# Patient Record
Sex: FEMALE | Race: WHITE | NOT HISPANIC OR LATINO | Employment: OTHER | URBAN - METROPOLITAN AREA
[De-identification: names, ages, dates, MRNs, and addresses within clinical notes are randomized per-mention and may not be internally consistent; named-entity substitution may affect disease eponyms.]

---

## 2017-05-22 ENCOUNTER — TELEPHONE (OUTPATIENT)
Dept: OBSTETRICS AND GYNECOLOGY | Facility: CLINIC | Age: 67
End: 2017-05-22

## 2017-05-22 NOTE — TELEPHONE ENCOUNTER
Returned call to [facility]. Questions regarding prescription were answered. Verbalized understanding.

## 2017-05-22 NOTE — TELEPHONE ENCOUNTER
----- Message from Afia Almaraz sent at 5/22/2017  2:41 PM CDT -----  Contact: [employer]/[phone]  They are calling about some questions they have about estrogens, conjugated, (PREMARIN) 0.625 MG tablet with a reference number of [id number].  Please advise

## 2017-09-07 RX ORDER — ESTROGENS, CONJUGATED 0.62 MG/1
0.62 TABLET, FILM COATED ORAL DAILY
Qty: 30 TABLET | Refills: 11 | OUTPATIENT
Start: 2017-09-07

## 2017-09-07 NOTE — TELEPHONE ENCOUNTER
----- Message from Latricia Johns sent at 9/7/2017 11:05 AM CDT -----  Contact: [phone]/[facility]   Pharmacy  requesting a refill on rx estrogens, conjugated, (PREMARIN) 0.625 MG tablet  . Please advise

## 2017-09-07 NOTE — TELEPHONE ENCOUNTER
Returned call to pharmacy. Pharmacy given refill authorization for a one month refill and notified to notify the patient that she should schedule and annual visit. Verbalized understanding.

## 2017-09-12 ENCOUNTER — TELEPHONE (OUTPATIENT)
Dept: OBSTETRICS AND GYNECOLOGY | Facility: CLINIC | Age: 67
End: 2017-09-12

## 2017-09-12 DIAGNOSIS — Z12.31 VISIT FOR SCREENING MAMMOGRAM: Primary | ICD-10-CM

## 2017-09-12 NOTE — TELEPHONE ENCOUNTER
----- Message from Lori Williamson sent at 9/12/2017  8:52 AM CDT -----  Contact: [phone]  Patient is  requesting an order for a mammogram

## 2017-10-02 ENCOUNTER — HOSPITAL ENCOUNTER (OUTPATIENT)
Dept: RADIOLOGY | Facility: HOSPITAL | Age: 67
Discharge: HOME | End: 2017-10-02
Attending: OBSTETRICS & GYNECOLOGY
Payer: MEDICARE

## 2017-10-02 ENCOUNTER — OFFICE VISIT (OUTPATIENT)
Dept: OBSTETRICS AND GYNECOLOGY | Facility: CLINIC | Age: 67
End: 2017-10-02
Payer: MEDICARE

## 2017-10-02 VITALS
HEIGHT: 64 IN | BODY MASS INDEX: 23.18 KG/M2 | DIASTOLIC BLOOD PRESSURE: 74 MMHG | WEIGHT: 135.81 LBS | SYSTOLIC BLOOD PRESSURE: 132 MMHG

## 2017-10-02 VITALS — WEIGHT: 124 LBS | HEIGHT: 64 IN | BODY MASS INDEX: 21.17 KG/M2

## 2017-10-02 DIAGNOSIS — Z12.4 ROUTINE PAPANICOLAOU SMEAR: Primary | ICD-10-CM

## 2017-10-02 DIAGNOSIS — Z12.31 VISIT FOR SCREENING MAMMOGRAM: ICD-10-CM

## 2017-10-02 PROCEDURE — 77063 BREAST TOMOSYNTHESIS BI: CPT | Mod: 26,,, | Performed by: RADIOLOGY

## 2017-10-02 PROCEDURE — 77067 SCR MAMMO BI INCL CAD: CPT | Mod: 26,,, | Performed by: RADIOLOGY

## 2017-10-02 PROCEDURE — 99212 OFFICE O/P EST SF 10 MIN: CPT | Mod: PBBFAC,PO | Performed by: OBSTETRICS & GYNECOLOGY

## 2017-10-02 PROCEDURE — 77067 SCR MAMMO BI INCL CAD: CPT | Mod: TC

## 2017-10-02 PROCEDURE — G0101 CA SCREEN;PELVIC/BREAST EXAM: HCPCS | Mod: S$PBB,,, | Performed by: OBSTETRICS & GYNECOLOGY

## 2017-10-02 PROCEDURE — 88175 CYTOPATH C/V AUTO FLUID REDO: CPT

## 2017-10-02 PROCEDURE — 99999 PR PBB SHADOW E&M-EST. PATIENT-LVL II: CPT | Mod: PBBFAC,,, | Performed by: OBSTETRICS & GYNECOLOGY

## 2017-10-02 NOTE — PROGRESS NOTES
"  HPI:  66 y.o.   OB History      Para Term  AB Living    0 0 0 0 0 0    SAB TAB Ectopic Multiple Live Births    0 0 0 0         No LMP recorded. Patient has had a hysterectomy.   Patient here for her annual gynecologic exam.  She has no complaints at this time.    ROS:  GENERAL: No fever, chills, fatigability or weight loss.  SKIN: No rashes, itching or changes in color or texture of skin.  HEAD: No headaches or recent head trauma.  EYES: Visual acuity fine. No photophobia, ocular pain or diplopia.  EARS: Denies ear pain, discharge or vertigo.  NOSE: No loss of smell, no epistaxis or postnasal drip.  MOUTH & THROAT: No hoarseness or change in voice. No excessive gum bleeding.  NODES: Denies swollen glands.  CHEST: Denies MILLAN, cyanosis, wheezing, cough and sputum production.  CARDIOVASCULAR: Denies chest pain, PND, orthopnea or reduced exercise tolerance.  ABDOMEN: Appetite fine. No weight loss. Denies diarrhea, abdominal pain, hematemesis or blood in stool.  URINARY: No flank pain, dysuria or hematuria.  PERIPHERAL VASCULAR: No claudication or cyanosis.  MUSCULOSKELETAL: No joint stiffness or swelling. Denies back pain.  NEUROLOGIC: No history of seizures, paralysis, alteration of gait or coordination.    PE:   /74   Ht 5' 4" (1.626 m)   Wt 61.6 kg (135 lb 12.9 oz)   BMI 23.31 kg/m²   APPEARANCE: Well nourished, well developed, in no acute distress.  NECK: Neck symmetric without masses or thyromegaly.  NODES: No inguinal lymph node enlargement.  ABDOMEN: Soft. No tenderness or masses. No hepatosplenomegaly. No hernias.  BREASTS: Symmetrical, no skin changes or visible lesions. No palpable masses, nipple discharge or adenopathy bilaterally.  PELVIC: Normal external female genitalia without lesions. Normal hair distribution. Adequate perineal body, normal urethral meatus. Vagina moist and well rugated without lesions or discharge. No significant cystocele or rectocele. Uterus and cervix " surgically absent. Bimanual exam revealed no masses, tenderness or abnormality.    Procedure:  Pap Smear    Assessment:  Normal Gynecologic Exam    Plan:  Mammogram and Colonoscopy as per current recommendations.   Return to clinic in one year or for any problems or complaints.  Ov out  On hrt, premarin  rx fioricet 3

## 2017-11-16 RX ORDER — BUTALBITAL, ACETAMINOPHEN, CAFFEINE AND CODEINE PHOSPHATE 50; 325; 40; 30 MG/1; MG/1; MG/1; MG/1
CAPSULE ORAL
Qty: 30 EACH | Refills: 0 | OUTPATIENT
Start: 2017-11-16

## 2017-11-16 NOTE — TELEPHONE ENCOUNTER
"After looking into pt chart it was found that ayana Hardy gave pt a Rx for fioricet with codeine on 11/14/17. She was given #40. I then called [facility] [employer] where it shows the medication was filled to verify if the medication was picked up by the pt and they confirmed that the medication was picked up by the pt. I called the patient back and notified her that her chart shows that she just had #40 fioricet with codeine sent in on 11/14/17 and she said "No". I advised her that I have already called the pharmacy and confirmed with them that she did indeed  the medicine and that because of that Dr. Wiedemann would not be able to fill her medicine. Pt said "ok, bye" and hung up.   "

## 2017-11-16 NOTE — TELEPHONE ENCOUNTER
----- Message from Chidi Mchugh sent at 11/16/2017  1:12 PM CST -----  Contact: [phone] self  Patient would like a refill of Fioricet # 3 sent to [facility] @ [phone] refill # [phone]. Patient is requesting a call back. Please call and advise.

## 2017-11-16 NOTE — TELEPHONE ENCOUNTER
----- Message from Chidi Mchugh sent at 11/16/2017  8:42 AM CST -----  Contact: [phone] self  Patient would like a refill of Fioricet # 3 sent to [facility] @ [phone] refill # [id number]. Please call and advise.

## 2018-04-11 ENCOUNTER — TELEPHONE (OUTPATIENT)
Dept: OBSTETRICS AND GYNECOLOGY | Facility: CLINIC | Age: 68
End: 2018-04-11

## 2018-04-11 NOTE — TELEPHONE ENCOUNTER
----- Message from Boo Hugo sent at 4/11/2018  9:33 AM CDT -----  Contact: [phone]/self  Pt requesting to speak with you concerning her insurance company sending her a letter stating she needs a prior authorization for estrogens, conjugated, (PREMARIN) 0.625 MG tablet being on May 22, 2018.     Please call and advise

## 2018-04-11 NOTE — TELEPHONE ENCOUNTER
Returned patients call.  Patient states she got a letter and PA form from insurance stating they will no longer pay for her medication PREMARIN after May 22. Informed her she can fax us the PA form and we will fill it out and fax it back to her insurance for them to approve or deny. Patient states she cannot fax but she will mail it to us and put it attention to Doris Middleton.

## 2018-05-02 ENCOUNTER — TELEPHONE (OUTPATIENT)
Dept: OBSTETRICS AND GYNECOLOGY | Facility: CLINIC | Age: 68
End: 2018-05-02

## 2018-05-02 NOTE — TELEPHONE ENCOUNTER
Pt notified we did not get anything in the mail. She says she needs something filled out to get her premarin. Pt advised it would be better if it could be faxed. Pt will have it faxed

## 2018-05-02 NOTE — TELEPHONE ENCOUNTER
----- Message from Ruby Marie sent at 5/2/2018  8:49 AM CDT -----  Contact: Self [phone]  Patient is calling to talk to nurse concerning to see if you have received the paper work she mailed to the office concerning her medication. Please advice

## 2018-05-03 ENCOUNTER — TELEPHONE (OUTPATIENT)
Dept: OBSTETRICS AND GYNECOLOGY | Facility: CLINIC | Age: 68
End: 2018-05-03

## 2018-05-03 NOTE — TELEPHONE ENCOUNTER
----- Message from Yaneth Olivares sent at 5/3/2018 11:31 AM CDT -----  Contact: Geovanna with [employer] [phone]  Geovanna with [employer] [phone] calling about the below medication. Please advise     Ref # [id number]      Premarin  ( Medication )     They have the below questions.    Needs her Diagnosis , Aware it is high risk for her age , Is pt in hospice ?

## 2018-05-04 ENCOUNTER — TELEPHONE (OUTPATIENT)
Dept: OBSTETRICS AND GYNECOLOGY | Facility: CLINIC | Age: 68
End: 2018-05-04

## 2018-05-04 NOTE — TELEPHONE ENCOUNTER
----- Message from Roslyn Wong sent at 5/4/2018  9:29 AM CDT -----  Contact: [employer]/[phone]  Medicare would like to speak with you about Prior Authorization for estrogens, conjugated, (PREMARIN) 0.625 MG tablet. Please advise.

## 2018-05-07 ENCOUNTER — TELEPHONE (OUTPATIENT)
Dept: OBSTETRICS AND GYNECOLOGY | Facility: CLINIC | Age: 68
End: 2018-05-07

## 2018-05-07 NOTE — TELEPHONE ENCOUNTER
----- Message from Tati Lizarraga sent at 5/7/2018  9:24 AM CDT -----  Contact: Self. [phone]  Patient would like to speak with you about a personal matter. Please advise

## 2018-05-07 NOTE — TELEPHONE ENCOUNTER
----- Message from Yaneth Olivares sent at 5/7/2018  1:14 PM CDT -----  Contact: Bobbi - [phone] with [employer]  Bobbi - [phone] with [employer], has clinical questions about the Premarin. Please advise

## 2018-05-07 NOTE — TELEPHONE ENCOUNTER
Returned patients call.   Patient is calling requesting prior authorization for her medication. Patient was notified that the office will work on this and let her know the status. Patient verbalized understanding.     [phone]

## 2018-05-08 NOTE — TELEPHONE ENCOUNTER
----- Message from Chidi Mchugh sent at 5/8/2018  8:36 AM CDT -----  Contact: jessica with [facility] [phone]  Rep called in returning your call. Please advise.

## 2018-10-06 RX ORDER — ESTROGENS, CONJUGATED 0.62 MG/1
TABLET, FILM COATED ORAL
Qty: 30 TABLET | Refills: 4 | Status: SHIPPED | OUTPATIENT
Start: 2018-10-06 | End: 2019-05-06 | Stop reason: SDUPTHER

## 2019-04-01 ENCOUNTER — TELEPHONE (OUTPATIENT)
Dept: OBSTETRICS AND GYNECOLOGY | Facility: CLINIC | Age: 69
End: 2019-04-01

## 2019-04-01 NOTE — TELEPHONE ENCOUNTER
----- Message from Yaneth Olivares sent at 4/1/2019 10:13 AM CDT -----  Contact: self / [phone]  Patient called regarding, she called to tell you that her  passed away, and she will come in to see you when things get settled in. Please advise

## 2019-05-06 DIAGNOSIS — Z12.39 SCREENING BREAST EXAMINATION: Primary | ICD-10-CM

## 2019-05-06 NOTE — TELEPHONE ENCOUNTER
----- Message from Luc Blackburn sent at 5/6/2019  1:06 PM CDT -----  Contact: self/[phone]  Type:  Mammogram    Caller is requesting to schedule their annual mammogram appointment.  Order is not listed in EPIC.  Please enter order and contact patient to schedule.  Name of Caller: Juan Antonio  Where would they like the mammogram performed? [facility]  Would the patient rather a call back or a response via [facility]? Call back  Best Call Back Number: [phone]  Additional Information:  She also needs her PREMARIN 0.625 mg tablet sent to the [facility] [address] in [place]

## 2019-05-13 ENCOUNTER — OFFICE VISIT (OUTPATIENT)
Dept: OBSTETRICS AND GYNECOLOGY | Facility: CLINIC | Age: 69
End: 2019-05-13
Payer: MEDICARE

## 2019-05-13 VITALS
WEIGHT: 135.81 LBS | SYSTOLIC BLOOD PRESSURE: 132 MMHG | HEIGHT: 64 IN | BODY MASS INDEX: 23.18 KG/M2 | DIASTOLIC BLOOD PRESSURE: 74 MMHG

## 2019-05-13 DIAGNOSIS — Z01.419 WELL WOMAN EXAM WITH ROUTINE GYNECOLOGICAL EXAM: ICD-10-CM

## 2019-05-13 DIAGNOSIS — Z12.4 ROUTINE PAPANICOLAOU SMEAR: Primary | ICD-10-CM

## 2019-05-13 PROCEDURE — G0101 CA SCREEN;PELVIC/BREAST EXAM: HCPCS | Mod: S$PBB,,, | Performed by: OBSTETRICS & GYNECOLOGY

## 2019-05-13 PROCEDURE — 99999 PR PBB SHADOW E&M-EST. PATIENT-LVL III: CPT | Mod: PBBFAC,,, | Performed by: OBSTETRICS & GYNECOLOGY

## 2019-05-13 PROCEDURE — 99999 PR PBB SHADOW E&M-EST. PATIENT-LVL III: ICD-10-PCS | Mod: PBBFAC,,, | Performed by: OBSTETRICS & GYNECOLOGY

## 2019-05-13 PROCEDURE — 99213 OFFICE O/P EST LOW 20 MIN: CPT | Mod: PBBFAC,PO | Performed by: OBSTETRICS & GYNECOLOGY

## 2019-05-13 PROCEDURE — G0101 PR CA SCREEN;PELVIC/BREAST EXAM: ICD-10-PCS | Mod: S$PBB,,, | Performed by: OBSTETRICS & GYNECOLOGY

## 2019-05-13 PROCEDURE — G0101 CA SCREEN;PELVIC/BREAST EXAM: HCPCS | Mod: PBBFAC,PO | Performed by: OBSTETRICS & GYNECOLOGY

## 2019-05-13 PROCEDURE — 88175 CYTOPATH C/V AUTO FLUID REDO: CPT

## 2019-05-13 RX ORDER — BUTALBITAL, ASPIRIN, CAFFEINE AND CODEINE PHOSPHATE 50; 325; 40; 30 MG/1; MG/1; MG/1; MG/1
1 CAPSULE ORAL EVERY 6 HOURS PRN
Qty: 20 CAPSULE | Refills: 0 | Status: SHIPPED | OUTPATIENT
Start: 2019-05-13 | End: 2019-05-23

## 2019-05-13 NOTE — PROGRESS NOTES
"  HPI:  68 y.o.   OB History        0    Para   0    Term   0       0    AB   0    Living   0       SAB   0    TAB   0    Ectopic   0    Multiple   0    Live Births                  No LMP recorded. Patient has had a hysterectomy.   Patient here for her annual gynecologic exam.  She has no complaints at this time.    ROS:  GENERAL: No fever, chills, fatigability or weight loss.  SKIN: No rashes, itching or changes in color or texture of skin.  HEAD: No headaches or recent head trauma.  EYES: Visual acuity fine. No photophobia, ocular pain or diplopia.  EARS: Denies ear pain, discharge or vertigo.  NOSE: No loss of smell, no epistaxis or postnasal drip.  MOUTH & THROAT: No hoarseness or change in voice. No excessive gum bleeding.  NODES: Denies swollen glands.  CHEST: Denies MILLAN, cyanosis, wheezing, cough and sputum production.  CARDIOVASCULAR: Denies chest pain, PND, orthopnea or reduced exercise tolerance.  ABDOMEN: Appetite fine. No weight loss. Denies diarrhea, abdominal pain, hematemesis or blood in stool.  URINARY: No flank pain, dysuria or hematuria.  PERIPHERAL VASCULAR: No claudication or cyanosis.  MUSCULOSKELETAL: No joint stiffness or swelling. Denies back pain.  NEUROLOGIC: No history of seizures, paralysis, alteration of gait or coordination.    PE:   /74   Ht 5' 4" (1.626 m)   Wt 61.6 kg (135 lb 12.9 oz)   BMI 23.31 kg/m²   APPEARANCE: Well nourished, well developed, in no acute distress.  NECK: Neck symmetric without masses or thyromegaly.  NODES: No inguinal lymph node enlargement.  ABDOMEN: Soft. No tenderness or masses. No hepatosplenomegaly. No hernias.  BREASTS: Symmetrical, no skin changes or visible lesions. No palpable masses, nipple discharge or adenopathy bilaterally.  PELVIC: Normal external female genitalia without lesions. Normal hair distribution. Adequate perineal body, normal urethral meatus. Vagina moist and well rugated without lesions or discharge. No " significant cystocele or rectocele. Uterus and cervix surgically absent. Bimanual exam revealed no masses, tenderness or abnormality.    Procedure:  Pap Smear    Assessment:  Normal Gynecologic Exam    Plan:  Mammogram and Colonoscopy as per current recommendations.   Return to clinic in one year or for any problems or complaints.  Doing well on premarin

## 2019-05-21 ENCOUNTER — HOSPITAL ENCOUNTER (OUTPATIENT)
Dept: RADIOLOGY | Facility: HOSPITAL | Age: 69
Discharge: HOME | End: 2019-05-21
Attending: OBSTETRICS & GYNECOLOGY
Payer: MEDICARE

## 2019-05-21 DIAGNOSIS — Z12.39 SCREENING BREAST EXAMINATION: ICD-10-CM

## 2019-05-21 PROCEDURE — 77067 MAMMO DIGITAL SCREENING BILAT WITH TOMOSYNTHESIS_CAD: ICD-10-PCS | Mod: 26,,, | Performed by: RADIOLOGY

## 2019-05-21 PROCEDURE — 77067 SCR MAMMO BI INCL CAD: CPT | Mod: 26,,, | Performed by: RADIOLOGY

## 2019-05-21 PROCEDURE — 77067 SCR MAMMO BI INCL CAD: CPT | Mod: TC

## 2019-05-21 PROCEDURE — 77063 MAMMO DIGITAL SCREENING BILAT WITH TOMOSYNTHESIS_CAD: ICD-10-PCS | Mod: 26,,, | Performed by: RADIOLOGY

## 2019-05-21 PROCEDURE — 77063 BREAST TOMOSYNTHESIS BI: CPT | Mod: 26,,, | Performed by: RADIOLOGY

## 2019-06-07 ENCOUNTER — TELEPHONE (OUTPATIENT)
Dept: OBSTETRICS AND GYNECOLOGY | Facility: CLINIC | Age: 69
End: 2019-06-07

## 2019-06-07 NOTE — TELEPHONE ENCOUNTER
Left message to return call  Pt insurance is not covering premarin without a PA. Will need to let pt know we can send rx to the [facility] pharmacy so PA can be completed

## 2019-06-07 NOTE — TELEPHONE ENCOUNTER
----- Message from Chidi Mchugh sent at 6/7/2019 12:55 PM CDT -----  Contact: riley mccann [phone]  Pharmacist requested to speak with the nurse because the estrogens, conjugated, (PREMARIN) 0.625 MG tablet is not covered under the patient insurance without a prior auth. Please call.

## 2019-06-11 ENCOUNTER — TELEPHONE (OUTPATIENT)
Dept: OBSTETRICS AND GYNECOLOGY | Facility: CLINIC | Age: 69
End: 2019-06-11

## 2019-06-11 NOTE — TELEPHONE ENCOUNTER
Pt state her insurance will call to see if pt need premarin. Pt was notified if the insurance call we will let her know

## 2019-06-11 NOTE — TELEPHONE ENCOUNTER
----- Message from Jeni Morales sent at 6/11/2019  8:54 AM CDT -----  Contact: [phone]/self  Patient requesting to speak with you regarding medication (PREMARIN) 0.625 MG tablet. She wants to keep it at [facility]. Please advise.

## 2019-06-11 NOTE — TELEPHONE ENCOUNTER
----- Message from Boo Hugo sent at 6/11/2019  1:46 PM CDT -----  Contact: Mar w/[employer] [phone]   Mar states she has clinical questions concerning a prior authorization received by the Dr.  Please call back to assist at [phone]

## 2019-10-02 ENCOUNTER — HOSPITAL ENCOUNTER (EMERGENCY)
Facility: HOSPITAL | Age: 69
Discharge: HOME | End: 2019-10-02
Attending: EMERGENCY MEDICINE
Payer: MEDICARE

## 2019-10-02 VITALS
HEART RATE: 78 BPM | DIASTOLIC BLOOD PRESSURE: 58 MMHG | HEIGHT: 64 IN | TEMPERATURE: 98 F | WEIGHT: 130 LBS | RESPIRATION RATE: 19 BRPM | OXYGEN SATURATION: 100 % | SYSTOLIC BLOOD PRESSURE: 125 MMHG | BODY MASS INDEX: 22.2 KG/M2

## 2019-10-02 DIAGNOSIS — S92.302A CLOSED DISPLACED FRACTURE OF METATARSAL BONE OF LEFT FOOT, UNSPECIFIED METATARSAL, INITIAL ENCOUNTER: ICD-10-CM

## 2019-10-02 DIAGNOSIS — M79.672 LEFT FOOT PAIN: ICD-10-CM

## 2019-10-02 DIAGNOSIS — S93.325A LISFRANC DISLOCATION, LEFT, INITIAL ENCOUNTER: Primary | ICD-10-CM

## 2019-10-02 DIAGNOSIS — S92.302A CLOSED NONDISPLACED FRACTURE OF METATARSAL BONE OF LEFT FOOT, UNSPECIFIED METATARSAL, INITIAL ENCOUNTER: ICD-10-CM

## 2019-10-02 DIAGNOSIS — M25.572 LEFT ANKLE PAIN: ICD-10-CM

## 2019-10-02 PROCEDURE — 99284 EMERGENCY DEPT VISIT MOD MDM: CPT | Mod: 25,,, | Performed by: EMERGENCY MEDICINE

## 2019-10-02 PROCEDURE — 99284 PR EMERGENCY DEPT VISIT,LEVEL IV: ICD-10-PCS | Mod: 25,,, | Performed by: EMERGENCY MEDICINE

## 2019-10-02 PROCEDURE — 25000003 PHARM REV CODE 250: Performed by: EMERGENCY MEDICINE

## 2019-10-02 PROCEDURE — 99283 EMERGENCY DEPT VISIT LOW MDM: CPT | Mod: 25

## 2019-10-02 PROCEDURE — 99220 PR INITIAL OBSERVATION CARE,LEVL III: CPT | Mod: GC,,, | Performed by: PODIATRIST

## 2019-10-02 PROCEDURE — 99220 PR INITIAL OBSERVATION CARE,LEVL III: ICD-10-PCS | Mod: GC,,, | Performed by: PODIATRIST

## 2019-10-02 PROCEDURE — 29515 PR APPLY LOWER LEG SPLINT: ICD-10-PCS | Mod: LT,,, | Performed by: EMERGENCY MEDICINE

## 2019-10-02 PROCEDURE — 29515 APPLICATION SHORT LEG SPLINT: CPT | Mod: LT,,, | Performed by: EMERGENCY MEDICINE

## 2019-10-02 RX ORDER — MORPHINE SULFATE 30 MG/1
30 TABLET ORAL EVERY 6 HOURS PRN
Qty: 12 TABLET | Refills: 0 | Status: SHIPPED | OUTPATIENT
Start: 2019-10-02 | End: 2019-10-05

## 2019-10-02 RX ORDER — HYDROCODONE BITARTRATE AND ACETAMINOPHEN 5; 325 MG/1; MG/1
1 TABLET ORAL
Status: COMPLETED | OUTPATIENT
Start: 2019-10-02 | End: 2019-10-02

## 2019-10-02 RX ORDER — MORPHINE SULFATE 15 MG/1
30 TABLET ORAL
Status: COMPLETED | OUTPATIENT
Start: 2019-10-02 | End: 2019-10-02

## 2019-10-02 RX ADMIN — HYDROCODONE BITARTRATE AND ACETAMINOPHEN 1 TABLET: 5; 325 TABLET ORAL at 09:10

## 2019-10-02 RX ADMIN — MORPHINE SULFATE 30 MG: 15 TABLET ORAL at 10:10

## 2019-10-02 NOTE — SUBJECTIVE & OBJECTIVE
Scheduled Meds:  Continuous Infusions:  PRN Meds:    Review of patient's allergies indicates:  No Known Allergies     Past Medical History:   Diagnosis Date    Hypertension      Past Surgical History:   Procedure Laterality Date    APPENDECTOMY      cyst removed from neck      HYSTERECTOMY  1986    OOPHORECTOMY Bilateral 1989    TONSILLECTOMY, ADENOIDECTOMY         Family History     Problem Relation (Age of Onset)    Hypertension Mother        Tobacco Use    Smoking status: Current Every Day Smoker    Smokeless tobacco: Never Used   Substance and Sexual Activity    Alcohol use: Not Currently     Comment: socially    Drug use: No    Sexual activity: Yes     Partners: Male     Comment:      Review of Systems   Constitutional: Negative for chills and fever.   Respiratory: Negative for apnea, cough, chest tightness and shortness of breath.    Cardiovascular: Positive for leg swelling. Negative for chest pain and palpitations.   Gastrointestinal: Negative for nausea and vomiting.   Musculoskeletal: Positive for arthralgias, gait problem and myalgias.   Neurological: Negative for weakness and numbness.   Hematological: Does not bruise/bleed easily.   Psychiatric/Behavioral: Negative for behavioral problems and confusion.     Objective:     Vital Signs (Most Recent):  Temp: 98.4 °F (36.9 °C) (10/02/19 1203)  Pulse: 78 (10/02/19 1203)  Resp: 19 (10/02/19 1203)  BP: (!) 125/58 (10/02/19 1203)  SpO2: 100 % (10/02/19 1203) Vital Signs (24h Range):  Temp:  [98 °F (36.7 °C)-98.4 °F (36.9 °C)] 98.4 °F (36.9 °C)  Pulse:  [78-90] 78  Resp:  [18-19] 19  SpO2:  [99 %-100 %] 100 %  BP: (120-125)/(58-70) 125/58     Weight: 59 kg (130 lb)  Body mass index is 22.31 kg/m².    Foot Exam    General  Orientation: alert and oriented to person, place, and time       Left Foot/Ankle      Inspection and Palpation  Ecchymosis: dorsum  Tenderness: metatarsals   Swelling: dorsum   Skin Exam: (ecchymosis noted to dorsal aspect of  forefoot left foot)    Neurovascular  Dorsalis pedis: 2+  Posterior tibial: 2+    Comments  Muscle strength deferred due to pain  ROM deferred due to pain.   NO pain with Ankle ROM      Laboratory:  CBC: No results for input(s): WBC, RBC, HGB, HCT, PLT, MCV, MCH, MCHC in the last 168 hours.  CMP: No results for input(s): GLU, CALCIUM, ALBUMIN, PROT, NA, K, CO2, CL, BUN, CREATININE, ALKPHOS, ALT, AST, BILITOT in the last 168 hours.    Diagnostic Results:  I have reviewed all pertinent imaging results/findings within the past 24 hours.  Imaging Results          X-Ray Tibia Fibula 2 View Left (Final result)  Result time 10/02/19 09:56:24    Final result by Howard Chan MD (10/02/19 09:56:24)                 Impression:      Unremarkable two views of the left tibia and fibula.      Electronically signed by: Howard Chan MD  Date:    10/02/2019  Time:    09:56             Narrative:    EXAMINATION:  XR TIBIA FIBULA 2 VIEW LEFT    CLINICAL HISTORY:  Pain in left ankle and joints of left foot    TECHNIQUE:  AP and lateral views of the left tibia and fibula were performed.    COMPARISON:  No prior studies are available.  This study was performed in conjunction with three views of the left ankle (with separate report).    FINDINGS:  No sites of displaced fracture or other acute osseous abnormalities are identified in the included regions.  There is unremarkable appearance of the included soft tissues and joint spaces on these two views.                               X-Ray Ankle Complete Left (Final result)  Result time 10/02/19 09:55:02    Final result by Howard Chan MD (10/02/19 09:55:02)                 Impression:      Unremarkable three views of the left ankle.      Electronically signed by: Howard Chan MD  Date:    10/02/2019  Time:    09:55             Narrative:    EXAMINATION:  XR ANKLE COMPLETE 3 VIEW LEFT    CLINICAL HISTORY:  Pain in left ankle and joints of left foot    TECHNIQUE:  AP, lateral and  "oblique views of the left ankle were performed.    COMPARISON:  No prior studies are available.  This study was performed in conjunction with two views of the left tibia and fibula.    FINDINGS:  The left ankle joint space is uniform and appears well-defined on these views.  The talar dome as well as the tibial plafond also appear intact on these views.    There are no sites of displaced fracture, periosteal reaction or other acute osseous abnormalities identified in the included regions.  There is unremarkable appearance of the remaining included joint spaces and soft tissues.                                X-Ray Foot Complete Left (Final result)  Result time 10/02/19 10:10:40    Final result by Howard Chan MD (10/02/19 10:10:40)                 Impression:      Fracturing of the distal 2nd through 3rd metatarsals clinical correlation for Lisfranc injury is recommended.    Findings were discussed with the referring physician at 10:10 a.m.  This report was flagged in Epic as abnormal.      Electronically signed by: Howard Chan MD  Date:    10/02/2019  Time:    10:10             Narrative:    EXAMINATION:  XR FOOT COMPLETE 3 VIEW LEFT    CLINICAL HISTORY:  .  Pain in left foot    TECHNIQUE:  AP, lateral and oblique views of the left foot were performed.    COMPARISON:  No prior studies of the left foot are available for comparison or correlation purposes.    FINDINGS:  There are findings consistent with healed oblique fracture of the 4th and 5th metatarsal shafts.  However, there is cortical irregularity and sites of cortical "step-off" involving the 2nd metatarsal head and at the junctions of the 3rd and 4th metatarsal heads and shaft consistent with fracturing in these regions.    On the AP view, there is also slight separation of the bases of the 2nd and 1st metatarsals without other associated findings.  Clinical correlation for Lisfranc dislocation or injury is recommended.    There are minor " degenerative type findings of the interphalangeal joints and at the 1st metatarsal-phalangeal articulation but with otherwise unremarkable appearance of the included soft tissues, osseous structures and joint spaces.                                Clinical Findings:  Moderate edema and ecchymosis to dorsal aspect of left forefoot. Light touch intact. Ankle ROM WNL. Muscle strength of left foot and ROM deferred due to pain.

## 2019-10-02 NOTE — CONSULTS
"[facility]  Podiatry  Consult Note    Patient Name: Juan Antonio Rowe  MRN: [id number]  Admission Date: 10/2/2019  Hospital Length of Stay: 0 days  Attending Physician: Johnnie Mckeon MD  Primary Care Provider: Ronal Boo MD     Inpatient consult to Podiatry  Consult performed by: Analia Milan MD  Consult ordered by: Johnnie Mckeon MD        Subjective:     History of Present Illness:  68 y.o Female with PMHx of HTN presents to ED for left foot pain. Pt reports she fell last night when she went to the bathroom and tripped on her dog. She placed herself in a CAM boot and EMS transported her to [facility] ED today. In the ED, xrays of left foot was taken and demonstrated slight separation of the bases of the 2nd and 1st metatarsals consistent with Lisfranc injury and  cortical "step-off" involving the 2nd metatarsal head and at the junctions of the 3rd and 4th metatarsal heads and shaft consistent with fracturing in these regions. Patient endorses to fracturing her left foot in the past and was treated conservatively with immobilization. Endorses to diffuse generalized pain and swelling to left foot at rest and with weightbearing. Denies numbness to left foot    Scheduled Meds:  Continuous Infusions:  PRN Meds:    Review of patient's allergies indicates:  No Known Allergies     Past Medical History:   Diagnosis Date    Hypertension      Past Surgical History:   Procedure Laterality Date    APPENDECTOMY      cyst removed from neck      HYSTERECTOMY  1986    OOPHORECTOMY Bilateral 1989    TONSILLECTOMY, ADENOIDECTOMY         Family History     Problem Relation (Age of Onset)    Hypertension Mother        Tobacco Use    Smoking status: Current Every Day Smoker    Smokeless tobacco: Never Used   Substance and Sexual Activity    Alcohol use: Not Currently     Comment: socially    Drug use: No    Sexual activity: Yes     Partners: Male     Comment:      Review of Systems "   Constitutional: Negative for chills and fever.   Respiratory: Negative for apnea, cough, chest tightness and shortness of breath.    Cardiovascular: Positive for leg swelling. Negative for chest pain and palpitations.   Gastrointestinal: Negative for nausea and vomiting.   Musculoskeletal: Positive for arthralgias, gait problem and myalgias.   Neurological: Negative for weakness and numbness.   Hematological: Does not bruise/bleed easily.   Psychiatric/Behavioral: Negative for behavioral problems and confusion.     Objective:     Vital Signs (Most Recent):  Temp: 98.4 °F (36.9 °C) (10/02/19 1203)  Pulse: 78 (10/02/19 1203)  Resp: 19 (10/02/19 1203)  BP: (!) 125/58 (10/02/19 1203)  SpO2: 100 % (10/02/19 1203) Vital Signs (24h Range):  Temp:  [98 °F (36.7 °C)-98.4 °F (36.9 °C)] 98.4 °F (36.9 °C)  Pulse:  [78-90] 78  Resp:  [18-19] 19  SpO2:  [99 %-100 %] 100 %  BP: (120-125)/(58-70) 125/58     Weight: 59 kg (130 lb)  Body mass index is 22.31 kg/m².    Foot Exam    General  Orientation: alert and oriented to person, place, and time       Left Foot/Ankle      Inspection and Palpation  Ecchymosis: dorsum  Tenderness: metatarsals   Swelling: dorsum   Skin Exam: (ecchymosis noted to dorsal aspect of forefoot left foot)    Neurovascular  Dorsalis pedis: 2+  Posterior tibial: 2+    Comments  Muscle strength deferred due to pain  ROM deferred due to pain.   NO pain with Ankle ROM      Laboratory:  CBC: No results for input(s): WBC, RBC, HGB, HCT, PLT, MCV, MCH, MCHC in the last 168 hours.  CMP: No results for input(s): GLU, CALCIUM, ALBUMIN, PROT, NA, K, CO2, CL, BUN, CREATININE, ALKPHOS, ALT, AST, BILITOT in the last 168 hours.    Diagnostic Results:  I have reviewed all pertinent imaging results/findings within the past 24 hours.  Imaging Results          X-Ray Tibia Fibula 2 View Left (Final result)  Result time 10/02/19 09:56:24    Final result by Howard Chan MD (10/02/19 09:56:24)                 Impression:       Unremarkable two views of the left tibia and fibula.      Electronically signed by: Howard Chan MD  Date:    10/02/2019  Time:    09:56             Narrative:    EXAMINATION:  XR TIBIA FIBULA 2 VIEW LEFT    CLINICAL HISTORY:  Pain in left ankle and joints of left foot    TECHNIQUE:  AP and lateral views of the left tibia and fibula were performed.    COMPARISON:  No prior studies are available.  This study was performed in conjunction with three views of the left ankle (with separate report).    FINDINGS:  No sites of displaced fracture or other acute osseous abnormalities are identified in the included regions.  There is unremarkable appearance of the included soft tissues and joint spaces on these two views.                               X-Ray Ankle Complete Left (Final result)  Result time 10/02/19 09:55:02    Final result by Howard Chan MD (10/02/19 09:55:02)                 Impression:      Unremarkable three views of the left ankle.      Electronically signed by: Howard Chan MD  Date:    10/02/2019  Time:    09:55             Narrative:    EXAMINATION:  XR ANKLE COMPLETE 3 VIEW LEFT    CLINICAL HISTORY:  Pain in left ankle and joints of left foot    TECHNIQUE:  AP, lateral and oblique views of the left ankle were performed.    COMPARISON:  No prior studies are available.  This study was performed in conjunction with two views of the left tibia and fibula.    FINDINGS:  The left ankle joint space is uniform and appears well-defined on these views.  The talar dome as well as the tibial plafond also appear intact on these views.    There are no sites of displaced fracture, periosteal reaction or other acute osseous abnormalities identified in the included regions.  There is unremarkable appearance of the remaining included joint spaces and soft tissues.                                X-Ray Foot Complete Left (Final result)  Result time 10/02/19 10:10:40    Final result by Howard Chan MD  "(10/02/19 10:10:40)                 Impression:      Fracturing of the distal 2nd through 3rd metatarsals clinical correlation for Lisfranc injury is recommended.    Findings were discussed with the referring physician at 10:10 a.m.  This report was flagged in Epic as abnormal.      Electronically signed by: Howard Chan MD  Date:    10/02/2019  Time:    10:10             Narrative:    EXAMINATION:  XR FOOT COMPLETE 3 VIEW LEFT    CLINICAL HISTORY:  .  Pain in left foot    TECHNIQUE:  AP, lateral and oblique views of the left foot were performed.    COMPARISON:  No prior studies of the left foot are available for comparison or correlation purposes.    FINDINGS:  There are findings consistent with healed oblique fracture of the 4th and 5th metatarsal shafts.  However, there is cortical irregularity and sites of cortical "step-off" involving the 2nd metatarsal head and at the junctions of the 3rd and 4th metatarsal heads and shaft consistent with fracturing in these regions.    On the AP view, there is also slight separation of the bases of the 2nd and 1st metatarsals without other associated findings.  Clinical correlation for Lisfranc dislocation or injury is recommended.    There are minor degenerative type findings of the interphalangeal joints and at the 1st metatarsal-phalangeal articulation but with otherwise unremarkable appearance of the included soft tissues, osseous structures and joint spaces.                                Clinical Findings:  Moderate edema and ecchymosis to dorsal aspect of left forefoot. Light touch intact. Ankle ROM WNL. Muscle strength of left foot and ROM deferred due to pain.     Assessment/Plan:     Lisfranc dislocation, left, initial encounter  68 y.o female with PMHx of HTN presents to ED with left foot pain. ED xray of left foot demonstrates slight separation of the bases of the 2nd and 1st metatarsals concerning for Lisfranc injury as well as cortical irregularity and sites " "of cortical "step-off" involving the 2nd metatarsal head and at the junctions of the 3rd and 4th metatarsal heads and shaft consistent with fracturing in these regions.    Plan:  -Left foot placed in Deng compression dressing. Keep dressing to left foot clean, dry, intact until podiatry follow up  -Patient instructed to be completely nonweightbearing on left foot  -Ordered knee scooter  -Patient tentatively scheduled for surgery next Tuesday 10/8. Pending preop clearance.   -Instructed to RICE left foot for edema.  -Will be dispensed home with PO pain medication per ED.         Thank you for your consult. I will follow-up with patient. Please contact us if you have any additional questions.    Analia Milan MD  Podiatry  [facility]  "

## 2019-10-02 NOTE — HPI
"68 y.o Female with PMHx of HTN presents to ED for left foot pain. Pt reports she fell last night when she went to the bathroom and tripped on her dog. She placed herself in a CAM boot and EMS transported her to [facility] ED today. In the ED, xrays of left foot was taken and demonstrated slight separation of the bases of the 2nd and 1st metatarsals consistent with Lisfranc injury and  cortical "step-off" involving the 2nd metatarsal head and at the junctions of the 3rd and 4th metatarsal heads and shaft consistent with fracturing in these regions. Patient endorses to fracturing her left foot in the past and was treated conservatively with immobilization. Endorses to diffuse generalized pain and swelling to left foot at rest and with weightbearing. Denies numbness to left foot  "

## 2019-10-02 NOTE — DISCHARGE INSTRUCTIONS
Per podiatry request, please follow up with your primary care physician prior to Tuesday to obtain pre-operative clearance.

## 2019-10-02 NOTE — ED NOTES
Encounter with patient for discharge only. No IV. Vital signs stable, alert/oriented x4, unlabored respirations. Instructed no ETOH or driving due to medication administration and prescriptions. Verbalized understanding of discharge instructions. Patient wheeled independently (on ortho scooter) with steady gait out of ER.

## 2019-10-02 NOTE — H&P (VIEW-ONLY)
"[facility]  Podiatry  Consult Note    Patient Name: Juan Antonio Rowe  MRN: [id number]  Admission Date: 10/2/2019  Hospital Length of Stay: 0 days  Attending Physician: Johnnie Mckeon MD  Primary Care Provider: Ronal Boo MD     Inpatient consult to Podiatry  Consult performed by: Analia Milan MD  Consult ordered by: Johnnie Mckeon MD        Subjective:     History of Present Illness:  68 y.o Female with PMHx of HTN presents to ED for left foot pain. Pt reports she fell last night when she went to the bathroom and tripped on her dog. She placed herself in a CAM boot and EMS transported her to [facility] ED today. In the ED, xrays of left foot was taken and demonstrated slight separation of the bases of the 2nd and 1st metatarsals consistent with Lisfranc injury and  cortical "step-off" involving the 2nd metatarsal head and at the junctions of the 3rd and 4th metatarsal heads and shaft consistent with fracturing in these regions. Patient endorses to fracturing her left foot in the past and was treated conservatively with immobilization. Endorses to diffuse generalized pain and swelling to left foot at rest and with weightbearing. Denies numbness to left foot    Scheduled Meds:  Continuous Infusions:  PRN Meds:    Review of patient's allergies indicates:  No Known Allergies     Past Medical History:   Diagnosis Date    Hypertension      Past Surgical History:   Procedure Laterality Date    APPENDECTOMY      cyst removed from neck      HYSTERECTOMY  1986    OOPHORECTOMY Bilateral 1989    TONSILLECTOMY, ADENOIDECTOMY         Family History     Problem Relation (Age of Onset)    Hypertension Mother        Tobacco Use    Smoking status: Current Every Day Smoker    Smokeless tobacco: Never Used   Substance and Sexual Activity    Alcohol use: Not Currently     Comment: socially    Drug use: No    Sexual activity: Yes     Partners: Male     Comment:      Review of Systems "   Constitutional: Negative for chills and fever.   Respiratory: Negative for apnea, cough, chest tightness and shortness of breath.    Cardiovascular: Positive for leg swelling. Negative for chest pain and palpitations.   Gastrointestinal: Negative for nausea and vomiting.   Musculoskeletal: Positive for arthralgias, gait problem and myalgias.   Neurological: Negative for weakness and numbness.   Hematological: Does not bruise/bleed easily.   Psychiatric/Behavioral: Negative for behavioral problems and confusion.     Objective:     Vital Signs (Most Recent):  Temp: 98.4 °F (36.9 °C) (10/02/19 1203)  Pulse: 78 (10/02/19 1203)  Resp: 19 (10/02/19 1203)  BP: (!) 125/58 (10/02/19 1203)  SpO2: 100 % (10/02/19 1203) Vital Signs (24h Range):  Temp:  [98 °F (36.7 °C)-98.4 °F (36.9 °C)] 98.4 °F (36.9 °C)  Pulse:  [78-90] 78  Resp:  [18-19] 19  SpO2:  [99 %-100 %] 100 %  BP: (120-125)/(58-70) 125/58     Weight: 59 kg (130 lb)  Body mass index is 22.31 kg/m².    Foot Exam    General  Orientation: alert and oriented to person, place, and time       Left Foot/Ankle      Inspection and Palpation  Ecchymosis: dorsum  Tenderness: metatarsals   Swelling: dorsum   Skin Exam: (ecchymosis noted to dorsal aspect of forefoot left foot)    Neurovascular  Dorsalis pedis: 2+  Posterior tibial: 2+    Comments  Muscle strength deferred due to pain  ROM deferred due to pain.   NO pain with Ankle ROM      Laboratory:  CBC: No results for input(s): WBC, RBC, HGB, HCT, PLT, MCV, MCH, MCHC in the last 168 hours.  CMP: No results for input(s): GLU, CALCIUM, ALBUMIN, PROT, NA, K, CO2, CL, BUN, CREATININE, ALKPHOS, ALT, AST, BILITOT in the last 168 hours.    Diagnostic Results:  I have reviewed all pertinent imaging results/findings within the past 24 hours.  Imaging Results          X-Ray Tibia Fibula 2 View Left (Final result)  Result time 10/02/19 09:56:24    Final result by Howard Chan MD (10/02/19 09:56:24)                 Impression:       Unremarkable two views of the left tibia and fibula.      Electronically signed by: Howard Chan MD  Date:    10/02/2019  Time:    09:56             Narrative:    EXAMINATION:  XR TIBIA FIBULA 2 VIEW LEFT    CLINICAL HISTORY:  Pain in left ankle and joints of left foot    TECHNIQUE:  AP and lateral views of the left tibia and fibula were performed.    COMPARISON:  No prior studies are available.  This study was performed in conjunction with three views of the left ankle (with separate report).    FINDINGS:  No sites of displaced fracture or other acute osseous abnormalities are identified in the included regions.  There is unremarkable appearance of the included soft tissues and joint spaces on these two views.                               X-Ray Ankle Complete Left (Final result)  Result time 10/02/19 09:55:02    Final result by Howard Chan MD (10/02/19 09:55:02)                 Impression:      Unremarkable three views of the left ankle.      Electronically signed by: Howard Chan MD  Date:    10/02/2019  Time:    09:55             Narrative:    EXAMINATION:  XR ANKLE COMPLETE 3 VIEW LEFT    CLINICAL HISTORY:  Pain in left ankle and joints of left foot    TECHNIQUE:  AP, lateral and oblique views of the left ankle were performed.    COMPARISON:  No prior studies are available.  This study was performed in conjunction with two views of the left tibia and fibula.    FINDINGS:  The left ankle joint space is uniform and appears well-defined on these views.  The talar dome as well as the tibial plafond also appear intact on these views.    There are no sites of displaced fracture, periosteal reaction or other acute osseous abnormalities identified in the included regions.  There is unremarkable appearance of the remaining included joint spaces and soft tissues.                                X-Ray Foot Complete Left (Final result)  Result time 10/02/19 10:10:40    Final result by Howard Chan MD  "(10/02/19 10:10:40)                 Impression:      Fracturing of the distal 2nd through 3rd metatarsals clinical correlation for Lisfranc injury is recommended.    Findings were discussed with the referring physician at 10:10 a.m.  This report was flagged in Epic as abnormal.      Electronically signed by: Howard Chan MD  Date:    10/02/2019  Time:    10:10             Narrative:    EXAMINATION:  XR FOOT COMPLETE 3 VIEW LEFT    CLINICAL HISTORY:  .  Pain in left foot    TECHNIQUE:  AP, lateral and oblique views of the left foot were performed.    COMPARISON:  No prior studies of the left foot are available for comparison or correlation purposes.    FINDINGS:  There are findings consistent with healed oblique fracture of the 4th and 5th metatarsal shafts.  However, there is cortical irregularity and sites of cortical "step-off" involving the 2nd metatarsal head and at the junctions of the 3rd and 4th metatarsal heads and shaft consistent with fracturing in these regions.    On the AP view, there is also slight separation of the bases of the 2nd and 1st metatarsals without other associated findings.  Clinical correlation for Lisfranc dislocation or injury is recommended.    There are minor degenerative type findings of the interphalangeal joints and at the 1st metatarsal-phalangeal articulation but with otherwise unremarkable appearance of the included soft tissues, osseous structures and joint spaces.                                Clinical Findings:  Moderate edema and ecchymosis to dorsal aspect of left forefoot. Light touch intact. Ankle ROM WNL. Muscle strength of left foot and ROM deferred due to pain.     Assessment/Plan:     Lisfranc dislocation, left, initial encounter  68 y.o female with PMHx of HTN presents to ED with left foot pain. ED xray of left foot demonstrates slight separation of the bases of the 2nd and 1st metatarsals concerning for Lisfranc injury as well as cortical irregularity and sites " "of cortical "step-off" involving the 2nd metatarsal head and at the junctions of the 3rd and 4th metatarsal heads and shaft consistent with fracturing in these regions.    Plan:  -Left foot placed in Deng compression dressing. Keep dressing to left foot clean, dry, intact until podiatry follow up  -Patient instructed to be completely nonweightbearing on left foot  -Ordered knee scooter  -Patient tentatively scheduled for surgery next Tuesday 10/8. Pending preop clearance.   -Instructed to RICE left foot for edema.  -Will be dispensed home with PO pain medication per ED.         Thank you for your consult. I will follow-up with patient. Please contact us if you have any additional questions.    Analia Milan MD  Podiatry  [facility]  "

## 2019-10-02 NOTE — ASSESSMENT & PLAN NOTE
"68 y.o female with PMHx of HTN presents to ED with left foot pain. ED xray of left foot demonstrates slight separation of the bases of the 2nd and 1st metatarsals concerning for Lisfranc injury as well as cortical irregularity and sites of cortical "step-off" involving the 2nd metatarsal head and at the junctions of the 3rd and 4th metatarsal heads and shaft consistent with fracturing in these regions.    Plan:  -Left foot placed in Deng compression dressing. Keep dressing to left foot clean, dry, intact until podiatry follow up  -Patient instructed to be completely nonweightbearing on left foot  -Ordered knee scooter  -Patient tentatively scheduled for surgery next Tuesday 10/8. Pending preop clearance.   -Instructed to RICE left foot for edema.  -Will be dispensed home with PO pain medication per ED.     "

## 2019-10-02 NOTE — ED PROVIDER NOTES
Encounter Date: 10/2/2019       History     Chief Complaint   Patient presents with    Foot Injury     PT had a trip and fall lastnight. Pt denies hitting head and no loc. left foot has swelling noted and deformity. 2+dorsalis pedis pulse noted. PT has broken left foot before.      HPI   Ms. Rowe is a 68 y.o. female with HTN, depression and anxiety here today with left ankle and foot pain. Reports she was walking in the dark around midnight when she lost her balance and fell, rolling her left ankle. Complaining of severe pain, swelling and color change to left ankle that does not radiate. Taken tylenol for pain which did not help much. Was unable to ambulate at home, so she called EMS for evaluation. Denies neck pain, back pain, head injury, chest pain, SOB, wound, abd pain, numbness, tingling, weakness.     Review of patient's allergies indicates:  No Known Allergies  Past Medical History:   Diagnosis Date    Hypertension      Past Surgical History:   Procedure Laterality Date    APPENDECTOMY      cyst removed from neck      HYSTERECTOMY  1986    OOPHORECTOMY Bilateral 1989    TONSILLECTOMY, ADENOIDECTOMY       Family History   Problem Relation Age of Onset    Hypertension Mother      Social History     Tobacco Use    Smoking status: Current Every Day Smoker    Smokeless tobacco: Never Used   Substance Use Topics    Alcohol use: Not Currently     Comment: socially    Drug use: No     Review of Systems   Constitutional: Negative for appetite change, fatigue and fever.   HENT: Negative for sore throat.    Eyes: Negative for visual disturbance.   Respiratory: Negative for shortness of breath.    Cardiovascular: Negative for chest pain and leg swelling.   Gastrointestinal: Negative for abdominal distention, abdominal pain, diarrhea, nausea and vomiting.   Genitourinary: Negative for dysuria.   Musculoskeletal: Positive for arthralgias, gait problem and joint swelling. Negative for back pain.   Skin:  Negative for rash and wound.   Neurological: Negative for weakness.   Hematological: Does not bruise/bleed easily.       Physical Exam     Initial Vitals [10/02/19 0849]   BP Pulse Resp Temp SpO2   120/70 90 18 98 °F (36.7 °C) 99 %      MAP       --         Physical Exam    Nursing note and vitals reviewed.  Constitutional: She appears well-developed and well-nourished. She is not diaphoretic. No distress.   HENT:   Head: Normocephalic and atraumatic.   Right Ear: External ear normal.   Left Ear: External ear normal.   Nose: Nose normal.   Mouth/Throat: Oropharynx is clear and moist.   Eyes: Conjunctivae and EOM are normal. Pupils are equal, round, and reactive to light.   Neck: Neck supple.   Cardiovascular: Normal rate, regular rhythm, normal heart sounds and intact distal pulses.   Pulmonary/Chest: Breath sounds normal. No respiratory distress. She has no wheezes. She has no rhonchi. She has no rales.   Abdominal: Soft. She exhibits no distension. There is no tenderness. There is no rebound and no guarding.   Musculoskeletal:   Swelling to left ankle w/ TTP over dorsum of midfoot as well as over lateral malleolus. Has no medial malleolar TTP.    Neurological: She is alert and oriented to person, place, and time. No cranial nerve deficit or sensory deficit. GCS score is 15. GCS eye subscore is 4. GCS verbal subscore is 5. GCS motor subscore is 6.   Decreased strength in left foot due to pain.   Skin: Skin is warm. Capillary refill takes less than 2 seconds. No rash noted.   Ecchymosis to left foot and ankle.   Psychiatric:   Appears anxious. Tearful.          ED Course   Orthopedic Injury  Date/Time: 10/2/2019 12:00 PM  Performed by: Analia Milan MD  Authorized by: Johnnie Mckeon MD     Injury:     Injury location:  Foot    Location details:  Left foot    Injury type:  Fracture-dislocation    Fracture type: second metatarsal, third metatarsal and fourth metatarsal        Pre-procedure assessment:      Neurovascular status: Neurovascularly intact      Distal perfusion: normal      Neurological function: normal      Range of motion: reduced        Selections made in this section will also lock the Injury type section above.:     Manipulation performed?: No      Immobilization:  Splint    Splint type: raffi gonzalez.    Supplies used:  Cotton padding and plaster  Post-procedure assessment:     Neurovascular status: Neurovascularly intact      Distal perfusion: normal      Neurological function: normal      Range of motion: splinted      Patient tolerance:  Patient tolerated the procedure well with no immediate complications     Also has Lisfranc injury      Labs Reviewed - No data to display       Imaging Results          X-Ray Tibia Fibula 2 View Left (Final result)  Result time 10/02/19 09:56:24    Final result by Howard Chan MD (10/02/19 09:56:24)                 Impression:      Unremarkable two views of the left tibia and fibula.      Electronically signed by: Howard Chan MD  Date:    10/02/2019  Time:    09:56             Narrative:    EXAMINATION:  XR TIBIA FIBULA 2 VIEW LEFT    CLINICAL HISTORY:  Pain in left ankle and joints of left foot    TECHNIQUE:  AP and lateral views of the left tibia and fibula were performed.    COMPARISON:  No prior studies are available.  This study was performed in conjunction with three views of the left ankle (with separate report).    FINDINGS:  No sites of displaced fracture or other acute osseous abnormalities are identified in the included regions.  There is unremarkable appearance of the included soft tissues and joint spaces on these two views.                               X-Ray Ankle Complete Left (Final result)  Result time 10/02/19 09:55:02    Final result by Howard Chan MD (10/02/19 09:55:02)                 Impression:      Unremarkable three views of the left ankle.      Electronically signed by: Howard Chan MD  Date:    10/02/2019  Time:    09:55     "         Narrative:    EXAMINATION:  XR ANKLE COMPLETE 3 VIEW LEFT    CLINICAL HISTORY:  Pain in left ankle and joints of left foot    TECHNIQUE:  AP, lateral and oblique views of the left ankle were performed.    COMPARISON:  No prior studies are available.  This study was performed in conjunction with two views of the left tibia and fibula.    FINDINGS:  The left ankle joint space is uniform and appears well-defined on these views.  The talar dome as well as the tibial plafond also appear intact on these views.    There are no sites of displaced fracture, periosteal reaction or other acute osseous abnormalities identified in the included regions.  There is unremarkable appearance of the remaining included joint spaces and soft tissues.                                X-Ray Foot Complete Left (Final result)  Result time 10/02/19 10:10:40    Final result by Howard Chan MD (10/02/19 10:10:40)                 Impression:      Fracturing of the distal 2nd through 3rd metatarsals clinical correlation for Lisfranc injury is recommended.    Findings were discussed with the referring physician at 10:10 a.m.  This report was flagged in Epic as abnormal.      Electronically signed by: Howard Chan MD  Date:    10/02/2019  Time:    10:10             Narrative:    EXAMINATION:  XR FOOT COMPLETE 3 VIEW LEFT    CLINICAL HISTORY:  .  Pain in left foot    TECHNIQUE:  AP, lateral and oblique views of the left foot were performed.    COMPARISON:  No prior studies of the left foot are available for comparison or correlation purposes.    FINDINGS:  There are findings consistent with healed oblique fracture of the 4th and 5th metatarsal shafts.  However, there is cortical irregularity and sites of cortical "step-off" involving the 2nd metatarsal head and at the junctions of the 3rd and 4th metatarsal heads and shaft consistent with fracturing in these regions.    On the AP view, there is also slight separation of the bases of " the 2nd and 1st metatarsals without other associated findings.  Clinical correlation for Lisfranc dislocation or injury is recommended.    There are minor degenerative type findings of the interphalangeal joints and at the 1st metatarsal-phalangeal articulation but with otherwise unremarkable appearance of the included soft tissues, osseous structures and joint spaces.                              X-Rays:   Independently Interpreted Readings:   Other Readings:  XR w/ multiple metatarsal fractures on my read. Clinical concern for Lisfranc injury on my read.    Medical Decision Making:   History:   Old Medical Records: I decided to obtain old medical records.  Old Records Summarized: records from clinic visits.  Independently Interpreted Test(s):   I have ordered and independently interpreted X-rays - see prior notes.  Clinical Tests:   Radiological Study: Ordered and Reviewed  ED Management:  Vitals normal. Afebrile. Here w/ left foot/ankle injury. Clinically concerned for fx. XR L foot, ankle, tib/fib obtained. Norco given for pain.     Continues to have pain; MSIR given.    XR w/ fractures and lisfranc injury.  Discussed with podiatry who evaluated pt.   They placed pt in splint.   Rolling walker obtained for pt.  Pt will need to obtain medical clearance prior to surgery next Tuesday, which I explicitly stated to pt as did podiatry resident.     rx for MSIR provided.  Stable for discharge at this time. Return precautions discussed.     Other:   I have discussed this case with another health care provider.       <> Summary of the Discussion: Orthopedic surgery              Attending Attestation:   Physician Attestation Statement for Resident:  As the supervising MD I was personally present during the critical portions of the procedure(s) performed by the resident and was immediately available in the ED to provide services and assistance as needed during the entire procedure.                       Clinical  Impression:       ICD-10-CM ICD-9-CM   1. Lisfranc dislocation, left, initial encounter S93.325A 838.03   2. Left foot pain M79.672 729.5   3. Left ankle pain M25.572 719.47   4. Closed displaced fracture of metatarsal bone of left foot, unspecified metatarsal, initial encounter S92.302A 825.25   5. Closed nondisplaced fracture of metatarsal bone of left foot, unspecified metatarsal, initial encounter S92.302A 825.25         Disposition:   Disposition: Discharged  Condition: Stable                        Johnnie Mckeon MD  10/03/19 1634

## 2019-10-03 DIAGNOSIS — S93.325A LISFRANC DISLOCATION, LEFT, INITIAL ENCOUNTER: Primary | ICD-10-CM

## 2019-10-04 ENCOUNTER — TELEPHONE (OUTPATIENT)
Dept: PODIATRY | Facility: CLINIC | Age: 69
End: 2019-10-04

## 2019-10-04 DIAGNOSIS — S93.325A LISFRANC DISLOCATION, LEFT, INITIAL ENCOUNTER: Primary | ICD-10-CM

## 2019-10-04 NOTE — TELEPHONE ENCOUNTER
Dr. Johnson, don't know what to do with that message, can you please call the patient back, she is asking for surgery ASAP, thank you

## 2019-10-08 ENCOUNTER — ANESTHESIA EVENT (OUTPATIENT)
Dept: SURGERY | Facility: HOSPITAL | Age: 69
End: 2019-10-08
Payer: MEDICARE

## 2019-10-09 ENCOUNTER — TELEPHONE (OUTPATIENT)
Dept: PODIATRY | Facility: CLINIC | Age: 69
End: 2019-10-09

## 2019-10-09 ENCOUNTER — HOSPITAL ENCOUNTER (OUTPATIENT)
Dept: RADIOLOGY | Facility: HOSPITAL | Age: 69
Discharge: HOME | End: 2019-10-09
Attending: STUDENT IN AN ORGANIZED HEALTH CARE EDUCATION/TRAINING PROGRAM
Payer: MEDICARE

## 2019-10-09 ENCOUNTER — ANESTHESIA (OUTPATIENT)
Dept: SURGERY | Facility: HOSPITAL | Age: 69
End: 2019-10-09
Payer: MEDICARE

## 2019-10-09 DIAGNOSIS — S93.325D LISFRANC DISLOCATION, LEFT, SUBSEQUENT ENCOUNTER: Primary | ICD-10-CM

## 2019-10-09 DIAGNOSIS — S93.325D LISFRANC DISLOCATION, LEFT, SUBSEQUENT ENCOUNTER: ICD-10-CM

## 2019-10-09 DIAGNOSIS — S93.325A LISFRANC DISLOCATION, LEFT, INITIAL ENCOUNTER: ICD-10-CM

## 2019-10-09 PROCEDURE — 73718 MRI LOWER EXTREMITY W/O DYE: CPT | Mod: TC,LT

## 2019-10-09 PROCEDURE — 73700 CT FOOT WITHOUT CONTRAST LEFT: ICD-10-PCS | Mod: 26,LT,, | Performed by: RADIOLOGY

## 2019-10-09 PROCEDURE — 73718 MRI FOOT (FOREFOOT) LEFT WITHOUT CONTRAST: ICD-10-PCS | Mod: 26,LT,, | Performed by: RADIOLOGY

## 2019-10-09 PROCEDURE — 73700 CT LOWER EXTREMITY W/O DYE: CPT | Mod: 26,LT,, | Performed by: RADIOLOGY

## 2019-10-09 PROCEDURE — 73700 CT LOWER EXTREMITY W/O DYE: CPT | Mod: TC,LT

## 2019-10-09 PROCEDURE — 73718 MRI LOWER EXTREMITY W/O DYE: CPT | Mod: 26,LT,, | Performed by: RADIOLOGY

## 2019-10-09 RX ORDER — HYDROCODONE BITARTRATE AND ACETAMINOPHEN 5; 325 MG/1; MG/1
1 TABLET ORAL EVERY 8 HOURS PRN
Qty: 30 TABLET | Refills: 0 | Status: ON HOLD | OUTPATIENT
Start: 2019-10-09 | End: 2019-10-11 | Stop reason: HOSPADM

## 2019-10-09 RX ORDER — HYDROCODONE BITARTRATE AND ACETAMINOPHEN 5; 325 MG/1; MG/1
1 TABLET ORAL EVERY 6 HOURS PRN
Qty: 8 TABLET | Refills: 0 | Status: ON HOLD | OUTPATIENT
Start: 2019-10-09 | End: 2019-10-11 | Stop reason: HOSPADM

## 2019-10-11 ENCOUNTER — TELEPHONE (OUTPATIENT)
Dept: PODIATRY | Facility: CLINIC | Age: 69
End: 2019-10-11

## 2019-10-11 ENCOUNTER — HOSPITAL ENCOUNTER (OUTPATIENT)
Facility: HOSPITAL | Age: 69
Discharge: HOME | End: 2019-10-11
Attending: PODIATRIST | Admitting: PODIATRIST
Payer: MEDICARE

## 2019-10-11 VITALS
HEIGHT: 65 IN | TEMPERATURE: 99 F | DIASTOLIC BLOOD PRESSURE: 74 MMHG | OXYGEN SATURATION: 100 % | RESPIRATION RATE: 16 BRPM | BODY MASS INDEX: 21.66 KG/M2 | HEART RATE: 86 BPM | SYSTOLIC BLOOD PRESSURE: 166 MMHG | WEIGHT: 130 LBS

## 2019-10-11 DIAGNOSIS — S93.325D LISFRANC DISLOCATION, LEFT, SUBSEQUENT ENCOUNTER: Primary | ICD-10-CM

## 2019-10-11 PROCEDURE — D9220A PRA ANESTHESIA: ICD-10-PCS | Mod: CRNA,,, | Performed by: NURSE ANESTHETIST, CERTIFIED REGISTERED

## 2019-10-11 PROCEDURE — D9220A PRA ANESTHESIA: Mod: CRNA,,, | Performed by: NURSE ANESTHETIST, CERTIFIED REGISTERED

## 2019-10-11 PROCEDURE — 71000016 HC POSTOP RECOV ADDL HR: Performed by: PODIATRIST

## 2019-10-11 PROCEDURE — 28615 REPAIR FOOT DISLOCATION: CPT | Mod: TA,GC,, | Performed by: PODIATRIST

## 2019-10-11 PROCEDURE — 36000709 HC OR TIME LEV III EA ADD 15 MIN: Performed by: PODIATRIST

## 2019-10-11 PROCEDURE — C1769 GUIDE WIRE: HCPCS | Performed by: PODIATRIST

## 2019-10-11 PROCEDURE — 71000044 HC DOSC ROUTINE RECOVERY FIRST HOUR: Performed by: PODIATRIST

## 2019-10-11 PROCEDURE — 37000009 HC ANESTHESIA EA ADD 15 MINS: Performed by: PODIATRIST

## 2019-10-11 PROCEDURE — 63600175 PHARM REV CODE 636 W HCPCS: Performed by: PODIATRIST

## 2019-10-11 PROCEDURE — 71000015 HC POSTOP RECOV 1ST HR: Performed by: PODIATRIST

## 2019-10-11 PROCEDURE — 36000708 HC OR TIME LEV III 1ST 15 MIN: Performed by: PODIATRIST

## 2019-10-11 PROCEDURE — 37000008 HC ANESTHESIA 1ST 15 MINUTES: Performed by: PODIATRIST

## 2019-10-11 PROCEDURE — 25000003 PHARM REV CODE 250: Performed by: STUDENT IN AN ORGANIZED HEALTH CARE EDUCATION/TRAINING PROGRAM

## 2019-10-11 PROCEDURE — 63600175 PHARM REV CODE 636 W HCPCS: Performed by: NURSE ANESTHETIST, CERTIFIED REGISTERED

## 2019-10-11 PROCEDURE — 27201423 OPTIME MED/SURG SUP & DEVICES STERILE SUPPLY: Performed by: PODIATRIST

## 2019-10-11 PROCEDURE — 28615 PR OPEN TREATMENT TARSOMETATARSAL JOINT DISLOCATION: ICD-10-PCS | Mod: 51,T1,GC, | Performed by: PODIATRIST

## 2019-10-11 PROCEDURE — 11012 PR DEBRIDE ASSOC OPEN FX/DISLO SKIN/MUS/BONE: ICD-10-PCS | Mod: 51,GC,, | Performed by: PODIATRIST

## 2019-10-11 PROCEDURE — D9220A PRA ANESTHESIA: ICD-10-PCS | Mod: ANES,,, | Performed by: ANESTHESIOLOGY

## 2019-10-11 PROCEDURE — D9220A PRA ANESTHESIA: Mod: ANES,,, | Performed by: ANESTHESIOLOGY

## 2019-10-11 PROCEDURE — 11012 DEB SKIN BONE AT FX SITE: CPT | Mod: 51,GC,, | Performed by: PODIATRIST

## 2019-10-11 PROCEDURE — 25000003 PHARM REV CODE 250: Performed by: PODIATRIST

## 2019-10-11 PROCEDURE — C1713 ANCHOR/SCREW BN/BN,TIS/BN: HCPCS | Performed by: PODIATRIST

## 2019-10-11 PROCEDURE — 25000003 PHARM REV CODE 250: Performed by: NURSE ANESTHETIST, CERTIFIED REGISTERED

## 2019-10-11 PROCEDURE — 63600175 PHARM REV CODE 636 W HCPCS: Performed by: ANESTHESIOLOGY

## 2019-10-11 RX ORDER — SODIUM CHLORIDE 9 MG/ML
INJECTION, SOLUTION INTRAVENOUS CONTINUOUS
Status: DISCONTINUED | OUTPATIENT
Start: 2019-10-11 | End: 2019-10-11 | Stop reason: HOSPADM

## 2019-10-11 RX ORDER — OXYCODONE HYDROCHLORIDE 5 MG/1
5 TABLET ORAL EVERY 4 HOURS PRN
Status: DISCONTINUED | OUTPATIENT
Start: 2019-10-11 | End: 2019-10-11

## 2019-10-11 RX ORDER — ONDANSETRON 4 MG/1
4 TABLET, FILM COATED ORAL EVERY 6 HOURS PRN
Qty: 30 TABLET | Refills: 0 | Status: ON HOLD | OUTPATIENT
Start: 2019-10-11 | End: 2021-02-27 | Stop reason: HOSPADM

## 2019-10-11 RX ORDER — FENTANYL CITRATE 50 UG/ML
25 INJECTION, SOLUTION INTRAMUSCULAR; INTRAVENOUS EVERY 5 MIN PRN
Status: COMPLETED | OUTPATIENT
Start: 2019-10-11 | End: 2019-10-11

## 2019-10-11 RX ORDER — ONDANSETRON 2 MG/ML
4 INJECTION INTRAMUSCULAR; INTRAVENOUS DAILY PRN
Status: DISCONTINUED | OUTPATIENT
Start: 2019-10-11 | End: 2019-10-11 | Stop reason: HOSPADM

## 2019-10-11 RX ORDER — BUPIVACAINE HYDROCHLORIDE 2.5 MG/ML
INJECTION, SOLUTION EPIDURAL; INFILTRATION; INTRACAUDAL
Status: DISCONTINUED | OUTPATIENT
Start: 2019-10-11 | End: 2019-10-11 | Stop reason: HOSPADM

## 2019-10-11 RX ORDER — CLONAZEPAM 1 MG/1
1 TABLET ORAL 2 TIMES DAILY PRN
Status: ON HOLD | COMMUNITY
End: 2021-02-09 | Stop reason: SDUPTHER

## 2019-10-11 RX ORDER — KETAMINE HCL IN 0.9 % NACL 50 MG/5 ML
SYRINGE (ML) INTRAVENOUS
Status: DISCONTINUED | OUTPATIENT
Start: 2019-10-11 | End: 2019-10-11

## 2019-10-11 RX ORDER — KETAMINE HCL IN 0.9 % NACL 50 MG/5 ML
SYRINGE (ML) INTRAVENOUS
Status: COMPLETED
Start: 2019-10-11 | End: 2019-10-11

## 2019-10-11 RX ORDER — FENTANYL CITRATE 50 UG/ML
INJECTION, SOLUTION INTRAMUSCULAR; INTRAVENOUS
Status: DISCONTINUED
Start: 2019-10-11 | End: 2019-10-11 | Stop reason: HOSPADM

## 2019-10-11 RX ORDER — HYDROMORPHONE HYDROCHLORIDE 1 MG/ML
0.2 INJECTION, SOLUTION INTRAMUSCULAR; INTRAVENOUS; SUBCUTANEOUS EVERY 5 MIN PRN
Status: COMPLETED | OUTPATIENT
Start: 2019-10-11 | End: 2019-10-11

## 2019-10-11 RX ORDER — OXYCODONE AND ACETAMINOPHEN 10; 325 MG/1; MG/1
1 TABLET ORAL EVERY 4 HOURS PRN
Qty: 32 TABLET | Refills: 0 | Status: SHIPPED | OUTPATIENT
Start: 2019-10-11 | End: 2019-10-18 | Stop reason: SDUPTHER

## 2019-10-11 RX ORDER — CEFAZOLIN SODIUM 1 G/3ML
INJECTION, POWDER, FOR SOLUTION INTRAMUSCULAR; INTRAVENOUS
Status: DISCONTINUED | OUTPATIENT
Start: 2019-10-11 | End: 2019-10-11

## 2019-10-11 RX ORDER — OXYCODONE AND ACETAMINOPHEN 5; 325 MG/1; MG/1
1 TABLET ORAL EVERY 4 HOURS PRN
Qty: 30 TABLET | Refills: 0 | Status: SHIPPED | OUTPATIENT
Start: 2019-10-11 | End: 2019-10-11 | Stop reason: HOSPADM

## 2019-10-11 RX ORDER — KETOROLAC TROMETHAMINE 30 MG/ML
30 INJECTION, SOLUTION INTRAMUSCULAR; INTRAVENOUS ONCE
Status: COMPLETED | OUTPATIENT
Start: 2019-10-11 | End: 2019-10-11

## 2019-10-11 RX ORDER — KETOROLAC TROMETHAMINE 30 MG/ML
INJECTION, SOLUTION INTRAMUSCULAR; INTRAVENOUS
Status: DISCONTINUED
Start: 2019-10-11 | End: 2019-10-11 | Stop reason: HOSPADM

## 2019-10-11 RX ORDER — LABETALOL HYDROCHLORIDE 5 MG/ML
INJECTION, SOLUTION INTRAVENOUS
Status: DISCONTINUED | OUTPATIENT
Start: 2019-10-11 | End: 2019-10-11

## 2019-10-11 RX ORDER — LIDOCAINE HYDROCHLORIDE 10 MG/ML
1 INJECTION, SOLUTION EPIDURAL; INFILTRATION; INTRACAUDAL; PERINEURAL ONCE
Status: COMPLETED | OUTPATIENT
Start: 2019-10-11 | End: 2019-10-11

## 2019-10-11 RX ORDER — OXYCODONE HYDROCHLORIDE 5 MG/1
TABLET ORAL
Status: DISCONTINUED
Start: 2019-10-11 | End: 2019-10-11 | Stop reason: HOSPADM

## 2019-10-11 RX ORDER — BUPIVACAINE HYDROCHLORIDE 2.5 MG/ML
INJECTION, SOLUTION EPIDURAL; INFILTRATION; INTRACAUDAL
Status: DISCONTINUED
Start: 2019-10-11 | End: 2019-10-11 | Stop reason: HOSPADM

## 2019-10-11 RX ORDER — ONDANSETRON 2 MG/ML
INJECTION INTRAMUSCULAR; INTRAVENOUS
Status: DISCONTINUED | OUTPATIENT
Start: 2019-10-11 | End: 2019-10-11

## 2019-10-11 RX ORDER — SODIUM CHLORIDE 9 MG/ML
INJECTION, SOLUTION INTRAVENOUS CONTINUOUS PRN
Status: DISCONTINUED | OUTPATIENT
Start: 2019-10-11 | End: 2019-10-11

## 2019-10-11 RX ORDER — OXYCODONE HYDROCHLORIDE 5 MG/1
10 TABLET ORAL EVERY 4 HOURS PRN
Status: DISCONTINUED | OUTPATIENT
Start: 2019-10-11 | End: 2019-10-11 | Stop reason: HOSPADM

## 2019-10-11 RX ORDER — PROPOFOL 10 MG/ML
VIAL (ML) INTRAVENOUS
Status: DISCONTINUED | OUTPATIENT
Start: 2019-10-11 | End: 2019-10-11

## 2019-10-11 RX ORDER — ACETAMINOPHEN 10 MG/ML
INJECTION, SOLUTION INTRAVENOUS
Status: DISCONTINUED | OUTPATIENT
Start: 2019-10-11 | End: 2019-10-11

## 2019-10-11 RX ORDER — LIDOCAINE HYDROCHLORIDE 10 MG/ML
INJECTION, SOLUTION EPIDURAL; INFILTRATION; INTRACAUDAL; PERINEURAL
Status: DISCONTINUED | OUTPATIENT
Start: 2019-10-11 | End: 2019-10-11 | Stop reason: HOSPADM

## 2019-10-11 RX ORDER — FENTANYL CITRATE 50 UG/ML
INJECTION, SOLUTION INTRAMUSCULAR; INTRAVENOUS
Status: DISCONTINUED | OUTPATIENT
Start: 2019-10-11 | End: 2019-10-11

## 2019-10-11 RX ORDER — MIDAZOLAM HYDROCHLORIDE 1 MG/ML
INJECTION, SOLUTION INTRAMUSCULAR; INTRAVENOUS
Status: DISCONTINUED | OUTPATIENT
Start: 2019-10-11 | End: 2019-10-11

## 2019-10-11 RX ORDER — LIDOCAINE HCL/PF 100 MG/5ML
SYRINGE (ML) INTRAVENOUS
Status: DISCONTINUED | OUTPATIENT
Start: 2019-10-11 | End: 2019-10-11

## 2019-10-11 RX ORDER — LIDOCAINE HYDROCHLORIDE 10 MG/ML
INJECTION INFILTRATION; PERINEURAL
Status: DISCONTINUED
Start: 2019-10-11 | End: 2019-10-11 | Stop reason: HOSPADM

## 2019-10-11 RX ADMIN — FENTANYL CITRATE 25 MCG: 50 INJECTION, SOLUTION INTRAMUSCULAR; INTRAVENOUS at 10:10

## 2019-10-11 RX ADMIN — FENTANYL CITRATE 25 MCG: 50 INJECTION INTRAMUSCULAR; INTRAVENOUS at 01:10

## 2019-10-11 RX ADMIN — SODIUM CHLORIDE, SODIUM GLUCONATE, SODIUM ACETATE, POTASSIUM CHLORIDE, MAGNESIUM CHLORIDE, SODIUM PHOSPHATE, DIBASIC, AND POTASSIUM PHOSPHATE: .53; .5; .37; .037; .03; .012; .00082 INJECTION, SOLUTION INTRAVENOUS at 11:10

## 2019-10-11 RX ADMIN — HYDROMORPHONE HYDROCHLORIDE 0.2 MG: 1 INJECTION, SOLUTION INTRAMUSCULAR; INTRAVENOUS; SUBCUTANEOUS at 01:10

## 2019-10-11 RX ADMIN — FENTANYL CITRATE 25 MCG: 50 INJECTION, SOLUTION INTRAMUSCULAR; INTRAVENOUS at 11:10

## 2019-10-11 RX ADMIN — ONDANSETRON 4 MG: 2 INJECTION INTRAMUSCULAR; INTRAVENOUS at 10:10

## 2019-10-11 RX ADMIN — FENTANYL CITRATE 25 MCG: 50 INJECTION INTRAMUSCULAR; INTRAVENOUS at 12:10

## 2019-10-11 RX ADMIN — KETOROLAC TROMETHAMINE 30 MG: 30 INJECTION, SOLUTION INTRAMUSCULAR at 02:10

## 2019-10-11 RX ADMIN — PROPOFOL 120 MG: 10 INJECTION, EMULSION INTRAVENOUS at 10:10

## 2019-10-11 RX ADMIN — PROPOFOL 30 MG: 10 INJECTION, EMULSION INTRAVENOUS at 10:10

## 2019-10-11 RX ADMIN — LIDOCAINE HYDROCHLORIDE 100 MG: 20 INJECTION, SOLUTION INTRAVENOUS at 10:10

## 2019-10-11 RX ADMIN — SODIUM CHLORIDE: 0.9 INJECTION, SOLUTION INTRAVENOUS at 10:10

## 2019-10-11 RX ADMIN — MIDAZOLAM HYDROCHLORIDE 2 MG: 1 INJECTION, SOLUTION INTRAMUSCULAR; INTRAVENOUS at 10:10

## 2019-10-11 RX ADMIN — SODIUM CHLORIDE: 0.9 INJECTION, SOLUTION INTRAVENOUS at 08:10

## 2019-10-11 RX ADMIN — OXYCODONE HYDROCHLORIDE 10 MG: 5 TABLET ORAL at 01:10

## 2019-10-11 RX ADMIN — FENTANYL CITRATE 50 MCG: 50 INJECTION, SOLUTION INTRAMUSCULAR; INTRAVENOUS at 10:10

## 2019-10-11 RX ADMIN — CEFAZOLIN 2 G: 330 INJECTION, POWDER, FOR SOLUTION INTRAMUSCULAR; INTRAVENOUS at 10:10

## 2019-10-11 RX ADMIN — HYDROMORPHONE HYDROCHLORIDE 0.2 MG: 1 INJECTION, SOLUTION INTRAMUSCULAR; INTRAVENOUS; SUBCUTANEOUS at 12:10

## 2019-10-11 RX ADMIN — FENTANYL CITRATE 25 MCG: 50 INJECTION INTRAMUSCULAR; INTRAVENOUS at 02:10

## 2019-10-11 RX ADMIN — Medication 10 MG: at 11:10

## 2019-10-11 RX ADMIN — LIDOCAINE HYDROCHLORIDE 2 MG: 10 INJECTION, SOLUTION EPIDURAL; INFILTRATION; INTRACAUDAL; PERINEURAL at 08:10

## 2019-10-11 RX ADMIN — ACETAMINOPHEN 1000 MG: 10 INJECTION, SOLUTION INTRAVENOUS at 10:10

## 2019-10-11 RX ADMIN — LABETALOL HYDROCHLORIDE 5 MG: 5 INJECTION, SOLUTION INTRAVENOUS at 10:10

## 2019-10-11 RX ADMIN — Medication 25 MG: at 10:10

## 2019-10-11 NOTE — BRIEF OP NOTE
[facility]  Brief Operative Note     SUMMARY     Surgery Date: 10/11/2019     Surgeon(s) and Role:     * Ferdinand Stauffer DPM - Primary     * Analia Milan DPM - Resident - Assisting      Pre-op Diagnosis:  Lisfranc dislocation, left, initial encounter [S93.325A]    Post-op Diagnosis:  Post-Op Diagnosis Codes:     * Lisfranc dislocation, left, initial encounter [S93.325A]    Procedure(s) (LRB):  ORIF, LISFRANC INJURY, FOOT (Left)    Anesthesia: General    Description of the findings of the procedure:    2 linear dorsal incisions were made using a #15 blade overlying the 1st metatarsal and medial cuneiform and the second incision overlying the 3rd metatarsal and lateral cuneiform. Sharp and blunt dissection was carried down through the subcutaneous tissues until the 1st, 2nd, 3rd, and 4th TMT joints were visualized, being mindful to protect all neurovascular structures. All small bleeding vessels were cauterized as necessary.     Diastasis > 3 mm between the bases of the 1st and 2nd metatarsals was noted. Fracture of the proximal 1st metatarsal along the medial aspect was noted. Oblique, complete, extraarticular fracture of the 2nd metatarsal base with dorsal displacement/elevation was visualized. 3rd metatarsal base fracture was nondisplaced.     Bony, fragmented debris at the TMT joints and intervening soft tissue were resected with rongeurs. Sequential reduction of the 1st and 2nd metatarsal and 1st and 2nd intercuneiform was performed. Disrupted 1st and 2nd TMT joints were repositioned back into proper alignment under fluoroscopy and once proper alignment was achieved, the dorsal lisfranc plate was applied and fixated with locking and nonlocking screws using proper AO principles.    Under fluoro, good reduction was noted as well as decreased distance between the 1st and 2nd metatarsal base was observed along with good realignment of the metatarsals to their respective cuneiforms. No  tarsometatarsal gapping was noted.  SubQ closure was achieved with 3-0, 4-0 Monocryl and skin was reapproximated with 3-0 Nylon. 30 mL of a 1:1 mixture of 1% lidocaine plain & .25% Marcaine plain was injected around the surgical incision sites. Well padded sterile dressing and posterior splint was applied to the left lower extremity.      Findings/Key Components: as above    Estimated Blood Loss: 10 mL       Specimens:   Specimen (12h ago, onward)    None

## 2019-10-11 NOTE — ANESTHESIA PREPROCEDURE EVALUATION
[facility]  Anesthesia Pre-Operative Evaluation         Patient Name: Juan Antonio Rowe  YOB: 1950  MRN: [id number]  [facility]: [id number]        Date of Procedure: 10/11/2019  Procedure: Procedure(s) (LRB):  ORIF, LISFRANC INJURY, FOOT (Left)  Anesthesia: Local MAC  Pre-Operative Diagnosis: <principal problem not specified>   Proceduralist/Surgeon(s) and Role:     * Ferdinand Stauffer DPM - Primary    SUBJECTIVE:     Juan Antonio Rowe is a 68 y.o. female w/ a significant PMHx listed below.   Patient now presents for the above procedure(s). Pt appropriately NPO.        Anesthesia Evaluation      Airway   Mallampati: I  TM distance: Normal, at least 6 cm  Neck ROM: Normal ROM  Dental    (+) In tact    Pulmonary    Cardiovascular   Exercise tolerance: good  (+) hypertension,     Neuro/Psych      GI/Hepatic/Renal      Endo/Other    Abdominal                   Relevant Problems   No relevant active problems      ALLERGIES:   Review of patient's allergies indicates:  No Known Allergies  MEDICATIONS:     Prior to Admission medications    Medication Sig Start Date End Date Taking? Authorizing Provider   calcium carbonate (OS-TORI) 600 mg (1,500 mg) Tab Take 600 mg by mouth 2 (two) times daily with meals.   Yes Historical Provider, MD   estrogens, conjugated, (PREMARIN) 0.625 MG tablet TAKE 1 TABLET (0.625 MG TOTAL) BY MOUTH ONCE DAILY. 6/5/19  Yes Michael A. Wiedemann, MD   HYDROcodone-acetaminophen (NORCO) 5-325 mg per tablet Take 1 tablet by mouth every 6 (six) hours as needed for Pain. 10/9/19 10/11/19 Yes Analia Milan MD   lisinopril-hydrochlorothiazide (PRINZIDE,ZESTORETIC) 20-12.5 mg per tablet Take 1 tablet by mouth once daily.   Yes Historical Provider, MD   codeine-butalbital-ASA-caffeine 54--40 mg (FIORINAL-CODEINE #3) 36--40 mg Cap Take 1 capsule by mouth every 4 (four) hours as needed.      Historical Provider, MD   HYDROcodone-acetaminophen (NORCO) 5-325 mg per tablet  "Take 1 tablet by mouth every 8 (eight) hours as needed for Pain. 10/9/19   Ferdinand Stauffer, GEN     History:     Patient Active Problem List   Diagnosis    Lisfranc dislocation, left, initial encounter      Past Medical History:   Diagnosis Date    Hypertension      Past Surgical History:   Procedure Laterality Date    APPENDECTOMY      cyst removed from neck      HYSTERECTOMY  1986    OOPHORECTOMY Bilateral 1989    TONSILLECTOMY, ADENOIDECTOMY       OBJECTIVE:   Last 3 sets of Vitals    Vitals - 1 value per visit 5/13/2019 10/2/2019 10/11/2019   SYSTOLIC 132 125 133   DIASTOLIC 74 58 65   PULSE - 78 83   TEMPERATURE - 98.4 98.3   RESPIRATIONS - 19 16   SPO2 - 100 99   Weight (lb) 135.8 130 130   Weight (kg) 61.6 58.968 58.968   HEIGHT 5' 4" 5' 4" 5' 4.5"   BODY MASS INDEX 23.31 22.31 21.97   VISIT REPORT - - -   Pain Score  0 - -       Significant Labs:  No results found for: WBC, HGB, HCT, PLT, CHOL, TRIG, HDL, LDLDIRECT, ALT, AST, NA, K, CL, CREATININE, BUN, CO2, TSH, PSA, INR, GLUF, HGBA1C, MICROALBUR  No results found for: PREGTESTUR, PREGSERUM, HCG, HCGQUANT   Patient's last menstrual period was 01/01/1986.    EKG:   No results found for this or any previous visit.  TTE:  No results found for this or any previous visit.  DHARA:  No results found for this or any previous visit.  Stress Test:  No results found for this or any previous visit.   LHC:  No results found for this or any previous visit.   PFT:  No results found for: FEV1, FVC, HOC9DOS, TLC, DLCO     ASSESSMENT/PLAN:     Anesthesia Evaluation    I have reviewed the Patient Summary Reports.    I have reviewed the Nursing Notes.   I have reviewed the Medications.     Review of Systems  Anesthesia Hx:  No problems with previous Anesthesia  Denies Family Hx of Anesthesia complications.   Denies Personal Hx of Anesthesia complications.   Hematology/Oncology:  Hematology Normal   Oncology Normal     EENT/Dental:EENT/Dental Normal   Cardiovascular:   " Exercise tolerance: good Hypertension    Pulmonary:  Pulmonary Normal    Renal/:  Renal/ Normal     Hepatic/GI:  Hepatic/GI Normal    Musculoskeletal:   Lisfranc dislocation   Neurological:  Neurology Normal    Endocrine:  Endocrine Normal    Dermatological:  Skin Normal    Psych:  Psychiatric Normal           Physical Exam  General:  Well nourished    Airway/Jaw/Neck:  Airway Findings: Mouth Opening: Normal Tongue: Normal  General Airway Assessment: Adult  Mallampati: I  TM Distance: Normal, at least 6 cm  Jaw/Neck Findings:  Neck ROM: Normal ROM     Eyes/Ears/Nose:  EYES/EARS/NOSE FINDINGS: Normal   Dental:  Dental Findings: In tact   Chest/Lungs:  Chest/Lungs Findings: Clear to auscultation     Heart/Vascular:  Heart Findings: Rhythm: Regular Rhythm  Sounds: Normal  Heart murmur: negative Vascular Findings: Normal    Abdomen:  Abdomen Findings: Normal    Musculoskeletal:  Musculoskeletal Findings: Normal   Skin:  Skin Findings: Normal    Mental Status:  Mental Status Findings:  Cooperative, Alert and Oriented         Anesthesia Plan  Type of Anesthesia, risks & benefits discussed:  Anesthesia Type:  general, MAC  Patient's Preference:   Intra-op Monitoring Plan: standard ASA monitors  Intra-op Monitoring Plan Comments:   Post Op Pain Control Plan: per primary service following discharge from PACU  Post Op Pain Control Plan Comments:   Induction:   IV  Beta Blocker:  Patient is not currently on a Beta-Blocker (No further documentation required).       Informed Consent: Patient understands risks and agrees with Anesthesia plan.  Questions answered. Anesthesia consent signed with patient.  ASA Score: 2     Day of Surgery Review of History & Physical:     H&P completed by Anesthesiologist.   Anesthesia Plan Notes: Chart reviewed, patient interviewed and examined.  The anesthetic plan was explained.  Risks, benefits, and alternatives were discussed. Questions were answered and the consent was signed.        LUIS ANTONIO  Corbin [facility]URN         Ready For Surgery From Anesthesia Perspective.

## 2019-10-11 NOTE — PROGRESS NOTES
Patient given prescription for pain medication.  Patient educated to stop all previously filled opioids.  Patient told not only take pain medication as needed for moderate to severe pain.

## 2019-10-11 NOTE — TRANSFER OF CARE
"Anesthesia Transfer of Care Note    Patient: Juan Antonio Rowe    Procedure(s) Performed: Procedure(s) (LRB):  ORIF, LISFRANC INJURY, FOOT (Left)    Patient location: [facility]    Anesthesia Type: general    Transport from OR: Transported from OR on 6-10 L/min O2 by face mask with adequate spontaneous ventilation    Post pain: adequate analgesia    Post assessment: no apparent anesthetic complications    Post vital signs: stable    Level of consciousness: awake, alert and oriented    Nausea/Vomiting: no nausea/vomiting    Complications: none    Transfer of care protocol was followed      Last vitals:   Visit Vitals  /71 (BP Location: Left arm, Patient Position: Lying)   Pulse 83   Temp 36.8 °C (98.3 °F) (Oral)   Resp 16   Ht 5' 4.5" (1.638 m)   Wt 59 kg (130 lb)   LMP 01/01/1986   SpO2 99%   BMI 21.97 kg/m²     "

## 2019-10-11 NOTE — ANESTHESIA POSTPROCEDURE EVALUATION
Anesthesia Post Evaluation    Patient: Juan Antonio Rowe    Procedure(s) Performed: Procedure(s) (LRB):  ORIF, LISFRANC INJURY, FOOT (Left)    Final Anesthesia Type: general  Patient location during evaluation: PACU  Patient participation: Yes- Able to Participate  Level of consciousness: awake and alert  Post-procedure vital signs: reviewed and stable  Pain management: adequate (patient requiring high levels of narcotics for pain control, pulse ox maintained throughout.  Offered pt regional block for postop pain mangement, but pt declined, saying her pain was adequately controlled for discharge to home)  Airway patency: patent  PONV status at discharge: No PONV  Anesthetic complications: no      Cardiovascular status: blood pressure returned to baseline  Respiratory status: unassisted  Hydration status: euvolemic  Follow-up not needed.          Vitals Value Taken Time   /74 10/11/2019  3:33 PM   Temp 37 °C (98.6 °F) 10/11/2019  3:30 PM   Pulse 91 10/11/2019  3:37 PM   Resp 16 10/11/2019  3:30 PM   SpO2 99 % 10/11/2019  3:37 PM   Vitals shown include unvalidated device data.      No case tracking events are documented in the log.      Pain/Teodoro Score: Pain Rating Prior to Med Admin: 8 (10/11/2019  2:33 PM)  Teodoro Score: 10 (10/11/2019  3:48 PM)

## 2019-10-11 NOTE — DISCHARGE INSTRUCTIONS

## 2019-10-11 NOTE — TELEPHONE ENCOUNTER
Nurse called pt to make post op appointment.N o answer Lvm with contact info. Appointment reminder will be mailed out

## 2019-10-12 NOTE — OP NOTE
Operative Note       Surgery Date: 10/11/2019     Surgeon(s) and Role:     * Ferdinand Stauffer DPM - Primary     * Analia Milan DPM - Resident - Assisting    Pre-op Diagnosis:  Lisfranc dislocation, left, initial encounter [S93.325A]    Post-op Diagnosis: Post-Op Diagnosis Codes:     * Lisfranc dislocation, left, initial encounter [S93.325A]    Procedure(s) (LRB):  ORIF, LISFRANC INJURY, FOOT (Left)    Anesthesia: General     Procedure in Detail/Findings:  The patient was brought to the operating room on a stretcher and placed on the operating table in a supine position. Following the successful induction of general anesthesia, a pneumatic tourniquet was placed on the left ankle. A timeout was performed verifying the patient's identity, surgical site, allergies, and medications. All were in agreement. The left foot was prepped and draped in a sterile manner. An esmarch bandage was used to exsanguinate the left foot. Tourniquet was inflated to 250mmHg.      Attention was directed to the left foot where the degree of injury to the tarsometatarsal joint was assessed by manipulating the forefoot on the midfoot under direct visualization via intraoperative fluoroscopy. Next,  2 linearl dorsal incisions were made using a #15 blade overlying the 1st metatarsal and medial cuneiform and the second incision overlying the 3rd metatarsal and lateral cuneiform. Sharp and blunt dissection was carried down through the subcutaneous tissues until the 1st, 2nd, 3rd, and 4th TMT joints were visualized, being mindful to protect all neurovascular structures. All small bleeding vessels were cauterized as necessary. Diastasis > 3 mm between the bases of the 1st and 2nd metatarsals was noted. Fracture of the proximal 1st metatarsal along the medial aspect was noted. Oblique, complete, extraarticular fracture of the 2nd metatarsal base with dorsal displacement/elevation was visualized. 3rd metatarsal base fracture was nondisplaced.  Bony, fragmented debris at the TMT joints and intervening soft tissue were resected with rongeurs. All soft tissue and periosteum was reflected off the proximal bases of metatarsals 1-2. Sequential reduction of the 1st and 2nd metatarsal and 1st and 2nd intercuneiform was performed. Disrupted 1st and 2nd TMT joints were repositioned back into proper alignment under fluoroscopy and temporary fixation of a dorsal 1st and 2nd Lisfranc TMT plate from [place] [employer] was achieved with olive wires.contoured manually to match the patient's anatomy.  Adjustment of the positioning of the Lisfranc plate was done under intraop fluoroscopy to verify alignment to ensure proper anatomic reduction.  Once proper alignment was achieved, the dorsal lisfranc plate was applied and fixated with locking and nonlocking screws using proper AO principles. Under fluoro, good reduction was noted as well as decreased distance between the 1st and 2nd metatarsal base was observed along with good realignment of the metatarsals to their respective cuneiforms. No tarsometatarsal gapping was noted. The surgical site was irrigated with copious amounts of normal sterile saline solution.  SubQ closure was achieved with 3-0, 4-0 Monocryl and skin was reapproximated with 3-0 Nylon. 30 mL of a 1:1 mixture of 1% lidocaine plain & .25% Marcaine plain was injected around the surgical incision sites. Xeroform was applied to the surgical incision sites and a well-padded sterile dressing consisting of 4x4 gauze, cast padding, kerlix, posterior splint, and ACE bandage was applied to the left foot while maintaining the foot at 90 degrees to the lower leg. Tourniquet was deflated and brisk capillary refill was noted to all digits of the left foot.  Patient tolerated the procedure and anesthesia well.      The patient was transferred to the recovery room with vital signs stable. Following a period of post op monitoring, the patient was discharged home  with the  following post op instructions:   1. Keep dressing dry and intact until Podiatry follow up.   2.Weight bearing status: Strict nonweightbearing to left foot. Use knee scooter for assistance.  3. All necessary prescriptions ordered and medical management will continue.   4. Contact Podiatry for all post op follow up care and if any problems arise.      Estimated Blood Loss: 10 mL           Specimens (From admission, onward)    None        Implants:   Implant Name Type Inv. Item Serial No.  Lot No. LRB No. Used   trocar tip k-wire (smooth) 2.0 x 150mm      Left 1   1st and 2nd TMT plate, LisFranc, dual ray      Left 1   1st and 2nd TMT plate, left, medium    [employer]  Left 1   R3CON locking screw      Left 2   R3CON locking screw      Left 1   R3CON locking screw      Left 1   R3CON non-locking screw      Left 2              Disposition: PACU - hemodynamically stable.           Condition: Good    Attestation:  I was present and scrubbed for the entire procedure.

## 2019-10-12 NOTE — DISCHARGE SUMMARY
[facility]  Podiatry  Discharge Summary      Patient Name: Juan Antonio Rowe  MRN: [id number]  Admission Date: 10/11/2019  Hospital Length of Stay: 0 days  Discharge Date and Time: 10/11/2019  3:53 PM  Attending Physician: Ferdinand Stauffer DPM  Discharging Provider: Ferdinand Stauffer DPM  Primary Care Provider: Ronal Boo MD    HPI: Juan Antonio Rowe is a 68 y.o. female who  has a past medical history of Hypertension.    Patient placed in outpatient surgery for below procedures.  Patient denies F/C/N/V    Procedure(s) (LRB):  ORIF, LISFRANC INJURY, FOOT (Left)      Hospital Course: .Had above procedures done without complication        Significant Diagnostic Studies: Labs: All labs within the past 24 hours have been reviewed    Pending Diagnostic Studies:     None        Final Active Diagnoses:    Diagnosis Date Noted POA    Lisfranc dislocation, left, subsequent encounter [S93.325D] 10/11/2019 Not Applicable      Problems Resolved During this Admission:      Discharged Condition: good    Disposition: Home or Self Care    Follow Up:    Patient Instructions:      Diet general     Diet Adult Regular     Keep surgical extremity elevated     Ice to affected area     Keep surgical extremity elevated     Ice to affected area     Call MD for:  temperature >100.4     Call MD for:  persistent nausea and vomiting     Call MD for:  severe uncontrolled pain     Call MD for:  difficulty breathing, headache or visual disturbances     Call MD for:  redness, tenderness, or signs of infection (pain, swelling, redness, odor or green/yellow discharge around incision site)     Call MD for:  hives     Call MD for:  persistent dizziness or light-headedness     Call MD for:  extreme fatigue     Leave dressing on - Keep it clean, dry, and intact until clinic visit     Weight bearing restrictions (specify):   Order Comments: Strict nonweightbearing to left lower extremity. Use knee scooter for assistance. Keep  posterior splint intact     Medications:  Reconciled Home Medications:      Medication List      START taking these medications    ondansetron 4 MG tablet  Commonly known as:  ZOFRAN  Take 1 tablet (4 mg total) by mouth every 6 (six) hours as needed for Nausea.     oxyCODONE-acetaminophen  mg per tablet  Commonly known as:  PERCOCET  Take 1 tablet by mouth every 4 (four) hours as needed for Pain.        STOP taking these medications    HYDROcodone-acetaminophen 5-325 mg per tablet  Commonly known as:  NORCO        ASK your doctor about these medications    calcium carbonate 600 mg calcium (1,500 mg) Tab  Commonly known as:  OS-TORI  Take 600 mg by mouth 2 (two) times daily with meals.     clonazePAM 1 MG tablet  Commonly known as:  KLONOPIN  Take 1 mg by mouth 2 (two) times daily as needed for Anxiety.     estrogens (conjugated) 0.625 MG tablet  Commonly known as:  PREMARIN  TAKE 1 TABLET (0.625 MG TOTAL) BY MOUTH ONCE DAILY.     FIORINAL-CODEINE #3 96--40 mg Cap  Generic drug:  codeine-butalbital-ASA-caffeine  Take 1 capsule by mouth every 4 (four) hours as needed.     lisinopril-hydrochlorothiazide 20-12.5 mg per tablet  Commonly known as:  PRINZIDE,ZESTORETIC  Take 1 tablet by mouth once daily.          Time spent on the discharge of patient: 20 minutes    Analia Milan MD  Podiatry  [facility]

## 2019-10-14 ENCOUNTER — TELEPHONE (OUTPATIENT)
Dept: PODIATRY | Facility: CLINIC | Age: 69
End: 2019-10-14

## 2019-10-14 NOTE — TELEPHONE ENCOUNTER
Nurse spoke with pt. Pt appointment moved back to 2:45 per her request        ----- Message from Caryl Somers sent at 10/14/2019  8:08 AM CDT -----  Contact: pt#[phone]  Pt is calling to reschedule post op appt. Please call

## 2019-10-18 ENCOUNTER — OFFICE VISIT (OUTPATIENT)
Dept: PODIATRY | Facility: CLINIC | Age: 69
End: 2019-10-18
Payer: MEDICARE

## 2019-10-18 VITALS
BODY MASS INDEX: 22.2 KG/M2 | SYSTOLIC BLOOD PRESSURE: 153 MMHG | HEART RATE: 105 BPM | DIASTOLIC BLOOD PRESSURE: 87 MMHG | WEIGHT: 130 LBS | HEIGHT: 64 IN

## 2019-10-18 DIAGNOSIS — S93.325D LISFRANC DISLOCATION, LEFT, SUBSEQUENT ENCOUNTER: Primary | ICD-10-CM

## 2019-10-18 PROCEDURE — 99024 POSTOP FOLLOW-UP VISIT: CPT | Mod: POP,,, | Performed by: PODIATRIST

## 2019-10-18 PROCEDURE — 99024 PR POST-OP FOLLOW-UP VISIT: ICD-10-PCS | Mod: POP,,, | Performed by: PODIATRIST

## 2019-10-18 PROCEDURE — 99213 OFFICE O/P EST LOW 20 MIN: CPT | Mod: PBBFAC | Performed by: PODIATRIST

## 2019-10-18 PROCEDURE — 99999 PR PBB SHADOW E&M-EST. PATIENT-LVL III: CPT | Mod: PBBFAC,,, | Performed by: PODIATRIST

## 2019-10-18 PROCEDURE — 99999 PR PBB SHADOW E&M-EST. PATIENT-LVL III: ICD-10-PCS | Mod: PBBFAC,,, | Performed by: PODIATRIST

## 2019-10-18 RX ORDER — SULFAMETHOXAZOLE AND TRIMETHOPRIM 400; 80 MG/1; MG/1
1 TABLET ORAL 2 TIMES DAILY
Qty: 14 TABLET | Refills: 0 | Status: ON HOLD | OUTPATIENT
Start: 2019-10-18 | End: 2021-02-09 | Stop reason: HOSPADM

## 2019-10-18 RX ORDER — OXYCODONE AND ACETAMINOPHEN 10; 325 MG/1; MG/1
1 TABLET ORAL EVERY 8 HOURS PRN
Qty: 40 TABLET | Refills: 0 | Status: SHIPPED | OUTPATIENT
Start: 2019-10-18 | End: 2019-10-25 | Stop reason: SDUPTHER

## 2019-10-22 NOTE — PROGRESS NOTES
Approximately 5 days status post left Lisfranc's ORIF.  She is doing well.  She has been nonweightbearing.  She lives alone however is managing with the help of her brother.  She has been taking her pain medication which is helping.  She has been keeping the area clean and dry.  Incisions are healing well no signs of infection.  Follow-up in 1 week.

## 2019-10-25 ENCOUNTER — OFFICE VISIT (OUTPATIENT)
Dept: PODIATRY | Facility: CLINIC | Age: 69
End: 2019-10-25
Payer: MEDICARE

## 2019-10-25 DIAGNOSIS — S93.325D LISFRANC DISLOCATION, LEFT, SUBSEQUENT ENCOUNTER: Primary | ICD-10-CM

## 2019-10-25 PROCEDURE — 99024 PR POST-OP FOLLOW-UP VISIT: ICD-10-PCS | Mod: POP,,, | Performed by: PODIATRIST

## 2019-10-25 PROCEDURE — 99999 PR PBB SHADOW E&M-EST. PATIENT-LVL I: ICD-10-PCS | Mod: PBBFAC,,, | Performed by: PODIATRIST

## 2019-10-25 PROCEDURE — 99211 OFF/OP EST MAY X REQ PHY/QHP: CPT | Mod: PBBFAC | Performed by: PODIATRIST

## 2019-10-25 PROCEDURE — 99024 POSTOP FOLLOW-UP VISIT: CPT | Mod: POP,,, | Performed by: PODIATRIST

## 2019-10-25 PROCEDURE — 99999 PR PBB SHADOW E&M-EST. PATIENT-LVL I: CPT | Mod: PBBFAC,,, | Performed by: PODIATRIST

## 2019-10-25 RX ORDER — OXYCODONE AND ACETAMINOPHEN 10; 325 MG/1; MG/1
1 TABLET ORAL EVERY 8 HOURS PRN
Qty: 40 TABLET | Refills: 0 | Status: ON HOLD | OUTPATIENT
Start: 2019-10-25 | End: 2021-02-09 | Stop reason: HOSPADM

## 2019-10-27 NOTE — PROGRESS NOTES
Approximately 2 weeks status post left Lisfranc's ORIF.  She is doing well.  She has been nonweightbearing.  She lives alone however is managing with the help of her brother.  She has been taking her pain medication which is helping.  She has been keeping the area clean and dry.  Incisions are healing well no signs of infection.  Follow-up in 1 week.

## 2019-10-28 ENCOUNTER — TELEPHONE (OUTPATIENT)
Dept: PODIATRY | Facility: CLINIC | Age: 69
End: 2019-10-28

## 2019-10-28 NOTE — TELEPHONE ENCOUNTER
Spoke with pharmacy who is questioning use of Klonopin in addition to Percocet.  Medication authorization given for both medications.

## 2019-10-28 NOTE — TELEPHONE ENCOUNTER
----- Message from Karuna Agee sent at 10/28/2019  9:46 AM CDT -----  Contact: Self @ [phone]  Pt stated that she went to the pharmacy to  her rx for oxyCODONE-acetaminophen (PERCOCET)  mg per tablet and they stated that the provider needs to override the rx because she is currently on a nerve blocking medication.    [facility]/pharmacy #[id number] - [place] - [address]  [address]  [place] [id number]  Phone: [phone] Fax: [phone]    -Hannah Israel

## 2019-11-12 ENCOUNTER — OFFICE VISIT (OUTPATIENT)
Dept: PODIATRY | Facility: CLINIC | Age: 69
End: 2019-11-12
Payer: MEDICARE

## 2019-11-12 VITALS
WEIGHT: 130 LBS | SYSTOLIC BLOOD PRESSURE: 156 MMHG | DIASTOLIC BLOOD PRESSURE: 105 MMHG | HEIGHT: 64 IN | HEART RATE: 84 BPM | BODY MASS INDEX: 22.2 KG/M2

## 2019-11-12 DIAGNOSIS — S93.325D LISFRANC DISLOCATION, LEFT, SUBSEQUENT ENCOUNTER: Primary | ICD-10-CM

## 2019-11-12 PROCEDURE — 99024 POSTOP FOLLOW-UP VISIT: CPT | Mod: POP,,, | Performed by: PODIATRIST

## 2019-11-12 PROCEDURE — 99024 PR POST-OP FOLLOW-UP VISIT: ICD-10-PCS | Mod: POP,,, | Performed by: PODIATRIST

## 2019-11-12 PROCEDURE — 99999 PR PBB SHADOW E&M-EST. PATIENT-LVL III: ICD-10-PCS | Mod: PBBFAC,,, | Performed by: PODIATRIST

## 2019-11-12 PROCEDURE — 99999 PR PBB SHADOW E&M-EST. PATIENT-LVL III: CPT | Mod: PBBFAC,,, | Performed by: PODIATRIST

## 2019-11-12 PROCEDURE — 99213 OFFICE O/P EST LOW 20 MIN: CPT | Mod: PBBFAC | Performed by: PODIATRIST

## 2019-11-12 RX ORDER — OXYCODONE AND ACETAMINOPHEN 7.5; 325 MG/1; MG/1
1 TABLET ORAL EVERY 8 HOURS PRN
Qty: 45 TABLET | Refills: 0 | Status: ON HOLD | OUTPATIENT
Start: 2019-11-12 | End: 2021-02-09 | Stop reason: HOSPADM

## 2019-12-02 RX ORDER — OXYCODONE AND ACETAMINOPHEN 5; 325 MG/1; MG/1
1 TABLET ORAL EVERY 8 HOURS PRN
Qty: 90 TABLET | Refills: 0 | Status: SHIPPED | OUTPATIENT
Start: 2019-12-02 | End: 2020-01-11

## 2019-12-04 ENCOUNTER — OFFICE VISIT (OUTPATIENT)
Dept: PODIATRY | Facility: CLINIC | Age: 69
End: 2019-12-04
Payer: MEDICARE

## 2019-12-04 VITALS
HEART RATE: 85 BPM | HEIGHT: 64 IN | BODY MASS INDEX: 22.2 KG/M2 | WEIGHT: 130 LBS | SYSTOLIC BLOOD PRESSURE: 150 MMHG | DIASTOLIC BLOOD PRESSURE: 105 MMHG

## 2019-12-04 DIAGNOSIS — S93.325D LISFRANC DISLOCATION, LEFT, SUBSEQUENT ENCOUNTER: Primary | ICD-10-CM

## 2019-12-04 PROCEDURE — 99213 OFFICE O/P EST LOW 20 MIN: CPT | Mod: PBBFAC | Performed by: PODIATRIST

## 2019-12-04 PROCEDURE — 99024 PR POST-OP FOLLOW-UP VISIT: ICD-10-PCS | Mod: POP,,, | Performed by: PODIATRIST

## 2019-12-04 PROCEDURE — 99024 POSTOP FOLLOW-UP VISIT: CPT | Mod: POP,,, | Performed by: PODIATRIST

## 2019-12-04 PROCEDURE — 99999 PR PBB SHADOW E&M-EST. PATIENT-LVL III: CPT | Mod: PBBFAC,,, | Performed by: PODIATRIST

## 2019-12-04 PROCEDURE — 99999 PR PBB SHADOW E&M-EST. PATIENT-LVL III: ICD-10-PCS | Mod: PBBFAC,,, | Performed by: PODIATRIST

## 2019-12-05 ENCOUNTER — HOSPITAL ENCOUNTER (EMERGENCY)
Facility: HOSPITAL | Age: 69
Discharge: HOME | End: 2019-12-05
Attending: EMERGENCY MEDICINE
Payer: MEDICARE

## 2019-12-05 ENCOUNTER — PATIENT OUTREACH (OUTPATIENT)
Dept: EMERGENCY MEDICINE | Facility: HOSPITAL | Age: 69
End: 2019-12-05

## 2019-12-05 VITALS
RESPIRATION RATE: 18 BRPM | SYSTOLIC BLOOD PRESSURE: 170 MMHG | TEMPERATURE: 98 F | DIASTOLIC BLOOD PRESSURE: 80 MMHG | HEART RATE: 88 BPM | OXYGEN SATURATION: 99 %

## 2019-12-05 DIAGNOSIS — Z78.9 HAS RUN OUT OF MEDICATIONS: Primary | ICD-10-CM

## 2019-12-05 DIAGNOSIS — Z91.148 NON COMPLIANCE W MEDICATION REGIMEN: ICD-10-CM

## 2019-12-05 DIAGNOSIS — F41.9 ANXIETY: ICD-10-CM

## 2019-12-05 DIAGNOSIS — Z71.89 MEDICATION CARE PLAN DISCUSSED WITH PATIENT: ICD-10-CM

## 2019-12-05 PROCEDURE — 99284 EMERGENCY DEPT VISIT MOD MDM: CPT | Mod: ,,, | Performed by: EMERGENCY MEDICINE

## 2019-12-05 PROCEDURE — 99284 PR EMERGENCY DEPT VISIT,LEVEL IV: ICD-10-PCS | Mod: ,,, | Performed by: EMERGENCY MEDICINE

## 2019-12-05 PROCEDURE — 99283 EMERGENCY DEPT VISIT LOW MDM: CPT

## 2019-12-05 PROCEDURE — 25000003 PHARM REV CODE 250: Performed by: PHYSICIAN ASSISTANT

## 2019-12-05 RX ORDER — CLONAZEPAM 0.5 MG/1
1 TABLET ORAL
Status: COMPLETED | OUTPATIENT
Start: 2019-12-05 | End: 2019-12-05

## 2019-12-05 RX ADMIN — CLONAZEPAM 1 MG: 0.5 TABLET ORAL at 10:12

## 2019-12-05 NOTE — ED NOTES
LOC: The patient is awake, alert and aware of environment with an appropriate affect, the patient is oriented x 3 and speaking appropriately.  APPEARANCE: Patient resting comfortably and in no acute distress, patient is clean and well groomed, patient's clothing is properly fastened.  SKIN: The skin is warm and dry, color consistent with ethnicity, patient has normal skin turgor and moist mucus membranes, skin intact, no breakdown or bruising noted.  RESPIRATORY: Airway is open and patent, respirations are spontaneous, patient has a normal effort and rate, no accessory muscle use noted  ABDOMEN: Soft and non tender to palpation, no distention noted  NEUROLOGIC:  facial expression is symmetrical, patient moving all extremities spontaneously, normal sensation in all extremities when touched with a finger.  Follows all commands appropriately.    Patient states she has been out of her Klonopin for about a week, patient states she believes she has been taking more than she should be taken, patient states now that she is out she cant eat for the last 5 days because without her medication none of the food tastes good, no acute distress noted, will continue to monitor

## 2019-12-05 NOTE — ED PROVIDER NOTES
"Encounter Date: 12/5/2019       History     Chief Complaint   Patient presents with    Out of Meds     reports being out of medication clonazapam x 4-5 days, not able to eat, weakness, pain to foot      The patient is prescribed Klonopin for anxiety. She states that she has been more anxious recently and has been taking the Klonopin more frequently that she is prescribed. She states that she ran out of the medication 4 days ago and is not due for a Rx refill until next week. She states that her anxiety is uncontrolled. She states that she has had a decreased appetite due to anxiety. "My nerves are so bad, I have barely been eating". She states that she has not contacted her prescribing doctor about this. She states that this has happened to her once before. She denies any additional symptoms or concerns. She is requesting Klonopin.         Review of patient's allergies indicates:  No Known Allergies  Past Medical History:   Diagnosis Date    Hypertension      Past Surgical History:   Procedure Laterality Date    APPENDECTOMY      cyst removed from neck      HYSTERECTOMY  1986    OOPHORECTOMY Bilateral 1989    OPEN REDUCTION AND INTERNAL FIXATION (ORIF) OF LISFRANC INJURY OF FOOT Left 10/11/2019    Procedure: ORIF, LISFRANC INJURY, FOOT;  Surgeon: Ferdinand Stauffer DPM;  Location: [facility] OR [address] [place] [address];  Service: Podiatry;  Laterality: Left;    TONSILLECTOMY, ADENOIDECTOMY       Family History   Problem Relation Age of Onset    Hypertension Mother      Social History     Tobacco Use    Smoking status: Current Every Day Smoker    Smokeless tobacco: Never Used   Substance Use Topics    Alcohol use: Not Currently     Comment: socially    Drug use: No     Review of Systems   Constitutional: Negative for chills and fever.   HENT: Negative for congestion and sore throat.    Eyes: Negative for visual disturbance.   Respiratory: Negative for cough, chest tightness, shortness of breath and wheezing.    Cardiovascular: " Negative for chest pain, palpitations and leg swelling.   Gastrointestinal: Negative for abdominal pain, diarrhea, nausea and vomiting.   Genitourinary: Negative for decreased urine volume.   Musculoskeletal: Negative for back pain and neck pain.   Skin: Negative for rash.   Neurological: Negative for dizziness, tremors, seizures, syncope, facial asymmetry, speech difficulty, light-headedness, numbness and headaches.   Psychiatric/Behavioral: Negative for agitation, behavioral problems, confusion, self-injury, sleep disturbance and suicidal ideas. The patient is nervous/anxious.        Physical Exam     Initial Vitals [12/05/19 1019]   BP Pulse Resp Temp SpO2   (!) 170/80 88 18 98.1 °F (36.7 °C) 99 %      MAP       --         Physical Exam    Nursing note and vitals reviewed.  Constitutional: She appears well-developed and well-nourished. She is not diaphoretic.   HENT:   Head: Normocephalic.   Mouth/Throat: Oropharynx is clear and moist.   Eyes: Conjunctivae are normal.   Cardiovascular: Normal rate and intact distal pulses.   Pulmonary/Chest: Breath sounds normal. No respiratory distress.   Abdominal: Soft. There is no tenderness.   Musculoskeletal: Normal range of motion. She exhibits no edema or tenderness.   Walking boot to left foot - seeing podiatry for lis franc injury - evaluated by podiatry yesterday.    Neurological: She is alert and oriented to person, place, and time. She has normal strength. No sensory deficit.   Skin: Skin is warm and dry. No rash noted.   Psychiatric:   Anxious. Good eye contact. No SI/HI.          ED Course   Procedures  Labs Reviewed - No data to display       Imaging Results    None          Medical Decision Making:   History:   Old Medical Records: I decided to obtain old medical records.  Initial Assessment:   Pt exceeding prescribed dose of klonopin and ran out a week early. Not due to be refilled until next week. Here c/o anxiety and asking for refill. Did not call her PCP  about this.   Differential Diagnosis:   Uncontrolled Anxiety, Non-compliance, Benzodiazepine dependence/withdrawal, Medication abuse, etc   Clinical Tests:   Lab Tests: Reviewed  ED Management:  I discussed the case with the ER attending physician   I had a lengthy discussion with the patient regarding importance of medication compliance and risks of overdose, depenance, and seizure/withdrawal.   Will give patient dose of Klonopin in the ER now, and advised her to go see her PCP this afternoon to formulate long term plan.   Pt advised to return to the ER if worse in any way                                   Clinical Impression:       ICD-10-CM ICD-9-CM   1. Has run out of medications Z78.9 V49.89   2. Anxiety F41.9 300.00   3. Medication care plan discussed with patient Z71.89 V65.49   4. Non compliance w medication regimen Z91.14 V15.81         Disposition:   Disposition: Discharged  Condition: Stable                     Barak Rain PA-C  12/05/19 [id number]

## 2019-12-08 NOTE — PROGRESS NOTES
Approximately 6 weeks status post left Lisfranc's ORIF.  She is doing well.  She lives alone however is managing with the help of her brother.  She has been taking her pain medication which is helping.  She has been keeping the area clean and dry.  Incisions are healing well no signs of infection.  She admittedly has been walking on the foot in her boot despite recommendations to remain completely nonweightbearing secondary to the ORIF procedure. Sutures removed.  Follow-up in 1 week.

## 2020-01-13 ENCOUNTER — TELEPHONE (OUTPATIENT)
Dept: PODIATRY | Facility: CLINIC | Age: 70
End: 2020-01-13

## 2020-01-13 NOTE — TELEPHONE ENCOUNTER
Called pat back and she said she is in a lot of pain. Ask for pain meds, something she had before, and she want to be send to [facility] on [address]. Pat want a call back as soon as the prescription is send out by Dr. Stauffer

## 2020-01-13 NOTE — TELEPHONE ENCOUNTER
----- Message from Cuate Calvin sent at 1/13/2020  8:29 AM CST -----  Contact: pt   Please call pt at [phone]    Patient is having left foot pain    Thank you

## 2020-01-13 NOTE — TELEPHONE ENCOUNTER
Patient is asking for pain meds . Patient is experiencing pain , please advise Kim lundberg advised patient she was going to be receiving a appt regarding appt  Patient is schedule to be seen

## 2020-01-14 ENCOUNTER — TELEPHONE (OUTPATIENT)
Dept: PODIATRY | Facility: CLINIC | Age: 70
End: 2020-01-14

## 2020-01-14 RX ORDER — TRAMADOL HYDROCHLORIDE 50 MG/1
50 TABLET ORAL EVERY 12 HOURS PRN
Qty: 40 TABLET | Refills: 1 | Status: ON HOLD | OUTPATIENT
Start: 2020-01-14 | End: 2021-02-09 | Stop reason: SDUPTHER

## 2020-01-14 NOTE — TELEPHONE ENCOUNTER
----- Message from Leisa Alonso sent at 1/14/2020  2:33 PM CST -----  Contact: self  Pt is returning nurse Luly phone call.      Contact Info

## 2020-01-16 ENCOUNTER — OFFICE VISIT (OUTPATIENT)
Dept: PODIATRY | Facility: CLINIC | Age: 70
End: 2020-01-16
Payer: MEDICARE

## 2020-01-16 ENCOUNTER — TELEPHONE (OUTPATIENT)
Dept: PODIATRY | Facility: CLINIC | Age: 70
End: 2020-01-16

## 2020-01-16 VITALS
DIASTOLIC BLOOD PRESSURE: 77 MMHG | SYSTOLIC BLOOD PRESSURE: 147 MMHG | HEART RATE: 124 BPM | BODY MASS INDEX: 22.2 KG/M2 | WEIGHT: 130 LBS | HEIGHT: 64 IN

## 2020-01-16 DIAGNOSIS — S93.325D LISFRANC DISLOCATION, LEFT, SUBSEQUENT ENCOUNTER: Primary | ICD-10-CM

## 2020-01-16 PROCEDURE — 99999 PR PBB SHADOW E&M-EST. PATIENT-LVL III: CPT | Mod: PBBFAC,,, | Performed by: PODIATRIST

## 2020-01-16 PROCEDURE — 99213 OFFICE O/P EST LOW 20 MIN: CPT | Mod: PBBFAC | Performed by: PODIATRIST

## 2020-01-16 PROCEDURE — 99024 PR POST-OP FOLLOW-UP VISIT: ICD-10-PCS | Mod: POP,,, | Performed by: PODIATRIST

## 2020-01-16 PROCEDURE — 99999 PR PBB SHADOW E&M-EST. PATIENT-LVL III: ICD-10-PCS | Mod: PBBFAC,,, | Performed by: PODIATRIST

## 2020-01-16 PROCEDURE — 99024 POSTOP FOLLOW-UP VISIT: CPT | Mod: POP,,, | Performed by: PODIATRIST

## 2020-01-16 RX ORDER — OXYCODONE AND ACETAMINOPHEN 5; 325 MG/1; MG/1
1 TABLET ORAL EVERY 4 HOURS PRN
Qty: 40 TABLET | Refills: 0 | Status: ON HOLD | OUTPATIENT
Start: 2020-01-16 | End: 2021-02-09 | Stop reason: HOSPADM

## 2020-01-16 NOTE — TELEPHONE ENCOUNTER
Patient called c/o tramadol doesn't agreed with pain pain level pain would like a script for hydrocodone refill called in

## 2020-01-16 NOTE — PROGRESS NOTES
Approximately 8 weeks status post left Lisfranc's ORIF.  She is doing well.  She lives alone however is managing with the help of her brother.  She has been taking her pain medication which is helping.  She has been keeping the area clean and dry.  Incisions are healing well no signs of infection.  She admittedly has been walking on the foot in her boot despite recommendations to remain completely nonweightbearing secondary to the ORIF procedure. Transition slowly out of the walking boot and into normal shoe gear.  Follow-up in 4 weeks.

## 2020-01-17 ENCOUNTER — TELEPHONE (OUTPATIENT)
Dept: PODIATRY | Facility: CLINIC | Age: 70
End: 2020-01-17

## 2020-01-17 NOTE — TELEPHONE ENCOUNTER
Nurse informed pt that medication was sent to [facility] [address]          ----- Message from Felicia Castro sent at 1/17/2020  9:17 AM CST -----  Contact: SELF  Dr Stauffer    Pt states that he prescription that was called into her pharmacy on yesterday do not work Pt states that she need something else ask for a  Call    Contact info  [phone]

## 2020-01-18 RX ORDER — OXYCODONE AND ACETAMINOPHEN 5; 325 MG/1; MG/1
1 TABLET ORAL EVERY 12 HOURS PRN
Qty: 40 TABLET | Refills: 0 | Status: ON HOLD | OUTPATIENT
Start: 2020-01-18 | End: 2021-02-09 | Stop reason: HOSPADM

## 2020-06-29 NOTE — TELEPHONE ENCOUNTER
----- Message from Ruby Frey sent at 6/29/2020  2:34 PM CDT -----  Regarding: Refills  Contact: [phone]  Patient is calling to get refills on her medication sent to [facility]/pharmacy #[id number] - [place], [place] - [address] [phone] (Phone) [phone] (Fax)    1. estrogens, conjugated, (PREMARIN) 0.625 MG tablet 30 tablet

## 2020-06-30 NOTE — TELEPHONE ENCOUNTER
----- Message from Jeni Morales sent at 6/30/2020  1:44 PM CDT -----  Regarding: refill  Patient is following up on a refill request for Premarin. [facility] on [address]. Please call and advise.

## 2020-08-04 ENCOUNTER — TELEPHONE (OUTPATIENT)
Dept: OBSTETRICS AND GYNECOLOGY | Facility: CLINIC | Age: 70
End: 2020-08-04

## 2020-08-04 DIAGNOSIS — Z12.31 ENCOUNTER FOR SCREENING MAMMOGRAM FOR BREAST CANCER: Primary | ICD-10-CM

## 2020-08-04 NOTE — TELEPHONE ENCOUNTER
----- Message from Canelo Benavides sent at 8/4/2020  9:33 AM CDT -----  Regarding: Orders request  Contact: self/ [phone]  Patient requesting orders for a mammogram. Please advise.

## 2020-08-27 ENCOUNTER — OFFICE VISIT (OUTPATIENT)
Dept: OBSTETRICS AND GYNECOLOGY | Facility: CLINIC | Age: 70
End: 2020-08-27
Payer: MEDICARE

## 2020-08-27 ENCOUNTER — HOSPITAL ENCOUNTER (OUTPATIENT)
Dept: RADIOLOGY | Facility: HOSPITAL | Age: 70
Discharge: HOME | End: 2020-08-27
Attending: OBSTETRICS & GYNECOLOGY
Payer: MEDICARE

## 2020-08-27 VITALS
BODY MASS INDEX: 23.19 KG/M2 | WEIGHT: 135.13 LBS | DIASTOLIC BLOOD PRESSURE: 64 MMHG | SYSTOLIC BLOOD PRESSURE: 110 MMHG

## 2020-08-27 DIAGNOSIS — Z01.419 WELL WOMAN EXAM WITH ROUTINE GYNECOLOGICAL EXAM: Primary | ICD-10-CM

## 2020-08-27 DIAGNOSIS — Z12.31 ENCOUNTER FOR SCREENING MAMMOGRAM FOR BREAST CANCER: ICD-10-CM

## 2020-08-27 PROCEDURE — 88142 CYTOPATH C/V THIN LAYER: CPT

## 2020-08-27 PROCEDURE — 77063 BREAST TOMOSYNTHESIS BI: CPT | Mod: 26,,, | Performed by: RADIOLOGY

## 2020-08-27 PROCEDURE — 99999 PR PBB SHADOW E&M-EST. PATIENT-LVL III: ICD-10-PCS | Mod: PBBFAC,,, | Performed by: OBSTETRICS & GYNECOLOGY

## 2020-08-27 PROCEDURE — 99999 PR PBB SHADOW E&M-EST. PATIENT-LVL III: CPT | Mod: PBBFAC,,, | Performed by: OBSTETRICS & GYNECOLOGY

## 2020-08-27 PROCEDURE — 99213 OFFICE O/P EST LOW 20 MIN: CPT | Mod: PBBFAC,PO | Performed by: OBSTETRICS & GYNECOLOGY

## 2020-08-27 PROCEDURE — 77067 SCR MAMMO BI INCL CAD: CPT | Mod: TC

## 2020-08-27 PROCEDURE — G0101 CA SCREEN;PELVIC/BREAST EXAM: HCPCS | Mod: PBBFAC,PO | Performed by: OBSTETRICS & GYNECOLOGY

## 2020-08-27 PROCEDURE — G0101 CA SCREEN;PELVIC/BREAST EXAM: HCPCS | Mod: S$PBB,,, | Performed by: OBSTETRICS & GYNECOLOGY

## 2020-08-27 PROCEDURE — 77067 SCR MAMMO BI INCL CAD: CPT | Mod: 26,,, | Performed by: RADIOLOGY

## 2020-08-27 PROCEDURE — 77063 MAMMO DIGITAL SCREENING BILAT WITH TOMOSYNTHESIS_CAD: ICD-10-PCS | Mod: 26,,, | Performed by: RADIOLOGY

## 2020-08-27 PROCEDURE — 77067 MAMMO DIGITAL SCREENING BILAT WITH TOMOSYNTHESIS_CAD: ICD-10-PCS | Mod: 26,,, | Performed by: RADIOLOGY

## 2020-08-27 PROCEDURE — G0101 PR CA SCREEN;PELVIC/BREAST EXAM: ICD-10-PCS | Mod: S$PBB,,, | Performed by: OBSTETRICS & GYNECOLOGY

## 2020-08-27 NOTE — PROGRESS NOTES
HPI:   69 y.o.   OB History        0    Para   0    Term   0       0    AB   0    Living   0       SAB   0    TAB   0    Ectopic   0    Multiple   0    Live Births                  Patient's last menstrual period was 1986.    Patient is  here for her annual gynecologic exam.  She has no complaints.     ROS:  GENERAL: No fever, chills, fatigability or weight loss.  SKIN: No rashes, itching or changes in color or texture of skin.  HEAD: No headaches or recent head trauma.  EYES: Visual acuity fine. No photophobia, ocular pain or diplopia.  EARS: Denies ear pain, discharge or vertigo.  NOSE: No loss of smell, no epistaxis or postnasal drip.  MOUTH & THROAT: No hoarseness or change in voice. No excessive gum bleeding.  NODES: Denies swollen glands.  CHEST: Denies MILLAN, cyanosis, wheezing, cough and sputum production.  CARDIOVASCULAR: Denies chest pain, PND, orthopnea or reduced exercise tolerance.  ABDOMEN: Appetite fine. No weight loss. Denies diarrhea, abdominal pain, hematemesis or blood in stool.  URINARY: No flank pain, dysuria or hematuria.  PERIPHERAL VASCULAR: No claudication or cyanosis.  MUSCULOSKELETAL: No joint stiffness or swelling. Denies back pain.  NEUROLOGIC: No history of seizures, paralysis, alteration of gait or coordination.    PE:   /64   Wt 61.3 kg (135 lb 1.6 oz)   LMP 1986   BMI 23.19 kg/m²   APPEARANCE: Well nourished, well developed, in no acute distress.  NECK: Neck symmetric without masses or thyromegaly.  BREASTS: Symmetrical, no skin changes or visible lesions. No palpable masses, nipple discharge or adenopathy bilaterally.  ABDOMEN: Flat. Soft. No tenderness or masses. No hepatosplenomegaly. No hernias. No CVA tenderness.  VULVA: No lesions. Normal female genitalia.  URETHRAL MEATUS: Normal size and location, no lesions, no prolapse.  URETHRA: No masses, tenderness, prolapse or scarring.  VAGINA: Moist and well rugated, no discharge, no significant  cystocele or rectocele.  ADNEXA: No masses, tenderness or CDS nodularity.  ANUS PERINEUM: Normal.    PROCEDURES:  Pap smear    Assessment:  Normal Gynecologic Exam    Plan:  Mammogram and Colonoscopy if indicated by current recommendations.  Return to clinic in one year or for any problems or complaints.  On premarin, but $$$

## 2020-09-01 NOTE — TELEPHONE ENCOUNTER
----- Message from Koki Aguilera sent at 9/1/2020 11:57 AM CDT -----  Regarding: refill  Contact: Juan Antonio  Type:  RX Refill Request    Who Called: patient  Refill or New Rx:refill  RX Name and Strength:estrogens, conjugated, (PREMARIN) 0.625 MG tablet  How is the patient currently taking it? (ex. 1XDay): TAKE 1 TABLET (0.625 MG TOTAL) BY MOUTH ONCE DAILY.  Is this a 30 day or 90 day RX: 30  [facility] [address]  Local or Mail Order:local  Ordering Provider: Dr. Michael Wiedemann  Would the patient rather a call back or a response via [facility]? Call back  Best Call Back Number:[phone]  Additional Information: N/A

## 2020-09-01 NOTE — TELEPHONE ENCOUNTER
----- Message from Hailey Nam sent at 9/1/2020  2:57 PM CDT -----  Contact: Opcfg-[phone]  Type:  Needs Medical Advice    Who Called: PT  Reason for Call: regarding needing a Prior Authorization  for her medication estrogens, conjugated, (PREMARIN) 0.625 MG tablet through her Insurance company  Pharmacy name and phone #:  [facility]/PHARMACY #[id number] - [place], [place] - [address]  Would the patient rather a call back or a response via [facility]? Call back  Best Call Back Number: [phone]

## 2020-09-01 NOTE — TELEPHONE ENCOUNTER
----- Message from Hailey Nam sent at 9/1/2020  2:57 PM CDT -----  Contact: [phone]  Type:  Needs Medical Advice    Who Called: PT  Reason for Call: regarding needing a Prior Authorization  for her medication estrogens, conjugated, (PREMARIN) 0.625 MG tablet through her Insurance company  Pharmacy name and phone #:  [facility]/PHARMACY #[id number] - [place], [place] - [address].  Would the patient rather a call back or a response via [facility]? Call back  Best Call Back Number: [phone]

## 2020-09-03 ENCOUNTER — TELEPHONE (OUTPATIENT)
Dept: OBSTETRICS AND GYNECOLOGY | Facility: CLINIC | Age: 70
End: 2020-09-03

## 2020-09-03 NOTE — TELEPHONE ENCOUNTER
Returned call, pt states her premarin rx has been denied so she purchases some items OTC in the drug store and will try that out and f/u with the office.

## 2020-09-03 NOTE — TELEPHONE ENCOUNTER
----- Message from Ruby Frey sent at 9/3/2020 10:17 AM CDT -----  Regarding: Call back  Contact: [phone]  Patient is calling to talk to nurse in regards to her Premerin hormone.

## 2020-09-15 LAB
FINAL PATHOLOGIC DIAGNOSIS: NORMAL
Lab: NORMAL

## 2021-01-21 ENCOUNTER — ANESTHESIA EVENT (OUTPATIENT)
Dept: SURGERY | Facility: HOSPITAL | Age: 71
DRG: 853 | End: 2021-01-21
Payer: MEDICARE

## 2021-01-21 ENCOUNTER — HOSPITAL ENCOUNTER (INPATIENT)
Facility: HOSPITAL | Age: 71
LOS: 19 days | Discharge: NURSING, REHAB OR LONG-TERM CARE | DRG: 853 | End: 2021-02-09
Attending: EMERGENCY MEDICINE | Admitting: SURGERY
Payer: MEDICARE

## 2021-01-21 ENCOUNTER — ANESTHESIA (OUTPATIENT)
Dept: SURGERY | Facility: HOSPITAL | Age: 71
DRG: 853 | End: 2021-01-21
Payer: MEDICARE

## 2021-01-21 DIAGNOSIS — R65.21 SEPTIC SHOCK: ICD-10-CM

## 2021-01-21 DIAGNOSIS — R10.84 GENERALIZED ABDOMINAL PAIN: ICD-10-CM

## 2021-01-21 DIAGNOSIS — R41.82 ALTERED MENTAL STATUS, UNSPECIFIED ALTERED MENTAL STATUS TYPE: ICD-10-CM

## 2021-01-21 DIAGNOSIS — D68.9 COAGULOPATHY: ICD-10-CM

## 2021-01-21 DIAGNOSIS — K65.9 PERITONITIS: ICD-10-CM

## 2021-01-21 DIAGNOSIS — I21.4 NSTEMI (NON-ST ELEVATED MYOCARDIAL INFARCTION): ICD-10-CM

## 2021-01-21 DIAGNOSIS — R00.0 TACHYCARDIA: ICD-10-CM

## 2021-01-21 DIAGNOSIS — R79.89 ELEVATED TROPONIN: ICD-10-CM

## 2021-01-21 DIAGNOSIS — Z74.09 IMPAIRED FUNCTIONAL MOBILITY AND ENDURANCE: ICD-10-CM

## 2021-01-21 DIAGNOSIS — N17.9 AKI (ACUTE KIDNEY INJURY): ICD-10-CM

## 2021-01-21 DIAGNOSIS — K55.9 COLONIC ISCHEMIA: ICD-10-CM

## 2021-01-21 DIAGNOSIS — K66.8 PNEUMOPERITONEUM: Primary | ICD-10-CM

## 2021-01-21 DIAGNOSIS — E86.0 DEHYDRATION: ICD-10-CM

## 2021-01-21 DIAGNOSIS — R94.31 QT PROLONGATION: ICD-10-CM

## 2021-01-21 DIAGNOSIS — T14.8XXA BRUISING: ICD-10-CM

## 2021-01-21 DIAGNOSIS — A41.9 SEPTIC SHOCK: ICD-10-CM

## 2021-01-21 DIAGNOSIS — Z99.11 ENCOUNTER FOR WEANING FROM VENTILATOR: ICD-10-CM

## 2021-01-21 DIAGNOSIS — N19 UREMIA: ICD-10-CM

## 2021-01-21 DIAGNOSIS — R41.82 AMS (ALTERED MENTAL STATUS): ICD-10-CM

## 2021-01-21 DIAGNOSIS — K25.1 ACUTE GASTRIC ULCER WITH PERFORATION: ICD-10-CM

## 2021-01-21 PROBLEM — R74.01 TRANSAMINITIS: Status: ACTIVE | Noted: 2021-01-21

## 2021-01-21 PROBLEM — R65.20 SEVERE SEPSIS: Status: ACTIVE | Noted: 2021-01-21

## 2021-01-21 LAB
ABO + RH BLD: NORMAL
ALBUMIN SERPL BCP-MCNC: 2.2 G/DL (ref 3.5–5.2)
ALBUMIN SERPL BCP-MCNC: 3.4 G/DL (ref 3.5–5.2)
ALLENS TEST: ABNORMAL
ALLENS TEST: ABNORMAL
ALLENS TEST: NORMAL
ALP SERPL-CCNC: 115 U/L (ref 55–135)
ALP SERPL-CCNC: 71 U/L (ref 55–135)
ALT SERPL W/O P-5'-P-CCNC: 120 U/L (ref 10–44)
ALT SERPL W/O P-5'-P-CCNC: 151 U/L (ref 10–44)
AMMONIA PLAS-SCNC: 52 UMOL/L (ref 10–50)
AMPHET+METHAMPHET UR QL: NEGATIVE
ANION GAP SERPL CALC-SCNC: 15 MMOL/L (ref 8–16)
ANION GAP SERPL CALC-SCNC: 17 MMOL/L (ref 8–16)
ANION GAP SERPL CALC-SCNC: 19 MMOL/L (ref 8–16)
ANION GAP SERPL CALC-SCNC: 32 MMOL/L (ref 8–16)
ANISOCYTOSIS BLD QL SMEAR: SLIGHT
ANISOCYTOSIS BLD QL SMEAR: SLIGHT
APTT BLDCRRT: 28.8 SEC (ref 21–32)
APTT BLDCRRT: 29.6 SEC (ref 21–32)
AST SERPL-CCNC: 585 U/L (ref 10–40)
AST SERPL-CCNC: 751 U/L (ref 10–40)
BARBITURATES UR QL SCN>200 NG/ML: NORMAL
BASO STIPL BLD QL SMEAR: ABNORMAL
BASO STIPL BLD QL SMEAR: ABNORMAL
BASOPHILS # BLD AUTO: 0 K/UL (ref 0–0.2)
BASOPHILS # BLD AUTO: 0 K/UL (ref 0–0.2)
BASOPHILS # BLD AUTO: 0.01 K/UL (ref 0–0.2)
BASOPHILS NFR BLD: 0 % (ref 0–1.9)
BASOPHILS NFR BLD: 0 % (ref 0–1.9)
BASOPHILS NFR BLD: 0.1 % (ref 0–1.9)
BENZODIAZ UR QL SCN>200 NG/ML: NEGATIVE
BILIRUB SERPL-MCNC: 0.2 MG/DL (ref 0.1–1)
BILIRUB SERPL-MCNC: 0.2 MG/DL (ref 0.1–1)
BLD GP AB SCN CELLS X3 SERPL QL: NORMAL
BLD PROD TYP BPU: NORMAL
BLOOD UNIT EXPIRATION DATE: NORMAL
BLOOD UNIT TYPE CODE: 9500
BLOOD UNIT TYPE: NORMAL
BUN SERPL-MCNC: 61 MG/DL (ref 8–23)
BUN SERPL-MCNC: 63 MG/DL (ref 8–23)
BUN SERPL-MCNC: 64 MG/DL (ref 8–23)
BUN SERPL-MCNC: 72 MG/DL (ref 6–30)
BUN SERPL-MCNC: 72 MG/DL (ref 8–23)
BZE UR QL SCN: NEGATIVE
CA-I BLDV-SCNC: 0.85 MMOL/L (ref 1.06–1.42)
CA-I BLDV-SCNC: 0.94 MMOL/L (ref 1.06–1.42)
CALCIUM SERPL-MCNC: 6.5 MG/DL (ref 8.7–10.5)
CALCIUM SERPL-MCNC: 7.1 MG/DL (ref 8.7–10.5)
CALCIUM SERPL-MCNC: 7.3 MG/DL (ref 8.7–10.5)
CALCIUM SERPL-MCNC: 9.1 MG/DL (ref 8.7–10.5)
CANNABINOIDS UR QL SCN: NEGATIVE
CHLORIDE SERPL-SCNC: 102 MMOL/L (ref 95–110)
CHLORIDE SERPL-SCNC: 105 MMOL/L (ref 95–110)
CHLORIDE SERPL-SCNC: 105 MMOL/L (ref 95–110)
CHLORIDE SERPL-SCNC: 106 MMOL/L (ref 95–110)
CHLORIDE SERPL-SCNC: 97 MMOL/L (ref 95–110)
CO2 SERPL-SCNC: 14 MMOL/L (ref 23–29)
CO2 SERPL-SCNC: 16 MMOL/L (ref 23–29)
CO2 SERPL-SCNC: 18 MMOL/L (ref 23–29)
CO2 SERPL-SCNC: 19 MMOL/L (ref 23–29)
CODING SYSTEM: NORMAL
CREAT SERPL-MCNC: 5.9 MG/DL (ref 0.5–1.4)
CREAT SERPL-MCNC: 6.2 MG/DL (ref 0.5–1.4)
CREAT SERPL-MCNC: 6.3 MG/DL (ref 0.5–1.4)
CREAT SERPL-MCNC: 8.3 MG/DL (ref 0.5–1.4)
CREAT SERPL-MCNC: 9.4 MG/DL (ref 0.5–1.4)
CREAT UR-MCNC: 210 MG/DL (ref 15–325)
CREAT UR-MCNC: 210 MG/DL (ref 15–325)
CTP QC/QA: YES
DELSYS: ABNORMAL
DELSYS: NORMAL
DIFFERENTIAL METHOD: ABNORMAL
DISPENSE STATUS: NORMAL
EOSINOPHIL # BLD AUTO: 0 K/UL (ref 0–0.5)
EOSINOPHIL NFR BLD: 0 % (ref 0–8)
ERYTHROCYTE [DISTWIDTH] IN BLOOD BY AUTOMATED COUNT: 16.3 % (ref 11.5–14.5)
ERYTHROCYTE [DISTWIDTH] IN BLOOD BY AUTOMATED COUNT: 16.4 % (ref 11.5–14.5)
ERYTHROCYTE [DISTWIDTH] IN BLOOD BY AUTOMATED COUNT: 16.6 % (ref 11.5–14.5)
EST. GFR  (AFRICAN AMERICAN): 5.1 ML/MIN/1.73 M^2
EST. GFR  (AFRICAN AMERICAN): 7.2 ML/MIN/1.73 M^2
EST. GFR  (AFRICAN AMERICAN): 7.3 ML/MIN/1.73 M^2
EST. GFR  (AFRICAN AMERICAN): 7.8 ML/MIN/1.73 M^2
EST. GFR  (NON AFRICAN AMERICAN): 4.5 ML/MIN/1.73 M^2
EST. GFR  (NON AFRICAN AMERICAN): 6.2 ML/MIN/1.73 M^2
EST. GFR  (NON AFRICAN AMERICAN): 6.3 ML/MIN/1.73 M^2
EST. GFR  (NON AFRICAN AMERICAN): 6.7 ML/MIN/1.73 M^2
ETHANOL SERPL-MCNC: <10 MG/DL
FIBRINOGEN PPP-MCNC: 398 MG/DL (ref 182–366)
GIANT PLATELETS BLD QL SMEAR: PRESENT
GLUCOSE SERPL-MCNC: 101 MG/DL (ref 70–110)
GLUCOSE SERPL-MCNC: 123 MG/DL (ref 70–110)
GLUCOSE SERPL-MCNC: 125 MG/DL (ref 70–110)
GLUCOSE SERPL-MCNC: 90 MG/DL (ref 70–110)
GLUCOSE SERPL-MCNC: 95 MG/DL (ref 70–110)
GLUCOSE SERPL-MCNC: 98 MG/DL (ref 70–110)
HCO3 UR-SCNC: 18.4 MMOL/L (ref 24–28)
HCO3 UR-SCNC: 19.3 MMOL/L (ref 24–28)
HCO3 UR-SCNC: 20.1 MMOL/L (ref 24–28)
HCT VFR BLD AUTO: 19.6 % (ref 37–48.5)
HCT VFR BLD AUTO: 22.9 % (ref 37–48.5)
HCT VFR BLD AUTO: 30.4 % (ref 37–48.5)
HCT VFR BLD CALC: 16 %PCV (ref 36–54)
HCT VFR BLD CALC: 17 %PCV (ref 36–54)
HCT VFR BLD CALC: 27 %PCV (ref 36–54)
HGB BLD-MCNC: 6.1 G/DL (ref 12–16)
HGB BLD-MCNC: 6.9 G/DL (ref 12–16)
HGB BLD-MCNC: 9.1 G/DL (ref 12–16)
HYPOCHROMIA BLD QL SMEAR: ABNORMAL
HYPOCHROMIA BLD QL SMEAR: ABNORMAL
IMM GRANULOCYTES # BLD AUTO: 0.01 K/UL (ref 0–0.04)
IMM GRANULOCYTES # BLD AUTO: 0.01 K/UL (ref 0–0.04)
IMM GRANULOCYTES # BLD AUTO: 0.03 K/UL (ref 0–0.04)
IMM GRANULOCYTES NFR BLD AUTO: 0.2 % (ref 0–0.5)
IMM GRANULOCYTES NFR BLD AUTO: 0.4 % (ref 0–0.5)
IMM GRANULOCYTES NFR BLD AUTO: 0.4 % (ref 0–0.5)
INR PPP: 1.6 (ref 0.8–1.2)
INR PPP: 1.7 (ref 0.8–1.2)
INR PPP: 1.7 (ref 0.8–1.2)
LACTATE SERPL-SCNC: 1.1 MMOL/L (ref 0.5–2.2)
LACTATE SERPL-SCNC: 1.2 MMOL/L (ref 0.5–2.2)
LACTATE SERPL-SCNC: 4.3 MMOL/L (ref 0.5–2.2)
LDH SERPL L TO P-CCNC: 1.1 MMOL/L (ref 0.36–1.25)
LIPASE SERPL-CCNC: 132 U/L (ref 4–60)
LYMPHOCYTES # BLD AUTO: 0.5 K/UL (ref 1–4.8)
LYMPHOCYTES # BLD AUTO: 0.7 K/UL (ref 1–4.8)
LYMPHOCYTES # BLD AUTO: 0.8 K/UL (ref 1–4.8)
LYMPHOCYTES NFR BLD: 20.1 % (ref 18–48)
LYMPHOCYTES NFR BLD: 20.5 % (ref 18–48)
LYMPHOCYTES NFR BLD: 7.8 % (ref 18–48)
MAGNESIUM SERPL-MCNC: 1.9 MG/DL (ref 1.6–2.6)
MAGNESIUM SERPL-MCNC: 2 MG/DL (ref 1.6–2.6)
MAGNESIUM SERPL-MCNC: 2.3 MG/DL (ref 1.6–2.6)
MCH RBC QN AUTO: 31.2 PG (ref 27–31)
MCH RBC QN AUTO: 31.5 PG (ref 27–31)
MCH RBC QN AUTO: 31.9 PG (ref 27–31)
MCHC RBC AUTO-ENTMCNC: 29.9 G/DL (ref 32–36)
MCHC RBC AUTO-ENTMCNC: 30.1 G/DL (ref 32–36)
MCHC RBC AUTO-ENTMCNC: 31.1 G/DL (ref 32–36)
MCV RBC AUTO: 103 FL (ref 82–98)
MCV RBC AUTO: 104 FL (ref 82–98)
MCV RBC AUTO: 105 FL (ref 82–98)
METHADONE UR QL SCN>300 NG/ML: NEGATIVE
MONOCYTES # BLD AUTO: 0.1 K/UL (ref 0.3–1)
MONOCYTES # BLD AUTO: 0.1 K/UL (ref 0.3–1)
MONOCYTES # BLD AUTO: 0.6 K/UL (ref 0.3–1)
MONOCYTES NFR BLD: 2.7 % (ref 4–15)
MONOCYTES NFR BLD: 3.5 % (ref 4–15)
MONOCYTES NFR BLD: 7 % (ref 4–15)
NEUTROPHILS # BLD AUTO: 2 K/UL (ref 1.8–7.7)
NEUTROPHILS # BLD AUTO: 3.1 K/UL (ref 1.8–7.7)
NEUTROPHILS # BLD AUTO: 7.2 K/UL (ref 1.8–7.7)
NEUTROPHILS NFR BLD: 75.6 % (ref 38–73)
NEUTROPHILS NFR BLD: 77 % (ref 38–73)
NEUTROPHILS NFR BLD: 84.7 % (ref 38–73)
NRBC BLD-RTO: 0 /100 WBC
NRBC BLD-RTO: 1 /100 WBC
NRBC BLD-RTO: 1 /100 WBC
NUM UNITS TRANS FFP: NORMAL
OPIATES UR QL SCN: NORMAL
OVALOCYTES BLD QL SMEAR: ABNORMAL
OVALOCYTES BLD QL SMEAR: ABNORMAL
PCO2 BLDA: 36.5 MMHG (ref 35–45)
PCO2 BLDA: 37.9 MMHG (ref 35–45)
PCO2 BLDA: 42.4 MMHG (ref 35–45)
PCP UR QL SCN>25 NG/ML: NEGATIVE
PH SMN: 7.28 [PH] (ref 7.35–7.45)
PH SMN: 7.29 [PH] (ref 7.35–7.45)
PH SMN: 7.33 [PH] (ref 7.35–7.45)
PHOSPHATE SERPL-MCNC: 5.9 MG/DL (ref 2.7–4.5)
PLATELET # BLD AUTO: 259 K/UL (ref 150–350)
PLATELET # BLD AUTO: 296 K/UL (ref 150–350)
PLATELET # BLD AUTO: 531 K/UL (ref 150–350)
PLATELET BLD QL SMEAR: ABNORMAL
PLATELET BLD QL SMEAR: ABNORMAL
PMV BLD AUTO: 10.2 FL (ref 9.2–12.9)
PMV BLD AUTO: 10.2 FL (ref 9.2–12.9)
PMV BLD AUTO: 10.3 FL (ref 9.2–12.9)
PO2 BLDA: 306 MMHG (ref 80–100)
PO2 BLDA: 337 MMHG (ref 80–100)
PO2 BLDA: 45 MMHG (ref 40–60)
POC BE: -7 MMOL/L
POC BE: -7 MMOL/L
POC BE: -8 MMOL/L
POC IONIZED CALCIUM: 0.88 MMOL/L (ref 1.06–1.42)
POC IONIZED CALCIUM: 0.92 MMOL/L (ref 1.06–1.42)
POC IONIZED CALCIUM: 0.94 MMOL/L (ref 1.06–1.42)
POC SATURATED O2: 100 % (ref 95–100)
POC SATURATED O2: 100 % (ref 95–100)
POC SATURATED O2: 76 % (ref 95–100)
POC TCO2 (MEASURED): 18 MMOL/L (ref 23–29)
POC TCO2: 20 MMOL/L (ref 23–27)
POC TCO2: 20 MMOL/L (ref 24–29)
POC TCO2: 21 MMOL/L (ref 23–27)
POCT GLUCOSE: 147 MG/DL (ref 70–110)
POIKILOCYTOSIS BLD QL SMEAR: SLIGHT
POIKILOCYTOSIS BLD QL SMEAR: SLIGHT
POLYCHROMASIA BLD QL SMEAR: ABNORMAL
POLYCHROMASIA BLD QL SMEAR: ABNORMAL
POTASSIUM BLD-SCNC: 3.9 MMOL/L (ref 3.5–5.1)
POTASSIUM BLD-SCNC: 4.1 MMOL/L (ref 3.5–5.1)
POTASSIUM BLD-SCNC: 4.2 MMOL/L (ref 3.5–5.1)
POTASSIUM SERPL-SCNC: 3.8 MMOL/L (ref 3.5–5.1)
POTASSIUM SERPL-SCNC: 3.9 MMOL/L (ref 3.5–5.1)
POTASSIUM SERPL-SCNC: 4.1 MMOL/L (ref 3.5–5.1)
POTASSIUM SERPL-SCNC: 4.2 MMOL/L (ref 3.5–5.1)
PROCALCITONIN SERPL IA-MCNC: 2.83 NG/ML
PROT SERPL-MCNC: 4.4 G/DL (ref 6–8.4)
PROT SERPL-MCNC: 7.1 G/DL (ref 6–8.4)
PROTHROMBIN TIME: 17.1 SEC (ref 9–12.5)
PROTHROMBIN TIME: 18 SEC (ref 9–12.5)
PROTHROMBIN TIME: 18.3 SEC (ref 9–12.5)
RBC # BLD AUTO: 1.91 M/UL (ref 4–5.4)
RBC # BLD AUTO: 2.21 M/UL (ref 4–5.4)
RBC # BLD AUTO: 2.89 M/UL (ref 4–5.4)
SAMPLE: ABNORMAL
SAMPLE: NORMAL
SARS-COV-2 RDRP RESP QL NAA+PROBE: NEGATIVE
SITE: ABNORMAL
SITE: ABNORMAL
SITE: NORMAL
SODIUM BLD-SCNC: 137 MMOL/L (ref 136–145)
SODIUM BLD-SCNC: 140 MMOL/L (ref 136–145)
SODIUM BLD-SCNC: 140 MMOL/L (ref 136–145)
SODIUM SERPL-SCNC: 139 MMOL/L (ref 136–145)
SODIUM SERPL-SCNC: 140 MMOL/L (ref 136–145)
SODIUM SERPL-SCNC: 141 MMOL/L (ref 136–145)
SODIUM SERPL-SCNC: 143 MMOL/L (ref 136–145)
SODIUM UR-SCNC: 56 MMOL/L (ref 20–250)
SPHEROCYTES BLD QL SMEAR: ABNORMAL
TOXICOLOGY INFORMATION: NORMAL
TROPONIN I SERPL DL<=0.01 NG/ML-MCNC: 5.09 NG/ML (ref 0–0.03)
WBC # BLD AUTO: 2.58 K/UL (ref 3.9–12.7)
WBC # BLD AUTO: 4.02 K/UL (ref 3.9–12.7)
WBC # BLD AUTO: 8.54 K/UL (ref 3.9–12.7)

## 2021-01-21 PROCEDURE — 99292 PR CRITICAL CARE, ADDL 30 MIN: ICD-10-PCS | Mod: ,,, | Performed by: EMERGENCY MEDICINE

## 2021-01-21 PROCEDURE — 88307 TISSUE EXAM BY PATHOLOGIST: CPT | Mod: 26,,, | Performed by: STUDENT IN AN ORGANIZED HEALTH CARE EDUCATION/TRAINING PROGRAM

## 2021-01-21 PROCEDURE — 85027 COMPLETE CBC AUTOMATED: CPT

## 2021-01-21 PROCEDURE — 96374 THER/PROPH/DIAG INJ IV PUSH: CPT

## 2021-01-21 PROCEDURE — 85007 BL SMEAR W/DIFF WBC COUNT: CPT

## 2021-01-21 PROCEDURE — 99291 PR CRITICAL CARE, E/M 30-74 MINUTES: ICD-10-PCS | Mod: ,,, | Performed by: EMERGENCY MEDICINE

## 2021-01-21 PROCEDURE — 43840 GSTRRPHY SUTR DUOL/GSTR ULCR: CPT | Mod: GC,,, | Performed by: SURGERY

## 2021-01-21 PROCEDURE — 85025 COMPLETE CBC W/AUTO DIFF WBC: CPT | Mod: 91

## 2021-01-21 PROCEDURE — 36000709 HC OR TIME LEV III EA ADD 15 MIN: Performed by: SURGERY

## 2021-01-21 PROCEDURE — 82962 GLUCOSE BLOOD TEST: CPT

## 2021-01-21 PROCEDURE — 93010 ELECTROCARDIOGRAM REPORT: CPT | Mod: ,,, | Performed by: INTERNAL MEDICINE

## 2021-01-21 PROCEDURE — 85014 HEMATOCRIT: CPT

## 2021-01-21 PROCEDURE — 88307 PR  SURG PATH,LEVEL V: ICD-10-PCS | Mod: 26,,, | Performed by: STUDENT IN AN ORGANIZED HEALTH CARE EDUCATION/TRAINING PROGRAM

## 2021-01-21 PROCEDURE — 82077 ASSAY SPEC XCP UR&BREATH IA: CPT

## 2021-01-21 PROCEDURE — 84484 ASSAY OF TROPONIN QUANT: CPT | Mod: 91

## 2021-01-21 PROCEDURE — 99292 CRITICAL CARE ADDL 30 MIN: CPT

## 2021-01-21 PROCEDURE — 99291 CRITICAL CARE FIRST HOUR: CPT | Mod: 24,,, | Performed by: STUDENT IN AN ORGANIZED HEALTH CARE EDUCATION/TRAINING PROGRAM

## 2021-01-21 PROCEDURE — 36556 INSERT NON-TUNNEL CV CATH: CPT | Mod: 59,,, | Performed by: ANESTHESIOLOGY

## 2021-01-21 PROCEDURE — 37799 UNLISTED PX VASCULAR SURGERY: CPT

## 2021-01-21 PROCEDURE — 37000009 HC ANESTHESIA EA ADD 15 MINS: Performed by: SURGERY

## 2021-01-21 PROCEDURE — 83735 ASSAY OF MAGNESIUM: CPT | Mod: 91

## 2021-01-21 PROCEDURE — 82330 ASSAY OF CALCIUM: CPT | Mod: 91

## 2021-01-21 PROCEDURE — 80047 BASIC METABLC PNL IONIZED CA: CPT

## 2021-01-21 PROCEDURE — 83605 ASSAY OF LACTIC ACID: CPT

## 2021-01-21 PROCEDURE — 88305 TISSUE EXAM BY PATHOLOGIST: ICD-10-PCS | Mod: 26,,, | Performed by: STUDENT IN AN ORGANIZED HEALTH CARE EDUCATION/TRAINING PROGRAM

## 2021-01-21 PROCEDURE — 82565 ASSAY OF CREATININE: CPT

## 2021-01-21 PROCEDURE — P9021 RED BLOOD CELLS UNIT: HCPCS

## 2021-01-21 PROCEDURE — 99291 CRITICAL CARE FIRST HOUR: CPT | Mod: ,,, | Performed by: EMERGENCY MEDICINE

## 2021-01-21 PROCEDURE — 85384 FIBRINOGEN ACTIVITY: CPT

## 2021-01-21 PROCEDURE — 27000221 HC OXYGEN, UP TO 24 HOURS

## 2021-01-21 PROCEDURE — 25000003 PHARM REV CODE 250: Performed by: PHYSICIAN ASSISTANT

## 2021-01-21 PROCEDURE — 25000003 PHARM REV CODE 250: Performed by: STUDENT IN AN ORGANIZED HEALTH CARE EDUCATION/TRAINING PROGRAM

## 2021-01-21 PROCEDURE — P9017 PLASMA 1 DONOR FRZ W/IN 8 HR: HCPCS

## 2021-01-21 PROCEDURE — 87040 BLOOD CULTURE FOR BACTERIA: CPT | Mod: 59

## 2021-01-21 PROCEDURE — 63600175 PHARM REV CODE 636 W HCPCS: Performed by: STUDENT IN AN ORGANIZED HEALTH CARE EDUCATION/TRAINING PROGRAM

## 2021-01-21 PROCEDURE — 94002 VENT MGMT INPAT INIT DAY: CPT

## 2021-01-21 PROCEDURE — 82803 BLOOD GASES ANY COMBINATION: CPT

## 2021-01-21 PROCEDURE — 36620 PR INSERT CATH,ART,PERCUT,SHORTTERM: ICD-10-PCS | Mod: 59,,, | Performed by: ANESTHESIOLOGY

## 2021-01-21 PROCEDURE — 85730 THROMBOPLASTIN TIME PARTIAL: CPT

## 2021-01-21 PROCEDURE — 84100 ASSAY OF PHOSPHORUS: CPT

## 2021-01-21 PROCEDURE — 80307 DRUG TEST PRSMV CHEM ANLYZR: CPT

## 2021-01-21 PROCEDURE — 99223 1ST HOSP IP/OBS HIGH 75: CPT | Mod: 57,AI,, | Performed by: SURGERY

## 2021-01-21 PROCEDURE — 44160 PR REMVL COLON & TERM ILEUM W/ILEOCOLOSTOMY: ICD-10-PCS | Mod: 52,51,GC, | Performed by: SURGERY

## 2021-01-21 PROCEDURE — 36556 PR INSERT NON-TUNNEL CV CATH 5+ YRS OLD: ICD-10-PCS | Mod: 59,,, | Performed by: ANESTHESIOLOGY

## 2021-01-21 PROCEDURE — 82140 ASSAY OF AMMONIA: CPT

## 2021-01-21 PROCEDURE — 84145 PROCALCITONIN (PCT): CPT

## 2021-01-21 PROCEDURE — 84295 ASSAY OF SERUM SODIUM: CPT

## 2021-01-21 PROCEDURE — 86677 HELICOBACTER PYLORI ANTIBODY: CPT

## 2021-01-21 PROCEDURE — 93005 ELECTROCARDIOGRAM TRACING: CPT

## 2021-01-21 PROCEDURE — 44160 REMOVAL OF COLON: CPT | Mod: 52,51,GC, | Performed by: SURGERY

## 2021-01-21 PROCEDURE — 83605 ASSAY OF LACTIC ACID: CPT | Mod: 91

## 2021-01-21 PROCEDURE — D9220A PRA ANESTHESIA: Mod: ANES,,, | Performed by: ANESTHESIOLOGY

## 2021-01-21 PROCEDURE — 85610 PROTHROMBIN TIME: CPT | Mod: 91

## 2021-01-21 PROCEDURE — 86900 BLOOD TYPING SEROLOGIC ABO: CPT

## 2021-01-21 PROCEDURE — 63600175 PHARM REV CODE 636 W HCPCS: Performed by: PHYSICIAN ASSISTANT

## 2021-01-21 PROCEDURE — 20000000 HC ICU ROOM

## 2021-01-21 PROCEDURE — 63600175 PHARM REV CODE 636 W HCPCS: Performed by: NURSE ANESTHETIST, CERTIFIED REGISTERED

## 2021-01-21 PROCEDURE — D9220A PRA ANESTHESIA: ICD-10-PCS | Mod: CRNA,,, | Performed by: NURSE ANESTHETIST, CERTIFIED REGISTERED

## 2021-01-21 PROCEDURE — C9113 INJ PANTOPRAZOLE SODIUM, VIA: HCPCS | Performed by: STUDENT IN AN ORGANIZED HEALTH CARE EDUCATION/TRAINING PROGRAM

## 2021-01-21 PROCEDURE — 99900035 HC TECH TIME PER 15 MIN (STAT)

## 2021-01-21 PROCEDURE — 84300 ASSAY OF URINE SODIUM: CPT

## 2021-01-21 PROCEDURE — 84132 ASSAY OF SERUM POTASSIUM: CPT

## 2021-01-21 PROCEDURE — 93010 EKG 12-LEAD: ICD-10-PCS | Mod: ,,, | Performed by: INTERNAL MEDICINE

## 2021-01-21 PROCEDURE — 88307 TISSUE EXAM BY PATHOLOGIST: CPT | Performed by: STUDENT IN AN ORGANIZED HEALTH CARE EDUCATION/TRAINING PROGRAM

## 2021-01-21 PROCEDURE — 94761 N-INVAS EAR/PLS OXIMETRY MLT: CPT

## 2021-01-21 PROCEDURE — 88305 TISSUE EXAM BY PATHOLOGIST: CPT | Performed by: STUDENT IN AN ORGANIZED HEALTH CARE EDUCATION/TRAINING PROGRAM

## 2021-01-21 PROCEDURE — 84484 ASSAY OF TROPONIN QUANT: CPT

## 2021-01-21 PROCEDURE — 36430 TRANSFUSION BLD/BLD COMPNT: CPT

## 2021-01-21 PROCEDURE — 82330 ASSAY OF CALCIUM: CPT

## 2021-01-21 PROCEDURE — 27201423 OPTIME MED/SURG SUP & DEVICES STERILE SUPPLY: Performed by: SURGERY

## 2021-01-21 PROCEDURE — 36620 INSERTION CATHETER ARTERY: CPT | Mod: 59,,, | Performed by: ANESTHESIOLOGY

## 2021-01-21 PROCEDURE — 85730 THROMBOPLASTIN TIME PARTIAL: CPT | Mod: 91

## 2021-01-21 PROCEDURE — 43840 PR REPAIR, PERF DUOD/GAST ULC-WND/INJ: ICD-10-PCS | Mod: GC,,, | Performed by: SURGERY

## 2021-01-21 PROCEDURE — 88342 IMHCHEM/IMCYTCHM 1ST ANTB: CPT | Performed by: STUDENT IN AN ORGANIZED HEALTH CARE EDUCATION/TRAINING PROGRAM

## 2021-01-21 PROCEDURE — 80048 BASIC METABOLIC PNL TOTAL CA: CPT | Mod: 91

## 2021-01-21 PROCEDURE — 99291 CRITICAL CARE FIRST HOUR: CPT | Mod: 25

## 2021-01-21 PROCEDURE — 80053 COMPREHEN METABOLIC PANEL: CPT

## 2021-01-21 PROCEDURE — 99900026 HC AIRWAY MAINTENANCE (STAT)

## 2021-01-21 PROCEDURE — 88305 TISSUE EXAM BY PATHOLOGIST: CPT | Mod: 26,,, | Performed by: STUDENT IN AN ORGANIZED HEALTH CARE EDUCATION/TRAINING PROGRAM

## 2021-01-21 PROCEDURE — 99291 PR CRITICAL CARE, E/M 30-74 MINUTES: ICD-10-PCS | Mod: 24,,, | Performed by: STUDENT IN AN ORGANIZED HEALTH CARE EDUCATION/TRAINING PROGRAM

## 2021-01-21 PROCEDURE — 86920 COMPATIBILITY TEST SPIN: CPT

## 2021-01-21 PROCEDURE — 80053 COMPREHEN METABOLIC PANEL: CPT | Mod: 91

## 2021-01-21 PROCEDURE — 83690 ASSAY OF LIPASE: CPT

## 2021-01-21 PROCEDURE — D9220A PRA ANESTHESIA: ICD-10-PCS | Mod: ANES,,, | Performed by: ANESTHESIOLOGY

## 2021-01-21 PROCEDURE — 99223 PR INITIAL HOSPITAL CARE,LEVL III: ICD-10-PCS | Mod: 57,AI,, | Performed by: SURGERY

## 2021-01-21 PROCEDURE — 85610 PROTHROMBIN TIME: CPT

## 2021-01-21 PROCEDURE — 97606 NEG PRS WND THER DME>50 SQCM: CPT | Mod: GC,,, | Performed by: SURGERY

## 2021-01-21 PROCEDURE — 96375 TX/PRO/DX INJ NEW DRUG ADDON: CPT

## 2021-01-21 PROCEDURE — 88342 IMHCHEM/IMCYTCHM 1ST ANTB: CPT | Mod: 26,,, | Performed by: STUDENT IN AN ORGANIZED HEALTH CARE EDUCATION/TRAINING PROGRAM

## 2021-01-21 PROCEDURE — 97606 PR NEG PRESS WOUND THERAPY (NPWT) W/NON-DISPOSABLE WOUND VAC DEVICE (DME), >=50 CM: ICD-10-PCS | Mod: GC,,, | Performed by: SURGERY

## 2021-01-21 PROCEDURE — D9220A PRA ANESTHESIA: Mod: CRNA,,, | Performed by: NURSE ANESTHETIST, CERTIFIED REGISTERED

## 2021-01-21 PROCEDURE — 25000003 PHARM REV CODE 250: Performed by: NURSE ANESTHETIST, CERTIFIED REGISTERED

## 2021-01-21 PROCEDURE — 88342 CHG IMMUNOCYTOCHEMISTRY: ICD-10-PCS | Mod: 26,,, | Performed by: STUDENT IN AN ORGANIZED HEALTH CARE EDUCATION/TRAINING PROGRAM

## 2021-01-21 PROCEDURE — 37000008 HC ANESTHESIA 1ST 15 MINUTES: Performed by: SURGERY

## 2021-01-21 PROCEDURE — U0002 COVID-19 LAB TEST NON-CDC: HCPCS | Performed by: PHYSICIAN ASSISTANT

## 2021-01-21 PROCEDURE — 99292 CRITICAL CARE ADDL 30 MIN: CPT | Mod: ,,, | Performed by: EMERGENCY MEDICINE

## 2021-01-21 PROCEDURE — 83735 ASSAY OF MAGNESIUM: CPT

## 2021-01-21 PROCEDURE — 36000708 HC OR TIME LEV III 1ST 15 MIN: Performed by: SURGERY

## 2021-01-21 RX ORDER — LIDOCAINE HYDROCHLORIDE 20 MG/ML
INJECTION, SOLUTION EPIDURAL; INFILTRATION; INTRACAUDAL; PERINEURAL
Status: DISCONTINUED | OUTPATIENT
Start: 2021-01-21 | End: 2021-01-21

## 2021-01-21 RX ORDER — NOREPINEPHRINE BITARTRATE/D5W 4MG/250ML
0-3 PLASTIC BAG, INJECTION (ML) INTRAVENOUS CONTINUOUS
Status: DISCONTINUED | OUTPATIENT
Start: 2021-01-21 | End: 2021-01-25

## 2021-01-21 RX ORDER — FLUCONAZOLE 2 MG/ML
400 INJECTION, SOLUTION INTRAVENOUS
Status: DISCONTINUED | OUTPATIENT
Start: 2021-01-21 | End: 2021-01-31

## 2021-01-21 RX ORDER — MUPIROCIN 20 MG/G
OINTMENT TOPICAL 2 TIMES DAILY
Status: DISPENSED | OUTPATIENT
Start: 2021-01-21 | End: 2021-01-26

## 2021-01-21 RX ORDER — FENTANYL CITRATE 50 UG/ML
50 INJECTION, SOLUTION INTRAMUSCULAR; INTRAVENOUS
Status: COMPLETED | OUTPATIENT
Start: 2021-01-21 | End: 2021-01-21

## 2021-01-21 RX ORDER — HYDROCODONE BITARTRATE AND ACETAMINOPHEN 500; 5 MG/1; MG/1
TABLET ORAL
Status: DISCONTINUED | OUTPATIENT
Start: 2021-01-21 | End: 2021-01-22

## 2021-01-21 RX ORDER — PROPOFOL 10 MG/ML
VIAL (ML) INTRAVENOUS
Status: DISCONTINUED | OUTPATIENT
Start: 2021-01-21 | End: 2021-01-21

## 2021-01-21 RX ORDER — PANTOPRAZOLE SODIUM 40 MG/10ML
40 INJECTION, POWDER, LYOPHILIZED, FOR SOLUTION INTRAVENOUS 2 TIMES DAILY
Status: DISCONTINUED | OUTPATIENT
Start: 2021-01-21 | End: 2021-01-30

## 2021-01-21 RX ORDER — ROCURONIUM BROMIDE 10 MG/ML
INJECTION, SOLUTION INTRAVENOUS
Status: DISCONTINUED | OUTPATIENT
Start: 2021-01-21 | End: 2021-01-21

## 2021-01-21 RX ORDER — SUCCINYLCHOLINE CHLORIDE 20 MG/ML
INJECTION INTRAMUSCULAR; INTRAVENOUS
Status: DISCONTINUED | OUTPATIENT
Start: 2021-01-21 | End: 2021-01-21

## 2021-01-21 RX ORDER — HEPARIN SODIUM 5000 [USP'U]/ML
5000 INJECTION, SOLUTION INTRAVENOUS; SUBCUTANEOUS EVERY 8 HOURS
Status: DISCONTINUED | OUTPATIENT
Start: 2021-01-21 | End: 2021-02-09 | Stop reason: HOSPADM

## 2021-01-21 RX ORDER — IPRATROPIUM BROMIDE AND ALBUTEROL SULFATE 2.5; .5 MG/3ML; MG/3ML
3 SOLUTION RESPIRATORY (INHALATION) EVERY 6 HOURS PRN
Status: DISCONTINUED | OUTPATIENT
Start: 2021-01-21 | End: 2021-01-30

## 2021-01-21 RX ORDER — ETOMIDATE 2 MG/ML
INJECTION INTRAVENOUS
Status: DISCONTINUED | OUTPATIENT
Start: 2021-01-21 | End: 2021-01-21

## 2021-01-21 RX ORDER — LIDOCAINE HYDROCHLORIDE 20 MG/ML
INJECTION INTRAVENOUS
Status: DISCONTINUED | OUTPATIENT
Start: 2021-01-21 | End: 2021-01-21

## 2021-01-21 RX ORDER — FENTANYL CITRATE 50 UG/ML
25 INJECTION, SOLUTION INTRAMUSCULAR; INTRAVENOUS
Status: DISCONTINUED | OUTPATIENT
Start: 2021-01-21 | End: 2021-01-25

## 2021-01-21 RX ORDER — PHENYLEPHRINE HYDROCHLORIDE 10 MG/ML
INJECTION INTRAVENOUS
Status: DISCONTINUED | OUTPATIENT
Start: 2021-01-21 | End: 2021-01-21

## 2021-01-21 RX ORDER — NEOSTIGMINE METHYLSULFATE 0.5 MG/ML
INJECTION, SOLUTION INTRAVENOUS
Status: DISCONTINUED | OUTPATIENT
Start: 2021-01-21 | End: 2021-01-21

## 2021-01-21 RX ORDER — ONDANSETRON 2 MG/ML
4 INJECTION INTRAMUSCULAR; INTRAVENOUS EVERY 12 HOURS PRN
Status: DISCONTINUED | OUTPATIENT
Start: 2021-01-21 | End: 2021-02-09 | Stop reason: HOSPADM

## 2021-01-21 RX ORDER — HYDROCODONE BITARTRATE AND ACETAMINOPHEN 500; 5 MG/1; MG/1
TABLET ORAL
Status: DISCONTINUED | OUTPATIENT
Start: 2021-01-21 | End: 2021-01-25

## 2021-01-21 RX ORDER — MORPHINE SULFATE 4 MG/ML
4 INJECTION, SOLUTION INTRAMUSCULAR; INTRAVENOUS EVERY 4 HOURS PRN
Status: DISCONTINUED | OUTPATIENT
Start: 2021-01-21 | End: 2021-01-21

## 2021-01-21 RX ORDER — MIDAZOLAM HYDROCHLORIDE 1 MG/ML
INJECTION, SOLUTION INTRAMUSCULAR; INTRAVENOUS
Status: DISCONTINUED | OUTPATIENT
Start: 2021-01-21 | End: 2021-01-21

## 2021-01-21 RX ORDER — PROPOFOL 10 MG/ML
0-50 INJECTION, EMULSION INTRAVENOUS CONTINUOUS
Status: DISCONTINUED | OUTPATIENT
Start: 2021-01-21 | End: 2021-01-25

## 2021-01-21 RX ORDER — SODIUM CHLORIDE, SODIUM LACTATE, POTASSIUM CHLORIDE, CALCIUM CHLORIDE 600; 310; 30; 20 MG/100ML; MG/100ML; MG/100ML; MG/100ML
INJECTION, SOLUTION INTRAVENOUS CONTINUOUS
Status: DISCONTINUED | OUTPATIENT
Start: 2021-01-21 | End: 2021-01-22

## 2021-01-21 RX ORDER — SODIUM CHLORIDE 0.9 % (FLUSH) 0.9 %
10 SYRINGE (ML) INJECTION EVERY 6 HOURS PRN
Status: DISCONTINUED | OUTPATIENT
Start: 2021-01-21 | End: 2021-02-09 | Stop reason: HOSPADM

## 2021-01-21 RX ORDER — MORPHINE SULFATE 2 MG/ML
2 INJECTION, SOLUTION INTRAMUSCULAR; INTRAVENOUS EVERY 4 HOURS PRN
Status: DISCONTINUED | OUTPATIENT
Start: 2021-01-21 | End: 2021-01-21

## 2021-01-21 RX ORDER — PROPOFOL 10 MG/ML
VIAL (ML) INTRAVENOUS CONTINUOUS PRN
Status: DISCONTINUED | OUTPATIENT
Start: 2021-01-21 | End: 2021-01-21

## 2021-01-21 RX ORDER — FENTANYL CITRATE 50 UG/ML
INJECTION, SOLUTION INTRAMUSCULAR; INTRAVENOUS
Status: DISCONTINUED | OUTPATIENT
Start: 2021-01-21 | End: 2021-01-21

## 2021-01-21 RX ADMIN — SODIUM CHLORIDE 1000 ML: 0.9 INJECTION, SOLUTION INTRAVENOUS at 11:01

## 2021-01-21 RX ADMIN — FENTANYL CITRATE 50 MCG: 50 INJECTION, SOLUTION INTRAMUSCULAR; INTRAVENOUS at 11:01

## 2021-01-21 RX ADMIN — PIPERACILLIN AND TAZOBACTAM 4.5 G: 4; .5 INJECTION, POWDER, LYOPHILIZED, FOR SOLUTION INTRAVENOUS; PARENTERAL at 11:01

## 2021-01-21 RX ADMIN — ROCURONIUM BROMIDE 20 MG: 10 INJECTION, SOLUTION INTRAVENOUS at 02:01

## 2021-01-21 RX ADMIN — HEPARIN SODIUM 5000 UNITS: 5000 INJECTION INTRAVENOUS; SUBCUTANEOUS at 09:01

## 2021-01-21 RX ADMIN — FENTANYL CITRATE 50 MCG: 50 INJECTION, SOLUTION INTRAMUSCULAR; INTRAVENOUS at 12:01

## 2021-01-21 RX ADMIN — SODIUM CHLORIDE, SODIUM LACTATE, POTASSIUM CHLORIDE, AND CALCIUM CHLORIDE 1770 ML: .6; .31; .03; .02 INJECTION, SOLUTION INTRAVENOUS at 11:01

## 2021-01-21 RX ADMIN — PHENYLEPHRINE HYDROCHLORIDE 100 MCG: 10 INJECTION INTRAVENOUS at 01:01

## 2021-01-21 RX ADMIN — NEOSTIGMINE METHYLSULFATE 4 MG: 0.5 INJECTION INTRAVENOUS at 03:01

## 2021-01-21 RX ADMIN — ROCURONIUM BROMIDE 10 MG: 10 INJECTION, SOLUTION INTRAVENOUS at 02:01

## 2021-01-21 RX ADMIN — PIPERACILLIN AND TAZOBACTAM 4.5 G: 4; .5 INJECTION, POWDER, LYOPHILIZED, FOR SOLUTION INTRAVENOUS; PARENTERAL at 12:01

## 2021-01-21 RX ADMIN — MIDAZOLAM 1 MG: 1 INJECTION INTRAMUSCULAR; INTRAVENOUS at 12:01

## 2021-01-21 RX ADMIN — FLUCONAZOLE, SODIUM CHLORIDE 400 MG: 2 INJECTION INTRAVENOUS at 01:01

## 2021-01-21 RX ADMIN — SODIUM CHLORIDE: 0.9 INJECTION, SOLUTION INTRAVENOUS at 12:01

## 2021-01-21 RX ADMIN — SODIUM CHLORIDE 0.4 MCG/KG/MIN: 9 INJECTION, SOLUTION INTRAVENOUS at 02:01

## 2021-01-21 RX ADMIN — SODIUM CHLORIDE, SODIUM LACTATE, POTASSIUM CHLORIDE, AND CALCIUM CHLORIDE 100 ML/HR: .6; .31; .03; .02 INJECTION, SOLUTION INTRAVENOUS at 05:01

## 2021-01-21 RX ADMIN — SODIUM CHLORIDE, SODIUM LACTATE, POTASSIUM CHLORIDE, AND CALCIUM CHLORIDE 1000 ML: .6; .31; .03; .02 INJECTION, SOLUTION INTRAVENOUS at 05:01

## 2021-01-21 RX ADMIN — FENTANYL CITRATE 25 MCG: 50 INJECTION, SOLUTION INTRAMUSCULAR; INTRAVENOUS at 05:01

## 2021-01-21 RX ADMIN — LIDOCAINE HYDROCHLORIDE 40 MG: 20 INJECTION, SOLUTION INTRAVENOUS at 12:01

## 2021-01-21 RX ADMIN — MUPIROCIN: 20 OINTMENT TOPICAL at 09:01

## 2021-01-21 RX ADMIN — ROCURONIUM BROMIDE 5 MG: 10 INJECTION, SOLUTION INTRAVENOUS at 12:01

## 2021-01-21 RX ADMIN — PROPOFOL 30 MG: 10 INJECTION, EMULSION INTRAVENOUS at 12:01

## 2021-01-21 RX ADMIN — PHENYLEPHRINE HYDROCHLORIDE 100 MCG: 10 INJECTION INTRAVENOUS at 12:01

## 2021-01-21 RX ADMIN — VANCOMYCIN HYDROCHLORIDE 1250 MG: 1.25 INJECTION, POWDER, LYOPHILIZED, FOR SOLUTION INTRAVENOUS at 01:01

## 2021-01-21 RX ADMIN — PHENYLEPHRINE HYDROCHLORIDE 200 MCG: 10 INJECTION INTRAVENOUS at 12:01

## 2021-01-21 RX ADMIN — LIDOCAINE HYDROCHLORIDE 40 MG: 20 INJECTION, SOLUTION EPIDURAL; INFILTRATION; INTRACAUDAL at 12:01

## 2021-01-21 RX ADMIN — PROPOFOL 20 MCG/KG/MIN: 10 INJECTION, EMULSION INTRAVENOUS at 05:01

## 2021-01-21 RX ADMIN — FENTANYL CITRATE 50 MCG: 50 INJECTION, SOLUTION INTRAMUSCULAR; INTRAVENOUS at 01:01

## 2021-01-21 RX ADMIN — ETOMIDATE 10 MG: 2 INJECTION, SOLUTION INTRAVENOUS at 12:01

## 2021-01-21 RX ADMIN — SUCCINYLCHOLINE CHLORIDE 120 MG: 20 INJECTION, SOLUTION INTRAMUSCULAR; INTRAVENOUS at 12:01

## 2021-01-21 RX ADMIN — PROPOFOL 50 MCG/KG/MIN: 10 INJECTION, EMULSION INTRAVENOUS at 03:01

## 2021-01-21 RX ADMIN — SODIUM CHLORIDE, SODIUM LACTATE, POTASSIUM CHLORIDE, AND CALCIUM CHLORIDE: .6; .31; .03; .02 INJECTION, SOLUTION INTRAVENOUS at 08:01

## 2021-01-21 RX ADMIN — ROCURONIUM BROMIDE 45 MG: 10 INJECTION, SOLUTION INTRAVENOUS at 12:01

## 2021-01-21 RX ADMIN — PANTOPRAZOLE SODIUM 40 MG: 40 INJECTION, POWDER, FOR SOLUTION INTRAVENOUS at 09:01

## 2021-01-21 RX ADMIN — ROCURONIUM BROMIDE 20 MG: 10 INJECTION, SOLUTION INTRAVENOUS at 01:01

## 2021-01-21 RX ADMIN — GLYCOPYRROLATE 0.6 MG: 0.2 INJECTION, SOLUTION INTRAMUSCULAR; INTRAVITREAL at 03:01

## 2021-01-21 RX ADMIN — PHENYLEPHRINE HYDROCHLORIDE 200 MCG: 10 INJECTION INTRAVENOUS at 02:01

## 2021-01-21 RX ADMIN — CALCIUM GLUCONATE 1000 MG: 98 INJECTION, SOLUTION INTRAVENOUS at 07:01

## 2021-01-21 RX ADMIN — PHENYLEPHRINE HYDROCHLORIDE 200 MCG: 10 INJECTION INTRAVENOUS at 01:01

## 2021-01-22 ENCOUNTER — ANESTHESIA EVENT (OUTPATIENT)
Dept: SURGERY | Facility: HOSPITAL | Age: 71
DRG: 853 | End: 2021-01-22
Payer: MEDICARE

## 2021-01-22 LAB
ALBUMIN SERPL BCP-MCNC: 2.3 G/DL (ref 3.5–5.2)
ALP SERPL-CCNC: 88 U/L (ref 55–135)
ALT SERPL W/O P-5'-P-CCNC: 134 U/L (ref 10–44)
AMORPH CRY UR QL COMP ASSIST: ABNORMAL
ANION GAP SERPL CALC-SCNC: 16 MMOL/L (ref 8–16)
ANION GAP SERPL CALC-SCNC: 17 MMOL/L (ref 8–16)
ANION GAP SERPL CALC-SCNC: 17 MMOL/L (ref 8–16)
ANION GAP SERPL CALC-SCNC: 18 MMOL/L (ref 8–16)
ANION GAP SERPL CALC-SCNC: 18 MMOL/L (ref 8–16)
ANISOCYTOSIS BLD QL SMEAR: SLIGHT
ASCENDING AORTA: 2.73 CM
AST SERPL-CCNC: 576 U/L (ref 10–40)
AV INDEX (PROSTH): 0.95
AV MEAN GRADIENT: 7 MMHG
AV PEAK GRADIENT: 15 MMHG
AV VALVE AREA: 3.11 CM2
AV VELOCITY RATIO: 0.97
BACTERIA #/AREA URNS AUTO: ABNORMAL /HPF
BASO STIPL BLD QL SMEAR: ABNORMAL
BASOPHILS # BLD AUTO: 0.01 K/UL (ref 0–0.2)
BASOPHILS # BLD AUTO: 0.03 K/UL (ref 0–0.2)
BASOPHILS # BLD AUTO: ABNORMAL K/UL (ref 0–0.2)
BASOPHILS NFR BLD: 0 % (ref 0–1.9)
BASOPHILS NFR BLD: 0 % (ref 0–1.9)
BASOPHILS NFR BLD: 0.2 % (ref 0–1.9)
BASOPHILS NFR BLD: 0.4 % (ref 0–1.9)
BASOPHILS NFR BLD: 1 % (ref 0–1.9)
BILIRUB SERPL-MCNC: 0.3 MG/DL (ref 0.1–1)
BILIRUB UR QL STRIP: NEGATIVE
BSA FOR ECHO PROCEDURE: 1.63 M2
BUN SERPL-MCNC: 60 MG/DL (ref 8–23)
BUN SERPL-MCNC: 64 MG/DL (ref 8–23)
BUN SERPL-MCNC: 66 MG/DL (ref 8–23)
BUN SERPL-MCNC: 70 MG/DL (ref 8–23)
BUN SERPL-MCNC: 72 MG/DL (ref 8–23)
BURR CELLS BLD QL SMEAR: ABNORMAL
CALCIUM SERPL-MCNC: 7.2 MG/DL (ref 8.7–10.5)
CALCIUM SERPL-MCNC: 7.3 MG/DL (ref 8.7–10.5)
CALCIUM SERPL-MCNC: 7.4 MG/DL (ref 8.7–10.5)
CALCIUM SERPL-MCNC: 7.4 MG/DL (ref 8.7–10.5)
CALCIUM SERPL-MCNC: 7.6 MG/DL (ref 8.7–10.5)
CAOX CRY UR QL COMP ASSIST: ABNORMAL
CHLORIDE SERPL-SCNC: 102 MMOL/L (ref 95–110)
CHLORIDE SERPL-SCNC: 103 MMOL/L (ref 95–110)
CHLORIDE SERPL-SCNC: 104 MMOL/L (ref 95–110)
CHLORIDE SERPL-SCNC: 105 MMOL/L (ref 95–110)
CHLORIDE SERPL-SCNC: 105 MMOL/L (ref 95–110)
CHOLEST SERPL-MCNC: 115 MG/DL (ref 120–199)
CHOLEST/HDLC SERPL: 5 {RATIO} (ref 2–5)
CLARITY UR REFRACT.AUTO: ABNORMAL
CO2 SERPL-SCNC: 15 MMOL/L (ref 23–29)
CO2 SERPL-SCNC: 16 MMOL/L (ref 23–29)
CO2 SERPL-SCNC: 17 MMOL/L (ref 23–29)
CO2 SERPL-SCNC: 18 MMOL/L (ref 23–29)
CO2 SERPL-SCNC: 18 MMOL/L (ref 23–29)
COLOR UR AUTO: YELLOW
CREAT SERPL-MCNC: 5.7 MG/DL (ref 0.5–1.4)
CREAT SERPL-MCNC: 5.8 MG/DL (ref 0.5–1.4)
CREAT SERPL-MCNC: 5.9 MG/DL (ref 0.5–1.4)
CREAT UR-MCNC: 24 MG/DL (ref 15–325)
CV ECHO LV RWT: 0.52 CM
DACRYOCYTES BLD QL SMEAR: ABNORMAL
DACRYOCYTES BLD QL SMEAR: ABNORMAL
DIFFERENTIAL METHOD: ABNORMAL
DOP CALC AO PEAK VEL: 1.93 M/S
DOP CALC AO VTI: 28.01 CM
DOP CALC LVOT AREA: 3.3 CM2
DOP CALC LVOT DIAMETER: 2.04 CM
DOP CALC LVOT PEAK VEL: 1.87 M/S
DOP CALC LVOT STROKE VOLUME: 87.16 CM3
DOP CALCLVOT PEAK VEL VTI: 26.68 CM
E WAVE DECELERATION TIME: 244.65 MSEC
E/A RATIO: 0.65
E/E' RATIO: 9.29 M/S
ECHO LV POSTERIOR WALL: 0.91 CM (ref 0.6–1.1)
EOSINOPHIL # BLD AUTO: 0 K/UL (ref 0–0.5)
EOSINOPHIL # BLD AUTO: 0 K/UL (ref 0–0.5)
EOSINOPHIL # BLD AUTO: ABNORMAL K/UL (ref 0–0.5)
EOSINOPHIL NFR BLD: 0 % (ref 0–8)
EOSINOPHIL NFR BLD: 0 % (ref 0–8)
EOSINOPHIL NFR BLD: 0.2 % (ref 0–8)
EOSINOPHIL NFR BLD: 0.4 % (ref 0–8)
EOSINOPHIL NFR BLD: 1 % (ref 0–8)
ERYTHROCYTE [DISTWIDTH] IN BLOOD BY AUTOMATED COUNT: 16 % (ref 11.5–14.5)
ERYTHROCYTE [DISTWIDTH] IN BLOOD BY AUTOMATED COUNT: 16.2 % (ref 11.5–14.5)
ERYTHROCYTE [DISTWIDTH] IN BLOOD BY AUTOMATED COUNT: 16.4 % (ref 11.5–14.5)
ERYTHROCYTE [DISTWIDTH] IN BLOOD BY AUTOMATED COUNT: 16.4 % (ref 11.5–14.5)
ERYTHROCYTE [DISTWIDTH] IN BLOOD BY AUTOMATED COUNT: 16.5 % (ref 11.5–14.5)
EST. GFR  (AFRICAN AMERICAN): 7.8 ML/MIN/1.73 M^2
EST. GFR  (AFRICAN AMERICAN): 7.9 ML/MIN/1.73 M^2
EST. GFR  (AFRICAN AMERICAN): 8.1 ML/MIN/1.73 M^2
EST. GFR  (NON AFRICAN AMERICAN): 6.7 ML/MIN/1.73 M^2
EST. GFR  (NON AFRICAN AMERICAN): 6.9 ML/MIN/1.73 M^2
EST. GFR  (NON AFRICAN AMERICAN): 7 ML/MIN/1.73 M^2
ESTIMATED AVG GLUCOSE: 88 MG/DL (ref 68–131)
FRACTIONAL SHORTENING: 28 % (ref 28–44)
GLUCOSE SERPL-MCNC: 113 MG/DL (ref 70–110)
GLUCOSE SERPL-MCNC: 139 MG/DL (ref 70–110)
GLUCOSE SERPL-MCNC: 81 MG/DL (ref 70–110)
GLUCOSE SERPL-MCNC: 85 MG/DL (ref 70–110)
GLUCOSE SERPL-MCNC: 88 MG/DL (ref 70–110)
GLUCOSE UR QL STRIP: NEGATIVE
HBA1C MFR BLD HPLC: 4.7 % (ref 4–5.6)
HCT VFR BLD AUTO: 27.2 % (ref 37–48.5)
HCT VFR BLD AUTO: 28 % (ref 37–48.5)
HCT VFR BLD AUTO: 28.3 % (ref 37–48.5)
HCT VFR BLD AUTO: 28.5 % (ref 37–48.5)
HCT VFR BLD AUTO: 29 % (ref 37–48.5)
HCV AB SERPL QL IA: NEGATIVE
HDLC SERPL-MCNC: 23 MG/DL (ref 40–75)
HDLC SERPL: 20 % (ref 20–50)
HGB BLD-MCNC: 8.6 G/DL (ref 12–16)
HGB BLD-MCNC: 9 G/DL (ref 12–16)
HGB BLD-MCNC: 9.1 G/DL (ref 12–16)
HGB UR QL STRIP: ABNORMAL
HYALINE CASTS UR QL AUTO: 8 /LPF
HYPOCHROMIA BLD QL SMEAR: ABNORMAL
IMM GRANULOCYTES # BLD AUTO: 0.02 K/UL (ref 0–0.04)
IMM GRANULOCYTES # BLD AUTO: 0.08 K/UL (ref 0–0.04)
IMM GRANULOCYTES # BLD AUTO: ABNORMAL K/UL (ref 0–0.04)
IMM GRANULOCYTES NFR BLD AUTO: 0.4 % (ref 0–0.5)
IMM GRANULOCYTES NFR BLD AUTO: 1.1 % (ref 0–0.5)
IMM GRANULOCYTES NFR BLD AUTO: ABNORMAL % (ref 0–0.5)
INR PPP: 1.3 (ref 0.8–1.2)
INTERVENTRICULAR SEPTUM: 0.95 CM (ref 0.6–1.1)
IVRT: 68.51 MSEC
KETONES UR QL STRIP: NEGATIVE
LA MAJOR: 4.44 CM
LA MINOR: 4.67 CM
LA WIDTH: 3.33 CM
LACTATE SERPL-SCNC: 1 MMOL/L (ref 0.5–2.2)
LDLC SERPL CALC-MCNC: 40.4 MG/DL (ref 63–159)
LEFT ATRIUM SIZE: 2.73 CM
LEFT ATRIUM VOLUME INDEX: 21.6 ML/M2
LEFT ATRIUM VOLUME: 35.18 CM3
LEFT INTERNAL DIMENSION IN SYSTOLE: 2.49 CM (ref 2.1–4)
LEFT VENTRICLE DIASTOLIC VOLUME INDEX: 30.81 ML/M2
LEFT VENTRICLE DIASTOLIC VOLUME: 50.19 ML
LEFT VENTRICLE MASS INDEX: 57 G/M2
LEFT VENTRICLE SYSTOLIC VOLUME INDEX: 13.5 ML/M2
LEFT VENTRICLE SYSTOLIC VOLUME: 22.04 ML
LEFT VENTRICULAR INTERNAL DIMENSION IN DIASTOLE: 3.48 CM (ref 3.5–6)
LEFT VENTRICULAR MASS: 92.23 G
LEUKOCYTE ESTERASE UR QL STRIP: NEGATIVE
LV LATERAL E/E' RATIO: 8.13 M/S
LV SEPTAL E/E' RATIO: 10.83 M/S
LYMPHOCYTES # BLD AUTO: 0.6 K/UL (ref 1–4.8)
LYMPHOCYTES # BLD AUTO: 0.7 K/UL (ref 1–4.8)
LYMPHOCYTES # BLD AUTO: ABNORMAL K/UL (ref 1–4.8)
LYMPHOCYTES NFR BLD: 11.8 % (ref 18–48)
LYMPHOCYTES NFR BLD: 17.4 % (ref 18–48)
LYMPHOCYTES NFR BLD: 6 % (ref 18–48)
LYMPHOCYTES NFR BLD: 7 % (ref 18–48)
LYMPHOCYTES NFR BLD: 9.5 % (ref 18–48)
MAGNESIUM SERPL-MCNC: 1.7 MG/DL (ref 1.6–2.6)
MCH RBC QN AUTO: 30.9 PG (ref 27–31)
MCH RBC QN AUTO: 31.3 PG (ref 27–31)
MCH RBC QN AUTO: 31.4 PG (ref 27–31)
MCH RBC QN AUTO: 31.4 PG (ref 27–31)
MCH RBC QN AUTO: 32.1 PG (ref 27–31)
MCHC RBC AUTO-ENTMCNC: 31 G/DL (ref 32–36)
MCHC RBC AUTO-ENTMCNC: 31.6 G/DL (ref 32–36)
MCHC RBC AUTO-ENTMCNC: 31.6 G/DL (ref 32–36)
MCHC RBC AUTO-ENTMCNC: 32.1 G/DL (ref 32–36)
MCHC RBC AUTO-ENTMCNC: 32.2 G/DL (ref 32–36)
MCV RBC AUTO: 100 FL (ref 82–98)
MCV RBC AUTO: 100 FL (ref 82–98)
MCV RBC AUTO: 97 FL (ref 82–98)
MCV RBC AUTO: 99 FL (ref 82–98)
MCV RBC AUTO: 99 FL (ref 82–98)
METAMYELOCYTES NFR BLD MANUAL: 1 %
METAMYELOCYTES NFR BLD MANUAL: 1.3 %
METAMYELOCYTES NFR BLD MANUAL: 4 %
MICROSCOPIC COMMENT: ABNORMAL
MONOCYTES # BLD AUTO: 0.2 K/UL (ref 0.3–1)
MONOCYTES # BLD AUTO: 0.2 K/UL (ref 0.3–1)
MONOCYTES # BLD AUTO: ABNORMAL K/UL (ref 0.3–1)
MONOCYTES NFR BLD: 1.3 % (ref 4–15)
MONOCYTES NFR BLD: 2.5 % (ref 4–15)
MONOCYTES NFR BLD: 3 % (ref 4–15)
MONOCYTES NFR BLD: 3.8 % (ref 4–15)
MONOCYTES NFR BLD: 4 % (ref 4–15)
MV A" WAVE DURATION": 9.99 MSEC
MV PEAK A VEL: 1 M/S
MV PEAK E VEL: 0.65 M/S
MYELOCYTES NFR BLD MANUAL: 1 %
MYELOCYTES NFR BLD MANUAL: 3 %
NEUTROPHILS # BLD AUTO: 4.4 K/UL (ref 1.8–7.7)
NEUTROPHILS # BLD AUTO: 6.3 K/UL (ref 1.8–7.7)
NEUTROPHILS NFR BLD: 54 % (ref 38–73)
NEUTROPHILS NFR BLD: 58 % (ref 38–73)
NEUTROPHILS NFR BLD: 64 % (ref 38–73)
NEUTROPHILS NFR BLD: 83.6 % (ref 38–73)
NEUTROPHILS NFR BLD: 86.1 % (ref 38–73)
NEUTS BAND NFR BLD MANUAL: 16 %
NEUTS BAND NFR BLD MANUAL: 25 %
NEUTS BAND NFR BLD MANUAL: 32 %
NITRITE UR QL STRIP: NEGATIVE
NONHDLC SERPL-MCNC: 92 MG/DL
NRBC BLD-RTO: 0 /100 WBC
NRBC BLD-RTO: 1 /100 WBC
NRBC BLD-RTO: 1 /100 WBC
OVALOCYTES BLD QL SMEAR: ABNORMAL
PH UR STRIP: 5 [PH] (ref 5–8)
PHOSPHATE SERPL-MCNC: 5.2 MG/DL (ref 2.7–4.5)
PISA TR MAX VEL: 3.08 M/S
PLATELET # BLD AUTO: 254 K/UL (ref 150–350)
PLATELET # BLD AUTO: 262 K/UL (ref 150–350)
PLATELET # BLD AUTO: 269 K/UL (ref 150–350)
PLATELET # BLD AUTO: 284 K/UL (ref 150–350)
PLATELET # BLD AUTO: 291 K/UL (ref 150–350)
PLATELET BLD QL SMEAR: ABNORMAL
PLATELET BLD QL SMEAR: ABNORMAL
PMV BLD AUTO: 10.1 FL (ref 9.2–12.9)
PMV BLD AUTO: 10.3 FL (ref 9.2–12.9)
PMV BLD AUTO: 10.3 FL (ref 9.2–12.9)
PMV BLD AUTO: 10.4 FL (ref 9.2–12.9)
PMV BLD AUTO: 9.9 FL (ref 9.2–12.9)
POIKILOCYTOSIS BLD QL SMEAR: SLIGHT
POLYCHROMASIA BLD QL SMEAR: ABNORMAL
POTASSIUM SERPL-SCNC: 3.9 MMOL/L (ref 3.5–5.1)
POTASSIUM SERPL-SCNC: 4 MMOL/L (ref 3.5–5.1)
POTASSIUM SERPL-SCNC: 4 MMOL/L (ref 3.5–5.1)
POTASSIUM SERPL-SCNC: 4.1 MMOL/L (ref 3.5–5.1)
POTASSIUM SERPL-SCNC: 4.5 MMOL/L (ref 3.5–5.1)
PROT SERPL-MCNC: 5.2 G/DL (ref 6–8.4)
PROT UR QL STRIP: NEGATIVE
PROT UR-MCNC: 18 MG/DL (ref 0–15)
PROT/CREAT UR: 0.75 MG/G{CREAT} (ref 0–0.2)
PROTHROMBIN TIME: 13.9 SEC (ref 9–12.5)
PULM VEIN S/D RATIO: 1.8
PV PEAK D VEL: 0.41 M/S
PV PEAK S VEL: 0.74 M/S
RA MAJOR: 4.29 CM
RA WIDTH: 3.33 CM
RBC # BLD AUTO: 2.74 M/UL (ref 4–5.4)
RBC # BLD AUTO: 2.8 M/UL (ref 4–5.4)
RBC # BLD AUTO: 2.87 M/UL (ref 4–5.4)
RBC # BLD AUTO: 2.91 M/UL (ref 4–5.4)
RBC # BLD AUTO: 2.91 M/UL (ref 4–5.4)
RBC #/AREA URNS AUTO: 3 /HPF (ref 0–4)
RIGHT VENTRICULAR END-DIASTOLIC DIMENSION: 2.62 CM
RV TISSUE DOPPLER FREE WALL SYSTOLIC VELOCITY 1 (APICAL 4 CHAMBER VIEW): 19.6 CM/S
SINUS: 2.89 CM
SODIUM SERPL-SCNC: 137 MMOL/L (ref 136–145)
SODIUM SERPL-SCNC: 137 MMOL/L (ref 136–145)
SODIUM SERPL-SCNC: 138 MMOL/L (ref 136–145)
SODIUM SERPL-SCNC: 138 MMOL/L (ref 136–145)
SODIUM SERPL-SCNC: 139 MMOL/L (ref 136–145)
SODIUM UR-SCNC: 106 MMOL/L (ref 20–250)
SP GR UR STRIP: 1.01 (ref 1–1.03)
SQUAMOUS #/AREA URNS AUTO: 1 /HPF
STJ: 2.28 CM
T4 FREE SERPL-MCNC: 0.7 NG/DL (ref 0.71–1.51)
TDI LATERAL: 0.08 M/S
TDI SEPTAL: 0.06 M/S
TDI: 0.07 M/S
TR MAX PG: 38 MMHG
TRICUSPID ANNULAR PLANE SYSTOLIC EXCURSION: 1.95 CM
TRIGL SERPL-MCNC: 258 MG/DL (ref 30–150)
TROPONIN I SERPL DL<=0.01 NG/ML-MCNC: 2.56 NG/ML (ref 0–0.03)
TROPONIN I SERPL DL<=0.01 NG/ML-MCNC: 3.06 NG/ML (ref 0–0.03)
TSH SERPL DL<=0.005 MIU/L-ACNC: 2.55 UIU/ML (ref 0.4–4)
URN SPEC COLLECT METH UR: ABNORMAL
VANCOMYCIN SERPL-MCNC: 14.5 UG/ML
WBC # BLD AUTO: 4.93 K/UL (ref 3.9–12.7)
WBC # BLD AUTO: 5.26 K/UL (ref 3.9–12.7)
WBC # BLD AUTO: 7.24 K/UL (ref 3.9–12.7)
WBC # BLD AUTO: 7.27 K/UL (ref 3.9–12.7)
WBC # BLD AUTO: 7.99 K/UL (ref 3.9–12.7)
WBC #/AREA URNS AUTO: 5 /HPF (ref 0–5)

## 2021-01-22 PROCEDURE — 99900035 HC TECH TIME PER 15 MIN (STAT)

## 2021-01-22 PROCEDURE — 81001 URINALYSIS AUTO W/SCOPE: CPT

## 2021-01-22 PROCEDURE — 20000000 HC ICU ROOM

## 2021-01-22 PROCEDURE — 86803 HEPATITIS C AB TEST: CPT

## 2021-01-22 PROCEDURE — 80202 ASSAY OF VANCOMYCIN: CPT

## 2021-01-22 PROCEDURE — 99223 1ST HOSP IP/OBS HIGH 75: CPT | Mod: ,,, | Performed by: INTERNAL MEDICINE

## 2021-01-22 PROCEDURE — 99223 PR INITIAL HOSPITAL CARE,LEVL III: ICD-10-PCS | Mod: ,,, | Performed by: INTERNAL MEDICINE

## 2021-01-22 PROCEDURE — 85027 COMPLETE CBC AUTOMATED: CPT

## 2021-01-22 PROCEDURE — 63600175 PHARM REV CODE 636 W HCPCS: Performed by: STUDENT IN AN ORGANIZED HEALTH CARE EDUCATION/TRAINING PROGRAM

## 2021-01-22 PROCEDURE — 25000003 PHARM REV CODE 250: Performed by: STUDENT IN AN ORGANIZED HEALTH CARE EDUCATION/TRAINING PROGRAM

## 2021-01-22 PROCEDURE — 84156 ASSAY OF PROTEIN URINE: CPT

## 2021-01-22 PROCEDURE — 84100 ASSAY OF PHOSPHORUS: CPT

## 2021-01-22 PROCEDURE — 27000221 HC OXYGEN, UP TO 24 HOURS

## 2021-01-22 PROCEDURE — 94003 VENT MGMT INPAT SUBQ DAY: CPT

## 2021-01-22 PROCEDURE — 84443 ASSAY THYROID STIM HORMONE: CPT

## 2021-01-22 PROCEDURE — C9113 INJ PANTOPRAZOLE SODIUM, VIA: HCPCS | Performed by: STUDENT IN AN ORGANIZED HEALTH CARE EDUCATION/TRAINING PROGRAM

## 2021-01-22 PROCEDURE — 36620 INSERTION CATHETER ARTERY: CPT

## 2021-01-22 PROCEDURE — 85610 PROTHROMBIN TIME: CPT

## 2021-01-22 PROCEDURE — 99900026 HC AIRWAY MAINTENANCE (STAT)

## 2021-01-22 PROCEDURE — 94761 N-INVAS EAR/PLS OXIMETRY MLT: CPT

## 2021-01-22 PROCEDURE — 85025 COMPLETE CBC W/AUTO DIFF WBC: CPT | Mod: 91

## 2021-01-22 PROCEDURE — 84300 ASSAY OF URINE SODIUM: CPT

## 2021-01-22 PROCEDURE — 36430 TRANSFUSION BLD/BLD COMPNT: CPT

## 2021-01-22 PROCEDURE — 99291 CRITICAL CARE FIRST HOUR: CPT | Mod: 24,,, | Performed by: STUDENT IN AN ORGANIZED HEALTH CARE EDUCATION/TRAINING PROGRAM

## 2021-01-22 PROCEDURE — 80061 LIPID PANEL: CPT

## 2021-01-22 PROCEDURE — 83036 HEMOGLOBIN GLYCOSYLATED A1C: CPT

## 2021-01-22 PROCEDURE — 87040 BLOOD CULTURE FOR BACTERIA: CPT | Mod: 59

## 2021-01-22 PROCEDURE — 99232 SBSQ HOSP IP/OBS MODERATE 35: CPT | Mod: ,,, | Performed by: STUDENT IN AN ORGANIZED HEALTH CARE EDUCATION/TRAINING PROGRAM

## 2021-01-22 PROCEDURE — 99232 PR SUBSEQUENT HOSPITAL CARE,LEVL II: ICD-10-PCS | Mod: ,,, | Performed by: STUDENT IN AN ORGANIZED HEALTH CARE EDUCATION/TRAINING PROGRAM

## 2021-01-22 PROCEDURE — 87535 HIV-1 PROBE&REVERSE TRNSCRPJ: CPT

## 2021-01-22 PROCEDURE — 80053 COMPREHEN METABOLIC PANEL: CPT

## 2021-01-22 PROCEDURE — 84439 ASSAY OF FREE THYROXINE: CPT

## 2021-01-22 PROCEDURE — 84484 ASSAY OF TROPONIN QUANT: CPT

## 2021-01-22 PROCEDURE — 83735 ASSAY OF MAGNESIUM: CPT

## 2021-01-22 PROCEDURE — 63600175 PHARM REV CODE 636 W HCPCS: Performed by: SURGERY

## 2021-01-22 PROCEDURE — 86707 HEPATITIS BE ANTIBODY: CPT

## 2021-01-22 PROCEDURE — 80048 BASIC METABOLIC PNL TOTAL CA: CPT | Mod: 91

## 2021-01-22 PROCEDURE — 99291 PR CRITICAL CARE, E/M 30-74 MINUTES: ICD-10-PCS | Mod: 24,,, | Performed by: STUDENT IN AN ORGANIZED HEALTH CARE EDUCATION/TRAINING PROGRAM

## 2021-01-22 PROCEDURE — 85007 BL SMEAR W/DIFF WBC COUNT: CPT

## 2021-01-22 PROCEDURE — 25000003 PHARM REV CODE 250: Performed by: SURGERY

## 2021-01-22 RX ORDER — FENTANYL CITRATE-0.9 % NACL/PF 10 MCG/ML
0-200 PLASTIC BAG, INJECTION (ML) INTRAVENOUS CONTINUOUS
Status: DISCONTINUED | OUTPATIENT
Start: 2021-01-22 | End: 2021-01-25

## 2021-01-22 RX ORDER — FUROSEMIDE 10 MG/ML
120 INJECTION INTRAMUSCULAR; INTRAVENOUS ONCE
Status: COMPLETED | OUTPATIENT
Start: 2021-01-22 | End: 2021-01-22

## 2021-01-22 RX ORDER — MAGNESIUM SULFATE 1 G/100ML
1 INJECTION INTRAVENOUS ONCE
Status: COMPLETED | OUTPATIENT
Start: 2021-01-22 | End: 2021-01-22

## 2021-01-22 RX ORDER — HYDROMORPHONE HYDROCHLORIDE 1 MG/ML
0.5 INJECTION, SOLUTION INTRAMUSCULAR; INTRAVENOUS; SUBCUTANEOUS ONCE
Status: COMPLETED | OUTPATIENT
Start: 2021-01-22 | End: 2021-01-22

## 2021-01-22 RX ORDER — HYDROMORPHONE HYDROCHLORIDE 1 MG/ML
1 INJECTION, SOLUTION INTRAMUSCULAR; INTRAVENOUS; SUBCUTANEOUS ONCE
Status: COMPLETED | OUTPATIENT
Start: 2021-01-22 | End: 2021-01-22

## 2021-01-22 RX ADMIN — MUPIROCIN: 20 OINTMENT TOPICAL at 10:01

## 2021-01-22 RX ADMIN — PIPERACILLIN AND TAZOBACTAM 4.5 G: 4; .5 INJECTION, POWDER, LYOPHILIZED, FOR SOLUTION INTRAVENOUS; PARENTERAL at 10:01

## 2021-01-22 RX ADMIN — PANTOPRAZOLE SODIUM 40 MG: 40 INJECTION, POWDER, FOR SOLUTION INTRAVENOUS at 10:01

## 2021-01-22 RX ADMIN — MUPIROCIN: 20 OINTMENT TOPICAL at 09:01

## 2021-01-22 RX ADMIN — PROPOFOL 40 MCG/KG/MIN: 10 INJECTION, EMULSION INTRAVENOUS at 04:01

## 2021-01-22 RX ADMIN — HEPARIN SODIUM 5000 UNITS: 5000 INJECTION INTRAVENOUS; SUBCUTANEOUS at 10:01

## 2021-01-22 RX ADMIN — Medication 50 MCG/HR: at 08:01

## 2021-01-22 RX ADMIN — FENTANYL CITRATE 25 MCG: 50 INJECTION, SOLUTION INTRAMUSCULAR; INTRAVENOUS at 07:01

## 2021-01-22 RX ADMIN — FENTANYL CITRATE 25 MCG: 50 INJECTION, SOLUTION INTRAMUSCULAR; INTRAVENOUS at 02:01

## 2021-01-22 RX ADMIN — HYDROMORPHONE HYDROCHLORIDE 1 MG: 1 INJECTION, SOLUTION INTRAMUSCULAR; INTRAVENOUS; SUBCUTANEOUS at 06:01

## 2021-01-22 RX ADMIN — PIPERACILLIN AND TAZOBACTAM 4.5 G: 4; .5 INJECTION, POWDER, LYOPHILIZED, FOR SOLUTION INTRAVENOUS; PARENTERAL at 12:01

## 2021-01-22 RX ADMIN — FLUCONAZOLE, SODIUM CHLORIDE 400 MG: 2 INJECTION INTRAVENOUS at 02:01

## 2021-01-22 RX ADMIN — PROPOFOL 40 MCG/KG/MIN: 10 INJECTION, EMULSION INTRAVENOUS at 12:01

## 2021-01-22 RX ADMIN — HEPARIN SODIUM 5000 UNITS: 5000 INJECTION INTRAVENOUS; SUBCUTANEOUS at 06:01

## 2021-01-22 RX ADMIN — CALCIUM GLUCONATE 500 MG: 98 INJECTION, SOLUTION INTRAVENOUS at 01:01

## 2021-01-22 RX ADMIN — HEPARIN SODIUM 5000 UNITS: 5000 INJECTION INTRAVENOUS; SUBCUTANEOUS at 02:01

## 2021-01-22 RX ADMIN — PROPOFOL 20 MCG/KG/MIN: 10 INJECTION, EMULSION INTRAVENOUS at 07:01

## 2021-01-22 RX ADMIN — SODIUM CHLORIDE 1000 ML: 0.9 INJECTION, SOLUTION INTRAVENOUS at 10:01

## 2021-01-22 RX ADMIN — HYDROMORPHONE HYDROCHLORIDE 0.5 MG: 1 INJECTION, SOLUTION INTRAMUSCULAR; INTRAVENOUS; SUBCUTANEOUS at 04:01

## 2021-01-22 RX ADMIN — FENTANYL CITRATE 25 MCG: 50 INJECTION, SOLUTION INTRAMUSCULAR; INTRAVENOUS at 12:01

## 2021-01-22 RX ADMIN — SODIUM CHLORIDE, SODIUM LACTATE, POTASSIUM CHLORIDE, AND CALCIUM CHLORIDE: .6; .31; .03; .02 INJECTION, SOLUTION INTRAVENOUS at 07:01

## 2021-01-22 RX ADMIN — PANTOPRAZOLE SODIUM 40 MG: 40 INJECTION, POWDER, FOR SOLUTION INTRAVENOUS at 09:01

## 2021-01-22 RX ADMIN — VANCOMYCIN HYDROCHLORIDE 750 MG: 750 INJECTION, POWDER, LYOPHILIZED, FOR SOLUTION INTRAVENOUS at 04:01

## 2021-01-22 RX ADMIN — MAGNESIUM SULFATE HEPTAHYDRATE 1 G: 500 INJECTION, SOLUTION INTRAMUSCULAR; INTRAVENOUS at 09:01

## 2021-01-22 RX ADMIN — CALCIUM GLUCONATE 1000 MG: 98 INJECTION, SOLUTION INTRAVENOUS at 11:01

## 2021-01-22 RX ADMIN — FUROSEMIDE 120 MG: 10 INJECTION, SOLUTION INTRAMUSCULAR; INTRAVENOUS at 07:01

## 2021-01-23 ENCOUNTER — ANESTHESIA (OUTPATIENT)
Dept: SURGERY | Facility: HOSPITAL | Age: 71
DRG: 853 | End: 2021-01-23
Payer: MEDICARE

## 2021-01-23 LAB
ALBUMIN SERPL BCP-MCNC: 1.9 G/DL (ref 3.5–5.2)
ALP SERPL-CCNC: 106 U/L (ref 55–135)
ALT SERPL W/O P-5'-P-CCNC: 141 U/L (ref 10–44)
ANION GAP SERPL CALC-SCNC: 15 MMOL/L (ref 8–16)
ANION GAP SERPL CALC-SCNC: 18 MMOL/L (ref 8–16)
ANION GAP SERPL CALC-SCNC: 18 MMOL/L (ref 8–16)
ANION GAP SERPL CALC-SCNC: 20 MMOL/L (ref 8–16)
ANION GAP SERPL CALC-SCNC: 21 MMOL/L (ref 8–16)
ANISOCYTOSIS BLD QL SMEAR: SLIGHT
AST SERPL-CCNC: 531 U/L (ref 10–40)
BASOPHILS # BLD AUTO: 0.01 K/UL (ref 0–0.2)
BASOPHILS # BLD AUTO: 0.02 K/UL (ref 0–0.2)
BASOPHILS # BLD AUTO: 0.02 K/UL (ref 0–0.2)
BASOPHILS # BLD AUTO: 0.03 K/UL (ref 0–0.2)
BASOPHILS # BLD AUTO: 0.03 K/UL (ref 0–0.2)
BASOPHILS # BLD AUTO: 0.05 K/UL (ref 0–0.2)
BASOPHILS NFR BLD: 0.2 % (ref 0–1.9)
BASOPHILS NFR BLD: 0.3 % (ref 0–1.9)
BASOPHILS NFR BLD: 0.3 % (ref 0–1.9)
BASOPHILS NFR BLD: 0.4 % (ref 0–1.9)
BASOPHILS NFR BLD: 0.5 % (ref 0–1.9)
BASOPHILS NFR BLD: 0.9 % (ref 0–1.9)
BILIRUB SERPL-MCNC: 0.4 MG/DL (ref 0.1–1)
BLD PROD TYP BPU: NORMAL
BLD PROD TYP BPU: NORMAL
BLOOD UNIT EXPIRATION DATE: NORMAL
BLOOD UNIT EXPIRATION DATE: NORMAL
BLOOD UNIT TYPE CODE: 5100
BLOOD UNIT TYPE CODE: 5100
BLOOD UNIT TYPE: NORMAL
BLOOD UNIT TYPE: NORMAL
BUN SERPL-MCNC: 60 MG/DL (ref 8–23)
BUN SERPL-MCNC: 67 MG/DL (ref 8–23)
BUN SERPL-MCNC: 69 MG/DL (ref 8–23)
BUN SERPL-MCNC: 70 MG/DL (ref 8–23)
BUN SERPL-MCNC: 70 MG/DL (ref 8–23)
BURR CELLS BLD QL SMEAR: ABNORMAL
CA-I BLDV-SCNC: 0.89 MMOL/L (ref 1.06–1.42)
CALCIUM SERPL-MCNC: 6.2 MG/DL (ref 8.7–10.5)
CALCIUM SERPL-MCNC: 6.5 MG/DL (ref 8.7–10.5)
CALCIUM SERPL-MCNC: 6.9 MG/DL (ref 8.7–10.5)
CALCIUM SERPL-MCNC: 6.9 MG/DL (ref 8.7–10.5)
CALCIUM SERPL-MCNC: 7.1 MG/DL (ref 8.7–10.5)
CHLORIDE SERPL-SCNC: 104 MMOL/L (ref 95–110)
CHLORIDE SERPL-SCNC: 106 MMOL/L (ref 95–110)
CO2 SERPL-SCNC: 14 MMOL/L (ref 23–29)
CO2 SERPL-SCNC: 15 MMOL/L (ref 23–29)
CO2 SERPL-SCNC: 16 MMOL/L (ref 23–29)
CO2 SERPL-SCNC: 16 MMOL/L (ref 23–29)
CO2 SERPL-SCNC: 18 MMOL/L (ref 23–29)
CODING SYSTEM: NORMAL
CODING SYSTEM: NORMAL
CREAT SERPL-MCNC: 4.1 MG/DL (ref 0.5–1.4)
CREAT SERPL-MCNC: 4.9 MG/DL (ref 0.5–1.4)
CREAT SERPL-MCNC: 5.4 MG/DL (ref 0.5–1.4)
CREAT SERPL-MCNC: 5.5 MG/DL (ref 0.5–1.4)
CREAT SERPL-MCNC: 5.7 MG/DL (ref 0.5–1.4)
DACRYOCYTES BLD QL SMEAR: ABNORMAL
DIFFERENTIAL METHOD: ABNORMAL
DISPENSE STATUS: NORMAL
DISPENSE STATUS: NORMAL
DOHLE BOD BLD QL SMEAR: PRESENT
EOSINOPHIL # BLD AUTO: 0 K/UL (ref 0–0.5)
EOSINOPHIL # BLD AUTO: 0 K/UL (ref 0–0.5)
EOSINOPHIL # BLD AUTO: 0.1 K/UL (ref 0–0.5)
EOSINOPHIL # BLD AUTO: 0.2 K/UL (ref 0–0.5)
EOSINOPHIL NFR BLD: 0.5 % (ref 0–8)
EOSINOPHIL NFR BLD: 0.7 % (ref 0–8)
EOSINOPHIL NFR BLD: 0.7 % (ref 0–8)
EOSINOPHIL NFR BLD: 1.1 % (ref 0–8)
EOSINOPHIL NFR BLD: 1.3 % (ref 0–8)
EOSINOPHIL NFR BLD: 2.3 % (ref 0–8)
ERYTHROCYTE [DISTWIDTH] IN BLOOD BY AUTOMATED COUNT: 15.8 % (ref 11.5–14.5)
ERYTHROCYTE [DISTWIDTH] IN BLOOD BY AUTOMATED COUNT: 16.6 % (ref 11.5–14.5)
ERYTHROCYTE [DISTWIDTH] IN BLOOD BY AUTOMATED COUNT: 16.6 % (ref 11.5–14.5)
ERYTHROCYTE [DISTWIDTH] IN BLOOD BY AUTOMATED COUNT: 16.8 % (ref 11.5–14.5)
ERYTHROCYTE [DISTWIDTH] IN BLOOD BY AUTOMATED COUNT: 16.8 % (ref 11.5–14.5)
ERYTHROCYTE [DISTWIDTH] IN BLOOD BY AUTOMATED COUNT: 16.9 % (ref 11.5–14.5)
EST. GFR  (AFRICAN AMERICAN): 12.1 ML/MIN/1.73 M^2
EST. GFR  (AFRICAN AMERICAN): 8.1 ML/MIN/1.73 M^2
EST. GFR  (AFRICAN AMERICAN): 8.5 ML/MIN/1.73 M^2
EST. GFR  (AFRICAN AMERICAN): 8.6 ML/MIN/1.73 M^2
EST. GFR  (AFRICAN AMERICAN): 9.7 ML/MIN/1.73 M^2
EST. GFR  (NON AFRICAN AMERICAN): 10.5 ML/MIN/1.73 M^2
EST. GFR  (NON AFRICAN AMERICAN): 7 ML/MIN/1.73 M^2
EST. GFR  (NON AFRICAN AMERICAN): 7.3 ML/MIN/1.73 M^2
EST. GFR  (NON AFRICAN AMERICAN): 7.5 ML/MIN/1.73 M^2
EST. GFR  (NON AFRICAN AMERICAN): 8.4 ML/MIN/1.73 M^2
GIANT PLATELETS BLD QL SMEAR: PRESENT
GLUCOSE SERPL-MCNC: 84 MG/DL (ref 70–110)
GLUCOSE SERPL-MCNC: 89 MG/DL (ref 70–110)
GLUCOSE SERPL-MCNC: 90 MG/DL (ref 70–110)
GLUCOSE SERPL-MCNC: 90 MG/DL (ref 70–110)
GLUCOSE SERPL-MCNC: 92 MG/DL (ref 70–110)
HCT VFR BLD AUTO: 21 % (ref 37–48.5)
HCT VFR BLD AUTO: 25.8 % (ref 37–48.5)
HCT VFR BLD AUTO: 25.9 % (ref 37–48.5)
HCT VFR BLD AUTO: 26 % (ref 37–48.5)
HCT VFR BLD AUTO: 26.7 % (ref 37–48.5)
HCT VFR BLD AUTO: 27.5 % (ref 37–48.5)
HGB BLD-MCNC: 6.8 G/DL (ref 12–16)
HGB BLD-MCNC: 8.1 G/DL (ref 12–16)
HGB BLD-MCNC: 8.2 G/DL (ref 12–16)
HGB BLD-MCNC: 8.5 G/DL (ref 12–16)
HGB BLD-MCNC: 8.6 G/DL (ref 12–16)
HGB BLD-MCNC: 8.8 G/DL (ref 12–16)
HYPOCHROMIA BLD QL SMEAR: ABNORMAL
IMM GRANULOCYTES # BLD AUTO: 0.01 K/UL (ref 0–0.04)
IMM GRANULOCYTES # BLD AUTO: 0.01 K/UL (ref 0–0.04)
IMM GRANULOCYTES # BLD AUTO: 0.02 K/UL (ref 0–0.04)
IMM GRANULOCYTES # BLD AUTO: 0.06 K/UL (ref 0–0.04)
IMM GRANULOCYTES # BLD AUTO: 0.07 K/UL (ref 0–0.04)
IMM GRANULOCYTES # BLD AUTO: 0.15 K/UL (ref 0–0.04)
IMM GRANULOCYTES NFR BLD AUTO: 0.2 % (ref 0–0.5)
IMM GRANULOCYTES NFR BLD AUTO: 0.2 % (ref 0–0.5)
IMM GRANULOCYTES NFR BLD AUTO: 0.3 % (ref 0–0.5)
IMM GRANULOCYTES NFR BLD AUTO: 0.9 % (ref 0–0.5)
IMM GRANULOCYTES NFR BLD AUTO: 1 % (ref 0–0.5)
IMM GRANULOCYTES NFR BLD AUTO: 2.2 % (ref 0–0.5)
INR PPP: 1 (ref 0.8–1.2)
LACTATE SERPL-SCNC: 1.1 MMOL/L (ref 0.5–2.2)
LYMPHOCYTES # BLD AUTO: 0.3 K/UL (ref 1–4.8)
LYMPHOCYTES # BLD AUTO: 0.4 K/UL (ref 1–4.8)
LYMPHOCYTES # BLD AUTO: 0.4 K/UL (ref 1–4.8)
LYMPHOCYTES # BLD AUTO: 0.5 K/UL (ref 1–4.8)
LYMPHOCYTES # BLD AUTO: 0.6 K/UL (ref 1–4.8)
LYMPHOCYTES # BLD AUTO: 0.7 K/UL (ref 1–4.8)
LYMPHOCYTES NFR BLD: 6.4 % (ref 18–48)
LYMPHOCYTES NFR BLD: 6.8 % (ref 18–48)
LYMPHOCYTES NFR BLD: 7.2 % (ref 18–48)
LYMPHOCYTES NFR BLD: 8.7 % (ref 18–48)
LYMPHOCYTES NFR BLD: 8.8 % (ref 18–48)
LYMPHOCYTES NFR BLD: 9.7 % (ref 18–48)
MAGNESIUM SERPL-MCNC: 1.7 MG/DL (ref 1.6–2.6)
MAGNESIUM SERPL-MCNC: 1.9 MG/DL (ref 1.6–2.6)
MCH RBC QN AUTO: 31.2 PG (ref 27–31)
MCH RBC QN AUTO: 31.4 PG (ref 27–31)
MCH RBC QN AUTO: 31.6 PG (ref 27–31)
MCH RBC QN AUTO: 31.6 PG (ref 27–31)
MCH RBC QN AUTO: 31.7 PG (ref 27–31)
MCH RBC QN AUTO: 31.9 PG (ref 27–31)
MCHC RBC AUTO-ENTMCNC: 31.4 G/DL (ref 32–36)
MCHC RBC AUTO-ENTMCNC: 31.5 G/DL (ref 32–36)
MCHC RBC AUTO-ENTMCNC: 32 G/DL (ref 32–36)
MCHC RBC AUTO-ENTMCNC: 32.2 G/DL (ref 32–36)
MCHC RBC AUTO-ENTMCNC: 32.4 G/DL (ref 32–36)
MCHC RBC AUTO-ENTMCNC: 32.8 G/DL (ref 32–36)
MCV RBC AUTO: 100 FL (ref 82–98)
MCV RBC AUTO: 96 FL (ref 82–98)
MCV RBC AUTO: 98 FL (ref 82–98)
MCV RBC AUTO: 99 FL (ref 82–98)
MONOCYTES # BLD AUTO: 0.2 K/UL (ref 0.3–1)
MONOCYTES NFR BLD: 2.6 % (ref 4–15)
MONOCYTES NFR BLD: 2.7 % (ref 4–15)
MONOCYTES NFR BLD: 3 % (ref 4–15)
MONOCYTES NFR BLD: 3.1 % (ref 4–15)
MONOCYTES NFR BLD: 3.6 % (ref 4–15)
MONOCYTES NFR BLD: 4.2 % (ref 4–15)
NEUTROPHILS # BLD AUTO: 4 K/UL (ref 1.8–7.7)
NEUTROPHILS # BLD AUTO: 5 K/UL (ref 1.8–7.7)
NEUTROPHILS # BLD AUTO: 5.3 K/UL (ref 1.8–7.7)
NEUTROPHILS # BLD AUTO: 5.6 K/UL (ref 1.8–7.7)
NEUTROPHILS # BLD AUTO: 5.8 K/UL (ref 1.8–7.7)
NEUTROPHILS # BLD AUTO: 6.3 K/UL (ref 1.8–7.7)
NEUTROPHILS NFR BLD: 84.9 % (ref 38–73)
NEUTROPHILS NFR BLD: 85.1 % (ref 38–73)
NEUTROPHILS NFR BLD: 86 % (ref 38–73)
NEUTROPHILS NFR BLD: 86.7 % (ref 38–73)
NEUTROPHILS NFR BLD: 87.5 % (ref 38–73)
NEUTROPHILS NFR BLD: 89 % (ref 38–73)
NRBC BLD-RTO: 0 /100 WBC
PHOSPHATE SERPL-MCNC: 5.2 MG/DL (ref 2.7–4.5)
PLATELET # BLD AUTO: 164 K/UL (ref 150–350)
PLATELET # BLD AUTO: 164 K/UL (ref 150–350)
PLATELET # BLD AUTO: 230 K/UL (ref 150–350)
PLATELET # BLD AUTO: 235 K/UL (ref 150–350)
PLATELET # BLD AUTO: 244 K/UL (ref 150–350)
PLATELET # BLD AUTO: 259 K/UL (ref 150–350)
PLATELET BLD QL SMEAR: ABNORMAL
PMV BLD AUTO: 10.1 FL (ref 9.2–12.9)
PMV BLD AUTO: 10.3 FL (ref 9.2–12.9)
PMV BLD AUTO: 10.4 FL (ref 9.2–12.9)
PMV BLD AUTO: 10.5 FL (ref 9.2–12.9)
PMV BLD AUTO: 10.5 FL (ref 9.2–12.9)
PMV BLD AUTO: 10.6 FL (ref 9.2–12.9)
POCT GLUCOSE: 97 MG/DL (ref 70–110)
POIKILOCYTOSIS BLD QL SMEAR: SLIGHT
POLYCHROMASIA BLD QL SMEAR: ABNORMAL
POTASSIUM SERPL-SCNC: 3.7 MMOL/L (ref 3.5–5.1)
POTASSIUM SERPL-SCNC: 3.9 MMOL/L (ref 3.5–5.1)
POTASSIUM SERPL-SCNC: 3.9 MMOL/L (ref 3.5–5.1)
POTASSIUM SERPL-SCNC: 4 MMOL/L (ref 3.5–5.1)
POTASSIUM SERPL-SCNC: 4.1 MMOL/L (ref 3.5–5.1)
PROT SERPL-MCNC: 5.2 G/DL (ref 6–8.4)
PROTHROMBIN TIME: 10.9 SEC (ref 9–12.5)
RBC # BLD AUTO: 2.15 M/UL (ref 4–5.4)
RBC # BLD AUTO: 2.6 M/UL (ref 4–5.4)
RBC # BLD AUTO: 2.61 M/UL (ref 4–5.4)
RBC # BLD AUTO: 2.69 M/UL (ref 4–5.4)
RBC # BLD AUTO: 2.7 M/UL (ref 4–5.4)
RBC # BLD AUTO: 2.78 M/UL (ref 4–5.4)
SARS-COV-2 RNA RESP QL NAA+PROBE: NOT DETECTED
SODIUM SERPL-SCNC: 137 MMOL/L (ref 136–145)
SODIUM SERPL-SCNC: 138 MMOL/L (ref 136–145)
SODIUM SERPL-SCNC: 139 MMOL/L (ref 136–145)
SODIUM SERPL-SCNC: 139 MMOL/L (ref 136–145)
SODIUM SERPL-SCNC: 140 MMOL/L (ref 136–145)
TRANS ERYTHROCYTES VOL PATIENT: NORMAL ML
TRANS ERYTHROCYTES VOL PATIENT: NORMAL ML
VANCOMYCIN SERPL-MCNC: 18.4 UG/ML
WBC # BLD AUTO: 4.57 K/UL (ref 3.9–12.7)
WBC # BLD AUTO: 5.64 K/UL (ref 3.9–12.7)
WBC # BLD AUTO: 6.19 K/UL (ref 3.9–12.7)
WBC # BLD AUTO: 6.43 K/UL (ref 3.9–12.7)
WBC # BLD AUTO: 6.82 K/UL (ref 3.9–12.7)
WBC # BLD AUTO: 7.43 K/UL (ref 3.9–12.7)

## 2021-01-23 PROCEDURE — 97605 NEG PRS WND THER DME<=50SQCM: CPT | Mod: ,,, | Performed by: STUDENT IN AN ORGANIZED HEALTH CARE EDUCATION/TRAINING PROGRAM

## 2021-01-23 PROCEDURE — 37000009 HC ANESTHESIA EA ADD 15 MINS: Performed by: STUDENT IN AN ORGANIZED HEALTH CARE EDUCATION/TRAINING PROGRAM

## 2021-01-23 PROCEDURE — 44139 PR MOBILIZE SPLENIC FLEX: ICD-10-PCS | Mod: ,,, | Performed by: STUDENT IN AN ORGANIZED HEALTH CARE EDUCATION/TRAINING PROGRAM

## 2021-01-23 PROCEDURE — 63600175 PHARM REV CODE 636 W HCPCS: Performed by: STUDENT IN AN ORGANIZED HEALTH CARE EDUCATION/TRAINING PROGRAM

## 2021-01-23 PROCEDURE — 85610 PROTHROMBIN TIME: CPT

## 2021-01-23 PROCEDURE — 63600175 PHARM REV CODE 636 W HCPCS: Performed by: NURSE ANESTHETIST, CERTIFIED REGISTERED

## 2021-01-23 PROCEDURE — D9220A PRA ANESTHESIA: Mod: CRNA,,, | Performed by: NURSE ANESTHETIST, CERTIFIED REGISTERED

## 2021-01-23 PROCEDURE — 84100 ASSAY OF PHOSPHORUS: CPT

## 2021-01-23 PROCEDURE — 82330 ASSAY OF CALCIUM: CPT

## 2021-01-23 PROCEDURE — 85025 COMPLETE CBC W/AUTO DIFF WBC: CPT | Mod: 91

## 2021-01-23 PROCEDURE — 44139 MOBILIZATION OF COLON: CPT | Mod: ,,, | Performed by: STUDENT IN AN ORGANIZED HEALTH CARE EDUCATION/TRAINING PROGRAM

## 2021-01-23 PROCEDURE — 83735 ASSAY OF MAGNESIUM: CPT

## 2021-01-23 PROCEDURE — 25000003 PHARM REV CODE 250: Performed by: SURGERY

## 2021-01-23 PROCEDURE — 27200966 HC CLOSED SUCTION SYSTEM

## 2021-01-23 PROCEDURE — 97605 PR NEG PRESS WOUND THERAPY (NPWT) W/NON-DISPOSABLE WOUND VAC DEVICE (DME), <=50 CM: ICD-10-PCS | Mod: ,,, | Performed by: STUDENT IN AN ORGANIZED HEALTH CARE EDUCATION/TRAINING PROGRAM

## 2021-01-23 PROCEDURE — P9021 RED BLOOD CELLS UNIT: HCPCS

## 2021-01-23 PROCEDURE — D9220A PRA ANESTHESIA: ICD-10-PCS | Mod: CRNA,,, | Performed by: NURSE ANESTHETIST, CERTIFIED REGISTERED

## 2021-01-23 PROCEDURE — 27000221 HC OXYGEN, UP TO 24 HOURS

## 2021-01-23 PROCEDURE — 36000707: Performed by: STUDENT IN AN ORGANIZED HEALTH CARE EDUCATION/TRAINING PROGRAM

## 2021-01-23 PROCEDURE — 99233 PR SUBSEQUENT HOSPITAL CARE,LEVL III: ICD-10-PCS | Mod: ,,, | Performed by: HOSPITALIST

## 2021-01-23 PROCEDURE — 80048 BASIC METABOLIC PNL TOTAL CA: CPT

## 2021-01-23 PROCEDURE — 36000706: Performed by: STUDENT IN AN ORGANIZED HEALTH CARE EDUCATION/TRAINING PROGRAM

## 2021-01-23 PROCEDURE — 80202 ASSAY OF VANCOMYCIN: CPT

## 2021-01-23 PROCEDURE — U0003 INFECTIOUS AGENT DETECTION BY NUCLEIC ACID (DNA OR RNA); SEVERE ACUTE RESPIRATORY SYNDROME CORONAVIRUS 2 (SARS-COV-2) (CORONAVIRUS DISEASE [COVID-19]), AMPLIFIED PROBE TECHNIQUE, MAKING USE OF HIGH THROUGHPUT TECHNOLOGIES AS DESCRIBED BY CMS-2020-01-R: HCPCS

## 2021-01-23 PROCEDURE — 37000008 HC ANESTHESIA 1ST 15 MINUTES: Performed by: STUDENT IN AN ORGANIZED HEALTH CARE EDUCATION/TRAINING PROGRAM

## 2021-01-23 PROCEDURE — 25000003 PHARM REV CODE 250: Performed by: STUDENT IN AN ORGANIZED HEALTH CARE EDUCATION/TRAINING PROGRAM

## 2021-01-23 PROCEDURE — 99291 CRITICAL CARE FIRST HOUR: CPT | Mod: 24,,, | Performed by: STUDENT IN AN ORGANIZED HEALTH CARE EDUCATION/TRAINING PROGRAM

## 2021-01-23 PROCEDURE — 94761 N-INVAS EAR/PLS OXIMETRY MLT: CPT

## 2021-01-23 PROCEDURE — 94003 VENT MGMT INPAT SUBQ DAY: CPT

## 2021-01-23 PROCEDURE — 99900026 HC AIRWAY MAINTENANCE (STAT)

## 2021-01-23 PROCEDURE — D9220A PRA ANESTHESIA: ICD-10-PCS | Mod: ANES,,, | Performed by: ANESTHESIOLOGY

## 2021-01-23 PROCEDURE — 27201423 OPTIME MED/SURG SUP & DEVICES STERILE SUPPLY: Performed by: STUDENT IN AN ORGANIZED HEALTH CARE EDUCATION/TRAINING PROGRAM

## 2021-01-23 PROCEDURE — 25000003 PHARM REV CODE 250: Performed by: NURSE ANESTHETIST, CERTIFIED REGISTERED

## 2021-01-23 PROCEDURE — 99233 SBSQ HOSP IP/OBS HIGH 50: CPT | Mod: ,,, | Performed by: HOSPITALIST

## 2021-01-23 PROCEDURE — D9220A PRA ANESTHESIA: Mod: ANES,,, | Performed by: ANESTHESIOLOGY

## 2021-01-23 PROCEDURE — 44140 PARTIAL REMOVAL OF COLON: CPT | Mod: 58,52,, | Performed by: STUDENT IN AN ORGANIZED HEALTH CARE EDUCATION/TRAINING PROGRAM

## 2021-01-23 PROCEDURE — 88307 TISSUE EXAM BY PATHOLOGIST: CPT | Performed by: STUDENT IN AN ORGANIZED HEALTH CARE EDUCATION/TRAINING PROGRAM

## 2021-01-23 PROCEDURE — C9113 INJ PANTOPRAZOLE SODIUM, VIA: HCPCS | Performed by: STUDENT IN AN ORGANIZED HEALTH CARE EDUCATION/TRAINING PROGRAM

## 2021-01-23 PROCEDURE — 80053 COMPREHEN METABOLIC PANEL: CPT

## 2021-01-23 PROCEDURE — 83605 ASSAY OF LACTIC ACID: CPT

## 2021-01-23 PROCEDURE — 44140 PR PART REMOVAL COLON W ANASTOMOSIS: ICD-10-PCS | Mod: 58,52,, | Performed by: STUDENT IN AN ORGANIZED HEALTH CARE EDUCATION/TRAINING PROGRAM

## 2021-01-23 PROCEDURE — 20000000 HC ICU ROOM

## 2021-01-23 PROCEDURE — 83735 ASSAY OF MAGNESIUM: CPT | Mod: 91

## 2021-01-23 PROCEDURE — 63600175 PHARM REV CODE 636 W HCPCS: Performed by: SURGERY

## 2021-01-23 PROCEDURE — 99900035 HC TECH TIME PER 15 MIN (STAT)

## 2021-01-23 PROCEDURE — 99291 PR CRITICAL CARE, E/M 30-74 MINUTES: ICD-10-PCS | Mod: 24,,, | Performed by: STUDENT IN AN ORGANIZED HEALTH CARE EDUCATION/TRAINING PROGRAM

## 2021-01-23 RX ORDER — MIDAZOLAM HYDROCHLORIDE 1 MG/ML
INJECTION, SOLUTION INTRAMUSCULAR; INTRAVENOUS
Status: DISCONTINUED | OUTPATIENT
Start: 2021-01-23 | End: 2021-01-23

## 2021-01-23 RX ORDER — FENTANYL CITRATE 50 UG/ML
INJECTION, SOLUTION INTRAMUSCULAR; INTRAVENOUS
Status: DISCONTINUED | OUTPATIENT
Start: 2021-01-23 | End: 2021-01-23

## 2021-01-23 RX ORDER — SODIUM CHLORIDE, SODIUM LACTATE, POTASSIUM CHLORIDE, CALCIUM CHLORIDE 600; 310; 30; 20 MG/100ML; MG/100ML; MG/100ML; MG/100ML
INJECTION, SOLUTION INTRAVENOUS CONTINUOUS
Status: DISCONTINUED | OUTPATIENT
Start: 2021-01-23 | End: 2021-01-28

## 2021-01-23 RX ORDER — MAGNESIUM SULFATE HEPTAHYDRATE 40 MG/ML
2 INJECTION, SOLUTION INTRAVENOUS ONCE
Status: COMPLETED | OUTPATIENT
Start: 2021-01-23 | End: 2021-01-23

## 2021-01-23 RX ORDER — PHENYLEPHRINE HYDROCHLORIDE 10 MG/ML
INJECTION INTRAVENOUS
Status: DISCONTINUED | OUTPATIENT
Start: 2021-01-23 | End: 2021-01-23

## 2021-01-23 RX ORDER — POTASSIUM CHLORIDE 7.45 MG/ML
10 INJECTION INTRAVENOUS
Status: COMPLETED | OUTPATIENT
Start: 2021-01-23 | End: 2021-01-23

## 2021-01-23 RX ORDER — ONDANSETRON 2 MG/ML
INJECTION INTRAMUSCULAR; INTRAVENOUS
Status: DISCONTINUED | OUTPATIENT
Start: 2021-01-23 | End: 2021-01-23

## 2021-01-23 RX ORDER — ROCURONIUM BROMIDE 10 MG/ML
INJECTION, SOLUTION INTRAVENOUS
Status: DISCONTINUED | OUTPATIENT
Start: 2021-01-23 | End: 2021-01-23

## 2021-01-23 RX ORDER — BALSAM PERU/CASTOR OIL
OINTMENT (GRAM) TOPICAL 2 TIMES DAILY
Status: DISCONTINUED | OUTPATIENT
Start: 2021-01-23 | End: 2021-02-09 | Stop reason: HOSPADM

## 2021-01-23 RX ORDER — DEXMEDETOMIDINE HYDROCHLORIDE 4 UG/ML
0-1.4 INJECTION, SOLUTION INTRAVENOUS CONTINUOUS
Status: DISCONTINUED | OUTPATIENT
Start: 2021-01-23 | End: 2021-01-27

## 2021-01-23 RX ORDER — HYDROCODONE BITARTRATE AND ACETAMINOPHEN 500; 5 MG/1; MG/1
TABLET ORAL
Status: DISCONTINUED | OUTPATIENT
Start: 2021-01-23 | End: 2021-01-28

## 2021-01-23 RX ORDER — PROPOFOL 10 MG/ML
VIAL (ML) INTRAVENOUS
Status: DISCONTINUED | OUTPATIENT
Start: 2021-01-23 | End: 2021-01-23

## 2021-01-23 RX ADMIN — DEXMEDETOMIDINE HYDROCHLORIDE 0.2 MCG/KG/HR: 4 INJECTION, SOLUTION INTRAVENOUS at 04:01

## 2021-01-23 RX ADMIN — SODIUM CHLORIDE, SODIUM LACTATE, POTASSIUM CHLORIDE, AND CALCIUM CHLORIDE 500 ML: .6; .31; .03; .02 INJECTION, SOLUTION INTRAVENOUS at 06:01

## 2021-01-23 RX ADMIN — PHENYLEPHRINE HYDROCHLORIDE 200 MCG: 10 INJECTION INTRAVENOUS at 08:01

## 2021-01-23 RX ADMIN — PROPOFOL 50 MG: 10 INJECTION, EMULSION INTRAVENOUS at 08:01

## 2021-01-23 RX ADMIN — MAGNESIUM SULFATE 2 G: 2 INJECTION INTRAVENOUS at 09:01

## 2021-01-23 RX ADMIN — PROPOFOL 20 MCG/KG/MIN: 10 INJECTION, EMULSION INTRAVENOUS at 05:01

## 2021-01-23 RX ADMIN — SODIUM CHLORIDE, SODIUM LACTATE, POTASSIUM CHLORIDE, AND CALCIUM CHLORIDE 1000 ML: .6; .31; .03; .02 INJECTION, SOLUTION INTRAVENOUS at 03:01

## 2021-01-23 RX ADMIN — ONDANSETRON 4 MG: 2 INJECTION, SOLUTION INTRAMUSCULAR; INTRAVENOUS at 08:01

## 2021-01-23 RX ADMIN — PROPOFOL 25.42 MCG/KG/MIN: 10 INJECTION, EMULSION INTRAVENOUS at 04:01

## 2021-01-23 RX ADMIN — CALCIUM GLUCONATE 1000 MG: 98 INJECTION, SOLUTION INTRAVENOUS at 07:01

## 2021-01-23 RX ADMIN — CASTOR OIL AND BALSAM, PERU: 788; 87 OINTMENT TOPICAL at 11:01

## 2021-01-23 RX ADMIN — MUPIROCIN: 20 OINTMENT TOPICAL at 10:01

## 2021-01-23 RX ADMIN — PANTOPRAZOLE SODIUM 40 MG: 40 INJECTION, POWDER, FOR SOLUTION INTRAVENOUS at 10:01

## 2021-01-23 RX ADMIN — FENTANYL CITRATE 25 MCG: 50 INJECTION, SOLUTION INTRAMUSCULAR; INTRAVENOUS at 07:01

## 2021-01-23 RX ADMIN — Medication 0.02 MCG/KG/MIN: at 05:01

## 2021-01-23 RX ADMIN — PANTOPRAZOLE SODIUM 40 MG: 40 INJECTION, POWDER, FOR SOLUTION INTRAVENOUS at 08:01

## 2021-01-23 RX ADMIN — SODIUM CHLORIDE 0.75 MCG/KG/MIN: 9 INJECTION, SOLUTION INTRAVENOUS at 08:01

## 2021-01-23 RX ADMIN — PHENYLEPHRINE HYDROCHLORIDE 100 MCG: 10 INJECTION INTRAVENOUS at 07:01

## 2021-01-23 RX ADMIN — SODIUM CHLORIDE, SODIUM LACTATE, POTASSIUM CHLORIDE, AND CALCIUM CHLORIDE 1000 ML: .6; .31; .03; .02 INJECTION, SOLUTION INTRAVENOUS at 06:01

## 2021-01-23 RX ADMIN — POTASSIUM CHLORIDE 10 MEQ: 7.46 INJECTION, SOLUTION INTRAVENOUS at 11:01

## 2021-01-23 RX ADMIN — PIPERACILLIN AND TAZOBACTAM 4.5 G: 4; .5 INJECTION, POWDER, LYOPHILIZED, FOR SOLUTION INTRAVENOUS; PARENTERAL at 11:01

## 2021-01-23 RX ADMIN — MIDAZOLAM HYDROCHLORIDE 2 MG: 1 INJECTION, SOLUTION INTRAMUSCULAR; INTRAVENOUS at 07:01

## 2021-01-23 RX ADMIN — VANCOMYCIN HYDROCHLORIDE 500 MG: 500 INJECTION, POWDER, LYOPHILIZED, FOR SOLUTION INTRAVENOUS at 01:01

## 2021-01-23 RX ADMIN — Medication 50 MCG/HR: at 07:01

## 2021-01-23 RX ADMIN — SODIUM CHLORIDE, SODIUM LACTATE, POTASSIUM CHLORIDE, AND CALCIUM CHLORIDE 500 ML: .6; .31; .03; .02 INJECTION, SOLUTION INTRAVENOUS at 05:01

## 2021-01-23 RX ADMIN — FLUCONAZOLE, SODIUM CHLORIDE 400 MG: 2 INJECTION INTRAVENOUS at 01:01

## 2021-01-23 RX ADMIN — POTASSIUM CHLORIDE 10 MEQ: 7.46 INJECTION, SOLUTION INTRAVENOUS at 09:01

## 2021-01-23 RX ADMIN — CALCIUM GLUCONATE 3000 MG: 98 INJECTION, SOLUTION INTRAVENOUS at 10:01

## 2021-01-23 RX ADMIN — PROPOFOL 20 MG: 10 INJECTION, EMULSION INTRAVENOUS at 08:01

## 2021-01-23 RX ADMIN — FENTANYL CITRATE 50 MCG: 50 INJECTION, SOLUTION INTRAMUSCULAR; INTRAVENOUS at 08:01

## 2021-01-23 RX ADMIN — SODIUM CHLORIDE, SODIUM LACTATE, POTASSIUM CHLORIDE, AND CALCIUM CHLORIDE: .6; .31; .03; .02 INJECTION, SOLUTION INTRAVENOUS at 05:01

## 2021-01-23 RX ADMIN — FENTANYL CITRATE 25 MCG: 50 INJECTION, SOLUTION INTRAMUSCULAR; INTRAVENOUS at 08:01

## 2021-01-23 RX ADMIN — HEPARIN SODIUM 5000 UNITS: 5000 INJECTION INTRAVENOUS; SUBCUTANEOUS at 01:01

## 2021-01-23 RX ADMIN — ROCURONIUM BROMIDE 50 MG: 10 INJECTION, SOLUTION INTRAVENOUS at 08:01

## 2021-01-23 RX ADMIN — MUPIROCIN: 20 OINTMENT TOPICAL at 08:01

## 2021-01-23 RX ADMIN — CASTOR OIL AND BALSAM, PERU: 788; 87 OINTMENT TOPICAL at 08:01

## 2021-01-23 RX ADMIN — PROPOFOL 15 MCG/KG/MIN: 10 INJECTION, EMULSION INTRAVENOUS at 08:01

## 2021-01-23 RX ADMIN — HEPARIN SODIUM 5000 UNITS: 5000 INJECTION INTRAVENOUS; SUBCUTANEOUS at 09:01

## 2021-01-24 ENCOUNTER — ANESTHESIA EVENT (OUTPATIENT)
Dept: SURGERY | Facility: HOSPITAL | Age: 71
DRG: 853 | End: 2021-01-24
Payer: MEDICARE

## 2021-01-24 LAB
ALBUMIN SERPL BCP-MCNC: 1.4 G/DL (ref 3.5–5.2)
ALP SERPL-CCNC: 88 U/L (ref 55–135)
ALT SERPL W/O P-5'-P-CCNC: 90 U/L (ref 10–44)
ANION GAP SERPL CALC-SCNC: 12 MMOL/L (ref 8–16)
ANION GAP SERPL CALC-SCNC: 12 MMOL/L (ref 8–16)
ANION GAP SERPL CALC-SCNC: 14 MMOL/L (ref 8–16)
ANION GAP SERPL CALC-SCNC: 15 MMOL/L (ref 8–16)
ANISOCYTOSIS BLD QL SMEAR: SLIGHT
ANISOCYTOSIS BLD QL SMEAR: SLIGHT
AST SERPL-CCNC: 344 U/L (ref 10–40)
BASOPHILS # BLD AUTO: 0.02 K/UL (ref 0–0.2)
BASOPHILS # BLD AUTO: 0.03 K/UL (ref 0–0.2)
BASOPHILS NFR BLD: 0.3 % (ref 0–1.9)
BASOPHILS NFR BLD: 0.4 % (ref 0–1.9)
BILIRUB SERPL-MCNC: 0.4 MG/DL (ref 0.1–1)
BUN SERPL-MCNC: 43 MG/DL (ref 8–23)
BUN SERPL-MCNC: 46 MG/DL (ref 8–23)
BUN SERPL-MCNC: 52 MG/DL (ref 8–23)
BUN SERPL-MCNC: 53 MG/DL (ref 8–23)
BURR CELLS BLD QL SMEAR: ABNORMAL
CA-I BLDV-SCNC: 1.05 MMOL/L (ref 1.06–1.42)
CALCIUM SERPL-MCNC: 7.3 MG/DL (ref 8.7–10.5)
CALCIUM SERPL-MCNC: 7.5 MG/DL (ref 8.7–10.5)
CALCIUM SERPL-MCNC: 7.6 MG/DL (ref 8.7–10.5)
CALCIUM SERPL-MCNC: 8.3 MG/DL (ref 8.7–10.5)
CHLORIDE SERPL-SCNC: 104 MMOL/L (ref 95–110)
CHLORIDE SERPL-SCNC: 104 MMOL/L (ref 95–110)
CHLORIDE SERPL-SCNC: 106 MMOL/L (ref 95–110)
CHLORIDE SERPL-SCNC: 107 MMOL/L (ref 95–110)
CO2 SERPL-SCNC: 18 MMOL/L (ref 23–29)
CO2 SERPL-SCNC: 19 MMOL/L (ref 23–29)
CREAT SERPL-MCNC: 2.9 MG/DL (ref 0.5–1.4)
CREAT SERPL-MCNC: 3.2 MG/DL (ref 0.5–1.4)
CREAT SERPL-MCNC: 3.7 MG/DL (ref 0.5–1.4)
CREAT SERPL-MCNC: 3.8 MG/DL (ref 0.5–1.4)
DIFFERENTIAL METHOD: ABNORMAL
DIFFERENTIAL METHOD: ABNORMAL
EOSINOPHIL # BLD AUTO: 0.2 K/UL (ref 0–0.5)
EOSINOPHIL # BLD AUTO: 0.4 K/UL (ref 0–0.5)
EOSINOPHIL NFR BLD: 3.2 % (ref 0–8)
EOSINOPHIL NFR BLD: 5.3 % (ref 0–8)
ERYTHROCYTE [DISTWIDTH] IN BLOOD BY AUTOMATED COUNT: 15.8 % (ref 11.5–14.5)
ERYTHROCYTE [DISTWIDTH] IN BLOOD BY AUTOMATED COUNT: 16 % (ref 11.5–14.5)
EST. GFR  (AFRICAN AMERICAN): 13.2 ML/MIN/1.73 M^2
EST. GFR  (AFRICAN AMERICAN): 13.7 ML/MIN/1.73 M^2
EST. GFR  (AFRICAN AMERICAN): 16.3 ML/MIN/1.73 M^2
EST. GFR  (AFRICAN AMERICAN): 18.3 ML/MIN/1.73 M^2
EST. GFR  (NON AFRICAN AMERICAN): 11.5 ML/MIN/1.73 M^2
EST. GFR  (NON AFRICAN AMERICAN): 11.8 ML/MIN/1.73 M^2
EST. GFR  (NON AFRICAN AMERICAN): 14.1 ML/MIN/1.73 M^2
EST. GFR  (NON AFRICAN AMERICAN): 15.9 ML/MIN/1.73 M^2
GLUCOSE SERPL-MCNC: 81 MG/DL (ref 70–110)
GLUCOSE SERPL-MCNC: 95 MG/DL (ref 70–110)
GLUCOSE SERPL-MCNC: 96 MG/DL (ref 70–110)
GLUCOSE SERPL-MCNC: 97 MG/DL (ref 70–110)
HCT VFR BLD AUTO: 25.8 % (ref 37–48.5)
HCT VFR BLD AUTO: 26.6 % (ref 37–48.5)
HGB BLD-MCNC: 8.4 G/DL (ref 12–16)
HGB BLD-MCNC: 8.6 G/DL (ref 12–16)
HYPOCHROMIA BLD QL SMEAR: ABNORMAL
IMM GRANULOCYTES # BLD AUTO: 0.01 K/UL (ref 0–0.04)
IMM GRANULOCYTES # BLD AUTO: 0.03 K/UL (ref 0–0.04)
IMM GRANULOCYTES NFR BLD AUTO: 0.2 % (ref 0–0.5)
IMM GRANULOCYTES NFR BLD AUTO: 0.4 % (ref 0–0.5)
INR PPP: 1 (ref 0.8–1.2)
LACTATE SERPL-SCNC: 0.5 MMOL/L (ref 0.5–2.2)
LYMPHOCYTES # BLD AUTO: 0.6 K/UL (ref 1–4.8)
LYMPHOCYTES # BLD AUTO: 0.7 K/UL (ref 1–4.8)
LYMPHOCYTES NFR BLD: 9 % (ref 18–48)
LYMPHOCYTES NFR BLD: 9.3 % (ref 18–48)
MAGNESIUM SERPL-MCNC: 2.2 MG/DL (ref 1.6–2.6)
MAGNESIUM SERPL-MCNC: 2.3 MG/DL (ref 1.6–2.6)
MCH RBC QN AUTO: 30.9 PG (ref 27–31)
MCH RBC QN AUTO: 31 PG (ref 27–31)
MCHC RBC AUTO-ENTMCNC: 32.3 G/DL (ref 32–36)
MCHC RBC AUTO-ENTMCNC: 32.6 G/DL (ref 32–36)
MCV RBC AUTO: 95 FL (ref 82–98)
MCV RBC AUTO: 96 FL (ref 82–98)
MONOCYTES # BLD AUTO: 0.2 K/UL (ref 0.3–1)
MONOCYTES # BLD AUTO: 0.3 K/UL (ref 0.3–1)
MONOCYTES NFR BLD: 3.4 % (ref 4–15)
MONOCYTES NFR BLD: 4 % (ref 4–15)
NEUTROPHILS # BLD AUTO: 5.2 K/UL (ref 1.8–7.7)
NEUTROPHILS # BLD AUTO: 6.3 K/UL (ref 1.8–7.7)
NEUTROPHILS NFR BLD: 80.6 % (ref 38–73)
NEUTROPHILS NFR BLD: 83.9 % (ref 38–73)
NRBC BLD-RTO: 0 /100 WBC
NRBC BLD-RTO: 0 /100 WBC
PHOSPHATE SERPL-MCNC: 3.8 MG/DL (ref 2.7–4.5)
PHOSPHATE SERPL-MCNC: 4 MG/DL (ref 2.7–4.5)
PLATELET # BLD AUTO: 161 K/UL (ref 150–350)
PLATELET # BLD AUTO: 161 K/UL (ref 150–350)
PLATELET BLD QL SMEAR: ABNORMAL
PLATELET BLD QL SMEAR: ABNORMAL
PMV BLD AUTO: 10.5 FL (ref 9.2–12.9)
PMV BLD AUTO: 10.6 FL (ref 9.2–12.9)
POIKILOCYTOSIS BLD QL SMEAR: SLIGHT
POIKILOCYTOSIS BLD QL SMEAR: SLIGHT
POLYCHROMASIA BLD QL SMEAR: ABNORMAL
POTASSIUM SERPL-SCNC: 3.4 MMOL/L (ref 3.5–5.1)
POTASSIUM SERPL-SCNC: 3.7 MMOL/L (ref 3.5–5.1)
POTASSIUM SERPL-SCNC: 3.7 MMOL/L (ref 3.5–5.1)
POTASSIUM SERPL-SCNC: 4.3 MMOL/L (ref 3.5–5.1)
PROT SERPL-MCNC: 4 G/DL (ref 6–8.4)
PROTHROMBIN TIME: 10.7 SEC (ref 9–12.5)
RBC # BLD AUTO: 2.71 M/UL (ref 4–5.4)
RBC # BLD AUTO: 2.78 M/UL (ref 4–5.4)
SODIUM SERPL-SCNC: 136 MMOL/L (ref 136–145)
SODIUM SERPL-SCNC: 137 MMOL/L (ref 136–145)
SODIUM SERPL-SCNC: 138 MMOL/L (ref 136–145)
SODIUM SERPL-SCNC: 138 MMOL/L (ref 136–145)
VANCOMYCIN SERPL-MCNC: 19.7 UG/ML
WBC # BLD AUTO: 6.22 K/UL (ref 3.9–12.7)
WBC # BLD AUTO: 7.75 K/UL (ref 3.9–12.7)

## 2021-01-24 PROCEDURE — 85610 PROTHROMBIN TIME: CPT

## 2021-01-24 PROCEDURE — 99233 SBSQ HOSP IP/OBS HIGH 50: CPT | Mod: ,,, | Performed by: HOSPITALIST

## 2021-01-24 PROCEDURE — 36430 TRANSFUSION BLD/BLD COMPNT: CPT

## 2021-01-24 PROCEDURE — 63600175 PHARM REV CODE 636 W HCPCS: Performed by: STUDENT IN AN ORGANIZED HEALTH CARE EDUCATION/TRAINING PROGRAM

## 2021-01-24 PROCEDURE — 25000003 PHARM REV CODE 250: Performed by: STUDENT IN AN ORGANIZED HEALTH CARE EDUCATION/TRAINING PROGRAM

## 2021-01-24 PROCEDURE — 84100 ASSAY OF PHOSPHORUS: CPT | Mod: 91

## 2021-01-24 PROCEDURE — 83735 ASSAY OF MAGNESIUM: CPT | Mod: 91

## 2021-01-24 PROCEDURE — 80048 BASIC METABOLIC PNL TOTAL CA: CPT

## 2021-01-24 PROCEDURE — 83605 ASSAY OF LACTIC ACID: CPT

## 2021-01-24 PROCEDURE — 85025 COMPLETE CBC W/AUTO DIFF WBC: CPT

## 2021-01-24 PROCEDURE — 25000003 PHARM REV CODE 250: Performed by: SURGERY

## 2021-01-24 PROCEDURE — 94761 N-INVAS EAR/PLS OXIMETRY MLT: CPT

## 2021-01-24 PROCEDURE — 99900035 HC TECH TIME PER 15 MIN (STAT)

## 2021-01-24 PROCEDURE — 99291 PR CRITICAL CARE, E/M 30-74 MINUTES: ICD-10-PCS | Mod: 24,,, | Performed by: STUDENT IN AN ORGANIZED HEALTH CARE EDUCATION/TRAINING PROGRAM

## 2021-01-24 PROCEDURE — 99900026 HC AIRWAY MAINTENANCE (STAT)

## 2021-01-24 PROCEDURE — 27000221 HC OXYGEN, UP TO 24 HOURS

## 2021-01-24 PROCEDURE — 80048 BASIC METABOLIC PNL TOTAL CA: CPT | Mod: 91

## 2021-01-24 PROCEDURE — 84100 ASSAY OF PHOSPHORUS: CPT

## 2021-01-24 PROCEDURE — 20000000 HC ICU ROOM

## 2021-01-24 PROCEDURE — 99291 CRITICAL CARE FIRST HOUR: CPT | Mod: 24,,, | Performed by: STUDENT IN AN ORGANIZED HEALTH CARE EDUCATION/TRAINING PROGRAM

## 2021-01-24 PROCEDURE — 82330 ASSAY OF CALCIUM: CPT

## 2021-01-24 PROCEDURE — 80053 COMPREHEN METABOLIC PANEL: CPT

## 2021-01-24 PROCEDURE — 63600175 PHARM REV CODE 636 W HCPCS: Performed by: SURGERY

## 2021-01-24 PROCEDURE — 99233 PR SUBSEQUENT HOSPITAL CARE,LEVL III: ICD-10-PCS | Mod: ,,, | Performed by: HOSPITALIST

## 2021-01-24 PROCEDURE — 94003 VENT MGMT INPAT SUBQ DAY: CPT

## 2021-01-24 PROCEDURE — 82330 ASSAY OF CALCIUM: CPT | Mod: 91

## 2021-01-24 PROCEDURE — 80202 ASSAY OF VANCOMYCIN: CPT

## 2021-01-24 PROCEDURE — C9113 INJ PANTOPRAZOLE SODIUM, VIA: HCPCS | Performed by: STUDENT IN AN ORGANIZED HEALTH CARE EDUCATION/TRAINING PROGRAM

## 2021-01-24 PROCEDURE — 83735 ASSAY OF MAGNESIUM: CPT

## 2021-01-24 RX ORDER — POTASSIUM CHLORIDE 29.8 MG/ML
40 INJECTION INTRAVENOUS ONCE
Status: COMPLETED | OUTPATIENT
Start: 2021-01-24 | End: 2021-01-24

## 2021-01-24 RX ADMIN — PROPOFOL 20 MCG/KG/MIN: 10 INJECTION, EMULSION INTRAVENOUS at 03:01

## 2021-01-24 RX ADMIN — PIPERACILLIN AND TAZOBACTAM 4.5 G: 4; .5 INJECTION, POWDER, LYOPHILIZED, FOR SOLUTION INTRAVENOUS; PARENTERAL at 10:01

## 2021-01-24 RX ADMIN — CASTOR OIL AND BALSAM, PERU: 788; 87 OINTMENT TOPICAL at 08:01

## 2021-01-24 RX ADMIN — PROPOFOL 40 MCG/KG/MIN: 10 INJECTION, EMULSION INTRAVENOUS at 10:01

## 2021-01-24 RX ADMIN — HEPARIN SODIUM 5000 UNITS: 5000 INJECTION INTRAVENOUS; SUBCUTANEOUS at 02:01

## 2021-01-24 RX ADMIN — HEPARIN SODIUM 5000 UNITS: 5000 INJECTION INTRAVENOUS; SUBCUTANEOUS at 10:01

## 2021-01-24 RX ADMIN — POTASSIUM CHLORIDE 40 MEQ: 29.8 INJECTION, SOLUTION INTRAVENOUS at 12:01

## 2021-01-24 RX ADMIN — SODIUM CHLORIDE, SODIUM LACTATE, POTASSIUM CHLORIDE, AND CALCIUM CHLORIDE: .6; .31; .03; .02 INJECTION, SOLUTION INTRAVENOUS at 06:01

## 2021-01-24 RX ADMIN — MUPIROCIN: 20 OINTMENT TOPICAL at 08:01

## 2021-01-24 RX ADMIN — PIPERACILLIN AND TAZOBACTAM 4.5 G: 4; .5 INJECTION, POWDER, LYOPHILIZED, FOR SOLUTION INTRAVENOUS; PARENTERAL at 12:01

## 2021-01-24 RX ADMIN — Medication 125 MCG/HR: at 04:01

## 2021-01-24 RX ADMIN — PIPERACILLIN AND TAZOBACTAM 4.5 G: 4; .5 INJECTION, POWDER, LYOPHILIZED, FOR SOLUTION INTRAVENOUS; PARENTERAL at 11:01

## 2021-01-24 RX ADMIN — PANTOPRAZOLE SODIUM 40 MG: 40 INJECTION, POWDER, FOR SOLUTION INTRAVENOUS at 08:01

## 2021-01-24 RX ADMIN — CALCIUM GLUCONATE 2000 MG: 98 INJECTION, SOLUTION INTRAVENOUS at 02:01

## 2021-01-24 RX ADMIN — HEPARIN SODIUM 5000 UNITS: 5000 INJECTION INTRAVENOUS; SUBCUTANEOUS at 06:01

## 2021-01-24 RX ADMIN — VANCOMYCIN HYDROCHLORIDE 500 MG: 500 INJECTION, POWDER, LYOPHILIZED, FOR SOLUTION INTRAVENOUS at 10:01

## 2021-01-24 RX ADMIN — SODIUM CHLORIDE, SODIUM LACTATE, POTASSIUM CHLORIDE, AND CALCIUM CHLORIDE 1000 ML: .6; .31; .03; .02 INJECTION, SOLUTION INTRAVENOUS at 05:01

## 2021-01-24 RX ADMIN — PROPOFOL 40 MCG/KG/MIN: 10 INJECTION, EMULSION INTRAVENOUS at 05:01

## 2021-01-24 RX ADMIN — PROPOFOL 25 MCG/KG/MIN: 10 INJECTION, EMULSION INTRAVENOUS at 12:01

## 2021-01-24 RX ADMIN — SODIUM CHLORIDE, SODIUM LACTATE, POTASSIUM CHLORIDE, AND CALCIUM CHLORIDE: .6; .31; .03; .02 INJECTION, SOLUTION INTRAVENOUS at 08:01

## 2021-01-24 RX ADMIN — CALCIUM GLUCONATE 2000 MG: 98 INJECTION, SOLUTION INTRAVENOUS at 07:01

## 2021-01-24 RX ADMIN — Medication 150 MCG/HR: at 05:01

## 2021-01-24 RX ADMIN — POTASSIUM CHLORIDE 40 MEQ: 29.8 INJECTION, SOLUTION INTRAVENOUS at 02:01

## 2021-01-24 RX ADMIN — FLUCONAZOLE, SODIUM CHLORIDE 400 MG: 2 INJECTION INTRAVENOUS at 12:01

## 2021-01-25 ENCOUNTER — ANESTHESIA (OUTPATIENT)
Dept: SURGERY | Facility: HOSPITAL | Age: 71
DRG: 853 | End: 2021-01-25
Payer: MEDICARE

## 2021-01-25 LAB
ABO + RH BLD: NORMAL
ALBUMIN SERPL BCP-MCNC: 1.4 G/DL (ref 3.5–5.2)
ALLENS TEST: ABNORMAL
ALP SERPL-CCNC: 108 U/L (ref 55–135)
ALT SERPL W/O P-5'-P-CCNC: 83 U/L (ref 10–44)
ANION GAP SERPL CALC-SCNC: 10 MMOL/L (ref 8–16)
ANION GAP SERPL CALC-SCNC: 14 MMOL/L (ref 8–16)
ANISOCYTOSIS BLD QL SMEAR: SLIGHT
ANISOCYTOSIS BLD QL SMEAR: SLIGHT
APTT BLDCRRT: 26.2 SEC (ref 21–32)
AST SERPL-CCNC: 314 U/L (ref 10–40)
BASO STIPL BLD QL SMEAR: ABNORMAL
BASOPHILS # BLD AUTO: 0.02 K/UL (ref 0–0.2)
BASOPHILS # BLD AUTO: 0.02 K/UL (ref 0–0.2)
BASOPHILS NFR BLD: 0.2 % (ref 0–1.9)
BASOPHILS NFR BLD: 0.3 % (ref 0–1.9)
BILIRUB SERPL-MCNC: 0.5 MG/DL (ref 0.1–1)
BLD GP AB SCN CELLS X3 SERPL QL: NORMAL
BUN SERPL-MCNC: 35 MG/DL (ref 8–23)
BUN SERPL-MCNC: 37 MG/DL (ref 8–23)
BURR CELLS BLD QL SMEAR: ABNORMAL
CALCIUM SERPL-MCNC: 7.1 MG/DL (ref 8.7–10.5)
CALCIUM SERPL-MCNC: 7.6 MG/DL (ref 8.7–10.5)
CHLORIDE SERPL-SCNC: 108 MMOL/L (ref 95–110)
CHLORIDE SERPL-SCNC: 111 MMOL/L (ref 95–110)
CO2 SERPL-SCNC: 16 MMOL/L (ref 23–29)
CO2 SERPL-SCNC: 18 MMOL/L (ref 23–29)
CREAT SERPL-MCNC: 2 MG/DL (ref 0.5–1.4)
CREAT SERPL-MCNC: 2.4 MG/DL (ref 0.5–1.4)
DELSYS: ABNORMAL
DIFFERENTIAL METHOD: ABNORMAL
DIFFERENTIAL METHOD: ABNORMAL
DOHLE BOD BLD QL SMEAR: PRESENT
DOHLE BOD BLD QL SMEAR: PRESENT
EOSINOPHIL # BLD AUTO: 0.4 K/UL (ref 0–0.5)
EOSINOPHIL # BLD AUTO: 0.7 K/UL (ref 0–0.5)
EOSINOPHIL NFR BLD: 4.7 % (ref 0–8)
EOSINOPHIL NFR BLD: 8.2 % (ref 0–8)
ERYTHROCYTE [DISTWIDTH] IN BLOOD BY AUTOMATED COUNT: 16.2 % (ref 11.5–14.5)
ERYTHROCYTE [DISTWIDTH] IN BLOOD BY AUTOMATED COUNT: 16.3 % (ref 11.5–14.5)
EST. GFR  (AFRICAN AMERICAN): 23 ML/MIN/1.73 M^2
EST. GFR  (AFRICAN AMERICAN): 28.7 ML/MIN/1.73 M^2
EST. GFR  (NON AFRICAN AMERICAN): 20 ML/MIN/1.73 M^2
EST. GFR  (NON AFRICAN AMERICAN): 24.9 ML/MIN/1.73 M^2
FIO2: 36
FLOW: 4
GIANT PLATELETS BLD QL SMEAR: PRESENT
GLUCOSE SERPL-MCNC: 66 MG/DL (ref 70–110)
GLUCOSE SERPL-MCNC: 70 MG/DL (ref 70–110)
GLUCOSE SERPL-MCNC: 85 MG/DL (ref 70–110)
GLUCOSE SERPL-MCNC: 94 MG/DL (ref 70–110)
HCO3 UR-SCNC: 18.4 MMOL/L (ref 24–28)
HCO3 UR-SCNC: 19.9 MMOL/L (ref 24–28)
HCT VFR BLD AUTO: 26.5 % (ref 37–48.5)
HCT VFR BLD AUTO: 32.3 % (ref 37–48.5)
HCT VFR BLD CALC: 24 %PCV (ref 36–54)
HGB BLD-MCNC: 10.1 G/DL (ref 12–16)
HGB BLD-MCNC: 8.3 G/DL (ref 12–16)
HYPOCHROMIA BLD QL SMEAR: ABNORMAL
HYPOCHROMIA BLD QL SMEAR: ABNORMAL
IMM GRANULOCYTES # BLD AUTO: 0.03 K/UL (ref 0–0.04)
IMM GRANULOCYTES # BLD AUTO: 0.13 K/UL (ref 0–0.04)
IMM GRANULOCYTES NFR BLD AUTO: 0.4 % (ref 0–0.5)
IMM GRANULOCYTES NFR BLD AUTO: 1.5 % (ref 0–0.5)
INR PPP: 0.9 (ref 0.8–1.2)
INR PPP: 0.9 (ref 0.8–1.2)
LYMPHOCYTES # BLD AUTO: 0.7 K/UL (ref 1–4.8)
LYMPHOCYTES # BLD AUTO: 1 K/UL (ref 1–4.8)
LYMPHOCYTES NFR BLD: 12.2 % (ref 18–48)
LYMPHOCYTES NFR BLD: 7.9 % (ref 18–48)
MAGNESIUM SERPL-MCNC: 1.6 MG/DL (ref 1.6–2.6)
MAGNESIUM SERPL-MCNC: 1.8 MG/DL (ref 1.6–2.6)
MCH RBC QN AUTO: 30.3 PG (ref 27–31)
MCH RBC QN AUTO: 30.6 PG (ref 27–31)
MCHC RBC AUTO-ENTMCNC: 31.3 G/DL (ref 32–36)
MCHC RBC AUTO-ENTMCNC: 31.3 G/DL (ref 32–36)
MCV RBC AUTO: 97 FL (ref 82–98)
MCV RBC AUTO: 98 FL (ref 82–98)
MODE: ABNORMAL
MONOCYTES # BLD AUTO: 0.4 K/UL (ref 0.3–1)
MONOCYTES # BLD AUTO: 0.4 K/UL (ref 0.3–1)
MONOCYTES NFR BLD: 4.4 % (ref 4–15)
MONOCYTES NFR BLD: 4.8 % (ref 4–15)
NEUTROPHILS # BLD AUTO: 5.9 K/UL (ref 1.8–7.7)
NEUTROPHILS # BLD AUTO: 7.1 K/UL (ref 1.8–7.7)
NEUTROPHILS NFR BLD: 74.1 % (ref 38–73)
NEUTROPHILS NFR BLD: 81.3 % (ref 38–73)
NRBC BLD-RTO: 1 /100 WBC
NRBC BLD-RTO: 1 /100 WBC
OVALOCYTES BLD QL SMEAR: ABNORMAL
PCO2 BLDA: 34.6 MMHG (ref 35–45)
PCO2 BLDA: 36.3 MMHG (ref 35–45)
PH SMN: 7.33 [PH] (ref 7.35–7.45)
PH SMN: 7.35 [PH] (ref 7.35–7.45)
PHOSPHATE SERPL-MCNC: 2.6 MG/DL (ref 2.7–4.5)
PHOSPHATE SERPL-MCNC: 4.1 MG/DL (ref 2.7–4.5)
PLATELET # BLD AUTO: 126 K/UL (ref 150–350)
PLATELET # BLD AUTO: 147 K/UL (ref 150–350)
PLATELET BLD QL SMEAR: ABNORMAL
PLATELET BLD QL SMEAR: ABNORMAL
PMV BLD AUTO: 10.1 FL (ref 9.2–12.9)
PMV BLD AUTO: 10.5 FL (ref 9.2–12.9)
PO2 BLDA: 101 MMHG (ref 80–100)
PO2 BLDA: 323 MMHG (ref 80–100)
POC BE: -6 MMOL/L
POC BE: -7 MMOL/L
POC IONIZED CALCIUM: 1.12 MMOL/L (ref 1.06–1.42)
POC SATURATED O2: 100 % (ref 95–100)
POC SATURATED O2: 97 % (ref 95–100)
POC TCO2: 19 MMOL/L (ref 23–27)
POC TCO2: 21 MMOL/L (ref 23–27)
POCT GLUCOSE: 36 MG/DL (ref 70–110)
POCT GLUCOSE: 57 MG/DL (ref 70–110)
POCT GLUCOSE: 64 MG/DL (ref 70–110)
POCT GLUCOSE: 75 MG/DL (ref 70–110)
POCT GLUCOSE: 78 MG/DL (ref 70–110)
POCT GLUCOSE: 84 MG/DL (ref 70–110)
POCT GLUCOSE: 90 MG/DL (ref 70–110)
POCT GLUCOSE: 91 MG/DL (ref 70–110)
POIKILOCYTOSIS BLD QL SMEAR: SLIGHT
POLYCHROMASIA BLD QL SMEAR: ABNORMAL
POTASSIUM BLD-SCNC: 4.2 MMOL/L (ref 3.5–5.1)
POTASSIUM SERPL-SCNC: 3.8 MMOL/L (ref 3.5–5.1)
POTASSIUM SERPL-SCNC: 3.9 MMOL/L (ref 3.5–5.1)
PROT SERPL-MCNC: 4.1 G/DL (ref 6–8.4)
PROTHROMBIN TIME: 9.7 SEC (ref 9–12.5)
PROTHROMBIN TIME: 9.7 SEC (ref 9–12.5)
RBC # BLD AUTO: 2.74 M/UL (ref 4–5.4)
RBC # BLD AUTO: 3.3 M/UL (ref 4–5.4)
SAMPLE: ABNORMAL
SAMPLE: ABNORMAL
SARS-COV-2 RDRP RESP QL NAA+PROBE: NEGATIVE
SCHISTOCYTES BLD QL SMEAR: ABNORMAL
SCHISTOCYTES BLD QL SMEAR: PRESENT
SITE: ABNORMAL
SODIUM BLD-SCNC: 139 MMOL/L (ref 136–145)
SODIUM SERPL-SCNC: 138 MMOL/L (ref 136–145)
SODIUM SERPL-SCNC: 139 MMOL/L (ref 136–145)
SPHEROCYTES BLD QL SMEAR: ABNORMAL
TARGETS BLD QL SMEAR: ABNORMAL
TOXIC GRANULES BLD QL SMEAR: PRESENT
TOXIC GRANULES BLD QL SMEAR: PRESENT
VANCOMYCIN SERPL-MCNC: 18.4 UG/ML
WBC # BLD AUTO: 7.9 K/UL (ref 3.9–12.7)
WBC # BLD AUTO: 8.69 K/UL (ref 3.9–12.7)

## 2021-01-25 PROCEDURE — 25000003 PHARM REV CODE 250: Performed by: NURSE ANESTHETIST, CERTIFIED REGISTERED

## 2021-01-25 PROCEDURE — 27000221 HC OXYGEN, UP TO 24 HOURS

## 2021-01-25 PROCEDURE — 84100 ASSAY OF PHOSPHORUS: CPT | Mod: 91

## 2021-01-25 PROCEDURE — 85025 COMPLETE CBC W/AUTO DIFF WBC: CPT

## 2021-01-25 PROCEDURE — D9220A PRA ANESTHESIA: Mod: ANES,,, | Performed by: ANESTHESIOLOGY

## 2021-01-25 PROCEDURE — 25000003 PHARM REV CODE 250: Performed by: STUDENT IN AN ORGANIZED HEALTH CARE EDUCATION/TRAINING PROGRAM

## 2021-01-25 PROCEDURE — D9220A PRA ANESTHESIA: ICD-10-PCS | Mod: CRNA,,, | Performed by: NURSE ANESTHETIST, CERTIFIED REGISTERED

## 2021-01-25 PROCEDURE — 80053 COMPREHEN METABOLIC PANEL: CPT | Mod: 91

## 2021-01-25 PROCEDURE — 37799 UNLISTED PX VASCULAR SURGERY: CPT

## 2021-01-25 PROCEDURE — C9113 INJ PANTOPRAZOLE SODIUM, VIA: HCPCS | Performed by: STUDENT IN AN ORGANIZED HEALTH CARE EDUCATION/TRAINING PROGRAM

## 2021-01-25 PROCEDURE — 44310 ILEOSTOMY/JEJUNOSTOMY: CPT | Mod: 58,,, | Performed by: SURGERY

## 2021-01-25 PROCEDURE — 84100 ASSAY OF PHOSPHORUS: CPT

## 2021-01-25 PROCEDURE — 83735 ASSAY OF MAGNESIUM: CPT | Mod: 91

## 2021-01-25 PROCEDURE — D9220A PRA ANESTHESIA: ICD-10-PCS | Mod: ANES,,, | Performed by: ANESTHESIOLOGY

## 2021-01-25 PROCEDURE — 80202 ASSAY OF VANCOMYCIN: CPT

## 2021-01-25 PROCEDURE — 36000707: Performed by: SURGERY

## 2021-01-25 PROCEDURE — 99232 PR SUBSEQUENT HOSPITAL CARE,LEVL II: ICD-10-PCS | Mod: ,,, | Performed by: INTERNAL MEDICINE

## 2021-01-25 PROCEDURE — 94761 N-INVAS EAR/PLS OXIMETRY MLT: CPT

## 2021-01-25 PROCEDURE — 27201423 OPTIME MED/SURG SUP & DEVICES STERILE SUPPLY: Performed by: SURGERY

## 2021-01-25 PROCEDURE — 82947 ASSAY GLUCOSE BLOOD QUANT: CPT

## 2021-01-25 PROCEDURE — 63600175 PHARM REV CODE 636 W HCPCS: Performed by: SURGERY

## 2021-01-25 PROCEDURE — 37000008 HC ANESTHESIA 1ST 15 MINUTES: Performed by: SURGERY

## 2021-01-25 PROCEDURE — 85610 PROTHROMBIN TIME: CPT

## 2021-01-25 PROCEDURE — 43830 GSTRST OPEN WO CONSTJ TUBE: CPT | Mod: 58,51,, | Performed by: SURGERY

## 2021-01-25 PROCEDURE — 99900026 HC AIRWAY MAINTENANCE (STAT)

## 2021-01-25 PROCEDURE — D9220A PRA ANESTHESIA: Mod: CRNA,,, | Performed by: NURSE ANESTHETIST, CERTIFIED REGISTERED

## 2021-01-25 PROCEDURE — 82803 BLOOD GASES ANY COMBINATION: CPT

## 2021-01-25 PROCEDURE — 63600175 PHARM REV CODE 636 W HCPCS: Performed by: NURSE ANESTHETIST, CERTIFIED REGISTERED

## 2021-01-25 PROCEDURE — 94003 VENT MGMT INPAT SUBQ DAY: CPT

## 2021-01-25 PROCEDURE — 44310 PR ILEOSTOMY/JEJUNOSTOMY,NONTUBE: ICD-10-PCS | Mod: 58,,, | Performed by: SURGERY

## 2021-01-25 PROCEDURE — 86920 COMPATIBILITY TEST SPIN: CPT

## 2021-01-25 PROCEDURE — 43830 PR GASTROSTOMY,OPEN,W/O TUBE CNSTR: ICD-10-PCS | Mod: 58,51,, | Performed by: SURGERY

## 2021-01-25 PROCEDURE — 99232 SBSQ HOSP IP/OBS MODERATE 35: CPT | Mod: ,,, | Performed by: INTERNAL MEDICINE

## 2021-01-25 PROCEDURE — 63600175 PHARM REV CODE 636 W HCPCS: Performed by: STUDENT IN AN ORGANIZED HEALTH CARE EDUCATION/TRAINING PROGRAM

## 2021-01-25 PROCEDURE — 36000706: Performed by: SURGERY

## 2021-01-25 PROCEDURE — 99233 SBSQ HOSP IP/OBS HIGH 50: CPT | Mod: 24,,, | Performed by: SURGERY

## 2021-01-25 PROCEDURE — 25000003 PHARM REV CODE 250: Performed by: SURGERY

## 2021-01-25 PROCEDURE — U0002 COVID-19 LAB TEST NON-CDC: HCPCS

## 2021-01-25 PROCEDURE — 99900035 HC TECH TIME PER 15 MIN (STAT)

## 2021-01-25 PROCEDURE — 80053 COMPREHEN METABOLIC PANEL: CPT

## 2021-01-25 PROCEDURE — 20000000 HC ICU ROOM

## 2021-01-25 PROCEDURE — 85730 THROMBOPLASTIN TIME PARTIAL: CPT

## 2021-01-25 PROCEDURE — 37000009 HC ANESTHESIA EA ADD 15 MINS: Performed by: SURGERY

## 2021-01-25 PROCEDURE — 97606 PR NEG PRESS WOUND THERAPY (NPWT) W/NON-DISPOSABLE WOUND VAC DEVICE (DME), >=50 CM: ICD-10-PCS | Mod: ,,, | Performed by: SURGERY

## 2021-01-25 PROCEDURE — 99233 PR SUBSEQUENT HOSPITAL CARE,LEVL III: ICD-10-PCS | Mod: 24,,, | Performed by: SURGERY

## 2021-01-25 PROCEDURE — 97606 NEG PRS WND THER DME>50 SQCM: CPT | Mod: ,,, | Performed by: SURGERY

## 2021-01-25 PROCEDURE — 86900 BLOOD TYPING SEROLOGIC ABO: CPT

## 2021-01-25 PROCEDURE — 85610 PROTHROMBIN TIME: CPT | Mod: 91

## 2021-01-25 PROCEDURE — 80048 BASIC METABOLIC PNL TOTAL CA: CPT

## 2021-01-25 PROCEDURE — 83735 ASSAY OF MAGNESIUM: CPT

## 2021-01-25 RX ORDER — MIDAZOLAM HYDROCHLORIDE 1 MG/ML
INJECTION INTRAMUSCULAR; INTRAVENOUS
Status: DISCONTINUED | OUTPATIENT
Start: 2021-01-25 | End: 2021-01-25

## 2021-01-25 RX ORDER — MAGNESIUM SULFATE HEPTAHYDRATE 40 MG/ML
4 INJECTION, SOLUTION INTRAVENOUS
Status: DISCONTINUED | OUTPATIENT
Start: 2021-01-25 | End: 2021-01-28

## 2021-01-25 RX ORDER — MAGNESIUM SULFATE HEPTAHYDRATE 40 MG/ML
2 INJECTION, SOLUTION INTRAVENOUS
Status: DISCONTINUED | OUTPATIENT
Start: 2021-01-25 | End: 2021-01-28

## 2021-01-25 RX ORDER — HYDROMORPHONE HYDROCHLORIDE 2 MG/ML
INJECTION, SOLUTION INTRAMUSCULAR; INTRAVENOUS; SUBCUTANEOUS
Status: DISCONTINUED | OUTPATIENT
Start: 2021-01-25 | End: 2021-01-25

## 2021-01-25 RX ORDER — POTASSIUM CHLORIDE 29.8 MG/ML
40 INJECTION INTRAVENOUS
Status: DISCONTINUED | OUTPATIENT
Start: 2021-01-25 | End: 2021-01-28

## 2021-01-25 RX ORDER — KETAMINE HCL IN 0.9 % NACL 50 MG/5 ML
SYRINGE (ML) INTRAVENOUS
Status: DISCONTINUED | OUTPATIENT
Start: 2021-01-25 | End: 2021-01-25

## 2021-01-25 RX ORDER — ONDANSETRON 2 MG/ML
INJECTION INTRAMUSCULAR; INTRAVENOUS
Status: DISCONTINUED | OUTPATIENT
Start: 2021-01-25 | End: 2021-01-25

## 2021-01-25 RX ORDER — POTASSIUM CHLORIDE 14.9 MG/ML
60 INJECTION INTRAVENOUS
Status: DISCONTINUED | OUTPATIENT
Start: 2021-01-25 | End: 2021-01-28

## 2021-01-25 RX ORDER — LABETALOL HYDROCHLORIDE 5 MG/ML
INJECTION, SOLUTION INTRAVENOUS
Status: DISCONTINUED | OUTPATIENT
Start: 2021-01-25 | End: 2021-01-25

## 2021-01-25 RX ORDER — POTASSIUM CHLORIDE 29.8 MG/ML
80 INJECTION INTRAVENOUS
Status: DISCONTINUED | OUTPATIENT
Start: 2021-01-25 | End: 2021-01-28

## 2021-01-25 RX ORDER — HYDROMORPHONE HYDROCHLORIDE 1 MG/ML
0.5 INJECTION, SOLUTION INTRAMUSCULAR; INTRAVENOUS; SUBCUTANEOUS EVERY 4 HOURS PRN
Status: DISCONTINUED | OUTPATIENT
Start: 2021-01-25 | End: 2021-01-26

## 2021-01-25 RX ORDER — ROCURONIUM BROMIDE 10 MG/ML
INJECTION, SOLUTION INTRAVENOUS
Status: DISCONTINUED | OUTPATIENT
Start: 2021-01-25 | End: 2021-01-25

## 2021-01-25 RX ORDER — DEXAMETHASONE SODIUM PHOSPHATE 4 MG/ML
INJECTION, SOLUTION INTRA-ARTICULAR; INTRALESIONAL; INTRAMUSCULAR; INTRAVENOUS; SOFT TISSUE
Status: DISCONTINUED | OUTPATIENT
Start: 2021-01-25 | End: 2021-01-25

## 2021-01-25 RX ADMIN — MAGNESIUM SULFATE 2 G: 2 INJECTION INTRAVENOUS at 08:01

## 2021-01-25 RX ADMIN — SODIUM PHOSPHATE, MONOBASIC, MONOHYDRATE 15 MMOL: 276; 142 INJECTION, SOLUTION INTRAVENOUS at 06:01

## 2021-01-25 RX ADMIN — ROCURONIUM BROMIDE 50 MG: 10 INJECTION, SOLUTION INTRAVENOUS at 03:01

## 2021-01-25 RX ADMIN — SUGAMMADEX 200 MG: 100 INJECTION, SOLUTION INTRAVENOUS at 05:01

## 2021-01-25 RX ADMIN — MIDAZOLAM HYDROCHLORIDE 2 MG: 1 INJECTION, SOLUTION INTRAMUSCULAR; INTRAVENOUS at 03:01

## 2021-01-25 RX ADMIN — SODIUM CHLORIDE, SODIUM LACTATE, POTASSIUM CHLORIDE, AND CALCIUM CHLORIDE 1000 ML: .6; .31; .03; .02 INJECTION, SOLUTION INTRAVENOUS at 08:01

## 2021-01-25 RX ADMIN — DEXMEDETOMIDINE HYDROCHLORIDE 0.2 MCG/KG/HR: 4 INJECTION, SOLUTION INTRAVENOUS at 08:01

## 2021-01-25 RX ADMIN — ROCURONIUM BROMIDE 20 MG: 10 INJECTION, SOLUTION INTRAVENOUS at 03:01

## 2021-01-25 RX ADMIN — PIPERACILLIN AND TAZOBACTAM 4.5 G: 4; .5 INJECTION, POWDER, LYOPHILIZED, FOR SOLUTION INTRAVENOUS; PARENTERAL at 11:01

## 2021-01-25 RX ADMIN — FENTANYL CITRATE 50 MCG: 50 INJECTION, SOLUTION INTRAMUSCULAR; INTRAVENOUS at 04:01

## 2021-01-25 RX ADMIN — Medication 150 MCG/HR: at 07:01

## 2021-01-25 RX ADMIN — CASTOR OIL AND BALSAM, PERU: 788; 87 OINTMENT TOPICAL at 08:01

## 2021-01-25 RX ADMIN — Medication 30 MG: at 03:01

## 2021-01-25 RX ADMIN — FLUCONAZOLE, SODIUM CHLORIDE 400 MG: 2 INJECTION INTRAVENOUS at 02:01

## 2021-01-25 RX ADMIN — HYDROMORPHONE HYDROCHLORIDE 1 MG: 2 INJECTION INTRAMUSCULAR; INTRAVENOUS; SUBCUTANEOUS at 04:01

## 2021-01-25 RX ADMIN — HEPARIN SODIUM 5000 UNITS: 5000 INJECTION INTRAVENOUS; SUBCUTANEOUS at 11:01

## 2021-01-25 RX ADMIN — FENTANYL CITRATE 50 MCG: 50 INJECTION, SOLUTION INTRAMUSCULAR; INTRAVENOUS at 05:01

## 2021-01-25 RX ADMIN — HYDROMORPHONE HYDROCHLORIDE 0.5 MG: 1 INJECTION, SOLUTION INTRAMUSCULAR; INTRAVENOUS; SUBCUTANEOUS at 08:01

## 2021-01-25 RX ADMIN — DEXAMETHASONE SODIUM PHOSPHATE 8 MG: 4 INJECTION, SOLUTION INTRAMUSCULAR; INTRAVENOUS at 03:01

## 2021-01-25 RX ADMIN — MAGNESIUM SULFATE 2 G: 2 INJECTION INTRAVENOUS at 04:01

## 2021-01-25 RX ADMIN — DEXTROSE MONOHYDRATE 25 G: 25 INJECTION, SOLUTION INTRAVENOUS at 06:01

## 2021-01-25 RX ADMIN — FENTANYL CITRATE 50 MCG: 50 INJECTION, SOLUTION INTRAMUSCULAR; INTRAVENOUS at 03:01

## 2021-01-25 RX ADMIN — HEPARIN SODIUM 5000 UNITS: 5000 INJECTION INTRAVENOUS; SUBCUTANEOUS at 05:01

## 2021-01-25 RX ADMIN — SODIUM CHLORIDE 500 ML: 0.9 INJECTION, SOLUTION INTRAVENOUS at 08:01

## 2021-01-25 RX ADMIN — POTASSIUM CHLORIDE 40 MEQ: 29.8 INJECTION, SOLUTION INTRAVENOUS at 04:01

## 2021-01-25 RX ADMIN — POTASSIUM CHLORIDE 40 MEQ: 29.8 INJECTION, SOLUTION INTRAVENOUS at 11:01

## 2021-01-25 RX ADMIN — PROPOFOL 35 MCG/KG/MIN: 10 INJECTION, EMULSION INTRAVENOUS at 06:01

## 2021-01-25 RX ADMIN — PANTOPRAZOLE SODIUM 40 MG: 40 INJECTION, POWDER, FOR SOLUTION INTRAVENOUS at 09:01

## 2021-01-25 RX ADMIN — Medication 10 MG: at 05:01

## 2021-01-25 RX ADMIN — ONDANSETRON 4 MG: 2 INJECTION, SOLUTION INTRAMUSCULAR; INTRAVENOUS at 04:01

## 2021-01-25 RX ADMIN — MUPIROCIN: 20 OINTMENT TOPICAL at 08:01

## 2021-01-25 RX ADMIN — PANTOPRAZOLE SODIUM 40 MG: 40 INJECTION, POWDER, FOR SOLUTION INTRAVENOUS at 08:01

## 2021-01-25 RX ADMIN — PROPOFOL 30 MCG/KG/MIN: 10 INJECTION, EMULSION INTRAVENOUS at 11:01

## 2021-01-25 RX ADMIN — VANCOMYCIN HYDROCHLORIDE 500 MG: 500 INJECTION, POWDER, LYOPHILIZED, FOR SOLUTION INTRAVENOUS at 08:01

## 2021-01-26 LAB
ALBUMIN SERPL BCP-MCNC: 1.4 G/DL (ref 3.5–5.2)
ALP SERPL-CCNC: 103 U/L (ref 55–135)
ALT SERPL W/O P-5'-P-CCNC: 66 U/L (ref 10–44)
ANION GAP SERPL CALC-SCNC: 12 MMOL/L (ref 8–16)
ANION GAP SERPL CALC-SCNC: 13 MMOL/L (ref 8–16)
ANISOCYTOSIS BLD QL SMEAR: SLIGHT
AST SERPL-CCNC: 217 U/L (ref 10–40)
BACTERIA BLD CULT: NORMAL
BACTERIA BLD CULT: NORMAL
BASO STIPL BLD QL SMEAR: ABNORMAL
BASOPHILS # BLD AUTO: 0.01 K/UL (ref 0–0.2)
BASOPHILS NFR BLD: 0.1 % (ref 0–1.9)
BILIRUB SERPL-MCNC: 0.9 MG/DL (ref 0.1–1)
BUN SERPL-MCNC: 26 MG/DL (ref 8–23)
BUN SERPL-MCNC: 28 MG/DL (ref 8–23)
CALCIUM SERPL-MCNC: 7 MG/DL (ref 8.7–10.5)
CALCIUM SERPL-MCNC: 7.2 MG/DL (ref 8.7–10.5)
CHLORIDE SERPL-SCNC: 108 MMOL/L (ref 95–110)
CHLORIDE SERPL-SCNC: 109 MMOL/L (ref 95–110)
CO2 SERPL-SCNC: 16 MMOL/L (ref 23–29)
CO2 SERPL-SCNC: 17 MMOL/L (ref 23–29)
CREAT SERPL-MCNC: 1.3 MG/DL (ref 0.5–1.4)
CREAT SERPL-MCNC: 1.5 MG/DL (ref 0.5–1.4)
DIFFERENTIAL METHOD: ABNORMAL
DOHLE BOD BLD QL SMEAR: PRESENT
EOSINOPHIL # BLD AUTO: 0 K/UL (ref 0–0.5)
EOSINOPHIL NFR BLD: 0.5 % (ref 0–8)
ERYTHROCYTE [DISTWIDTH] IN BLOOD BY AUTOMATED COUNT: 16 % (ref 11.5–14.5)
EST. GFR  (AFRICAN AMERICAN): 40.7 ML/MIN/1.73 M^2
EST. GFR  (AFRICAN AMERICAN): 48.4 ML/MIN/1.73 M^2
EST. GFR  (NON AFRICAN AMERICAN): 35.3 ML/MIN/1.73 M^2
EST. GFR  (NON AFRICAN AMERICAN): 42 ML/MIN/1.73 M^2
GLUCOSE SERPL-MCNC: 85 MG/DL (ref 70–110)
GLUCOSE SERPL-MCNC: 99 MG/DL (ref 70–110)
H PYLORI IGG SERPL QL IA: NEGATIVE
HBV E AB SER QL: NORMAL
HCT VFR BLD AUTO: 27.3 % (ref 37–48.5)
HGB BLD-MCNC: 8.5 G/DL (ref 12–16)
IMM GRANULOCYTES # BLD AUTO: 0.13 K/UL (ref 0–0.04)
IMM GRANULOCYTES NFR BLD AUTO: 1.5 % (ref 0–0.5)
INR PPP: 0.9 (ref 0.8–1.2)
LYMPHOCYTES # BLD AUTO: 0.4 K/UL (ref 1–4.8)
LYMPHOCYTES NFR BLD: 4.8 % (ref 18–48)
MAGNESIUM SERPL-MCNC: 2.1 MG/DL (ref 1.6–2.6)
MCH RBC QN AUTO: 30.5 PG (ref 27–31)
MCHC RBC AUTO-ENTMCNC: 31.1 G/DL (ref 32–36)
MCV RBC AUTO: 98 FL (ref 82–98)
MONOCYTES # BLD AUTO: 0.4 K/UL (ref 0.3–1)
MONOCYTES NFR BLD: 5 % (ref 4–15)
NEUTROPHILS # BLD AUTO: 7.5 K/UL (ref 1.8–7.7)
NEUTROPHILS NFR BLD: 88.1 % (ref 38–73)
NRBC BLD-RTO: 1 /100 WBC
PHOSPHATE SERPL-MCNC: 3.5 MG/DL (ref 2.7–4.5)
PLATELET # BLD AUTO: 132 K/UL (ref 150–350)
PLATELET BLD QL SMEAR: ABNORMAL
PMV BLD AUTO: 10.9 FL (ref 9.2–12.9)
POCT GLUCOSE: 110 MG/DL (ref 70–110)
POCT GLUCOSE: 80 MG/DL (ref 70–110)
POCT GLUCOSE: 87 MG/DL (ref 70–110)
POCT GLUCOSE: 90 MG/DL (ref 70–110)
POCT GLUCOSE: 95 MG/DL (ref 70–110)
POCT GLUCOSE: 99 MG/DL (ref 70–110)
POTASSIUM SERPL-SCNC: 4.3 MMOL/L (ref 3.5–5.1)
POTASSIUM SERPL-SCNC: 4.4 MMOL/L (ref 3.5–5.1)
PROT SERPL-MCNC: 4 G/DL (ref 6–8.4)
PROTHROMBIN TIME: 9.6 SEC (ref 9–12.5)
RBC # BLD AUTO: 2.79 M/UL (ref 4–5.4)
SODIUM SERPL-SCNC: 137 MMOL/L (ref 136–145)
SODIUM SERPL-SCNC: 138 MMOL/L (ref 136–145)
TOXIC GRANULES BLD QL SMEAR: PRESENT
WBC # BLD AUTO: 8.56 K/UL (ref 3.9–12.7)

## 2021-01-26 PROCEDURE — 25000003 PHARM REV CODE 250: Performed by: STUDENT IN AN ORGANIZED HEALTH CARE EDUCATION/TRAINING PROGRAM

## 2021-01-26 PROCEDURE — 63600175 PHARM REV CODE 636 W HCPCS: Performed by: STUDENT IN AN ORGANIZED HEALTH CARE EDUCATION/TRAINING PROGRAM

## 2021-01-26 PROCEDURE — 99233 PR SUBSEQUENT HOSPITAL CARE,LEVL III: ICD-10-PCS | Mod: 24,,, | Performed by: SURGERY

## 2021-01-26 PROCEDURE — 94761 N-INVAS EAR/PLS OXIMETRY MLT: CPT

## 2021-01-26 PROCEDURE — 85610 PROTHROMBIN TIME: CPT

## 2021-01-26 PROCEDURE — 99232 SBSQ HOSP IP/OBS MODERATE 35: CPT | Mod: ,,, | Performed by: INTERNAL MEDICINE

## 2021-01-26 PROCEDURE — 85025 COMPLETE CBC W/AUTO DIFF WBC: CPT

## 2021-01-26 PROCEDURE — 97802 MEDICAL NUTRITION INDIV IN: CPT

## 2021-01-26 PROCEDURE — C9113 INJ PANTOPRAZOLE SODIUM, VIA: HCPCS | Performed by: STUDENT IN AN ORGANIZED HEALTH CARE EDUCATION/TRAINING PROGRAM

## 2021-01-26 PROCEDURE — 27000221 HC OXYGEN, UP TO 24 HOURS

## 2021-01-26 PROCEDURE — 80053 COMPREHEN METABOLIC PANEL: CPT

## 2021-01-26 PROCEDURE — 99232 PR SUBSEQUENT HOSPITAL CARE,LEVL II: ICD-10-PCS | Mod: ,,, | Performed by: INTERNAL MEDICINE

## 2021-01-26 PROCEDURE — 25000003 PHARM REV CODE 250: Performed by: SURGERY

## 2021-01-26 PROCEDURE — 99900035 HC TECH TIME PER 15 MIN (STAT)

## 2021-01-26 PROCEDURE — 20000000 HC ICU ROOM

## 2021-01-26 PROCEDURE — 80048 BASIC METABOLIC PNL TOTAL CA: CPT

## 2021-01-26 PROCEDURE — 83735 ASSAY OF MAGNESIUM: CPT

## 2021-01-26 PROCEDURE — 63600175 PHARM REV CODE 636 W HCPCS: Performed by: SURGERY

## 2021-01-26 PROCEDURE — 84100 ASSAY OF PHOSPHORUS: CPT

## 2021-01-26 PROCEDURE — 99233 SBSQ HOSP IP/OBS HIGH 50: CPT | Mod: 24,,, | Performed by: SURGERY

## 2021-01-26 RX ORDER — NALOXONE HCL 0.4 MG/ML
0.02 VIAL (ML) INJECTION
Status: DISCONTINUED | OUTPATIENT
Start: 2021-01-26 | End: 2021-01-29

## 2021-01-26 RX ORDER — DIPHENHYDRAMINE HYDROCHLORIDE 50 MG/ML
50 INJECTION INTRAMUSCULAR; INTRAVENOUS NIGHTLY PRN
Status: DISCONTINUED | OUTPATIENT
Start: 2021-01-26 | End: 2021-01-30

## 2021-01-26 RX ORDER — HYDROMORPHONE HYDROCHLORIDE 1 MG/ML
1 INJECTION, SOLUTION INTRAMUSCULAR; INTRAVENOUS; SUBCUTANEOUS
Status: DISCONTINUED | OUTPATIENT
Start: 2021-01-26 | End: 2021-01-26

## 2021-01-26 RX ORDER — HYDROMORPHONE HYDROCHLORIDE 1 MG/ML
0.5 INJECTION, SOLUTION INTRAMUSCULAR; INTRAVENOUS; SUBCUTANEOUS
Status: DISCONTINUED | OUTPATIENT
Start: 2021-01-26 | End: 2021-01-26

## 2021-01-26 RX ADMIN — SODIUM CHLORIDE, SODIUM LACTATE, POTASSIUM CHLORIDE, AND CALCIUM CHLORIDE: .6; .31; .03; .02 INJECTION, SOLUTION INTRAVENOUS at 05:01

## 2021-01-26 RX ADMIN — FLUCONAZOLE, SODIUM CHLORIDE 400 MG: 2 INJECTION INTRAVENOUS at 12:01

## 2021-01-26 RX ADMIN — HEPARIN SODIUM 5000 UNITS: 5000 INJECTION INTRAVENOUS; SUBCUTANEOUS at 02:01

## 2021-01-26 RX ADMIN — VANCOMYCIN HYDROCHLORIDE 500 MG: 500 INJECTION, POWDER, LYOPHILIZED, FOR SOLUTION INTRAVENOUS at 08:01

## 2021-01-26 RX ADMIN — HYDROMORPHONE HYDROCHLORIDE 0.5 MG: 1 INJECTION, SOLUTION INTRAMUSCULAR; INTRAVENOUS; SUBCUTANEOUS at 06:01

## 2021-01-26 RX ADMIN — HYDROMORPHONE HYDROCHLORIDE 0.5 MG: 1 INJECTION, SOLUTION INTRAMUSCULAR; INTRAVENOUS; SUBCUTANEOUS at 08:01

## 2021-01-26 RX ADMIN — CASTOR OIL AND BALSAM, PERU: 788; 87 OINTMENT TOPICAL at 09:01

## 2021-01-26 RX ADMIN — PIPERACILLIN AND TAZOBACTAM 4.5 G: 4; .5 INJECTION, POWDER, LYOPHILIZED, FOR SOLUTION INTRAVENOUS; PARENTERAL at 05:01

## 2021-01-26 RX ADMIN — HEPARIN SODIUM 5000 UNITS: 5000 INJECTION INTRAVENOUS; SUBCUTANEOUS at 05:01

## 2021-01-26 RX ADMIN — HYDROMORPHONE HYDROCHLORIDE: 10 INJECTION, SOLUTION INTRAMUSCULAR; INTRAVENOUS; SUBCUTANEOUS at 06:01

## 2021-01-26 RX ADMIN — SODIUM CHLORIDE 1000 ML: 0.9 INJECTION, SOLUTION INTRAVENOUS at 12:01

## 2021-01-26 RX ADMIN — SODIUM CHLORIDE, SODIUM LACTATE, POTASSIUM CHLORIDE, AND CALCIUM CHLORIDE: .6; .31; .03; .02 INJECTION, SOLUTION INTRAVENOUS at 04:01

## 2021-01-26 RX ADMIN — PIPERACILLIN AND TAZOBACTAM 4.5 G: 4; .5 INJECTION, POWDER, LYOPHILIZED, FOR SOLUTION INTRAVENOUS; PARENTERAL at 08:01

## 2021-01-26 RX ADMIN — MUPIROCIN: 20 OINTMENT TOPICAL at 08:01

## 2021-01-26 RX ADMIN — PANTOPRAZOLE SODIUM 40 MG: 40 INJECTION, POWDER, FOR SOLUTION INTRAVENOUS at 09:01

## 2021-01-26 RX ADMIN — PANTOPRAZOLE SODIUM 40 MG: 40 INJECTION, POWDER, FOR SOLUTION INTRAVENOUS at 08:01

## 2021-01-26 RX ADMIN — HYDROMORPHONE HYDROCHLORIDE 1 MG: 1 INJECTION, SOLUTION INTRAMUSCULAR; INTRAVENOUS; SUBCUTANEOUS at 02:01

## 2021-01-26 RX ADMIN — CASTOR OIL AND BALSAM, PERU: 788; 87 OINTMENT TOPICAL at 08:01

## 2021-01-26 RX ADMIN — SODIUM CHLORIDE, SODIUM LACTATE, POTASSIUM CHLORIDE, AND CALCIUM CHLORIDE 1000 ML: .6; .31; .03; .02 INJECTION, SOLUTION INTRAVENOUS at 10:01

## 2021-01-26 RX ADMIN — HYDROMORPHONE HYDROCHLORIDE 0.5 MG: 1 INJECTION, SOLUTION INTRAMUSCULAR; INTRAVENOUS; SUBCUTANEOUS at 02:01

## 2021-01-26 RX ADMIN — HYDROMORPHONE HYDROCHLORIDE 1 MG: 1 INJECTION, SOLUTION INTRAMUSCULAR; INTRAVENOUS; SUBCUTANEOUS at 12:01

## 2021-01-26 RX ADMIN — HEPARIN SODIUM 5000 UNITS: 5000 INJECTION INTRAVENOUS; SUBCUTANEOUS at 09:01

## 2021-01-27 LAB
ALBUMIN SERPL BCP-MCNC: 1.3 G/DL (ref 3.5–5.2)
ALBUMIN SERPL BCP-MCNC: 1.4 G/DL (ref 3.5–5.2)
ALP SERPL-CCNC: 105 U/L (ref 55–135)
ALP SERPL-CCNC: 128 U/L (ref 55–135)
ALT SERPL W/O P-5'-P-CCNC: 48 U/L (ref 10–44)
ALT SERPL W/O P-5'-P-CCNC: 78 U/L (ref 10–44)
ANION GAP SERPL CALC-SCNC: 10 MMOL/L (ref 8–16)
ANION GAP SERPL CALC-SCNC: 14 MMOL/L (ref 8–16)
AST SERPL-CCNC: 140 U/L (ref 10–40)
AST SERPL-CCNC: 265 U/L (ref 10–40)
BACTERIA BLD CULT: NORMAL
BACTERIA BLD CULT: NORMAL
BASOPHILS # BLD AUTO: 0.02 K/UL (ref 0–0.2)
BASOPHILS NFR BLD: 0.2 % (ref 0–1.9)
BILIRUB SERPL-MCNC: 0.5 MG/DL (ref 0.1–1)
BILIRUB SERPL-MCNC: 0.7 MG/DL (ref 0.1–1)
BUN SERPL-MCNC: 26 MG/DL (ref 8–23)
BUN SERPL-MCNC: 31 MG/DL (ref 8–23)
CALCIUM SERPL-MCNC: 7.1 MG/DL (ref 8.7–10.5)
CALCIUM SERPL-MCNC: 7.5 MG/DL (ref 8.7–10.5)
CHLORIDE SERPL-SCNC: 110 MMOL/L (ref 95–110)
CHLORIDE SERPL-SCNC: 110 MMOL/L (ref 95–110)
CO2 SERPL-SCNC: 17 MMOL/L (ref 23–29)
CO2 SERPL-SCNC: 18 MMOL/L (ref 23–29)
CREAT SERPL-MCNC: 1.2 MG/DL (ref 0.5–1.4)
CREAT SERPL-MCNC: 1.8 MG/DL (ref 0.5–1.4)
DIFFERENTIAL METHOD: ABNORMAL
EOSINOPHIL # BLD AUTO: 0.1 K/UL (ref 0–0.5)
EOSINOPHIL NFR BLD: 0.7 % (ref 0–8)
ERYTHROCYTE [DISTWIDTH] IN BLOOD BY AUTOMATED COUNT: 16.2 % (ref 11.5–14.5)
EST. GFR  (AFRICAN AMERICAN): 32.6 ML/MIN/1.73 M^2
EST. GFR  (AFRICAN AMERICAN): 53.3 ML/MIN/1.73 M^2
EST. GFR  (NON AFRICAN AMERICAN): 28.3 ML/MIN/1.73 M^2
EST. GFR  (NON AFRICAN AMERICAN): 46.2 ML/MIN/1.73 M^2
GLUCOSE SERPL-MCNC: 106 MG/DL (ref 70–110)
GLUCOSE SERPL-MCNC: 89 MG/DL (ref 70–110)
HCT VFR BLD AUTO: 25.5 % (ref 37–48.5)
HGB BLD-MCNC: 8 G/DL (ref 12–16)
HIV 1 RNA+PROVIRAL DNA PLAS QL NAA+PROBE: NORMAL
IMM GRANULOCYTES # BLD AUTO: 0.48 K/UL (ref 0–0.04)
IMM GRANULOCYTES NFR BLD AUTO: 3.8 % (ref 0–0.5)
INR PPP: 0.9 (ref 0.8–1.2)
LYMPHOCYTES # BLD AUTO: 1.1 K/UL (ref 1–4.8)
LYMPHOCYTES NFR BLD: 8.9 % (ref 18–48)
MAGNESIUM SERPL-MCNC: 1.6 MG/DL (ref 1.6–2.6)
MCH RBC QN AUTO: 31.1 PG (ref 27–31)
MCHC RBC AUTO-ENTMCNC: 31.4 G/DL (ref 32–36)
MCV RBC AUTO: 99 FL (ref 82–98)
MONOCYTES # BLD AUTO: 0.9 K/UL (ref 0.3–1)
MONOCYTES NFR BLD: 7.1 % (ref 4–15)
NEUTROPHILS # BLD AUTO: 10.1 K/UL (ref 1.8–7.7)
NEUTROPHILS NFR BLD: 79.3 % (ref 38–73)
NRBC BLD-RTO: 0 /100 WBC
PHOSPHATE SERPL-MCNC: 3.1 MG/DL (ref 2.7–4.5)
PLATELET # BLD AUTO: 213 K/UL (ref 150–350)
PMV BLD AUTO: 11.2 FL (ref 9.2–12.9)
POCT GLUCOSE: 101 MG/DL (ref 70–110)
POCT GLUCOSE: 80 MG/DL (ref 70–110)
POCT GLUCOSE: 92 MG/DL (ref 70–110)
POCT GLUCOSE: 96 MG/DL (ref 70–110)
POCT GLUCOSE: 96 MG/DL (ref 70–110)
POTASSIUM SERPL-SCNC: 4.1 MMOL/L (ref 3.5–5.1)
POTASSIUM SERPL-SCNC: 5.1 MMOL/L (ref 3.5–5.1)
PROT SERPL-MCNC: 4 G/DL (ref 6–8.4)
PROT SERPL-MCNC: 4.2 G/DL (ref 6–8.4)
PROTHROMBIN TIME: 9.9 SEC (ref 9–12.5)
RBC # BLD AUTO: 2.57 M/UL (ref 4–5.4)
SODIUM SERPL-SCNC: 138 MMOL/L (ref 136–145)
SODIUM SERPL-SCNC: 141 MMOL/L (ref 136–145)
WBC # BLD AUTO: 12.72 K/UL (ref 3.9–12.7)

## 2021-01-27 PROCEDURE — 63600175 PHARM REV CODE 636 W HCPCS: Performed by: STUDENT IN AN ORGANIZED HEALTH CARE EDUCATION/TRAINING PROGRAM

## 2021-01-27 PROCEDURE — 27000221 HC OXYGEN, UP TO 24 HOURS

## 2021-01-27 PROCEDURE — 84100 ASSAY OF PHOSPHORUS: CPT

## 2021-01-27 PROCEDURE — 85025 COMPLETE CBC W/AUTO DIFF WBC: CPT

## 2021-01-27 PROCEDURE — 25000003 PHARM REV CODE 250: Performed by: SURGERY

## 2021-01-27 PROCEDURE — 83735 ASSAY OF MAGNESIUM: CPT

## 2021-01-27 PROCEDURE — 94761 N-INVAS EAR/PLS OXIMETRY MLT: CPT

## 2021-01-27 PROCEDURE — 85610 PROTHROMBIN TIME: CPT

## 2021-01-27 PROCEDURE — 20000000 HC ICU ROOM

## 2021-01-27 PROCEDURE — 80053 COMPREHEN METABOLIC PANEL: CPT

## 2021-01-27 PROCEDURE — 99233 PR SUBSEQUENT HOSPITAL CARE,LEVL III: ICD-10-PCS | Mod: 24,,, | Performed by: SURGERY

## 2021-01-27 PROCEDURE — 63600175 PHARM REV CODE 636 W HCPCS: Performed by: SURGERY

## 2021-01-27 PROCEDURE — 99233 SBSQ HOSP IP/OBS HIGH 50: CPT | Mod: 24,,, | Performed by: SURGERY

## 2021-01-27 PROCEDURE — 99900035 HC TECH TIME PER 15 MIN (STAT)

## 2021-01-27 PROCEDURE — C9113 INJ PANTOPRAZOLE SODIUM, VIA: HCPCS | Performed by: STUDENT IN AN ORGANIZED HEALTH CARE EDUCATION/TRAINING PROGRAM

## 2021-01-27 RX ORDER — KETOROLAC TROMETHAMINE 15 MG/ML
15 INJECTION, SOLUTION INTRAMUSCULAR; INTRAVENOUS EVERY 8 HOURS
Status: DISPENSED | OUTPATIENT
Start: 2021-01-27 | End: 2021-01-30

## 2021-01-27 RX ADMIN — CASTOR OIL AND BALSAM, PERU: 788; 87 OINTMENT TOPICAL at 09:01

## 2021-01-27 RX ADMIN — PIPERACILLIN AND TAZOBACTAM 4.5 G: 4; .5 INJECTION, POWDER, LYOPHILIZED, FOR SOLUTION INTRAVENOUS; PARENTERAL at 08:01

## 2021-01-27 RX ADMIN — PANTOPRAZOLE SODIUM 40 MG: 40 INJECTION, POWDER, FOR SOLUTION INTRAVENOUS at 09:01

## 2021-01-27 RX ADMIN — PANTOPRAZOLE SODIUM 40 MG: 40 INJECTION, POWDER, FOR SOLUTION INTRAVENOUS at 08:01

## 2021-01-27 RX ADMIN — FLUCONAZOLE, SODIUM CHLORIDE 400 MG: 2 INJECTION INTRAVENOUS at 01:01

## 2021-01-27 RX ADMIN — HEPARIN SODIUM 5000 UNITS: 5000 INJECTION INTRAVENOUS; SUBCUTANEOUS at 09:01

## 2021-01-27 RX ADMIN — MAGNESIUM SULFATE 2 G: 2 INJECTION INTRAVENOUS at 05:01

## 2021-01-27 RX ADMIN — CASTOR OIL AND BALSAM, PERU: 788; 87 OINTMENT TOPICAL at 08:01

## 2021-01-27 RX ADMIN — KETOROLAC TROMETHAMINE 15 MG: 15 INJECTION, SOLUTION INTRAMUSCULAR; INTRAVENOUS at 02:01

## 2021-01-27 RX ADMIN — PIPERACILLIN AND TAZOBACTAM 4.5 G: 4; .5 INJECTION, POWDER, LYOPHILIZED, FOR SOLUTION INTRAVENOUS; PARENTERAL at 12:01

## 2021-01-27 RX ADMIN — HEPARIN SODIUM 5000 UNITS: 5000 INJECTION INTRAVENOUS; SUBCUTANEOUS at 02:01

## 2021-01-27 RX ADMIN — KETOROLAC TROMETHAMINE 15 MG: 15 INJECTION, SOLUTION INTRAMUSCULAR; INTRAVENOUS at 09:01

## 2021-01-27 RX ADMIN — HEPARIN SODIUM 5000 UNITS: 5000 INJECTION INTRAVENOUS; SUBCUTANEOUS at 05:01

## 2021-01-27 RX ADMIN — PIPERACILLIN AND TAZOBACTAM 4.5 G: 4; .5 INJECTION, POWDER, LYOPHILIZED, FOR SOLUTION INTRAVENOUS; PARENTERAL at 05:01

## 2021-01-28 LAB
ALBUMIN SERPL BCP-MCNC: 1.3 G/DL (ref 3.5–5.2)
ALBUMIN SERPL BCP-MCNC: 1.4 G/DL (ref 3.5–5.2)
ALP SERPL-CCNC: 86 U/L (ref 55–135)
ALP SERPL-CCNC: 97 U/L (ref 55–135)
ALT SERPL W/O P-5'-P-CCNC: 36 U/L (ref 10–44)
ALT SERPL W/O P-5'-P-CCNC: 42 U/L (ref 10–44)
ANION GAP SERPL CALC-SCNC: 10 MMOL/L (ref 8–16)
ANION GAP SERPL CALC-SCNC: 15 MMOL/L (ref 8–16)
ANISOCYTOSIS BLD QL SMEAR: SLIGHT
ANISOCYTOSIS BLD QL SMEAR: SLIGHT
AST SERPL-CCNC: 106 U/L (ref 10–40)
AST SERPL-CCNC: 116 U/L (ref 10–40)
BASOPHILS # BLD AUTO: 0.02 K/UL (ref 0–0.2)
BASOPHILS # BLD AUTO: 0.03 K/UL (ref 0–0.2)
BASOPHILS # BLD AUTO: 0.04 K/UL (ref 0–0.2)
BASOPHILS NFR BLD: 0.2 % (ref 0–1.9)
BASOPHILS NFR BLD: 0.3 % (ref 0–1.9)
BASOPHILS NFR BLD: 0.3 % (ref 0–1.9)
BILIRUB SERPL-MCNC: 0.3 MG/DL (ref 0.1–1)
BILIRUB SERPL-MCNC: 0.3 MG/DL (ref 0.1–1)
BLD PROD TYP BPU: NORMAL
BLOOD UNIT EXPIRATION DATE: NORMAL
BLOOD UNIT TYPE CODE: 5100
BLOOD UNIT TYPE: NORMAL
BUN SERPL-MCNC: 19 MG/DL (ref 8–23)
BUN SERPL-MCNC: 23 MG/DL (ref 8–23)
CALCIUM SERPL-MCNC: 7.1 MG/DL (ref 8.7–10.5)
CALCIUM SERPL-MCNC: 7.1 MG/DL (ref 8.7–10.5)
CHLORIDE SERPL-SCNC: 109 MMOL/L (ref 95–110)
CHLORIDE SERPL-SCNC: 109 MMOL/L (ref 95–110)
CO2 SERPL-SCNC: 15 MMOL/L (ref 23–29)
CO2 SERPL-SCNC: 20 MMOL/L (ref 23–29)
CODING SYSTEM: NORMAL
CREAT SERPL-MCNC: 0.8 MG/DL (ref 0.5–1.4)
CREAT SERPL-MCNC: 1.1 MG/DL (ref 0.5–1.4)
DIFFERENTIAL METHOD: ABNORMAL
DISPENSE STATUS: NORMAL
EOSINOPHIL # BLD AUTO: 0.3 K/UL (ref 0–0.5)
EOSINOPHIL NFR BLD: 2.3 % (ref 0–8)
EOSINOPHIL NFR BLD: 2.3 % (ref 0–8)
EOSINOPHIL NFR BLD: 2.7 % (ref 0–8)
ERYTHROCYTE [DISTWIDTH] IN BLOOD BY AUTOMATED COUNT: 16.1 % (ref 11.5–14.5)
ERYTHROCYTE [DISTWIDTH] IN BLOOD BY AUTOMATED COUNT: 16.4 % (ref 11.5–14.5)
ERYTHROCYTE [DISTWIDTH] IN BLOOD BY AUTOMATED COUNT: 16.4 % (ref 11.5–14.5)
EST. GFR  (AFRICAN AMERICAN): 59.2 ML/MIN/1.73 M^2
EST. GFR  (AFRICAN AMERICAN): >60 ML/MIN/1.73 M^2
EST. GFR  (NON AFRICAN AMERICAN): 51.3 ML/MIN/1.73 M^2
EST. GFR  (NON AFRICAN AMERICAN): >60 ML/MIN/1.73 M^2
GIANT PLATELETS BLD QL SMEAR: PRESENT
GLUCOSE SERPL-MCNC: 65 MG/DL (ref 70–110)
GLUCOSE SERPL-MCNC: 77 MG/DL (ref 70–110)
HCT VFR BLD AUTO: 21.2 % (ref 37–48.5)
HCT VFR BLD AUTO: 21.5 % (ref 37–48.5)
HCT VFR BLD AUTO: 30 % (ref 37–48.5)
HGB BLD-MCNC: 6.4 G/DL (ref 12–16)
HGB BLD-MCNC: 6.5 G/DL (ref 12–16)
HGB BLD-MCNC: 9.4 G/DL (ref 12–16)
HYPOCHROMIA BLD QL SMEAR: ABNORMAL
IMM GRANULOCYTES # BLD AUTO: 0.27 K/UL (ref 0–0.04)
IMM GRANULOCYTES # BLD AUTO: 0.28 K/UL (ref 0–0.04)
IMM GRANULOCYTES # BLD AUTO: 0.42 K/UL (ref 0–0.04)
IMM GRANULOCYTES NFR BLD AUTO: 2.2 % (ref 0–0.5)
IMM GRANULOCYTES NFR BLD AUTO: 2.3 % (ref 0–0.5)
IMM GRANULOCYTES NFR BLD AUTO: 4 % (ref 0–0.5)
INR PPP: 0.9 (ref 0.8–1.2)
INR PPP: 0.9 (ref 0.8–1.2)
LYMPHOCYTES # BLD AUTO: 1.3 K/UL (ref 1–4.8)
LYMPHOCYTES # BLD AUTO: 1.6 K/UL (ref 1–4.8)
LYMPHOCYTES # BLD AUTO: 1.7 K/UL (ref 1–4.8)
LYMPHOCYTES NFR BLD: 13.7 % (ref 18–48)
LYMPHOCYTES NFR BLD: 16.6 % (ref 18–48)
LYMPHOCYTES NFR BLD: 9.9 % (ref 18–48)
MAGNESIUM SERPL-MCNC: 1.5 MG/DL (ref 1.6–2.6)
MAGNESIUM SERPL-MCNC: 1.7 MG/DL (ref 1.6–2.6)
MCH RBC QN AUTO: 30.1 PG (ref 27–31)
MCH RBC QN AUTO: 30.3 PG (ref 27–31)
MCH RBC QN AUTO: 30.8 PG (ref 27–31)
MCHC RBC AUTO-ENTMCNC: 29.8 G/DL (ref 32–36)
MCHC RBC AUTO-ENTMCNC: 30.7 G/DL (ref 32–36)
MCHC RBC AUTO-ENTMCNC: 31.3 G/DL (ref 32–36)
MCV RBC AUTO: 101 FL (ref 82–98)
MCV RBC AUTO: 102 FL (ref 82–98)
MCV RBC AUTO: 96 FL (ref 82–98)
MONOCYTES # BLD AUTO: 0.7 K/UL (ref 0.3–1)
MONOCYTES NFR BLD: 5.6 % (ref 4–15)
MONOCYTES NFR BLD: 5.7 % (ref 4–15)
MONOCYTES NFR BLD: 6.8 % (ref 4–15)
NEUTROPHILS # BLD AUTO: 10 K/UL (ref 1.8–7.7)
NEUTROPHILS # BLD AUTO: 7.2 K/UL (ref 1.8–7.7)
NEUTROPHILS # BLD AUTO: 8.9 K/UL (ref 1.8–7.7)
NEUTROPHILS NFR BLD: 69.6 % (ref 38–73)
NEUTROPHILS NFR BLD: 75.8 % (ref 38–73)
NEUTROPHILS NFR BLD: 79.7 % (ref 38–73)
NRBC BLD-RTO: 0 /100 WBC
OVALOCYTES BLD QL SMEAR: ABNORMAL
OVALOCYTES BLD QL SMEAR: ABNORMAL
PHOSPHATE SERPL-MCNC: 1.9 MG/DL (ref 2.7–4.5)
PHOSPHATE SERPL-MCNC: 2.3 MG/DL (ref 2.7–4.5)
PLATELET # BLD AUTO: 229 K/UL (ref 150–350)
PLATELET # BLD AUTO: 242 K/UL (ref 150–350)
PLATELET # BLD AUTO: 276 K/UL (ref 150–350)
PLATELET BLD QL SMEAR: ABNORMAL
PMV BLD AUTO: 10.5 FL (ref 9.2–12.9)
PMV BLD AUTO: 10.9 FL (ref 9.2–12.9)
PMV BLD AUTO: 11 FL (ref 9.2–12.9)
POCT GLUCOSE: 106 MG/DL (ref 70–110)
POCT GLUCOSE: 67 MG/DL (ref 70–110)
POCT GLUCOSE: 73 MG/DL (ref 70–110)
POCT GLUCOSE: 89 MG/DL (ref 70–110)
POCT GLUCOSE: 92 MG/DL (ref 70–110)
POIKILOCYTOSIS BLD QL SMEAR: SLIGHT
POIKILOCYTOSIS BLD QL SMEAR: SLIGHT
POLYCHROMASIA BLD QL SMEAR: ABNORMAL
POLYCHROMASIA BLD QL SMEAR: ABNORMAL
POTASSIUM SERPL-SCNC: 4 MMOL/L (ref 3.5–5.1)
POTASSIUM SERPL-SCNC: 4 MMOL/L (ref 3.5–5.1)
PROT SERPL-MCNC: 3.6 G/DL (ref 6–8.4)
PROT SERPL-MCNC: 4 G/DL (ref 6–8.4)
PROTHROMBIN TIME: 10.3 SEC (ref 9–12.5)
PROTHROMBIN TIME: 10.3 SEC (ref 9–12.5)
RBC # BLD AUTO: 2.11 M/UL (ref 4–5.4)
RBC # BLD AUTO: 2.11 M/UL (ref 4–5.4)
RBC # BLD AUTO: 3.12 M/UL (ref 4–5.4)
SODIUM SERPL-SCNC: 139 MMOL/L (ref 136–145)
SODIUM SERPL-SCNC: 139 MMOL/L (ref 136–145)
TOXIC GRANULES BLD QL SMEAR: PRESENT
TRANS ERYTHROCYTES VOL PATIENT: NORMAL ML
WBC # BLD AUTO: 10.39 K/UL (ref 3.9–12.7)
WBC # BLD AUTO: 11.7 K/UL (ref 3.9–12.7)
WBC # BLD AUTO: 12.59 K/UL (ref 3.9–12.7)

## 2021-01-28 PROCEDURE — 63600175 PHARM REV CODE 636 W HCPCS: Performed by: STUDENT IN AN ORGANIZED HEALTH CARE EDUCATION/TRAINING PROGRAM

## 2021-01-28 PROCEDURE — 83735 ASSAY OF MAGNESIUM: CPT | Mod: 91

## 2021-01-28 PROCEDURE — 84100 ASSAY OF PHOSPHORUS: CPT | Mod: 91

## 2021-01-28 PROCEDURE — 85610 PROTHROMBIN TIME: CPT | Mod: 91

## 2021-01-28 PROCEDURE — P9021 RED BLOOD CELLS UNIT: HCPCS

## 2021-01-28 PROCEDURE — 84100 ASSAY OF PHOSPHORUS: CPT

## 2021-01-28 PROCEDURE — 85610 PROTHROMBIN TIME: CPT

## 2021-01-28 PROCEDURE — 80053 COMPREHEN METABOLIC PANEL: CPT | Mod: 91

## 2021-01-28 PROCEDURE — 25000003 PHARM REV CODE 250: Performed by: STUDENT IN AN ORGANIZED HEALTH CARE EDUCATION/TRAINING PROGRAM

## 2021-01-28 PROCEDURE — 85025 COMPLETE CBC W/AUTO DIFF WBC: CPT

## 2021-01-28 PROCEDURE — 83735 ASSAY OF MAGNESIUM: CPT

## 2021-01-28 PROCEDURE — 99900035 HC TECH TIME PER 15 MIN (STAT)

## 2021-01-28 PROCEDURE — 80053 COMPREHEN METABOLIC PANEL: CPT

## 2021-01-28 PROCEDURE — 20600001 HC STEP DOWN PRIVATE ROOM

## 2021-01-28 PROCEDURE — 99233 SBSQ HOSP IP/OBS HIGH 50: CPT | Mod: 24,,, | Performed by: SURGERY

## 2021-01-28 PROCEDURE — 97530 THERAPEUTIC ACTIVITIES: CPT

## 2021-01-28 PROCEDURE — 94761 N-INVAS EAR/PLS OXIMETRY MLT: CPT

## 2021-01-28 PROCEDURE — 27000221 HC OXYGEN, UP TO 24 HOURS

## 2021-01-28 PROCEDURE — 25000003 PHARM REV CODE 250: Performed by: SURGERY

## 2021-01-28 PROCEDURE — 63600175 PHARM REV CODE 636 W HCPCS: Performed by: SURGERY

## 2021-01-28 PROCEDURE — 99233 PR SUBSEQUENT HOSPITAL CARE,LEVL III: ICD-10-PCS | Mod: 24,,, | Performed by: SURGERY

## 2021-01-28 PROCEDURE — C9113 INJ PANTOPRAZOLE SODIUM, VIA: HCPCS | Performed by: STUDENT IN AN ORGANIZED HEALTH CARE EDUCATION/TRAINING PROGRAM

## 2021-01-28 PROCEDURE — 97116 GAIT TRAINING THERAPY: CPT

## 2021-01-28 RX ORDER — OXYCODONE HYDROCHLORIDE 5 MG/1
5 TABLET ORAL EVERY 4 HOURS PRN
Status: DISCONTINUED | OUTPATIENT
Start: 2021-01-28 | End: 2021-01-28

## 2021-01-28 RX ORDER — LANOLIN ALCOHOL/MO/W.PET/CERES
800 CREAM (GRAM) TOPICAL
Status: DISCONTINUED | OUTPATIENT
Start: 2021-01-28 | End: 2021-01-29

## 2021-01-28 RX ORDER — SODIUM,POTASSIUM PHOSPHATES 280-250MG
2 POWDER IN PACKET (EA) ORAL
Status: DISCONTINUED | OUTPATIENT
Start: 2021-01-28 | End: 2021-01-29

## 2021-01-28 RX ORDER — OXYCODONE HYDROCHLORIDE 5 MG/1
5 TABLET ORAL EVERY 4 HOURS PRN
Status: DISCONTINUED | OUTPATIENT
Start: 2021-01-28 | End: 2021-02-03

## 2021-01-28 RX ORDER — HYDROCODONE BITARTRATE AND ACETAMINOPHEN 500; 5 MG/1; MG/1
TABLET ORAL
Status: DISCONTINUED | OUTPATIENT
Start: 2021-01-28 | End: 2021-02-03

## 2021-01-28 RX ORDER — SODIUM CHLORIDE, SODIUM LACTATE, POTASSIUM CHLORIDE, CALCIUM CHLORIDE 600; 310; 30; 20 MG/100ML; MG/100ML; MG/100ML; MG/100ML
INJECTION, SOLUTION INTRAVENOUS CONTINUOUS
Status: DISCONTINUED | OUTPATIENT
Start: 2021-01-28 | End: 2021-01-29

## 2021-01-28 RX ORDER — FUROSEMIDE 10 MG/ML
40 INJECTION INTRAMUSCULAR; INTRAVENOUS ONCE
Status: COMPLETED | OUTPATIENT
Start: 2021-01-28 | End: 2021-01-28

## 2021-01-28 RX ADMIN — OXYCODONE HYDROCHLORIDE 5 MG: 5 TABLET ORAL at 09:01

## 2021-01-28 RX ADMIN — FUROSEMIDE 40 MG: 10 INJECTION, SOLUTION INTRAMUSCULAR; INTRAVENOUS at 09:01

## 2021-01-28 RX ADMIN — HEPARIN SODIUM 5000 UNITS: 5000 INJECTION INTRAVENOUS; SUBCUTANEOUS at 06:01

## 2021-01-28 RX ADMIN — DEXTROSE MONOHYDRATE 25 G: 25 INJECTION, SOLUTION INTRAVENOUS at 08:01

## 2021-01-28 RX ADMIN — Medication 800 MG: at 08:01

## 2021-01-28 RX ADMIN — PIPERACILLIN AND TAZOBACTAM 4.5 G: 4; .5 INJECTION, POWDER, LYOPHILIZED, FOR SOLUTION INTRAVENOUS; PARENTERAL at 01:01

## 2021-01-28 RX ADMIN — PANTOPRAZOLE SODIUM 40 MG: 40 INJECTION, POWDER, FOR SOLUTION INTRAVENOUS at 09:01

## 2021-01-28 RX ADMIN — HEPARIN SODIUM 5000 UNITS: 5000 INJECTION INTRAVENOUS; SUBCUTANEOUS at 02:01

## 2021-01-28 RX ADMIN — HYDROMORPHONE HYDROCHLORIDE: 10 INJECTION, SOLUTION INTRAMUSCULAR; INTRAVENOUS; SUBCUTANEOUS at 11:01

## 2021-01-28 RX ADMIN — POTASSIUM & SODIUM PHOSPHATES POWDER PACK 280-160-250 MG 2 PACKET: 280-160-250 PACK at 08:01

## 2021-01-28 RX ADMIN — SODIUM PHOSPHATE, MONOBASIC, MONOHYDRATE 20.01 MMOL: 276; 142 INJECTION, SOLUTION INTRAVENOUS at 08:01

## 2021-01-28 RX ADMIN — POTASSIUM & SODIUM PHOSPHATES POWDER PACK 280-160-250 MG 2 PACKET: 280-160-250 PACK at 12:01

## 2021-01-28 RX ADMIN — HEPARIN SODIUM 5000 UNITS: 5000 INJECTION INTRAVENOUS; SUBCUTANEOUS at 09:01

## 2021-01-28 RX ADMIN — SODIUM CHLORIDE, SODIUM LACTATE, POTASSIUM CHLORIDE, AND CALCIUM CHLORIDE: .6; .31; .03; .02 INJECTION, SOLUTION INTRAVENOUS at 08:01

## 2021-01-28 RX ADMIN — PIPERACILLIN AND TAZOBACTAM 4.5 G: 4; .5 INJECTION, POWDER, LYOPHILIZED, FOR SOLUTION INTRAVENOUS; PARENTERAL at 04:01

## 2021-01-28 RX ADMIN — SODIUM CHLORIDE, SODIUM LACTATE, POTASSIUM CHLORIDE, AND CALCIUM CHLORIDE: .6; .31; .03; .02 INJECTION, SOLUTION INTRAVENOUS at 02:01

## 2021-01-28 RX ADMIN — KETOROLAC TROMETHAMINE 15 MG: 15 INJECTION, SOLUTION INTRAMUSCULAR; INTRAVENOUS at 09:01

## 2021-01-28 RX ADMIN — PIPERACILLIN AND TAZOBACTAM 4.5 G: 4; .5 INJECTION, POWDER, LYOPHILIZED, FOR SOLUTION INTRAVENOUS; PARENTERAL at 09:01

## 2021-01-28 RX ADMIN — CASTOR OIL AND BALSAM, PERU: 788; 87 OINTMENT TOPICAL at 09:01

## 2021-01-28 RX ADMIN — KETOROLAC TROMETHAMINE 15 MG: 15 INJECTION, SOLUTION INTRAMUSCULAR; INTRAVENOUS at 06:01

## 2021-01-28 RX ADMIN — KETOROLAC TROMETHAMINE 15 MG: 15 INJECTION, SOLUTION INTRAMUSCULAR; INTRAVENOUS at 02:01

## 2021-01-28 RX ADMIN — SODIUM CHLORIDE, SODIUM LACTATE, POTASSIUM CHLORIDE, AND CALCIUM CHLORIDE: .6; .31; .03; .02 INJECTION, SOLUTION INTRAVENOUS at 11:01

## 2021-01-28 RX ADMIN — POTASSIUM & SODIUM PHOSPHATES POWDER PACK 280-160-250 MG 2 PACKET: 280-160-250 PACK at 04:01

## 2021-01-28 RX ADMIN — FLUCONAZOLE, SODIUM CHLORIDE 400 MG: 2 INJECTION INTRAVENOUS at 01:01

## 2021-01-29 LAB
ALBUMIN SERPL BCP-MCNC: 1.6 G/DL (ref 3.5–5.2)
ALP SERPL-CCNC: 101 U/L (ref 55–135)
ALT SERPL W/O P-5'-P-CCNC: 36 U/L (ref 10–44)
ANION GAP SERPL CALC-SCNC: 12 MMOL/L (ref 8–16)
AST SERPL-CCNC: 98 U/L (ref 10–40)
BASOPHILS # BLD AUTO: 0.01 K/UL (ref 0–0.2)
BASOPHILS NFR BLD: 0.1 % (ref 0–1.9)
BILIRUB SERPL-MCNC: 0.5 MG/DL (ref 0.1–1)
BUN SERPL-MCNC: 14 MG/DL (ref 8–23)
CALCIUM SERPL-MCNC: 7.3 MG/DL (ref 8.7–10.5)
CHLORIDE SERPL-SCNC: 107 MMOL/L (ref 95–110)
CO2 SERPL-SCNC: 24 MMOL/L (ref 23–29)
CREAT SERPL-MCNC: 0.9 MG/DL (ref 0.5–1.4)
DIFFERENTIAL METHOD: ABNORMAL
EOSINOPHIL # BLD AUTO: 0.5 K/UL (ref 0–0.5)
EOSINOPHIL NFR BLD: 4.5 % (ref 0–8)
ERYTHROCYTE [DISTWIDTH] IN BLOOD BY AUTOMATED COUNT: 16.4 % (ref 11.5–14.5)
EST. GFR  (AFRICAN AMERICAN): >60 ML/MIN/1.73 M^2
EST. GFR  (NON AFRICAN AMERICAN): >60 ML/MIN/1.73 M^2
FINAL PATHOLOGIC DIAGNOSIS: NORMAL
FINAL PATHOLOGIC DIAGNOSIS: NORMAL
GLUCOSE SERPL-MCNC: 80 MG/DL (ref 70–110)
HCT VFR BLD AUTO: 27.3 % (ref 37–48.5)
HGB BLD-MCNC: 8.3 G/DL (ref 12–16)
IMM GRANULOCYTES # BLD AUTO: 0.14 K/UL (ref 0–0.04)
IMM GRANULOCYTES NFR BLD AUTO: 1.4 % (ref 0–0.5)
INR PPP: 1 (ref 0.8–1.2)
LYMPHOCYTES # BLD AUTO: 1.3 K/UL (ref 1–4.8)
LYMPHOCYTES NFR BLD: 13.2 % (ref 18–48)
Lab: NORMAL
Lab: NORMAL
MAGNESIUM SERPL-MCNC: 1.5 MG/DL (ref 1.6–2.6)
MCH RBC QN AUTO: 29.6 PG (ref 27–31)
MCHC RBC AUTO-ENTMCNC: 30.4 G/DL (ref 32–36)
MCV RBC AUTO: 98 FL (ref 82–98)
MONOCYTES # BLD AUTO: 0.6 K/UL (ref 0.3–1)
MONOCYTES NFR BLD: 5.8 % (ref 4–15)
NEUTROPHILS # BLD AUTO: 7.5 K/UL (ref 1.8–7.7)
NEUTROPHILS NFR BLD: 75 % (ref 38–73)
NRBC BLD-RTO: 0 /100 WBC
PHOSPHATE SERPL-MCNC: 3.3 MG/DL (ref 2.7–4.5)
PLATELET # BLD AUTO: 266 K/UL (ref 150–350)
PMV BLD AUTO: 10.8 FL (ref 9.2–12.9)
POCT GLUCOSE: 167 MG/DL (ref 70–110)
POCT GLUCOSE: 63 MG/DL (ref 70–110)
POCT GLUCOSE: 99 MG/DL (ref 70–110)
POTASSIUM SERPL-SCNC: 3.4 MMOL/L (ref 3.5–5.1)
PROT SERPL-MCNC: 4.3 G/DL (ref 6–8.4)
PROTHROMBIN TIME: 11.1 SEC (ref 9–12.5)
RBC # BLD AUTO: 2.8 M/UL (ref 4–5.4)
SODIUM SERPL-SCNC: 143 MMOL/L (ref 136–145)
WBC # BLD AUTO: 10.01 K/UL (ref 3.9–12.7)

## 2021-01-29 PROCEDURE — 25000003 PHARM REV CODE 250: Performed by: STUDENT IN AN ORGANIZED HEALTH CARE EDUCATION/TRAINING PROGRAM

## 2021-01-29 PROCEDURE — 97530 THERAPEUTIC ACTIVITIES: CPT

## 2021-01-29 PROCEDURE — 80053 COMPREHEN METABOLIC PANEL: CPT

## 2021-01-29 PROCEDURE — 63600175 PHARM REV CODE 636 W HCPCS: Performed by: STUDENT IN AN ORGANIZED HEALTH CARE EDUCATION/TRAINING PROGRAM

## 2021-01-29 PROCEDURE — 97165 OT EVAL LOW COMPLEX 30 MIN: CPT

## 2021-01-29 PROCEDURE — 36415 COLL VENOUS BLD VENIPUNCTURE: CPT

## 2021-01-29 PROCEDURE — 20600001 HC STEP DOWN PRIVATE ROOM

## 2021-01-29 PROCEDURE — 85610 PROTHROMBIN TIME: CPT

## 2021-01-29 PROCEDURE — 25000003 PHARM REV CODE 250: Performed by: SURGERY

## 2021-01-29 PROCEDURE — 94761 N-INVAS EAR/PLS OXIMETRY MLT: CPT

## 2021-01-29 PROCEDURE — 27000221 HC OXYGEN, UP TO 24 HOURS

## 2021-01-29 PROCEDURE — 97116 GAIT TRAINING THERAPY: CPT

## 2021-01-29 PROCEDURE — 83735 ASSAY OF MAGNESIUM: CPT

## 2021-01-29 PROCEDURE — 63600175 PHARM REV CODE 636 W HCPCS: Performed by: SURGERY

## 2021-01-29 PROCEDURE — 85025 COMPLETE CBC W/AUTO DIFF WBC: CPT

## 2021-01-29 PROCEDURE — 84100 ASSAY OF PHOSPHORUS: CPT

## 2021-01-29 PROCEDURE — C9113 INJ PANTOPRAZOLE SODIUM, VIA: HCPCS | Performed by: STUDENT IN AN ORGANIZED HEALTH CARE EDUCATION/TRAINING PROGRAM

## 2021-01-29 RX ORDER — FUROSEMIDE 20 MG/1
20 TABLET ORAL ONCE
Status: DISCONTINUED | OUTPATIENT
Start: 2021-01-29 | End: 2021-01-29

## 2021-01-29 RX ORDER — CALCIUM CARBONATE 500(1250)
1000 TABLET ORAL 2 TIMES DAILY
Status: DISPENSED | OUTPATIENT
Start: 2021-01-29 | End: 2021-01-30

## 2021-01-29 RX ORDER — CLONAZEPAM 0.25 MG/1
0.25 TABLET, ORALLY DISINTEGRATING ORAL 2 TIMES DAILY PRN
Status: DISCONTINUED | OUTPATIENT
Start: 2021-01-29 | End: 2021-02-02

## 2021-01-29 RX ORDER — ACETAMINOPHEN 325 MG/1
650 TABLET ORAL EVERY 6 HOURS
Status: DISCONTINUED | OUTPATIENT
Start: 2021-01-29 | End: 2021-02-09 | Stop reason: HOSPADM

## 2021-01-29 RX ORDER — FUROSEMIDE 10 MG/ML
20 INJECTION INTRAMUSCULAR; INTRAVENOUS ONCE
Status: COMPLETED | OUTPATIENT
Start: 2021-01-29 | End: 2021-01-29

## 2021-01-29 RX ORDER — POTASSIUM CHLORIDE 20 MEQ/1
40 TABLET, EXTENDED RELEASE ORAL
Status: COMPLETED | OUTPATIENT
Start: 2021-01-29 | End: 2021-01-29

## 2021-01-29 RX ORDER — LANOLIN ALCOHOL/MO/W.PET/CERES
400 CREAM (GRAM) TOPICAL 2 TIMES DAILY
Status: DISPENSED | OUTPATIENT
Start: 2021-01-29 | End: 2021-01-30

## 2021-01-29 RX ADMIN — HEPARIN SODIUM 5000 UNITS: 5000 INJECTION INTRAVENOUS; SUBCUTANEOUS at 06:01

## 2021-01-29 RX ADMIN — PANTOPRAZOLE SODIUM 40 MG: 40 INJECTION, POWDER, FOR SOLUTION INTRAVENOUS at 10:01

## 2021-01-29 RX ADMIN — FLUCONAZOLE, SODIUM CHLORIDE 400 MG: 2 INJECTION INTRAVENOUS at 02:01

## 2021-01-29 RX ADMIN — CASTOR OIL AND BALSAM, PERU: 788; 87 OINTMENT TOPICAL at 10:01

## 2021-01-29 RX ADMIN — FUROSEMIDE 20 MG: 10 INJECTION, SOLUTION INTRAMUSCULAR; INTRAVENOUS at 12:01

## 2021-01-29 RX ADMIN — KETOROLAC TROMETHAMINE 15 MG: 15 INJECTION, SOLUTION INTRAMUSCULAR; INTRAVENOUS at 11:01

## 2021-01-29 RX ADMIN — OXYCODONE HYDROCHLORIDE 5 MG: 5 TABLET ORAL at 06:01

## 2021-01-29 RX ADMIN — ACETAMINOPHEN 650 MG: 325 TABLET ORAL at 12:01

## 2021-01-29 RX ADMIN — Medication 400 MG: at 12:01

## 2021-01-29 RX ADMIN — PANTOPRAZOLE SODIUM 40 MG: 40 INJECTION, POWDER, FOR SOLUTION INTRAVENOUS at 08:01

## 2021-01-29 RX ADMIN — OXYCODONE HYDROCHLORIDE 5 MG: 5 TABLET ORAL at 05:01

## 2021-01-29 RX ADMIN — PIPERACILLIN AND TAZOBACTAM 4.5 G: 4; .5 INJECTION, POWDER, LYOPHILIZED, FOR SOLUTION INTRAVENOUS; PARENTERAL at 12:01

## 2021-01-29 RX ADMIN — DEXTROSE MONOHYDRATE 25 G: 25 INJECTION, SOLUTION INTRAVENOUS at 01:01

## 2021-01-29 RX ADMIN — CALCIUM 1000 MG: 500 TABLET ORAL at 12:01

## 2021-01-29 RX ADMIN — HEPARIN SODIUM 5000 UNITS: 5000 INJECTION INTRAVENOUS; SUBCUTANEOUS at 03:01

## 2021-01-29 RX ADMIN — HEPARIN SODIUM 5000 UNITS: 5000 INJECTION INTRAVENOUS; SUBCUTANEOUS at 10:01

## 2021-01-29 RX ADMIN — PIPERACILLIN AND TAZOBACTAM 4.5 G: 4; .5 INJECTION, POWDER, LYOPHILIZED, FOR SOLUTION INTRAVENOUS; PARENTERAL at 09:01

## 2021-01-29 RX ADMIN — KETOROLAC TROMETHAMINE 15 MG: 15 INJECTION, SOLUTION INTRAMUSCULAR; INTRAVENOUS at 10:01

## 2021-01-29 RX ADMIN — CASTOR OIL AND BALSAM, PERU: 788; 87 OINTMENT TOPICAL at 09:01

## 2021-01-29 RX ADMIN — PIPERACILLIN AND TAZOBACTAM 4.5 G: 4; .5 INJECTION, POWDER, LYOPHILIZED, FOR SOLUTION INTRAVENOUS; PARENTERAL at 08:01

## 2021-01-29 RX ADMIN — CLONAZEPAM 0.25 MG: 0.25 TABLET, ORALLY DISINTEGRATING ORAL at 03:01

## 2021-01-29 RX ADMIN — POTASSIUM CHLORIDE 40 MEQ: 1500 TABLET, EXTENDED RELEASE ORAL at 02:01

## 2021-01-29 RX ADMIN — POTASSIUM CHLORIDE 40 MEQ: 1500 TABLET, EXTENDED RELEASE ORAL at 12:01

## 2021-01-30 LAB
ALBUMIN SERPL BCP-MCNC: 1.7 G/DL (ref 3.5–5.2)
ALP SERPL-CCNC: 104 U/L (ref 55–135)
ALT SERPL W/O P-5'-P-CCNC: 35 U/L (ref 10–44)
ANION GAP SERPL CALC-SCNC: 14 MMOL/L (ref 8–16)
AST SERPL-CCNC: 103 U/L (ref 10–40)
BASOPHILS # BLD AUTO: 0.04 K/UL (ref 0–0.2)
BASOPHILS NFR BLD: 0.3 % (ref 0–1.9)
BILIRUB SERPL-MCNC: 0.5 MG/DL (ref 0.1–1)
BUN SERPL-MCNC: 11 MG/DL (ref 8–23)
CALCIUM SERPL-MCNC: 7.2 MG/DL (ref 8.7–10.5)
CHLORIDE SERPL-SCNC: 106 MMOL/L (ref 95–110)
CO2 SERPL-SCNC: 23 MMOL/L (ref 23–29)
CREAT SERPL-MCNC: 0.8 MG/DL (ref 0.5–1.4)
DIFFERENTIAL METHOD: ABNORMAL
EOSINOPHIL # BLD AUTO: 0.3 K/UL (ref 0–0.5)
EOSINOPHIL NFR BLD: 2.7 % (ref 0–8)
ERYTHROCYTE [DISTWIDTH] IN BLOOD BY AUTOMATED COUNT: 16.6 % (ref 11.5–14.5)
EST. GFR  (AFRICAN AMERICAN): >60 ML/MIN/1.73 M^2
EST. GFR  (NON AFRICAN AMERICAN): >60 ML/MIN/1.73 M^2
GLUCOSE SERPL-MCNC: 59 MG/DL (ref 70–110)
HCT VFR BLD AUTO: 26.4 % (ref 37–48.5)
HGB BLD-MCNC: 8.2 G/DL (ref 12–16)
IMM GRANULOCYTES # BLD AUTO: 0.15 K/UL (ref 0–0.04)
IMM GRANULOCYTES NFR BLD AUTO: 1.3 % (ref 0–0.5)
INR PPP: 1.1 (ref 0.8–1.2)
LYMPHOCYTES # BLD AUTO: 1.7 K/UL (ref 1–4.8)
LYMPHOCYTES NFR BLD: 13.9 % (ref 18–48)
MAGNESIUM SERPL-MCNC: 1.2 MG/DL (ref 1.6–2.6)
MCH RBC QN AUTO: 29.9 PG (ref 27–31)
MCHC RBC AUTO-ENTMCNC: 31.1 G/DL (ref 32–36)
MCV RBC AUTO: 96 FL (ref 82–98)
MONOCYTES # BLD AUTO: 0.6 K/UL (ref 0.3–1)
MONOCYTES NFR BLD: 5.3 % (ref 4–15)
NEUTROPHILS # BLD AUTO: 9.2 K/UL (ref 1.8–7.7)
NEUTROPHILS NFR BLD: 76.5 % (ref 38–73)
NRBC BLD-RTO: 0 /100 WBC
PHOSPHATE SERPL-MCNC: 1.7 MG/DL (ref 2.7–4.5)
PLATELET # BLD AUTO: 325 K/UL (ref 150–350)
PMV BLD AUTO: 11.2 FL (ref 9.2–12.9)
POCT GLUCOSE: 115 MG/DL (ref 70–110)
POCT GLUCOSE: 84 MG/DL (ref 70–110)
POCT GLUCOSE: 84 MG/DL (ref 70–110)
POCT GLUCOSE: 99 MG/DL (ref 70–110)
POTASSIUM SERPL-SCNC: 3.5 MMOL/L (ref 3.5–5.1)
PROT SERPL-MCNC: 4.4 G/DL (ref 6–8.4)
PROTHROMBIN TIME: 11.7 SEC (ref 9–12.5)
RBC # BLD AUTO: 2.74 M/UL (ref 4–5.4)
SODIUM SERPL-SCNC: 143 MMOL/L (ref 136–145)
WBC # BLD AUTO: 11.98 K/UL (ref 3.9–12.7)

## 2021-01-30 PROCEDURE — 63600175 PHARM REV CODE 636 W HCPCS: Performed by: STUDENT IN AN ORGANIZED HEALTH CARE EDUCATION/TRAINING PROGRAM

## 2021-01-30 PROCEDURE — 25000003 PHARM REV CODE 250: Performed by: SURGERY

## 2021-01-30 PROCEDURE — 85025 COMPLETE CBC W/AUTO DIFF WBC: CPT

## 2021-01-30 PROCEDURE — 20600001 HC STEP DOWN PRIVATE ROOM

## 2021-01-30 PROCEDURE — 97116 GAIT TRAINING THERAPY: CPT | Mod: CQ

## 2021-01-30 PROCEDURE — 85610 PROTHROMBIN TIME: CPT

## 2021-01-30 PROCEDURE — 80053 COMPREHEN METABOLIC PANEL: CPT

## 2021-01-30 PROCEDURE — 97530 THERAPEUTIC ACTIVITIES: CPT | Mod: CQ

## 2021-01-30 PROCEDURE — 25000003 PHARM REV CODE 250: Performed by: STUDENT IN AN ORGANIZED HEALTH CARE EDUCATION/TRAINING PROGRAM

## 2021-01-30 PROCEDURE — 63600175 PHARM REV CODE 636 W HCPCS: Performed by: SURGERY

## 2021-01-30 PROCEDURE — C9113 INJ PANTOPRAZOLE SODIUM, VIA: HCPCS | Performed by: STUDENT IN AN ORGANIZED HEALTH CARE EDUCATION/TRAINING PROGRAM

## 2021-01-30 PROCEDURE — 83735 ASSAY OF MAGNESIUM: CPT

## 2021-01-30 PROCEDURE — 84100 ASSAY OF PHOSPHORUS: CPT

## 2021-01-30 PROCEDURE — 36415 COLL VENOUS BLD VENIPUNCTURE: CPT

## 2021-01-30 RX ORDER — LANOLIN ALCOHOL/MO/W.PET/CERES
400 CREAM (GRAM) TOPICAL 2 TIMES DAILY
Status: COMPLETED | OUTPATIENT
Start: 2021-01-30 | End: 2021-01-31

## 2021-01-30 RX ORDER — LOPERAMIDE HYDROCHLORIDE 2 MG/1
2 CAPSULE ORAL 3 TIMES DAILY
Status: DISCONTINUED | OUTPATIENT
Start: 2021-01-30 | End: 2021-02-03

## 2021-01-30 RX ORDER — POTASSIUM CHLORIDE 7.45 MG/ML
10 INJECTION INTRAVENOUS
Status: COMPLETED | OUTPATIENT
Start: 2021-01-30 | End: 2021-01-30

## 2021-01-30 RX ORDER — HALOPERIDOL 5 MG/ML
5 INJECTION INTRAMUSCULAR EVERY 6 HOURS PRN
Status: DISCONTINUED | OUTPATIENT
Start: 2021-01-30 | End: 2021-02-09 | Stop reason: HOSPADM

## 2021-01-30 RX ORDER — LISINOPRIL AND HYDROCHLOROTHIAZIDE 10; 12.5 MG/1; MG/1
1 TABLET ORAL DAILY
Status: DISCONTINUED | OUTPATIENT
Start: 2021-01-30 | End: 2021-02-09

## 2021-01-30 RX ORDER — LABETALOL HCL 20 MG/4 ML
10 SYRINGE (ML) INTRAVENOUS EVERY 6 HOURS PRN
Status: DISCONTINUED | OUTPATIENT
Start: 2021-01-30 | End: 2021-01-30

## 2021-01-30 RX ORDER — NICOTINE 7MG/24HR
1 PATCH, TRANSDERMAL 24 HOURS TRANSDERMAL DAILY
Status: DISCONTINUED | OUTPATIENT
Start: 2021-01-30 | End: 2021-01-30

## 2021-01-30 RX ORDER — MAGNESIUM SULFATE HEPTAHYDRATE 40 MG/ML
2 INJECTION, SOLUTION INTRAVENOUS ONCE
Status: COMPLETED | OUTPATIENT
Start: 2021-01-30 | End: 2021-01-30

## 2021-01-30 RX ORDER — SODIUM,POTASSIUM PHOSPHATES 280-250MG
2 POWDER IN PACKET (EA) ORAL
Status: DISCONTINUED | OUTPATIENT
Start: 2021-01-30 | End: 2021-01-30

## 2021-01-30 RX ADMIN — HEPARIN SODIUM 5000 UNITS: 5000 INJECTION INTRAVENOUS; SUBCUTANEOUS at 09:01

## 2021-01-30 RX ADMIN — POTASSIUM CHLORIDE 10 MEQ: 10 INJECTION, SOLUTION INTRAVENOUS at 09:01

## 2021-01-30 RX ADMIN — PIPERACILLIN AND TAZOBACTAM 4.5 G: 4; .5 INJECTION, POWDER, LYOPHILIZED, FOR SOLUTION INTRAVENOUS; PARENTERAL at 05:01

## 2021-01-30 RX ADMIN — PANTOPRAZOLE SODIUM 40 MG: 40 INJECTION, POWDER, FOR SOLUTION INTRAVENOUS at 09:01

## 2021-01-30 RX ADMIN — LISINOPRIL AND HYDROCHLOROTHIAZIDE 1 TABLET: 12.5; 1 TABLET ORAL at 04:01

## 2021-01-30 RX ADMIN — POTASSIUM CHLORIDE 10 MEQ: 10 INJECTION, SOLUTION INTRAVENOUS at 10:01

## 2021-01-30 RX ADMIN — LOPERAMIDE HYDROCHLORIDE 2 MG: 2 CAPSULE ORAL at 09:01

## 2021-01-30 RX ADMIN — ACETAMINOPHEN 650 MG: 325 TABLET ORAL at 12:01

## 2021-01-30 RX ADMIN — KETOROLAC TROMETHAMINE 15 MG: 15 INJECTION, SOLUTION INTRAMUSCULAR; INTRAVENOUS at 09:01

## 2021-01-30 RX ADMIN — HEPARIN SODIUM 5000 UNITS: 5000 INJECTION INTRAVENOUS; SUBCUTANEOUS at 02:01

## 2021-01-30 RX ADMIN — POTASSIUM CHLORIDE 10 MEQ: 10 INJECTION, SOLUTION INTRAVENOUS at 05:01

## 2021-01-30 RX ADMIN — CLONAZEPAM 0.25 MG: 0.25 TABLET, ORALLY DISINTEGRATING ORAL at 09:01

## 2021-01-30 RX ADMIN — HEPARIN SODIUM 5000 UNITS: 5000 INJECTION INTRAVENOUS; SUBCUTANEOUS at 05:01

## 2021-01-30 RX ADMIN — SODIUM PHOSPHATE, MONOBASIC, MONOHYDRATE 39.99 MMOL: 276; 142 INJECTION, SOLUTION INTRAVENOUS at 05:01

## 2021-01-30 RX ADMIN — POTASSIUM CHLORIDE 10 MEQ: 10 INJECTION, SOLUTION INTRAVENOUS at 07:01

## 2021-01-30 RX ADMIN — MAGNESIUM SULFATE 2 G: 2 INJECTION INTRAVENOUS at 02:01

## 2021-01-30 RX ADMIN — PIPERACILLIN AND TAZOBACTAM 4.5 G: 4; .5 INJECTION, POWDER, LYOPHILIZED, FOR SOLUTION INTRAVENOUS; PARENTERAL at 02:01

## 2021-01-30 RX ADMIN — POTASSIUM CHLORIDE 10 MEQ: 10 INJECTION, SOLUTION INTRAVENOUS at 11:01

## 2021-01-30 RX ADMIN — Medication 400 MG: at 09:01

## 2021-01-30 RX ADMIN — LOPERAMIDE HYDROCHLORIDE 2 MG: 2 CAPSULE ORAL at 02:01

## 2021-01-30 RX ADMIN — FLUCONAZOLE, SODIUM CHLORIDE 400 MG: 2 INJECTION INTRAVENOUS at 12:01

## 2021-01-30 RX ADMIN — CASTOR OIL AND BALSAM, PERU: 788; 87 OINTMENT TOPICAL at 09:01

## 2021-01-30 RX ADMIN — HALOPERIDOL LACTATE 5 MG: 5 INJECTION, SOLUTION INTRAMUSCULAR at 04:01

## 2021-01-30 RX ADMIN — ACETAMINOPHEN 650 MG: 325 TABLET ORAL at 03:01

## 2021-01-31 LAB
ALBUMIN SERPL BCP-MCNC: 1.9 G/DL (ref 3.5–5.2)
ALP SERPL-CCNC: 96 U/L (ref 55–135)
ALT SERPL W/O P-5'-P-CCNC: 41 U/L (ref 10–44)
ANION GAP SERPL CALC-SCNC: 16 MMOL/L (ref 8–16)
AST SERPL-CCNC: 130 U/L (ref 10–40)
BASOPHILS # BLD AUTO: 0.03 K/UL (ref 0–0.2)
BASOPHILS NFR BLD: 0.2 % (ref 0–1.9)
BILIRUB SERPL-MCNC: 0.6 MG/DL (ref 0.1–1)
BUN SERPL-MCNC: 8 MG/DL (ref 8–23)
CALCIUM SERPL-MCNC: 7 MG/DL (ref 8.7–10.5)
CHLORIDE SERPL-SCNC: 103 MMOL/L (ref 95–110)
CO2 SERPL-SCNC: 22 MMOL/L (ref 23–29)
CREAT SERPL-MCNC: 0.8 MG/DL (ref 0.5–1.4)
DIFFERENTIAL METHOD: ABNORMAL
EOSINOPHIL # BLD AUTO: 0.2 K/UL (ref 0–0.5)
EOSINOPHIL NFR BLD: 1.3 % (ref 0–8)
ERYTHROCYTE [DISTWIDTH] IN BLOOD BY AUTOMATED COUNT: 16.4 % (ref 11.5–14.5)
EST. GFR  (AFRICAN AMERICAN): >60 ML/MIN/1.73 M^2
EST. GFR  (NON AFRICAN AMERICAN): >60 ML/MIN/1.73 M^2
GLUCOSE SERPL-MCNC: 73 MG/DL (ref 70–110)
HCT VFR BLD AUTO: 27.2 % (ref 37–48.5)
HGB BLD-MCNC: 8.5 G/DL (ref 12–16)
IMM GRANULOCYTES # BLD AUTO: 0.15 K/UL (ref 0–0.04)
IMM GRANULOCYTES NFR BLD AUTO: 0.9 % (ref 0–0.5)
INR PPP: 1.2 (ref 0.8–1.2)
LYMPHOCYTES # BLD AUTO: 1.7 K/UL (ref 1–4.8)
LYMPHOCYTES NFR BLD: 9.4 % (ref 18–48)
MAGNESIUM SERPL-MCNC: 1.4 MG/DL (ref 1.6–2.6)
MCH RBC QN AUTO: 29.4 PG (ref 27–31)
MCHC RBC AUTO-ENTMCNC: 31.3 G/DL (ref 32–36)
MCV RBC AUTO: 94 FL (ref 82–98)
MONOCYTES # BLD AUTO: 0.8 K/UL (ref 0.3–1)
MONOCYTES NFR BLD: 4.3 % (ref 4–15)
NEUTROPHILS # BLD AUTO: 14.7 K/UL (ref 1.8–7.7)
NEUTROPHILS NFR BLD: 83.9 % (ref 38–73)
NRBC BLD-RTO: 0 /100 WBC
PHOSPHATE SERPL-MCNC: 3.7 MG/DL (ref 2.7–4.5)
PLATELET # BLD AUTO: 430 K/UL (ref 150–350)
PMV BLD AUTO: 11 FL (ref 9.2–12.9)
POCT GLUCOSE: 81 MG/DL (ref 70–110)
POCT GLUCOSE: 88 MG/DL (ref 70–110)
POTASSIUM SERPL-SCNC: 3.3 MMOL/L (ref 3.5–5.1)
PROT SERPL-MCNC: 4.6 G/DL (ref 6–8.4)
PROTHROMBIN TIME: 12.5 SEC (ref 9–12.5)
RBC # BLD AUTO: 2.89 M/UL (ref 4–5.4)
SODIUM SERPL-SCNC: 141 MMOL/L (ref 136–145)
WBC # BLD AUTO: 17.53 K/UL (ref 3.9–12.7)

## 2021-01-31 PROCEDURE — 63600175 PHARM REV CODE 636 W HCPCS: Performed by: STUDENT IN AN ORGANIZED HEALTH CARE EDUCATION/TRAINING PROGRAM

## 2021-01-31 PROCEDURE — 85025 COMPLETE CBC W/AUTO DIFF WBC: CPT

## 2021-01-31 PROCEDURE — 25000003 PHARM REV CODE 250: Performed by: SURGERY

## 2021-01-31 PROCEDURE — 85610 PROTHROMBIN TIME: CPT

## 2021-01-31 PROCEDURE — 84100 ASSAY OF PHOSPHORUS: CPT

## 2021-01-31 PROCEDURE — 25000003 PHARM REV CODE 250: Performed by: STUDENT IN AN ORGANIZED HEALTH CARE EDUCATION/TRAINING PROGRAM

## 2021-01-31 PROCEDURE — 63600175 PHARM REV CODE 636 W HCPCS: Performed by: SURGERY

## 2021-01-31 PROCEDURE — 83735 ASSAY OF MAGNESIUM: CPT

## 2021-01-31 PROCEDURE — 36415 COLL VENOUS BLD VENIPUNCTURE: CPT

## 2021-01-31 PROCEDURE — 20600001 HC STEP DOWN PRIVATE ROOM

## 2021-01-31 PROCEDURE — 80053 COMPREHEN METABOLIC PANEL: CPT

## 2021-01-31 RX ORDER — HYDROMORPHONE HYDROCHLORIDE 1 MG/ML
1 INJECTION, SOLUTION INTRAMUSCULAR; INTRAVENOUS; SUBCUTANEOUS ONCE
Status: COMPLETED | OUTPATIENT
Start: 2021-01-31 | End: 2021-01-31

## 2021-01-31 RX ORDER — HYDROMORPHONE HYDROCHLORIDE 1 MG/ML
0.2 INJECTION, SOLUTION INTRAMUSCULAR; INTRAVENOUS; SUBCUTANEOUS ONCE
Status: COMPLETED | OUTPATIENT
Start: 2021-01-31 | End: 2021-01-31

## 2021-01-31 RX ORDER — FLUCONAZOLE 2 MG/ML
400 INJECTION, SOLUTION INTRAVENOUS
Status: DISCONTINUED | OUTPATIENT
Start: 2021-01-31 | End: 2021-02-02

## 2021-01-31 RX ORDER — LANOLIN ALCOHOL/MO/W.PET/CERES
400 CREAM (GRAM) TOPICAL 2 TIMES DAILY
Status: COMPLETED | OUTPATIENT
Start: 2021-01-31 | End: 2021-02-01

## 2021-01-31 RX ORDER — POTASSIUM CHLORIDE 20 MEQ/1
40 TABLET, EXTENDED RELEASE ORAL 2 TIMES DAILY
Status: DISPENSED | OUTPATIENT
Start: 2021-01-31 | End: 2021-02-01

## 2021-01-31 RX ORDER — QUETIAPINE FUMARATE 25 MG/1
25 TABLET, FILM COATED ORAL NIGHTLY
Status: DISCONTINUED | OUTPATIENT
Start: 2021-01-31 | End: 2021-02-03

## 2021-01-31 RX ORDER — OXYCODONE HYDROCHLORIDE 10 MG/1
10 TABLET ORAL EVERY 4 HOURS PRN
Status: DISCONTINUED | OUTPATIENT
Start: 2021-01-31 | End: 2021-02-03

## 2021-01-31 RX ADMIN — QUETIAPINE FUMARATE 25 MG: 25 TABLET ORAL at 09:01

## 2021-01-31 RX ADMIN — Medication 400 MG: at 09:01

## 2021-01-31 RX ADMIN — HALOPERIDOL LACTATE 5 MG: 5 INJECTION, SOLUTION INTRAMUSCULAR at 07:01

## 2021-01-31 RX ADMIN — CLONAZEPAM 0.25 MG: 0.25 TABLET, ORALLY DISINTEGRATING ORAL at 01:01

## 2021-01-31 RX ADMIN — LOPERAMIDE HYDROCHLORIDE 2 MG: 2 CAPSULE ORAL at 09:01

## 2021-01-31 RX ADMIN — ACETAMINOPHEN 650 MG: 325 TABLET ORAL at 05:01

## 2021-01-31 RX ADMIN — PIPERACILLIN AND TAZOBACTAM 4.5 G: 4; .5 INJECTION, POWDER, LYOPHILIZED, FOR SOLUTION INTRAVENOUS; PARENTERAL at 09:01

## 2021-01-31 RX ADMIN — HEPARIN SODIUM 5000 UNITS: 5000 INJECTION INTRAVENOUS; SUBCUTANEOUS at 01:01

## 2021-01-31 RX ADMIN — HYDROMORPHONE HYDROCHLORIDE 0.2 MG: 1 INJECTION, SOLUTION INTRAMUSCULAR; INTRAVENOUS; SUBCUTANEOUS at 02:01

## 2021-01-31 RX ADMIN — FLUCONAZOLE 400 MG: 2 INJECTION, SOLUTION INTRAVENOUS at 01:01

## 2021-01-31 RX ADMIN — CASTOR OIL AND BALSAM, PERU: 788; 87 OINTMENT TOPICAL at 09:01

## 2021-01-31 RX ADMIN — OXYCODONE HYDROCHLORIDE 10 MG: 10 TABLET ORAL at 07:01

## 2021-01-31 RX ADMIN — PIPERACILLIN AND TAZOBACTAM 4.5 G: 4; .5 INJECTION, POWDER, LYOPHILIZED, FOR SOLUTION INTRAVENOUS; PARENTERAL at 12:01

## 2021-01-31 RX ADMIN — LISINOPRIL AND HYDROCHLOROTHIAZIDE 1 TABLET: 12.5; 1 TABLET ORAL at 09:01

## 2021-01-31 RX ADMIN — HEPARIN SODIUM 5000 UNITS: 5000 INJECTION INTRAVENOUS; SUBCUTANEOUS at 05:01

## 2021-01-31 RX ADMIN — HEPARIN SODIUM 5000 UNITS: 5000 INJECTION INTRAVENOUS; SUBCUTANEOUS at 09:01

## 2021-01-31 RX ADMIN — OXYCODONE HYDROCHLORIDE 5 MG: 5 TABLET ORAL at 09:01

## 2021-01-31 RX ADMIN — POTASSIUM CHLORIDE 40 MEQ: 1500 TABLET, EXTENDED RELEASE ORAL at 01:01

## 2021-01-31 RX ADMIN — OXYCODONE HYDROCHLORIDE 5 MG: 5 TABLET ORAL at 01:01

## 2021-01-31 RX ADMIN — LOPERAMIDE HYDROCHLORIDE 2 MG: 2 CAPSULE ORAL at 05:01

## 2021-01-31 RX ADMIN — HYDROMORPHONE HYDROCHLORIDE 1 MG: 1 INJECTION, SOLUTION INTRAMUSCULAR; INTRAVENOUS; SUBCUTANEOUS at 01:01

## 2021-01-31 RX ADMIN — HALOPERIDOL LACTATE 5 MG: 5 INJECTION, SOLUTION INTRAMUSCULAR at 11:01

## 2021-02-01 PROBLEM — N17.0 ATN (ACUTE TUBULAR NECROSIS): Status: ACTIVE | Noted: 2021-02-01

## 2021-02-01 LAB
ALBUMIN SERPL BCP-MCNC: 1.7 G/DL (ref 3.5–5.2)
ALBUMIN SERPL BCP-MCNC: 1.8 G/DL (ref 3.5–5.2)
ALP SERPL-CCNC: 93 U/L (ref 55–135)
ALT SERPL W/O P-5'-P-CCNC: 40 U/L (ref 10–44)
ANION GAP SERPL CALC-SCNC: 14 MMOL/L (ref 8–16)
ANION GAP SERPL CALC-SCNC: 17 MMOL/L (ref 8–16)
AST SERPL-CCNC: 113 U/L (ref 10–40)
BASOPHILS # BLD AUTO: 0.05 K/UL (ref 0–0.2)
BASOPHILS NFR BLD: 0.3 % (ref 0–1.9)
BILIRUB SERPL-MCNC: 0.5 MG/DL (ref 0.1–1)
BILIRUB UR QL STRIP: NEGATIVE
BUN SERPL-MCNC: 7 MG/DL (ref 8–23)
BUN SERPL-MCNC: 8 MG/DL (ref 8–23)
CALCIUM SERPL-MCNC: 7 MG/DL (ref 8.7–10.5)
CALCIUM SERPL-MCNC: 7.2 MG/DL (ref 8.7–10.5)
CHLORIDE SERPL-SCNC: 104 MMOL/L (ref 95–110)
CHLORIDE SERPL-SCNC: 106 MMOL/L (ref 95–110)
CLARITY UR REFRACT.AUTO: CLEAR
CO2 SERPL-SCNC: 21 MMOL/L (ref 23–29)
CO2 SERPL-SCNC: 24 MMOL/L (ref 23–29)
COLOR UR AUTO: YELLOW
CREAT SERPL-MCNC: 1.3 MG/DL (ref 0.5–1.4)
CREAT SERPL-MCNC: 1.4 MG/DL (ref 0.5–1.4)
DIFFERENTIAL METHOD: ABNORMAL
EOSINOPHIL # BLD AUTO: 0.5 K/UL (ref 0–0.5)
EOSINOPHIL NFR BLD: 3.2 % (ref 0–8)
ERYTHROCYTE [DISTWIDTH] IN BLOOD BY AUTOMATED COUNT: 16.5 % (ref 11.5–14.5)
EST. GFR  (AFRICAN AMERICAN): 44.2 ML/MIN/1.73 M^2
EST. GFR  (AFRICAN AMERICAN): 48.4 ML/MIN/1.73 M^2
EST. GFR  (NON AFRICAN AMERICAN): 38.4 ML/MIN/1.73 M^2
EST. GFR  (NON AFRICAN AMERICAN): 42 ML/MIN/1.73 M^2
GLUCOSE SERPL-MCNC: 60 MG/DL (ref 70–110)
GLUCOSE SERPL-MCNC: 61 MG/DL (ref 70–110)
GLUCOSE UR QL STRIP: NEGATIVE
HCT VFR BLD AUTO: 27 % (ref 37–48.5)
HGB BLD-MCNC: 8.5 G/DL (ref 12–16)
HGB UR QL STRIP: ABNORMAL
IMM GRANULOCYTES # BLD AUTO: 0.1 K/UL (ref 0–0.04)
IMM GRANULOCYTES NFR BLD AUTO: 0.7 % (ref 0–0.5)
INR PPP: 1.2 (ref 0.8–1.2)
KETONES UR QL STRIP: NEGATIVE
LEUKOCYTE ESTERASE UR QL STRIP: NEGATIVE
LYMPHOCYTES # BLD AUTO: 1.9 K/UL (ref 1–4.8)
LYMPHOCYTES NFR BLD: 12.9 % (ref 18–48)
MAGNESIUM SERPL-MCNC: 1.6 MG/DL (ref 1.6–2.6)
MCH RBC QN AUTO: 29.8 PG (ref 27–31)
MCHC RBC AUTO-ENTMCNC: 31.5 G/DL (ref 32–36)
MCV RBC AUTO: 95 FL (ref 82–98)
MICROSCOPIC COMMENT: NORMAL
MONOCYTES # BLD AUTO: 0.8 K/UL (ref 0.3–1)
MONOCYTES NFR BLD: 5.4 % (ref 4–15)
NEUTROPHILS # BLD AUTO: 11.3 K/UL (ref 1.8–7.7)
NEUTROPHILS NFR BLD: 77.5 % (ref 38–73)
NITRITE UR QL STRIP: NEGATIVE
NRBC BLD-RTO: 0 /100 WBC
PH UR STRIP: 6 [PH] (ref 5–8)
PHOSPHATE SERPL-MCNC: 3.8 MG/DL (ref 2.7–4.5)
PHOSPHATE SERPL-MCNC: 4 MG/DL (ref 2.7–4.5)
PLATELET # BLD AUTO: 527 K/UL (ref 150–350)
PMV BLD AUTO: 10.8 FL (ref 9.2–12.9)
POCT GLUCOSE: 61 MG/DL (ref 70–110)
POCT GLUCOSE: 76 MG/DL (ref 70–110)
POTASSIUM SERPL-SCNC: 3.3 MMOL/L (ref 3.5–5.1)
POTASSIUM SERPL-SCNC: 3.8 MMOL/L (ref 3.5–5.1)
PROT SERPL-MCNC: 4.5 G/DL (ref 6–8.4)
PROT UR QL STRIP: NEGATIVE
PROTHROMBIN TIME: 13 SEC (ref 9–12.5)
RBC # BLD AUTO: 2.85 M/UL (ref 4–5.4)
RBC #/AREA URNS AUTO: 1 /HPF (ref 0–4)
SODIUM SERPL-SCNC: 142 MMOL/L (ref 136–145)
SODIUM SERPL-SCNC: 144 MMOL/L (ref 136–145)
SP GR UR STRIP: 1.01 (ref 1–1.03)
URN SPEC COLLECT METH UR: ABNORMAL
WBC # BLD AUTO: 14.51 K/UL (ref 3.9–12.7)
WBC #/AREA URNS AUTO: 3 /HPF (ref 0–5)

## 2021-02-01 PROCEDURE — 63600175 PHARM REV CODE 636 W HCPCS: Performed by: STUDENT IN AN ORGANIZED HEALTH CARE EDUCATION/TRAINING PROGRAM

## 2021-02-01 PROCEDURE — 81001 URINALYSIS AUTO W/SCOPE: CPT

## 2021-02-01 PROCEDURE — 25000003 PHARM REV CODE 250: Performed by: STUDENT IN AN ORGANIZED HEALTH CARE EDUCATION/TRAINING PROGRAM

## 2021-02-01 PROCEDURE — 83735 ASSAY OF MAGNESIUM: CPT

## 2021-02-01 PROCEDURE — 97535 SELF CARE MNGMENT TRAINING: CPT

## 2021-02-01 PROCEDURE — 85610 PROTHROMBIN TIME: CPT

## 2021-02-01 PROCEDURE — 80053 COMPREHEN METABOLIC PANEL: CPT

## 2021-02-01 PROCEDURE — 25500020 PHARM REV CODE 255: Performed by: SURGERY

## 2021-02-01 PROCEDURE — 97530 THERAPEUTIC ACTIVITIES: CPT

## 2021-02-01 PROCEDURE — 85025 COMPLETE CBC W/AUTO DIFF WBC: CPT

## 2021-02-01 PROCEDURE — 84100 ASSAY OF PHOSPHORUS: CPT

## 2021-02-01 PROCEDURE — 80069 RENAL FUNCTION PANEL: CPT

## 2021-02-01 PROCEDURE — 36415 COLL VENOUS BLD VENIPUNCTURE: CPT

## 2021-02-01 PROCEDURE — 20600001 HC STEP DOWN PRIVATE ROOM

## 2021-02-01 RX ORDER — POTASSIUM CHLORIDE 20 MEQ/1
40 TABLET, EXTENDED RELEASE ORAL
Status: DISPENSED | OUTPATIENT
Start: 2021-02-01 | End: 2021-02-01

## 2021-02-01 RX ORDER — SODIUM CHLORIDE 9 MG/ML
INJECTION, SOLUTION INTRAVENOUS CONTINUOUS
Status: DISCONTINUED | OUTPATIENT
Start: 2021-02-01 | End: 2021-02-03

## 2021-02-01 RX ADMIN — LOPERAMIDE HYDROCHLORIDE 2 MG: 2 CAPSULE ORAL at 09:02

## 2021-02-01 RX ADMIN — OXYCODONE HYDROCHLORIDE 10 MG: 10 TABLET ORAL at 09:02

## 2021-02-01 RX ADMIN — LOPERAMIDE HYDROCHLORIDE 2 MG: 2 CAPSULE ORAL at 03:02

## 2021-02-01 RX ADMIN — PIPERACILLIN AND TAZOBACTAM 4.5 G: 4; .5 INJECTION, POWDER, LYOPHILIZED, FOR SOLUTION INTRAVENOUS; PARENTERAL at 05:02

## 2021-02-01 RX ADMIN — OXYCODONE HYDROCHLORIDE 10 MG: 10 TABLET ORAL at 10:02

## 2021-02-01 RX ADMIN — IOHEXOL 75 ML: 350 INJECTION, SOLUTION INTRAVENOUS at 10:02

## 2021-02-01 RX ADMIN — Medication 400 MG: at 09:02

## 2021-02-01 RX ADMIN — SODIUM CHLORIDE: 0.9 INJECTION, SOLUTION INTRAVENOUS at 12:02

## 2021-02-01 RX ADMIN — DEXTROSE MONOHYDRATE 25 G: 25 INJECTION, SOLUTION INTRAVENOUS at 05:02

## 2021-02-01 RX ADMIN — POTASSIUM CHLORIDE 40 MEQ: 1500 TABLET, EXTENDED RELEASE ORAL at 01:02

## 2021-02-01 RX ADMIN — OXYCODONE HYDROCHLORIDE 10 MG: 10 TABLET ORAL at 03:02

## 2021-02-01 RX ADMIN — CLONAZEPAM 0.25 MG: 0.25 TABLET, ORALLY DISINTEGRATING ORAL at 10:02

## 2021-02-01 RX ADMIN — HEPARIN SODIUM 5000 UNITS: 5000 INJECTION INTRAVENOUS; SUBCUTANEOUS at 01:02

## 2021-02-01 RX ADMIN — HALOPERIDOL LACTATE 5 MG: 5 INJECTION, SOLUTION INTRAMUSCULAR at 09:02

## 2021-02-01 RX ADMIN — PIPERACILLIN AND TAZOBACTAM 4.5 G: 4; .5 INJECTION, POWDER, LYOPHILIZED, FOR SOLUTION INTRAVENOUS; PARENTERAL at 10:02

## 2021-02-01 RX ADMIN — CASTOR OIL AND BALSAM, PERU: 788; 87 OINTMENT TOPICAL at 09:02

## 2021-02-01 RX ADMIN — QUETIAPINE FUMARATE 25 MG: 25 TABLET ORAL at 09:02

## 2021-02-01 RX ADMIN — HEPARIN SODIUM 5000 UNITS: 5000 INJECTION INTRAVENOUS; SUBCUTANEOUS at 10:02

## 2021-02-01 RX ADMIN — FLUCONAZOLE 400 MG: 2 INJECTION, SOLUTION INTRAVENOUS at 03:02

## 2021-02-01 RX ADMIN — HEPARIN SODIUM 5000 UNITS: 5000 INJECTION INTRAVENOUS; SUBCUTANEOUS at 05:02

## 2021-02-01 RX ADMIN — ACETAMINOPHEN 650 MG: 325 TABLET ORAL at 01:02

## 2021-02-01 RX ADMIN — PIPERACILLIN AND TAZOBACTAM 4.5 G: 4; .5 INJECTION, POWDER, LYOPHILIZED, FOR SOLUTION INTRAVENOUS; PARENTERAL at 01:02

## 2021-02-01 RX ADMIN — LISINOPRIL AND HYDROCHLOROTHIAZIDE 1 TABLET: 12.5; 1 TABLET ORAL at 09:02

## 2021-02-01 RX ADMIN — HALOPERIDOL LACTATE 5 MG: 5 INJECTION, SOLUTION INTRAMUSCULAR at 03:02

## 2021-02-02 LAB
ALBUMIN SERPL BCP-MCNC: 1.8 G/DL (ref 3.5–5.2)
ALP SERPL-CCNC: 94 U/L (ref 55–135)
ALT SERPL W/O P-5'-P-CCNC: 34 U/L (ref 10–44)
ANION GAP SERPL CALC-SCNC: 15 MMOL/L (ref 8–16)
AST SERPL-CCNC: 83 U/L (ref 10–40)
BASOPHILS # BLD AUTO: 0.07 K/UL (ref 0–0.2)
BASOPHILS NFR BLD: 0.6 % (ref 0–1.9)
BILIRUB SERPL-MCNC: 0.5 MG/DL (ref 0.1–1)
BUN SERPL-MCNC: 7 MG/DL (ref 8–23)
CALCIUM SERPL-MCNC: 7.2 MG/DL (ref 8.7–10.5)
CHLORIDE SERPL-SCNC: 106 MMOL/L (ref 95–110)
CO2 SERPL-SCNC: 21 MMOL/L (ref 23–29)
CREAT SERPL-MCNC: 1.5 MG/DL (ref 0.5–1.4)
DIFFERENTIAL METHOD: ABNORMAL
EOSINOPHIL # BLD AUTO: 0.6 K/UL (ref 0–0.5)
EOSINOPHIL NFR BLD: 5 % (ref 0–8)
ERYTHROCYTE [DISTWIDTH] IN BLOOD BY AUTOMATED COUNT: 16.9 % (ref 11.5–14.5)
EST. GFR  (AFRICAN AMERICAN): 40.7 ML/MIN/1.73 M^2
EST. GFR  (NON AFRICAN AMERICAN): 35.3 ML/MIN/1.73 M^2
GLUCOSE SERPL-MCNC: 61 MG/DL (ref 70–110)
HCT VFR BLD AUTO: 27.7 % (ref 37–48.5)
HGB BLD-MCNC: 8.5 G/DL (ref 12–16)
IMM GRANULOCYTES # BLD AUTO: 0.07 K/UL (ref 0–0.04)
IMM GRANULOCYTES NFR BLD AUTO: 0.6 % (ref 0–0.5)
INR PPP: 1.2 (ref 0.8–1.2)
LYMPHOCYTES # BLD AUTO: 2.1 K/UL (ref 1–4.8)
LYMPHOCYTES NFR BLD: 18.8 % (ref 18–48)
MAGNESIUM SERPL-MCNC: 1.6 MG/DL (ref 1.6–2.6)
MCH RBC QN AUTO: 29.5 PG (ref 27–31)
MCHC RBC AUTO-ENTMCNC: 30.7 G/DL (ref 32–36)
MCV RBC AUTO: 96 FL (ref 82–98)
MONOCYTES # BLD AUTO: 0.7 K/UL (ref 0.3–1)
MONOCYTES NFR BLD: 5.9 % (ref 4–15)
NEUTROPHILS # BLD AUTO: 7.5 K/UL (ref 1.8–7.7)
NEUTROPHILS NFR BLD: 69.1 % (ref 38–73)
NRBC BLD-RTO: 0 /100 WBC
PHOSPHATE SERPL-MCNC: 4.5 MG/DL (ref 2.7–4.5)
PLATELET # BLD AUTO: 585 K/UL (ref 150–350)
PMV BLD AUTO: 10.7 FL (ref 9.2–12.9)
POCT GLUCOSE: 74 MG/DL (ref 70–110)
POCT GLUCOSE: 76 MG/DL (ref 70–110)
POCT GLUCOSE: 79 MG/DL (ref 70–110)
POCT GLUCOSE: 84 MG/DL (ref 70–110)
POCT GLUCOSE: 91 MG/DL (ref 70–110)
POTASSIUM SERPL-SCNC: 3.8 MMOL/L (ref 3.5–5.1)
PROT SERPL-MCNC: 4.4 G/DL (ref 6–8.4)
PROTHROMBIN TIME: 13.4 SEC (ref 9–12.5)
RBC # BLD AUTO: 2.88 M/UL (ref 4–5.4)
SODIUM SERPL-SCNC: 142 MMOL/L (ref 136–145)
WBC # BLD AUTO: 10.93 K/UL (ref 3.9–12.7)

## 2021-02-02 PROCEDURE — 97110 THERAPEUTIC EXERCISES: CPT

## 2021-02-02 PROCEDURE — 25000003 PHARM REV CODE 250: Performed by: STUDENT IN AN ORGANIZED HEALTH CARE EDUCATION/TRAINING PROGRAM

## 2021-02-02 PROCEDURE — 85025 COMPLETE CBC W/AUTO DIFF WBC: CPT

## 2021-02-02 PROCEDURE — 97803 MED NUTRITION INDIV SUBSEQ: CPT

## 2021-02-02 PROCEDURE — 97535 SELF CARE MNGMENT TRAINING: CPT

## 2021-02-02 PROCEDURE — 97116 GAIT TRAINING THERAPY: CPT

## 2021-02-02 PROCEDURE — 63600175 PHARM REV CODE 636 W HCPCS: Performed by: STUDENT IN AN ORGANIZED HEALTH CARE EDUCATION/TRAINING PROGRAM

## 2021-02-02 PROCEDURE — 20600001 HC STEP DOWN PRIVATE ROOM

## 2021-02-02 PROCEDURE — 83735 ASSAY OF MAGNESIUM: CPT

## 2021-02-02 PROCEDURE — 85610 PROTHROMBIN TIME: CPT

## 2021-02-02 PROCEDURE — 80053 COMPREHEN METABOLIC PANEL: CPT

## 2021-02-02 PROCEDURE — 97530 THERAPEUTIC ACTIVITIES: CPT

## 2021-02-02 PROCEDURE — 36415 COLL VENOUS BLD VENIPUNCTURE: CPT

## 2021-02-02 PROCEDURE — 84100 ASSAY OF PHOSPHORUS: CPT

## 2021-02-02 RX ORDER — CLONAZEPAM 0.25 MG/1
0.5 TABLET, ORALLY DISINTEGRATING ORAL 2 TIMES DAILY
Status: DISCONTINUED | OUTPATIENT
Start: 2021-02-02 | End: 2021-02-03

## 2021-02-02 RX ORDER — FLUCONAZOLE 200 MG/1
400 TABLET ORAL DAILY
Status: DISCONTINUED | OUTPATIENT
Start: 2021-02-02 | End: 2021-02-03

## 2021-02-02 RX ADMIN — LOPERAMIDE HYDROCHLORIDE 2 MG: 2 CAPSULE ORAL at 09:02

## 2021-02-02 RX ADMIN — QUETIAPINE FUMARATE 25 MG: 25 TABLET ORAL at 09:02

## 2021-02-02 RX ADMIN — ACETAMINOPHEN 650 MG: 325 TABLET ORAL at 11:02

## 2021-02-02 RX ADMIN — ACETAMINOPHEN 650 MG: 325 TABLET ORAL at 05:02

## 2021-02-02 RX ADMIN — FLUCONAZOLE 400 MG: 200 TABLET ORAL at 03:02

## 2021-02-02 RX ADMIN — HEPARIN SODIUM 5000 UNITS: 5000 INJECTION INTRAVENOUS; SUBCUTANEOUS at 05:02

## 2021-02-02 RX ADMIN — OXYCODONE HYDROCHLORIDE 10 MG: 10 TABLET ORAL at 09:02

## 2021-02-02 RX ADMIN — CASTOR OIL AND BALSAM, PERU: 788; 87 OINTMENT TOPICAL at 09:02

## 2021-02-02 RX ADMIN — HEPARIN SODIUM 5000 UNITS: 5000 INJECTION INTRAVENOUS; SUBCUTANEOUS at 03:02

## 2021-02-02 RX ADMIN — HEPARIN SODIUM 5000 UNITS: 5000 INJECTION INTRAVENOUS; SUBCUTANEOUS at 09:02

## 2021-02-02 RX ADMIN — PIPERACILLIN AND TAZOBACTAM 4.5 G: 4; .5 INJECTION, POWDER, LYOPHILIZED, FOR SOLUTION INTRAVENOUS; PARENTERAL at 03:02

## 2021-02-02 RX ADMIN — PIPERACILLIN AND TAZOBACTAM 4.5 G: 4; .5 INJECTION, POWDER, LYOPHILIZED, FOR SOLUTION INTRAVENOUS; PARENTERAL at 05:02

## 2021-02-02 RX ADMIN — LISINOPRIL AND HYDROCHLOROTHIAZIDE 1 TABLET: 12.5; 1 TABLET ORAL at 09:02

## 2021-02-02 RX ADMIN — HALOPERIDOL LACTATE 5 MG: 5 INJECTION, SOLUTION INTRAMUSCULAR at 05:02

## 2021-02-02 RX ADMIN — OXYCODONE HYDROCHLORIDE 10 MG: 10 TABLET ORAL at 03:02

## 2021-02-02 RX ADMIN — CLONAZEPAM 0.5 MG: 0.25 TABLET, ORALLY DISINTEGRATING ORAL at 09:02

## 2021-02-02 RX ADMIN — PIPERACILLIN AND TAZOBACTAM 4.5 G: 4; .5 INJECTION, POWDER, LYOPHILIZED, FOR SOLUTION INTRAVENOUS; PARENTERAL at 09:02

## 2021-02-02 RX ADMIN — LOPERAMIDE HYDROCHLORIDE 2 MG: 2 CAPSULE ORAL at 03:02

## 2021-02-03 LAB
ALBUMIN SERPL BCP-MCNC: 1.8 G/DL (ref 3.5–5.2)
ALP SERPL-CCNC: 98 U/L (ref 55–135)
ALT SERPL W/O P-5'-P-CCNC: 33 U/L (ref 10–44)
ANION GAP SERPL CALC-SCNC: 12 MMOL/L (ref 8–16)
AST SERPL-CCNC: 82 U/L (ref 10–40)
BASOPHILS # BLD AUTO: 0.09 K/UL (ref 0–0.2)
BASOPHILS NFR BLD: 0.9 % (ref 0–1.9)
BILIRUB SERPL-MCNC: 0.5 MG/DL (ref 0.1–1)
BUN SERPL-MCNC: 8 MG/DL (ref 8–23)
CALCIUM SERPL-MCNC: 7.4 MG/DL (ref 8.7–10.5)
CHLORIDE SERPL-SCNC: 105 MMOL/L (ref 95–110)
CO2 SERPL-SCNC: 24 MMOL/L (ref 23–29)
CREAT SERPL-MCNC: 1.7 MG/DL (ref 0.5–1.4)
DIFFERENTIAL METHOD: ABNORMAL
EOSINOPHIL # BLD AUTO: 0.6 K/UL (ref 0–0.5)
EOSINOPHIL NFR BLD: 6.1 % (ref 0–8)
ERYTHROCYTE [DISTWIDTH] IN BLOOD BY AUTOMATED COUNT: 16.9 % (ref 11.5–14.5)
EST. GFR  (AFRICAN AMERICAN): 35 ML/MIN/1.73 M^2
EST. GFR  (NON AFRICAN AMERICAN): 30.3 ML/MIN/1.73 M^2
GLUCOSE SERPL-MCNC: 59 MG/DL (ref 70–110)
HCT VFR BLD AUTO: 28 % (ref 37–48.5)
HGB BLD-MCNC: 8.3 G/DL (ref 12–16)
IMM GRANULOCYTES # BLD AUTO: 0.04 K/UL (ref 0–0.04)
IMM GRANULOCYTES NFR BLD AUTO: 0.4 % (ref 0–0.5)
INR PPP: 1.2 (ref 0.8–1.2)
LYMPHOCYTES # BLD AUTO: 2.4 K/UL (ref 1–4.8)
LYMPHOCYTES NFR BLD: 23.7 % (ref 18–48)
MAGNESIUM SERPL-MCNC: 1.5 MG/DL (ref 1.6–2.6)
MCH RBC QN AUTO: 29.2 PG (ref 27–31)
MCHC RBC AUTO-ENTMCNC: 29.6 G/DL (ref 32–36)
MCV RBC AUTO: 99 FL (ref 82–98)
MONOCYTES # BLD AUTO: 0.8 K/UL (ref 0.3–1)
MONOCYTES NFR BLD: 7.6 % (ref 4–15)
NEUTROPHILS # BLD AUTO: 6.3 K/UL (ref 1.8–7.7)
NEUTROPHILS NFR BLD: 61.3 % (ref 38–73)
NRBC BLD-RTO: 0 /100 WBC
PHOSPHATE SERPL-MCNC: 4.1 MG/DL (ref 2.7–4.5)
PLATELET # BLD AUTO: 666 K/UL (ref 150–350)
PMV BLD AUTO: 10.5 FL (ref 9.2–12.9)
POCT GLUCOSE: 233 MG/DL (ref 70–110)
POCT GLUCOSE: 62 MG/DL (ref 70–110)
POCT GLUCOSE: 71 MG/DL (ref 70–110)
POCT GLUCOSE: 87 MG/DL (ref 70–110)
POTASSIUM SERPL-SCNC: 3.3 MMOL/L (ref 3.5–5.1)
PROT SERPL-MCNC: 4.7 G/DL (ref 6–8.4)
PROTHROMBIN TIME: 12.7 SEC (ref 9–12.5)
RBC # BLD AUTO: 2.84 M/UL (ref 4–5.4)
SODIUM SERPL-SCNC: 141 MMOL/L (ref 136–145)
WBC # BLD AUTO: 10.25 K/UL (ref 3.9–12.7)

## 2021-02-03 PROCEDURE — 25000003 PHARM REV CODE 250: Performed by: STUDENT IN AN ORGANIZED HEALTH CARE EDUCATION/TRAINING PROGRAM

## 2021-02-03 PROCEDURE — 85025 COMPLETE CBC W/AUTO DIFF WBC: CPT

## 2021-02-03 PROCEDURE — 20600001 HC STEP DOWN PRIVATE ROOM

## 2021-02-03 PROCEDURE — 83735 ASSAY OF MAGNESIUM: CPT

## 2021-02-03 PROCEDURE — 80053 COMPREHEN METABOLIC PANEL: CPT

## 2021-02-03 PROCEDURE — 36415 COLL VENOUS BLD VENIPUNCTURE: CPT

## 2021-02-03 PROCEDURE — 84100 ASSAY OF PHOSPHORUS: CPT

## 2021-02-03 PROCEDURE — 63600175 PHARM REV CODE 636 W HCPCS: Performed by: STUDENT IN AN ORGANIZED HEALTH CARE EDUCATION/TRAINING PROGRAM

## 2021-02-03 PROCEDURE — 85610 PROTHROMBIN TIME: CPT

## 2021-02-03 RX ORDER — POTASSIUM CHLORIDE 20 MEQ/1
40 TABLET, EXTENDED RELEASE ORAL
Status: DISCONTINUED | OUTPATIENT
Start: 2021-02-03 | End: 2021-02-03

## 2021-02-03 RX ORDER — POTASSIUM CHLORIDE 7.45 MG/ML
10 INJECTION INTRAVENOUS
Status: COMPLETED | OUTPATIENT
Start: 2021-02-03 | End: 2021-02-03

## 2021-02-03 RX ORDER — QUETIAPINE FUMARATE 25 MG/1
50 TABLET, FILM COATED ORAL NIGHTLY
Status: DISCONTINUED | OUTPATIENT
Start: 2021-02-03 | End: 2021-02-09 | Stop reason: HOSPADM

## 2021-02-03 RX ORDER — QUETIAPINE FUMARATE 25 MG/1
25 TABLET, FILM COATED ORAL EVERY 6 HOURS PRN
Status: DISCONTINUED | OUTPATIENT
Start: 2021-02-03 | End: 2021-02-09 | Stop reason: HOSPADM

## 2021-02-03 RX ORDER — LOPERAMIDE HYDROCHLORIDE 2 MG/1
4 CAPSULE ORAL 3 TIMES DAILY
Status: DISCONTINUED | OUTPATIENT
Start: 2021-02-03 | End: 2021-02-03

## 2021-02-03 RX ORDER — MAGNESIUM SULFATE HEPTAHYDRATE 40 MG/ML
2 INJECTION, SOLUTION INTRAVENOUS ONCE
Status: COMPLETED | OUTPATIENT
Start: 2021-02-03 | End: 2021-02-03

## 2021-02-03 RX ORDER — OXYCODONE HYDROCHLORIDE 5 MG/1
5 TABLET ORAL EVERY 4 HOURS PRN
Status: DISCONTINUED | OUTPATIENT
Start: 2021-02-03 | End: 2021-02-09 | Stop reason: HOSPADM

## 2021-02-03 RX ORDER — LOPERAMIDE HYDROCHLORIDE 2 MG/1
4 CAPSULE ORAL 2 TIMES DAILY
Status: DISCONTINUED | OUTPATIENT
Start: 2021-02-03 | End: 2021-02-08

## 2021-02-03 RX ORDER — SODIUM CHLORIDE AND POTASSIUM CHLORIDE 150; 450 MG/100ML; MG/100ML
INJECTION, SOLUTION INTRAVENOUS CONTINUOUS
Status: DISCONTINUED | OUTPATIENT
Start: 2021-02-03 | End: 2021-02-03

## 2021-02-03 RX ORDER — DEXTROSE MONOHYDRATE, SODIUM CHLORIDE, AND POTASSIUM CHLORIDE 50; 1.49; 4.5 G/1000ML; G/1000ML; G/1000ML
INJECTION, SOLUTION INTRAVENOUS CONTINUOUS
Status: DISCONTINUED | OUTPATIENT
Start: 2021-02-03 | End: 2021-02-09

## 2021-02-03 RX ORDER — LANOLIN ALCOHOL/MO/W.PET/CERES
800 CREAM (GRAM) TOPICAL EVERY 4 HOURS
Status: DISCONTINUED | OUTPATIENT
Start: 2021-02-03 | End: 2021-02-03

## 2021-02-03 RX ORDER — CLONAZEPAM 0.25 MG/1
1 TABLET, ORALLY DISINTEGRATING ORAL 2 TIMES DAILY
Status: DISCONTINUED | OUTPATIENT
Start: 2021-02-03 | End: 2021-02-03

## 2021-02-03 RX ORDER — SODIUM CHLORIDE 9 MG/ML
INJECTION, SOLUTION INTRAVENOUS CONTINUOUS
Status: DISCONTINUED | OUTPATIENT
Start: 2021-02-03 | End: 2021-02-03

## 2021-02-03 RX ADMIN — POTASSIUM CHLORIDE, DEXTROSE MONOHYDRATE AND SODIUM CHLORIDE: 150; 5; 450 INJECTION, SOLUTION INTRAVENOUS at 06:02

## 2021-02-03 RX ADMIN — MAGNESIUM SULFATE 2 G: 2 INJECTION INTRAVENOUS at 05:02

## 2021-02-03 RX ADMIN — HEPARIN SODIUM 5000 UNITS: 5000 INJECTION INTRAVENOUS; SUBCUTANEOUS at 06:02

## 2021-02-03 RX ADMIN — ACETAMINOPHEN 650 MG: 325 TABLET ORAL at 06:02

## 2021-02-03 RX ADMIN — SODIUM CHLORIDE 500 ML: 0.9 INJECTION, SOLUTION INTRAVENOUS at 02:02

## 2021-02-03 RX ADMIN — FLUCONAZOLE 400 MG: 200 TABLET ORAL at 02:02

## 2021-02-03 RX ADMIN — LOPERAMIDE HYDROCHLORIDE 4 MG: 2 CAPSULE ORAL at 08:02

## 2021-02-03 RX ADMIN — LOPERAMIDE HYDROCHLORIDE 4 MG: 2 CAPSULE ORAL at 10:02

## 2021-02-03 RX ADMIN — OXYCODONE HYDROCHLORIDE 10 MG: 10 TABLET ORAL at 07:02

## 2021-02-03 RX ADMIN — CLONAZEPAM 0.5 MG: 0.25 TABLET, ORALLY DISINTEGRATING ORAL at 08:02

## 2021-02-03 RX ADMIN — CASTOR OIL AND BALSAM, PERU: 788; 87 OINTMENT TOPICAL at 10:02

## 2021-02-03 RX ADMIN — POTASSIUM CHLORIDE 10 MEQ: 7.46 INJECTION, SOLUTION INTRAVENOUS at 05:02

## 2021-02-03 RX ADMIN — LISINOPRIL AND HYDROCHLOROTHIAZIDE 1 TABLET: 12.5; 1 TABLET ORAL at 08:02

## 2021-02-03 RX ADMIN — PIPERACILLIN AND TAZOBACTAM 4.5 G: 4; .5 INJECTION, POWDER, LYOPHILIZED, FOR SOLUTION INTRAVENOUS; PARENTERAL at 06:02

## 2021-02-03 RX ADMIN — ACETAMINOPHEN 650 MG: 325 TABLET ORAL at 12:02

## 2021-02-03 RX ADMIN — HEPARIN SODIUM 5000 UNITS: 5000 INJECTION INTRAVENOUS; SUBCUTANEOUS at 10:02

## 2021-02-03 RX ADMIN — DEXTROSE MONOHYDRATE 25 G: 25 INJECTION, SOLUTION INTRAVENOUS at 05:02

## 2021-02-03 RX ADMIN — Medication 800 MG: at 12:02

## 2021-02-03 RX ADMIN — OXYCODONE HYDROCHLORIDE 10 MG: 10 TABLET ORAL at 03:02

## 2021-02-03 RX ADMIN — POTASSIUM CHLORIDE 10 MEQ: 7.46 INJECTION, SOLUTION INTRAVENOUS at 09:02

## 2021-02-03 RX ADMIN — HALOPERIDOL LACTATE 5 MG: 5 INJECTION, SOLUTION INTRAMUSCULAR at 11:02

## 2021-02-03 RX ADMIN — HALOPERIDOL LACTATE 5 MG: 5 INJECTION, SOLUTION INTRAMUSCULAR at 12:02

## 2021-02-03 RX ADMIN — POTASSIUM CHLORIDE 10 MEQ: 7.46 INJECTION, SOLUTION INTRAVENOUS at 06:02

## 2021-02-03 RX ADMIN — SODIUM CHLORIDE: 0.9 INJECTION, SOLUTION INTRAVENOUS at 04:02

## 2021-02-03 RX ADMIN — PIPERACILLIN AND TAZOBACTAM 4.5 G: 4; .5 INJECTION, POWDER, LYOPHILIZED, FOR SOLUTION INTRAVENOUS; PARENTERAL at 02:02

## 2021-02-03 RX ADMIN — POTASSIUM CHLORIDE 10 MEQ: 7.46 INJECTION, SOLUTION INTRAVENOUS at 07:02

## 2021-02-03 RX ADMIN — CASTOR OIL AND BALSAM, PERU: 788; 87 OINTMENT TOPICAL at 08:02

## 2021-02-04 LAB
ALBUMIN SERPL BCP-MCNC: 1.6 G/DL (ref 3.5–5.2)
ALP SERPL-CCNC: 96 U/L (ref 55–135)
ALT SERPL W/O P-5'-P-CCNC: 25 U/L (ref 10–44)
ANION GAP SERPL CALC-SCNC: 13 MMOL/L (ref 8–16)
AST SERPL-CCNC: 59 U/L (ref 10–40)
BASOPHILS # BLD AUTO: 0.06 K/UL (ref 0–0.2)
BASOPHILS NFR BLD: 0.8 % (ref 0–1.9)
BILIRUB SERPL-MCNC: 0.3 MG/DL (ref 0.1–1)
BUN SERPL-MCNC: 6 MG/DL (ref 8–23)
CALCIUM SERPL-MCNC: 7.1 MG/DL (ref 8.7–10.5)
CHLORIDE SERPL-SCNC: 107 MMOL/L (ref 95–110)
CO2 SERPL-SCNC: 19 MMOL/L (ref 23–29)
CREAT SERPL-MCNC: 1.4 MG/DL (ref 0.5–1.4)
DIFFERENTIAL METHOD: ABNORMAL
EOSINOPHIL # BLD AUTO: 0.5 K/UL (ref 0–0.5)
EOSINOPHIL NFR BLD: 6.1 % (ref 0–8)
ERYTHROCYTE [DISTWIDTH] IN BLOOD BY AUTOMATED COUNT: 17 % (ref 11.5–14.5)
EST. GFR  (AFRICAN AMERICAN): 44.2 ML/MIN/1.73 M^2
EST. GFR  (NON AFRICAN AMERICAN): 38.4 ML/MIN/1.73 M^2
GLUCOSE SERPL-MCNC: 93 MG/DL (ref 70–110)
HCT VFR BLD AUTO: 25.7 % (ref 37–48.5)
HGB BLD-MCNC: 7.7 G/DL (ref 12–16)
IMM GRANULOCYTES # BLD AUTO: 0.03 K/UL (ref 0–0.04)
IMM GRANULOCYTES NFR BLD AUTO: 0.4 % (ref 0–0.5)
INR PPP: 1.2 (ref 0.8–1.2)
LYMPHOCYTES # BLD AUTO: 2.2 K/UL (ref 1–4.8)
LYMPHOCYTES NFR BLD: 28.3 % (ref 18–48)
MAGNESIUM SERPL-MCNC: 1.6 MG/DL (ref 1.6–2.6)
MCH RBC QN AUTO: 29.7 PG (ref 27–31)
MCHC RBC AUTO-ENTMCNC: 30 G/DL (ref 32–36)
MCV RBC AUTO: 99 FL (ref 82–98)
MONOCYTES # BLD AUTO: 0.6 K/UL (ref 0.3–1)
MONOCYTES NFR BLD: 7.8 % (ref 4–15)
NEUTROPHILS # BLD AUTO: 4.4 K/UL (ref 1.8–7.7)
NEUTROPHILS NFR BLD: 56.6 % (ref 38–73)
NRBC BLD-RTO: 0 /100 WBC
PHOSPHATE SERPL-MCNC: 3.8 MG/DL (ref 2.7–4.5)
PLATELET # BLD AUTO: 292 K/UL (ref 150–350)
PMV BLD AUTO: 11 FL (ref 9.2–12.9)
POTASSIUM SERPL-SCNC: 4.4 MMOL/L (ref 3.5–5.1)
PROT SERPL-MCNC: 4.4 G/DL (ref 6–8.4)
PROTHROMBIN TIME: 12.6 SEC (ref 9–12.5)
RBC # BLD AUTO: 2.59 M/UL (ref 4–5.4)
SODIUM SERPL-SCNC: 139 MMOL/L (ref 136–145)
WBC # BLD AUTO: 7.81 K/UL (ref 3.9–12.7)

## 2021-02-04 PROCEDURE — 25000003 PHARM REV CODE 250: Performed by: STUDENT IN AN ORGANIZED HEALTH CARE EDUCATION/TRAINING PROGRAM

## 2021-02-04 PROCEDURE — 83735 ASSAY OF MAGNESIUM: CPT

## 2021-02-04 PROCEDURE — 97116 GAIT TRAINING THERAPY: CPT

## 2021-02-04 PROCEDURE — 93010 EKG 12-LEAD: ICD-10-PCS | Mod: ,,, | Performed by: INTERNAL MEDICINE

## 2021-02-04 PROCEDURE — 85025 COMPLETE CBC W/AUTO DIFF WBC: CPT

## 2021-02-04 PROCEDURE — 80053 COMPREHEN METABOLIC PANEL: CPT

## 2021-02-04 PROCEDURE — 97110 THERAPEUTIC EXERCISES: CPT

## 2021-02-04 PROCEDURE — 84100 ASSAY OF PHOSPHORUS: CPT

## 2021-02-04 PROCEDURE — 63600175 PHARM REV CODE 636 W HCPCS: Performed by: STUDENT IN AN ORGANIZED HEALTH CARE EDUCATION/TRAINING PROGRAM

## 2021-02-04 PROCEDURE — 93010 ELECTROCARDIOGRAM REPORT: CPT | Mod: ,,, | Performed by: INTERNAL MEDICINE

## 2021-02-04 PROCEDURE — 85610 PROTHROMBIN TIME: CPT

## 2021-02-04 PROCEDURE — 20600001 HC STEP DOWN PRIVATE ROOM

## 2021-02-04 PROCEDURE — 93005 ELECTROCARDIOGRAM TRACING: CPT

## 2021-02-04 PROCEDURE — 36415 COLL VENOUS BLD VENIPUNCTURE: CPT

## 2021-02-04 RX ORDER — CALCIUM CARBONATE 200(500)MG
500 TABLET,CHEWABLE ORAL 2 TIMES DAILY PRN
Status: DISCONTINUED | OUTPATIENT
Start: 2021-02-04 | End: 2021-02-09 | Stop reason: HOSPADM

## 2021-02-04 RX ADMIN — HEPARIN SODIUM 5000 UNITS: 5000 INJECTION INTRAVENOUS; SUBCUTANEOUS at 09:02

## 2021-02-04 RX ADMIN — ACETAMINOPHEN 650 MG: 325 TABLET ORAL at 11:02

## 2021-02-04 RX ADMIN — LISINOPRIL AND HYDROCHLOROTHIAZIDE 1 TABLET: 12.5; 1 TABLET ORAL at 08:02

## 2021-02-04 RX ADMIN — POTASSIUM CHLORIDE, DEXTROSE MONOHYDRATE AND SODIUM CHLORIDE: 150; 5; 450 INJECTION, SOLUTION INTRAVENOUS at 02:02

## 2021-02-04 RX ADMIN — HEPARIN SODIUM 5000 UNITS: 5000 INJECTION INTRAVENOUS; SUBCUTANEOUS at 01:02

## 2021-02-04 RX ADMIN — OXYCODONE HYDROCHLORIDE 5 MG: 5 TABLET ORAL at 08:02

## 2021-02-04 RX ADMIN — POTASSIUM CHLORIDE, DEXTROSE MONOHYDRATE AND SODIUM CHLORIDE: 150; 5; 450 INJECTION, SOLUTION INTRAVENOUS at 04:02

## 2021-02-04 RX ADMIN — POTASSIUM CHLORIDE, DEXTROSE MONOHYDRATE AND SODIUM CHLORIDE: 150; 5; 450 INJECTION, SOLUTION INTRAVENOUS at 10:02

## 2021-02-04 RX ADMIN — CASTOR OIL AND BALSAM, PERU: 788; 87 OINTMENT TOPICAL at 09:02

## 2021-02-04 RX ADMIN — ACETAMINOPHEN 650 MG: 325 TABLET ORAL at 07:02

## 2021-02-04 RX ADMIN — HEPARIN SODIUM 5000 UNITS: 5000 INJECTION INTRAVENOUS; SUBCUTANEOUS at 06:02

## 2021-02-04 RX ADMIN — CALCIUM CARBONATE (ANTACID) CHEW TAB 500 MG 500 MG: 500 CHEW TAB at 04:02

## 2021-02-04 RX ADMIN — QUETIAPINE FUMARATE 50 MG: 25 TABLET ORAL at 09:02

## 2021-02-04 RX ADMIN — ACETAMINOPHEN 650 MG: 325 TABLET ORAL at 05:02

## 2021-02-04 RX ADMIN — LOPERAMIDE HYDROCHLORIDE 4 MG: 2 CAPSULE ORAL at 09:02

## 2021-02-04 RX ADMIN — CASTOR OIL AND BALSAM, PERU: 788; 87 OINTMENT TOPICAL at 08:02

## 2021-02-04 RX ADMIN — LOPERAMIDE HYDROCHLORIDE 4 MG: 2 CAPSULE ORAL at 08:02

## 2021-02-04 RX ADMIN — OXYCODONE HYDROCHLORIDE 5 MG: 5 TABLET ORAL at 02:02

## 2021-02-05 PROBLEM — Z74.09 IMPAIRED FUNCTIONAL MOBILITY AND ENDURANCE: Status: ACTIVE | Noted: 2021-02-05

## 2021-02-05 LAB
ALBUMIN SERPL BCP-MCNC: 1.6 G/DL (ref 3.5–5.2)
ALP SERPL-CCNC: 96 U/L (ref 55–135)
ALT SERPL W/O P-5'-P-CCNC: 20 U/L (ref 10–44)
ANION GAP SERPL CALC-SCNC: 10 MMOL/L (ref 8–16)
AST SERPL-CCNC: 39 U/L (ref 10–40)
BASOPHILS # BLD AUTO: 0.04 K/UL (ref 0–0.2)
BASOPHILS NFR BLD: 0.5 % (ref 0–1.9)
BILIRUB SERPL-MCNC: 0.2 MG/DL (ref 0.1–1)
BUN SERPL-MCNC: 6 MG/DL (ref 8–23)
CALCIUM SERPL-MCNC: 7.1 MG/DL (ref 8.7–10.5)
CHLORIDE SERPL-SCNC: 107 MMOL/L (ref 95–110)
CO2 SERPL-SCNC: 21 MMOL/L (ref 23–29)
CREAT SERPL-MCNC: 1.2 MG/DL (ref 0.5–1.4)
DIFFERENTIAL METHOD: ABNORMAL
EOSINOPHIL # BLD AUTO: 0.4 K/UL (ref 0–0.5)
EOSINOPHIL NFR BLD: 4.9 % (ref 0–8)
ERYTHROCYTE [DISTWIDTH] IN BLOOD BY AUTOMATED COUNT: 16.8 % (ref 11.5–14.5)
EST. GFR  (AFRICAN AMERICAN): 53.3 ML/MIN/1.73 M^2
EST. GFR  (NON AFRICAN AMERICAN): 46.2 ML/MIN/1.73 M^2
GLUCOSE SERPL-MCNC: 105 MG/DL (ref 70–110)
HCT VFR BLD AUTO: 24.4 % (ref 37–48.5)
HGB BLD-MCNC: 7.3 G/DL (ref 12–16)
IMM GRANULOCYTES # BLD AUTO: 0.03 K/UL (ref 0–0.04)
IMM GRANULOCYTES NFR BLD AUTO: 0.4 % (ref 0–0.5)
INR PPP: 1.1 (ref 0.8–1.2)
LYMPHOCYTES # BLD AUTO: 2.7 K/UL (ref 1–4.8)
LYMPHOCYTES NFR BLD: 34.3 % (ref 18–48)
MAGNESIUM SERPL-MCNC: 1.4 MG/DL (ref 1.6–2.6)
MCH RBC QN AUTO: 29.6 PG (ref 27–31)
MCHC RBC AUTO-ENTMCNC: 29.9 G/DL (ref 32–36)
MCV RBC AUTO: 99 FL (ref 82–98)
MONOCYTES # BLD AUTO: 0.7 K/UL (ref 0.3–1)
MONOCYTES NFR BLD: 8.5 % (ref 4–15)
NEUTROPHILS # BLD AUTO: 4.1 K/UL (ref 1.8–7.7)
NEUTROPHILS NFR BLD: 51.4 % (ref 38–73)
NRBC BLD-RTO: 0 /100 WBC
PHOSPHATE SERPL-MCNC: 3.2 MG/DL (ref 2.7–4.5)
PLATELET # BLD AUTO: 620 K/UL (ref 150–350)
PMV BLD AUTO: 10.2 FL (ref 9.2–12.9)
POTASSIUM SERPL-SCNC: 4.3 MMOL/L (ref 3.5–5.1)
PROT SERPL-MCNC: 4.2 G/DL (ref 6–8.4)
PROTHROMBIN TIME: 12.4 SEC (ref 9–12.5)
RBC # BLD AUTO: 2.47 M/UL (ref 4–5.4)
SODIUM SERPL-SCNC: 138 MMOL/L (ref 136–145)
WBC # BLD AUTO: 7.89 K/UL (ref 3.9–12.7)

## 2021-02-05 PROCEDURE — 80053 COMPREHEN METABOLIC PANEL: CPT

## 2021-02-05 PROCEDURE — 25000003 PHARM REV CODE 250: Performed by: STUDENT IN AN ORGANIZED HEALTH CARE EDUCATION/TRAINING PROGRAM

## 2021-02-05 PROCEDURE — 99232 PR SUBSEQUENT HOSPITAL CARE,LEVL II: ICD-10-PCS | Mod: ,,, | Performed by: NURSE PRACTITIONER

## 2021-02-05 PROCEDURE — 99232 SBSQ HOSP IP/OBS MODERATE 35: CPT | Mod: ,,, | Performed by: NURSE PRACTITIONER

## 2021-02-05 PROCEDURE — 84100 ASSAY OF PHOSPHORUS: CPT

## 2021-02-05 PROCEDURE — 36415 COLL VENOUS BLD VENIPUNCTURE: CPT

## 2021-02-05 PROCEDURE — 83735 ASSAY OF MAGNESIUM: CPT

## 2021-02-05 PROCEDURE — 97530 THERAPEUTIC ACTIVITIES: CPT

## 2021-02-05 PROCEDURE — 97110 THERAPEUTIC EXERCISES: CPT

## 2021-02-05 PROCEDURE — 63600175 PHARM REV CODE 636 W HCPCS: Performed by: STUDENT IN AN ORGANIZED HEALTH CARE EDUCATION/TRAINING PROGRAM

## 2021-02-05 PROCEDURE — 97535 SELF CARE MNGMENT TRAINING: CPT

## 2021-02-05 PROCEDURE — 85025 COMPLETE CBC W/AUTO DIFF WBC: CPT

## 2021-02-05 PROCEDURE — 20600001 HC STEP DOWN PRIVATE ROOM

## 2021-02-05 PROCEDURE — 85610 PROTHROMBIN TIME: CPT

## 2021-02-05 RX ORDER — MAGNESIUM SULFATE HEPTAHYDRATE 40 MG/ML
2 INJECTION, SOLUTION INTRAVENOUS
Status: COMPLETED | OUTPATIENT
Start: 2021-02-05 | End: 2021-02-05

## 2021-02-05 RX ORDER — TAMSULOSIN HYDROCHLORIDE 0.4 MG/1
0.4 CAPSULE ORAL DAILY
Status: DISCONTINUED | OUTPATIENT
Start: 2021-02-05 | End: 2021-02-09 | Stop reason: HOSPADM

## 2021-02-05 RX ADMIN — CASTOR OIL AND BALSAM, PERU: 788; 87 OINTMENT TOPICAL at 09:02

## 2021-02-05 RX ADMIN — MAGNESIUM SULFATE 2 G: 2 INJECTION INTRAVENOUS at 02:02

## 2021-02-05 RX ADMIN — POTASSIUM CHLORIDE, DEXTROSE MONOHYDRATE AND SODIUM CHLORIDE: 150; 5; 450 INJECTION, SOLUTION INTRAVENOUS at 01:02

## 2021-02-05 RX ADMIN — LISINOPRIL AND HYDROCHLOROTHIAZIDE 1 TABLET: 12.5; 1 TABLET ORAL at 09:02

## 2021-02-05 RX ADMIN — LOPERAMIDE HYDROCHLORIDE 4 MG: 2 CAPSULE ORAL at 09:02

## 2021-02-05 RX ADMIN — HEPARIN SODIUM 5000 UNITS: 5000 INJECTION INTRAVENOUS; SUBCUTANEOUS at 09:02

## 2021-02-05 RX ADMIN — OXYCODONE HYDROCHLORIDE 5 MG: 5 TABLET ORAL at 08:02

## 2021-02-05 RX ADMIN — HEPARIN SODIUM 5000 UNITS: 5000 INJECTION INTRAVENOUS; SUBCUTANEOUS at 02:02

## 2021-02-05 RX ADMIN — QUETIAPINE FUMARATE 50 MG: 25 TABLET ORAL at 09:02

## 2021-02-05 RX ADMIN — ACETAMINOPHEN 650 MG: 325 TABLET ORAL at 05:02

## 2021-02-05 RX ADMIN — OXYCODONE HYDROCHLORIDE 5 MG: 5 TABLET ORAL at 07:02

## 2021-02-05 RX ADMIN — POTASSIUM CHLORIDE, DEXTROSE MONOHYDRATE AND SODIUM CHLORIDE: 150; 5; 450 INJECTION, SOLUTION INTRAVENOUS at 09:02

## 2021-02-05 RX ADMIN — ACETAMINOPHEN 650 MG: 325 TABLET ORAL at 12:02

## 2021-02-05 RX ADMIN — POTASSIUM CHLORIDE, DEXTROSE MONOHYDRATE AND SODIUM CHLORIDE: 150; 5; 450 INJECTION, SOLUTION INTRAVENOUS at 07:02

## 2021-02-05 RX ADMIN — ACETAMINOPHEN 650 MG: 325 TABLET ORAL at 11:02

## 2021-02-05 RX ADMIN — OXYCODONE HYDROCHLORIDE 5 MG: 5 TABLET ORAL at 03:02

## 2021-02-05 RX ADMIN — HEPARIN SODIUM 5000 UNITS: 5000 INJECTION INTRAVENOUS; SUBCUTANEOUS at 05:02

## 2021-02-05 RX ADMIN — MAGNESIUM SULFATE 2 G: 2 INJECTION INTRAVENOUS at 05:02

## 2021-02-05 RX ADMIN — TAMSULOSIN HYDROCHLORIDE 0.4 MG: 0.4 CAPSULE ORAL at 08:02

## 2021-02-05 RX ADMIN — ONDANSETRON 4 MG: 2 INJECTION INTRAMUSCULAR; INTRAVENOUS at 04:02

## 2021-02-06 LAB
ALBUMIN SERPL BCP-MCNC: 1.8 G/DL (ref 3.5–5.2)
ALP SERPL-CCNC: 110 U/L (ref 55–135)
ALT SERPL W/O P-5'-P-CCNC: 20 U/L (ref 10–44)
ANION GAP SERPL CALC-SCNC: 9 MMOL/L (ref 8–16)
AST SERPL-CCNC: 36 U/L (ref 10–40)
BASOPHILS # BLD AUTO: 0.09 K/UL (ref 0–0.2)
BASOPHILS NFR BLD: 0.9 % (ref 0–1.9)
BILIRUB SERPL-MCNC: 0.3 MG/DL (ref 0.1–1)
BUN SERPL-MCNC: 5 MG/DL (ref 8–23)
CALCIUM SERPL-MCNC: 7.7 MG/DL (ref 8.7–10.5)
CHLORIDE SERPL-SCNC: 106 MMOL/L (ref 95–110)
CO2 SERPL-SCNC: 22 MMOL/L (ref 23–29)
CREAT SERPL-MCNC: 1 MG/DL (ref 0.5–1.4)
DIFFERENTIAL METHOD: ABNORMAL
EOSINOPHIL # BLD AUTO: 0.4 K/UL (ref 0–0.5)
EOSINOPHIL NFR BLD: 3.8 % (ref 0–8)
ERYTHROCYTE [DISTWIDTH] IN BLOOD BY AUTOMATED COUNT: 16.3 % (ref 11.5–14.5)
EST. GFR  (AFRICAN AMERICAN): >60 ML/MIN/1.73 M^2
EST. GFR  (NON AFRICAN AMERICAN): 57.6 ML/MIN/1.73 M^2
GLUCOSE SERPL-MCNC: 100 MG/DL (ref 70–110)
HCT VFR BLD AUTO: 26.4 % (ref 37–48.5)
HGB BLD-MCNC: 7.8 G/DL (ref 12–16)
IMM GRANULOCYTES # BLD AUTO: 0.05 K/UL (ref 0–0.04)
IMM GRANULOCYTES NFR BLD AUTO: 0.5 % (ref 0–0.5)
INR PPP: 1.1 (ref 0.8–1.2)
LYMPHOCYTES # BLD AUTO: 2.6 K/UL (ref 1–4.8)
LYMPHOCYTES NFR BLD: 26.5 % (ref 18–48)
MAGNESIUM SERPL-MCNC: 1.7 MG/DL (ref 1.6–2.6)
MCH RBC QN AUTO: 29 PG (ref 27–31)
MCHC RBC AUTO-ENTMCNC: 29.5 G/DL (ref 32–36)
MCV RBC AUTO: 98 FL (ref 82–98)
MONOCYTES # BLD AUTO: 0.7 K/UL (ref 0.3–1)
MONOCYTES NFR BLD: 7.1 % (ref 4–15)
NEUTROPHILS # BLD AUTO: 5.9 K/UL (ref 1.8–7.7)
NEUTROPHILS NFR BLD: 61.2 % (ref 38–73)
NRBC BLD-RTO: 0 /100 WBC
PHOSPHATE SERPL-MCNC: 3.3 MG/DL (ref 2.7–4.5)
PLATELET # BLD AUTO: 721 K/UL (ref 150–350)
PMV BLD AUTO: 10 FL (ref 9.2–12.9)
POTASSIUM SERPL-SCNC: 4.3 MMOL/L (ref 3.5–5.1)
PROT SERPL-MCNC: 4.7 G/DL (ref 6–8.4)
PROTHROMBIN TIME: 11.5 SEC (ref 9–12.5)
RBC # BLD AUTO: 2.69 M/UL (ref 4–5.4)
SODIUM SERPL-SCNC: 137 MMOL/L (ref 136–145)
WBC # BLD AUTO: 9.66 K/UL (ref 3.9–12.7)

## 2021-02-06 PROCEDURE — 25000003 PHARM REV CODE 250: Performed by: STUDENT IN AN ORGANIZED HEALTH CARE EDUCATION/TRAINING PROGRAM

## 2021-02-06 PROCEDURE — 83735 ASSAY OF MAGNESIUM: CPT

## 2021-02-06 PROCEDURE — 85610 PROTHROMBIN TIME: CPT

## 2021-02-06 PROCEDURE — 36415 COLL VENOUS BLD VENIPUNCTURE: CPT

## 2021-02-06 PROCEDURE — 85025 COMPLETE CBC W/AUTO DIFF WBC: CPT

## 2021-02-06 PROCEDURE — 20600001 HC STEP DOWN PRIVATE ROOM

## 2021-02-06 PROCEDURE — 51798 US URINE CAPACITY MEASURE: CPT

## 2021-02-06 PROCEDURE — 63600175 PHARM REV CODE 636 W HCPCS: Performed by: STUDENT IN AN ORGANIZED HEALTH CARE EDUCATION/TRAINING PROGRAM

## 2021-02-06 PROCEDURE — 80053 COMPREHEN METABOLIC PANEL: CPT

## 2021-02-06 PROCEDURE — 84100 ASSAY OF PHOSPHORUS: CPT

## 2021-02-06 PROCEDURE — 51702 INSERT TEMP BLADDER CATH: CPT

## 2021-02-06 RX ADMIN — ACETAMINOPHEN 650 MG: 325 TABLET ORAL at 05:02

## 2021-02-06 RX ADMIN — OXYCODONE HYDROCHLORIDE 5 MG: 5 TABLET ORAL at 10:02

## 2021-02-06 RX ADMIN — HEPARIN SODIUM 5000 UNITS: 5000 INJECTION INTRAVENOUS; SUBCUTANEOUS at 02:02

## 2021-02-06 RX ADMIN — ACETAMINOPHEN 650 MG: 325 TABLET ORAL at 11:02

## 2021-02-06 RX ADMIN — OXYCODONE HYDROCHLORIDE 5 MG: 5 TABLET ORAL at 08:02

## 2021-02-06 RX ADMIN — HEPARIN SODIUM 5000 UNITS: 5000 INJECTION INTRAVENOUS; SUBCUTANEOUS at 10:02

## 2021-02-06 RX ADMIN — CASTOR OIL AND BALSAM, PERU: 788; 87 OINTMENT TOPICAL at 10:02

## 2021-02-06 RX ADMIN — POTASSIUM CHLORIDE, DEXTROSE MONOHYDRATE AND SODIUM CHLORIDE: 150; 5; 450 INJECTION, SOLUTION INTRAVENOUS at 08:02

## 2021-02-06 RX ADMIN — OXYCODONE HYDROCHLORIDE 5 MG: 5 TABLET ORAL at 02:02

## 2021-02-06 RX ADMIN — POTASSIUM CHLORIDE, DEXTROSE MONOHYDRATE AND SODIUM CHLORIDE: 150; 5; 450 INJECTION, SOLUTION INTRAVENOUS at 11:02

## 2021-02-06 RX ADMIN — POTASSIUM CHLORIDE, DEXTROSE MONOHYDRATE AND SODIUM CHLORIDE: 150; 5; 450 INJECTION, SOLUTION INTRAVENOUS at 03:02

## 2021-02-06 RX ADMIN — CASTOR OIL AND BALSAM, PERU: 788; 87 OINTMENT TOPICAL at 08:02

## 2021-02-06 RX ADMIN — LISINOPRIL AND HYDROCHLOROTHIAZIDE 1 TABLET: 12.5; 1 TABLET ORAL at 10:02

## 2021-02-06 RX ADMIN — HEPARIN SODIUM 5000 UNITS: 5000 INJECTION INTRAVENOUS; SUBCUTANEOUS at 05:02

## 2021-02-06 RX ADMIN — LOPERAMIDE HYDROCHLORIDE 4 MG: 2 CAPSULE ORAL at 08:02

## 2021-02-06 RX ADMIN — CALCIUM CARBONATE (ANTACID) CHEW TAB 500 MG 500 MG: 500 CHEW TAB at 11:02

## 2021-02-06 RX ADMIN — TAMSULOSIN HYDROCHLORIDE 0.4 MG: 0.4 CAPSULE ORAL at 10:02

## 2021-02-06 RX ADMIN — ONDANSETRON 4 MG: 2 INJECTION INTRAMUSCULAR; INTRAVENOUS at 05:02

## 2021-02-06 RX ADMIN — LOPERAMIDE HYDROCHLORIDE 4 MG: 2 CAPSULE ORAL at 10:02

## 2021-02-06 RX ADMIN — QUETIAPINE FUMARATE 50 MG: 25 TABLET ORAL at 08:02

## 2021-02-07 LAB
ALBUMIN SERPL BCP-MCNC: 1.8 G/DL (ref 3.5–5.2)
ALP SERPL-CCNC: 116 U/L (ref 55–135)
ALT SERPL W/O P-5'-P-CCNC: 19 U/L (ref 10–44)
ANION GAP SERPL CALC-SCNC: 9 MMOL/L (ref 8–16)
AST SERPL-CCNC: 33 U/L (ref 10–40)
BASOPHILS # BLD AUTO: 0.12 K/UL (ref 0–0.2)
BASOPHILS NFR BLD: 1.2 % (ref 0–1.9)
BILIRUB SERPL-MCNC: 0.2 MG/DL (ref 0.1–1)
BUN SERPL-MCNC: 4 MG/DL (ref 8–23)
CALCIUM SERPL-MCNC: 7.8 MG/DL (ref 8.7–10.5)
CHLORIDE SERPL-SCNC: 108 MMOL/L (ref 95–110)
CO2 SERPL-SCNC: 20 MMOL/L (ref 23–29)
CREAT SERPL-MCNC: 1 MG/DL (ref 0.5–1.4)
DIFFERENTIAL METHOD: ABNORMAL
EOSINOPHIL # BLD AUTO: 0.4 K/UL (ref 0–0.5)
EOSINOPHIL NFR BLD: 3.6 % (ref 0–8)
ERYTHROCYTE [DISTWIDTH] IN BLOOD BY AUTOMATED COUNT: 16.2 % (ref 11.5–14.5)
EST. GFR  (AFRICAN AMERICAN): >60 ML/MIN/1.73 M^2
EST. GFR  (NON AFRICAN AMERICAN): 57.6 ML/MIN/1.73 M^2
GLUCOSE SERPL-MCNC: 97 MG/DL (ref 70–110)
HCT VFR BLD AUTO: 24.7 % (ref 37–48.5)
HGB BLD-MCNC: 7.5 G/DL (ref 12–16)
IMM GRANULOCYTES # BLD AUTO: 0.06 K/UL (ref 0–0.04)
IMM GRANULOCYTES NFR BLD AUTO: 0.6 % (ref 0–0.5)
INR PPP: 1 (ref 0.8–1.2)
LYMPHOCYTES # BLD AUTO: 2.3 K/UL (ref 1–4.8)
LYMPHOCYTES NFR BLD: 23.4 % (ref 18–48)
MAGNESIUM SERPL-MCNC: 1.4 MG/DL (ref 1.6–2.6)
MCH RBC QN AUTO: 29.4 PG (ref 27–31)
MCHC RBC AUTO-ENTMCNC: 30.4 G/DL (ref 32–36)
MCV RBC AUTO: 97 FL (ref 82–98)
MONOCYTES # BLD AUTO: 0.8 K/UL (ref 0.3–1)
MONOCYTES NFR BLD: 7.9 % (ref 4–15)
NEUTROPHILS # BLD AUTO: 6.3 K/UL (ref 1.8–7.7)
NEUTROPHILS NFR BLD: 63.3 % (ref 38–73)
NRBC BLD-RTO: 0 /100 WBC
PHOSPHATE SERPL-MCNC: 3.6 MG/DL (ref 2.7–4.5)
PLATELET # BLD AUTO: 611 K/UL (ref 150–350)
PMV BLD AUTO: 10.5 FL (ref 9.2–12.9)
POTASSIUM SERPL-SCNC: 4.6 MMOL/L (ref 3.5–5.1)
PROT SERPL-MCNC: 4.8 G/DL (ref 6–8.4)
PROTHROMBIN TIME: 11.4 SEC (ref 9–12.5)
RBC # BLD AUTO: 2.55 M/UL (ref 4–5.4)
SODIUM SERPL-SCNC: 137 MMOL/L (ref 136–145)
WBC # BLD AUTO: 9.88 K/UL (ref 3.9–12.7)

## 2021-02-07 PROCEDURE — 25000003 PHARM REV CODE 250: Performed by: STUDENT IN AN ORGANIZED HEALTH CARE EDUCATION/TRAINING PROGRAM

## 2021-02-07 PROCEDURE — 83735 ASSAY OF MAGNESIUM: CPT

## 2021-02-07 PROCEDURE — 85610 PROTHROMBIN TIME: CPT

## 2021-02-07 PROCEDURE — 84100 ASSAY OF PHOSPHORUS: CPT

## 2021-02-07 PROCEDURE — 36415 COLL VENOUS BLD VENIPUNCTURE: CPT

## 2021-02-07 PROCEDURE — 63600175 PHARM REV CODE 636 W HCPCS: Performed by: STUDENT IN AN ORGANIZED HEALTH CARE EDUCATION/TRAINING PROGRAM

## 2021-02-07 PROCEDURE — 20600001 HC STEP DOWN PRIVATE ROOM

## 2021-02-07 PROCEDURE — 80053 COMPREHEN METABOLIC PANEL: CPT

## 2021-02-07 PROCEDURE — 85025 COMPLETE CBC W/AUTO DIFF WBC: CPT

## 2021-02-07 RX ADMIN — POTASSIUM CHLORIDE, DEXTROSE MONOHYDRATE AND SODIUM CHLORIDE: 150; 5; 450 INJECTION, SOLUTION INTRAVENOUS at 01:02

## 2021-02-07 RX ADMIN — ACETAMINOPHEN 650 MG: 325 TABLET ORAL at 05:02

## 2021-02-07 RX ADMIN — OXYCODONE HYDROCHLORIDE 5 MG: 5 TABLET ORAL at 05:02

## 2021-02-07 RX ADMIN — HEPARIN SODIUM 5000 UNITS: 5000 INJECTION INTRAVENOUS; SUBCUTANEOUS at 05:02

## 2021-02-07 RX ADMIN — OXYCODONE HYDROCHLORIDE 5 MG: 5 TABLET ORAL at 12:02

## 2021-02-07 RX ADMIN — QUETIAPINE FUMARATE 50 MG: 25 TABLET ORAL at 08:02

## 2021-02-07 RX ADMIN — TAMSULOSIN HYDROCHLORIDE 0.4 MG: 0.4 CAPSULE ORAL at 09:02

## 2021-02-07 RX ADMIN — LOPERAMIDE HYDROCHLORIDE 4 MG: 2 CAPSULE ORAL at 09:02

## 2021-02-07 RX ADMIN — ACETAMINOPHEN 650 MG: 325 TABLET ORAL at 12:02

## 2021-02-07 RX ADMIN — ACETAMINOPHEN 650 MG: 325 TABLET ORAL at 11:02

## 2021-02-07 RX ADMIN — LISINOPRIL AND HYDROCHLOROTHIAZIDE 1 TABLET: 12.5; 1 TABLET ORAL at 09:02

## 2021-02-07 RX ADMIN — LOPERAMIDE HYDROCHLORIDE 4 MG: 2 CAPSULE ORAL at 08:02

## 2021-02-07 RX ADMIN — HEPARIN SODIUM 5000 UNITS: 5000 INJECTION INTRAVENOUS; SUBCUTANEOUS at 01:02

## 2021-02-07 RX ADMIN — ONDANSETRON 4 MG: 2 INJECTION INTRAMUSCULAR; INTRAVENOUS at 05:02

## 2021-02-07 RX ADMIN — POTASSIUM CHLORIDE, DEXTROSE MONOHYDRATE AND SODIUM CHLORIDE: 150; 5; 450 INJECTION, SOLUTION INTRAVENOUS at 05:02

## 2021-02-07 RX ADMIN — CASTOR OIL AND BALSAM, PERU: 788; 87 OINTMENT TOPICAL at 08:02

## 2021-02-07 RX ADMIN — CASTOR OIL AND BALSAM, PERU: 788; 87 OINTMENT TOPICAL at 09:02

## 2021-02-07 RX ADMIN — HEPARIN SODIUM 5000 UNITS: 5000 INJECTION INTRAVENOUS; SUBCUTANEOUS at 10:02

## 2021-02-07 RX ADMIN — POTASSIUM CHLORIDE, DEXTROSE MONOHYDRATE AND SODIUM CHLORIDE: 150; 5; 450 INJECTION, SOLUTION INTRAVENOUS at 10:02

## 2021-02-08 LAB
ALBUMIN SERPL BCP-MCNC: 1.9 G/DL (ref 3.5–5.2)
ALP SERPL-CCNC: 123 U/L (ref 55–135)
ALT SERPL W/O P-5'-P-CCNC: 18 U/L (ref 10–44)
ANION GAP SERPL CALC-SCNC: 8 MMOL/L (ref 8–16)
AST SERPL-CCNC: 27 U/L (ref 10–40)
BASOPHILS # BLD AUTO: 0.15 K/UL (ref 0–0.2)
BASOPHILS NFR BLD: 1.3 % (ref 0–1.9)
BILIRUB SERPL-MCNC: 0.2 MG/DL (ref 0.1–1)
BUN SERPL-MCNC: 4 MG/DL (ref 8–23)
CALCIUM SERPL-MCNC: 7.8 MG/DL (ref 8.7–10.5)
CHLORIDE SERPL-SCNC: 107 MMOL/L (ref 95–110)
CO2 SERPL-SCNC: 22 MMOL/L (ref 23–29)
CREAT SERPL-MCNC: 1.1 MG/DL (ref 0.5–1.4)
DIFFERENTIAL METHOD: ABNORMAL
EOSINOPHIL # BLD AUTO: 0.2 K/UL (ref 0–0.5)
EOSINOPHIL NFR BLD: 1.7 % (ref 0–8)
ERYTHROCYTE [DISTWIDTH] IN BLOOD BY AUTOMATED COUNT: 16 % (ref 11.5–14.5)
EST. GFR  (AFRICAN AMERICAN): 59.2 ML/MIN/1.73 M^2
EST. GFR  (NON AFRICAN AMERICAN): 51.3 ML/MIN/1.73 M^2
GLUCOSE SERPL-MCNC: 100 MG/DL (ref 70–110)
HCT VFR BLD AUTO: 24.9 % (ref 37–48.5)
HGB BLD-MCNC: 7.4 G/DL (ref 12–16)
IMM GRANULOCYTES # BLD AUTO: 0.07 K/UL (ref 0–0.04)
IMM GRANULOCYTES NFR BLD AUTO: 0.6 % (ref 0–0.5)
INR PPP: 1 (ref 0.8–1.2)
LYMPHOCYTES # BLD AUTO: 2.7 K/UL (ref 1–4.8)
LYMPHOCYTES NFR BLD: 23.7 % (ref 18–48)
MAGNESIUM SERPL-MCNC: 1.4 MG/DL (ref 1.6–2.6)
MCH RBC QN AUTO: 29.5 PG (ref 27–31)
MCHC RBC AUTO-ENTMCNC: 29.7 G/DL (ref 32–36)
MCV RBC AUTO: 99 FL (ref 82–98)
MONOCYTES # BLD AUTO: 0.9 K/UL (ref 0.3–1)
MONOCYTES NFR BLD: 7.7 % (ref 4–15)
NEUTROPHILS # BLD AUTO: 7.3 K/UL (ref 1.8–7.7)
NEUTROPHILS NFR BLD: 65 % (ref 38–73)
NRBC BLD-RTO: 0 /100 WBC
PHOSPHATE SERPL-MCNC: 4.1 MG/DL (ref 2.7–4.5)
PLATELET # BLD AUTO: 744 K/UL (ref 150–350)
PMV BLD AUTO: 10.3 FL (ref 9.2–12.9)
POTASSIUM SERPL-SCNC: 4.8 MMOL/L (ref 3.5–5.1)
PROT SERPL-MCNC: 4.9 G/DL (ref 6–8.4)
PROTHROMBIN TIME: 11.4 SEC (ref 9–12.5)
RBC # BLD AUTO: 2.51 M/UL (ref 4–5.4)
SODIUM SERPL-SCNC: 137 MMOL/L (ref 136–145)
WBC # BLD AUTO: 11.25 K/UL (ref 3.9–12.7)

## 2021-02-08 PROCEDURE — 25000003 PHARM REV CODE 250: Performed by: STUDENT IN AN ORGANIZED HEALTH CARE EDUCATION/TRAINING PROGRAM

## 2021-02-08 PROCEDURE — 93010 ELECTROCARDIOGRAM REPORT: CPT | Mod: ,,, | Performed by: INTERNAL MEDICINE

## 2021-02-08 PROCEDURE — 84100 ASSAY OF PHOSPHORUS: CPT

## 2021-02-08 PROCEDURE — 99232 PR SUBSEQUENT HOSPITAL CARE,LEVL II: ICD-10-PCS | Mod: ,,, | Performed by: NURSE PRACTITIONER

## 2021-02-08 PROCEDURE — 63600175 PHARM REV CODE 636 W HCPCS: Performed by: STUDENT IN AN ORGANIZED HEALTH CARE EDUCATION/TRAINING PROGRAM

## 2021-02-08 PROCEDURE — 97110 THERAPEUTIC EXERCISES: CPT

## 2021-02-08 PROCEDURE — 93005 ELECTROCARDIOGRAM TRACING: CPT

## 2021-02-08 PROCEDURE — 36415 COLL VENOUS BLD VENIPUNCTURE: CPT

## 2021-02-08 PROCEDURE — 80053 COMPREHEN METABOLIC PANEL: CPT

## 2021-02-08 PROCEDURE — 20600001 HC STEP DOWN PRIVATE ROOM

## 2021-02-08 PROCEDURE — 93010 EKG 12-LEAD: ICD-10-PCS | Mod: ,,, | Performed by: INTERNAL MEDICINE

## 2021-02-08 PROCEDURE — 97535 SELF CARE MNGMENT TRAINING: CPT

## 2021-02-08 PROCEDURE — 99232 SBSQ HOSP IP/OBS MODERATE 35: CPT | Mod: ,,, | Performed by: NURSE PRACTITIONER

## 2021-02-08 PROCEDURE — 83735 ASSAY OF MAGNESIUM: CPT

## 2021-02-08 PROCEDURE — 97116 GAIT TRAINING THERAPY: CPT

## 2021-02-08 PROCEDURE — 85610 PROTHROMBIN TIME: CPT

## 2021-02-08 PROCEDURE — 85025 COMPLETE CBC W/AUTO DIFF WBC: CPT

## 2021-02-08 RX ORDER — MAGNESIUM SULFATE HEPTAHYDRATE 40 MG/ML
2 INJECTION, SOLUTION INTRAVENOUS
Status: COMPLETED | OUTPATIENT
Start: 2021-02-08 | End: 2021-02-08

## 2021-02-08 RX ORDER — LOPERAMIDE HYDROCHLORIDE 2 MG/1
4 CAPSULE ORAL DAILY
Status: DISCONTINUED | OUTPATIENT
Start: 2021-02-08 | End: 2021-02-09 | Stop reason: HOSPADM

## 2021-02-08 RX ADMIN — ONDANSETRON 4 MG: 2 INJECTION INTRAMUSCULAR; INTRAVENOUS at 04:02

## 2021-02-08 RX ADMIN — POTASSIUM CHLORIDE, DEXTROSE MONOHYDRATE AND SODIUM CHLORIDE: 150; 5; 450 INJECTION, SOLUTION INTRAVENOUS at 04:02

## 2021-02-08 RX ADMIN — CASTOR OIL AND BALSAM, PERU: 788; 87 OINTMENT TOPICAL at 08:02

## 2021-02-08 RX ADMIN — OXYCODONE HYDROCHLORIDE 5 MG: 5 TABLET ORAL at 12:02

## 2021-02-08 RX ADMIN — OXYCODONE HYDROCHLORIDE 5 MG: 5 TABLET ORAL at 04:02

## 2021-02-08 RX ADMIN — LOPERAMIDE HYDROCHLORIDE 4 MG: 2 CAPSULE ORAL at 08:02

## 2021-02-08 RX ADMIN — ACETAMINOPHEN 650 MG: 325 TABLET ORAL at 11:02

## 2021-02-08 RX ADMIN — MAGNESIUM SULFATE 2 G: 2 INJECTION INTRAVENOUS at 10:02

## 2021-02-08 RX ADMIN — MAGNESIUM SULFATE 2 G: 2 INJECTION INTRAVENOUS at 11:02

## 2021-02-08 RX ADMIN — HEPARIN SODIUM 5000 UNITS: 5000 INJECTION INTRAVENOUS; SUBCUTANEOUS at 02:02

## 2021-02-08 RX ADMIN — HEPARIN SODIUM 5000 UNITS: 5000 INJECTION INTRAVENOUS; SUBCUTANEOUS at 05:02

## 2021-02-08 RX ADMIN — ACETAMINOPHEN 325 MG: 325 TABLET ORAL at 11:02

## 2021-02-08 RX ADMIN — TAMSULOSIN HYDROCHLORIDE 0.4 MG: 0.4 CAPSULE ORAL at 08:02

## 2021-02-08 RX ADMIN — LISINOPRIL AND HYDROCHLOROTHIAZIDE 1 TABLET: 12.5; 1 TABLET ORAL at 08:02

## 2021-02-08 RX ADMIN — ACETAMINOPHEN 650 MG: 325 TABLET ORAL at 05:02

## 2021-02-08 RX ADMIN — OXYCODONE HYDROCHLORIDE 5 MG: 5 TABLET ORAL at 10:02

## 2021-02-08 RX ADMIN — HEPARIN SODIUM 5000 UNITS: 5000 INJECTION INTRAVENOUS; SUBCUTANEOUS at 10:02

## 2021-02-08 RX ADMIN — ACETAMINOPHEN 650 MG: 325 TABLET ORAL at 06:02

## 2021-02-08 RX ADMIN — CASTOR OIL AND BALSAM, PERU: 788; 87 OINTMENT TOPICAL at 09:02

## 2021-02-09 VITALS
DIASTOLIC BLOOD PRESSURE: 87 MMHG | SYSTOLIC BLOOD PRESSURE: 170 MMHG | RESPIRATION RATE: 19 BRPM | TEMPERATURE: 98 F | HEIGHT: 64 IN | HEART RATE: 119 BPM | WEIGHT: 176.38 LBS | BODY MASS INDEX: 30.11 KG/M2 | OXYGEN SATURATION: 96 %

## 2021-02-09 PROBLEM — A41.9 SEVERE SEPSIS: Status: RESOLVED | Noted: 2021-01-21 | Resolved: 2021-02-09

## 2021-02-09 PROBLEM — N17.9 AKI (ACUTE KIDNEY INJURY): Status: RESOLVED | Noted: 2021-01-21 | Resolved: 2021-02-09

## 2021-02-09 PROBLEM — N17.0 ATN (ACUTE TUBULAR NECROSIS): Status: RESOLVED | Noted: 2021-02-01 | Resolved: 2021-02-09

## 2021-02-09 PROBLEM — K66.8 PNEUMOPERITONEUM: Status: RESOLVED | Noted: 2021-01-21 | Resolved: 2021-02-09

## 2021-02-09 PROBLEM — R65.20 SEVERE SEPSIS: Status: RESOLVED | Noted: 2021-01-21 | Resolved: 2021-02-09

## 2021-02-09 PROBLEM — I21.4 NSTEMI (NON-ST ELEVATED MYOCARDIAL INFARCTION): Status: RESOLVED | Noted: 2021-01-21 | Resolved: 2021-02-09

## 2021-02-09 PROBLEM — R79.89 ELEVATED TROPONIN: Status: RESOLVED | Noted: 2021-01-21 | Resolved: 2021-02-09

## 2021-02-09 PROBLEM — D68.9 COAGULOPATHY: Status: RESOLVED | Noted: 2021-01-21 | Resolved: 2021-02-09

## 2021-02-09 PROBLEM — Z99.11 ENCOUNTER FOR WEANING FROM VENTILATOR: Status: RESOLVED | Noted: 2021-01-21 | Resolved: 2021-02-09

## 2021-02-09 LAB
ALBUMIN SERPL BCP-MCNC: 1.9 G/DL (ref 3.5–5.2)
ALP SERPL-CCNC: 128 U/L (ref 55–135)
ALT SERPL W/O P-5'-P-CCNC: 17 U/L (ref 10–44)
ANION GAP SERPL CALC-SCNC: 10 MMOL/L (ref 8–16)
AST SERPL-CCNC: 28 U/L (ref 10–40)
BASOPHILS # BLD AUTO: 0.17 K/UL (ref 0–0.2)
BASOPHILS NFR BLD: 1.4 % (ref 0–1.9)
BILIRUB SERPL-MCNC: 0.3 MG/DL (ref 0.1–1)
BUN SERPL-MCNC: 4 MG/DL (ref 8–23)
CALCIUM SERPL-MCNC: 8.1 MG/DL (ref 8.7–10.5)
CHLORIDE SERPL-SCNC: 107 MMOL/L (ref 95–110)
CO2 SERPL-SCNC: 21 MMOL/L (ref 23–29)
CREAT SERPL-MCNC: 1.2 MG/DL (ref 0.5–1.4)
DIFFERENTIAL METHOD: ABNORMAL
EOSINOPHIL # BLD AUTO: 0.3 K/UL (ref 0–0.5)
EOSINOPHIL NFR BLD: 2.3 % (ref 0–8)
ERYTHROCYTE [DISTWIDTH] IN BLOOD BY AUTOMATED COUNT: 16.3 % (ref 11.5–14.5)
EST. GFR  (AFRICAN AMERICAN): 53.3 ML/MIN/1.73 M^2
EST. GFR  (NON AFRICAN AMERICAN): 46.2 ML/MIN/1.73 M^2
GLUCOSE SERPL-MCNC: 90 MG/DL (ref 70–110)
HCT VFR BLD AUTO: 27.9 % (ref 37–48.5)
HGB BLD-MCNC: 8.2 G/DL (ref 12–16)
IMM GRANULOCYTES # BLD AUTO: 0.04 K/UL (ref 0–0.04)
IMM GRANULOCYTES NFR BLD AUTO: 0.3 % (ref 0–0.5)
INR PPP: 1 (ref 0.8–1.2)
LYMPHOCYTES # BLD AUTO: 2.4 K/UL (ref 1–4.8)
LYMPHOCYTES NFR BLD: 20.2 % (ref 18–48)
MAGNESIUM SERPL-MCNC: 1.8 MG/DL (ref 1.6–2.6)
MCH RBC QN AUTO: 29.6 PG (ref 27–31)
MCHC RBC AUTO-ENTMCNC: 29.4 G/DL (ref 32–36)
MCV RBC AUTO: 101 FL (ref 82–98)
MONOCYTES # BLD AUTO: 0.8 K/UL (ref 0.3–1)
MONOCYTES NFR BLD: 7 % (ref 4–15)
NEUTROPHILS # BLD AUTO: 8.2 K/UL (ref 1.8–7.7)
NEUTROPHILS NFR BLD: 68.8 % (ref 38–73)
NRBC BLD-RTO: 0 /100 WBC
PHOSPHATE SERPL-MCNC: 4.5 MG/DL (ref 2.7–4.5)
PLATELET # BLD AUTO: 659 K/UL (ref 150–350)
PMV BLD AUTO: 10.9 FL (ref 9.2–12.9)
POTASSIUM SERPL-SCNC: 4.8 MMOL/L (ref 3.5–5.1)
PROT SERPL-MCNC: 4.9 G/DL (ref 6–8.4)
PROTHROMBIN TIME: 11.1 SEC (ref 9–12.5)
RBC # BLD AUTO: 2.77 M/UL (ref 4–5.4)
SODIUM SERPL-SCNC: 138 MMOL/L (ref 136–145)
WBC # BLD AUTO: 11.9 K/UL (ref 3.9–12.7)

## 2021-02-09 PROCEDURE — 36415 COLL VENOUS BLD VENIPUNCTURE: CPT

## 2021-02-09 PROCEDURE — 63600175 PHARM REV CODE 636 W HCPCS: Performed by: STUDENT IN AN ORGANIZED HEALTH CARE EDUCATION/TRAINING PROGRAM

## 2021-02-09 PROCEDURE — 25000003 PHARM REV CODE 250: Performed by: STUDENT IN AN ORGANIZED HEALTH CARE EDUCATION/TRAINING PROGRAM

## 2021-02-09 PROCEDURE — 80053 COMPREHEN METABOLIC PANEL: CPT

## 2021-02-09 PROCEDURE — 85610 PROTHROMBIN TIME: CPT

## 2021-02-09 PROCEDURE — 84100 ASSAY OF PHOSPHORUS: CPT

## 2021-02-09 PROCEDURE — 85025 COMPLETE CBC W/AUTO DIFF WBC: CPT

## 2021-02-09 PROCEDURE — 83735 ASSAY OF MAGNESIUM: CPT

## 2021-02-09 PROCEDURE — 30200315 PPD INTRADERMAL TEST REV CODE 302: Performed by: SURGERY

## 2021-02-09 PROCEDURE — 86580 TB INTRADERMAL TEST: CPT | Performed by: SURGERY

## 2021-02-09 RX ORDER — CLONAZEPAM 1 MG/1
1 TABLET ORAL 2 TIMES DAILY PRN
Qty: 30 TABLET | Refills: 0 | Status: SHIPPED | OUTPATIENT
Start: 2021-02-09

## 2021-02-09 RX ORDER — NAPROXEN SODIUM 220 MG/1
81 TABLET, FILM COATED ORAL DAILY
Qty: 30 TABLET | Refills: 0 | Status: ON HOLD | OUTPATIENT
Start: 2021-02-09 | End: 2021-02-21 | Stop reason: SDDI

## 2021-02-09 RX ORDER — OXYCODONE HYDROCHLORIDE 5 MG/1
5 TABLET ORAL EVERY 4 HOURS PRN
Qty: 20 TABLET | Refills: 0 | Status: ON HOLD | OUTPATIENT
Start: 2021-02-09 | End: 2021-02-27 | Stop reason: SDUPTHER

## 2021-02-09 RX ORDER — OXYCODONE HYDROCHLORIDE 5 MG/1
5 TABLET ORAL EVERY 4 HOURS PRN
Qty: 20 TABLET | Refills: 0 | Status: SHIPPED | OUTPATIENT
Start: 2021-02-09 | End: 2021-02-09

## 2021-02-09 RX ORDER — BUTALBITAL, ASPIRIN, CAFFEINE AND CODEINE PHOSPHATE 50; 325; 40; 30 MG/1; MG/1; MG/1; MG/1
1 CAPSULE ORAL EVERY 4 HOURS PRN
Qty: 30 CAPSULE | Refills: 0 | Status: ON HOLD | OUTPATIENT
Start: 2021-02-09 | End: 2021-02-27 | Stop reason: HOSPADM

## 2021-02-09 RX ORDER — TRAMADOL HYDROCHLORIDE 50 MG/1
50 TABLET ORAL EVERY 12 HOURS PRN
Qty: 40 TABLET | Refills: 1 | Status: ON HOLD | OUTPATIENT
Start: 2021-02-09 | End: 2021-02-27 | Stop reason: HOSPADM

## 2021-02-09 RX ORDER — LOPERAMIDE HYDROCHLORIDE 2 MG/1
4 CAPSULE ORAL DAILY
Qty: 20 CAPSULE | Refills: 0 | Status: SHIPPED | OUTPATIENT
Start: 2021-02-09 | End: 2021-02-19 | Stop reason: CLARIF

## 2021-02-09 RX ORDER — LABETALOL HCL 20 MG/4 ML
10 SYRINGE (ML) INTRAVENOUS EVERY 6 HOURS PRN
Status: DISCONTINUED | OUTPATIENT
Start: 2021-02-09 | End: 2021-02-09 | Stop reason: HOSPADM

## 2021-02-09 RX ORDER — TAMSULOSIN HYDROCHLORIDE 0.4 MG/1
0.4 CAPSULE ORAL DAILY
Qty: 30 CAPSULE | Refills: 0 | Status: ON HOLD | OUTPATIENT
Start: 2021-02-09 | End: 2021-02-27 | Stop reason: SDUPTHER

## 2021-02-09 RX ORDER — ATORVASTATIN CALCIUM 10 MG/1
20 TABLET, FILM COATED ORAL DAILY
Qty: 60 TABLET | Refills: 0 | Status: ON HOLD | OUTPATIENT
Start: 2021-02-09 | End: 2021-02-21 | Stop reason: SDDI

## 2021-02-09 RX ORDER — LISINOPRIL AND HYDROCHLOROTHIAZIDE 10; 12.5 MG/1; MG/1
1 TABLET ORAL DAILY
Status: DISCONTINUED | OUTPATIENT
Start: 2021-02-09 | End: 2021-02-09 | Stop reason: HOSPADM

## 2021-02-09 RX ADMIN — CASTOR OIL AND BALSAM, PERU: 788; 87 OINTMENT TOPICAL at 09:02

## 2021-02-09 RX ADMIN — POTASSIUM CHLORIDE, DEXTROSE MONOHYDRATE AND SODIUM CHLORIDE: 150; 5; 450 INJECTION, SOLUTION INTRAVENOUS at 12:02

## 2021-02-09 RX ADMIN — TAMSULOSIN HYDROCHLORIDE 0.4 MG: 0.4 CAPSULE ORAL at 09:02

## 2021-02-09 RX ADMIN — Medication 10 MG: at 11:02

## 2021-02-09 RX ADMIN — HEPARIN SODIUM 5000 UNITS: 5000 INJECTION INTRAVENOUS; SUBCUTANEOUS at 06:02

## 2021-02-09 RX ADMIN — LOPERAMIDE HYDROCHLORIDE 4 MG: 2 CAPSULE ORAL at 09:02

## 2021-02-09 RX ADMIN — ACETAMINOPHEN 650 MG: 325 TABLET ORAL at 06:02

## 2021-02-09 RX ADMIN — OXYCODONE HYDROCHLORIDE 5 MG: 5 TABLET ORAL at 09:02

## 2021-02-09 RX ADMIN — LISINOPRIL AND HYDROCHLOROTHIAZIDE 1 TABLET: 12.5; 1 TABLET ORAL at 09:02

## 2021-02-09 RX ADMIN — OXYCODONE HYDROCHLORIDE 5 MG: 5 TABLET ORAL at 12:02

## 2021-02-09 RX ADMIN — LISINOPRIL AND HYDROCHLOROTHIAZIDE 1 TABLET: 12.5; 1 TABLET ORAL at 12:02

## 2021-02-09 RX ADMIN — TUBERCULIN PURIFIED PROTEIN DERIVATIVE 5 UNITS: 5 INJECTION, SOLUTION INTRADERMAL at 10:02

## 2021-02-18 ENCOUNTER — LAB VISIT (OUTPATIENT)
Dept: LAB | Facility: OTHER | Age: 71
End: 2021-02-18
Payer: MEDICARE

## 2021-02-18 DIAGNOSIS — Z20.822 ENCOUNTER FOR LABORATORY TESTING FOR COVID-19 VIRUS: ICD-10-CM

## 2021-02-18 PROCEDURE — U0003 INFECTIOUS AGENT DETECTION BY NUCLEIC ACID (DNA OR RNA); SEVERE ACUTE RESPIRATORY SYNDROME CORONAVIRUS 2 (SARS-COV-2) (CORONAVIRUS DISEASE [COVID-19]), AMPLIFIED PROBE TECHNIQUE, MAKING USE OF HIGH THROUGHPUT TECHNOLOGIES AS DESCRIBED BY CMS-2020-01-R: HCPCS

## 2021-02-19 ENCOUNTER — HOSPITAL ENCOUNTER (INPATIENT)
Facility: HOSPITAL | Age: 71
LOS: 2 days | Discharge: HOME | DRG: 812 | End: 2021-02-21
Attending: EMERGENCY MEDICINE | Admitting: SPECIALIST
Payer: MEDICARE

## 2021-02-19 DIAGNOSIS — D64.9 SYMPTOMATIC ANEMIA: ICD-10-CM

## 2021-02-19 DIAGNOSIS — R07.9 CHEST PAIN: ICD-10-CM

## 2021-02-19 DIAGNOSIS — D62 ACUTE BLOOD LOSS ANEMIA: Primary | ICD-10-CM

## 2021-02-19 DIAGNOSIS — E87.6 HYPOKALEMIA: ICD-10-CM

## 2021-02-19 LAB
ABO + RH BLD: NORMAL
ALBUMIN SERPL BCP-MCNC: 1.8 G/DL (ref 3.5–5.2)
ALP SERPL-CCNC: 92 U/L (ref 55–135)
ALT SERPL W/O P-5'-P-CCNC: 7 U/L (ref 10–44)
ANION GAP SERPL CALC-SCNC: 8 MMOL/L (ref 8–16)
AST SERPL-CCNC: 22 U/L (ref 10–40)
BASOPHILS # BLD AUTO: 0.1 K/UL (ref 0–0.2)
BASOPHILS NFR BLD: 0.9 % (ref 0–1.9)
BILIRUB SERPL-MCNC: 0.2 MG/DL (ref 0.1–1)
BLD GP AB SCN CELLS X3 SERPL QL: NORMAL
BLD PROD TYP BPU: NORMAL
BLOOD UNIT EXPIRATION DATE: NORMAL
BLOOD UNIT TYPE CODE: 5100
BLOOD UNIT TYPE: NORMAL
BUN SERPL-MCNC: 10 MG/DL (ref 8–23)
CALCIUM SERPL-MCNC: 7.5 MG/DL (ref 8.7–10.5)
CHLORIDE SERPL-SCNC: 102 MMOL/L (ref 95–110)
CO2 SERPL-SCNC: 31 MMOL/L (ref 23–29)
CODING SYSTEM: NORMAL
CREAT SERPL-MCNC: 1.2 MG/DL (ref 0.5–1.4)
CTP QC/QA: YES
DIFFERENTIAL METHOD: ABNORMAL
DISPENSE STATUS: NORMAL
EOSINOPHIL # BLD AUTO: 0.4 K/UL (ref 0–0.5)
EOSINOPHIL NFR BLD: 3.5 % (ref 0–8)
ERYTHROCYTE [DISTWIDTH] IN BLOOD BY AUTOMATED COUNT: 16.2 % (ref 11.5–14.5)
EST. GFR  (AFRICAN AMERICAN): 53 ML/MIN/1.73 M^2
EST. GFR  (NON AFRICAN AMERICAN): 46 ML/MIN/1.73 M^2
GLUCOSE SERPL-MCNC: 100 MG/DL (ref 70–110)
HCT VFR BLD AUTO: 21.1 % (ref 37–48.5)
HGB BLD-MCNC: 6.6 G/DL (ref 12–16)
IMM GRANULOCYTES # BLD AUTO: 0.05 K/UL (ref 0–0.04)
IMM GRANULOCYTES NFR BLD AUTO: 0.4 % (ref 0–0.5)
INR PPP: 1.1 (ref 0.8–1.2)
LYMPHOCYTES # BLD AUTO: 3.1 K/UL (ref 1–4.8)
LYMPHOCYTES NFR BLD: 26.6 % (ref 18–48)
MCH RBC QN AUTO: 29.6 PG (ref 27–31)
MCHC RBC AUTO-ENTMCNC: 31.3 G/DL (ref 32–36)
MCV RBC AUTO: 95 FL (ref 82–98)
MONOCYTES # BLD AUTO: 0.7 K/UL (ref 0.3–1)
MONOCYTES NFR BLD: 6.2 % (ref 4–15)
NEUTROPHILS # BLD AUTO: 7.3 K/UL (ref 1.8–7.7)
NEUTROPHILS NFR BLD: 62.4 % (ref 38–73)
NRBC BLD-RTO: 0 /100 WBC
PLATELET # BLD AUTO: 471 K/UL (ref 150–350)
PMV BLD AUTO: 10.2 FL (ref 9.2–12.9)
POTASSIUM SERPL-SCNC: 2.3 MMOL/L (ref 3.5–5.1)
PROT SERPL-MCNC: 4.8 G/DL (ref 6–8.4)
PROTHROMBIN TIME: 11.8 SEC (ref 9–12.5)
RBC # BLD AUTO: 2.23 M/UL (ref 4–5.4)
SARS-COV-2 RDRP RESP QL NAA+PROBE: NEGATIVE
SARS-COV-2 RNA RESP QL NAA+PROBE: NOT DETECTED
SODIUM SERPL-SCNC: 141 MMOL/L (ref 136–145)
TRANS ERYTHROCYTES VOL PATIENT: NORMAL ML
WBC # BLD AUTO: 11.7 K/UL (ref 3.9–12.7)

## 2021-02-19 PROCEDURE — 93005 ELECTROCARDIOGRAM TRACING: CPT

## 2021-02-19 PROCEDURE — 86920 COMPATIBILITY TEST SPIN: CPT

## 2021-02-19 PROCEDURE — 84100 ASSAY OF PHOSPHORUS: CPT

## 2021-02-19 PROCEDURE — 83735 ASSAY OF MAGNESIUM: CPT

## 2021-02-19 PROCEDURE — 63600175 PHARM REV CODE 636 W HCPCS: Performed by: EMERGENCY MEDICINE

## 2021-02-19 PROCEDURE — U0002 COVID-19 LAB TEST NON-CDC: HCPCS | Performed by: SPECIALIST

## 2021-02-19 PROCEDURE — 36430 TRANSFUSION BLD/BLD COMPNT: CPT

## 2021-02-19 PROCEDURE — P9021 RED BLOOD CELLS UNIT: HCPCS

## 2021-02-19 PROCEDURE — 93010 ELECTROCARDIOGRAM REPORT: CPT | Mod: 76,,, | Performed by: INTERNAL MEDICINE

## 2021-02-19 PROCEDURE — 36415 COLL VENOUS BLD VENIPUNCTURE: CPT

## 2021-02-19 PROCEDURE — 25000003 PHARM REV CODE 250: Performed by: EMERGENCY MEDICINE

## 2021-02-19 PROCEDURE — 11000001 HC ACUTE MED/SURG PRIVATE ROOM

## 2021-02-19 PROCEDURE — 85014 HEMATOCRIT: CPT

## 2021-02-19 PROCEDURE — 99291 CRITICAL CARE FIRST HOUR: CPT

## 2021-02-19 PROCEDURE — 86900 BLOOD TYPING SEROLOGIC ABO: CPT

## 2021-02-19 PROCEDURE — 85025 COMPLETE CBC W/AUTO DIFF WBC: CPT

## 2021-02-19 PROCEDURE — 84439 ASSAY OF FREE THYROXINE: CPT

## 2021-02-19 PROCEDURE — 85018 HEMOGLOBIN: CPT

## 2021-02-19 PROCEDURE — 84443 ASSAY THYROID STIM HORMONE: CPT

## 2021-02-19 PROCEDURE — 93010 ELECTROCARDIOGRAM REPORT: CPT | Mod: ,,, | Performed by: INTERNAL MEDICINE

## 2021-02-19 PROCEDURE — 85610 PROTHROMBIN TIME: CPT

## 2021-02-19 PROCEDURE — 80053 COMPREHEN METABOLIC PANEL: CPT

## 2021-02-19 PROCEDURE — 84145 PROCALCITONIN (PCT): CPT

## 2021-02-19 PROCEDURE — 93010 EKG 12-LEAD: ICD-10-PCS | Mod: ,,, | Performed by: INTERNAL MEDICINE

## 2021-02-19 RX ORDER — TIZANIDINE 4 MG/1
TABLET ORAL
Status: ON HOLD | COMMUNITY
End: 2021-02-20

## 2021-02-19 RX ORDER — POTASSIUM CHLORIDE 20 MEQ/1
40 TABLET, EXTENDED RELEASE ORAL
Status: COMPLETED | OUTPATIENT
Start: 2021-02-19 | End: 2021-02-19

## 2021-02-19 RX ORDER — METHYLPREDNISOLONE 4 MG/1
TABLET ORAL
Status: ON HOLD | COMMUNITY
End: 2021-02-20

## 2021-02-19 RX ORDER — HYDROCODONE BITARTRATE AND ACETAMINOPHEN 7.5; 325 MG/1; MG/1
TABLET ORAL
Status: ON HOLD | COMMUNITY
End: 2021-02-20 | Stop reason: SDUPTHER

## 2021-02-19 RX ORDER — GLUCAGON 1 MG
1 KIT INJECTION
Status: DISCONTINUED | OUTPATIENT
Start: 2021-02-19 | End: 2021-02-21 | Stop reason: HOSPADM

## 2021-02-19 RX ORDER — IBUPROFEN 200 MG
16 TABLET ORAL
Status: DISCONTINUED | OUTPATIENT
Start: 2021-02-19 | End: 2021-02-21 | Stop reason: HOSPADM

## 2021-02-19 RX ORDER — ESCITALOPRAM OXALATE 10 MG/1
10 TABLET ORAL DAILY
COMMUNITY
Start: 2020-11-23 | End: 2021-10-12 | Stop reason: CLARIF

## 2021-02-19 RX ORDER — ONDANSETRON 4 MG/1
TABLET, FILM COATED ORAL
Status: ON HOLD | COMMUNITY
End: 2021-02-20 | Stop reason: SDUPTHER

## 2021-02-19 RX ORDER — POTASSIUM CHLORIDE 7.45 MG/ML
10 INJECTION INTRAVENOUS
Status: COMPLETED | OUTPATIENT
Start: 2021-02-19 | End: 2021-02-19

## 2021-02-19 RX ORDER — TIZANIDINE 4 MG/1
TABLET ORAL
Status: ON HOLD | COMMUNITY
Start: 2020-12-15 | End: 2021-02-20 | Stop reason: SDUPTHER

## 2021-02-19 RX ORDER — LISINOPRIL AND HYDROCHLOROTHIAZIDE 12.5; 2 MG/1; MG/1
TABLET ORAL
Status: ON HOLD | COMMUNITY
Start: 2018-11-07 | End: 2021-02-20 | Stop reason: SDUPTHER

## 2021-02-19 RX ORDER — BUTALBITAL, ACETAMINOPHEN, CAFFEINE AND CODEINE PHOSPHATE 50; 325; 40; 30 MG/1; MG/1; MG/1; MG/1
CAPSULE ORAL
Status: ON HOLD | COMMUNITY
End: 2021-02-20 | Stop reason: SDUPTHER

## 2021-02-19 RX ORDER — ASCORBIC ACID 500 MG
500 TABLET ORAL DAILY
COMMUNITY
End: 2022-08-15

## 2021-02-19 RX ORDER — TRAZODONE HYDROCHLORIDE 150 MG/1
150 TABLET ORAL NIGHTLY PRN
Status: ON HOLD | COMMUNITY
Start: 2021-01-20 | End: 2021-02-20

## 2021-02-19 RX ORDER — ONDANSETRON 8 MG/1
8 TABLET, ORALLY DISINTEGRATING ORAL EVERY 6 HOURS PRN
Status: DISCONTINUED | OUTPATIENT
Start: 2021-02-20 | End: 2021-02-21 | Stop reason: HOSPADM

## 2021-02-19 RX ORDER — IBUPROFEN 200 MG
24 TABLET ORAL
Status: DISCONTINUED | OUTPATIENT
Start: 2021-02-19 | End: 2021-02-21 | Stop reason: HOSPADM

## 2021-02-19 RX ORDER — GUAIFENESIN 1200 MG
TABLET, EXTENDED RELEASE 12 HR ORAL
Status: ON HOLD | COMMUNITY
End: 2021-02-20 | Stop reason: ALTCHOICE

## 2021-02-19 RX ORDER — PAROXETINE 10 MG/1
TABLET, FILM COATED ORAL
Status: ON HOLD | COMMUNITY
End: 2021-02-20 | Stop reason: ALTCHOICE

## 2021-02-19 RX ORDER — ESCITALOPRAM OXALATE 10 MG/1
10 TABLET ORAL DAILY
Status: DISCONTINUED | OUTPATIENT
Start: 2021-02-20 | End: 2021-02-21 | Stop reason: HOSPADM

## 2021-02-19 RX ORDER — ATORVASTATIN CALCIUM 20 MG/1
20 TABLET, FILM COATED ORAL DAILY
Status: DISCONTINUED | OUTPATIENT
Start: 2021-02-20 | End: 2021-02-21 | Stop reason: HOSPADM

## 2021-02-19 RX ORDER — SODIUM CHLORIDE 0.9 % (FLUSH) 0.9 %
5 SYRINGE (ML) INJECTION
Status: DISCONTINUED | OUTPATIENT
Start: 2021-02-19 | End: 2021-02-21 | Stop reason: HOSPADM

## 2021-02-19 RX ORDER — TIZANIDINE HYDROCHLORIDE 4 MG/1
CAPSULE, GELATIN COATED ORAL
Status: ON HOLD | COMMUNITY
Start: 2018-11-07 | End: 2021-02-20 | Stop reason: SDUPTHER

## 2021-02-19 RX ORDER — CLONAZEPAM 1 MG/1
TABLET ORAL
Status: ON HOLD | COMMUNITY
Start: 2018-11-07 | End: 2021-02-20 | Stop reason: SDUPTHER

## 2021-02-19 RX ORDER — BUTALBITAL, ACETAMINOPHEN AND CAFFEINE 50; 325; 40 MG/1; MG/1; MG/1
TABLET ORAL
Status: ON HOLD | COMMUNITY
Start: 2018-11-07 | End: 2021-02-20 | Stop reason: SDUPTHER

## 2021-02-19 RX ORDER — LORAZEPAM 1 MG/1
TABLET ORAL
Status: ON HOLD | COMMUNITY
End: 2021-02-20

## 2021-02-19 RX ORDER — HYDROCODONE BITARTRATE AND ACETAMINOPHEN 7.5; 3 MG/1; MG/1
TABLET ORAL
Status: ON HOLD | COMMUNITY
End: 2021-02-20 | Stop reason: ALTCHOICE

## 2021-02-19 RX ORDER — MULTIVITAMIN
1 TABLET ORAL DAILY
COMMUNITY

## 2021-02-19 RX ADMIN — POTASSIUM CHLORIDE 40 MEQ: 1500 TABLET, EXTENDED RELEASE ORAL at 06:02

## 2021-02-19 RX ADMIN — POTASSIUM CHLORIDE 10 MEQ: 7.46 INJECTION, SOLUTION INTRAVENOUS at 08:02

## 2021-02-20 PROBLEM — R94.31 PROLONGED Q-T INTERVAL ON ECG: Status: ACTIVE | Noted: 2021-02-20

## 2021-02-20 PROBLEM — E87.6 HYPOKALEMIA: Status: ACTIVE | Noted: 2021-02-20

## 2021-02-20 LAB
ALBUMIN SERPL BCP-MCNC: 1.9 G/DL (ref 3.5–5.2)
ALBUMIN SERPL BCP-MCNC: 1.9 G/DL (ref 3.5–5.2)
ALP SERPL-CCNC: 89 U/L (ref 55–135)
ALP SERPL-CCNC: 91 U/L (ref 55–135)
ALT SERPL W/O P-5'-P-CCNC: 7 U/L (ref 10–44)
ALT SERPL W/O P-5'-P-CCNC: 7 U/L (ref 10–44)
ANION GAP SERPL CALC-SCNC: 10 MMOL/L (ref 8–16)
ANION GAP SERPL CALC-SCNC: 9 MMOL/L (ref 8–16)
ANION GAP SERPL CALC-SCNC: 9 MMOL/L (ref 8–16)
AST SERPL-CCNC: 20 U/L (ref 10–40)
AST SERPL-CCNC: 21 U/L (ref 10–40)
BASOPHILS # BLD AUTO: 0.07 K/UL (ref 0–0.2)
BASOPHILS # BLD AUTO: 0.13 K/UL (ref 0–0.2)
BASOPHILS NFR BLD: 0.5 % (ref 0–1.9)
BASOPHILS NFR BLD: 0.7 % (ref 0–1.9)
BILIRUB SERPL-MCNC: 0.5 MG/DL (ref 0.1–1)
BILIRUB SERPL-MCNC: 0.7 MG/DL (ref 0.1–1)
BUN SERPL-MCNC: 9 MG/DL (ref 8–23)
CALCIUM SERPL-MCNC: 7.4 MG/DL (ref 8.7–10.5)
CALCIUM SERPL-MCNC: 7.4 MG/DL (ref 8.7–10.5)
CALCIUM SERPL-MCNC: 7.6 MG/DL (ref 8.7–10.5)
CHLORIDE SERPL-SCNC: 103 MMOL/L (ref 95–110)
CHLORIDE SERPL-SCNC: 104 MMOL/L (ref 95–110)
CHLORIDE SERPL-SCNC: 104 MMOL/L (ref 95–110)
CO2 SERPL-SCNC: 28 MMOL/L (ref 23–29)
CO2 SERPL-SCNC: 28 MMOL/L (ref 23–29)
CO2 SERPL-SCNC: 29 MMOL/L (ref 23–29)
CREAT SERPL-MCNC: 1.1 MG/DL (ref 0.5–1.4)
CREAT SERPL-MCNC: 1.1 MG/DL (ref 0.5–1.4)
CREAT SERPL-MCNC: 1.2 MG/DL (ref 0.5–1.4)
DIFFERENTIAL METHOD: ABNORMAL
DIFFERENTIAL METHOD: ABNORMAL
EOSINOPHIL # BLD AUTO: 0.3 K/UL (ref 0–0.5)
EOSINOPHIL # BLD AUTO: 0.3 K/UL (ref 0–0.5)
EOSINOPHIL NFR BLD: 1.6 % (ref 0–8)
EOSINOPHIL NFR BLD: 1.8 % (ref 0–8)
ERYTHROCYTE [DISTWIDTH] IN BLOOD BY AUTOMATED COUNT: 15.9 % (ref 11.5–14.5)
ERYTHROCYTE [DISTWIDTH] IN BLOOD BY AUTOMATED COUNT: 15.9 % (ref 11.5–14.5)
EST. GFR  (AFRICAN AMERICAN): 53 ML/MIN/1.73 M^2
EST. GFR  (AFRICAN AMERICAN): 59 ML/MIN/1.73 M^2
EST. GFR  (AFRICAN AMERICAN): 59 ML/MIN/1.73 M^2
EST. GFR  (NON AFRICAN AMERICAN): 46 ML/MIN/1.73 M^2
EST. GFR  (NON AFRICAN AMERICAN): 51 ML/MIN/1.73 M^2
EST. GFR  (NON AFRICAN AMERICAN): 51 ML/MIN/1.73 M^2
ESTIMATED AVG GLUCOSE: 77 MG/DL (ref 68–131)
FERRITIN SERPL-MCNC: 82 NG/ML (ref 20–300)
FOLATE SERPL-MCNC: 6.3 NG/ML (ref 4–24)
GLUCOSE SERPL-MCNC: 91 MG/DL (ref 70–110)
GLUCOSE SERPL-MCNC: 97 MG/DL (ref 70–110)
GLUCOSE SERPL-MCNC: 98 MG/DL (ref 70–110)
HBA1C MFR BLD: 4.3 % (ref 4–5.6)
HCT VFR BLD AUTO: 28.7 % (ref 37–48.5)
HCT VFR BLD AUTO: 28.8 % (ref 37–48.5)
HCT VFR BLD AUTO: 29 % (ref 37–48.5)
HGB BLD-MCNC: 9.1 G/DL (ref 12–16)
HGB BLD-MCNC: 9.4 G/DL (ref 12–16)
HGB BLD-MCNC: 9.4 G/DL (ref 12–16)
IMM GRANULOCYTES # BLD AUTO: 0.08 K/UL (ref 0–0.04)
IMM GRANULOCYTES # BLD AUTO: 0.12 K/UL (ref 0–0.04)
IMM GRANULOCYTES NFR BLD AUTO: 0.6 % (ref 0–0.5)
IMM GRANULOCYTES NFR BLD AUTO: 0.7 % (ref 0–0.5)
IRON SERPL-MCNC: 81 UG/DL (ref 30–160)
LYMPHOCYTES # BLD AUTO: 2.2 K/UL (ref 1–4.8)
LYMPHOCYTES # BLD AUTO: 2.2 K/UL (ref 1–4.8)
LYMPHOCYTES NFR BLD: 12.3 % (ref 18–48)
LYMPHOCYTES NFR BLD: 15.5 % (ref 18–48)
MAGNESIUM SERPL-MCNC: 1 MG/DL (ref 1.6–2.6)
MAGNESIUM SERPL-MCNC: 1 MG/DL (ref 1.6–2.6)
MCH RBC QN AUTO: 29.7 PG (ref 27–31)
MCH RBC QN AUTO: 29.8 PG (ref 27–31)
MCHC RBC AUTO-ENTMCNC: 32.4 G/DL (ref 32–36)
MCHC RBC AUTO-ENTMCNC: 32.6 G/DL (ref 32–36)
MCV RBC AUTO: 91 FL (ref 82–98)
MCV RBC AUTO: 92 FL (ref 82–98)
MONOCYTES # BLD AUTO: 0.7 K/UL (ref 0.3–1)
MONOCYTES # BLD AUTO: 0.7 K/UL (ref 0.3–1)
MONOCYTES NFR BLD: 3.8 % (ref 4–15)
MONOCYTES NFR BLD: 4.8 % (ref 4–15)
NEUTROPHILS # BLD AUTO: 11 K/UL (ref 1.8–7.7)
NEUTROPHILS # BLD AUTO: 14.4 K/UL (ref 1.8–7.7)
NEUTROPHILS NFR BLD: 76.8 % (ref 38–73)
NEUTROPHILS NFR BLD: 80.9 % (ref 38–73)
NRBC BLD-RTO: 0 /100 WBC
NRBC BLD-RTO: 0 /100 WBC
PHOSPHATE SERPL-MCNC: 3.1 MG/DL (ref 2.7–4.5)
PHOSPHATE SERPL-MCNC: 3.2 MG/DL (ref 2.7–4.5)
PLATELET # BLD AUTO: 426 K/UL (ref 150–350)
PLATELET # BLD AUTO: 433 K/UL (ref 150–350)
PMV BLD AUTO: 10 FL (ref 9.2–12.9)
PMV BLD AUTO: 9.6 FL (ref 9.2–12.9)
POTASSIUM SERPL-SCNC: 2.8 MMOL/L (ref 3.5–5.1)
POTASSIUM SERPL-SCNC: 2.8 MMOL/L (ref 3.5–5.1)
POTASSIUM SERPL-SCNC: 3.1 MMOL/L (ref 3.5–5.1)
PROCALCITONIN SERPL IA-MCNC: 0.08 NG/ML
PROT SERPL-MCNC: 5 G/DL (ref 6–8.4)
PROT SERPL-MCNC: 5 G/DL (ref 6–8.4)
RBC # BLD AUTO: 3.15 M/UL (ref 4–5.4)
RBC # BLD AUTO: 3.17 M/UL (ref 4–5.4)
SATURATED IRON: 46 % (ref 20–50)
SODIUM SERPL-SCNC: 141 MMOL/L (ref 136–145)
SODIUM SERPL-SCNC: 141 MMOL/L (ref 136–145)
SODIUM SERPL-SCNC: 142 MMOL/L (ref 136–145)
T4 FREE SERPL-MCNC: 0.87 NG/DL (ref 0.71–1.51)
TOTAL IRON BINDING CAPACITY: 175 UG/DL (ref 250–450)
TRANSFERRIN SERPL-MCNC: 118 MG/DL (ref 200–375)
TRANSFERRIN SERPL-MCNC: 118 MG/DL (ref 200–375)
TSH SERPL DL<=0.005 MIU/L-ACNC: 4.84 UIU/ML (ref 0.4–4)
VIT B12 SERPL-MCNC: 677 PG/ML (ref 210–950)
WBC # BLD AUTO: 14.28 K/UL (ref 3.9–12.7)
WBC # BLD AUTO: 17.78 K/UL (ref 3.9–12.7)

## 2021-02-20 PROCEDURE — 80048 BASIC METABOLIC PNL TOTAL CA: CPT

## 2021-02-20 PROCEDURE — 25000003 PHARM REV CODE 250: Performed by: STUDENT IN AN ORGANIZED HEALTH CARE EDUCATION/TRAINING PROGRAM

## 2021-02-20 PROCEDURE — 82746 ASSAY OF FOLIC ACID SERUM: CPT

## 2021-02-20 PROCEDURE — 82607 VITAMIN B-12: CPT

## 2021-02-20 PROCEDURE — 11000001 HC ACUTE MED/SURG PRIVATE ROOM

## 2021-02-20 PROCEDURE — 97161 PT EVAL LOW COMPLEX 20 MIN: CPT

## 2021-02-20 PROCEDURE — 97166 OT EVAL MOD COMPLEX 45 MIN: CPT

## 2021-02-20 PROCEDURE — 80053 COMPREHEN METABOLIC PANEL: CPT | Mod: 91

## 2021-02-20 PROCEDURE — 83540 ASSAY OF IRON: CPT

## 2021-02-20 PROCEDURE — 63600175 PHARM REV CODE 636 W HCPCS: Performed by: STUDENT IN AN ORGANIZED HEALTH CARE EDUCATION/TRAINING PROGRAM

## 2021-02-20 PROCEDURE — 82728 ASSAY OF FERRITIN: CPT

## 2021-02-20 PROCEDURE — 85025 COMPLETE CBC W/AUTO DIFF WBC: CPT | Mod: 91

## 2021-02-20 PROCEDURE — 36415 COLL VENOUS BLD VENIPUNCTURE: CPT

## 2021-02-20 PROCEDURE — 83036 HEMOGLOBIN GLYCOSYLATED A1C: CPT

## 2021-02-20 PROCEDURE — 84100 ASSAY OF PHOSPHORUS: CPT

## 2021-02-20 PROCEDURE — 83735 ASSAY OF MAGNESIUM: CPT

## 2021-02-20 RX ORDER — MAGNESIUM SULFATE 1 G/100ML
1 INJECTION INTRAVENOUS
Status: COMPLETED | OUTPATIENT
Start: 2021-02-20 | End: 2021-02-20

## 2021-02-20 RX ORDER — POTASSIUM CHLORIDE 750 MG/1
10 TABLET, EXTENDED RELEASE ORAL ONCE
Status: CANCELLED | OUTPATIENT
Start: 2021-02-20 | End: 2021-02-20

## 2021-02-20 RX ORDER — POTASSIUM CHLORIDE 20 MEQ/1
40 TABLET, EXTENDED RELEASE ORAL ONCE
Status: COMPLETED | OUTPATIENT
Start: 2021-02-20 | End: 2021-02-20

## 2021-02-20 RX ORDER — LISINOPRIL 20 MG/1
20 TABLET ORAL DAILY
Status: DISCONTINUED | OUTPATIENT
Start: 2021-02-20 | End: 2021-02-21

## 2021-02-20 RX ORDER — LABETALOL HYDROCHLORIDE 5 MG/ML
20 INJECTION, SOLUTION INTRAVENOUS EVERY 4 HOURS PRN
Status: DISCONTINUED | OUTPATIENT
Start: 2021-02-20 | End: 2021-02-21 | Stop reason: HOSPADM

## 2021-02-20 RX ORDER — ENOXAPARIN SODIUM 100 MG/ML
40 INJECTION SUBCUTANEOUS EVERY 24 HOURS
Status: DISCONTINUED | OUTPATIENT
Start: 2021-02-20 | End: 2021-02-21 | Stop reason: HOSPADM

## 2021-02-20 RX ORDER — HYDROCHLOROTHIAZIDE 12.5 MG/1
12.5 TABLET ORAL DAILY
Status: DISCONTINUED | OUTPATIENT
Start: 2021-02-21 | End: 2021-02-20

## 2021-02-20 RX ORDER — PANTOPRAZOLE SODIUM 40 MG/1
40 TABLET, DELAYED RELEASE ORAL 2 TIMES DAILY
COMMUNITY
End: 2021-10-12 | Stop reason: CLARIF

## 2021-02-20 RX ORDER — HYDROCHLOROTHIAZIDE 12.5 MG/1
12.5 TABLET ORAL DAILY
Status: DISCONTINUED | OUTPATIENT
Start: 2021-02-20 | End: 2021-02-21

## 2021-02-20 RX ADMIN — LISINOPRIL 20 MG: 20 TABLET ORAL at 12:02

## 2021-02-20 RX ADMIN — MAGNESIUM SULFATE 1 G: 1 INJECTION INTRAVENOUS at 06:02

## 2021-02-20 RX ADMIN — HYDROCHLOROTHIAZIDE 12.5 MG: 12.5 TABLET ORAL at 04:02

## 2021-02-20 RX ADMIN — LABETALOL HYDROCHLORIDE 20 MG: 5 INJECTION INTRAVENOUS at 09:02

## 2021-02-20 RX ADMIN — POTASSIUM CHLORIDE 40 MEQ: 1500 TABLET, EXTENDED RELEASE ORAL at 06:02

## 2021-02-20 RX ADMIN — ATORVASTATIN CALCIUM 20 MG: 20 TABLET, FILM COATED ORAL at 08:02

## 2021-02-20 RX ADMIN — MAGNESIUM SULFATE 1 G: 1 INJECTION INTRAVENOUS at 03:02

## 2021-02-20 RX ADMIN — MAGNESIUM SULFATE 1 G: 1 INJECTION INTRAVENOUS at 01:02

## 2021-02-20 RX ADMIN — POTASSIUM CHLORIDE 40 MEQ: 1500 TABLET, EXTENDED RELEASE ORAL at 02:02

## 2021-02-20 RX ADMIN — ENOXAPARIN SODIUM 40 MG: 40 INJECTION SUBCUTANEOUS at 04:02

## 2021-02-20 RX ADMIN — MAGNESIUM SULFATE 1 G: 1 INJECTION INTRAVENOUS at 08:02

## 2021-02-20 RX ADMIN — MAGNESIUM SULFATE 1 G: 1 INJECTION INTRAVENOUS at 10:02

## 2021-02-20 RX ADMIN — ESCITALOPRAM OXALATE 10 MG: 10 TABLET ORAL at 08:02

## 2021-02-20 RX ADMIN — MAGNESIUM SULFATE 1 G: 1 INJECTION INTRAVENOUS at 05:02

## 2021-02-21 VITALS
TEMPERATURE: 99 F | HEIGHT: 64 IN | SYSTOLIC BLOOD PRESSURE: 178 MMHG | HEART RATE: 89 BPM | BODY MASS INDEX: 22.4 KG/M2 | DIASTOLIC BLOOD PRESSURE: 87 MMHG | OXYGEN SATURATION: 96 % | WEIGHT: 131.19 LBS | RESPIRATION RATE: 18 BRPM

## 2021-02-21 PROBLEM — D64.9 SYMPTOMATIC ANEMIA: Status: RESOLVED | Noted: 2021-02-19 | Resolved: 2021-02-21

## 2021-02-21 PROBLEM — E87.6 HYPOKALEMIA: Status: RESOLVED | Noted: 2021-02-20 | Resolved: 2021-02-21

## 2021-02-21 LAB
ALBUMIN SERPL BCP-MCNC: 1.9 G/DL (ref 3.5–5.2)
ALP SERPL-CCNC: 91 U/L (ref 55–135)
ALT SERPL W/O P-5'-P-CCNC: 8 U/L (ref 10–44)
ANION GAP SERPL CALC-SCNC: 11 MMOL/L (ref 8–16)
AST SERPL-CCNC: 20 U/L (ref 10–40)
BASOPHILS # BLD AUTO: 0.11 K/UL (ref 0–0.2)
BASOPHILS NFR BLD: 0.8 % (ref 0–1.9)
BILIRUB SERPL-MCNC: 0.3 MG/DL (ref 0.1–1)
BUN SERPL-MCNC: 8 MG/DL (ref 8–23)
CALCIUM SERPL-MCNC: 7.8 MG/DL (ref 8.7–10.5)
CHLORIDE SERPL-SCNC: 104 MMOL/L (ref 95–110)
CO2 SERPL-SCNC: 26 MMOL/L (ref 23–29)
CREAT SERPL-MCNC: 1.2 MG/DL (ref 0.5–1.4)
DIFFERENTIAL METHOD: ABNORMAL
EOSINOPHIL # BLD AUTO: 0.2 K/UL (ref 0–0.5)
EOSINOPHIL NFR BLD: 1.5 % (ref 0–8)
ERYTHROCYTE [DISTWIDTH] IN BLOOD BY AUTOMATED COUNT: 16.1 % (ref 11.5–14.5)
EST. GFR  (AFRICAN AMERICAN): 53 ML/MIN/1.73 M^2
EST. GFR  (NON AFRICAN AMERICAN): 46 ML/MIN/1.73 M^2
GLUCOSE SERPL-MCNC: 105 MG/DL (ref 70–110)
HCT VFR BLD AUTO: 28.4 % (ref 37–48.5)
HGB BLD-MCNC: 9.3 G/DL (ref 12–16)
IMM GRANULOCYTES # BLD AUTO: 0.05 K/UL (ref 0–0.04)
IMM GRANULOCYTES NFR BLD AUTO: 0.4 % (ref 0–0.5)
LYMPHOCYTES # BLD AUTO: 2.3 K/UL (ref 1–4.8)
LYMPHOCYTES NFR BLD: 17.5 % (ref 18–48)
MAGNESIUM SERPL-MCNC: 2.4 MG/DL (ref 1.6–2.6)
MCH RBC QN AUTO: 30.2 PG (ref 27–31)
MCHC RBC AUTO-ENTMCNC: 32.7 G/DL (ref 32–36)
MCV RBC AUTO: 92 FL (ref 82–98)
MONOCYTES # BLD AUTO: 0.7 K/UL (ref 0.3–1)
MONOCYTES NFR BLD: 5.3 % (ref 4–15)
NEUTROPHILS # BLD AUTO: 9.8 K/UL (ref 1.8–7.7)
NEUTROPHILS NFR BLD: 74.5 % (ref 38–73)
NRBC BLD-RTO: 0 /100 WBC
PHOSPHATE SERPL-MCNC: 2.8 MG/DL (ref 2.7–4.5)
PLATELET # BLD AUTO: 485 K/UL (ref 150–350)
PMV BLD AUTO: 10.4 FL (ref 9.2–12.9)
POTASSIUM SERPL-SCNC: 3.2 MMOL/L (ref 3.5–5.1)
PROT SERPL-MCNC: 5.1 G/DL (ref 6–8.4)
RBC # BLD AUTO: 3.08 M/UL (ref 4–5.4)
SODIUM SERPL-SCNC: 141 MMOL/L (ref 136–145)
WBC # BLD AUTO: 13.18 K/UL (ref 3.9–12.7)

## 2021-02-21 PROCEDURE — 84100 ASSAY OF PHOSPHORUS: CPT

## 2021-02-21 PROCEDURE — 83735 ASSAY OF MAGNESIUM: CPT

## 2021-02-21 PROCEDURE — 36415 COLL VENOUS BLD VENIPUNCTURE: CPT

## 2021-02-21 PROCEDURE — 25000003 PHARM REV CODE 250: Performed by: STUDENT IN AN ORGANIZED HEALTH CARE EDUCATION/TRAINING PROGRAM

## 2021-02-21 PROCEDURE — 85025 COMPLETE CBC W/AUTO DIFF WBC: CPT

## 2021-02-21 PROCEDURE — 80053 COMPREHEN METABOLIC PANEL: CPT

## 2021-02-21 RX ORDER — HYDROCHLOROTHIAZIDE 25 MG/1
25 TABLET ORAL DAILY
Status: DISCONTINUED | OUTPATIENT
Start: 2021-02-21 | End: 2021-02-21 | Stop reason: HOSPADM

## 2021-02-21 RX ORDER — LISINOPRIL 20 MG/1
40 TABLET ORAL DAILY
Status: DISCONTINUED | OUTPATIENT
Start: 2021-02-21 | End: 2021-02-21 | Stop reason: HOSPADM

## 2021-02-21 RX ORDER — HYDRALAZINE HYDROCHLORIDE 20 MG/ML
10 INJECTION INTRAMUSCULAR; INTRAVENOUS EVERY 8 HOURS PRN
Status: CANCELLED | OUTPATIENT
Start: 2021-02-21

## 2021-02-21 RX ORDER — POTASSIUM CHLORIDE 20 MEQ/1
40 TABLET, EXTENDED RELEASE ORAL EVERY 4 HOURS
Status: COMPLETED | OUTPATIENT
Start: 2021-02-21 | End: 2021-02-21

## 2021-02-21 RX ADMIN — POTASSIUM CHLORIDE 40 MEQ: 1500 TABLET, EXTENDED RELEASE ORAL at 08:02

## 2021-02-21 RX ADMIN — ATORVASTATIN CALCIUM 20 MG: 20 TABLET, FILM COATED ORAL at 08:02

## 2021-02-21 RX ADMIN — POTASSIUM CHLORIDE 40 MEQ: 1500 TABLET, EXTENDED RELEASE ORAL at 10:02

## 2021-02-21 RX ADMIN — ESCITALOPRAM OXALATE 10 MG: 10 TABLET ORAL at 08:02

## 2021-02-21 RX ADMIN — HYDROCHLOROTHIAZIDE 25 MG: 12.5 TABLET ORAL at 08:02

## 2021-02-21 RX ADMIN — LISINOPRIL 40 MG: 20 TABLET ORAL at 08:02

## 2021-02-21 RX ADMIN — LABETALOL HYDROCHLORIDE 20 MG: 5 INJECTION INTRAVENOUS at 01:02

## 2021-02-23 ENCOUNTER — PATIENT OUTREACH (OUTPATIENT)
Dept: ADMINISTRATIVE | Facility: CLINIC | Age: 71
End: 2021-02-23

## 2021-02-23 ENCOUNTER — TELEPHONE (OUTPATIENT)
Dept: SURGERY | Facility: CLINIC | Age: 71
End: 2021-02-23

## 2021-02-23 DIAGNOSIS — D64.9 SYMPTOMATIC ANEMIA: Primary | ICD-10-CM

## 2021-02-25 ENCOUNTER — HOSPITAL ENCOUNTER (INPATIENT)
Facility: HOSPITAL | Age: 71
LOS: 2 days | Discharge: HOME | DRG: 684 | End: 2021-02-27
Attending: EMERGENCY MEDICINE | Admitting: STUDENT IN AN ORGANIZED HEALTH CARE EDUCATION/TRAINING PROGRAM
Payer: MEDICARE

## 2021-02-25 DIAGNOSIS — R33.9 URINARY RETENTION: ICD-10-CM

## 2021-02-25 DIAGNOSIS — R10.84 GENERALIZED ABDOMINAL PAIN: ICD-10-CM

## 2021-02-25 DIAGNOSIS — E87.6 HYPOKALEMIA: ICD-10-CM

## 2021-02-25 DIAGNOSIS — N17.9 AKI (ACUTE KIDNEY INJURY): Primary | ICD-10-CM

## 2021-02-25 DIAGNOSIS — K92.2 GASTROINTESTINAL HEMORRHAGE, UNSPECIFIED GASTROINTESTINAL HEMORRHAGE TYPE: ICD-10-CM

## 2021-02-25 PROBLEM — R10.9 ABDOMINAL PAIN: Status: ACTIVE | Noted: 2021-02-25

## 2021-02-25 LAB
ALBUMIN SERPL BCP-MCNC: 2 G/DL (ref 3.5–5.2)
ALP SERPL-CCNC: 80 U/L (ref 55–135)
ALT SERPL W/O P-5'-P-CCNC: 7 U/L (ref 10–44)
ANION GAP SERPL CALC-SCNC: 10 MMOL/L (ref 8–16)
AST SERPL-CCNC: 18 U/L (ref 10–40)
BASOPHILS # BLD AUTO: 0.07 K/UL (ref 0–0.2)
BASOPHILS NFR BLD: 0.6 % (ref 0–1.9)
BILIRUB SERPL-MCNC: 0.2 MG/DL (ref 0.1–1)
BILIRUB UR QL STRIP: NEGATIVE
BUN SERPL-MCNC: 17 MG/DL (ref 8–23)
CALCIUM SERPL-MCNC: 7.5 MG/DL (ref 8.7–10.5)
CHLORIDE SERPL-SCNC: 106 MMOL/L (ref 95–110)
CLARITY UR REFRACT.AUTO: CLEAR
CO2 SERPL-SCNC: 26 MMOL/L (ref 23–29)
COLOR UR AUTO: NORMAL
CREAT SERPL-MCNC: 1.6 MG/DL (ref 0.5–1.4)
CTP QC/QA: YES
DIFFERENTIAL METHOD: ABNORMAL
EOSINOPHIL # BLD AUTO: 0 K/UL (ref 0–0.5)
EOSINOPHIL NFR BLD: 0.1 % (ref 0–8)
ERYTHROCYTE [DISTWIDTH] IN BLOOD BY AUTOMATED COUNT: 15.5 % (ref 11.5–14.5)
EST. GFR  (AFRICAN AMERICAN): 37.6 ML/MIN/1.73 M^2
EST. GFR  (NON AFRICAN AMERICAN): 32.6 ML/MIN/1.73 M^2
GLUCOSE SERPL-MCNC: 78 MG/DL (ref 70–110)
GLUCOSE UR QL STRIP: NEGATIVE
HCT VFR BLD AUTO: 27.4 % (ref 37–48.5)
HGB BLD-MCNC: 8.3 G/DL (ref 12–16)
HGB UR QL STRIP: NEGATIVE
IMM GRANULOCYTES # BLD AUTO: 0.06 K/UL (ref 0–0.04)
IMM GRANULOCYTES NFR BLD AUTO: 0.5 % (ref 0–0.5)
KETONES UR QL STRIP: NEGATIVE
LACTATE SERPL-SCNC: 1 MMOL/L (ref 0.5–2.2)
LEUKOCYTE ESTERASE UR QL STRIP: NEGATIVE
LIPASE SERPL-CCNC: 9 U/L (ref 4–60)
LYMPHOCYTES # BLD AUTO: 2.2 K/UL (ref 1–4.8)
LYMPHOCYTES NFR BLD: 18.4 % (ref 18–48)
MAGNESIUM SERPL-MCNC: 1.2 MG/DL (ref 1.6–2.6)
MCH RBC QN AUTO: 29.1 PG (ref 27–31)
MCHC RBC AUTO-ENTMCNC: 30.3 G/DL (ref 32–36)
MCV RBC AUTO: 96 FL (ref 82–98)
MONOCYTES # BLD AUTO: 0.6 K/UL (ref 0.3–1)
MONOCYTES NFR BLD: 5.1 % (ref 4–15)
NEUTROPHILS # BLD AUTO: 8.8 K/UL (ref 1.8–7.7)
NEUTROPHILS NFR BLD: 75.3 % (ref 38–73)
NITRITE UR QL STRIP: NEGATIVE
NRBC BLD-RTO: 0 /100 WBC
PH UR STRIP: 8 [PH] (ref 5–8)
PLATELET # BLD AUTO: 437 K/UL (ref 150–350)
PMV BLD AUTO: 9.7 FL (ref 9.2–12.9)
POTASSIUM SERPL-SCNC: 2.6 MMOL/L (ref 3.5–5.1)
PROT SERPL-MCNC: 4.4 G/DL (ref 6–8.4)
PROT UR QL STRIP: NEGATIVE
RBC # BLD AUTO: 2.85 M/UL (ref 4–5.4)
SARS-COV-2 RDRP RESP QL NAA+PROBE: NEGATIVE
SODIUM SERPL-SCNC: 142 MMOL/L (ref 136–145)
SP GR UR STRIP: 1.01 (ref 1–1.03)
URN SPEC COLLECT METH UR: NORMAL
WBC # BLD AUTO: 11.68 K/UL (ref 3.9–12.7)

## 2021-02-25 PROCEDURE — 63600175 PHARM REV CODE 636 W HCPCS: Performed by: EMERGENCY MEDICINE

## 2021-02-25 PROCEDURE — 96361 HYDRATE IV INFUSION ADD-ON: CPT

## 2021-02-25 PROCEDURE — 83735 ASSAY OF MAGNESIUM: CPT

## 2021-02-25 PROCEDURE — 83690 ASSAY OF LIPASE: CPT

## 2021-02-25 PROCEDURE — 99291 CRITICAL CARE FIRST HOUR: CPT | Mod: 25,,, | Performed by: EMERGENCY MEDICINE

## 2021-02-25 PROCEDURE — 63600175 PHARM REV CODE 636 W HCPCS: Performed by: INTERNAL MEDICINE

## 2021-02-25 PROCEDURE — 99285 EMERGENCY DEPT VISIT HI MDM: CPT | Mod: 25

## 2021-02-25 PROCEDURE — 96376 TX/PRO/DX INJ SAME DRUG ADON: CPT

## 2021-02-25 PROCEDURE — 25500020 PHARM REV CODE 255: Performed by: EMERGENCY MEDICINE

## 2021-02-25 PROCEDURE — 99292 CRITICAL CARE ADDL 30 MIN: CPT | Mod: ,,, | Performed by: EMERGENCY MEDICINE

## 2021-02-25 PROCEDURE — 96365 THER/PROPH/DIAG IV INF INIT: CPT

## 2021-02-25 PROCEDURE — U0002 COVID-19 LAB TEST NON-CDC: HCPCS | Performed by: EMERGENCY MEDICINE

## 2021-02-25 PROCEDURE — 85025 COMPLETE CBC W/AUTO DIFF WBC: CPT

## 2021-02-25 PROCEDURE — 11000001 HC ACUTE MED/SURG PRIVATE ROOM

## 2021-02-25 PROCEDURE — 99291 PR CRITICAL CARE, E/M 30-74 MINUTES: ICD-10-PCS | Mod: 25,,, | Performed by: EMERGENCY MEDICINE

## 2021-02-25 PROCEDURE — 99292 PR CRITICAL CARE, ADDL 30 MIN: ICD-10-PCS | Mod: ,,, | Performed by: EMERGENCY MEDICINE

## 2021-02-25 PROCEDURE — 25000003 PHARM REV CODE 250: Performed by: INTERNAL MEDICINE

## 2021-02-25 PROCEDURE — 25000003 PHARM REV CODE 250: Performed by: EMERGENCY MEDICINE

## 2021-02-25 PROCEDURE — 51798 US URINE CAPACITY MEASURE: CPT

## 2021-02-25 PROCEDURE — 96375 TX/PRO/DX INJ NEW DRUG ADDON: CPT

## 2021-02-25 PROCEDURE — 83605 ASSAY OF LACTIC ACID: CPT

## 2021-02-25 PROCEDURE — 80053 COMPREHEN METABOLIC PANEL: CPT

## 2021-02-25 PROCEDURE — 81003 URINALYSIS AUTO W/O SCOPE: CPT

## 2021-02-25 RX ORDER — SODIUM CHLORIDE, SODIUM LACTATE, POTASSIUM CHLORIDE, CALCIUM CHLORIDE 600; 310; 30; 20 MG/100ML; MG/100ML; MG/100ML; MG/100ML
INJECTION, SOLUTION INTRAVENOUS CONTINUOUS
Status: ACTIVE | OUTPATIENT
Start: 2021-02-25 | End: 2021-02-26

## 2021-02-25 RX ORDER — HYDROMORPHONE HYDROCHLORIDE 1 MG/ML
1 INJECTION, SOLUTION INTRAMUSCULAR; INTRAVENOUS; SUBCUTANEOUS EVERY 4 HOURS PRN
Status: DISCONTINUED | OUTPATIENT
Start: 2021-02-25 | End: 2021-02-26

## 2021-02-25 RX ORDER — ONDANSETRON 2 MG/ML
4 INJECTION INTRAMUSCULAR; INTRAVENOUS
Status: COMPLETED | OUTPATIENT
Start: 2021-02-25 | End: 2021-02-25

## 2021-02-25 RX ORDER — POTASSIUM CHLORIDE 7.45 MG/ML
10 INJECTION INTRAVENOUS
Status: COMPLETED | OUTPATIENT
Start: 2021-02-25 | End: 2021-02-25

## 2021-02-25 RX ORDER — ONDANSETRON 2 MG/ML
4 INJECTION INTRAMUSCULAR; INTRAVENOUS EVERY 4 HOURS PRN
Status: DISCONTINUED | OUTPATIENT
Start: 2021-02-25 | End: 2021-02-27 | Stop reason: HOSPADM

## 2021-02-25 RX ORDER — TAMSULOSIN HYDROCHLORIDE 0.4 MG/1
0.4 CAPSULE ORAL DAILY
Status: DISCONTINUED | OUTPATIENT
Start: 2021-02-25 | End: 2021-02-27 | Stop reason: HOSPADM

## 2021-02-25 RX ORDER — HYDROMORPHONE HYDROCHLORIDE 1 MG/ML
0.5 INJECTION, SOLUTION INTRAMUSCULAR; INTRAVENOUS; SUBCUTANEOUS
Status: COMPLETED | OUTPATIENT
Start: 2021-02-25 | End: 2021-02-25

## 2021-02-25 RX ORDER — PANTOPRAZOLE SODIUM 40 MG/1
40 TABLET, DELAYED RELEASE ORAL 2 TIMES DAILY
Status: DISCONTINUED | OUTPATIENT
Start: 2021-02-25 | End: 2021-02-27 | Stop reason: HOSPADM

## 2021-02-25 RX ORDER — CLONAZEPAM 0.5 MG/1
1 TABLET ORAL 2 TIMES DAILY PRN
Status: DISCONTINUED | OUTPATIENT
Start: 2021-02-25 | End: 2021-02-26

## 2021-02-25 RX ORDER — POTASSIUM CHLORIDE 20 MEQ/1
40 TABLET, EXTENDED RELEASE ORAL EVERY 6 HOURS
Status: DISCONTINUED | OUTPATIENT
Start: 2021-02-25 | End: 2021-02-26

## 2021-02-25 RX ORDER — MUPIROCIN 20 MG/G
OINTMENT TOPICAL 2 TIMES DAILY
Status: DISCONTINUED | OUTPATIENT
Start: 2021-02-25 | End: 2021-02-27 | Stop reason: HOSPADM

## 2021-02-25 RX ORDER — HYDRALAZINE HYDROCHLORIDE 20 MG/ML
10 INJECTION INTRAMUSCULAR; INTRAVENOUS
Status: COMPLETED | OUTPATIENT
Start: 2021-02-25 | End: 2021-02-25

## 2021-02-25 RX ORDER — CALCIUM CARBONATE 500(1250)
500 TABLET ORAL 2 TIMES DAILY WITH MEALS
Status: DISCONTINUED | OUTPATIENT
Start: 2021-02-25 | End: 2021-02-27 | Stop reason: HOSPADM

## 2021-02-25 RX ORDER — MAGNESIUM SULFATE HEPTAHYDRATE 40 MG/ML
4 INJECTION, SOLUTION INTRAVENOUS ONCE
Status: COMPLETED | OUTPATIENT
Start: 2021-02-25 | End: 2021-02-25

## 2021-02-25 RX ORDER — ESCITALOPRAM OXALATE 10 MG/1
10 TABLET ORAL DAILY
Status: DISCONTINUED | OUTPATIENT
Start: 2021-02-26 | End: 2021-02-25

## 2021-02-25 RX ORDER — HYDROMORPHONE HYDROCHLORIDE 1 MG/ML
1 INJECTION, SOLUTION INTRAMUSCULAR; INTRAVENOUS; SUBCUTANEOUS
Status: COMPLETED | OUTPATIENT
Start: 2021-02-25 | End: 2021-02-25

## 2021-02-25 RX ORDER — GLUCAGON 1 MG
1 KIT INJECTION
Status: DISCONTINUED | OUTPATIENT
Start: 2021-02-25 | End: 2021-02-27 | Stop reason: HOSPADM

## 2021-02-25 RX ORDER — OXYCODONE HYDROCHLORIDE 5 MG/1
5 TABLET ORAL EVERY 4 HOURS PRN
Status: DISCONTINUED | OUTPATIENT
Start: 2021-02-25 | End: 2021-02-26

## 2021-02-25 RX ORDER — IBUPROFEN 200 MG
16 TABLET ORAL
Status: DISCONTINUED | OUTPATIENT
Start: 2021-02-25 | End: 2021-02-27 | Stop reason: HOSPADM

## 2021-02-25 RX ORDER — IBUPROFEN 200 MG
24 TABLET ORAL
Status: DISCONTINUED | OUTPATIENT
Start: 2021-02-25 | End: 2021-02-27 | Stop reason: HOSPADM

## 2021-02-25 RX ORDER — ASCORBIC ACID 500 MG
500 TABLET ORAL 2 TIMES DAILY
Status: DISCONTINUED | OUTPATIENT
Start: 2021-02-25 | End: 2021-02-27 | Stop reason: HOSPADM

## 2021-02-25 RX ORDER — SODIUM CHLORIDE 0.9 % (FLUSH) 0.9 %
10 SYRINGE (ML) INJECTION
Status: DISCONTINUED | OUTPATIENT
Start: 2021-02-25 | End: 2021-02-27 | Stop reason: HOSPADM

## 2021-02-25 RX ORDER — LABETALOL HCL 20 MG/4 ML
10 SYRINGE (ML) INTRAVENOUS EVERY 4 HOURS PRN
Status: DISCONTINUED | OUTPATIENT
Start: 2021-02-25 | End: 2021-02-27 | Stop reason: HOSPADM

## 2021-02-25 RX ORDER — ACETAMINOPHEN 325 MG/1
650 TABLET ORAL EVERY 4 HOURS PRN
Status: DISCONTINUED | OUTPATIENT
Start: 2021-02-25 | End: 2021-02-27 | Stop reason: HOSPADM

## 2021-02-25 RX ORDER — HYDROCODONE BITARTRATE AND ACETAMINOPHEN 5; 325 MG/1; MG/1
1 TABLET ORAL
Status: COMPLETED | OUTPATIENT
Start: 2021-02-25 | End: 2021-02-25

## 2021-02-25 RX ADMIN — OXYCODONE HYDROCHLORIDE AND ACETAMINOPHEN 500 MG: 500 TABLET ORAL at 10:02

## 2021-02-25 RX ADMIN — HYDROMORPHONE HYDROCHLORIDE 1 MG: 1 INJECTION, SOLUTION INTRAMUSCULAR; INTRAVENOUS; SUBCUTANEOUS at 02:02

## 2021-02-25 RX ADMIN — IOHEXOL 75 ML: 350 INJECTION, SOLUTION INTRAVENOUS at 01:02

## 2021-02-25 RX ADMIN — HYDRALAZINE HYDROCHLORIDE 10 MG: 20 INJECTION INTRAMUSCULAR; INTRAVENOUS at 02:02

## 2021-02-25 RX ADMIN — POTASSIUM CHLORIDE 40 MEQ: 1500 TABLET, EXTENDED RELEASE ORAL at 06:02

## 2021-02-25 RX ADMIN — TAMSULOSIN HYDROCHLORIDE 0.4 MG: 0.4 CAPSULE ORAL at 06:02

## 2021-02-25 RX ADMIN — POTASSIUM CHLORIDE 10 MEQ: 7.46 INJECTION, SOLUTION INTRAVENOUS at 05:02

## 2021-02-25 RX ADMIN — CALCIUM 500 MG: 500 TABLET ORAL at 06:02

## 2021-02-25 RX ADMIN — MAGNESIUM SULFATE 4 G: 2 INJECTION INTRAVENOUS at 08:02

## 2021-02-25 RX ADMIN — SODIUM CHLORIDE, SODIUM LACTATE, POTASSIUM CHLORIDE, AND CALCIUM CHLORIDE 1000 ML: .6; .31; .03; .02 INJECTION, SOLUTION INTRAVENOUS at 03:02

## 2021-02-25 RX ADMIN — POTASSIUM CHLORIDE 10 MEQ: 7.46 INJECTION, SOLUTION INTRAVENOUS at 04:02

## 2021-02-25 RX ADMIN — HYDROCODONE BITARTRATE AND ACETAMINOPHEN 1 TABLET: 5; 325 TABLET ORAL at 04:02

## 2021-02-25 RX ADMIN — SODIUM CHLORIDE, SODIUM LACTATE, POTASSIUM CHLORIDE, AND CALCIUM CHLORIDE: .6; .31; .03; .02 INJECTION, SOLUTION INTRAVENOUS at 06:02

## 2021-02-25 RX ADMIN — ONDANSETRON 4 MG: 2 INJECTION INTRAMUSCULAR; INTRAVENOUS at 12:02

## 2021-02-25 RX ADMIN — HYDROMORPHONE HYDROCHLORIDE 0.5 MG: 1 INJECTION, SOLUTION INTRAMUSCULAR; INTRAVENOUS; SUBCUTANEOUS at 12:02

## 2021-02-25 RX ADMIN — PANTOPRAZOLE SODIUM 40 MG: 40 TABLET, DELAYED RELEASE ORAL at 10:02

## 2021-02-26 ENCOUNTER — TELEPHONE (OUTPATIENT)
Dept: UROLOGY | Facility: CLINIC | Age: 71
End: 2021-02-26

## 2021-02-26 PROBLEM — N13.30 HYDRONEPHROSIS: Status: ACTIVE | Noted: 2021-02-26

## 2021-02-26 LAB
ANION GAP SERPL CALC-SCNC: 12 MMOL/L (ref 8–16)
ANION GAP SERPL CALC-SCNC: 13 MMOL/L (ref 8–16)
BASOPHILS # BLD AUTO: 0.09 K/UL (ref 0–0.2)
BASOPHILS NFR BLD: 0.7 % (ref 0–1.9)
BUN SERPL-MCNC: 15 MG/DL (ref 8–23)
BUN SERPL-MCNC: 17 MG/DL (ref 8–23)
CALCIUM SERPL-MCNC: 7.6 MG/DL (ref 8.7–10.5)
CALCIUM SERPL-MCNC: 8.3 MG/DL (ref 8.7–10.5)
CHLORIDE SERPL-SCNC: 100 MMOL/L (ref 95–110)
CHLORIDE SERPL-SCNC: 102 MMOL/L (ref 95–110)
CO2 SERPL-SCNC: 25 MMOL/L (ref 23–29)
CO2 SERPL-SCNC: 29 MMOL/L (ref 23–29)
CREAT SERPL-MCNC: 1.3 MG/DL (ref 0.5–1.4)
CREAT SERPL-MCNC: 1.6 MG/DL (ref 0.5–1.4)
DIFFERENTIAL METHOD: ABNORMAL
EOSINOPHIL # BLD AUTO: 0.2 K/UL (ref 0–0.5)
EOSINOPHIL NFR BLD: 1.6 % (ref 0–8)
ERYTHROCYTE [DISTWIDTH] IN BLOOD BY AUTOMATED COUNT: 15.3 % (ref 11.5–14.5)
EST. GFR  (AFRICAN AMERICAN): 37.6 ML/MIN/1.73 M^2
EST. GFR  (AFRICAN AMERICAN): 48.4 ML/MIN/1.73 M^2
EST. GFR  (NON AFRICAN AMERICAN): 32.6 ML/MIN/1.73 M^2
EST. GFR  (NON AFRICAN AMERICAN): 42 ML/MIN/1.73 M^2
GLUCOSE SERPL-MCNC: 79 MG/DL (ref 70–110)
GLUCOSE SERPL-MCNC: 95 MG/DL (ref 70–110)
HCT VFR BLD AUTO: 28.2 % (ref 37–48.5)
HGB BLD-MCNC: 8.9 G/DL (ref 12–16)
IMM GRANULOCYTES # BLD AUTO: 0.06 K/UL (ref 0–0.04)
IMM GRANULOCYTES NFR BLD AUTO: 0.5 % (ref 0–0.5)
LYMPHOCYTES # BLD AUTO: 2.3 K/UL (ref 1–4.8)
LYMPHOCYTES NFR BLD: 18.2 % (ref 18–48)
MAGNESIUM SERPL-MCNC: 2 MG/DL (ref 1.6–2.6)
MCH RBC QN AUTO: 29.2 PG (ref 27–31)
MCHC RBC AUTO-ENTMCNC: 31.6 G/DL (ref 32–36)
MCV RBC AUTO: 93 FL (ref 82–98)
MONOCYTES # BLD AUTO: 0.6 K/UL (ref 0.3–1)
MONOCYTES NFR BLD: 5 % (ref 4–15)
NEUTROPHILS # BLD AUTO: 9.5 K/UL (ref 1.8–7.7)
NEUTROPHILS NFR BLD: 74 % (ref 38–73)
NRBC BLD-RTO: 0 /100 WBC
PHOSPHATE SERPL-MCNC: 3.5 MG/DL (ref 2.7–4.5)
PHOSPHATE SERPL-MCNC: 4.3 MG/DL (ref 2.7–4.5)
PLATELET # BLD AUTO: 478 K/UL (ref 150–350)
PMV BLD AUTO: 9.8 FL (ref 9.2–12.9)
POTASSIUM SERPL-SCNC: 3.8 MMOL/L (ref 3.5–5.1)
POTASSIUM SERPL-SCNC: 3.9 MMOL/L (ref 3.5–5.1)
RBC # BLD AUTO: 3.05 M/UL (ref 4–5.4)
SODIUM SERPL-SCNC: 139 MMOL/L (ref 136–145)
SODIUM SERPL-SCNC: 142 MMOL/L (ref 136–145)
WBC # BLD AUTO: 12.8 K/UL (ref 3.9–12.7)

## 2021-02-26 PROCEDURE — 25000003 PHARM REV CODE 250: Performed by: INTERNAL MEDICINE

## 2021-02-26 PROCEDURE — 25000003 PHARM REV CODE 250: Performed by: STUDENT IN AN ORGANIZED HEALTH CARE EDUCATION/TRAINING PROGRAM

## 2021-02-26 PROCEDURE — 80048 BASIC METABOLIC PNL TOTAL CA: CPT

## 2021-02-26 PROCEDURE — 85025 COMPLETE CBC W/AUTO DIFF WBC: CPT

## 2021-02-26 PROCEDURE — 83735 ASSAY OF MAGNESIUM: CPT

## 2021-02-26 PROCEDURE — 63600175 PHARM REV CODE 636 W HCPCS: Performed by: INTERNAL MEDICINE

## 2021-02-26 PROCEDURE — 99233 SBSQ HOSP IP/OBS HIGH 50: CPT | Mod: ,,, | Performed by: STUDENT IN AN ORGANIZED HEALTH CARE EDUCATION/TRAINING PROGRAM

## 2021-02-26 PROCEDURE — 99233 PR SUBSEQUENT HOSPITAL CARE,LEVL III: ICD-10-PCS | Mod: ,,, | Performed by: STUDENT IN AN ORGANIZED HEALTH CARE EDUCATION/TRAINING PROGRAM

## 2021-02-26 PROCEDURE — 36415 COLL VENOUS BLD VENIPUNCTURE: CPT

## 2021-02-26 PROCEDURE — 11000001 HC ACUTE MED/SURG PRIVATE ROOM

## 2021-02-26 PROCEDURE — 80048 BASIC METABOLIC PNL TOTAL CA: CPT | Mod: 91

## 2021-02-26 PROCEDURE — 84100 ASSAY OF PHOSPHORUS: CPT | Mod: 91

## 2021-02-26 PROCEDURE — 25000003 PHARM REV CODE 250: Performed by: EMERGENCY MEDICINE

## 2021-02-26 PROCEDURE — 84100 ASSAY OF PHOSPHORUS: CPT

## 2021-02-26 RX ORDER — OXYCODONE HYDROCHLORIDE 5 MG/1
5 TABLET ORAL EVERY 4 HOURS PRN
Status: DISCONTINUED | OUTPATIENT
Start: 2021-02-26 | End: 2021-02-27 | Stop reason: HOSPADM

## 2021-02-26 RX ORDER — POTASSIUM CHLORIDE 20 MEQ/1
20 TABLET, EXTENDED RELEASE ORAL ONCE
Status: COMPLETED | OUTPATIENT
Start: 2021-02-26 | End: 2021-02-26

## 2021-02-26 RX ORDER — NIFEDIPINE 60 MG/1
60 TABLET, EXTENDED RELEASE ORAL DAILY
Status: DISCONTINUED | OUTPATIENT
Start: 2021-02-26 | End: 2021-02-27

## 2021-02-26 RX ORDER — CLONAZEPAM 0.5 MG/1
1 TABLET ORAL DAILY PRN
Status: DISCONTINUED | OUTPATIENT
Start: 2021-02-26 | End: 2021-02-27 | Stop reason: HOSPADM

## 2021-02-26 RX ORDER — POTASSIUM CHLORIDE 20 MEQ/1
40 TABLET, EXTENDED RELEASE ORAL ONCE
Status: COMPLETED | OUTPATIENT
Start: 2021-02-26 | End: 2021-02-26

## 2021-02-26 RX ADMIN — THERA TABS 1 TABLET: TAB at 10:02

## 2021-02-26 RX ADMIN — TAMSULOSIN HYDROCHLORIDE 0.4 MG: 0.4 CAPSULE ORAL at 09:02

## 2021-02-26 RX ADMIN — POTASSIUM CHLORIDE 20 MEQ: 1500 TABLET, EXTENDED RELEASE ORAL at 12:02

## 2021-02-26 RX ADMIN — PANTOPRAZOLE SODIUM 40 MG: 40 TABLET, DELAYED RELEASE ORAL at 09:02

## 2021-02-26 RX ADMIN — MUPIROCIN: 20 OINTMENT TOPICAL at 10:02

## 2021-02-26 RX ADMIN — OXYCODONE HYDROCHLORIDE 5 MG: 5 TABLET ORAL at 05:02

## 2021-02-26 RX ADMIN — POTASSIUM CHLORIDE 20 MEQ: 1500 TABLET, EXTENDED RELEASE ORAL at 06:02

## 2021-02-26 RX ADMIN — CALCIUM 500 MG: 500 TABLET ORAL at 05:02

## 2021-02-26 RX ADMIN — OXYCODONE HYDROCHLORIDE 5 MG: 5 TABLET ORAL at 12:02

## 2021-02-26 RX ADMIN — CALCIUM 500 MG: 500 TABLET ORAL at 08:02

## 2021-02-26 RX ADMIN — POTASSIUM CHLORIDE 40 MEQ: 1500 TABLET, EXTENDED RELEASE ORAL at 08:02

## 2021-02-26 RX ADMIN — SODIUM CHLORIDE, SODIUM LACTATE, POTASSIUM CHLORIDE, AND CALCIUM CHLORIDE: .6; .31; .03; .02 INJECTION, SOLUTION INTRAVENOUS at 07:02

## 2021-02-26 RX ADMIN — OXYCODONE HYDROCHLORIDE AND ACETAMINOPHEN 500 MG: 500 TABLET ORAL at 09:02

## 2021-02-26 RX ADMIN — OXYCODONE HYDROCHLORIDE 5 MG: 5 TABLET ORAL at 09:02

## 2021-02-26 RX ADMIN — POTASSIUM CHLORIDE 40 MEQ: 1500 TABLET, EXTENDED RELEASE ORAL at 10:02

## 2021-02-26 RX ADMIN — NIFEDIPINE 60 MG: 60 TABLET, FILM COATED, EXTENDED RELEASE ORAL at 12:02

## 2021-02-26 RX ADMIN — HYDROMORPHONE HYDROCHLORIDE 1 MG: 1 INJECTION, SOLUTION INTRAMUSCULAR; INTRAVENOUS; SUBCUTANEOUS at 08:02

## 2021-02-26 RX ADMIN — OXYCODONE 5 MG: 5 TABLET ORAL at 01:02

## 2021-02-26 RX ADMIN — POTASSIUM CHLORIDE 40 MEQ: 1500 TABLET, EXTENDED RELEASE ORAL at 12:02

## 2021-02-27 VITALS
SYSTOLIC BLOOD PRESSURE: 173 MMHG | BODY MASS INDEX: 19.47 KG/M2 | TEMPERATURE: 99 F | RESPIRATION RATE: 18 BRPM | DIASTOLIC BLOOD PRESSURE: 76 MMHG | HEART RATE: 96 BPM | WEIGHT: 116.88 LBS | HEIGHT: 65 IN | OXYGEN SATURATION: 96 %

## 2021-02-27 LAB
ANION GAP SERPL CALC-SCNC: 8 MMOL/L (ref 8–16)
BASOPHILS # BLD AUTO: 0.04 K/UL (ref 0–0.2)
BASOPHILS NFR BLD: 0.4 % (ref 0–1.9)
BUN SERPL-MCNC: 15 MG/DL (ref 8–23)
CALCIUM SERPL-MCNC: 7.7 MG/DL (ref 8.7–10.5)
CHLORIDE SERPL-SCNC: 102 MMOL/L (ref 95–110)
CO2 SERPL-SCNC: 28 MMOL/L (ref 23–29)
CREAT SERPL-MCNC: 1.4 MG/DL (ref 0.5–1.4)
DIFFERENTIAL METHOD: ABNORMAL
EOSINOPHIL # BLD AUTO: 0.4 K/UL (ref 0–0.5)
EOSINOPHIL NFR BLD: 3.7 % (ref 0–8)
ERYTHROCYTE [DISTWIDTH] IN BLOOD BY AUTOMATED COUNT: 15.2 % (ref 11.5–14.5)
EST. GFR  (AFRICAN AMERICAN): 44.2 ML/MIN/1.73 M^2
EST. GFR  (NON AFRICAN AMERICAN): 38.4 ML/MIN/1.73 M^2
GLUCOSE SERPL-MCNC: 96 MG/DL (ref 70–110)
HCT VFR BLD AUTO: 27.7 % (ref 37–48.5)
HGB BLD-MCNC: 8.5 G/DL (ref 12–16)
IMM GRANULOCYTES # BLD AUTO: 0.05 K/UL (ref 0–0.04)
IMM GRANULOCYTES NFR BLD AUTO: 0.5 % (ref 0–0.5)
LYMPHOCYTES # BLD AUTO: 2.4 K/UL (ref 1–4.8)
LYMPHOCYTES NFR BLD: 21.4 % (ref 18–48)
MAGNESIUM SERPL-MCNC: 1.5 MG/DL (ref 1.6–2.6)
MCH RBC QN AUTO: 29 PG (ref 27–31)
MCHC RBC AUTO-ENTMCNC: 30.7 G/DL (ref 32–36)
MCV RBC AUTO: 95 FL (ref 82–98)
MONOCYTES # BLD AUTO: 0.6 K/UL (ref 0.3–1)
MONOCYTES NFR BLD: 5.4 % (ref 4–15)
NEUTROPHILS # BLD AUTO: 7.6 K/UL (ref 1.8–7.7)
NEUTROPHILS NFR BLD: 68.6 % (ref 38–73)
NRBC BLD-RTO: 0 /100 WBC
PHOSPHATE SERPL-MCNC: 3 MG/DL (ref 2.7–4.5)
PLATELET # BLD AUTO: 398 K/UL (ref 150–350)
PMV BLD AUTO: 10 FL (ref 9.2–12.9)
POTASSIUM SERPL-SCNC: 3.6 MMOL/L (ref 3.5–5.1)
RBC # BLD AUTO: 2.93 M/UL (ref 4–5.4)
SODIUM SERPL-SCNC: 138 MMOL/L (ref 136–145)
WBC # BLD AUTO: 11.01 K/UL (ref 3.9–12.7)

## 2021-02-27 PROCEDURE — 25000003 PHARM REV CODE 250: Performed by: STUDENT IN AN ORGANIZED HEALTH CARE EDUCATION/TRAINING PROGRAM

## 2021-02-27 PROCEDURE — 84100 ASSAY OF PHOSPHORUS: CPT

## 2021-02-27 PROCEDURE — 99239 HOSP IP/OBS DSCHRG MGMT >30: CPT | Mod: ,,, | Performed by: STUDENT IN AN ORGANIZED HEALTH CARE EDUCATION/TRAINING PROGRAM

## 2021-02-27 PROCEDURE — 83735 ASSAY OF MAGNESIUM: CPT

## 2021-02-27 PROCEDURE — 99239 PR HOSPITAL DISCHARGE DAY,>30 MIN: ICD-10-PCS | Mod: ,,, | Performed by: STUDENT IN AN ORGANIZED HEALTH CARE EDUCATION/TRAINING PROGRAM

## 2021-02-27 PROCEDURE — 80048 BASIC METABOLIC PNL TOTAL CA: CPT

## 2021-02-27 PROCEDURE — 25000003 PHARM REV CODE 250: Performed by: INTERNAL MEDICINE

## 2021-02-27 PROCEDURE — 63600175 PHARM REV CODE 636 W HCPCS: Performed by: STUDENT IN AN ORGANIZED HEALTH CARE EDUCATION/TRAINING PROGRAM

## 2021-02-27 PROCEDURE — 85025 COMPLETE CBC W/AUTO DIFF WBC: CPT

## 2021-02-27 PROCEDURE — 36415 COLL VENOUS BLD VENIPUNCTURE: CPT

## 2021-02-27 RX ORDER — POTASSIUM CHLORIDE 20 MEQ/1
40 TABLET, EXTENDED RELEASE ORAL ONCE
Status: COMPLETED | OUTPATIENT
Start: 2021-02-27 | End: 2021-02-27

## 2021-02-27 RX ORDER — NIFEDIPINE 90 MG/1
90 TABLET, EXTENDED RELEASE ORAL DAILY
Qty: 30 TABLET | Refills: 1 | Status: SHIPPED | OUTPATIENT
Start: 2021-02-27 | End: 2021-10-12 | Stop reason: CLARIF

## 2021-02-27 RX ORDER — OXYCODONE HYDROCHLORIDE 5 MG/1
5 TABLET ORAL EVERY 12 HOURS PRN
Qty: 4 TABLET | Refills: 0 | Status: SHIPPED | OUTPATIENT
Start: 2021-02-27 | End: 2021-02-27 | Stop reason: SDUPTHER

## 2021-02-27 RX ORDER — OXYCODONE HYDROCHLORIDE 5 MG/1
5 TABLET ORAL EVERY 12 HOURS PRN
Qty: 4 TABLET | Refills: 0 | Status: SHIPPED | OUTPATIENT
Start: 2021-02-27 | End: 2021-03-01

## 2021-02-27 RX ORDER — MAGNESIUM SULFATE HEPTAHYDRATE 40 MG/ML
2 INJECTION, SOLUTION INTRAVENOUS ONCE
Status: COMPLETED | OUTPATIENT
Start: 2021-02-27 | End: 2021-02-27

## 2021-02-27 RX ORDER — TAMSULOSIN HYDROCHLORIDE 0.4 MG/1
0.4 CAPSULE ORAL DAILY
Qty: 90 CAPSULE | Refills: 3 | Status: SHIPPED | OUTPATIENT
Start: 2021-02-27 | End: 2022-08-15

## 2021-02-27 RX ADMIN — PANTOPRAZOLE SODIUM 40 MG: 40 TABLET, DELAYED RELEASE ORAL at 09:02

## 2021-02-27 RX ADMIN — OXYCODONE HYDROCHLORIDE 5 MG: 5 TABLET ORAL at 06:02

## 2021-02-27 RX ADMIN — OXYCODONE HYDROCHLORIDE AND ACETAMINOPHEN 500 MG: 500 TABLET ORAL at 09:02

## 2021-02-27 RX ADMIN — POTASSIUM CHLORIDE 40 MEQ: 1500 TABLET, EXTENDED RELEASE ORAL at 09:02

## 2021-02-27 RX ADMIN — NIFEDIPINE 90 MG: 60 TABLET, FILM COATED, EXTENDED RELEASE ORAL at 09:02

## 2021-02-27 RX ADMIN — CALCIUM 500 MG: 500 TABLET ORAL at 09:02

## 2021-02-27 RX ADMIN — TAMSULOSIN HYDROCHLORIDE 0.4 MG: 0.4 CAPSULE ORAL at 09:02

## 2021-02-27 RX ADMIN — THERA TABS 1 TABLET: TAB at 09:02

## 2021-02-27 RX ADMIN — MAGNESIUM SULFATE 2 G: 2 INJECTION INTRAVENOUS at 09:02

## 2021-02-28 ENCOUNTER — HOSPITAL ENCOUNTER (EMERGENCY)
Facility: HOSPITAL | Age: 71
Discharge: HOME | End: 2021-02-28
Attending: EMERGENCY MEDICINE
Payer: MEDICARE

## 2021-02-28 VITALS
SYSTOLIC BLOOD PRESSURE: 200 MMHG | TEMPERATURE: 99 F | DIASTOLIC BLOOD PRESSURE: 99 MMHG | WEIGHT: 116.88 LBS | BODY MASS INDEX: 19.47 KG/M2 | HEIGHT: 65 IN | OXYGEN SATURATION: 96 % | RESPIRATION RATE: 18 BRPM | HEART RATE: 108 BPM

## 2021-02-28 DIAGNOSIS — R33.9 URINARY RETENTION: Primary | ICD-10-CM

## 2021-02-28 DIAGNOSIS — T83.9XXD COMPLICATION OF FOLEY CATHETER, SUBSEQUENT ENCOUNTER: ICD-10-CM

## 2021-02-28 LAB
BACTERIA #/AREA URNS AUTO: ABNORMAL /HPF
BILIRUB UR QL STRIP: NEGATIVE
CLARITY UR REFRACT.AUTO: ABNORMAL
COLOR UR AUTO: YELLOW
GLUCOSE UR QL STRIP: NEGATIVE
HGB UR QL STRIP: ABNORMAL
HYALINE CASTS UR QL AUTO: 1 /LPF
KETONES UR QL STRIP: NEGATIVE
LEUKOCYTE ESTERASE UR QL STRIP: ABNORMAL
MICROSCOPIC COMMENT: ABNORMAL
NITRITE UR QL STRIP: NEGATIVE
PH UR STRIP: 8 [PH] (ref 5–8)
PROT UR QL STRIP: ABNORMAL
RBC #/AREA URNS AUTO: 22 /HPF (ref 0–4)
SP GR UR STRIP: 1.01 (ref 1–1.03)
SQUAMOUS #/AREA URNS AUTO: 0 /HPF
URN SPEC COLLECT METH UR: ABNORMAL
WBC #/AREA URNS AUTO: 31 /HPF (ref 0–5)

## 2021-02-28 PROCEDURE — 87086 URINE CULTURE/COLONY COUNT: CPT

## 2021-02-28 PROCEDURE — 81001 URINALYSIS AUTO W/SCOPE: CPT

## 2021-02-28 PROCEDURE — 51702 INSERT TEMP BLADDER CATH: CPT

## 2021-02-28 PROCEDURE — 99283 EMERGENCY DEPT VISIT LOW MDM: CPT | Mod: 25

## 2021-02-28 PROCEDURE — 99282 PR EMERGENCY DEPT VISIT,LEVEL II: ICD-10-PCS | Mod: ,,, | Performed by: EMERGENCY MEDICINE

## 2021-02-28 PROCEDURE — 99282 EMERGENCY DEPT VISIT SF MDM: CPT | Mod: ,,, | Performed by: EMERGENCY MEDICINE

## 2021-03-01 ENCOUNTER — PATIENT OUTREACH (OUTPATIENT)
Dept: ADMINISTRATIVE | Facility: CLINIC | Age: 71
End: 2021-03-01

## 2021-03-01 LAB — BACTERIA UR CULT: NO GROWTH

## 2021-03-02 ENCOUNTER — HOSPITAL ENCOUNTER (EMERGENCY)
Facility: HOSPITAL | Age: 71
Discharge: HOME | End: 2021-03-03
Attending: EMERGENCY MEDICINE
Payer: MEDICARE

## 2021-03-02 ENCOUNTER — TELEPHONE (OUTPATIENT)
Dept: UROLOGY | Facility: CLINIC | Age: 71
End: 2021-03-02

## 2021-03-02 ENCOUNTER — OFFICE VISIT (OUTPATIENT)
Dept: SURGERY | Facility: CLINIC | Age: 71
End: 2021-03-02
Payer: MEDICARE

## 2021-03-02 ENCOUNTER — EXTERNAL HOME HEALTH (OUTPATIENT)
Dept: HOME HEALTH SERVICES | Facility: HOSPITAL | Age: 71
End: 2021-03-02

## 2021-03-02 ENCOUNTER — DOCUMENT SCAN (OUTPATIENT)
Dept: HOME HEALTH SERVICES | Facility: HOSPITAL | Age: 71
End: 2021-03-02

## 2021-03-02 ENCOUNTER — OFFICE VISIT (OUTPATIENT)
Dept: UROLOGY | Facility: CLINIC | Age: 71
End: 2021-03-02
Payer: MEDICARE

## 2021-03-02 VITALS
BODY MASS INDEX: 17.89 KG/M2 | HEART RATE: 114 BPM | SYSTOLIC BLOOD PRESSURE: 155 MMHG | HEIGHT: 65 IN | DIASTOLIC BLOOD PRESSURE: 92 MMHG | WEIGHT: 107.38 LBS

## 2021-03-02 VITALS
BODY MASS INDEX: 18.23 KG/M2 | DIASTOLIC BLOOD PRESSURE: 54 MMHG | TEMPERATURE: 98 F | HEIGHT: 65 IN | WEIGHT: 109.44 LBS | HEART RATE: 118 BPM | SYSTOLIC BLOOD PRESSURE: 98 MMHG

## 2021-03-02 DIAGNOSIS — R33.9 URINARY RETENTION: Primary | ICD-10-CM

## 2021-03-02 DIAGNOSIS — Z48.89 POSTOPERATIVE VISIT: ICD-10-CM

## 2021-03-02 DIAGNOSIS — K25.1 ACUTE GASTRIC ULCER WITH PERFORATION: Primary | ICD-10-CM

## 2021-03-02 PROBLEM — N17.9 AKI (ACUTE KIDNEY INJURY): Status: RESOLVED | Noted: 2021-02-25 | Resolved: 2021-03-02

## 2021-03-02 PROBLEM — D62 ACUTE BLOOD LOSS ANEMIA: Status: RESOLVED | Noted: 2021-02-19 | Resolved: 2021-03-02

## 2021-03-02 PROCEDURE — 99213 OFFICE O/P EST LOW 20 MIN: CPT | Mod: PBBFAC | Performed by: SURGERY

## 2021-03-02 PROCEDURE — 99024 POSTOP FOLLOW-UP VISIT: CPT | Mod: POP,,, | Performed by: SURGERY

## 2021-03-02 PROCEDURE — 99213 OFFICE O/P EST LOW 20 MIN: CPT | Mod: PBBFAC,27,25 | Performed by: PHYSICIAN ASSISTANT

## 2021-03-02 PROCEDURE — 99999 PR PBB SHADOW E&M-EST. PATIENT-LVL III: ICD-10-PCS | Mod: PBBFAC,,, | Performed by: SURGERY

## 2021-03-02 PROCEDURE — 99999 PR PBB SHADOW E&M-EST. PATIENT-LVL III: CPT | Mod: PBBFAC,,, | Performed by: PHYSICIAN ASSISTANT

## 2021-03-02 PROCEDURE — 99499 UNLISTED E&M SERVICE: CPT | Mod: S$PBB,,, | Performed by: PHYSICIAN ASSISTANT

## 2021-03-02 PROCEDURE — 51700 PR IRRIGATION, BLADDER: ICD-10-PCS | Mod: S$PBB,,, | Performed by: PHYSICIAN ASSISTANT

## 2021-03-02 PROCEDURE — 99999 PR PBB SHADOW E&M-EST. PATIENT-LVL III: CPT | Mod: PBBFAC,,, | Performed by: SURGERY

## 2021-03-02 PROCEDURE — 51700 IRRIGATION OF BLADDER: CPT | Mod: PBBFAC | Performed by: PHYSICIAN ASSISTANT

## 2021-03-02 PROCEDURE — 99999 PR PBB SHADOW E&M-EST. PATIENT-LVL III: ICD-10-PCS | Mod: PBBFAC,,, | Performed by: PHYSICIAN ASSISTANT

## 2021-03-02 PROCEDURE — 99499 NO LOS: ICD-10-PCS | Mod: S$PBB,,, | Performed by: PHYSICIAN ASSISTANT

## 2021-03-02 PROCEDURE — 99284 EMERGENCY DEPT VISIT MOD MDM: CPT | Mod: ,,, | Performed by: PHYSICIAN ASSISTANT

## 2021-03-02 PROCEDURE — 51702 INSERT TEMP BLADDER CATH: CPT

## 2021-03-02 PROCEDURE — 99024 PR POST-OP FOLLOW-UP VISIT: ICD-10-PCS | Mod: POP,,, | Performed by: SURGERY

## 2021-03-02 PROCEDURE — 99284 PR EMERGENCY DEPT VISIT,LEVEL IV: ICD-10-PCS | Mod: ,,, | Performed by: PHYSICIAN ASSISTANT

## 2021-03-02 PROCEDURE — 99283 EMERGENCY DEPT VISIT LOW MDM: CPT | Mod: 25,27

## 2021-03-02 PROCEDURE — 51798 US URINE CAPACITY MEASURE: CPT

## 2021-03-02 PROCEDURE — 51700 IRRIGATION OF BLADDER: CPT | Mod: S$PBB,,, | Performed by: PHYSICIAN ASSISTANT

## 2021-03-03 VITALS
BODY MASS INDEX: 18.2 KG/M2 | HEART RATE: 80 BPM | RESPIRATION RATE: 16 BRPM | DIASTOLIC BLOOD PRESSURE: 70 MMHG | TEMPERATURE: 98 F | OXYGEN SATURATION: 98 % | WEIGHT: 109.38 LBS | SYSTOLIC BLOOD PRESSURE: 150 MMHG

## 2021-03-08 ENCOUNTER — DOCUMENT SCAN (OUTPATIENT)
Dept: HOME HEALTH SERVICES | Facility: HOSPITAL | Age: 71
End: 2021-03-08

## 2021-03-08 ENCOUNTER — DOCUMENT SCAN (OUTPATIENT)
Dept: HOME HEALTH SERVICES | Facility: HOSPITAL | Age: 71
End: 2021-03-08
Payer: MEDICARE

## 2021-03-09 ENCOUNTER — TELEPHONE (OUTPATIENT)
Dept: SURGERY | Facility: CLINIC | Age: 71
End: 2021-03-09

## 2021-03-09 DIAGNOSIS — Z71.89 OSTOMY NURSE CONSULTATION: Primary | ICD-10-CM

## 2021-03-11 ENCOUNTER — OFFICE VISIT (OUTPATIENT)
Dept: WOUND CARE | Facility: CLINIC | Age: 71
End: 2021-03-11
Payer: MEDICARE

## 2021-03-11 VITALS — WEIGHT: 102.75 LBS | HEIGHT: 65 IN | BODY MASS INDEX: 17.12 KG/M2

## 2021-03-11 DIAGNOSIS — Z71.89 ENCOUNTER FOR OSTOMY CARE EDUCATION: ICD-10-CM

## 2021-03-11 DIAGNOSIS — Z43.2 ATTENTION TO ILEOSTOMY: Primary | ICD-10-CM

## 2021-03-11 DIAGNOSIS — Z71.89 OSTOMY NURSE CONSULTATION: ICD-10-CM

## 2021-03-11 DIAGNOSIS — L24.9 IRRITANT CONTACT DERMATITIS DUE TO ILEOSTOMY: ICD-10-CM

## 2021-03-11 DIAGNOSIS — K94.19 IRRITANT CONTACT DERMATITIS DUE TO ILEOSTOMY: ICD-10-CM

## 2021-03-11 PROCEDURE — 99999 PR PBB SHADOW E&M-EST. PATIENT-LVL II: ICD-10-PCS | Mod: PBBFAC,,, | Performed by: CLINICAL NURSE SPECIALIST

## 2021-03-11 PROCEDURE — 99204 OFFICE O/P NEW MOD 45 MIN: CPT | Mod: S$PBB,,, | Performed by: CLINICAL NURSE SPECIALIST

## 2021-03-11 PROCEDURE — 99212 OFFICE O/P EST SF 10 MIN: CPT | Mod: PBBFAC | Performed by: CLINICAL NURSE SPECIALIST

## 2021-03-11 PROCEDURE — 99204 PR OFFICE/OUTPT VISIT, NEW, LEVL IV, 45-59 MIN: ICD-10-PCS | Mod: S$PBB,,, | Performed by: CLINICAL NURSE SPECIALIST

## 2021-03-11 PROCEDURE — 99999 PR PBB SHADOW E&M-EST. PATIENT-LVL II: CPT | Mod: PBBFAC,,, | Performed by: CLINICAL NURSE SPECIALIST

## 2021-03-16 ENCOUNTER — TELEPHONE (OUTPATIENT)
Dept: SURGERY | Facility: CLINIC | Age: 71
End: 2021-03-16

## 2021-03-16 ENCOUNTER — OFFICE VISIT (OUTPATIENT)
Dept: SURGERY | Facility: CLINIC | Age: 71
End: 2021-03-16
Payer: MEDICARE

## 2021-03-16 ENCOUNTER — LAB VISIT (OUTPATIENT)
Dept: LAB | Facility: HOSPITAL | Age: 71
End: 2021-03-16
Attending: SURGERY
Payer: MEDICARE

## 2021-03-16 VITALS
HEIGHT: 65 IN | BODY MASS INDEX: 18.03 KG/M2 | TEMPERATURE: 98 F | WEIGHT: 108.25 LBS | HEART RATE: 96 BPM | DIASTOLIC BLOOD PRESSURE: 54 MMHG | SYSTOLIC BLOOD PRESSURE: 108 MMHG

## 2021-03-16 DIAGNOSIS — Z48.89 POSTOPERATIVE VISIT: Primary | ICD-10-CM

## 2021-03-16 DIAGNOSIS — K25.1 ACUTE GASTRIC ULCER WITH PERFORATION: ICD-10-CM

## 2021-03-16 LAB
ANION GAP SERPL CALC-SCNC: 12 MMOL/L (ref 8–16)
BUN SERPL-MCNC: 11 MG/DL (ref 8–23)
CALCIUM SERPL-MCNC: 9.8 MG/DL (ref 8.7–10.5)
CHLORIDE SERPL-SCNC: 101 MMOL/L (ref 95–110)
CO2 SERPL-SCNC: 29 MMOL/L (ref 23–29)
CREAT SERPL-MCNC: 0.8 MG/DL (ref 0.5–1.4)
EST. GFR  (AFRICAN AMERICAN): >60 ML/MIN/1.73 M^2
EST. GFR  (NON AFRICAN AMERICAN): >60 ML/MIN/1.73 M^2
GLUCOSE SERPL-MCNC: 132 MG/DL (ref 70–110)
POTASSIUM SERPL-SCNC: 2.7 MMOL/L (ref 3.5–5.1)
SODIUM SERPL-SCNC: 142 MMOL/L (ref 136–145)

## 2021-03-16 PROCEDURE — 99024 POSTOP FOLLOW-UP VISIT: CPT | Mod: POP,,, | Performed by: SURGERY

## 2021-03-16 PROCEDURE — 99999 PR PBB SHADOW E&M-EST. PATIENT-LVL III: ICD-10-PCS | Mod: PBBFAC,,, | Performed by: SURGERY

## 2021-03-16 PROCEDURE — 99024 PR POST-OP FOLLOW-UP VISIT: ICD-10-PCS | Mod: POP,,, | Performed by: SURGERY

## 2021-03-16 PROCEDURE — 99999 PR PBB SHADOW E&M-EST. PATIENT-LVL III: CPT | Mod: PBBFAC,,, | Performed by: SURGERY

## 2021-03-16 PROCEDURE — 80048 BASIC METABOLIC PNL TOTAL CA: CPT | Performed by: SURGERY

## 2021-03-16 PROCEDURE — 99213 OFFICE O/P EST LOW 20 MIN: CPT | Mod: PBBFAC | Performed by: SURGERY

## 2021-03-16 PROCEDURE — 36415 COLL VENOUS BLD VENIPUNCTURE: CPT | Performed by: SURGERY

## 2021-03-16 RX ORDER — POTASSIUM CHLORIDE 20 MEQ/1
20 TABLET, EXTENDED RELEASE ORAL DAILY
Qty: 14 TABLET | Refills: 0 | Status: SHIPPED | OUTPATIENT
Start: 2021-03-16

## 2021-03-23 ENCOUNTER — OFFICE VISIT (OUTPATIENT)
Dept: UROLOGY | Facility: CLINIC | Age: 71
End: 2021-03-23
Payer: MEDICARE

## 2021-03-23 VITALS
HEIGHT: 65 IN | DIASTOLIC BLOOD PRESSURE: 83 MMHG | WEIGHT: 108 LBS | SYSTOLIC BLOOD PRESSURE: 151 MMHG | HEART RATE: 100 BPM | BODY MASS INDEX: 17.99 KG/M2

## 2021-03-23 DIAGNOSIS — R33.9 URINARY RETENTION: ICD-10-CM

## 2021-03-23 DIAGNOSIS — Z46.6 ENCOUNTER FOR FOLEY CATHETER REMOVAL: Primary | ICD-10-CM

## 2021-03-23 PROCEDURE — 99213 OFFICE O/P EST LOW 20 MIN: CPT | Mod: PBBFAC | Performed by: PHYSICIAN ASSISTANT

## 2021-03-23 PROCEDURE — 99999 PR PBB SHADOW E&M-EST. PATIENT-LVL III: ICD-10-PCS | Mod: PBBFAC,,, | Performed by: PHYSICIAN ASSISTANT

## 2021-03-23 PROCEDURE — 99499 UNLISTED E&M SERVICE: CPT | Mod: S$PBB,,, | Performed by: PHYSICIAN ASSISTANT

## 2021-03-23 PROCEDURE — 99499 NO LOS: ICD-10-PCS | Mod: S$PBB,,, | Performed by: PHYSICIAN ASSISTANT

## 2021-03-23 PROCEDURE — 51700 IRRIGATION OF BLADDER: CPT | Mod: PBBFAC | Performed by: PHYSICIAN ASSISTANT

## 2021-03-23 PROCEDURE — 51700 IRRIGATION OF BLADDER: CPT | Mod: S$PBB,,, | Performed by: PHYSICIAN ASSISTANT

## 2021-03-23 PROCEDURE — 51700 PR IRRIGATION, BLADDER: ICD-10-PCS | Mod: S$PBB,,, | Performed by: PHYSICIAN ASSISTANT

## 2021-03-23 PROCEDURE — 99999 PR PBB SHADOW E&M-EST. PATIENT-LVL III: CPT | Mod: PBBFAC,,, | Performed by: PHYSICIAN ASSISTANT

## 2021-07-20 ENCOUNTER — TELEPHONE (OUTPATIENT)
Dept: SURGERY | Facility: CLINIC | Age: 71
End: 2021-07-20

## 2021-08-03 ENCOUNTER — OFFICE VISIT (OUTPATIENT)
Dept: SURGERY | Facility: CLINIC | Age: 71
End: 2021-08-03
Payer: MEDICARE

## 2021-08-03 VITALS
HEART RATE: 85 BPM | HEIGHT: 65 IN | BODY MASS INDEX: 19.1 KG/M2 | OXYGEN SATURATION: 97 % | TEMPERATURE: 99 F | SYSTOLIC BLOOD PRESSURE: 120 MMHG | DIASTOLIC BLOOD PRESSURE: 59 MMHG | WEIGHT: 114.63 LBS

## 2021-08-03 DIAGNOSIS — Z93.2 ILEOSTOMY PRESENT: Primary | ICD-10-CM

## 2021-08-03 PROCEDURE — 99213 PR OFFICE/OUTPT VISIT, EST, LEVL III, 20-29 MIN: ICD-10-PCS | Mod: S$PBB,,, | Performed by: SURGERY

## 2021-08-03 PROCEDURE — 99999 PR PBB SHADOW E&M-EST. PATIENT-LVL III: ICD-10-PCS | Mod: PBBFAC,,, | Performed by: SURGERY

## 2021-08-03 PROCEDURE — 99999 PR PBB SHADOW E&M-EST. PATIENT-LVL III: CPT | Mod: PBBFAC,,, | Performed by: SURGERY

## 2021-08-03 PROCEDURE — 99213 OFFICE O/P EST LOW 20 MIN: CPT | Mod: S$PBB,,, | Performed by: SURGERY

## 2021-08-03 PROCEDURE — 99213 OFFICE O/P EST LOW 20 MIN: CPT | Mod: PBBFAC | Performed by: SURGERY

## 2021-09-08 ENCOUNTER — TELEPHONE (OUTPATIENT)
Dept: SURGERY | Facility: CLINIC | Age: 71
End: 2021-09-08

## 2021-09-21 ENCOUNTER — OFFICE VISIT (OUTPATIENT)
Dept: SURGERY | Facility: CLINIC | Age: 71
End: 2021-09-21
Payer: MEDICARE

## 2021-09-21 VITALS
WEIGHT: 110 LBS | TEMPERATURE: 98 F | OXYGEN SATURATION: 99 % | DIASTOLIC BLOOD PRESSURE: 65 MMHG | HEART RATE: 108 BPM | BODY MASS INDEX: 18.33 KG/M2 | SYSTOLIC BLOOD PRESSURE: 108 MMHG | HEIGHT: 65 IN

## 2021-09-21 DIAGNOSIS — Z93.2 ILEOSTOMY PRESENT: Primary | ICD-10-CM

## 2021-09-21 PROCEDURE — 99999 PR PBB SHADOW E&M-EST. PATIENT-LVL V: ICD-10-PCS | Mod: PBBFAC,,, | Performed by: SURGERY

## 2021-09-21 PROCEDURE — 99213 PR OFFICE/OUTPT VISIT, EST, LEVL III, 20-29 MIN: ICD-10-PCS | Mod: S$PBB,,, | Performed by: SURGERY

## 2021-09-21 PROCEDURE — 99213 OFFICE O/P EST LOW 20 MIN: CPT | Mod: S$PBB,,, | Performed by: SURGERY

## 2021-09-21 PROCEDURE — 99215 OFFICE O/P EST HI 40 MIN: CPT | Mod: PBBFAC | Performed by: SURGERY

## 2021-09-21 PROCEDURE — 99999 PR PBB SHADOW E&M-EST. PATIENT-LVL V: CPT | Mod: PBBFAC,,, | Performed by: SURGERY

## 2021-10-11 ENCOUNTER — LAB VISIT (OUTPATIENT)
Dept: SPORTS MEDICINE | Facility: CLINIC | Age: 71
DRG: 331 | End: 2021-10-11
Payer: MEDICARE

## 2021-10-11 DIAGNOSIS — Z93.2 ILEOSTOMY PRESENT: ICD-10-CM

## 2021-10-11 LAB
SARS-COV-2 RNA RESP QL NAA+PROBE: NOT DETECTED
SARS-COV-2- CYCLE NUMBER: NORMAL

## 2021-10-11 PROCEDURE — U0005 INFEC AGEN DETEC AMPLI PROBE: HCPCS | Performed by: SURGERY

## 2021-10-11 PROCEDURE — U0003 INFECTIOUS AGENT DETECTION BY NUCLEIC ACID (DNA OR RNA); SEVERE ACUTE RESPIRATORY SYNDROME CORONAVIRUS 2 (SARS-COV-2) (CORONAVIRUS DISEASE [COVID-19]), AMPLIFIED PROBE TECHNIQUE, MAKING USE OF HIGH THROUGHPUT TECHNOLOGIES AS DESCRIBED BY CMS-2020-01-R: HCPCS | Performed by: SURGERY

## 2021-10-12 ENCOUNTER — TELEPHONE (OUTPATIENT)
Dept: SURGERY | Facility: CLINIC | Age: 71
End: 2021-10-12

## 2021-10-12 ENCOUNTER — ANESTHESIA EVENT (OUTPATIENT)
Dept: SURGERY | Facility: HOSPITAL | Age: 71
DRG: 331 | End: 2021-10-12
Payer: MEDICARE

## 2021-10-13 ENCOUNTER — ANESTHESIA (OUTPATIENT)
Dept: SURGERY | Facility: HOSPITAL | Age: 71
DRG: 331 | End: 2021-10-13
Payer: MEDICARE

## 2021-10-13 ENCOUNTER — HOSPITAL ENCOUNTER (INPATIENT)
Facility: HOSPITAL | Age: 71
LOS: 6 days | Discharge: HOME HEALTH | DRG: 331 | End: 2021-10-19
Attending: SURGERY | Admitting: SURGERY
Payer: MEDICARE

## 2021-10-13 DIAGNOSIS — R10.31 RIGHT LOWER QUADRANT ABDOMINAL PAIN: ICD-10-CM

## 2021-10-13 DIAGNOSIS — Z98.890 S/P CLOSURE OF ILEOSTOMY: Primary | ICD-10-CM

## 2021-10-13 LAB
ALBUMIN SERPL BCP-MCNC: 3.5 G/DL (ref 3.5–5.2)
ALP SERPL-CCNC: 64 U/L (ref 55–135)
ALT SERPL W/O P-5'-P-CCNC: 22 U/L (ref 10–44)
ANION GAP SERPL CALC-SCNC: 12 MMOL/L (ref 8–16)
AST SERPL-CCNC: 23 U/L (ref 10–40)
BASOPHILS # BLD AUTO: 0.09 K/UL (ref 0–0.2)
BASOPHILS NFR BLD: 0.4 % (ref 0–1.9)
BILIRUB SERPL-MCNC: 0.3 MG/DL (ref 0.1–1)
BUN SERPL-MCNC: 16 MG/DL (ref 8–23)
CALCIUM SERPL-MCNC: 9.3 MG/DL (ref 8.7–10.5)
CHLORIDE SERPL-SCNC: 111 MMOL/L (ref 95–110)
CO2 SERPL-SCNC: 17 MMOL/L (ref 23–29)
CREAT SERPL-MCNC: 1.1 MG/DL (ref 0.5–1.4)
DIFFERENTIAL METHOD: ABNORMAL
EOSINOPHIL # BLD AUTO: 0 K/UL (ref 0–0.5)
EOSINOPHIL NFR BLD: 0.1 % (ref 0–8)
ERYTHROCYTE [DISTWIDTH] IN BLOOD BY AUTOMATED COUNT: 15.3 % (ref 11.5–14.5)
EST. GFR  (AFRICAN AMERICAN): 59.2 ML/MIN/1.73 M^2
EST. GFR  (NON AFRICAN AMERICAN): 51.3 ML/MIN/1.73 M^2
GLUCOSE SERPL-MCNC: 117 MG/DL (ref 70–110)
HCT VFR BLD AUTO: 41.4 % (ref 37–48.5)
HGB BLD-MCNC: 13.1 G/DL (ref 12–16)
IMM GRANULOCYTES # BLD AUTO: 0.14 K/UL (ref 0–0.04)
IMM GRANULOCYTES NFR BLD AUTO: 0.6 % (ref 0–0.5)
LYMPHOCYTES # BLD AUTO: 2.6 K/UL (ref 1–4.8)
LYMPHOCYTES NFR BLD: 11.4 % (ref 18–48)
MCH RBC QN AUTO: 33.2 PG (ref 27–31)
MCHC RBC AUTO-ENTMCNC: 31.6 G/DL (ref 32–36)
MCV RBC AUTO: 105 FL (ref 82–98)
MONOCYTES # BLD AUTO: 1 K/UL (ref 0.3–1)
MONOCYTES NFR BLD: 4.3 % (ref 4–15)
NEUTROPHILS # BLD AUTO: 18.7 K/UL (ref 1.8–7.7)
NEUTROPHILS NFR BLD: 83.2 % (ref 38–73)
NRBC BLD-RTO: 0 /100 WBC
PLATELET # BLD AUTO: 389 K/UL (ref 150–450)
PMV BLD AUTO: 9.5 FL (ref 9.2–12.9)
POTASSIUM SERPL-SCNC: 4.3 MMOL/L (ref 3.5–5.1)
PROT SERPL-MCNC: 6 G/DL (ref 6–8.4)
RBC # BLD AUTO: 3.95 M/UL (ref 4–5.4)
SODIUM SERPL-SCNC: 140 MMOL/L (ref 136–145)
WBC # BLD AUTO: 22.43 K/UL (ref 3.9–12.7)

## 2021-10-13 PROCEDURE — 88342 CHG IMMUNOCYTOCHEMISTRY: ICD-10-PCS | Mod: 26,,, | Performed by: PATHOLOGY

## 2021-10-13 PROCEDURE — 88341 PR IHC OR ICC EACH ADD'L SINGLE ANTIBODY  STAINPR: ICD-10-PCS | Mod: 26,,, | Performed by: PATHOLOGY

## 2021-10-13 PROCEDURE — 25000003 PHARM REV CODE 250: Performed by: SURGERY

## 2021-10-13 PROCEDURE — D9220A PRA ANESTHESIA: ICD-10-PCS | Mod: ,,, | Performed by: ANESTHESIOLOGY

## 2021-10-13 PROCEDURE — 63600175 PHARM REV CODE 636 W HCPCS

## 2021-10-13 PROCEDURE — 71000033 HC RECOVERY, INTIAL HOUR: Performed by: SURGERY

## 2021-10-13 PROCEDURE — 88304 PR  SURG PATH,LEVEL III: ICD-10-PCS | Mod: 26,,, | Performed by: PATHOLOGY

## 2021-10-13 PROCEDURE — 88304 TISSUE EXAM BY PATHOLOGIST: CPT | Mod: 26,,, | Performed by: PATHOLOGY

## 2021-10-13 PROCEDURE — D9220A PRA ANESTHESIA: Mod: ,,, | Performed by: ANESTHESIOLOGY

## 2021-10-13 PROCEDURE — 80053 COMPREHEN METABOLIC PANEL: CPT

## 2021-10-13 PROCEDURE — 85025 COMPLETE CBC W/AUTO DIFF WBC: CPT

## 2021-10-13 PROCEDURE — 63600175 PHARM REV CODE 636 W HCPCS: Performed by: STUDENT IN AN ORGANIZED HEALTH CARE EDUCATION/TRAINING PROGRAM

## 2021-10-13 PROCEDURE — 36000707: Performed by: SURGERY

## 2021-10-13 PROCEDURE — 36000706: Performed by: SURGERY

## 2021-10-13 PROCEDURE — 71000015 HC POSTOP RECOV 1ST HR: Performed by: SURGERY

## 2021-10-13 PROCEDURE — 20600001 HC STEP DOWN PRIVATE ROOM

## 2021-10-13 PROCEDURE — 71000016 HC POSTOP RECOV ADDL HR: Performed by: SURGERY

## 2021-10-13 PROCEDURE — 88342 IMHCHEM/IMCYTCHM 1ST ANTB: CPT | Mod: 26,,, | Performed by: PATHOLOGY

## 2021-10-13 PROCEDURE — 37000009 HC ANESTHESIA EA ADD 15 MINS: Performed by: SURGERY

## 2021-10-13 PROCEDURE — 25000003 PHARM REV CODE 250

## 2021-10-13 PROCEDURE — 63600175 PHARM REV CODE 636 W HCPCS: Performed by: NURSE ANESTHETIST, CERTIFIED REGISTERED

## 2021-10-13 PROCEDURE — 88341 IMHCHEM/IMCYTCHM EA ADD ANTB: CPT | Mod: 26,,, | Performed by: PATHOLOGY

## 2021-10-13 PROCEDURE — 25000003 PHARM REV CODE 250: Performed by: NURSE ANESTHETIST, CERTIFIED REGISTERED

## 2021-10-13 PROCEDURE — 27201423 OPTIME MED/SURG SUP & DEVICES STERILE SUPPLY: Performed by: SURGERY

## 2021-10-13 PROCEDURE — 25000003 PHARM REV CODE 250: Performed by: STUDENT IN AN ORGANIZED HEALTH CARE EDUCATION/TRAINING PROGRAM

## 2021-10-13 PROCEDURE — 37000008 HC ANESTHESIA 1ST 15 MINUTES: Performed by: SURGERY

## 2021-10-13 PROCEDURE — 44625 PR CLOSE ENTEROSTOMY,RESEC+ANAST: ICD-10-PCS | Mod: 22,,, | Performed by: SURGERY

## 2021-10-13 PROCEDURE — 88304 TISSUE EXAM BY PATHOLOGIST: CPT | Performed by: PATHOLOGY

## 2021-10-13 PROCEDURE — 44625 REPAIR BOWEL OPENING: CPT | Mod: 22,,, | Performed by: SURGERY

## 2021-10-13 RX ORDER — LISINOPRIL AND HYDROCHLOROTHIAZIDE 12.5; 2 MG/1; MG/1
1 TABLET ORAL 2 TIMES DAILY
COMMUNITY

## 2021-10-13 RX ORDER — LIDOCAINE HYDROCHLORIDE 20 MG/ML
INJECTION INTRAVENOUS
Status: DISCONTINUED | OUTPATIENT
Start: 2021-10-13 | End: 2021-10-13

## 2021-10-13 RX ORDER — PROCHLORPERAZINE EDISYLATE 5 MG/ML
5 INJECTION INTRAMUSCULAR; INTRAVENOUS EVERY 30 MIN PRN
Status: DISCONTINUED | OUTPATIENT
Start: 2021-10-13 | End: 2021-10-13 | Stop reason: HOSPADM

## 2021-10-13 RX ORDER — HYDROMORPHONE HCL IN 0.9% NACL 6 MG/30 ML
PATIENT CONTROLLED ANALGESIA SYRINGE INTRAVENOUS
Status: COMPLETED
Start: 2021-10-13 | End: 2021-10-13

## 2021-10-13 RX ORDER — CEFOXITIN 2 G/1
2 INJECTION, POWDER, FOR SOLUTION INTRAVENOUS
Status: DISCONTINUED | OUTPATIENT
Start: 2021-10-13 | End: 2021-10-14

## 2021-10-13 RX ORDER — DEXAMETHASONE SODIUM PHOSPHATE 4 MG/ML
INJECTION, SOLUTION INTRA-ARTICULAR; INTRALESIONAL; INTRAMUSCULAR; INTRAVENOUS; SOFT TISSUE
Status: DISCONTINUED | OUTPATIENT
Start: 2021-10-13 | End: 2021-10-13

## 2021-10-13 RX ORDER — MUPIROCIN 20 MG/G
OINTMENT TOPICAL 2 TIMES DAILY
Status: COMPLETED | OUTPATIENT
Start: 2021-10-13 | End: 2021-10-18

## 2021-10-13 RX ORDER — SODIUM CHLORIDE 9 MG/ML
INJECTION, SOLUTION INTRAVENOUS CONTINUOUS
Status: DISCONTINUED | OUTPATIENT
Start: 2021-10-13 | End: 2021-10-13

## 2021-10-13 RX ORDER — FENTANYL CITRATE 50 UG/ML
INJECTION, SOLUTION INTRAMUSCULAR; INTRAVENOUS
Status: DISCONTINUED | OUTPATIENT
Start: 2021-10-13 | End: 2021-10-13

## 2021-10-13 RX ORDER — LABETALOL HCL 20 MG/4 ML
10 SYRINGE (ML) INTRAVENOUS EVERY 4 HOURS PRN
Status: DISCONTINUED | OUTPATIENT
Start: 2021-10-13 | End: 2021-10-19 | Stop reason: HOSPADM

## 2021-10-13 RX ORDER — CEFAZOLIN SODIUM 1 G/3ML
2 INJECTION, POWDER, FOR SOLUTION INTRAMUSCULAR; INTRAVENOUS
Status: DISCONTINUED | OUTPATIENT
Start: 2021-10-13 | End: 2021-10-13

## 2021-10-13 RX ORDER — LABETALOL HCL 20 MG/4 ML
10 SYRINGE (ML) INTRAVENOUS EVERY 4 HOURS PRN
Status: DISCONTINUED | OUTPATIENT
Start: 2021-10-13 | End: 2021-10-13

## 2021-10-13 RX ORDER — FAMOTIDINE 10 MG/ML
20 INJECTION INTRAVENOUS EVERY 12 HOURS
Status: DISCONTINUED | OUTPATIENT
Start: 2021-10-13 | End: 2021-10-14

## 2021-10-13 RX ORDER — FENTANYL CITRATE 50 UG/ML
25 INJECTION, SOLUTION INTRAMUSCULAR; INTRAVENOUS EVERY 5 MIN PRN
Status: COMPLETED | OUTPATIENT
Start: 2021-10-13 | End: 2021-10-13

## 2021-10-13 RX ORDER — ACETAMINOPHEN 10 MG/ML
15 INJECTION, SOLUTION INTRAVENOUS EVERY 8 HOURS
Status: COMPLETED | OUTPATIENT
Start: 2021-10-13 | End: 2021-10-14

## 2021-10-13 RX ORDER — ACETAMINOPHEN 10 MG/ML
INJECTION, SOLUTION INTRAVENOUS
Status: DISCONTINUED | OUTPATIENT
Start: 2021-10-13 | End: 2021-10-13

## 2021-10-13 RX ORDER — HALOPERIDOL 5 MG/ML
0.5 INJECTION INTRAMUSCULAR EVERY 10 MIN PRN
Status: DISCONTINUED | OUTPATIENT
Start: 2021-10-13 | End: 2021-10-13 | Stop reason: HOSPADM

## 2021-10-13 RX ORDER — LABETALOL HCL 20 MG/4 ML
SYRINGE (ML) INTRAVENOUS
Status: COMPLETED
Start: 2021-10-13 | End: 2021-10-13

## 2021-10-13 RX ORDER — PHENYLEPHRINE HYDROCHLORIDE 10 MG/ML
INJECTION INTRAVENOUS
Status: DISCONTINUED | OUTPATIENT
Start: 2021-10-13 | End: 2021-10-13

## 2021-10-13 RX ORDER — CEFAZOLIN SODIUM 1 G/3ML
INJECTION, POWDER, FOR SOLUTION INTRAMUSCULAR; INTRAVENOUS
Status: DISCONTINUED | OUTPATIENT
Start: 2021-10-13 | End: 2021-10-13

## 2021-10-13 RX ORDER — KETAMINE HCL IN 0.9 % NACL 50 MG/5 ML
SYRINGE (ML) INTRAVENOUS
Status: DISCONTINUED | OUTPATIENT
Start: 2021-10-13 | End: 2021-10-13

## 2021-10-13 RX ORDER — ENOXAPARIN SODIUM 100 MG/ML
40 INJECTION SUBCUTANEOUS EVERY 24 HOURS
Status: DISCONTINUED | OUTPATIENT
Start: 2021-10-13 | End: 2021-10-14

## 2021-10-13 RX ORDER — HYDROMORPHONE HYDROCHLORIDE 2 MG/ML
INJECTION, SOLUTION INTRAMUSCULAR; INTRAVENOUS; SUBCUTANEOUS
Status: DISCONTINUED | OUTPATIENT
Start: 2021-10-13 | End: 2021-10-13

## 2021-10-13 RX ORDER — PROPOFOL 10 MG/ML
VIAL (ML) INTRAVENOUS
Status: DISCONTINUED | OUTPATIENT
Start: 2021-10-13 | End: 2021-10-13

## 2021-10-13 RX ORDER — ROCURONIUM BROMIDE 10 MG/ML
INJECTION, SOLUTION INTRAVENOUS
Status: DISCONTINUED | OUTPATIENT
Start: 2021-10-13 | End: 2021-10-13

## 2021-10-13 RX ORDER — ONDANSETRON 2 MG/ML
8 INJECTION INTRAMUSCULAR; INTRAVENOUS EVERY 6 HOURS PRN
Status: DISCONTINUED | OUTPATIENT
Start: 2021-10-13 | End: 2021-10-19 | Stop reason: HOSPADM

## 2021-10-13 RX ORDER — SODIUM CHLORIDE, SODIUM LACTATE, POTASSIUM CHLORIDE, CALCIUM CHLORIDE 600; 310; 30; 20 MG/100ML; MG/100ML; MG/100ML; MG/100ML
INJECTION, SOLUTION INTRAVENOUS CONTINUOUS
Status: DISCONTINUED | OUTPATIENT
Start: 2021-10-13 | End: 2021-10-14

## 2021-10-13 RX ORDER — SODIUM CHLORIDE 0.9 % (FLUSH) 0.9 %
10 SYRINGE (ML) INJECTION
Status: DISCONTINUED | OUTPATIENT
Start: 2021-10-13 | End: 2021-10-19 | Stop reason: HOSPADM

## 2021-10-13 RX ORDER — PROCHLORPERAZINE EDISYLATE 5 MG/ML
5 INJECTION INTRAMUSCULAR; INTRAVENOUS EVERY 6 HOURS PRN
Status: DISCONTINUED | OUTPATIENT
Start: 2021-10-13 | End: 2021-10-19 | Stop reason: HOSPADM

## 2021-10-13 RX ORDER — HYDROMORPHONE HCL IN 0.9% NACL 6 MG/30 ML
PATIENT CONTROLLED ANALGESIA SYRINGE INTRAVENOUS CONTINUOUS
Status: DISCONTINUED | OUTPATIENT
Start: 2021-10-13 | End: 2021-10-15

## 2021-10-13 RX ORDER — NEOSTIGMINE METHYLSULFATE 0.5 MG/ML
INJECTION, SOLUTION INTRAVENOUS
Status: DISCONTINUED | OUTPATIENT
Start: 2021-10-13 | End: 2021-10-13

## 2021-10-13 RX ORDER — FENTANYL CITRATE 50 UG/ML
INJECTION, SOLUTION INTRAMUSCULAR; INTRAVENOUS
Status: COMPLETED
Start: 2021-10-13 | End: 2021-10-13

## 2021-10-13 RX ORDER — NALOXONE HCL 0.4 MG/ML
0.02 VIAL (ML) INJECTION
Status: DISCONTINUED | OUTPATIENT
Start: 2021-10-13 | End: 2021-10-19 | Stop reason: HOSPADM

## 2021-10-13 RX ORDER — ONDANSETRON 2 MG/ML
INJECTION INTRAMUSCULAR; INTRAVENOUS
Status: DISCONTINUED | OUTPATIENT
Start: 2021-10-13 | End: 2021-10-13

## 2021-10-13 RX ADMIN — PROPOFOL 30 MG: 10 INJECTION, EMULSION INTRAVENOUS at 11:10

## 2021-10-13 RX ADMIN — Medication 10 MG: at 04:10

## 2021-10-13 RX ADMIN — ACETAMINOPHEN 1000 MG: 10 INJECTION, SOLUTION INTRAVENOUS at 11:10

## 2021-10-13 RX ADMIN — FENTANYL CITRATE 25 MCG: 50 INJECTION INTRAMUSCULAR; INTRAVENOUS at 03:10

## 2021-10-13 RX ADMIN — CEFAZOLIN 2 G: 330 INJECTION, POWDER, FOR SOLUTION INTRAMUSCULAR; INTRAVENOUS at 10:10

## 2021-10-13 RX ADMIN — PHENYLEPHRINE HYDROCHLORIDE 200 MCG: 10 INJECTION INTRAVENOUS at 02:10

## 2021-10-13 RX ADMIN — GLYCOPYRROLATE 0.6 MG: 0.2 INJECTION, SOLUTION INTRAMUSCULAR; INTRAVITREAL at 03:10

## 2021-10-13 RX ADMIN — HYDROMORPHONE HYDROCHLORIDE 0.2 MG: 2 INJECTION INTRAMUSCULAR; INTRAVENOUS; SUBCUTANEOUS at 01:10

## 2021-10-13 RX ADMIN — ONDANSETRON 4 MG: 2 INJECTION INTRAMUSCULAR; INTRAVENOUS at 03:10

## 2021-10-13 RX ADMIN — ACETAMINOPHEN 750 MG: 10 INJECTION, SOLUTION INTRAVENOUS at 11:10

## 2021-10-13 RX ADMIN — Medication 15 MG: at 01:10

## 2021-10-13 RX ADMIN — FENTANYL CITRATE 100 MCG: 50 INJECTION, SOLUTION INTRAMUSCULAR; INTRAVENOUS at 10:10

## 2021-10-13 RX ADMIN — DEXAMETHASONE SODIUM PHOSPHATE 4 MG: 4 INJECTION, SOLUTION INTRAMUSCULAR; INTRAVENOUS at 10:10

## 2021-10-13 RX ADMIN — FENTANYL CITRATE 100 MCG: 50 INJECTION, SOLUTION INTRAMUSCULAR; INTRAVENOUS at 11:10

## 2021-10-13 RX ADMIN — ROCURONIUM BROMIDE 10 MG: 10 INJECTION, SOLUTION INTRAVENOUS at 02:10

## 2021-10-13 RX ADMIN — ENOXAPARIN SODIUM 40 MG: 40 INJECTION, SOLUTION INTRAVENOUS; SUBCUTANEOUS at 04:10

## 2021-10-13 RX ADMIN — HYDROMORPHONE HYDROCHLORIDE 0.4 MG: 2 INJECTION INTRAMUSCULAR; INTRAVENOUS; SUBCUTANEOUS at 01:10

## 2021-10-13 RX ADMIN — CEFOXITIN 2 G: 2 INJECTION, POWDER, FOR SOLUTION INTRAVENOUS at 11:10

## 2021-10-13 RX ADMIN — ROCURONIUM BROMIDE 10 MG: 10 INJECTION, SOLUTION INTRAVENOUS at 01:10

## 2021-10-13 RX ADMIN — ROCURONIUM BROMIDE 10 MG: 10 INJECTION, SOLUTION INTRAVENOUS at 12:10

## 2021-10-13 RX ADMIN — ROCURONIUM BROMIDE 10 MG: 10 INJECTION, SOLUTION INTRAVENOUS at 10:10

## 2021-10-13 RX ADMIN — CEFAZOLIN 2 G: 330 INJECTION, POWDER, FOR SOLUTION INTRAMUSCULAR; INTRAVENOUS at 02:10

## 2021-10-13 RX ADMIN — PROPOFOL 180 MG: 10 INJECTION, EMULSION INTRAVENOUS at 10:10

## 2021-10-13 RX ADMIN — Medication: at 04:10

## 2021-10-13 RX ADMIN — LABETALOL HYDROCHLORIDE 10 MG: 5 INJECTION, SOLUTION INTRAVENOUS at 08:10

## 2021-10-13 RX ADMIN — NEOSTIGMINE METHYLSULFATE 4 MG: 0.5 INJECTION INTRAVENOUS at 03:10

## 2021-10-13 RX ADMIN — HALOPERIDOL LACTATE 0.5 MG: 5 INJECTION, SOLUTION INTRAMUSCULAR at 04:10

## 2021-10-13 RX ADMIN — Medication 20 MG: at 11:10

## 2021-10-13 RX ADMIN — FAMOTIDINE 20 MG: 10 INJECTION INTRAVENOUS at 08:10

## 2021-10-13 RX ADMIN — PHENYLEPHRINE HYDROCHLORIDE 50 MCG: 10 INJECTION INTRAVENOUS at 11:10

## 2021-10-13 RX ADMIN — PHENYLEPHRINE HYDROCHLORIDE 100 MCG: 10 INJECTION INTRAVENOUS at 02:10

## 2021-10-13 RX ADMIN — Medication 15 MG: at 12:10

## 2021-10-13 RX ADMIN — SODIUM CHLORIDE, SODIUM LACTATE, POTASSIUM CHLORIDE, AND CALCIUM CHLORIDE 1000 ML: .6; .31; .03; .02 INJECTION, SOLUTION INTRAVENOUS at 08:10

## 2021-10-13 RX ADMIN — LIDOCAINE HYDROCHLORIDE 100 MG: 20 INJECTION, SOLUTION INTRAVENOUS at 10:10

## 2021-10-13 RX ADMIN — ROCURONIUM BROMIDE 50 MG: 10 INJECTION, SOLUTION INTRAVENOUS at 10:10

## 2021-10-13 RX ADMIN — LABETALOL HYDROCHLORIDE 10 MG: 5 INJECTION, SOLUTION INTRAVENOUS at 04:10

## 2021-10-13 RX ADMIN — MUPIROCIN: 20 OINTMENT TOPICAL at 11:10

## 2021-10-13 RX ADMIN — SODIUM CHLORIDE: 0.9 INJECTION, SOLUTION INTRAVENOUS at 10:10

## 2021-10-13 RX ADMIN — SODIUM CHLORIDE, SODIUM LACTATE, POTASSIUM CHLORIDE, AND CALCIUM CHLORIDE: .6; .31; .03; .02 INJECTION, SOLUTION INTRAVENOUS at 04:10

## 2021-10-14 LAB
ALBUMIN SERPL BCP-MCNC: 3 G/DL (ref 3.5–5.2)
ALBUMIN SERPL BCP-MCNC: 3 G/DL (ref 3.5–5.2)
ALP SERPL-CCNC: 52 U/L (ref 55–135)
ALP SERPL-CCNC: 52 U/L (ref 55–135)
ALT SERPL W/O P-5'-P-CCNC: 12 U/L (ref 10–44)
ALT SERPL W/O P-5'-P-CCNC: 15 U/L (ref 10–44)
ANION GAP SERPL CALC-SCNC: 9 MMOL/L (ref 8–16)
ANION GAP SERPL CALC-SCNC: 9 MMOL/L (ref 8–16)
AST SERPL-CCNC: 26 U/L (ref 10–40)
AST SERPL-CCNC: 27 U/L (ref 10–40)
BASOPHILS # BLD AUTO: 0.04 K/UL (ref 0–0.2)
BASOPHILS # BLD AUTO: 0.05 K/UL (ref 0–0.2)
BASOPHILS NFR BLD: 0.3 % (ref 0–1.9)
BASOPHILS NFR BLD: 0.3 % (ref 0–1.9)
BILIRUB SERPL-MCNC: 0.7 MG/DL (ref 0.1–1)
BILIRUB SERPL-MCNC: 0.7 MG/DL (ref 0.1–1)
BUN SERPL-MCNC: 15 MG/DL (ref 8–23)
BUN SERPL-MCNC: 16 MG/DL (ref 8–23)
CALCIUM SERPL-MCNC: 9 MG/DL (ref 8.7–10.5)
CALCIUM SERPL-MCNC: 9.2 MG/DL (ref 8.7–10.5)
CHLORIDE SERPL-SCNC: 105 MMOL/L (ref 95–110)
CHLORIDE SERPL-SCNC: 109 MMOL/L (ref 95–110)
CO2 SERPL-SCNC: 21 MMOL/L (ref 23–29)
CO2 SERPL-SCNC: 22 MMOL/L (ref 23–29)
CREAT SERPL-MCNC: 0.9 MG/DL (ref 0.5–1.4)
CREAT SERPL-MCNC: 1 MG/DL (ref 0.5–1.4)
DIFFERENTIAL METHOD: ABNORMAL
DIFFERENTIAL METHOD: ABNORMAL
EOSINOPHIL # BLD AUTO: 0 K/UL (ref 0–0.5)
EOSINOPHIL # BLD AUTO: 0 K/UL (ref 0–0.5)
EOSINOPHIL NFR BLD: 0.1 % (ref 0–8)
EOSINOPHIL NFR BLD: 0.1 % (ref 0–8)
ERYTHROCYTE [DISTWIDTH] IN BLOOD BY AUTOMATED COUNT: 15.4 % (ref 11.5–14.5)
ERYTHROCYTE [DISTWIDTH] IN BLOOD BY AUTOMATED COUNT: 15.7 % (ref 11.5–14.5)
EST. GFR  (AFRICAN AMERICAN): >60 ML/MIN/1.73 M^2
EST. GFR  (AFRICAN AMERICAN): >60 ML/MIN/1.73 M^2
EST. GFR  (NON AFRICAN AMERICAN): 57.6 ML/MIN/1.73 M^2
EST. GFR  (NON AFRICAN AMERICAN): >60 ML/MIN/1.73 M^2
GLUCOSE SERPL-MCNC: 100 MG/DL (ref 70–110)
GLUCOSE SERPL-MCNC: 103 MG/DL (ref 70–110)
HCT VFR BLD AUTO: 34.5 % (ref 37–48.5)
HCT VFR BLD AUTO: 35.4 % (ref 37–48.5)
HGB BLD-MCNC: 11.2 G/DL (ref 12–16)
HGB BLD-MCNC: 11.2 G/DL (ref 12–16)
IMM GRANULOCYTES # BLD AUTO: 0.11 K/UL (ref 0–0.04)
IMM GRANULOCYTES # BLD AUTO: 0.12 K/UL (ref 0–0.04)
IMM GRANULOCYTES NFR BLD AUTO: 0.7 % (ref 0–0.5)
IMM GRANULOCYTES NFR BLD AUTO: 0.8 % (ref 0–0.5)
LYMPHOCYTES # BLD AUTO: 2.3 K/UL (ref 1–4.8)
LYMPHOCYTES # BLD AUTO: 2.3 K/UL (ref 1–4.8)
LYMPHOCYTES NFR BLD: 14.3 % (ref 18–48)
LYMPHOCYTES NFR BLD: 14.6 % (ref 18–48)
MAGNESIUM SERPL-MCNC: 1.4 MG/DL (ref 1.6–2.6)
MAGNESIUM SERPL-MCNC: 1.5 MG/DL (ref 1.6–2.6)
MCH RBC QN AUTO: 32.7 PG (ref 27–31)
MCH RBC QN AUTO: 33.2 PG (ref 27–31)
MCHC RBC AUTO-ENTMCNC: 31.6 G/DL (ref 32–36)
MCHC RBC AUTO-ENTMCNC: 32.5 G/DL (ref 32–36)
MCV RBC AUTO: 102 FL (ref 82–98)
MCV RBC AUTO: 104 FL (ref 82–98)
MONOCYTES # BLD AUTO: 1 K/UL (ref 0.3–1)
MONOCYTES # BLD AUTO: 1 K/UL (ref 0.3–1)
MONOCYTES NFR BLD: 6.3 % (ref 4–15)
MONOCYTES NFR BLD: 6.3 % (ref 4–15)
NEUTROPHILS # BLD AUTO: 12.2 K/UL (ref 1.8–7.7)
NEUTROPHILS # BLD AUTO: 12.5 K/UL (ref 1.8–7.7)
NEUTROPHILS NFR BLD: 78 % (ref 38–73)
NEUTROPHILS NFR BLD: 78.2 % (ref 38–73)
NRBC BLD-RTO: 0 /100 WBC
NRBC BLD-RTO: 0 /100 WBC
PHOSPHATE SERPL-MCNC: 4.3 MG/DL (ref 2.7–4.5)
PHOSPHATE SERPL-MCNC: 4.4 MG/DL (ref 2.7–4.5)
PLATELET # BLD AUTO: 292 K/UL (ref 150–450)
PLATELET # BLD AUTO: 314 K/UL (ref 150–450)
PMV BLD AUTO: 9.7 FL (ref 9.2–12.9)
PMV BLD AUTO: 9.9 FL (ref 9.2–12.9)
POTASSIUM SERPL-SCNC: 4.5 MMOL/L (ref 3.5–5.1)
POTASSIUM SERPL-SCNC: 4.6 MMOL/L (ref 3.5–5.1)
PROT SERPL-MCNC: 5.3 G/DL (ref 6–8.4)
PROT SERPL-MCNC: 5.4 G/DL (ref 6–8.4)
RBC # BLD AUTO: 3.37 M/UL (ref 4–5.4)
RBC # BLD AUTO: 3.42 M/UL (ref 4–5.4)
SODIUM SERPL-SCNC: 135 MMOL/L (ref 136–145)
SODIUM SERPL-SCNC: 140 MMOL/L (ref 136–145)
WBC # BLD AUTO: 15.61 K/UL (ref 3.9–12.7)
WBC # BLD AUTO: 15.97 K/UL (ref 3.9–12.7)

## 2021-10-14 PROCEDURE — 85025 COMPLETE CBC W/AUTO DIFF WBC: CPT | Mod: 91

## 2021-10-14 PROCEDURE — 97165 OT EVAL LOW COMPLEX 30 MIN: CPT

## 2021-10-14 PROCEDURE — 84100 ASSAY OF PHOSPHORUS: CPT

## 2021-10-14 PROCEDURE — 20600001 HC STEP DOWN PRIVATE ROOM

## 2021-10-14 PROCEDURE — 25000003 PHARM REV CODE 250

## 2021-10-14 PROCEDURE — 99900035 HC TECH TIME PER 15 MIN (STAT)

## 2021-10-14 PROCEDURE — 97162 PT EVAL MOD COMPLEX 30 MIN: CPT

## 2021-10-14 PROCEDURE — 97535 SELF CARE MNGMENT TRAINING: CPT

## 2021-10-14 PROCEDURE — 80053 COMPREHEN METABOLIC PANEL: CPT | Mod: 91

## 2021-10-14 PROCEDURE — 25000003 PHARM REV CODE 250: Performed by: STUDENT IN AN ORGANIZED HEALTH CARE EDUCATION/TRAINING PROGRAM

## 2021-10-14 PROCEDURE — 63600175 PHARM REV CODE 636 W HCPCS

## 2021-10-14 PROCEDURE — 83735 ASSAY OF MAGNESIUM: CPT | Mod: 91

## 2021-10-14 RX ORDER — ACETAMINOPHEN 500 MG
1000 TABLET ORAL ONCE
Status: COMPLETED | OUTPATIENT
Start: 2021-10-14 | End: 2021-10-14

## 2021-10-14 RX ORDER — AMOXICILLIN 250 MG
2 CAPSULE ORAL 2 TIMES DAILY
Status: DISCONTINUED | OUTPATIENT
Start: 2021-10-14 | End: 2021-10-15

## 2021-10-14 RX ORDER — LIDOCAINE 50 MG/G
2 PATCH TOPICAL
Status: DISCONTINUED | OUTPATIENT
Start: 2021-10-14 | End: 2021-10-19 | Stop reason: HOSPADM

## 2021-10-14 RX ORDER — CLONAZEPAM 0.25 MG/1
0.25 TABLET, ORALLY DISINTEGRATING ORAL ONCE
Status: COMPLETED | OUTPATIENT
Start: 2021-10-14 | End: 2021-10-14

## 2021-10-14 RX ORDER — ACETAMINOPHEN 325 MG/1
650 TABLET ORAL EVERY 6 HOURS PRN
Status: DISCONTINUED | OUTPATIENT
Start: 2021-10-14 | End: 2021-10-19 | Stop reason: HOSPADM

## 2021-10-14 RX ORDER — METHOCARBAMOL 500 MG/1
500 TABLET, FILM COATED ORAL 3 TIMES DAILY
Status: DISCONTINUED | OUTPATIENT
Start: 2021-10-14 | End: 2021-10-19 | Stop reason: HOSPADM

## 2021-10-14 RX ORDER — ENOXAPARIN SODIUM 100 MG/ML
30 INJECTION SUBCUTANEOUS EVERY 24 HOURS
Status: DISCONTINUED | OUTPATIENT
Start: 2021-10-14 | End: 2021-10-19 | Stop reason: HOSPADM

## 2021-10-14 RX ORDER — GABAPENTIN 300 MG/1
300 CAPSULE ORAL 3 TIMES DAILY
Status: DISCONTINUED | OUTPATIENT
Start: 2021-10-14 | End: 2021-10-19 | Stop reason: HOSPADM

## 2021-10-14 RX ORDER — CLONAZEPAM 0.25 MG/1
0.25 TABLET, ORALLY DISINTEGRATING ORAL 2 TIMES DAILY
Status: DISCONTINUED | OUTPATIENT
Start: 2021-10-14 | End: 2021-10-14

## 2021-10-14 RX ORDER — KETOROLAC TROMETHAMINE 15 MG/ML
15 INJECTION, SOLUTION INTRAMUSCULAR; INTRAVENOUS EVERY 6 HOURS PRN
Status: DISCONTINUED | OUTPATIENT
Start: 2021-10-14 | End: 2021-10-14

## 2021-10-14 RX ORDER — MAGNESIUM SULFATE HEPTAHYDRATE 40 MG/ML
2 INJECTION, SOLUTION INTRAVENOUS ONCE
Status: COMPLETED | OUTPATIENT
Start: 2021-10-14 | End: 2021-10-14

## 2021-10-14 RX ORDER — KETOROLAC TROMETHAMINE 15 MG/ML
15 INJECTION, SOLUTION INTRAMUSCULAR; INTRAVENOUS EVERY 6 HOURS
Status: COMPLETED | OUTPATIENT
Start: 2021-10-14 | End: 2021-10-17

## 2021-10-14 RX ORDER — HYDRALAZINE HYDROCHLORIDE 20 MG/ML
10 INJECTION INTRAMUSCULAR; INTRAVENOUS EVERY 8 HOURS PRN
Status: DISCONTINUED | OUTPATIENT
Start: 2021-10-14 | End: 2021-10-19 | Stop reason: HOSPADM

## 2021-10-14 RX ORDER — DEXTROSE MONOHYDRATE, SODIUM CHLORIDE, AND POTASSIUM CHLORIDE 50; 1.49; 4.5 G/1000ML; G/1000ML; G/1000ML
INJECTION, SOLUTION INTRAVENOUS CONTINUOUS
Status: DISCONTINUED | OUTPATIENT
Start: 2021-10-14 | End: 2021-10-15

## 2021-10-14 RX ORDER — FAMOTIDINE 10 MG/ML
20 INJECTION INTRAVENOUS DAILY
Status: DISCONTINUED | OUTPATIENT
Start: 2021-10-15 | End: 2021-10-18

## 2021-10-14 RX ORDER — LANOLIN ALCOHOL/MO/W.PET/CERES
400 CREAM (GRAM) TOPICAL ONCE
Status: COMPLETED | OUTPATIENT
Start: 2021-10-14 | End: 2021-10-14

## 2021-10-14 RX ORDER — CLONAZEPAM 0.25 MG/1
1 TABLET, ORALLY DISINTEGRATING ORAL 2 TIMES DAILY
Status: DISCONTINUED | OUTPATIENT
Start: 2021-10-14 | End: 2021-10-19 | Stop reason: HOSPADM

## 2021-10-14 RX ORDER — POLYETHYLENE GLYCOL 3350 17 G/17G
17 POWDER, FOR SOLUTION ORAL 2 TIMES DAILY
Status: DISCONTINUED | OUTPATIENT
Start: 2021-10-14 | End: 2021-10-15

## 2021-10-14 RX ADMIN — ACETAMINOPHEN 750 MG: 10 INJECTION, SOLUTION INTRAVENOUS at 06:10

## 2021-10-14 RX ADMIN — GABAPENTIN 300 MG: 300 CAPSULE ORAL at 08:10

## 2021-10-14 RX ADMIN — ACETAMINOPHEN 750 MG: 10 INJECTION, SOLUTION INTRAVENOUS at 02:10

## 2021-10-14 RX ADMIN — KETOROLAC TROMETHAMINE 15 MG: 15 INJECTION, SOLUTION INTRAMUSCULAR; INTRAVENOUS at 05:10

## 2021-10-14 RX ADMIN — CLONAZEPAM 1 MG: 0.25 TABLET, ORALLY DISINTEGRATING ORAL at 08:10

## 2021-10-14 RX ADMIN — METHOCARBAMOL 500 MG: 500 TABLET ORAL at 08:10

## 2021-10-14 RX ADMIN — ENOXAPARIN SODIUM 30 MG: 30 INJECTION, SOLUTION INTRAVENOUS; SUBCUTANEOUS at 05:10

## 2021-10-14 RX ADMIN — METHOCARBAMOL 500 MG: 500 TABLET ORAL at 02:10

## 2021-10-14 RX ADMIN — KETOROLAC TROMETHAMINE 15 MG: 15 INJECTION, SOLUTION INTRAMUSCULAR; INTRAVENOUS at 12:10

## 2021-10-14 RX ADMIN — MUPIROCIN: 20 OINTMENT TOPICAL at 08:10

## 2021-10-14 RX ADMIN — Medication 400 MG: at 08:10

## 2021-10-14 RX ADMIN — DEXTROSE, SODIUM CHLORIDE, AND POTASSIUM CHLORIDE: 5; .45; .15 INJECTION INTRAVENOUS at 08:10

## 2021-10-14 RX ADMIN — Medication: at 06:10

## 2021-10-14 RX ADMIN — MAGNESIUM SULFATE 2 G: 2 INJECTION INTRAVENOUS at 09:10

## 2021-10-14 RX ADMIN — LIDOCAINE 5% 2 PATCH: 700 PATCH TOPICAL at 09:10

## 2021-10-14 RX ADMIN — HYDRALAZINE HYDROCHLORIDE 10 MG: 20 INJECTION, SOLUTION INTRAMUSCULAR; INTRAVENOUS at 08:10

## 2021-10-14 RX ADMIN — MUPIROCIN: 20 OINTMENT TOPICAL at 09:10

## 2021-10-14 RX ADMIN — GABAPENTIN 300 MG: 300 CAPSULE ORAL at 02:10

## 2021-10-14 RX ADMIN — Medication: at 04:10

## 2021-10-14 RX ADMIN — KETOROLAC TROMETHAMINE 15 MG: 15 INJECTION, SOLUTION INTRAMUSCULAR; INTRAVENOUS at 11:10

## 2021-10-14 RX ADMIN — CLONAZEPAM 0.25 MG: 0.25 TABLET, ORALLY DISINTEGRATING ORAL at 12:10

## 2021-10-14 RX ADMIN — ACETAMINOPHEN 1000 MG: 500 TABLET ORAL at 10:10

## 2021-10-14 RX ADMIN — FAMOTIDINE 20 MG: 10 INJECTION INTRAVENOUS at 08:10

## 2021-10-15 LAB
ALBUMIN SERPL BCP-MCNC: 2.5 G/DL (ref 3.5–5.2)
ALP SERPL-CCNC: 49 U/L (ref 55–135)
ALT SERPL W/O P-5'-P-CCNC: 12 U/L (ref 10–44)
ANION GAP SERPL CALC-SCNC: 11 MMOL/L (ref 8–16)
AST SERPL-CCNC: 27 U/L (ref 10–40)
BASOPHILS # BLD AUTO: 0.01 K/UL (ref 0–0.2)
BASOPHILS NFR BLD: 0.3 % (ref 0–1.9)
BILIRUB SERPL-MCNC: 1.1 MG/DL (ref 0.1–1)
BUN SERPL-MCNC: 13 MG/DL (ref 8–23)
CALCIUM SERPL-MCNC: 8.7 MG/DL (ref 8.7–10.5)
CHLORIDE SERPL-SCNC: 103 MMOL/L (ref 95–110)
CO2 SERPL-SCNC: 21 MMOL/L (ref 23–29)
CREAT SERPL-MCNC: 0.9 MG/DL (ref 0.5–1.4)
CRP SERPL-MCNC: 241.7 MG/L (ref 0–8.2)
CRP SERPL-MCNC: 241.7 MG/L (ref 0–8.2)
DIFFERENTIAL METHOD: ABNORMAL
EOSINOPHIL # BLD AUTO: 0.1 K/UL (ref 0–0.5)
EOSINOPHIL NFR BLD: 1.8 % (ref 0–8)
ERYTHROCYTE [DISTWIDTH] IN BLOOD BY AUTOMATED COUNT: 15.3 % (ref 11.5–14.5)
EST. GFR  (AFRICAN AMERICAN): >60 ML/MIN/1.73 M^2
EST. GFR  (NON AFRICAN AMERICAN): >60 ML/MIN/1.73 M^2
GLUCOSE SERPL-MCNC: 104 MG/DL (ref 70–110)
HCT VFR BLD AUTO: 30.9 % (ref 37–48.5)
HGB BLD-MCNC: 9.7 G/DL (ref 12–16)
IMM GRANULOCYTES # BLD AUTO: 0 K/UL (ref 0–0.04)
IMM GRANULOCYTES NFR BLD AUTO: 0 % (ref 0–0.5)
LYMPHOCYTES # BLD AUTO: 1 K/UL (ref 1–4.8)
LYMPHOCYTES NFR BLD: 24.5 % (ref 18–48)
MAGNESIUM SERPL-MCNC: 1.9 MG/DL (ref 1.6–2.6)
MCH RBC QN AUTO: 33.2 PG (ref 27–31)
MCHC RBC AUTO-ENTMCNC: 31.4 G/DL (ref 32–36)
MCV RBC AUTO: 106 FL (ref 82–98)
MONOCYTES # BLD AUTO: 0.2 K/UL (ref 0.3–1)
MONOCYTES NFR BLD: 5.8 % (ref 4–15)
NEUTROPHILS # BLD AUTO: 2.7 K/UL (ref 1.8–7.7)
NEUTROPHILS NFR BLD: 67.6 % (ref 38–73)
NRBC BLD-RTO: 0 /100 WBC
PHOSPHATE SERPL-MCNC: 2.2 MG/DL (ref 2.7–4.5)
PLATELET # BLD AUTO: 244 K/UL (ref 150–450)
PLATELET BLD QL SMEAR: ABNORMAL
PMV BLD AUTO: 9.8 FL (ref 9.2–12.9)
POTASSIUM SERPL-SCNC: 4.8 MMOL/L (ref 3.5–5.1)
PROT SERPL-MCNC: 4.9 G/DL (ref 6–8.4)
RBC # BLD AUTO: 2.92 M/UL (ref 4–5.4)
SODIUM SERPL-SCNC: 135 MMOL/L (ref 136–145)
WBC # BLD AUTO: 3.96 K/UL (ref 3.9–12.7)

## 2021-10-15 PROCEDURE — 63600175 PHARM REV CODE 636 W HCPCS

## 2021-10-15 PROCEDURE — 25000003 PHARM REV CODE 250: Performed by: SURGERY

## 2021-10-15 PROCEDURE — 36415 COLL VENOUS BLD VENIPUNCTURE: CPT | Performed by: STUDENT IN AN ORGANIZED HEALTH CARE EDUCATION/TRAINING PROGRAM

## 2021-10-15 PROCEDURE — 25000003 PHARM REV CODE 250

## 2021-10-15 PROCEDURE — 36415 COLL VENOUS BLD VENIPUNCTURE: CPT

## 2021-10-15 PROCEDURE — 80053 COMPREHEN METABOLIC PANEL: CPT

## 2021-10-15 PROCEDURE — 86140 C-REACTIVE PROTEIN: CPT | Performed by: STUDENT IN AN ORGANIZED HEALTH CARE EDUCATION/TRAINING PROGRAM

## 2021-10-15 PROCEDURE — 20600001 HC STEP DOWN PRIVATE ROOM

## 2021-10-15 PROCEDURE — 85025 COMPLETE CBC W/AUTO DIFF WBC: CPT

## 2021-10-15 PROCEDURE — 99900035 HC TECH TIME PER 15 MIN (STAT)

## 2021-10-15 PROCEDURE — 25000003 PHARM REV CODE 250: Performed by: STUDENT IN AN ORGANIZED HEALTH CARE EDUCATION/TRAINING PROGRAM

## 2021-10-15 PROCEDURE — 27000221 HC OXYGEN, UP TO 24 HOURS

## 2021-10-15 PROCEDURE — 84100 ASSAY OF PHOSPHORUS: CPT

## 2021-10-15 PROCEDURE — 83735 ASSAY OF MAGNESIUM: CPT

## 2021-10-15 PROCEDURE — 94761 N-INVAS EAR/PLS OXIMETRY MLT: CPT

## 2021-10-15 PROCEDURE — 63600175 PHARM REV CODE 636 W HCPCS: Performed by: SURGERY

## 2021-10-15 RX ORDER — HYDROMORPHONE HYDROCHLORIDE 1 MG/ML
1 INJECTION, SOLUTION INTRAMUSCULAR; INTRAVENOUS; SUBCUTANEOUS EVERY 4 HOURS PRN
Status: DISCONTINUED | OUTPATIENT
Start: 2021-10-15 | End: 2021-10-19 | Stop reason: HOSPADM

## 2021-10-15 RX ORDER — OXYCODONE HYDROCHLORIDE 5 MG/1
5 TABLET ORAL EVERY 4 HOURS PRN
Status: DISCONTINUED | OUTPATIENT
Start: 2021-10-15 | End: 2021-10-15

## 2021-10-15 RX ORDER — OXYCODONE HYDROCHLORIDE 5 MG/1
5 TABLET ORAL EVERY 4 HOURS PRN
Status: DISCONTINUED | OUTPATIENT
Start: 2021-10-15 | End: 2021-10-19 | Stop reason: HOSPADM

## 2021-10-15 RX ORDER — SODIUM CHLORIDE 9 MG/ML
INJECTION, SOLUTION INTRAVENOUS CONTINUOUS
Status: DISCONTINUED | OUTPATIENT
Start: 2021-10-15 | End: 2021-10-18

## 2021-10-15 RX ORDER — DEXTROSE MONOHYDRATE, SODIUM CHLORIDE, AND POTASSIUM CHLORIDE 50; 1.49; 4.5 G/1000ML; G/1000ML; G/1000ML
INJECTION, SOLUTION INTRAVENOUS CONTINUOUS
Status: DISCONTINUED | OUTPATIENT
Start: 2021-10-15 | End: 2021-10-15

## 2021-10-15 RX ORDER — OXYCODONE HYDROCHLORIDE 10 MG/1
10 TABLET ORAL EVERY 4 HOURS PRN
Status: DISCONTINUED | OUTPATIENT
Start: 2021-10-15 | End: 2021-10-19 | Stop reason: HOSPADM

## 2021-10-15 RX ADMIN — OXYCODONE HYDROCHLORIDE 10 MG: 10 TABLET ORAL at 02:10

## 2021-10-15 RX ADMIN — MUPIROCIN: 20 OINTMENT TOPICAL at 09:10

## 2021-10-15 RX ADMIN — CLONAZEPAM 1 MG: 0.25 TABLET, ORALLY DISINTEGRATING ORAL at 09:10

## 2021-10-15 RX ADMIN — KETOROLAC TROMETHAMINE 15 MG: 15 INJECTION, SOLUTION INTRAMUSCULAR; INTRAVENOUS at 05:10

## 2021-10-15 RX ADMIN — LABETALOL HYDROCHLORIDE 10 MG: 5 INJECTION, SOLUTION INTRAVENOUS at 05:10

## 2021-10-15 RX ADMIN — KETOROLAC TROMETHAMINE 15 MG: 15 INJECTION, SOLUTION INTRAMUSCULAR; INTRAVENOUS at 11:10

## 2021-10-15 RX ADMIN — HYDROMORPHONE HYDROCHLORIDE 1 MG: 1 INJECTION, SOLUTION INTRAMUSCULAR; INTRAVENOUS; SUBCUTANEOUS at 12:10

## 2021-10-15 RX ADMIN — HYDROMORPHONE HYDROCHLORIDE 1 MG: 1 INJECTION, SOLUTION INTRAMUSCULAR; INTRAVENOUS; SUBCUTANEOUS at 04:10

## 2021-10-15 RX ADMIN — SODIUM CHLORIDE, SODIUM LACTATE, POTASSIUM CHLORIDE, AND CALCIUM CHLORIDE 1000 ML: .6; .31; .03; .02 INJECTION, SOLUTION INTRAVENOUS at 09:10

## 2021-10-15 RX ADMIN — METHOCARBAMOL 500 MG: 500 TABLET ORAL at 02:10

## 2021-10-15 RX ADMIN — HYDRALAZINE HYDROCHLORIDE 10 MG: 20 INJECTION, SOLUTION INTRAMUSCULAR; INTRAVENOUS at 12:10

## 2021-10-15 RX ADMIN — KETOROLAC TROMETHAMINE 15 MG: 15 INJECTION, SOLUTION INTRAMUSCULAR; INTRAVENOUS at 12:10

## 2021-10-15 RX ADMIN — OXYCODONE HYDROCHLORIDE 10 MG: 10 TABLET ORAL at 07:10

## 2021-10-15 RX ADMIN — ENOXAPARIN SODIUM 30 MG: 30 INJECTION, SOLUTION INTRAVENOUS; SUBCUTANEOUS at 05:10

## 2021-10-15 RX ADMIN — DEXTROSE, SODIUM CHLORIDE, AND POTASSIUM CHLORIDE: 5; .45; .15 INJECTION INTRAVENOUS at 02:10

## 2021-10-15 RX ADMIN — METHOCARBAMOL 500 MG: 500 TABLET ORAL at 08:10

## 2021-10-15 RX ADMIN — SODIUM CHLORIDE: 0.9 INJECTION, SOLUTION INTRAVENOUS at 01:10

## 2021-10-15 RX ADMIN — PIPERACILLIN SODIUM AND TAZOBACTAM SODIUM 4.5 G: 4; .5 INJECTION, POWDER, FOR SOLUTION INTRAVENOUS at 09:10

## 2021-10-15 RX ADMIN — Medication: at 06:10

## 2021-10-15 RX ADMIN — FAMOTIDINE 20 MG: 10 INJECTION INTRAVENOUS at 08:10

## 2021-10-15 RX ADMIN — ACETAMINOPHEN 650 MG: 325 TABLET ORAL at 11:10

## 2021-10-15 RX ADMIN — GABAPENTIN 300 MG: 300 CAPSULE ORAL at 08:10

## 2021-10-15 RX ADMIN — METHOCARBAMOL 500 MG: 500 TABLET ORAL at 09:10

## 2021-10-15 RX ADMIN — CLONAZEPAM 1 MG: 0.25 TABLET, ORALLY DISINTEGRATING ORAL at 10:10

## 2021-10-15 RX ADMIN — GABAPENTIN 300 MG: 300 CAPSULE ORAL at 09:10

## 2021-10-15 RX ADMIN — GABAPENTIN 300 MG: 300 CAPSULE ORAL at 02:10

## 2021-10-15 RX ADMIN — SODIUM CHLORIDE, SODIUM LACTATE, POTASSIUM CHLORIDE, AND CALCIUM CHLORIDE 1000 ML: .6; .31; .03; .02 INJECTION, SOLUTION INTRAVENOUS at 01:10

## 2021-10-15 RX ADMIN — SODIUM CHLORIDE, SODIUM LACTATE, POTASSIUM CHLORIDE, AND CALCIUM CHLORIDE 1000 ML: .6; .31; .03; .02 INJECTION, SOLUTION INTRAVENOUS at 12:10

## 2021-10-15 RX ADMIN — OXYCODONE HYDROCHLORIDE 10 MG: 10 TABLET ORAL at 09:10

## 2021-10-15 RX ADMIN — LIDOCAINE 5% 2 PATCH: 700 PATCH TOPICAL at 08:10

## 2021-10-15 RX ADMIN — PIPERACILLIN SODIUM AND TAZOBACTAM SODIUM 4.5 G: 4; .5 INJECTION, POWDER, FOR SOLUTION INTRAVENOUS at 01:10

## 2021-10-16 LAB
ALBUMIN SERPL BCP-MCNC: 2.5 G/DL (ref 3.5–5.2)
ALP SERPL-CCNC: 58 U/L (ref 55–135)
ALT SERPL W/O P-5'-P-CCNC: 10 U/L (ref 10–44)
ANION GAP SERPL CALC-SCNC: 12 MMOL/L (ref 8–16)
AST SERPL-CCNC: 23 U/L (ref 10–40)
BASOPHILS # BLD AUTO: 0.02 K/UL (ref 0–0.2)
BASOPHILS NFR BLD: 0.5 % (ref 0–1.9)
BILIRUB SERPL-MCNC: 1.3 MG/DL (ref 0.1–1)
BUN SERPL-MCNC: 10 MG/DL (ref 8–23)
CALCIUM SERPL-MCNC: 8.8 MG/DL (ref 8.7–10.5)
CHLORIDE SERPL-SCNC: 103 MMOL/L (ref 95–110)
CO2 SERPL-SCNC: 23 MMOL/L (ref 23–29)
CREAT SERPL-MCNC: 0.8 MG/DL (ref 0.5–1.4)
CRP SERPL-MCNC: 242.2 MG/L (ref 0–8.2)
CRP SERPL-MCNC: 243 MG/L (ref 0–8.2)
DIFFERENTIAL METHOD: ABNORMAL
EOSINOPHIL # BLD AUTO: 0.2 K/UL (ref 0–0.5)
EOSINOPHIL NFR BLD: 5 % (ref 0–8)
ERYTHROCYTE [DISTWIDTH] IN BLOOD BY AUTOMATED COUNT: 14.8 % (ref 11.5–14.5)
EST. GFR  (AFRICAN AMERICAN): >60 ML/MIN/1.73 M^2
EST. GFR  (NON AFRICAN AMERICAN): >60 ML/MIN/1.73 M^2
GLUCOSE SERPL-MCNC: 85 MG/DL (ref 70–110)
HCT VFR BLD AUTO: 28.4 % (ref 37–48.5)
HGB BLD-MCNC: 9.1 G/DL (ref 12–16)
IMM GRANULOCYTES # BLD AUTO: 0 K/UL (ref 0–0.04)
IMM GRANULOCYTES NFR BLD AUTO: 0 % (ref 0–0.5)
LYMPHOCYTES # BLD AUTO: 1.1 K/UL (ref 1–4.8)
LYMPHOCYTES NFR BLD: 29.2 % (ref 18–48)
MAGNESIUM SERPL-MCNC: 1.7 MG/DL (ref 1.6–2.6)
MCH RBC QN AUTO: 33.1 PG (ref 27–31)
MCHC RBC AUTO-ENTMCNC: 32 G/DL (ref 32–36)
MCV RBC AUTO: 103 FL (ref 82–98)
MONOCYTES # BLD AUTO: 0.3 K/UL (ref 0.3–1)
MONOCYTES NFR BLD: 8.1 % (ref 4–15)
NEUTROPHILS # BLD AUTO: 2.2 K/UL (ref 1.8–7.7)
NEUTROPHILS NFR BLD: 57.2 % (ref 38–73)
NRBC BLD-RTO: 0 /100 WBC
PHOSPHATE SERPL-MCNC: 2.5 MG/DL (ref 2.7–4.5)
PLATELET # BLD AUTO: 207 K/UL (ref 150–450)
PMV BLD AUTO: 9.8 FL (ref 9.2–12.9)
POIKILOCYTOSIS BLD QL SMEAR: SLIGHT
POTASSIUM SERPL-SCNC: 4.1 MMOL/L (ref 3.5–5.1)
PROT SERPL-MCNC: 5.2 G/DL (ref 6–8.4)
RBC # BLD AUTO: 2.75 M/UL (ref 4–5.4)
SODIUM SERPL-SCNC: 138 MMOL/L (ref 136–145)
STOMATOCYTES BLD QL SMEAR: PRESENT
WBC # BLD AUTO: 3.83 K/UL (ref 3.9–12.7)

## 2021-10-16 PROCEDURE — 63600175 PHARM REV CODE 636 W HCPCS

## 2021-10-16 PROCEDURE — 25000003 PHARM REV CODE 250

## 2021-10-16 PROCEDURE — 84100 ASSAY OF PHOSPHORUS: CPT

## 2021-10-16 PROCEDURE — 80053 COMPREHEN METABOLIC PANEL: CPT

## 2021-10-16 PROCEDURE — 20600001 HC STEP DOWN PRIVATE ROOM

## 2021-10-16 PROCEDURE — 86140 C-REACTIVE PROTEIN: CPT

## 2021-10-16 PROCEDURE — 63600175 PHARM REV CODE 636 W HCPCS: Performed by: SURGERY

## 2021-10-16 PROCEDURE — 25000003 PHARM REV CODE 250: Performed by: SURGERY

## 2021-10-16 PROCEDURE — 25000003 PHARM REV CODE 250: Performed by: STUDENT IN AN ORGANIZED HEALTH CARE EDUCATION/TRAINING PROGRAM

## 2021-10-16 PROCEDURE — 83735 ASSAY OF MAGNESIUM: CPT

## 2021-10-16 PROCEDURE — 36415 COLL VENOUS BLD VENIPUNCTURE: CPT

## 2021-10-16 PROCEDURE — 27000221 HC OXYGEN, UP TO 24 HOURS

## 2021-10-16 PROCEDURE — 85025 COMPLETE CBC W/AUTO DIFF WBC: CPT

## 2021-10-16 RX ORDER — LOPERAMIDE HYDROCHLORIDE 2 MG/1
4 CAPSULE ORAL 2 TIMES DAILY
Status: DISCONTINUED | OUTPATIENT
Start: 2021-10-16 | End: 2021-10-17

## 2021-10-16 RX ORDER — LOPERAMIDE HYDROCHLORIDE 2 MG/1
2 CAPSULE ORAL 2 TIMES DAILY
Status: DISCONTINUED | OUTPATIENT
Start: 2021-10-16 | End: 2021-10-16

## 2021-10-16 RX ADMIN — HYDROMORPHONE HYDROCHLORIDE 1 MG: 1 INJECTION, SOLUTION INTRAMUSCULAR; INTRAVENOUS; SUBCUTANEOUS at 10:10

## 2021-10-16 RX ADMIN — OXYCODONE HYDROCHLORIDE 10 MG: 10 TABLET ORAL at 09:10

## 2021-10-16 RX ADMIN — KETOROLAC TROMETHAMINE 15 MG: 15 INJECTION, SOLUTION INTRAMUSCULAR; INTRAVENOUS at 05:10

## 2021-10-16 RX ADMIN — GABAPENTIN 300 MG: 300 CAPSULE ORAL at 04:10

## 2021-10-16 RX ADMIN — GABAPENTIN 300 MG: 300 CAPSULE ORAL at 08:10

## 2021-10-16 RX ADMIN — OXYCODONE HYDROCHLORIDE 10 MG: 10 TABLET ORAL at 08:10

## 2021-10-16 RX ADMIN — KETOROLAC TROMETHAMINE 15 MG: 15 INJECTION, SOLUTION INTRAMUSCULAR; INTRAVENOUS at 12:10

## 2021-10-16 RX ADMIN — ENOXAPARIN SODIUM 30 MG: 30 INJECTION, SOLUTION INTRAVENOUS; SUBCUTANEOUS at 04:10

## 2021-10-16 RX ADMIN — LOPERAMIDE HYDROCHLORIDE 2 MG: 2 CAPSULE ORAL at 08:10

## 2021-10-16 RX ADMIN — METHOCARBAMOL 500 MG: 500 TABLET ORAL at 08:10

## 2021-10-16 RX ADMIN — MUPIROCIN: 20 OINTMENT TOPICAL at 08:10

## 2021-10-16 RX ADMIN — HYDROMORPHONE HYDROCHLORIDE 1 MG: 1 INJECTION, SOLUTION INTRAMUSCULAR; INTRAVENOUS; SUBCUTANEOUS at 01:10

## 2021-10-16 RX ADMIN — CLONAZEPAM 1 MG: 0.25 TABLET, ORALLY DISINTEGRATING ORAL at 08:10

## 2021-10-16 RX ADMIN — OXYCODONE HYDROCHLORIDE 10 MG: 10 TABLET ORAL at 04:10

## 2021-10-16 RX ADMIN — LOPERAMIDE HYDROCHLORIDE 4 MG: 2 CAPSULE ORAL at 08:10

## 2021-10-16 RX ADMIN — METHOCARBAMOL 500 MG: 500 TABLET ORAL at 04:10

## 2021-10-16 RX ADMIN — LIDOCAINE 5% 2 PATCH: 700 PATCH TOPICAL at 08:10

## 2021-10-16 RX ADMIN — PIPERACILLIN SODIUM AND TAZOBACTAM SODIUM 4.5 G: 4; .5 INJECTION, POWDER, FOR SOLUTION INTRAVENOUS at 10:10

## 2021-10-16 RX ADMIN — SODIUM CHLORIDE: 0.9 INJECTION, SOLUTION INTRAVENOUS at 09:10

## 2021-10-16 RX ADMIN — HYDROMORPHONE HYDROCHLORIDE 1 MG: 1 INJECTION, SOLUTION INTRAMUSCULAR; INTRAVENOUS; SUBCUTANEOUS at 05:10

## 2021-10-16 RX ADMIN — PIPERACILLIN SODIUM AND TAZOBACTAM SODIUM 4.5 G: 4; .5 INJECTION, POWDER, FOR SOLUTION INTRAVENOUS at 05:10

## 2021-10-16 RX ADMIN — FAMOTIDINE 20 MG: 10 INJECTION INTRAVENOUS at 08:10

## 2021-10-16 RX ADMIN — PIPERACILLIN SODIUM AND TAZOBACTAM SODIUM 4.5 G: 4; .5 INJECTION, POWDER, FOR SOLUTION INTRAVENOUS at 01:10

## 2021-10-17 LAB
ALBUMIN SERPL BCP-MCNC: 2.5 G/DL (ref 3.5–5.2)
ALP SERPL-CCNC: 74 U/L (ref 55–135)
ALT SERPL W/O P-5'-P-CCNC: 6 U/L (ref 10–44)
ANION GAP SERPL CALC-SCNC: 14 MMOL/L (ref 8–16)
AST SERPL-CCNC: 17 U/L (ref 10–40)
BASOPHILS # BLD AUTO: 0.02 K/UL (ref 0–0.2)
BASOPHILS NFR BLD: 0.5 % (ref 0–1.9)
BILIRUB SERPL-MCNC: 1 MG/DL (ref 0.1–1)
BUN SERPL-MCNC: 10 MG/DL (ref 8–23)
CALCIUM SERPL-MCNC: 8.6 MG/DL (ref 8.7–10.5)
CHLORIDE SERPL-SCNC: 104 MMOL/L (ref 95–110)
CO2 SERPL-SCNC: 21 MMOL/L (ref 23–29)
CREAT SERPL-MCNC: 0.9 MG/DL (ref 0.5–1.4)
CRP SERPL-MCNC: 159.8 MG/L (ref 0–8.2)
DIFFERENTIAL METHOD: ABNORMAL
EOSINOPHIL # BLD AUTO: 0.3 K/UL (ref 0–0.5)
EOSINOPHIL NFR BLD: 6.6 % (ref 0–8)
ERYTHROCYTE [DISTWIDTH] IN BLOOD BY AUTOMATED COUNT: 14.6 % (ref 11.5–14.5)
EST. GFR  (AFRICAN AMERICAN): >60 ML/MIN/1.73 M^2
EST. GFR  (NON AFRICAN AMERICAN): >60 ML/MIN/1.73 M^2
GLUCOSE SERPL-MCNC: 70 MG/DL (ref 70–110)
HCT VFR BLD AUTO: 29.1 % (ref 37–48.5)
HGB BLD-MCNC: 9 G/DL (ref 12–16)
IMM GRANULOCYTES # BLD AUTO: 0.01 K/UL (ref 0–0.04)
IMM GRANULOCYTES NFR BLD AUTO: 0.3 % (ref 0–0.5)
LYMPHOCYTES # BLD AUTO: 1.4 K/UL (ref 1–4.8)
LYMPHOCYTES NFR BLD: 36.7 % (ref 18–48)
MAGNESIUM SERPL-MCNC: 1.6 MG/DL (ref 1.6–2.6)
MCH RBC QN AUTO: 32.6 PG (ref 27–31)
MCHC RBC AUTO-ENTMCNC: 30.9 G/DL (ref 32–36)
MCV RBC AUTO: 105 FL (ref 82–98)
MONOCYTES # BLD AUTO: 0.6 K/UL (ref 0.3–1)
MONOCYTES NFR BLD: 14.5 % (ref 4–15)
NEUTROPHILS # BLD AUTO: 1.6 K/UL (ref 1.8–7.7)
NEUTROPHILS NFR BLD: 41.4 % (ref 38–73)
NRBC BLD-RTO: 0 /100 WBC
PHOSPHATE SERPL-MCNC: 2.8 MG/DL (ref 2.7–4.5)
PLATELET # BLD AUTO: 239 K/UL (ref 150–450)
PMV BLD AUTO: 9.6 FL (ref 9.2–12.9)
POTASSIUM SERPL-SCNC: 3.6 MMOL/L (ref 3.5–5.1)
PROT SERPL-MCNC: 5.3 G/DL (ref 6–8.4)
RBC # BLD AUTO: 2.76 M/UL (ref 4–5.4)
SODIUM SERPL-SCNC: 139 MMOL/L (ref 136–145)
WBC # BLD AUTO: 3.79 K/UL (ref 3.9–12.7)

## 2021-10-17 PROCEDURE — 25000003 PHARM REV CODE 250

## 2021-10-17 PROCEDURE — 63600175 PHARM REV CODE 636 W HCPCS

## 2021-10-17 PROCEDURE — 25000003 PHARM REV CODE 250: Performed by: STUDENT IN AN ORGANIZED HEALTH CARE EDUCATION/TRAINING PROGRAM

## 2021-10-17 PROCEDURE — 36415 COLL VENOUS BLD VENIPUNCTURE: CPT

## 2021-10-17 PROCEDURE — 80053 COMPREHEN METABOLIC PANEL: CPT

## 2021-10-17 PROCEDURE — 25000003 PHARM REV CODE 250: Performed by: SURGERY

## 2021-10-17 PROCEDURE — 63600175 PHARM REV CODE 636 W HCPCS: Performed by: SURGERY

## 2021-10-17 PROCEDURE — 85025 COMPLETE CBC W/AUTO DIFF WBC: CPT

## 2021-10-17 PROCEDURE — 20600001 HC STEP DOWN PRIVATE ROOM

## 2021-10-17 PROCEDURE — 84100 ASSAY OF PHOSPHORUS: CPT

## 2021-10-17 PROCEDURE — 83735 ASSAY OF MAGNESIUM: CPT

## 2021-10-17 PROCEDURE — 86140 C-REACTIVE PROTEIN: CPT

## 2021-10-17 RX ORDER — POTASSIUM CHLORIDE 750 MG/1
30 CAPSULE, EXTENDED RELEASE ORAL ONCE
Status: COMPLETED | OUTPATIENT
Start: 2021-10-17 | End: 2021-10-17

## 2021-10-17 RX ORDER — LOPERAMIDE HYDROCHLORIDE 2 MG/1
2 CAPSULE ORAL 2 TIMES DAILY
Status: DISCONTINUED | OUTPATIENT
Start: 2021-10-17 | End: 2021-10-19 | Stop reason: HOSPADM

## 2021-10-17 RX ORDER — LANOLIN ALCOHOL/MO/W.PET/CERES
400 CREAM (GRAM) TOPICAL EVERY 4 HOURS
Status: COMPLETED | OUTPATIENT
Start: 2021-10-17 | End: 2021-10-17

## 2021-10-17 RX ADMIN — LIDOCAINE 5% 2 PATCH: 700 PATCH TOPICAL at 08:10

## 2021-10-17 RX ADMIN — KETOROLAC TROMETHAMINE 15 MG: 15 INJECTION, SOLUTION INTRAMUSCULAR; INTRAVENOUS at 06:10

## 2021-10-17 RX ADMIN — OXYCODONE HYDROCHLORIDE 10 MG: 10 TABLET ORAL at 05:10

## 2021-10-17 RX ADMIN — OXYCODONE HYDROCHLORIDE 10 MG: 10 TABLET ORAL at 10:10

## 2021-10-17 RX ADMIN — GABAPENTIN 300 MG: 300 CAPSULE ORAL at 08:10

## 2021-10-17 RX ADMIN — LOPERAMIDE HYDROCHLORIDE 2 MG: 2 CAPSULE ORAL at 08:10

## 2021-10-17 RX ADMIN — MUPIROCIN: 20 OINTMENT TOPICAL at 08:10

## 2021-10-17 RX ADMIN — CLONAZEPAM 1 MG: 0.25 TABLET, ORALLY DISINTEGRATING ORAL at 08:10

## 2021-10-17 RX ADMIN — PIPERACILLIN SODIUM AND TAZOBACTAM SODIUM 4.5 G: 4; .5 INJECTION, POWDER, FOR SOLUTION INTRAVENOUS at 01:10

## 2021-10-17 RX ADMIN — METHOCARBAMOL 500 MG: 500 TABLET ORAL at 08:10

## 2021-10-17 RX ADMIN — PIPERACILLIN SODIUM AND TAZOBACTAM SODIUM 4.5 G: 4; .5 INJECTION, POWDER, FOR SOLUTION INTRAVENOUS at 04:10

## 2021-10-17 RX ADMIN — HYDROMORPHONE HYDROCHLORIDE 1 MG: 1 INJECTION, SOLUTION INTRAMUSCULAR; INTRAVENOUS; SUBCUTANEOUS at 10:10

## 2021-10-17 RX ADMIN — ENOXAPARIN SODIUM 30 MG: 30 INJECTION, SOLUTION INTRAVENOUS; SUBCUTANEOUS at 05:10

## 2021-10-17 RX ADMIN — OXYCODONE HYDROCHLORIDE 10 MG: 10 TABLET ORAL at 08:10

## 2021-10-17 RX ADMIN — Medication 400 MG: at 10:10

## 2021-10-17 RX ADMIN — PIPERACILLIN SODIUM AND TAZOBACTAM SODIUM 4.5 G: 4; .5 INJECTION, POWDER, FOR SOLUTION INTRAVENOUS at 09:10

## 2021-10-17 RX ADMIN — FAMOTIDINE 20 MG: 10 INJECTION INTRAVENOUS at 08:10

## 2021-10-17 RX ADMIN — METHOCARBAMOL 500 MG: 500 TABLET ORAL at 05:10

## 2021-10-17 RX ADMIN — KETOROLAC TROMETHAMINE 15 MG: 15 INJECTION, SOLUTION INTRAMUSCULAR; INTRAVENOUS at 12:10

## 2021-10-17 RX ADMIN — ACETAMINOPHEN 650 MG: 325 TABLET ORAL at 08:10

## 2021-10-17 RX ADMIN — Medication 400 MG: at 08:10

## 2021-10-17 RX ADMIN — GABAPENTIN 300 MG: 300 CAPSULE ORAL at 05:10

## 2021-10-17 RX ADMIN — SODIUM CHLORIDE: 0.9 INJECTION, SOLUTION INTRAVENOUS at 04:10

## 2021-10-17 RX ADMIN — HYDRALAZINE HYDROCHLORIDE 10 MG: 20 INJECTION, SOLUTION INTRAMUSCULAR; INTRAVENOUS at 04:10

## 2021-10-17 RX ADMIN — HYDROMORPHONE HYDROCHLORIDE 1 MG: 1 INJECTION, SOLUTION INTRAMUSCULAR; INTRAVENOUS; SUBCUTANEOUS at 08:10

## 2021-10-17 RX ADMIN — HYDRALAZINE HYDROCHLORIDE 10 MG: 20 INJECTION, SOLUTION INTRAMUSCULAR; INTRAVENOUS at 08:10

## 2021-10-17 RX ADMIN — POTASSIUM CHLORIDE 30 MEQ: 10 CAPSULE, COATED, EXTENDED RELEASE ORAL at 08:10

## 2021-10-18 LAB
ALBUMIN SERPL BCP-MCNC: 2.4 G/DL (ref 3.5–5.2)
ALP SERPL-CCNC: 77 U/L (ref 55–135)
ALT SERPL W/O P-5'-P-CCNC: 6 U/L (ref 10–44)
ANION GAP SERPL CALC-SCNC: 14 MMOL/L (ref 8–16)
AST SERPL-CCNC: 12 U/L (ref 10–40)
BASOPHILS # BLD AUTO: 0.03 K/UL (ref 0–0.2)
BASOPHILS NFR BLD: 0.6 % (ref 0–1.9)
BILIRUB SERPL-MCNC: 0.8 MG/DL (ref 0.1–1)
BUN SERPL-MCNC: 7 MG/DL (ref 8–23)
CALCIUM SERPL-MCNC: 8.7 MG/DL (ref 8.7–10.5)
CHLORIDE SERPL-SCNC: 103 MMOL/L (ref 95–110)
CO2 SERPL-SCNC: 21 MMOL/L (ref 23–29)
CREAT SERPL-MCNC: 0.7 MG/DL (ref 0.5–1.4)
CRP SERPL-MCNC: 124.6 MG/L (ref 0–8.2)
DIFFERENTIAL METHOD: ABNORMAL
EOSINOPHIL # BLD AUTO: 0.2 K/UL (ref 0–0.5)
EOSINOPHIL NFR BLD: 4.2 % (ref 0–8)
ERYTHROCYTE [DISTWIDTH] IN BLOOD BY AUTOMATED COUNT: 14.6 % (ref 11.5–14.5)
EST. GFR  (AFRICAN AMERICAN): >60 ML/MIN/1.73 M^2
EST. GFR  (NON AFRICAN AMERICAN): >60 ML/MIN/1.73 M^2
GLUCOSE SERPL-MCNC: 69 MG/DL (ref 70–110)
HCT VFR BLD AUTO: 27.8 % (ref 37–48.5)
HGB BLD-MCNC: 8.9 G/DL (ref 12–16)
IMM GRANULOCYTES # BLD AUTO: 0.02 K/UL (ref 0–0.04)
IMM GRANULOCYTES NFR BLD AUTO: 0.4 % (ref 0–0.5)
LYMPHOCYTES # BLD AUTO: 1.7 K/UL (ref 1–4.8)
LYMPHOCYTES NFR BLD: 33.9 % (ref 18–48)
MAGNESIUM SERPL-MCNC: 1.4 MG/DL (ref 1.6–2.6)
MCH RBC QN AUTO: 32.6 PG (ref 27–31)
MCHC RBC AUTO-ENTMCNC: 32 G/DL (ref 32–36)
MCV RBC AUTO: 102 FL (ref 82–98)
MONOCYTES # BLD AUTO: 0.6 K/UL (ref 0.3–1)
MONOCYTES NFR BLD: 11.4 % (ref 4–15)
NEUTROPHILS # BLD AUTO: 2.5 K/UL (ref 1.8–7.7)
NEUTROPHILS NFR BLD: 49.5 % (ref 38–73)
NRBC BLD-RTO: 0 /100 WBC
PHOSPHATE SERPL-MCNC: 2.6 MG/DL (ref 2.7–4.5)
PLATELET # BLD AUTO: 276 K/UL (ref 150–450)
PMV BLD AUTO: 9.4 FL (ref 9.2–12.9)
POTASSIUM SERPL-SCNC: 3.3 MMOL/L (ref 3.5–5.1)
PROT SERPL-MCNC: 5.2 G/DL (ref 6–8.4)
RBC # BLD AUTO: 2.73 M/UL (ref 4–5.4)
SODIUM SERPL-SCNC: 138 MMOL/L (ref 136–145)
WBC # BLD AUTO: 4.98 K/UL (ref 3.9–12.7)

## 2021-10-18 PROCEDURE — 25000003 PHARM REV CODE 250: Performed by: STUDENT IN AN ORGANIZED HEALTH CARE EDUCATION/TRAINING PROGRAM

## 2021-10-18 PROCEDURE — 63600175 PHARM REV CODE 636 W HCPCS

## 2021-10-18 PROCEDURE — 36415 COLL VENOUS BLD VENIPUNCTURE: CPT

## 2021-10-18 PROCEDURE — 25000003 PHARM REV CODE 250

## 2021-10-18 PROCEDURE — 20600001 HC STEP DOWN PRIVATE ROOM

## 2021-10-18 PROCEDURE — 86140 C-REACTIVE PROTEIN: CPT

## 2021-10-18 PROCEDURE — 25000003 PHARM REV CODE 250: Performed by: SURGERY

## 2021-10-18 PROCEDURE — 85025 COMPLETE CBC W/AUTO DIFF WBC: CPT

## 2021-10-18 PROCEDURE — 63600175 PHARM REV CODE 636 W HCPCS: Performed by: SURGERY

## 2021-10-18 PROCEDURE — 83735 ASSAY OF MAGNESIUM: CPT

## 2021-10-18 PROCEDURE — 97535 SELF CARE MNGMENT TRAINING: CPT | Mod: CO

## 2021-10-18 PROCEDURE — 84100 ASSAY OF PHOSPHORUS: CPT

## 2021-10-18 PROCEDURE — 80053 COMPREHEN METABOLIC PANEL: CPT

## 2021-10-18 PROCEDURE — 97116 GAIT TRAINING THERAPY: CPT

## 2021-10-18 RX ORDER — SODIUM,POTASSIUM PHOSPHATES 280-250MG
2 POWDER IN PACKET (EA) ORAL EVERY 4 HOURS
Status: COMPLETED | OUTPATIENT
Start: 2021-10-18 | End: 2021-10-18

## 2021-10-18 RX ORDER — LANOLIN ALCOHOL/MO/W.PET/CERES
800 CREAM (GRAM) TOPICAL EVERY 4 HOURS
Status: COMPLETED | OUTPATIENT
Start: 2021-10-18 | End: 2021-10-18

## 2021-10-18 RX ORDER — AMOXICILLIN AND CLAVULANATE POTASSIUM 875; 125 MG/1; MG/1
1 TABLET, FILM COATED ORAL EVERY 12 HOURS
Status: DISCONTINUED | OUTPATIENT
Start: 2021-10-18 | End: 2021-10-19 | Stop reason: HOSPADM

## 2021-10-18 RX ADMIN — HYDROMORPHONE HYDROCHLORIDE 1 MG: 1 INJECTION, SOLUTION INTRAMUSCULAR; INTRAVENOUS; SUBCUTANEOUS at 08:10

## 2021-10-18 RX ADMIN — GABAPENTIN 300 MG: 300 CAPSULE ORAL at 08:10

## 2021-10-18 RX ADMIN — MUPIROCIN: 20 OINTMENT TOPICAL at 08:10

## 2021-10-18 RX ADMIN — POTASSIUM & SODIUM PHOSPHATES POWDER PACK 280-160-250 MG 2 PACKET: 280-160-250 PACK at 10:10

## 2021-10-18 RX ADMIN — METHOCARBAMOL 500 MG: 500 TABLET ORAL at 08:10

## 2021-10-18 RX ADMIN — CLONAZEPAM 1 MG: 0.25 TABLET, ORALLY DISINTEGRATING ORAL at 08:10

## 2021-10-18 RX ADMIN — ENOXAPARIN SODIUM 30 MG: 30 INJECTION, SOLUTION INTRAVENOUS; SUBCUTANEOUS at 05:10

## 2021-10-18 RX ADMIN — GABAPENTIN 300 MG: 300 CAPSULE ORAL at 09:10

## 2021-10-18 RX ADMIN — POTASSIUM & SODIUM PHOSPHATES POWDER PACK 280-160-250 MG 2 PACKET: 280-160-250 PACK at 01:10

## 2021-10-18 RX ADMIN — PIPERACILLIN SODIUM AND TAZOBACTAM SODIUM 4.5 G: 4; .5 INJECTION, POWDER, FOR SOLUTION INTRAVENOUS at 05:10

## 2021-10-18 RX ADMIN — GABAPENTIN 300 MG: 300 CAPSULE ORAL at 03:10

## 2021-10-18 RX ADMIN — LOPERAMIDE HYDROCHLORIDE 2 MG: 2 CAPSULE ORAL at 09:10

## 2021-10-18 RX ADMIN — METHOCARBAMOL 500 MG: 500 TABLET ORAL at 03:10

## 2021-10-18 RX ADMIN — Medication 800 MG: at 10:10

## 2021-10-18 RX ADMIN — HYDRALAZINE HYDROCHLORIDE 10 MG: 20 INJECTION, SOLUTION INTRAMUSCULAR; INTRAVENOUS at 09:10

## 2021-10-18 RX ADMIN — Medication 800 MG: at 01:10

## 2021-10-18 RX ADMIN — AMOXICILLIN AND CLAVULANATE POTASSIUM 1 TABLET: 875; 125 TABLET, FILM COATED ORAL at 09:10

## 2021-10-18 RX ADMIN — HYDRALAZINE HYDROCHLORIDE 10 MG: 20 INJECTION, SOLUTION INTRAMUSCULAR; INTRAVENOUS at 01:10

## 2021-10-18 RX ADMIN — OXYCODONE HYDROCHLORIDE 10 MG: 10 TABLET ORAL at 05:10

## 2021-10-18 RX ADMIN — OXYCODONE HYDROCHLORIDE 10 MG: 10 TABLET ORAL at 10:10

## 2021-10-18 RX ADMIN — HYDROMORPHONE HYDROCHLORIDE 1 MG: 1 INJECTION, SOLUTION INTRAMUSCULAR; INTRAVENOUS; SUBCUTANEOUS at 04:10

## 2021-10-18 RX ADMIN — HYDROMORPHONE HYDROCHLORIDE 1 MG: 1 INJECTION, SOLUTION INTRAMUSCULAR; INTRAVENOUS; SUBCUTANEOUS at 07:10

## 2021-10-18 RX ADMIN — CLONAZEPAM 1 MG: 0.25 TABLET, ORALLY DISINTEGRATING ORAL at 09:10

## 2021-10-18 RX ADMIN — METHOCARBAMOL 500 MG: 500 TABLET ORAL at 09:10

## 2021-10-18 RX ADMIN — AMOXICILLIN AND CLAVULANATE POTASSIUM 1 TABLET: 875; 125 TABLET, FILM COATED ORAL at 08:10

## 2021-10-18 RX ADMIN — HYDROMORPHONE HYDROCHLORIDE 1 MG: 1 INJECTION, SOLUTION INTRAMUSCULAR; INTRAVENOUS; SUBCUTANEOUS at 01:10

## 2021-10-18 RX ADMIN — LIDOCAINE 5% 2 PATCH: 700 PATCH TOPICAL at 08:10

## 2021-10-18 RX ADMIN — OXYCODONE HYDROCHLORIDE 10 MG: 10 TABLET ORAL at 09:10

## 2021-10-19 VITALS
TEMPERATURE: 98 F | SYSTOLIC BLOOD PRESSURE: 172 MMHG | WEIGHT: 110 LBS | OXYGEN SATURATION: 96 % | HEIGHT: 65 IN | DIASTOLIC BLOOD PRESSURE: 87 MMHG | HEART RATE: 102 BPM | RESPIRATION RATE: 19 BRPM | BODY MASS INDEX: 18.33 KG/M2

## 2021-10-19 LAB
ALBUMIN SERPL BCP-MCNC: 2.7 G/DL (ref 3.5–5.2)
ALP SERPL-CCNC: 80 U/L (ref 55–135)
ALT SERPL W/O P-5'-P-CCNC: 7 U/L (ref 10–44)
ANION GAP SERPL CALC-SCNC: 15 MMOL/L (ref 8–16)
AST SERPL-CCNC: 13 U/L (ref 10–40)
BASOPHILS # BLD AUTO: 0.04 K/UL (ref 0–0.2)
BASOPHILS NFR BLD: 0.6 % (ref 0–1.9)
BILIRUB SERPL-MCNC: 0.6 MG/DL (ref 0.1–1)
BUN SERPL-MCNC: 5 MG/DL (ref 8–23)
CALCIUM SERPL-MCNC: 9.1 MG/DL (ref 8.7–10.5)
CHLORIDE SERPL-SCNC: 100 MMOL/L (ref 95–110)
CO2 SERPL-SCNC: 23 MMOL/L (ref 23–29)
CREAT SERPL-MCNC: 0.7 MG/DL (ref 0.5–1.4)
CRP SERPL-MCNC: 100.2 MG/L (ref 0–8.2)
DIFFERENTIAL METHOD: ABNORMAL
EOSINOPHIL # BLD AUTO: 0.3 K/UL (ref 0–0.5)
EOSINOPHIL NFR BLD: 4.1 % (ref 0–8)
ERYTHROCYTE [DISTWIDTH] IN BLOOD BY AUTOMATED COUNT: 14.6 % (ref 11.5–14.5)
EST. GFR  (AFRICAN AMERICAN): >60 ML/MIN/1.73 M^2
EST. GFR  (NON AFRICAN AMERICAN): >60 ML/MIN/1.73 M^2
GLUCOSE SERPL-MCNC: 78 MG/DL (ref 70–110)
HCT VFR BLD AUTO: 29.1 % (ref 37–48.5)
HGB BLD-MCNC: 9.4 G/DL (ref 12–16)
IMM GRANULOCYTES # BLD AUTO: 0.09 K/UL (ref 0–0.04)
IMM GRANULOCYTES NFR BLD AUTO: 1.3 % (ref 0–0.5)
LYMPHOCYTES # BLD AUTO: 1.8 K/UL (ref 1–4.8)
LYMPHOCYTES NFR BLD: 26.3 % (ref 18–48)
MAGNESIUM SERPL-MCNC: 1.7 MG/DL (ref 1.6–2.6)
MCH RBC QN AUTO: 32.2 PG (ref 27–31)
MCHC RBC AUTO-ENTMCNC: 32.3 G/DL (ref 32–36)
MCV RBC AUTO: 100 FL (ref 82–98)
MONOCYTES # BLD AUTO: 0.7 K/UL (ref 0.3–1)
MONOCYTES NFR BLD: 10.4 % (ref 4–15)
NEUTROPHILS # BLD AUTO: 4 K/UL (ref 1.8–7.7)
NEUTROPHILS NFR BLD: 57.3 % (ref 38–73)
NRBC BLD-RTO: 0 /100 WBC
PHOSPHATE SERPL-MCNC: 3.3 MG/DL (ref 2.7–4.5)
PLATELET # BLD AUTO: 344 K/UL (ref 150–450)
PMV BLD AUTO: 9.3 FL (ref 9.2–12.9)
POTASSIUM SERPL-SCNC: 3.7 MMOL/L (ref 3.5–5.1)
PROT SERPL-MCNC: 5.8 G/DL (ref 6–8.4)
RBC # BLD AUTO: 2.92 M/UL (ref 4–5.4)
SODIUM SERPL-SCNC: 138 MMOL/L (ref 136–145)
WBC # BLD AUTO: 6.89 K/UL (ref 3.9–12.7)

## 2021-10-19 PROCEDURE — 25000003 PHARM REV CODE 250

## 2021-10-19 PROCEDURE — 80053 COMPREHEN METABOLIC PANEL: CPT

## 2021-10-19 PROCEDURE — 86140 C-REACTIVE PROTEIN: CPT

## 2021-10-19 PROCEDURE — 84100 ASSAY OF PHOSPHORUS: CPT

## 2021-10-19 PROCEDURE — 36415 COLL VENOUS BLD VENIPUNCTURE: CPT

## 2021-10-19 PROCEDURE — 63600175 PHARM REV CODE 636 W HCPCS

## 2021-10-19 PROCEDURE — 25000003 PHARM REV CODE 250: Performed by: STUDENT IN AN ORGANIZED HEALTH CARE EDUCATION/TRAINING PROGRAM

## 2021-10-19 PROCEDURE — 83735 ASSAY OF MAGNESIUM: CPT

## 2021-10-19 PROCEDURE — 85025 COMPLETE CBC W/AUTO DIFF WBC: CPT

## 2021-10-19 RX ORDER — AMOXICILLIN AND CLAVULANATE POTASSIUM 875; 125 MG/1; MG/1
1 TABLET, FILM COATED ORAL EVERY 12 HOURS
Qty: 10 TABLET | Refills: 0 | Status: SHIPPED | OUTPATIENT
Start: 2021-10-19 | End: 2021-10-24

## 2021-10-19 RX ORDER — ACETAMINOPHEN 325 MG/1
650 TABLET ORAL EVERY 6 HOURS PRN
Qty: 30 TABLET | Refills: 0 | Status: SHIPPED | OUTPATIENT
Start: 2021-10-19

## 2021-10-19 RX ORDER — OXYCODONE HYDROCHLORIDE 5 MG/1
5 TABLET ORAL EVERY 4 HOURS PRN
Qty: 15 TABLET | Refills: 0 | Status: SHIPPED | OUTPATIENT
Start: 2021-10-19 | End: 2022-08-15

## 2021-10-19 RX ORDER — GABAPENTIN 300 MG/1
300 CAPSULE ORAL 3 TIMES DAILY
Qty: 21 CAPSULE | Refills: 0 | Status: SHIPPED | OUTPATIENT
Start: 2021-10-19 | End: 2022-08-15

## 2021-10-19 RX ORDER — METHOCARBAMOL 500 MG/1
500 TABLET, FILM COATED ORAL 3 TIMES DAILY
Qty: 30 TABLET | Refills: 0 | Status: SHIPPED | OUTPATIENT
Start: 2021-10-19 | End: 2021-10-29

## 2021-10-19 RX ORDER — LOPERAMIDE HYDROCHLORIDE 2 MG/1
2 CAPSULE ORAL 2 TIMES DAILY
Qty: 14 CAPSULE | Refills: 0 | Status: SHIPPED | OUTPATIENT
Start: 2021-10-19

## 2021-10-19 RX ADMIN — OXYCODONE HYDROCHLORIDE 10 MG: 10 TABLET ORAL at 04:10

## 2021-10-19 RX ADMIN — AMOXICILLIN AND CLAVULANATE POTASSIUM 1 TABLET: 875; 125 TABLET, FILM COATED ORAL at 08:10

## 2021-10-19 RX ADMIN — LOPERAMIDE HYDROCHLORIDE 2 MG: 2 CAPSULE ORAL at 08:10

## 2021-10-19 RX ADMIN — HYDRALAZINE HYDROCHLORIDE 10 MG: 20 INJECTION, SOLUTION INTRAMUSCULAR; INTRAVENOUS at 08:10

## 2021-10-19 RX ADMIN — HYDROMORPHONE HYDROCHLORIDE 1 MG: 1 INJECTION, SOLUTION INTRAMUSCULAR; INTRAVENOUS; SUBCUTANEOUS at 08:10

## 2021-10-19 RX ADMIN — METHOCARBAMOL 500 MG: 500 TABLET ORAL at 08:10

## 2021-10-19 RX ADMIN — GABAPENTIN 300 MG: 300 CAPSULE ORAL at 08:10

## 2021-10-19 RX ADMIN — CLONAZEPAM 1 MG: 0.25 TABLET, ORALLY DISINTEGRATING ORAL at 08:10

## 2021-10-20 ENCOUNTER — TELEPHONE (OUTPATIENT)
Dept: SURGERY | Facility: CLINIC | Age: 71
End: 2021-10-20

## 2021-10-20 ENCOUNTER — PATIENT OUTREACH (OUTPATIENT)
Dept: ADMINISTRATIVE | Facility: CLINIC | Age: 71
End: 2021-10-20

## 2021-10-20 PROCEDURE — G0180 PR HOME HEALTH MD CERTIFICATION: ICD-10-PCS | Mod: ,,, | Performed by: SURGERY

## 2021-10-20 PROCEDURE — G0180 MD CERTIFICATION HHA PATIENT: HCPCS | Mod: ,,, | Performed by: SURGERY

## 2021-10-26 ENCOUNTER — OFFICE VISIT (OUTPATIENT)
Dept: SURGERY | Facility: CLINIC | Age: 71
End: 2021-10-26
Payer: MEDICARE

## 2021-10-26 VITALS
DIASTOLIC BLOOD PRESSURE: 81 MMHG | HEIGHT: 65 IN | HEART RATE: 93 BPM | BODY MASS INDEX: 18.66 KG/M2 | SYSTOLIC BLOOD PRESSURE: 136 MMHG | OXYGEN SATURATION: 96 % | TEMPERATURE: 99 F | WEIGHT: 112 LBS

## 2021-10-26 DIAGNOSIS — Z48.89 POSTOPERATIVE VISIT: Primary | ICD-10-CM

## 2021-10-26 PROBLEM — R10.9 ABDOMINAL PAIN: Status: RESOLVED | Noted: 2021-02-25 | Resolved: 2021-10-26

## 2021-10-26 PROCEDURE — 99999 PR PBB SHADOW E&M-EST. PATIENT-LVL III: ICD-10-PCS | Mod: PBBFAC,,, | Performed by: SURGERY

## 2021-10-26 PROCEDURE — 99213 OFFICE O/P EST LOW 20 MIN: CPT | Mod: PBBFAC | Performed by: SURGERY

## 2021-10-26 PROCEDURE — 99999 PR PBB SHADOW E&M-EST. PATIENT-LVL III: CPT | Mod: PBBFAC,,, | Performed by: SURGERY

## 2021-10-26 PROCEDURE — 99024 PR POST-OP FOLLOW-UP VISIT: ICD-10-PCS | Mod: POP,,, | Performed by: SURGERY

## 2021-10-26 PROCEDURE — 99024 POSTOP FOLLOW-UP VISIT: CPT | Mod: POP,,, | Performed by: SURGERY

## 2021-10-27 LAB
FINAL PATHOLOGIC DIAGNOSIS: NORMAL
Lab: NORMAL

## 2021-10-28 ENCOUNTER — EXTERNAL HOME HEALTH (OUTPATIENT)
Dept: HOME HEALTH SERVICES | Facility: HOSPITAL | Age: 71
End: 2021-10-28
Payer: MEDICARE

## 2021-11-29 ENCOUNTER — DOCUMENT SCAN (OUTPATIENT)
Dept: HOME HEALTH SERVICES | Facility: HOSPITAL | Age: 71
End: 2021-11-29
Payer: MEDICARE

## 2022-02-16 ENCOUNTER — HOSPITAL ENCOUNTER (INPATIENT)
Facility: HOSPITAL | Age: 72
LOS: 2 days | Discharge: HOME | DRG: 389 | End: 2022-02-18
Attending: EMERGENCY MEDICINE | Admitting: SURGERY
Payer: MEDICARE

## 2022-02-16 DIAGNOSIS — R11.2 NAUSEA AND VOMITING, INTRACTABILITY OF VOMITING NOT SPECIFIED, UNSPECIFIED VOMITING TYPE: ICD-10-CM

## 2022-02-16 DIAGNOSIS — K56.609 SBO (SMALL BOWEL OBSTRUCTION): ICD-10-CM

## 2022-02-16 DIAGNOSIS — N17.9 ACUTE RENAL FAILURE, UNSPECIFIED ACUTE RENAL FAILURE TYPE: Primary | ICD-10-CM

## 2022-02-16 DIAGNOSIS — R10.9 ABDOMINAL PAIN: ICD-10-CM

## 2022-02-16 LAB
ALBUMIN SERPL BCP-MCNC: 3.7 G/DL (ref 3.5–5.2)
ALP SERPL-CCNC: 68 U/L (ref 55–135)
ALT SERPL W/O P-5'-P-CCNC: 10 U/L (ref 10–44)
ANION GAP SERPL CALC-SCNC: 18 MMOL/L (ref 8–16)
AST SERPL-CCNC: 15 U/L (ref 10–40)
BACTERIA #/AREA URNS AUTO: ABNORMAL /HPF
BASOPHILS # BLD AUTO: 0.02 K/UL (ref 0–0.2)
BASOPHILS NFR BLD: 0.3 % (ref 0–1.9)
BILIRUB SERPL-MCNC: 1 MG/DL (ref 0.1–1)
BILIRUB UR QL STRIP: NEGATIVE
BUN SERPL-MCNC: 74 MG/DL (ref 6–30)
BUN SERPL-MCNC: 74 MG/DL (ref 8–23)
CALCIUM SERPL-MCNC: 10 MG/DL (ref 8.7–10.5)
CHLORIDE SERPL-SCNC: 92 MMOL/L (ref 95–110)
CHLORIDE SERPL-SCNC: 96 MMOL/L (ref 95–110)
CLARITY UR REFRACT.AUTO: ABNORMAL
CO2 SERPL-SCNC: 27 MMOL/L (ref 23–29)
COLOR UR AUTO: YELLOW
CREAT SERPL-MCNC: 4.3 MG/DL (ref 0.5–1.4)
CREAT SERPL-MCNC: 4.5 MG/DL (ref 0.5–1.4)
CTP QC/QA: YES
DIFFERENTIAL METHOD: ABNORMAL
EOSINOPHIL # BLD AUTO: 0.1 K/UL (ref 0–0.5)
EOSINOPHIL NFR BLD: 1.4 % (ref 0–8)
ERYTHROCYTE [DISTWIDTH] IN BLOOD BY AUTOMATED COUNT: 13.6 % (ref 11.5–14.5)
EST. GFR  (AFRICAN AMERICAN): 11.3 ML/MIN/1.73 M^2
EST. GFR  (NON AFRICAN AMERICAN): 9.8 ML/MIN/1.73 M^2
GLUCOSE SERPL-MCNC: 118 MG/DL (ref 70–110)
GLUCOSE SERPL-MCNC: 119 MG/DL (ref 70–110)
GLUCOSE UR QL STRIP: NEGATIVE
HCT VFR BLD AUTO: 42.9 % (ref 37–48.5)
HCT VFR BLD CALC: 44 %PCV (ref 36–54)
HGB BLD-MCNC: 14 G/DL (ref 12–16)
HGB UR QL STRIP: NEGATIVE
HYALINE CASTS UR QL AUTO: 11 /LPF
IMM GRANULOCYTES # BLD AUTO: 0.01 K/UL (ref 0–0.04)
IMM GRANULOCYTES NFR BLD AUTO: 0.1 % (ref 0–0.5)
KETONES UR QL STRIP: ABNORMAL
LACTATE SERPL-SCNC: 1.1 MMOL/L (ref 0.5–2.2)
LEUKOCYTE ESTERASE UR QL STRIP: NEGATIVE
LIPASE SERPL-CCNC: 28 U/L (ref 4–60)
LYMPHOCYTES # BLD AUTO: 1.7 K/UL (ref 1–4.8)
LYMPHOCYTES NFR BLD: 24 % (ref 18–48)
MAGNESIUM SERPL-MCNC: 1.9 MG/DL (ref 1.6–2.6)
MCH RBC QN AUTO: 32.1 PG (ref 27–31)
MCHC RBC AUTO-ENTMCNC: 32.6 G/DL (ref 32–36)
MCV RBC AUTO: 98 FL (ref 82–98)
MICROSCOPIC COMMENT: ABNORMAL
MONOCYTES # BLD AUTO: 0.9 K/UL (ref 0.3–1)
MONOCYTES NFR BLD: 12.5 % (ref 4–15)
NEUTROPHILS # BLD AUTO: 4.3 K/UL (ref 1.8–7.7)
NEUTROPHILS NFR BLD: 61.7 % (ref 38–73)
NITRITE UR QL STRIP: NEGATIVE
NRBC BLD-RTO: 0 /100 WBC
PH UR STRIP: 5 [PH] (ref 5–8)
PLATELET # BLD AUTO: 395 K/UL (ref 150–450)
PMV BLD AUTO: 9.7 FL (ref 9.2–12.9)
POC IONIZED CALCIUM: 0.92 MMOL/L (ref 1.06–1.42)
POC TCO2 (MEASURED): 26 MMOL/L (ref 23–29)
POTASSIUM BLD-SCNC: 3.8 MMOL/L (ref 3.5–5.1)
POTASSIUM SERPL-SCNC: 3.9 MMOL/L (ref 3.5–5.1)
PROT SERPL-MCNC: 7.2 G/DL (ref 6–8.4)
PROT UR QL STRIP: ABNORMAL
RBC # BLD AUTO: 4.36 M/UL (ref 4–5.4)
RBC #/AREA URNS AUTO: 2 /HPF (ref 0–4)
SAMPLE: ABNORMAL
SARS-COV-2 RDRP RESP QL NAA+PROBE: NEGATIVE
SODIUM BLD-SCNC: 131 MMOL/L (ref 136–145)
SODIUM SERPL-SCNC: 137 MMOL/L (ref 136–145)
SP GR UR STRIP: 1.01 (ref 1–1.03)
SQUAMOUS #/AREA URNS AUTO: 1 /HPF
TROPONIN I SERPL DL<=0.01 NG/ML-MCNC: 0.02 NG/ML (ref 0–0.03)
URN SPEC COLLECT METH UR: ABNORMAL
WBC # BLD AUTO: 7.04 K/UL (ref 3.9–12.7)
WBC #/AREA URNS AUTO: 2 /HPF (ref 0–5)

## 2022-02-16 PROCEDURE — 63600175 PHARM REV CODE 636 W HCPCS: Performed by: EMERGENCY MEDICINE

## 2022-02-16 PROCEDURE — U0002 COVID-19 LAB TEST NON-CDC: HCPCS | Performed by: STUDENT IN AN ORGANIZED HEALTH CARE EDUCATION/TRAINING PROGRAM

## 2022-02-16 PROCEDURE — 93005 ELECTROCARDIOGRAM TRACING: CPT

## 2022-02-16 PROCEDURE — 25000003 PHARM REV CODE 250: Performed by: EMERGENCY MEDICINE

## 2022-02-16 PROCEDURE — 83735 ASSAY OF MAGNESIUM: CPT | Performed by: EMERGENCY MEDICINE

## 2022-02-16 PROCEDURE — 80053 COMPREHEN METABOLIC PANEL: CPT | Performed by: EMERGENCY MEDICINE

## 2022-02-16 PROCEDURE — 96376 TX/PRO/DX INJ SAME DRUG ADON: CPT

## 2022-02-16 PROCEDURE — 99285 EMERGENCY DEPT VISIT HI MDM: CPT | Mod: CS,,, | Performed by: EMERGENCY MEDICINE

## 2022-02-16 PROCEDURE — 83605 ASSAY OF LACTIC ACID: CPT | Performed by: EMERGENCY MEDICINE

## 2022-02-16 PROCEDURE — 12000002 HC ACUTE/MED SURGE SEMI-PRIVATE ROOM

## 2022-02-16 PROCEDURE — 63600175 PHARM REV CODE 636 W HCPCS: Performed by: STUDENT IN AN ORGANIZED HEALTH CARE EDUCATION/TRAINING PROGRAM

## 2022-02-16 PROCEDURE — 25000003 PHARM REV CODE 250: Performed by: STUDENT IN AN ORGANIZED HEALTH CARE EDUCATION/TRAINING PROGRAM

## 2022-02-16 PROCEDURE — 93010 ELECTROCARDIOGRAM REPORT: CPT | Mod: ,,, | Performed by: INTERNAL MEDICINE

## 2022-02-16 PROCEDURE — 85025 COMPLETE CBC W/AUTO DIFF WBC: CPT | Performed by: EMERGENCY MEDICINE

## 2022-02-16 PROCEDURE — 99285 PR EMERGENCY DEPT VISIT,LEVEL V: ICD-10-PCS | Mod: CS,,, | Performed by: EMERGENCY MEDICINE

## 2022-02-16 PROCEDURE — 80047 BASIC METABLC PNL IONIZED CA: CPT

## 2022-02-16 PROCEDURE — 84484 ASSAY OF TROPONIN QUANT: CPT | Performed by: EMERGENCY MEDICINE

## 2022-02-16 PROCEDURE — 81001 URINALYSIS AUTO W/SCOPE: CPT | Performed by: EMERGENCY MEDICINE

## 2022-02-16 PROCEDURE — 83690 ASSAY OF LIPASE: CPT | Performed by: EMERGENCY MEDICINE

## 2022-02-16 PROCEDURE — 96375 TX/PRO/DX INJ NEW DRUG ADDON: CPT

## 2022-02-16 PROCEDURE — 86803 HEPATITIS C AB TEST: CPT | Performed by: EMERGENCY MEDICINE

## 2022-02-16 PROCEDURE — C9113 INJ PANTOPRAZOLE SODIUM, VIA: HCPCS | Performed by: EMERGENCY MEDICINE

## 2022-02-16 PROCEDURE — 96374 THER/PROPH/DIAG INJ IV PUSH: CPT

## 2022-02-16 PROCEDURE — 96361 HYDRATE IV INFUSION ADD-ON: CPT

## 2022-02-16 PROCEDURE — 93010 EKG 12-LEAD: ICD-10-PCS | Mod: ,,, | Performed by: INTERNAL MEDICINE

## 2022-02-16 PROCEDURE — 99285 EMERGENCY DEPT VISIT HI MDM: CPT | Mod: 25

## 2022-02-16 RX ORDER — HEPARIN SODIUM 5000 [USP'U]/ML
5000 INJECTION, SOLUTION INTRAVENOUS; SUBCUTANEOUS EVERY 8 HOURS
Status: DISCONTINUED | OUTPATIENT
Start: 2022-02-16 | End: 2022-02-18 | Stop reason: HOSPADM

## 2022-02-16 RX ORDER — MORPHINE SULFATE 4 MG/ML
4 INJECTION, SOLUTION INTRAMUSCULAR; INTRAVENOUS
Status: COMPLETED | OUTPATIENT
Start: 2022-02-16 | End: 2022-02-16

## 2022-02-16 RX ORDER — MORPHINE SULFATE 2 MG/ML
2 INJECTION, SOLUTION INTRAMUSCULAR; INTRAVENOUS
Status: COMPLETED | OUTPATIENT
Start: 2022-02-16 | End: 2022-02-16

## 2022-02-16 RX ORDER — HYDROMORPHONE HYDROCHLORIDE 1 MG/ML
0.5 INJECTION, SOLUTION INTRAMUSCULAR; INTRAVENOUS; SUBCUTANEOUS EVERY 6 HOURS PRN
Status: DISCONTINUED | OUTPATIENT
Start: 2022-02-16 | End: 2022-02-18 | Stop reason: HOSPADM

## 2022-02-16 RX ORDER — ONDANSETRON 2 MG/ML
4 INJECTION INTRAMUSCULAR; INTRAVENOUS
Status: COMPLETED | OUTPATIENT
Start: 2022-02-16 | End: 2022-02-16

## 2022-02-16 RX ORDER — ONDANSETRON 8 MG/1
8 TABLET, ORALLY DISINTEGRATING ORAL EVERY 8 HOURS PRN
Status: DISCONTINUED | OUTPATIENT
Start: 2022-02-16 | End: 2022-02-18 | Stop reason: HOSPADM

## 2022-02-16 RX ORDER — HYDROMORPHONE HYDROCHLORIDE 1 MG/ML
0.5 INJECTION, SOLUTION INTRAMUSCULAR; INTRAVENOUS; SUBCUTANEOUS ONCE
Status: COMPLETED | OUTPATIENT
Start: 2022-02-16 | End: 2022-02-16

## 2022-02-16 RX ORDER — SODIUM CHLORIDE 9 MG/ML
INJECTION, SOLUTION INTRAVENOUS CONTINUOUS
Status: DISCONTINUED | OUTPATIENT
Start: 2022-02-16 | End: 2022-02-17

## 2022-02-16 RX ORDER — PANTOPRAZOLE SODIUM 40 MG/10ML
40 INJECTION, POWDER, LYOPHILIZED, FOR SOLUTION INTRAVENOUS
Status: COMPLETED | OUTPATIENT
Start: 2022-02-16 | End: 2022-02-16

## 2022-02-16 RX ORDER — SODIUM CHLORIDE, SODIUM LACTATE, POTASSIUM CHLORIDE, CALCIUM CHLORIDE 600; 310; 30; 20 MG/100ML; MG/100ML; MG/100ML; MG/100ML
INJECTION, SOLUTION INTRAVENOUS CONTINUOUS
Status: DISCONTINUED | OUTPATIENT
Start: 2022-02-16 | End: 2022-02-18

## 2022-02-16 RX ADMIN — HEPARIN SODIUM 5000 UNITS: 5000 INJECTION INTRAVENOUS; SUBCUTANEOUS at 10:02

## 2022-02-16 RX ADMIN — MORPHINE SULFATE 4 MG: 4 INJECTION INTRAVENOUS at 05:02

## 2022-02-16 RX ADMIN — MORPHINE SULFATE 2 MG: 2 INJECTION, SOLUTION INTRAMUSCULAR; INTRAVENOUS at 07:02

## 2022-02-16 RX ADMIN — HYDROMORPHONE HYDROCHLORIDE 0.5 MG: 1 INJECTION, SOLUTION INTRAMUSCULAR; INTRAVENOUS; SUBCUTANEOUS at 09:02

## 2022-02-16 RX ADMIN — TOPICAL ANESTHETIC 1 EACH: 200 SPRAY DENTAL; PERIODONTAL at 11:02

## 2022-02-16 RX ADMIN — SODIUM CHLORIDE, SODIUM LACTATE, POTASSIUM CHLORIDE, AND CALCIUM CHLORIDE 1000 ML: .6; .31; .03; .02 INJECTION, SOLUTION INTRAVENOUS at 07:02

## 2022-02-16 RX ADMIN — ONDANSETRON 4 MG: 2 INJECTION INTRAMUSCULAR; INTRAVENOUS at 05:02

## 2022-02-16 RX ADMIN — SODIUM CHLORIDE: 0.9 INJECTION, SOLUTION INTRAVENOUS at 10:02

## 2022-02-16 RX ADMIN — SODIUM CHLORIDE, SODIUM LACTATE, POTASSIUM CHLORIDE, AND CALCIUM CHLORIDE: .6; .31; .03; .02 INJECTION, SOLUTION INTRAVENOUS at 10:02

## 2022-02-16 RX ADMIN — SODIUM CHLORIDE, SODIUM LACTATE, POTASSIUM CHLORIDE, AND CALCIUM CHLORIDE 1000 ML: .6; .31; .03; .02 INJECTION, SOLUTION INTRAVENOUS at 05:02

## 2022-02-16 RX ADMIN — PANTOPRAZOLE SODIUM 40 MG: 40 INJECTION, POWDER, FOR SOLUTION INTRAVENOUS at 05:02

## 2022-02-16 NOTE — ED TRIAGE NOTES
Pt reports pain and vomiting after eating a big bowl of popcorn on Saturday night. Pt reports last emesis yesterday Hx of colostomy, reversed in August.

## 2022-02-16 NOTE — ED PROVIDER NOTES
Encounter Date: 2/16/2022       History     Chief Complaint   Patient presents with    Abdominal Pain    Vomiting     Last emesis yesterday, symptoms began Saturday. Hx of ruptured ulcer.      HPI   Juan Antonio Rowe is a 71-year-old female with a history of gastric ulcer with perforation, GABBI, anxiety, hyperlipidemia, hypertension, peptic ulcer disease and vascular disorder of intestine presenting with severe abdominal pain associated with nausea and vomiting.  Onset has been gradual, began Saturday after eating large amounts of popcorn.  Associated with nausea vomiting of nonbloody, nonbilious emesis which last emesis yesterday.  Pain has been worsening, now rated at severe, worse with palpation located in the mid abdomen radiating to upper abdomen.  She denies any associated fevers, chills, hematochezia, melena, hemoptysis, hematemesis or hematuria.  She denies any dysuria, but does endorse 1 or 2 episodes of loose stools but endorses taking Imodium.  No other aggravating or alleviating factors.      Review of patient's allergies indicates:  No Known Allergies  Past Medical History:   Diagnosis Date    Acute gastric ulcer with perforation     Acute kidney failure with tubular necrosis     GABBI (acute kidney injury)     Anxiety     Diarrhea     HLD (hyperlipidemia)     Hypertension     Nausea & vomiting     Peritonitis     PUD (peptic ulcer disease)     Renal disorder     Urine retention     Vascular disorder of intestine, unspecified      Past Surgical History:   Procedure Laterality Date    APPENDECTOMY      APPLICATION OF WOUND VACUUM-ASSISTED CLOSURE DEVICE  1/21/2021    Procedure: APPLICATION, WOUND VAC;  Surgeon: Abel Francis MD;  Location: [address];  Service: General;;    APPLICATION OF WOUND VACUUM-ASSISTED CLOSURE DEVICE  1/25/2021    Procedure: APPLICATION, WOUND VAC;  Surgeon: Abel Francis MD;  Location: [facility] OR [address] [place] [address];  Service: General;;    CLOSURE OF PERFORATED  ULCER OF DUODENUM USING OMENTAL PATCH  1/21/2021    Procedure: CLOSURE, ULCER, PERFORATED, DUODENUM, USING OMENTAL PATCH;  Surgeon: Abel Francis MD;  [facility];  Service: General;;    COLECTOMY, RIGHT  1/21/2021    Procedure: COLECTOMY, RIGHT;  Surgeon: Abel Francis MD;  [facility];  Service: General;;    cyst removed from neck      GASTROSTOMY  1/25/2021    Procedure: GASTROSTOMY;  Surgeon: Abel Francis MD;  [facility];  Service: General;;    HYSTERECTOMY  1986    ILEOSTOMY CLOSURE  10/13/2021    Procedure: CLOSURE, ILEOSTOMY;  Surgeon: Abel Francis MD;  [facility];  Service: General;;    LYSIS OF ADHESIONS  10/13/2021    Procedure: LYSIS, ADHESIONS;  Surgeon: Abel Francis MD;  Location: [facility] OR [address] [place] [address];  Service: General;;    OOPHORECTOMY Bilateral 1989    OPEN REDUCTION AND INTERNAL FIXATION (ORIF) OF LISFRANC INJURY OF FOOT Left 10/11/2019    Procedure: ORIF, LISFRANC INJURY, FOOT;  Surgeon: Ferdinand Stauffer DPM;  Location: [facility] OR [address] [place] [address];  Service: Podiatry;  Laterality: Left;    TONSILLECTOMY, ADENOIDECTOMY       Family History   Problem Relation Age of Onset    Hypertension Mother      Social History     Tobacco Use    Smoking status: Current Every Day Smoker    Smokeless tobacco: Never Used   Substance Use Topics    Alcohol use: Not Currently     Comment: socially    Drug use: No     Review of Systems   Constitutional: Negative for chills, fatigue and fever.   HENT: Negative for congestion and sore throat.    Eyes: Negative for photophobia and visual disturbance.   Respiratory: Negative for chest tightness and shortness of breath.    Cardiovascular: Negative for chest pain, palpitations and leg swelling.   Gastrointestinal: Positive for abdominal pain, diarrhea, nausea and vomiting. Negative for abdominal distention, anal bleeding and blood in stool.   Endocrine: Negative for polyphagia and polyuria.    Genitourinary: Negative for dysuria, flank pain and pelvic pain.   Musculoskeletal: Negative for back pain, gait problem, neck pain and neck stiffness.   Skin: Negative for color change and wound.   Neurological: Negative for light-headedness, numbness and headaches.   Psychiatric/Behavioral: Negative for confusion. The patient is nervous/anxious.        Physical Exam     Initial Vitals [02/16/22 1644]   BP Pulse Resp Temp SpO2   94/61 94 20 98.7 °F (37.1 °C) 95 %      MAP       --         Physical Exam    Nursing note and vitals reviewed.      Gen/Constitutional: Interactive.  Moderate acute emotional distress secondary to pain in the abdomen.  Head: Normocephalic, Atraumatic  Neck: supple, no masses or LAD, no JVD  Eyes: PERRLA, conjunctiva clear  Ears, Nose and Throat: No rhinorrhea or stridor.  Cardiac:  Regular rate, Reg Rhythm, No murmur  Pulmonary: CTA Bilat, no wheezes, rhonchi, rales.  No increased work of breathing.  GI:  Mild abdominal distention, diffuse abdominal tenderness with worsening right lower quadrant, there is a midline laparotomy incision that is well healed, no guarding or rebound  : No CVA tenderness.  Musculoskeletal: Extremities warm, well perfused, no erythema, no edema  Skin: No rashes, cyanosis or jaundice.  Neuro: Alert and Oriented x 3; No focal motor or sensory deficits.    Psych: Normal affect      ED Course   Procedures  Labs Reviewed   CBC W/ AUTO DIFFERENTIAL - Abnormal; Notable for the following components:       Result Value    MCH 32.1 (*)     All other components within normal limits    Narrative:     Release to patient->Immediate   COMPREHENSIVE METABOLIC PANEL - Abnormal; Notable for the following components:    Chloride 92 (*)     Glucose 118 (*)     BUN 74 (*)     Creatinine 4.3 (*)     Anion Gap 18 (*)     eGFR if  11.3 (*)     eGFR if non  9.8 (*)     All other components within normal limits    Narrative:     Release to  patient->Immediate   URINALYSIS, REFLEX TO URINE CULTURE - Abnormal; Notable for the following components:    Appearance, UA Hazy (*)     Protein, UA 1+ (*)     Ketones, UA Trace (*)     All other components within normal limits    Narrative:     Specimen Source->Urine   URINALYSIS MICROSCOPIC - Abnormal; Notable for the following components:    Bacteria Few (*)     Hyaline Casts, UA 11 (*)     All other components within normal limits    Narrative:     Specimen Source->Urine   ISTAT PROCEDURE - Abnormal; Notable for the following components:    POC Glucose 119 (*)     POC BUN 74 (*)     POC Creatinine 4.5 (*)     POC Sodium 131 (*)     POC Ionized Calcium 0.92 (*)     All other components within normal limits   LIPASE    Narrative:     Release to patient->Immediate   LACTIC ACID, PLASMA    Narrative:     Release to patient->Immediate   TROPONIN I    Narrative:     Release to patient->Immediate   MAGNESIUM    Narrative:     Release to patient->Immediate   HEPATITIS C ANTIBODY   SARS-COV-2 RDRP GENE    Narrative:     This test utilizes isothermal nucleic acid amplification   technology to detect the SARS-CoV-2 RdRp nucleic acid segment.   The analytical sensitivity (limit of detection) is 125 genome   equivalents/mL.   A POSITIVE result implies infection with the SARS-CoV-2 virus;   the patient is presumed to be contagious.     A NEGATIVE result means that SARS-CoV-2 nucleic acids are not   present above the limit of detection. A NEGATIVE result should be   treated as presumptive. It does not rule out the possibility of   COVID-19 and should not be the sole basis for treatment decisions.   If COVID-19 is strongly suspected based on clinical and exposure   history, re-testing using an alternate molecular assay should be   considered.   This test is only for use under the Food and Drug   Administration s Emergency Use Authorization (EUA).   Commercial kits are provided by [employer]   Performance  characteristics of the EUA have been independently   verified by [facility] Department of   Pathology and Laboratory Medicine.   _________________________________________________________________   The authorized Fact Sheet for Healthcare Providers and the authorized Fact   Sheet for Patients of the ID NOW COVID-19 are available on the FDA   website:     https://www.fda.gov/media/773306/download  https://www.fda.gov/media/309922/download         EKG Readings: (Independently Interpreted)   Initial Reading: No STEMI. Previous EKG: Compared with most recent EKG Rhythm: Normal Sinus Rhythm. Heart Rate: 85. ST Segments: Non-Specific ST Segment Depression.     ECG Results          EKG 12-lead (Final result)  Result time 02/17/22 10:37:24    Final result by Interface, Lab In [facility] (02/17/22 10:37:24)                 Narrative:    Test Reason : R10.9,    Vent. Rate : 085 BPM     Atrial Rate : 085 BPM     P-R Int : 160 ms          QRS Dur : 084 ms      QT Int : 374 ms       P-R-T Axes : 073 043 076 degrees     QTc Int : 445 ms    Normal sinus rhythm  Right atrial enlargement  Nonspecific T wave abnormality  Abnormal ECG  When compared with ECG of 19-FEB-2021 22:15,  Nonspecific T wave abnormality no longer evident in Inferior leads  T wave inversion no longer evident in Anterior leads  QT has shortened  Confirmed by Jorge Alberto WOOD, Harjinder RUSS ([id number]) on 2/17/2022 10:37:12 AM    Referred By: [email or web address]   SELF           Confirmed By:Harjinder Azul MD                            Imaging Results          X-ray Abdomen for NG Tube Placement (Nursing should notify Radiology after placement) (Final result)  Result time 02/16/22 21:46:13    Final result by Evangelina Trujillo MD (02/16/22 21:46:13)                 Impression:      Please see above.      Electronically signed by: Evangelina Trujillo MD  Date:    02/16/2022  Time:    21:46             Narrative:    EXAMINATION:  XR NON-RADIOLOGIST PERFORMED NG/GASTRIC TUBE CHECK    CLINICAL  HISTORY:  ngt placement;    TECHNIQUE:  AP View(s) of the abdomen was performed.    COMPARISON:  Radiograph and CT abdomen and pelvis 02/16/2022    FINDINGS:  Interval placement of enteric tube which courses below the diaphragm with tip and side port projecting over the region of the stomach in the left upper quadrant.  The stomach is distended and there are dilated loops of small bowel in the visualized abdomen noting these findings appear similar to imaging examinations referenced above.  No definite evidence of free intraperitoneal air.  Visualized lung bases are grossly clear.                                CT Abdomen Pelvis  Without Contrast (Final result)  Result time 02/16/22 19:59:02    Final result by Ricki Hoang MD (02/16/22 19:59:02)                 Impression:      This report was flagged in Epic as abnormal.    1. Findings concerning for at least partial small bowel obstruction, findings may be on the basis of adhesion noting no focal discrete transition point identified.  Transition to relatively decompressed distal small bowel appears gradual and occurs within the right lower quadrant.  Correlation is advised.  No pneumatosis or free air.  There is marked distention of the gastric lumen with liquid.  2. Mild pericardial effusion, improved since the previous examination.  3. Left perinephric fat stranding, correlation with urinalysis recommended.  4. Interval closure of right ostomy noting surgical changes of distal colectomy.  5. Please see above for several additional findings.      Electronically signed by: Ricki Hoang MD  Date:    02/16/2022  Time:    19:59             Narrative:    EXAMINATION:  CT ABDOMEN PELVIS WITHOUT CONTRAST    CLINICAL HISTORY:  Peritonitis or perforation suspected;Abdominal pain, acute, nonlocalized;    TECHNIQUE:  Low dose axial images, sagittal and coronal reformations were obtained from the lung bases to the pubic symphysis.  Oral contrast was not  administered.    COMPARISON:  02/25/2021    FINDINGS:  Images of the lower thorax are remarkable for bilateral dependent atelectasis.  There is a mild pericardial effusion, improved.    There is a small hiatal hernia.  The liver, spleen, pancreas and adrenal glands have a grossly unremarkable noncontrast appearance.  The stomach is distended with liquid content, without wall thickening.  There is dilation of the common duct measuring up to 1.5 cm, similar to the previous examination without intraluminal filling defect.  There are several scattered upper limit of normal caliber abdominal lymph nodes.  The pancreatic duct is not dilated.    There is left perinephric fat stranding noting atrophic changes of the left kidney as compared to the right.  There is no left hydronephrosis.  The left ureter is unremarkable without calculi seen.  There is mild right hydronephrosis.  The right ureter is unremarkable without calculi seen.  The urinary bladder is decompressed without wall thickening.  The uterus is absent the adnexa is unremarkable.    There is a large amount of gas within the rectum.  Surgical changes are noted of distal colectomy.  Small bowel anastomosis is patent noting some distension at the anastomotic site without wall thickening.  There is fluid distention of the small bowel, particularly involving the proximal to mid small bowel noting relative decompression of the distal small bowel.  Findings may reflect slow flow related to adhesion although no discrete transition point is definitively identified at this time.  No focal organized fluid collection to suggest abscess.  Several prominent mesenteric lymph nodes are noted.  There is atherosclerotic calcification of the aorta and its branches.  No free air.    Degenerative changes are noted of the spine.  There is osteopenia.  There is grade 1 anterolisthesis of L3 on L4.                               X-Ray Abdomen Flat And Erect (Final result)  Result time  02/16/22 19:00:28    Final result by Ricki Hoang MD (02/16/22 19:00:28)                 Impression:      1. Mild gaseous distention of the large bowel suggesting slow flow.  This may be related to slow flow through the ostomy site noting scattered gas-filled small bowel loops in the region.  Correlation is advised.      Electronically signed by: Ricki Hoang MD  Date:    02/16/2022  Time:    19:00             Narrative:    EXAMINATION:  XR ABDOMEN FLAT AND ERECT    CLINICAL HISTORY:  Abdominal Pain;    TECHNIQUE:  Flat and erect AP views of the abdomen were performed.    COMPARISON:  10/15/2021, CT 02/25/2021    FINDINGS:  One upright view, 1 supine view.    No significant air-fluid levels on upright view.  Air and stool is seen within the large bowel and projected over the rectum.  There is mild gaseous distention of the large bowel.  There are no calcifications to suggest nephrolithiasis or cholelithiasis.  No large volume free air or pneumatosis.  The osseous structures are intact.  The lower lung zones are clear.                              X-Rays:   Independently Interpreted Readings:   Abdomen:   Flat and Erect of Abdomen - There are multiple air-fluid levels indictating a small bowel obstruction. No free air   Abdomen and Pelvis without Contrast - Marked distention of the stomach, dilated loops of bowel, concern for partial bowel obstruction, no perforation     Medications   ondansetron disintegrating tablet 8 mg (has no administration in time range)   heparin (porcine) injection 5,000 Units (5,000 Units Subcutaneous Given 2/17/22 0508)   lactated ringers infusion ( Intravenous Verify Only 2/17/22 0615)   HYDROmorphone injection 0.5 mg (0.5 mg Intravenous Given 2/17/22 0900)   benzocaine 20 % oral spray 1 each (has no administration in time range)   sodium chloride 0.9% flush 3 mL (has no administration in time range)   0.9%  NaCl infusion (has no administration in time range)   clonazePAM tablet  0.5 mg (0.5 mg Per NG tube Given 2/17/22 0909)   potassium chloride 10 mEq in 100 mL IVPB (has no administration in time range)   magnesium sulfate 2g in water 50mL IVPB (premix) (has no administration in time range)   diatrizoate meglumineand-diatrizoate sodium (GASTROGRAFFIN) solution 100 mL (has no administration in time range)   morphine injection 4 mg (4 mg Intravenous Given 2/16/22 1757)   ondansetron injection 4 mg (4 mg Intravenous Given 2/16/22 1757)   pantoprazole injection 40 mg (40 mg Intravenous Given 2/16/22 1757)   lactated ringers bolus 1,000 mL (0 mLs Intravenous Stopped 2/16/22 1943)   morphine injection 2 mg (2 mg Intravenous Given 2/16/22 1944)   lactated ringers bolus 1,000 mL (0 mLs Intravenous Stopped 2/16/22 2148)   HYDROmorphone injection 0.5 mg (0.5 mg Intravenous Given 2/16/22 2102)   benzocaine 20 % oral spray (1 each Mouth/Throat Given 2/16/22 2332)     Medical Decision Making:   History:   I obtained history from: EMS provider.       <> Summary of History: Brought from home for abdominal pain.  Old Medical Records: I decided to obtain old medical records.  Old Records Summarized: records from previous admission(s).  Initial Assessment:   Juan Antonio Rowe is a 71-year-old female with a history of gastric ulcer with perforation, GABBI, anxiety, hyperlipidemia, hypertension, peptic ulcer disease and vascular disorder of intestine presenting with severe abdominal pain associated with nausea and vomiting.  Differential Diagnosis:   Obstruction, perforation, intra-abdominal abscess, ileus, enteritis, colitis, diverticulitis, biliary pathology, mesenteric ischemia, ACS  Independently Interpreted Test(s):   I have ordered and independently interpreted X-rays - see prior notes.  I have ordered and independently interpreted EKG Reading(s) - see prior notes  Clinical Tests:   Lab Tests: Ordered and Reviewed  Radiological Study: Ordered and Reviewed  Medical Tests: Ordered and Reviewed  Sepsis  "Perfusion Assessment: "I attest a sepsis perfusion exam was performed within 6 hours of sepsis, severe sepsis, or septic shock presentation, following fluid resuscitation."    Emergent evaluation of a patient presenting with severe abdominal pain associated nausea and vomiting.  She is currently slightly hypotensive, tachycardic but no abdominal peritoneal findings on my exam.  She is tender diffusely with increasing tenderness in the right lower quadrant.  There is mild abdominal distension.  No focal neurologic deficits, cardiac exam unremarkable, lung sounds clear.  Broad workup including IV fluids, labs, antiemetic and opiate for pain therapy.  ECG with no signs of ischemia or STEMI on my read.  CT abdomen pelvis and two view abdomen x-ray obtained.  X-ray does not show free air but shows dilated loops of bowel.  Further imaging with CT abdomen pelvis shows concern for partial bowel obstruction.  She has leukocytosis, but no significant lactic acidosis.  The remainder of her labs show concern for acute kidney injury, continue IV fluids and continue to monitor.  She is on pulse oximetry and cardiac telemetry monitoring.  On re-evaluation, patient's pain improved only slightly with morphine, requiring 2nd dose.  Discussed case with general surgery who evaluated the patient at bedside.  Given concern for partial small bowel obstruction they will admit the patient to General surgery, NPO, IV fluids, and likely NG tube placement for decompression.  Patient agreeable to admission plan.    Complexity: High - level 5                      Clinical Impression:   Final diagnoses:  [R10.9] Abdominal pain  [N17.9] Acute renal failure, unspecified acute renal failure type (Primary)  [R11.2] Nausea and vomiting, intractability of vomiting not specified, unspecified vomiting type          ED Disposition Condition    Admit             Donovan Pearce DO, FAAEM  Emergency Staff Physician   Dept of Emergency Medicine   [facility] [id number]        Disclaimer: This note has been generated using voice-recognition software. There may be typographical errors that have been missed during proof-reading.       Donovan Pearce,   02/17/22 1149

## 2022-02-16 NOTE — ED NOTES
I-STAT Chem-8+ Results:   Value Reference Range   Sodium 131 136-145 mmol/L   Potassium  3.8 3.5-5.1 mmol/L   Chloride 96  mmol/L   Ionized Calcium 0.92 1.06-1.42 mmol/L   CO2 (measured) 26 23-29 mmol/L   Glucose 119  mg/dL   BUN 74 6-30 mg/dL   Creatinine 4.5 0.5-1.4 mg/dL   Hematocrit 44 36-54%

## 2022-02-17 LAB
ALBUMIN SERPL BCP-MCNC: 3.3 G/DL (ref 3.5–5.2)
ALP SERPL-CCNC: 62 U/L (ref 55–135)
ALT SERPL W/O P-5'-P-CCNC: 8 U/L (ref 10–44)
ANION GAP SERPL CALC-SCNC: 15 MMOL/L (ref 8–16)
ANISOCYTOSIS BLD QL SMEAR: SLIGHT
AST SERPL-CCNC: 14 U/L (ref 10–40)
BASOPHILS # BLD AUTO: ABNORMAL K/UL (ref 0–0.2)
BASOPHILS NFR BLD: 0 % (ref 0–1.9)
BILIRUB SERPL-MCNC: 0.9 MG/DL (ref 0.1–1)
BUN SERPL-MCNC: 58 MG/DL (ref 8–23)
CALCIUM SERPL-MCNC: 9 MG/DL (ref 8.7–10.5)
CHLORIDE SERPL-SCNC: 98 MMOL/L (ref 95–110)
CO2 SERPL-SCNC: 24 MMOL/L (ref 23–29)
CREAT SERPL-MCNC: 2.1 MG/DL (ref 0.5–1.4)
DIFFERENTIAL METHOD: ABNORMAL
EOSINOPHIL # BLD AUTO: ABNORMAL K/UL (ref 0–0.5)
EOSINOPHIL NFR BLD: 0 % (ref 0–8)
ERYTHROCYTE [DISTWIDTH] IN BLOOD BY AUTOMATED COUNT: 13.5 % (ref 11.5–14.5)
EST. GFR  (AFRICAN AMERICAN): 26.9 ML/MIN/1.73 M^2
EST. GFR  (NON AFRICAN AMERICAN): 23.3 ML/MIN/1.73 M^2
GLUCOSE SERPL-MCNC: 93 MG/DL (ref 70–110)
HCT VFR BLD AUTO: 41.7 % (ref 37–48.5)
HGB BLD-MCNC: 13.4 G/DL (ref 12–16)
HYPOCHROMIA BLD QL SMEAR: ABNORMAL
IMM GRANULOCYTES # BLD AUTO: ABNORMAL K/UL (ref 0–0.04)
IMM GRANULOCYTES NFR BLD AUTO: ABNORMAL % (ref 0–0.5)
LYMPHOCYTES # BLD AUTO: ABNORMAL K/UL (ref 1–4.8)
LYMPHOCYTES NFR BLD: 19 % (ref 18–48)
MAGNESIUM SERPL-MCNC: 1.5 MG/DL (ref 1.6–2.6)
MCH RBC QN AUTO: 31.7 PG (ref 27–31)
MCHC RBC AUTO-ENTMCNC: 32.1 G/DL (ref 32–36)
MCV RBC AUTO: 99 FL (ref 82–98)
MONOCYTES # BLD AUTO: ABNORMAL K/UL (ref 0.3–1)
MONOCYTES NFR BLD: 7 % (ref 4–15)
NEUTROPHILS NFR BLD: 67 % (ref 38–73)
NEUTS BAND NFR BLD MANUAL: 7 %
NRBC BLD-RTO: 0 /100 WBC
OVALOCYTES BLD QL SMEAR: ABNORMAL
PHOSPHATE SERPL-MCNC: 2.8 MG/DL (ref 2.7–4.5)
PLATELET # BLD AUTO: 279 K/UL (ref 150–450)
PMV BLD AUTO: 9.7 FL (ref 9.2–12.9)
POIKILOCYTOSIS BLD QL SMEAR: SLIGHT
POLYCHROMASIA BLD QL SMEAR: ABNORMAL
POTASSIUM SERPL-SCNC: 3.3 MMOL/L (ref 3.5–5.1)
PROT SERPL-MCNC: 6.1 G/DL (ref 6–8.4)
RBC # BLD AUTO: 4.23 M/UL (ref 4–5.4)
SODIUM SERPL-SCNC: 137 MMOL/L (ref 136–145)
WBC # BLD AUTO: 7.23 K/UL (ref 3.9–12.7)

## 2022-02-17 PROCEDURE — 25000003 PHARM REV CODE 250: Performed by: STUDENT IN AN ORGANIZED HEALTH CARE EDUCATION/TRAINING PROGRAM

## 2022-02-17 PROCEDURE — 63600175 PHARM REV CODE 636 W HCPCS: Performed by: STUDENT IN AN ORGANIZED HEALTH CARE EDUCATION/TRAINING PROGRAM

## 2022-02-17 PROCEDURE — 83735 ASSAY OF MAGNESIUM: CPT | Performed by: STUDENT IN AN ORGANIZED HEALTH CARE EDUCATION/TRAINING PROGRAM

## 2022-02-17 PROCEDURE — 80053 COMPREHEN METABOLIC PANEL: CPT | Performed by: STUDENT IN AN ORGANIZED HEALTH CARE EDUCATION/TRAINING PROGRAM

## 2022-02-17 PROCEDURE — 20600001 HC STEP DOWN PRIVATE ROOM

## 2022-02-17 PROCEDURE — 85027 COMPLETE CBC AUTOMATED: CPT | Performed by: STUDENT IN AN ORGANIZED HEALTH CARE EDUCATION/TRAINING PROGRAM

## 2022-02-17 PROCEDURE — 36415 COLL VENOUS BLD VENIPUNCTURE: CPT | Performed by: STUDENT IN AN ORGANIZED HEALTH CARE EDUCATION/TRAINING PROGRAM

## 2022-02-17 PROCEDURE — 94761 N-INVAS EAR/PLS OXIMETRY MLT: CPT

## 2022-02-17 PROCEDURE — 25500020 PHARM REV CODE 255: Performed by: STUDENT IN AN ORGANIZED HEALTH CARE EDUCATION/TRAINING PROGRAM

## 2022-02-17 PROCEDURE — 84100 ASSAY OF PHOSPHORUS: CPT | Performed by: STUDENT IN AN ORGANIZED HEALTH CARE EDUCATION/TRAINING PROGRAM

## 2022-02-17 PROCEDURE — 85007 BL SMEAR W/DIFF WBC COUNT: CPT | Performed by: STUDENT IN AN ORGANIZED HEALTH CARE EDUCATION/TRAINING PROGRAM

## 2022-02-17 RX ORDER — SODIUM CHLORIDE 0.9 % (FLUSH) 0.9 %
3 SYRINGE (ML) INJECTION
Status: DISCONTINUED | OUTPATIENT
Start: 2022-02-17 | End: 2022-02-18 | Stop reason: HOSPADM

## 2022-02-17 RX ORDER — MAGNESIUM SULFATE HEPTAHYDRATE 40 MG/ML
2 INJECTION, SOLUTION INTRAVENOUS ONCE
Status: COMPLETED | OUTPATIENT
Start: 2022-02-17 | End: 2022-02-17

## 2022-02-17 RX ORDER — CLONAZEPAM 0.5 MG/1
0.5 TABLET ORAL 2 TIMES DAILY
Status: DISCONTINUED | OUTPATIENT
Start: 2022-02-17 | End: 2022-02-18 | Stop reason: HOSPADM

## 2022-02-17 RX ORDER — SODIUM CHLORIDE 9 MG/ML
INJECTION, SOLUTION INTRAVENOUS
Status: DISCONTINUED | OUTPATIENT
Start: 2022-02-17 | End: 2022-02-18 | Stop reason: HOSPADM

## 2022-02-17 RX ORDER — POTASSIUM CHLORIDE 7.45 MG/ML
10 INJECTION INTRAVENOUS
Status: COMPLETED | OUTPATIENT
Start: 2022-02-17 | End: 2022-02-17

## 2022-02-17 RX ADMIN — MAGNESIUM SULFATE IN WATER 2 G: 40 INJECTION, SOLUTION INTRAVENOUS at 11:02

## 2022-02-17 RX ADMIN — HYDROMORPHONE HYDROCHLORIDE 0.5 MG: 1 INJECTION, SOLUTION INTRAMUSCULAR; INTRAVENOUS; SUBCUTANEOUS at 06:02

## 2022-02-17 RX ADMIN — HEPARIN SODIUM 5000 UNITS: 5000 INJECTION INTRAVENOUS; SUBCUTANEOUS at 01:02

## 2022-02-17 RX ADMIN — HEPARIN SODIUM 5000 UNITS: 5000 INJECTION INTRAVENOUS; SUBCUTANEOUS at 05:02

## 2022-02-17 RX ADMIN — DIATRIZOATE MEGLUMINE AND DIATRIZOATE SODIUM 100 ML: 660; 100 LIQUID ORAL; RECTAL at 01:02

## 2022-02-17 RX ADMIN — ONDANSETRON 8 MG: 8 TABLET, ORALLY DISINTEGRATING ORAL at 03:02

## 2022-02-17 RX ADMIN — POTASSIUM CHLORIDE 10 MEQ: 10 INJECTION, SOLUTION INTRAVENOUS at 11:02

## 2022-02-17 RX ADMIN — HYDROMORPHONE HYDROCHLORIDE 0.5 MG: 1 INJECTION, SOLUTION INTRAMUSCULAR; INTRAVENOUS; SUBCUTANEOUS at 03:02

## 2022-02-17 RX ADMIN — HYDROMORPHONE HYDROCHLORIDE 0.5 MG: 1 INJECTION, SOLUTION INTRAMUSCULAR; INTRAVENOUS; SUBCUTANEOUS at 09:02

## 2022-02-17 RX ADMIN — CLONAZEPAM 0.5 MG: 0.5 TABLET ORAL at 09:02

## 2022-02-17 RX ADMIN — HEPARIN SODIUM 5000 UNITS: 5000 INJECTION INTRAVENOUS; SUBCUTANEOUS at 09:02

## 2022-02-17 RX ADMIN — POTASSIUM CHLORIDE 10 MEQ: 10 INJECTION, SOLUTION INTRAVENOUS at 12:02

## 2022-02-17 RX ADMIN — POTASSIUM CHLORIDE 10 MEQ: 10 INJECTION, SOLUTION INTRAVENOUS at 01:02

## 2022-02-17 RX ADMIN — POTASSIUM CHLORIDE 10 MEQ: 10 INJECTION, SOLUTION INTRAVENOUS at 10:02

## 2022-02-17 RX ADMIN — SODIUM CHLORIDE, SODIUM LACTATE, POTASSIUM CHLORIDE, AND CALCIUM CHLORIDE: .6; .31; .03; .02 INJECTION, SOLUTION INTRAVENOUS at 12:02

## 2022-02-17 RX ADMIN — SODIUM CHLORIDE, SODIUM LACTATE, POTASSIUM CHLORIDE, AND CALCIUM CHLORIDE: .6; .31; .03; .02 INJECTION, SOLUTION INTRAVENOUS at 05:02

## 2022-02-17 RX ADMIN — HYDROMORPHONE HYDROCHLORIDE 0.5 MG: 1 INJECTION, SOLUTION INTRAMUSCULAR; INTRAVENOUS; SUBCUTANEOUS at 12:02

## 2022-02-17 NOTE — H&P
Please see full consult note for full H&P     L. Meghna Chávez MD   General Surgery- PGYV  [phone]

## 2022-02-17 NOTE — PROGRESS NOTES
[facility] - [facility]  General Surgery  Progress Note    Subjective:     History of Present Illness:  Juan Antonio Rowe is a 72 yo female known to surgery team with history of perforated gastric ulcer and colon ischemia s/p ex lap with duodenal ulcer repair and right colectomy, and end ileostomy creation in Jan 2021 and ilestomy reversal (ileosigmoid anastomosis) October 2021, GABBI, anxiety, HLD, HTN who presents to ED with 5 day history of abdominal pain, nausea and vomiting. Reports she ate a large bucket of popcorn on Saturday and shortly after started having abdominal pain. Pain has progressed and become more severe. Pain worse in the RLQ. Reports persistent nausea and several episode of dark emesis. Minimal PO intake since Saturday. Unsure if passing flatus. But has continued to have some loose bowel movements. She reports having these symptoms once before in August but they resolved without medical attention. Denies decrease UOP or dysuria. No fever or chills. No chest pain, sob, weakness.    On presentation patient HDS. Afebrile. WBC 7. H/h stable. GABBI with Cr 4.3. Lactate 1.1. UA pending. CT scan revealing stomach and proximal small bowel distention with some decompression of distal sb. No focal transition point. Some L perinephric fat stranding.       Post-Op Info:  * No surgery found *         Interval History:   NAEON  Nothing out of NG  Continues to have bowel movements     Medications:  Continuous Infusions:   lactated ringers 125 mL/hr at 02/17/22 0615     Scheduled Meds:   clonazePAM  0.5 mg Per NG tube BID    heparin (porcine)  5,000 Units Subcutaneous Q8H     PRN Meds:sodium chloride 0.9%, benzocaine, HYDROmorphone, ondansetron, sodium chloride 0.9%     Review of patient's allergies indicates:  No Known Allergies  Objective:     Vital Signs (Most Recent):  Temp: 98.4 °F (36.9 °C) (02/17/22 0407)  Pulse: 101 (02/17/22 0407)  Resp: 16 (02/17/22 0612)  BP: (!) 153/68 (02/17/22 0407)  SpO2: 95 % (02/17/22 0741)  Vital Signs (24h Range):  Temp:  [98.4 °F (36.9 °C)-98.7 °F (37.1 °C)] 98.4 °F (36.9 °C)  Pulse:  [] 101  Resp:  [16-20] 16  SpO2:  [92 %-99 %] 95 %  BP: ()/(61-90) 153/68     Weight: 55.2 kg (121 lb 11.1 oz)  Body mass index is 20.89 kg/m².    Intake/Output - Last 3 Shifts       02/15 0700  02/16 0659 02/16 0700  02/17 0659 02/17 0700  02/18 0659    I.V. (mL/kg)  727.6 (13.2)     Total Intake(mL/kg)  727.6 (13.2)     Urine (mL/kg/hr)  601     Drains  1000     Stool  0     Total Output  1601     Net  -873.4            Urine Occurrence  1 x     Stool Occurrence  0 x           Physical Exam   A&O, NAD  RRR  No Resp Distress  NG in place with minimal gastric contents  ABD: mildly TTP in the RLQ, non distended, soft    Significant Labs:  I have reviewed all pertinent lab results within the past 24 hours.  CBC:   Recent Labs   Lab 02/17/22  0336   WBC 7.23   RBC 4.23   HGB 13.4   HCT 41.7      MCV 99*   MCH 31.7*   MCHC 32.1     CMP:   Recent Labs   Lab 02/17/22  0336   GLU 93   CALCIUM 9.0   ALBUMIN 3.3*   PROT 6.1      K 3.3*   CO2 24   CL 98   BUN 58*   CREATININE 2.1*   ALKPHOS 62   ALT 8*   AST 14   BILITOT 0.9       Significant Diagnostics:  I have reviewed all pertinent imaging results/findings within the past 24 hours.  CT:     1. Findings concerning for at least partial small bowel obstruction, findings may be on the basis of adhesion noting no focal discrete transition point identified.  Transition to relatively decompressed distal small bowel appears gradual and occurs within the right lower quadrant.  Correlation is advised.  No pneumatosis or free air.  There is marked distention of the gastric lumen with liquid.  2. Mild pericardial effusion, improved since the previous examination.  3. Left perinephric fat stranding, correlation with urinalysis recommended.  4. Interval closure of right ostomy noting surgical changes of distal colectomy.    Assessment/Plan:     * SBO (small bowel  obstruction)   70 yo female with history of perforated gastric ulcer and colon ischemia s/p ex lap with duodenal ulcer repair and right colectomy, and end ileostomy creation in Jan 2021 and ilestomy reversal (ileosigmoid anastomosis) October 2021 presenting with abdominal pain and n/v. Concern for pSBO, no focal transition point on imaging.     - NPO   - NGT   - GGC today  - Patient will have gastrografin challenge today. Mix 100cc gastrografin with 50cc water and adminster via NGT and clamp for 2 hours. 2 KUBs are ordered for approximately 4 and 8 hours after contrast is given.   Please unclamp and place to LIWS if the patient experiences N/V at any point   - concern for BZD withdrawal, restart Klonopin half dose via NG  - mIVF  - PRN pain control   - Restart home medications as needed   - Daily labs- close monitoring of GABBI   - DVT ppx           Raji Mariscal MD  General Surgery  [facility]

## 2022-02-17 NOTE — HPI
Juan Antonio Rowe is a 72 yo female known to surgery team with history of perforated gastric ulcer and colon ischemia s/p ex lap with duodenal ulcer repair and right colectomy, and end ileostomy creation in Jan 2021 and ilestomy reversal (ileosigmoid anastomosis) October 2021, GABBI, anxiety, HLD, HTN who presents to ED with 5 day history of abdominal pain, nausea and vomiting. Reports she ate a large bucket of popcorn on Saturday and shortly after started having abdominal pain. Pain has progressed and become more severe. Pain worse in the RLQ. Reports persistent nausea and several episode of dark emesis. Minimal PO intake since Saturday. Unsure if passing flatus. But has continued to have some loose bowel movements. She reports having these symptoms once before in August but they resolved without medical attention. Denies decrease UOP or dysuria. No fever or chills. No chest pain, sob, weakness.    On presentation patient HDS. Afebrile. WBC 7. H/h stable. GABBI with Cr 4.3. Lactate 1.1. UA pending. CT scan revealing stomach and proximal small bowel distention with some decompression of distal sb. No focal transition point. Some L perinephric fat stranding.

## 2022-02-17 NOTE — PLAN OF CARE
CM MET WITH PT FOR DISCHARGE PLANNING PT STATES THAT SHE LIVES ALONE IN A 2 STORY HOUSE WITH HER BED AND BATHROOM ON THE SECOND FLOOR SHE IS NOT ON DIALYSIS AND DOES NOT TAKE COUMADIN SHE STATES THAT SHE HAS A RIDE HOME  PHARMACY [facility] [address]   PCP REMINGTON HARDY  LAST APPT 01/15/22  POA/BROTHER  [phone]  [facility] - [facility]  Initial Discharge Assessment       Primary Care Provider: Remington Hardy MD    Admission Diagnosis: Abdominal pain [R10.9]  Acute renal failure, unspecified acute renal failure type [N17.9]  Nausea and vomiting, intractability of vomiting not specified, unspecified vomiting type [R11.2]    Admission Date: 2/16/2022  Expected Discharge Date:     Discharge Barriers Identified: Transportation    Payor: MEDICARE / Plan: MEDICARE PART A & B / Product Type: Government /     Extended Emergency Contact Information  Primary Emergency Contact: Thierno Mckay  Mobile Phone: [phone]  Relation: Brother  Preferred language: English   needed? No    Discharge Plan A: Home  Discharge Plan B: Home,Home Health      [facility] - [place], [place] - [address]  [address]  [place] [address]  Phone: [phone] Fax: [phone]    [facility]  [address]  [facility] [address]      [facility]  [address]  [place] [address]  Phone: [phone] Fax: [phone]      Initial Assessment (most recent)     Adult Discharge Assessment - 02/17/22 1001        Discharge Assessment    Assessment Type Discharge Planning Assessment     Confirmed/corrected address, phone number and insurance Yes     Confirmed Demographics Correct on Facesheet     Source of Information patient     When was your last doctors appointment? 01/15/22     Communicated CATHY with patient/caregiver Date not available/Unable to determine     Lives With alone     Do you expect to return to your current living situation?  Yes     Do you have help at home or someone to help you manage your care at home? No     Prior to hospitilization cognitive status: No Deficits     Current cognitive status: No Deficits     Walking or Climbing Stairs Difficulty none     Home Layout Bathroom on 2nd floor;Bedroom on 2nd floor     Equipment Currently Used at Home walker, rolling     Patient currently being followed by outpatient case management? No     Do you currently have service(s) that help you manage your care at home? No     Do you take prescription medications? Yes     Do you have prescription coverage? No     Do you have any problems affording any of your prescribed medications? No     Is the patient taking medications as prescribed? yes     Who is going to help you get home at discharge? brother HODA ANDERSON      How do you get to doctors appointments? car, drives self;family or friend will provide     Are you on dialysis? No     Do you take coumadin? No     Discharge Plan A Home     Discharge Plan B Home;Home Health     DME Needed Upon Discharge  none     Discharge Plan discussed with: Patient     Discharge Barriers Identified Transportation

## 2022-02-17 NOTE — ASSESSMENT & PLAN NOTE
70 yo female with history of perforated gastric ulcer and colon ischemia s/p ex lap with duodenal ulcer repair and right colectomy, and end ileostomy creation in Jan 2021 and ilestomy reversal (ileosigmoid anastomosis) October 2021 presenting with abdominal pain and n/v. Concern for pSBO, no focal transition point on imaging.     - NPO   - NGT   - GGC today  - Patient will have gastrografin challenge today. Mix 100cc gastrografin with 50cc water and adminster via NGT and clamp for 2 hours. 2 KUBs are ordered for approximately 4 and 8 hours after contrast is given.   Please unclamp and place to LIWS if the patient experiences N/V at any point   - concern for BZD withdrawal, restart Klonopin half dose via NG  - mIVF  - PRN pain control   - Restart home medications as needed   - Daily labs- close monitoring of GABBI   - DVT ppx

## 2022-02-17 NOTE — PLAN OF CARE
Plan of care reviewed with patient, who verbalized understanding. Pt describes pain as well controlled with regular use of narcotic pain medications. Pt is able to turn and position themselves, and has gotten out of bed, chair and ambulated in the room. Pt denies nausea with NGT to LIWS, and has had a bowel movement 2/16 prior to arrival. Use of the incentive spirometer and ambulation in the halls should be encouraged this morning after a restful sleep. Pt has not received her Clonopin and has developed tremors from withdrawal. Situation reviewed with Dr. Ahmadi upon arrival of the Pt from the ED. No new orders given at this time, will continue to monitor.       Problem: Adult Inpatient Plan of Care  Goal: Plan of Care Review  Outcome: Ongoing, Progressing  Goal: Patient-Specific Goal (Individualized)  Outcome: Ongoing, Progressing  Goal: Absence of Hospital-Acquired Illness or Injury  Outcome: Ongoing, Progressing  Goal: Optimal Comfort and Wellbeing  Outcome: Ongoing, Progressing  Goal: Readiness for Transition of Care  Outcome: Ongoing, Progressing

## 2022-02-17 NOTE — ASSESSMENT & PLAN NOTE
72 yo female with history of perforated gastric ulcer and colon ischemia s/p ex lap with duodenal ulcer repair and right colectomy, and end ileostomy creation in Jan 2021 and ilestomy reversal (ileosigmoid anastomosis) October 2021 presenting with abdominal pain and n/v. Concern for pSBO, no focal transition point on imaging.     -Admit to general surgery   -NPO   -NGT placement for decompression   -mIVF  -PRN pain control   -Restart home medications as needed   -Daily labs- close monitoring of GABBI   -DVT ppx

## 2022-02-17 NOTE — ED NOTES
Pt resting quietly on stretcher. Pt remains on continuous cardiac and pulse ox monitoring with non-invasive blood pressure to cycle every 30 minutes.Bed locked in lowest position; side rails up and locked x 2; call light, bedside table, and personal belongings within reach. Room assessed for safety measures and cleanliness; no action needed at this time. Pt denies needs or complaints at this time; will continue to monitor.

## 2022-02-17 NOTE — CONSULTS
[facility] - Emergency Dept  General Surgery  Consult Note    Patient Name: Juan Antonio Rowe  MRN: [id number]  Code Status: Full Code  Admission Date: 2/16/2022  Hospital Length of Stay: 0 days  Attending Physician: Donovan Pearce DO  Primary Care Provider: Brendon Hardy MD    Patient information was obtained from patient, past medical records and ER records.     Inpatient consult to General Surgery  Consult performed by: Rissa Chávez MD  Consult ordered by: Rissa Chávez MD        Subjective:     Principal Problem: SBO (small bowel obstruction)    History of Present Illness: Juan Antonio Rowe is a 70 yo female known to surgery team with history of perforated gastric ulcer and colon ischemia s/p ex lap with duodenal ulcer repair and right colectomy, and end ileostomy creation in Jan 2021 and ilestomy reversal (ileosigmoid anastomosis) October 2021, GABBI, anxiety, HLD, HTN who presents to ED with 5 day history of abdominal pain, nausea and vomiting. Reports she ate a large bucket of popcorn on Saturday and shortly after started having abdominal pain. Pain has progressed and become more severe. Pain worse in the RLQ. Reports persistent nausea and several episode of dark emesis. Minimal PO intake since Saturday. Unsure if passing flatus. But has continued to have some loose bowel movements. She reports having these symptoms once before in August but they resolved without medical attention. Denies decrease UOP or dysuria. No fever or chills. No chest pain, sob, weakness.    On presentation patient HDS. Afebrile. WBC 7. H/h stable. GABBI with Cr 4.3. Lactate 1.1. UA pending. CT scan revealing stomach and proximal small bowel distention with some decompression of distal sb. No focal transition point. Some L perinephric fat stranding.       No current facility-administered medications on file prior to encounter.     Current Outpatient Medications on File Prior to Encounter   Medication Sig    acetaminophen (TYLENOL) 325 MG tablet  Take 2 tablets (650 mg total) by mouth every 6 (six) hours as needed for Pain.    ascorbic acid, vitamin C, (VITAMIN C) 500 MG tablet Take 500 mg by mouth once daily.     calcium carbonate (OS-TORI) 600 mg (1,500 mg) Tab Take 600 mg by mouth 2 (two) times daily with meals.    clonazePAM (KLONOPIN) 1 MG tablet Take 1 tablet (1 mg total) by mouth 2 (two) times daily as needed for Anxiety. (Patient taking differently: Take 1 mg by mouth 2 (two) times daily. )    gabapentin (NEURONTIN) 300 MG capsule Take 1 capsule (300 mg total) by mouth 3 (three) times daily. for 7 days    lisinopriL-hydrochlorothiazide (PRINZIDE,ZESTORETIC) 20-12.5 mg per tablet Take 1 tablet by mouth once daily.    loperamide (IMODIUM) 2 mg capsule Take 1 capsule (2 mg total) by mouth 2 (two) times a day.    multivitamin (ONE DAILY MULTIVITAMIN) per tablet Take 1 tablet by mouth once daily.    oxyCODONE (ROXICODONE) 5 MG immediate release tablet Take 1 tablet (5 mg total) by mouth every 4 (four) hours as needed.    potassium chloride SA (K-DUR,KLOR-CON) 20 MEQ tablet Take 1 tablet (20 mEq total) by mouth once daily.    tamsulosin (FLOMAX) 0.4 mg Cap Take 1 capsule (0.4 mg total) by mouth once daily. (Patient not taking: Reported on 10/20/2021)       Review of patient's allergies indicates:  No Known Allergies    Past Medical History:   Diagnosis Date    Acute gastric ulcer with perforation     Acute kidney failure with tubular necrosis     GABBI (acute kidney injury)     Anxiety     Diarrhea     HLD (hyperlipidemia)     Hypertension     Nausea & vomiting     Peritonitis     PUD (peptic ulcer disease)     Renal disorder     Urine retention     Vascular disorder of intestine, unspecified      Past Surgical History:   Procedure Laterality Date    APPENDECTOMY      APPLICATION OF WOUND VACUUM-ASSISTED CLOSURE DEVICE  1/21/2021    Procedure: APPLICATION, WOUND VAC;  Surgeon: Abel Francis MD;  Location: [facility] OR [address] [place] [address];   Service: General;;    APPLICATION OF WOUND VACUUM-ASSISTED CLOSURE DEVICE  1/25/2021    Procedure: APPLICATION, WOUND VAC;  Surgeon: Abel Francis MD;  [facility];  Service: General;;    CLOSURE OF PERFORATED ULCER OF DUODENUM USING OMENTAL PATCH  1/21/2021    Procedure: CLOSURE, ULCER, PERFORATED, DUODENUM, USING OMENTAL PATCH;  Surgeon: Abel Francis MD;  [facility];  Service: General;;    COLECTOMY, RIGHT  1/21/2021    Procedure: COLECTOMY, RIGHT;  Surgeon: Abel Francis MD;  [facility];  Service: General;;    cyst removed from neck      GASTROSTOMY  1/25/2021    Procedure: GASTROSTOMY;  Surgeon: Abel Francis MD;  [facility];  Service: General;;    HYSTERECTOMY  1986    ILEOSTOMY CLOSURE  10/13/2021    Procedure: CLOSURE, ILEOSTOMY;  Surgeon: Abel Francis MD;  [facility];  Service: General;;    LYSIS OF ADHESIONS  10/13/2021    Procedure: LYSIS, ADHESIONS;  Surgeon: Abel Francis MD;  [facility];  Service: General;;    OOPHORECTOMY Bilateral 1989    OPEN REDUCTION AND INTERNAL FIXATION (ORIF) OF LISFRANC INJURY OF FOOT Left 10/11/2019    Procedure: ORIF, LISFRANC INJURY, FOOT;  Surgeon: Ferdinand Stauffer DPM;  Location: [address];  Service: Podiatry;  Laterality: Left;    TONSILLECTOMY, ADENOIDECTOMY       Family History     Problem Relation (Age of Onset)    Hypertension Mother        Tobacco Use    Smoking status: Current Every Day Smoker    Smokeless tobacco: Never Used   Substance and Sexual Activity    Alcohol use: Not Currently     Comment: socially    Drug use: No    Sexual activity: Yes     Partners: Male     Comment:      Review of Systems   Constitutional: Positive for appetite change. Negative for activity change, diaphoresis, fatigue and fever.   Respiratory: Negative for cough, choking and shortness of breath.    Cardiovascular: Negative for chest pain  and palpitations.   Gastrointestinal: Positive for abdominal distention, abdominal pain, diarrhea, nausea and vomiting. Negative for constipation.   Genitourinary: Negative for difficulty urinating and dysuria.   Musculoskeletal: Negative for arthralgias and back pain.   Skin: Negative for color change and wound.   Neurological: Negative for dizziness and weakness.   Hematological: Negative for adenopathy. Does not bruise/bleed easily.     Objective:     Vital Signs (Most Recent):  Temp: 98.7 °F (37.1 °C) (02/16/22 1644)  Pulse: 88 (02/16/22 2000)  Resp: 18 (02/16/22 1944)  BP: (!) 153/75 (02/16/22 1945)  SpO2: 95 % (02/16/22 2000) Vital Signs (24h Range):  Temp:  [98.7 °F (37.1 °C)] 98.7 °F (37.1 °C)  Pulse:  [86-94] 88  Resp:  [18-20] 18  SpO2:  [95 %-99 %] 95 %  BP: ()/(61-80) 153/75        There is no height or weight on file to calculate BMI.    Physical Exam  Vitals and nursing note reviewed.   Constitutional:       General: She is not in acute distress.     Appearance: Normal appearance. She is normal weight. She is not ill-appearing or toxic-appearing.   HENT:      Head: Normocephalic.   Cardiovascular:      Rate and Rhythm: Normal rate and regular rhythm.      Pulses: Normal pulses.   Pulmonary:      Effort: Pulmonary effort is normal. No respiratory distress.   Abdominal:      General: There is distension (mild).      Palpations: Abdomen is soft.      Tenderness: There is abdominal tenderness (diffuse, worse in RLQ). There is no guarding.      Comments: Midline lap incision well healed    Musculoskeletal:         General: Normal range of motion.   Skin:     General: Skin is warm.      Capillary Refill: Capillary refill takes less than 2 seconds.      Coloration: Skin is not jaundiced.   Neurological:      General: No focal deficit present.      Mental Status: She is alert and oriented to person, place, and time.         Significant Labs:  I have reviewed all pertinent lab results within the past 24  hours.  CBC:   Recent Labs   Lab 02/16/22  1727 02/16/22  1733   WBC 7.04  --    RBC 4.36  --    HGB 14.0  --    HCT 42.9 44     --    MCV 98  --    MCH 32.1*  --    MCHC 32.6  --      BMP:   Recent Labs   Lab 02/16/22  1727   *      K 3.9   CL 92*   CO2 27   BUN 74*   CREATININE 4.3*   CALCIUM 10.0   MG 1.9     CMP:   Recent Labs   Lab 02/16/22  1727   *   CALCIUM 10.0   ALBUMIN 3.7   PROT 7.2      K 3.9   CO2 27   CL 92*   BUN 74*   CREATININE 4.3*   ALKPHOS 68   ALT 10   AST 15   BILITOT 1.0     LFTs:   Recent Labs   Lab 02/16/22  1727   ALT 10   AST 15   ALKPHOS 68   BILITOT 1.0   PROT 7.2   ALBUMIN 3.7     Coagulation: No results for input(s): LABPROT, INR, APTT in the last 168 hours.  Cardiac markers:   Recent Labs   Lab 02/16/22  1727   TROPONINI 0.017       Significant Diagnostics:  EXAMINATION:  CT ABDOMEN PELVIS WITHOUT CONTRAST     CLINICAL HISTORY:  Peritonitis or perforation suspected;Abdominal pain, acute, nonlocalized;     TECHNIQUE:  Low dose axial images, sagittal and coronal reformations were obtained from the lung bases to the pubic symphysis.  Oral contrast was not administered.     COMPARISON:  02/25/2021     FINDINGS:  Images of the lower thorax are remarkable for bilateral dependent atelectasis.  There is a mild pericardial effusion, improved.     There is a small hiatal hernia.  The liver, spleen, pancreas and adrenal glands have a grossly unremarkable noncontrast appearance.  The stomach is distended with liquid content, without wall thickening.  There is dilation of the common duct measuring up to 1.5 cm, similar to the previous examination without intraluminal filling defect.  There are several scattered upper limit of normal caliber abdominal lymph nodes.  The pancreatic duct is not dilated.     There is left perinephric fat stranding noting atrophic changes of the left kidney as compared to the right.  There is no left hydronephrosis.  The left ureter is  unremarkable without calculi seen.  There is mild right hydronephrosis.  The right ureter is unremarkable without calculi seen.  The urinary bladder is decompressed without wall thickening.  The uterus is absent the adnexa is unremarkable.     There is a large amount of gas within the rectum.  Surgical changes are noted of distal colectomy.  Small bowel anastomosis is patent noting some distension at the anastomotic site without wall thickening.  There is fluid distention of the small bowel, particularly involving the proximal to mid small bowel noting relative decompression of the distal small bowel.  Findings may reflect slow flow related to adhesion although no discrete transition point is definitively identified at this time.  No focal organized fluid collection to suggest abscess.  Several prominent mesenteric lymph nodes are noted.  There is atherosclerotic calcification of the aorta and its branches.  No free air.     Degenerative changes are noted of the spine.  There is osteopenia.  There is grade 1 anterolisthesis of L3 on L4.     Impression:     This report was flagged in Epic as abnormal.     1. Findings concerning for at least partial small bowel obstruction, findings may be on the basis of adhesion noting no focal discrete transition point identified.  Transition to relatively decompressed distal small bowel appears gradual and occurs within the right lower quadrant.  Correlation is advised.  No pneumatosis or free air.  There is marked distention of the gastric lumen with liquid.  2. Mild pericardial effusion, improved since the previous examination.  3. Left perinephric fat stranding, correlation with urinalysis recommended.  4. Interval closure of right ostomy noting surgical changes of distal colectomy.  5. Please see above for several additional findings.    Assessment/Plan:     * SBO (small bowel obstruction)   72 yo female with history of perforated gastric ulcer and colon ischemia s/p ex lap  with duodenal ulcer repair and right colectomy, and end ileostomy creation in Jan 2021 and ilestomy reversal (ileosigmoid anastomosis) October 2021 presenting with abdominal pain and n/v. Concern for pSBO, no focal transition point on imaging.     -Admit to general surgery   -NPO   -NGT placement for decompression   -mIVF  -PRN pain control   -Restart home medications as needed   -Daily labs- close monitoring of GABBI   -DVT ppx         VTE Risk Mitigation (From admission, onward)         Ordered     heparin (porcine) injection 5,000 Units  Every 8 hours         02/16/22 2034     IP VTE HIGH RISK PATIENT  Once         02/16/22 2034     Place sequential compression device  Until discontinued         02/16/22 2034                Thank you for your consult. I will follow-up with patient. Please contact us if you have any additional questions.    Rissa Chávez MD  General Surgery  [facility]

## 2022-02-17 NOTE — SUBJECTIVE & OBJECTIVE
Interval History:   NAEON  Nothing out of NG  Continues to have bowel movements     Medications:  Continuous Infusions:   lactated ringers 125 mL/hr at 02/17/22 0615     Scheduled Meds:   clonazePAM  0.5 mg Per NG tube BID    heparin (porcine)  5,000 Units Subcutaneous Q8H     PRN Meds:sodium chloride 0.9%, benzocaine, HYDROmorphone, ondansetron, sodium chloride 0.9%     Review of patient's allergies indicates:  No Known Allergies  Objective:     Vital Signs (Most Recent):  Temp: 98.4 °F (36.9 °C) (02/17/22 0407)  Pulse: 101 (02/17/22 0407)  Resp: 16 (02/17/22 0612)  BP: (!) 153/68 (02/17/22 0407)  SpO2: 95 % (02/17/22 0741) Vital Signs (24h Range):  Temp:  [98.4 °F (36.9 °C)-98.7 °F (37.1 °C)] 98.4 °F (36.9 °C)  Pulse:  [] 101  Resp:  [16-20] 16  SpO2:  [92 %-99 %] 95 %  BP: ()/(61-90) 153/68     Weight: 55.2 kg (121 lb 11.1 oz)  Body mass index is 20.89 kg/m².    Intake/Output - Last 3 Shifts       02/15 0700  02/16 0659 02/16 0700  02/17 0659 02/17 0700  02/18 0659    I.V. (mL/kg)  727.6 (13.2)     Total Intake(mL/kg)  727.6 (13.2)     Urine (mL/kg/hr)  601     Drains  1000     Stool  0     Total Output  1601     Net  -873.4            Urine Occurrence  1 x     Stool Occurrence  0 x           Physical Exam   A&O, NAD  RRR  No Resp Distress  NG in place with minimal gastric contents  ABD: mildly TTP in the RLQ, non distended, soft    Significant Labs:  I have reviewed all pertinent lab results within the past 24 hours.  CBC:   Recent Labs   Lab 02/17/22  0336   WBC 7.23   RBC 4.23   HGB 13.4   HCT 41.7      MCV 99*   MCH 31.7*   MCHC 32.1     CMP:   Recent Labs   Lab 02/17/22  0336   GLU 93   CALCIUM 9.0   ALBUMIN 3.3*   PROT 6.1      K 3.3*   CO2 24   CL 98   BUN 58*   CREATININE 2.1*   ALKPHOS 62   ALT 8*   AST 14   BILITOT 0.9       Significant Diagnostics:  I have reviewed all pertinent imaging results/findings within the past 24 hours.  CT:     1. Findings concerning for at least  partial small bowel obstruction, findings may be on the basis of adhesion noting no focal discrete transition point identified.  Transition to relatively decompressed distal small bowel appears gradual and occurs within the right lower quadrant.  Correlation is advised.  No pneumatosis or free air.  There is marked distention of the gastric lumen with liquid.  2. Mild pericardial effusion, improved since the previous examination.  3. Left perinephric fat stranding, correlation with urinalysis recommended.  4. Interval closure of right ostomy noting surgical changes of distal colectomy.

## 2022-02-17 NOTE — SUBJECTIVE & OBJECTIVE
No current facility-administered medications on file prior to encounter.     Current Outpatient Medications on File Prior to Encounter   Medication Sig    acetaminophen (TYLENOL) 325 MG tablet Take 2 tablets (650 mg total) by mouth every 6 (six) hours as needed for Pain.    ascorbic acid, vitamin C, (VITAMIN C) 500 MG tablet Take 500 mg by mouth once daily.     calcium carbonate (OS-TORI) 600 mg (1,500 mg) Tab Take 600 mg by mouth 2 (two) times daily with meals.    clonazePAM (KLONOPIN) 1 MG tablet Take 1 tablet (1 mg total) by mouth 2 (two) times daily as needed for Anxiety. (Patient taking differently: Take 1 mg by mouth 2 (two) times daily. )    gabapentin (NEURONTIN) 300 MG capsule Take 1 capsule (300 mg total) by mouth 3 (three) times daily. for 7 days    lisinopriL-hydrochlorothiazide (PRINZIDE,ZESTORETIC) 20-12.5 mg per tablet Take 1 tablet by mouth once daily.    loperamide (IMODIUM) 2 mg capsule Take 1 capsule (2 mg total) by mouth 2 (two) times a day.    multivitamin (ONE DAILY MULTIVITAMIN) per tablet Take 1 tablet by mouth once daily.    oxyCODONE (ROXICODONE) 5 MG immediate release tablet Take 1 tablet (5 mg total) by mouth every 4 (four) hours as needed.    potassium chloride SA (K-DUR,KLOR-CON) 20 MEQ tablet Take 1 tablet (20 mEq total) by mouth once daily.    tamsulosin (FLOMAX) 0.4 mg Cap Take 1 capsule (0.4 mg total) by mouth once daily. (Patient not taking: Reported on 10/20/2021)       Review of patient's allergies indicates:  No Known Allergies    Past Medical History:   Diagnosis Date    Acute gastric ulcer with perforation     Acute kidney failure with tubular necrosis     GABBI (acute kidney injury)     Anxiety     Diarrhea     HLD (hyperlipidemia)     Hypertension     Nausea & vomiting     Peritonitis     PUD (peptic ulcer disease)     Renal disorder     Urine retention     Vascular disorder of intestine, unspecified      Past Surgical History:   Procedure Laterality  Date    APPENDECTOMY      APPLICATION OF WOUND VACUUM-ASSISTED CLOSURE DEVICE  1/21/2021    Procedure: APPLICATION, WOUND VAC;  Surgeon: Abel Francis MD;  [facility];  Service: General;;    APPLICATION OF WOUND VACUUM-ASSISTED CLOSURE DEVICE  1/25/2021    Procedure: APPLICATION, WOUND VAC;  Surgeon: Abel Francis MD;  [facility];  Service: General;;    CLOSURE OF PERFORATED ULCER OF DUODENUM USING OMENTAL PATCH  1/21/2021    Procedure: CLOSURE, ULCER, PERFORATED, DUODENUM, USING OMENTAL PATCH;  Surgeon: Abel Francis MD;  [facility];  Service: General;;    COLECTOMY, RIGHT  1/21/2021    Procedure: COLECTOMY, RIGHT;  Surgeon: Abel Francis MD;  [facility];  Service: General;;    cyst removed from neck      GASTROSTOMY  1/25/2021    Procedure: GASTROSTOMY;  Surgeon: Abel Francis MD;  [facility];  Service: General;;    HYSTERECTOMY  1986    ILEOSTOMY CLOSURE  10/13/2021    Procedure: CLOSURE, ILEOSTOMY;  Surgeon: Abel Francis MD;  [facility];  Service: General;;    LYSIS OF ADHESIONS  10/13/2021    Procedure: LYSIS, ADHESIONS;  Surgeon: Abel Francis MD;  [facility];  Service: General;;    OOPHORECTOMY Bilateral 1989    OPEN REDUCTION AND INTERNAL FIXATION (ORIF) OF LISFRANC INJURY OF FOOT Left 10/11/2019    Procedure: ORIF, LISFRANC INJURY, FOOT;  Surgeon: Ferdinand Stauffer DPM;  [facility];  Service: Podiatry;  Laterality: Left;    TONSILLECTOMY, ADENOIDECTOMY       Family History     Problem Relation (Age of Onset)    Hypertension Mother        Tobacco Use    Smoking status: Current Every Day Smoker    Smokeless tobacco: Never Used   Substance and Sexual Activity    Alcohol use: Not Currently     Comment: socially    Drug use: No    Sexual activity: Yes     Partners: Male     Comment:      Review of Systems   Constitutional: Positive  for appetite change. Negative for activity change, diaphoresis, fatigue and fever.   Respiratory: Negative for cough, choking and shortness of breath.    Cardiovascular: Negative for chest pain and palpitations.   Gastrointestinal: Positive for abdominal distention, abdominal pain, diarrhea, nausea and vomiting. Negative for constipation.   Genitourinary: Negative for difficulty urinating and dysuria.   Musculoskeletal: Negative for arthralgias and back pain.   Skin: Negative for color change and wound.   Neurological: Negative for dizziness and weakness.   Hematological: Negative for adenopathy. Does not bruise/bleed easily.     Objective:     Vital Signs (Most Recent):  Temp: 98.7 °F (37.1 °C) (02/16/22 1644)  Pulse: 88 (02/16/22 2000)  Resp: 18 (02/16/22 1944)  BP: (!) 153/75 (02/16/22 1945)  SpO2: 95 % (02/16/22 2000) Vital Signs (24h Range):  Temp:  [98.7 °F (37.1 °C)] 98.7 °F (37.1 °C)  Pulse:  [86-94] 88  Resp:  [18-20] 18  SpO2:  [95 %-99 %] 95 %  BP: ()/(61-80) 153/75        There is no height or weight on file to calculate BMI.    Physical Exam  Vitals and nursing note reviewed.   Constitutional:       General: She is not in acute distress.     Appearance: Normal appearance. She is normal weight. She is not ill-appearing or toxic-appearing.   HENT:      Head: Normocephalic.   Cardiovascular:      Rate and Rhythm: Normal rate and regular rhythm.      Pulses: Normal pulses.   Pulmonary:      Effort: Pulmonary effort is normal. No respiratory distress.   Abdominal:      General: There is distension (mild).      Palpations: Abdomen is soft.      Tenderness: There is abdominal tenderness (diffuse, worse in RLQ). There is no guarding.      Comments: Midline lap incision well healed    Musculoskeletal:         General: Normal range of motion.   Skin:     General: Skin is warm.      Capillary Refill: Capillary refill takes less than 2 seconds.      Coloration: Skin is not jaundiced.   Neurological:       General: No focal deficit present.      Mental Status: She is alert and oriented to person, place, and time.         Significant Labs:  I have reviewed all pertinent lab results within the past 24 hours.  CBC:   Recent Labs   Lab 02/16/22  1727 02/16/22  1733   WBC 7.04  --    RBC 4.36  --    HGB 14.0  --    HCT 42.9 44     --    MCV 98  --    MCH 32.1*  --    MCHC 32.6  --      BMP:   Recent Labs   Lab 02/16/22  1727   *      K 3.9   CL 92*   CO2 27   BUN 74*   CREATININE 4.3*   CALCIUM 10.0   MG 1.9     CMP:   Recent Labs   Lab 02/16/22  1727   *   CALCIUM 10.0   ALBUMIN 3.7   PROT 7.2      K 3.9   CO2 27   CL 92*   BUN 74*   CREATININE 4.3*   ALKPHOS 68   ALT 10   AST 15   BILITOT 1.0     LFTs:   Recent Labs   Lab 02/16/22  1727   ALT 10   AST 15   ALKPHOS 68   BILITOT 1.0   PROT 7.2   ALBUMIN 3.7     Coagulation: No results for input(s): LABPROT, INR, APTT in the last 168 hours.  Cardiac markers:   Recent Labs   Lab 02/16/22  1727   TROPONINI 0.017       Significant Diagnostics:  EXAMINATION:  CT ABDOMEN PELVIS WITHOUT CONTRAST     CLINICAL HISTORY:  Peritonitis or perforation suspected;Abdominal pain, acute, nonlocalized;     TECHNIQUE:  Low dose axial images, sagittal and coronal reformations were obtained from the lung bases to the pubic symphysis.  Oral contrast was not administered.     COMPARISON:  02/25/2021     FINDINGS:  Images of the lower thorax are remarkable for bilateral dependent atelectasis.  There is a mild pericardial effusion, improved.     There is a small hiatal hernia.  The liver, spleen, pancreas and adrenal glands have a grossly unremarkable noncontrast appearance.  The stomach is distended with liquid content, without wall thickening.  There is dilation of the common duct measuring up to 1.5 cm, similar to the previous examination without intraluminal filling defect.  There are several scattered upper limit of normal caliber abdominal lymph nodes.  The  pancreatic duct is not dilated.     There is left perinephric fat stranding noting atrophic changes of the left kidney as compared to the right.  There is no left hydronephrosis.  The left ureter is unremarkable without calculi seen.  There is mild right hydronephrosis.  The right ureter is unremarkable without calculi seen.  The urinary bladder is decompressed without wall thickening.  The uterus is absent the adnexa is unremarkable.     There is a large amount of gas within the rectum.  Surgical changes are noted of distal colectomy.  Small bowel anastomosis is patent noting some distension at the anastomotic site without wall thickening.  There is fluid distention of the small bowel, particularly involving the proximal to mid small bowel noting relative decompression of the distal small bowel.  Findings may reflect slow flow related to adhesion although no discrete transition point is definitively identified at this time.  No focal organized fluid collection to suggest abscess.  Several prominent mesenteric lymph nodes are noted.  There is atherosclerotic calcification of the aorta and its branches.  No free air.     Degenerative changes are noted of the spine.  There is osteopenia.  There is grade 1 anterolisthesis of L3 on L4.     Impression:     This report was flagged in Epic as abnormal.     1. Findings concerning for at least partial small bowel obstruction, findings may be on the basis of adhesion noting no focal discrete transition point identified.  Transition to relatively decompressed distal small bowel appears gradual and occurs within the right lower quadrant.  Correlation is advised.  No pneumatosis or free air.  There is marked distention of the gastric lumen with liquid.  2. Mild pericardial effusion, improved since the previous examination.  3. Left perinephric fat stranding, correlation with urinalysis recommended.  4. Interval closure of right ostomy noting surgical changes of distal  colectomy.  5. Please see above for several additional findings.

## 2022-02-18 VITALS
SYSTOLIC BLOOD PRESSURE: 143 MMHG | TEMPERATURE: 99 F | WEIGHT: 121.69 LBS | HEART RATE: 83 BPM | HEIGHT: 64 IN | DIASTOLIC BLOOD PRESSURE: 75 MMHG | OXYGEN SATURATION: 94 % | BODY MASS INDEX: 20.78 KG/M2 | RESPIRATION RATE: 18 BRPM

## 2022-02-18 LAB
ALBUMIN SERPL BCP-MCNC: 3.2 G/DL (ref 3.5–5.2)
ALP SERPL-CCNC: 86 U/L (ref 55–135)
ALT SERPL W/O P-5'-P-CCNC: 7 U/L (ref 10–44)
ANION GAP SERPL CALC-SCNC: 22 MMOL/L (ref 8–16)
AST SERPL-CCNC: 15 U/L (ref 10–40)
BASOPHILS # BLD AUTO: 0.05 K/UL (ref 0–0.2)
BASOPHILS NFR BLD: 0.5 % (ref 0–1.9)
BILIRUB SERPL-MCNC: 1 MG/DL (ref 0.1–1)
BUN SERPL-MCNC: 33 MG/DL (ref 8–23)
CALCIUM SERPL-MCNC: 10 MG/DL (ref 8.7–10.5)
CHLORIDE SERPL-SCNC: 98 MMOL/L (ref 95–110)
CO2 SERPL-SCNC: 19 MMOL/L (ref 23–29)
CREAT SERPL-MCNC: 0.9 MG/DL (ref 0.5–1.4)
DIFFERENTIAL METHOD: ABNORMAL
EOSINOPHIL # BLD AUTO: 0.2 K/UL (ref 0–0.5)
EOSINOPHIL NFR BLD: 1.4 % (ref 0–8)
ERYTHROCYTE [DISTWIDTH] IN BLOOD BY AUTOMATED COUNT: 13.3 % (ref 11.5–14.5)
EST. GFR  (AFRICAN AMERICAN): >60 ML/MIN/1.73 M^2
EST. GFR  (NON AFRICAN AMERICAN): >60 ML/MIN/1.73 M^2
GLUCOSE SERPL-MCNC: 57 MG/DL (ref 70–110)
HCT VFR BLD AUTO: 41.5 % (ref 37–48.5)
HCV AB SERPL QL IA: NEGATIVE
HGB BLD-MCNC: 13.4 G/DL (ref 12–16)
IMM GRANULOCYTES # BLD AUTO: 0.02 K/UL (ref 0–0.04)
IMM GRANULOCYTES NFR BLD AUTO: 0.2 % (ref 0–0.5)
LYMPHOCYTES # BLD AUTO: 2.6 K/UL (ref 1–4.8)
LYMPHOCYTES NFR BLD: 23.8 % (ref 18–48)
MAGNESIUM SERPL-MCNC: 2 MG/DL (ref 1.6–2.6)
MCH RBC QN AUTO: 32.6 PG (ref 27–31)
MCHC RBC AUTO-ENTMCNC: 32.3 G/DL (ref 32–36)
MCV RBC AUTO: 101 FL (ref 82–98)
MONOCYTES # BLD AUTO: 0.9 K/UL (ref 0.3–1)
MONOCYTES NFR BLD: 8.6 % (ref 4–15)
NEUTROPHILS # BLD AUTO: 7.2 K/UL (ref 1.8–7.7)
NEUTROPHILS NFR BLD: 65.5 % (ref 38–73)
NRBC BLD-RTO: 0 /100 WBC
PHOSPHATE SERPL-MCNC: 2.3 MG/DL (ref 2.7–4.5)
PLATELET # BLD AUTO: 298 K/UL (ref 150–450)
PMV BLD AUTO: 10.4 FL (ref 9.2–12.9)
POCT GLUCOSE: 58 MG/DL (ref 70–110)
POTASSIUM SERPL-SCNC: 3.7 MMOL/L (ref 3.5–5.1)
PROT SERPL-MCNC: 6.8 G/DL (ref 6–8.4)
RBC # BLD AUTO: 4.11 M/UL (ref 4–5.4)
SODIUM SERPL-SCNC: 139 MMOL/L (ref 136–145)
WBC # BLD AUTO: 10.99 K/UL (ref 3.9–12.7)

## 2022-02-18 PROCEDURE — 25000003 PHARM REV CODE 250: Performed by: STUDENT IN AN ORGANIZED HEALTH CARE EDUCATION/TRAINING PROGRAM

## 2022-02-18 PROCEDURE — 99222 1ST HOSP IP/OBS MODERATE 55: CPT | Mod: AI,,, | Performed by: SURGERY

## 2022-02-18 PROCEDURE — 36415 COLL VENOUS BLD VENIPUNCTURE: CPT | Performed by: STUDENT IN AN ORGANIZED HEALTH CARE EDUCATION/TRAINING PROGRAM

## 2022-02-18 PROCEDURE — 83735 ASSAY OF MAGNESIUM: CPT | Performed by: STUDENT IN AN ORGANIZED HEALTH CARE EDUCATION/TRAINING PROGRAM

## 2022-02-18 PROCEDURE — 63600175 PHARM REV CODE 636 W HCPCS: Performed by: STUDENT IN AN ORGANIZED HEALTH CARE EDUCATION/TRAINING PROGRAM

## 2022-02-18 PROCEDURE — 80053 COMPREHEN METABOLIC PANEL: CPT | Performed by: STUDENT IN AN ORGANIZED HEALTH CARE EDUCATION/TRAINING PROGRAM

## 2022-02-18 PROCEDURE — 99222 PR INITIAL HOSPITAL CARE,LEVL II: ICD-10-PCS | Mod: AI,,, | Performed by: SURGERY

## 2022-02-18 PROCEDURE — 84100 ASSAY OF PHOSPHORUS: CPT | Performed by: STUDENT IN AN ORGANIZED HEALTH CARE EDUCATION/TRAINING PROGRAM

## 2022-02-18 PROCEDURE — 85025 COMPLETE CBC W/AUTO DIFF WBC: CPT | Performed by: STUDENT IN AN ORGANIZED HEALTH CARE EDUCATION/TRAINING PROGRAM

## 2022-02-18 RX ORDER — SODIUM,POTASSIUM PHOSPHATES 280-250MG
2 POWDER IN PACKET (EA) ORAL EVERY 4 HOURS
Status: DISCONTINUED | OUTPATIENT
Start: 2022-02-18 | End: 2022-02-18 | Stop reason: HOSPADM

## 2022-02-18 RX ADMIN — POTASSIUM & SODIUM PHOSPHATES POWDER PACK 280-160-250 MG 2 PACKET: 280-160-250 PACK at 10:02

## 2022-02-18 RX ADMIN — HEPARIN SODIUM 5000 UNITS: 5000 INJECTION INTRAVENOUS; SUBCUTANEOUS at 06:02

## 2022-02-18 RX ADMIN — CLONAZEPAM 0.5 MG: 0.5 TABLET ORAL at 09:02

## 2022-02-18 NOTE — PLAN OF CARE
CM attempted to contact patient's primary care physician's  (Brendon Hardy) office to schedule a hospital follow up appointment but did not receive an answer.

## 2022-02-18 NOTE — PLAN OF CARE
POC reviewed w/ pt, verbalized understanding. Pt AAOx4. VS stable on RA. IS bedside. Denies nausea, chest pain, & SOB. SCD's in room. Pt remains free of fall & injury. No acute events. Bed in lowest position, call light in reach, frequent rounds made for safety.    - Pt being treated for a SBO  - Lost IV access at shift change, pt refused to get stuck. MD made aware  - Minimal I/O overnight  - diet switched to Regular this AM  - ambulated independently to the bathroom overnight  - No c/o pain overnight      Care transferred to oncoming nurse.

## 2022-02-18 NOTE — PROGRESS NOTES
[facility]  General Surgery  Progress Note    Subjective:     History of Present Illness:  Juan Antonio Rowe is a 70 yo female known to surgery team with history of perforated gastric ulcer and colon ischemia s/p ex lap with duodenal ulcer repair and right colectomy, and end ileostomy creation in Jan 2021 and ilestomy reversal (ileosigmoid anastomosis) October 2021, GABBI, anxiety, HLD, HTN who presents to ED with 5 day history of abdominal pain, nausea and vomiting. Reports she ate a large bucket of popcorn on Saturday and shortly after started having abdominal pain. Pain has progressed and become more severe. Pain worse in the RLQ. Reports persistent nausea and several episode of dark emesis. Minimal PO intake since Saturday. Unsure if passing flatus. But has continued to have some loose bowel movements. She reports having these symptoms once before in August but they resolved without medical attention. Denies decrease UOP or dysuria. No fever or chills. No chest pain, sob, weakness.    On presentation patient HDS. Afebrile. WBC 7. H/h stable. GABBI with Cr 4.3. Lactate 1.1. UA pending. CT scan revealing stomach and proximal small bowel distention with some decompression of distal sb. No focal transition point. Some L perinephric fat stranding.       Post-Op Info:  * No surgery found *         Interval History: NAEON. Afberile. HDS. GGC yesterday with ROBF. NGT out, denies n/v. Having BMs. Asking for food. No new symptoms or concerns. All questions answered    Medications:  Continuous Infusions:   lactated ringers 125 mL/hr at 02/17/22 0615     Scheduled Meds:   clonazePAM  0.5 mg Per NG tube BID    heparin (porcine)  5,000 Units Subcutaneous Q8H     PRN Meds:sodium chloride 0.9%, benzocaine, HYDROmorphone, ondansetron, sodium chloride 0.9%     Review of patient's allergies indicates:  No Known Allergies  Objective:     Vital Signs (Most Recent):  Temp: 98.5 °F (36.9 °C) (02/18/22 0733)  Pulse: 85 (02/18/22  0733)  Resp: 16 (02/18/22 0733)  BP: (!) 144/65 (02/18/22 0733)  SpO2: 96 % (02/18/22 0733) Vital Signs (24h Range):  Temp:  [97.1 °F (36.2 °C)-99.2 °F (37.3 °C)] 98.5 °F (36.9 °C)  Pulse:  [] 85  Resp:  [16-20] 16  SpO2:  [91 %-96 %] 96 %  BP: (143-184)/(65-89) 144/65     Weight: 55.2 kg (121 lb 11.1 oz)  Body mass index is 20.89 kg/m².    Intake/Output - Last 3 Shifts       02/16 0700  02/17 0659 02/17 0700 02/18 0659 02/18 0700 02/19 0659    P.O.  0     I.V. (mL/kg) 727.6 (13.2) 44.9 (0.8)     Total Intake(mL/kg) 727.6 (13.2) 44.9 (0.8)     Urine (mL/kg/hr) 601      Drains 1000      Stool 0      Total Output 1601      Net -873.4 +44.9            Urine Occurrence 1 x 5 x     Stool Occurrence 0 x 1 x     Emesis Occurrence  0 x           Physical Exam  Vitals and nursing note reviewed.   Constitutional:       General: She is not in acute distress.     Appearance: She is not diaphoretic.      Comments: Room air   HENT:      Head: Normocephalic and atraumatic.      Mouth/Throat:      Mouth: Mucous membranes are moist.      Pharynx: Oropharynx is clear.   Eyes:      Extraocular Movements: Extraocular movements intact.      Conjunctiva/sclera: Conjunctivae normal.   Cardiovascular:      Rate and Rhythm: Normal rate.   Pulmonary:      Effort: Pulmonary effort is normal. No respiratory distress.   Abdominal:      General: There is no distension.      Palpations: Abdomen is soft.      Tenderness: There is no abdominal tenderness. There is no guarding or rebound.      Comments: Well healed surgical scars noted   Musculoskeletal:         General: No deformity.      Cervical back: Normal range of motion.   Skin:     General: Skin is warm and dry.   Neurological:      Mental Status: She is alert and oriented to person, place, and time.         Significant Labs:  I have reviewed all pertinent lab results within the past 24 hours.  CBC:   Recent Labs   Lab 02/18/22  0417   WBC 10.99   RBC 4.11   HGB 13.4   HCT 41.5   PLT  298   *   MCH 32.6*   MCHC 32.3     CMP:   Recent Labs   Lab 02/18/22  0417   GLU 57*   CALCIUM 10.0   ALBUMIN 3.2*   PROT 6.8      K 3.7   CO2 19*   CL 98   BUN 33*   CREATININE 0.9   ALKPHOS 86   ALT 7*   AST 15   BILITOT 1.0       Significant Diagnostics:  I have reviewed all pertinent imaging results/findings within the past 24 hours.    Assessment/Plan:     * SBO (small bowel obstruction)   72 yo female with history of perforated gastric ulcer and colon ischemia s/p ex lap with duodenal ulcer repair and right colectomy, and end ileostomy creation in Jan 2021 and ilestomy reversal (ileosigmoid anastomosis) October 2021 presenting with abdominal pain and n/v. Concern for pSBO, no focal transition point on imaging.     - Regular diet. Advised patient to go slow and stop if n/v or increased pain/bloating occurs  - GGC 2/17  - continue Klonopin half dose   - Dc mIVF  - PRN pain and nausea control   - Restart home medications as needed   - Daily labs  - GABBI resolved  - DVT ppx (heparin and SCDs)  - OOB, ambulate    Dispo: home later today if able to tolerate diet          Maycol Ambriz PA-C  General Surgery  [facility]

## 2022-02-18 NOTE — DISCHARGE SUMMARY
[facility] - [facility]  General Surgery  Discharge Summary      Patient Name: Juan Antonio Rowe  MRN: [id number]  Admission Date: 2/16/2022  Hospital Length of Stay: 2 days  Discharge Date and Time:  02/18/2022 1:18 PM  Attending Physician: Dwight Herrera MD   Discharging Provider: Diogenes Sun MD  Primary Care Provider: Brendon Hardy MD     HPI: Juan Antonio Rowe is a 72 yo female known to surgery team with history of perforated gastric ulcer and colon ischemia s/p ex lap with duodenal ulcer repair and right colectomy, and end ileostomy creation in Jan 2021 and ilestomy reversal (ileosigmoid anastomosis) October 2021, GABBI, anxiety, HLD, HTN who presents to ED with 5 day history of abdominal pain, nausea and vomiting. Reports she ate a large bucket of popcorn on Saturday and shortly after started having abdominal pain. Pain has progressed and become more severe. Pain worse in the RLQ. Reports persistent nausea and several episode of dark emesis. Minimal PO intake since Saturday. Unsure if passing flatus. But has continued to have some loose bowel movements. She reports having these symptoms once before in August but they resolved without medical attention. Denies decrease UOP or dysuria. No fever or chills. No chest pain, sob, weakness.     On presentation patient HDS. Afebrile. WBC 7. H/h stable. GABBI with Cr 4.3. Lactate 1.1. UA pending. CT scan revealing stomach and proximal small bowel distention with some decompression of distal sb. No focal transition point. Some L perinephric fat stranding.     * No surgery found *     Hospital Course: Mrs. Rowe presented to [facility] on the morning of 2/16/22 for the complications listed above. She was admitted to the floor for further care and monitoring. She was administered a gastrograffin challenge with successful transit of contrast into both the small and large bowel. Throughout her stay she had consistent bowel movements and easily tolerated advancement of her diet. Her pain was  controlled with a combination of IV and PO narcotic pain medications. She is now ambulating without assistance, tolerating a regular diet, pain is well controlled with PO pain medication, and she is voiding appropriately. She now meets all criteria for discharge.    Consults:   Consults (From admission, onward)        Status Ordering Provider     Inpatient consult to General Surgery  Once        Provider:  (Not yet assigned)    Completed COLLIN FLEMING          Significant Diagnostic Studies: Labs: All labs within the past 24 hours have been reviewed    Pending Diagnostic Studies:     None        Final Active Diagnoses:    Diagnosis Date Noted POA    PRINCIPAL PROBLEM:  SBO (small bowel obstruction) [K56.609] 02/16/2022 Unknown      Problems Resolved During this Admission:      Discharged Condition: good    Disposition: Home or Self Care    Follow Up:    Patient Instructions:      Diet Adult Regular     No driving until:   Order Comments: No Driving while taking narcotic pain medication     Notify your health care provider if you experience any of the following:  temperature >100.4     Notify your health care provider if you experience any of the following:  persistent nausea and vomiting or diarrhea     Notify your health care provider if you experience any of the following:  severe uncontrolled pain     Notify your health care provider if you experience any of the following:  redness, tenderness, or signs of infection (pain, swelling, redness, odor or green/yellow discharge around incision site)     Activity as tolerated     Medications:  Reconciled Home Medications:      Medication List      CHANGE how you take these medications    clonazePAM 1 MG tablet  Commonly known as: KlonoPIN  Take 1 tablet (1 mg total) by mouth 2 (two) times daily as needed for Anxiety.  What changed: when to take this        CONTINUE taking these medications    acetaminophen 325 MG tablet  Commonly known as: TYLENOL  Take 2 tablets (650  mg total) by mouth every 6 (six) hours as needed for Pain.     ascorbic acid (vitamin C) 500 MG tablet  Commonly known as: VITAMIN C  Take 500 mg by mouth once daily.     calcium carbonate 600 mg calcium (1,500 mg) Tab  Commonly known as: OS-TORI  Take 600 mg by mouth 2 (two) times daily with meals.     gabapentin 300 MG capsule  Commonly known as: NEURONTIN  Take 1 capsule (300 mg total) by mouth 3 (three) times daily. for 7 days     lisinopriL-hydrochlorothiazide 20-12.5 mg per tablet  Commonly known as: PRINZIDE,ZESTORETIC  Take 1 tablet by mouth once daily.     loperamide 2 mg capsule  Commonly known as: IMODIUM  Take 1 capsule (2 mg total) by mouth 2 (two) times a day.     ONE DAILY MULTIVITAMIN per tablet  Generic drug: multivitamin  Take 1 tablet by mouth once daily.     oxyCODONE 5 MG immediate release tablet  Commonly known as: ROXICODONE  Take 1 tablet (5 mg total) by mouth every 4 (four) hours as needed.     potassium chloride SA 20 MEQ tablet  Commonly known as: K-DUR,KLOR-CON  Take 1 tablet (20 mEq total) by mouth once daily.     tamsulosin 0.4 mg Cap  Commonly known as: FLOMAX  Take 1 capsule (0.4 mg total) by mouth once daily.            Diogenes Sun MD  General Surgery  [facility]

## 2022-02-18 NOTE — PROGRESS NOTES
IV Access lost at shift change, I tried, no success. She would not let the charge nurse on duty make an attempt. I gave her Anxiety medication and gave her some time. MD suggested I attempt again later on.     Went back into room and pt states she does not want to be poked anymore and refuses attempts. Says will talk to doctors in the morning.    Contacted MD ISRAEL aware.     Will continue to monitor.

## 2022-02-18 NOTE — ASSESSMENT & PLAN NOTE
70 yo female with history of perforated gastric ulcer and colon ischemia s/p ex lap with duodenal ulcer repair and right colectomy, and end ileostomy creation in Jan 2021 and ilestomy reversal (ileosigmoid anastomosis) October 2021 presenting with abdominal pain and n/v. Concern for pSBO, no focal transition point on imaging.     - Regular diet. Advised patient to go slow and stop if n/v or increased pain/bloating occurs  - GGC 2/17  - continue Klonopin half dose   - Dc mIVF  - PRN pain and nausea control   - Restart home medications as needed   - Daily labs  - GABBI resolved  - DVT ppx (heparin and SCDs)  - OOB, ambulate    Dispo: home later today if able to tolerate diet

## 2022-02-18 NOTE — SUBJECTIVE & OBJECTIVE
Interval History: NAEON. Afberile. HDS. GGC yesterday with ROBF. NGT out, denies n/v. Having BMs. Asking for food. No new symptoms or concerns. All questions answered    Medications:  Continuous Infusions:   lactated ringers 125 mL/hr at 02/17/22 0615     Scheduled Meds:   clonazePAM  0.5 mg Per NG tube BID    heparin (porcine)  5,000 Units Subcutaneous Q8H     PRN Meds:sodium chloride 0.9%, benzocaine, HYDROmorphone, ondansetron, sodium chloride 0.9%     Review of patient's allergies indicates:  No Known Allergies  Objective:     Vital Signs (Most Recent):  Temp: 98.5 °F (36.9 °C) (02/18/22 0733)  Pulse: 85 (02/18/22 0733)  Resp: 16 (02/18/22 0733)  BP: (!) 144/65 (02/18/22 0733)  SpO2: 96 % (02/18/22 0733) Vital Signs (24h Range):  Temp:  [97.1 °F (36.2 °C)-99.2 °F (37.3 °C)] 98.5 °F (36.9 °C)  Pulse:  [] 85  Resp:  [16-20] 16  SpO2:  [91 %-96 %] 96 %  BP: (143-184)/(65-89) 144/65     Weight: 55.2 kg (121 lb 11.1 oz)  Body mass index is 20.89 kg/m².    Intake/Output - Last 3 Shifts       02/16 0700 02/17 0659 02/17 0700 02/18 0659 02/18 0700 02/19 0659    P.O.  0     I.V. (mL/kg) 727.6 (13.2) 44.9 (0.8)     Total Intake(mL/kg) 727.6 (13.2) 44.9 (0.8)     Urine (mL/kg/hr) 601      Drains 1000      Stool 0      Total Output 1601      Net -873.4 +44.9            Urine Occurrence 1 x 5 x     Stool Occurrence 0 x 1 x     Emesis Occurrence  0 x           Physical Exam  Vitals and nursing note reviewed.   Constitutional:       General: She is not in acute distress.     Appearance: She is not diaphoretic.      Comments: Room air   HENT:      Head: Normocephalic and atraumatic.      Mouth/Throat:      Mouth: Mucous membranes are moist.      Pharynx: Oropharynx is clear.   Eyes:      Extraocular Movements: Extraocular movements intact.      Conjunctiva/sclera: Conjunctivae normal.   Cardiovascular:      Rate and Rhythm: Normal rate.   Pulmonary:      Effort: Pulmonary effort is normal. No respiratory distress.    Abdominal:      General: There is no distension.      Palpations: Abdomen is soft.      Tenderness: There is no abdominal tenderness. There is no guarding or rebound.      Comments: Well healed surgical scars noted   Musculoskeletal:         General: No deformity.      Cervical back: Normal range of motion.   Skin:     General: Skin is warm and dry.   Neurological:      Mental Status: She is alert and oriented to person, place, and time.         Significant Labs:  I have reviewed all pertinent lab results within the past 24 hours.  CBC:   Recent Labs   Lab 02/18/22 0417   WBC 10.99   RBC 4.11   HGB 13.4   HCT 41.5      *   MCH 32.6*   MCHC 32.3     CMP:   Recent Labs   Lab 02/18/22 0417   GLU 57*   CALCIUM 10.0   ALBUMIN 3.2*   PROT 6.8      K 3.7   CO2 19*   CL 98   BUN 33*   CREATININE 0.9   ALKPHOS 86   ALT 7*   AST 15   BILITOT 1.0       Significant Diagnostics:  I have reviewed all pertinent imaging results/findings within the past 24 hours.

## 2022-02-18 NOTE — PROGRESS NOTES
Pt able to tolerate diet. Ate breakfast with no issues or co pain/nv. Will continue to monitor and intervene prn.

## 2022-02-18 NOTE — PLAN OF CARE
02/18/22 0943   Post-Acute Status   Post-Acute Authorization Other   Other Status No Post-Acute Service Needs   This SW in communication with  and medical team. SW will continue to follow for discharge needs and offer support as needed.  No SW needs identified at this time.    Jolanta Smith LMSW  [facility]  [id number]

## 2022-02-18 NOTE — PLAN OF CARE
Re[facility] Ashford[facility]  Discharge Final Note    Primary Care Provider: Brendon Hardy MD    Expected Discharge Date: 2/18/2022    Final Discharge Note (most recent)     Final Note - 02/18/22 1417        Final Note    Assessment Type Final Discharge Note     Anticipated Discharge Disposition Home or Self Care        Post-Acute Status    Post-Acute Authorization Other     Other Status No Post-Acute Service Needs                 Important Message from Medicare

## 2022-02-21 ENCOUNTER — PATIENT OUTREACH (OUTPATIENT)
Dept: ADMINISTRATIVE | Facility: CLINIC | Age: 72
End: 2022-02-21
Payer: MEDICARE

## 2022-02-21 NOTE — PROGRESS NOTES
C3 nurse attempted to contact patient for a TCC post hospital discharge follow-up call. The patient declined call at this time.

## 2022-02-21 NOTE — PHYSICIAN QUERY
PT Name: Juan Antonio Rowe  MR #: [id number]     DOCUMENTATION CLARIFICATION     CDS/: Jelly Ballard               Contact information: [email or web address]  This form is a permanent document in the medical record.     Query Date: February 21, 2022    By submitting this query, we are merely seeking further clarification of documentation to reflect the severity of illness of your patient. Please utilize your independent clinical judgment when addressing the question(s) below.    The medical record reflects the following:     Indicators   Supporting Clinical Findings Location in Medical Record   x Bowel obstruction, intestinal obstruction, LBO or SBO documented SBO (small bowel obstruction)  Concern for pSBO, no focal transition point on imaging.     SBO (small bowel obstruction)     General Surgery Consult 02/16, filed 02/18    Discharge Summary 02/18     x Radiology findings Findings concerning for at least partial small bowel obstruction, findings may be on the basis of adhesion noting no focal discrete transition point identified.  Transition to relatively decompressed distal small bowel appears gradual and occurs within the right lower quadrant.        Given the history of Gastrografin challenge, the present exam documents variable degrees of contrast in numerous small bowel loops.  Given level of contrast opacification of these, the majority of them appear to be of normal caliber however, a short segment of small bowel to the right of midline in the mid abdomen as upper normal intraluminal measurements of 3 cm and a short segment of small bowel in the left side of the pelvis could be mildly dilated at 3.2 cm.  There is some faint contrast in an effaceless bowel loops superimposing the right iliac bone and right lower quadrant.  If this represents cecum and proximal acid ending colon, the loop is not dilated.  If it represents a small bowel loop, it is dilated.  I do not see additional contrast in  what I confirm as colon.       A few mildly dilated air-filled small bowel loops seen about the left mid abdomen.  There are other air-filled normal caliber small bowel loops.  No evidence on this exam to suggest the administered contrast has traversed the small bowel to be within any component of the colon.  A small amount of gas and fecal matter seen involving portions of the descending colon, relative paucity of colonic gas and or fecal matter elsewhere.  No findings to suggest colon dilatation.    CT Abdomen Pelvis 02/16            X Ray Abdomen 02/17 17:50                      X Ray Abdomen 02/17 21:35   x Treatment/Medication Admit to general surgery   NPO   NGT placement for decompression     GGC yesterday with ROBF  Regular diet. Advised patient to go slow      General Surgery Consult 02/16, filed 02/18      General Surgery PN 02/18    Procedure/Surgery     x Other History of perforated gastric ulcer and colon ischemia s/p ex lap with duodenal ulcer repair and right colectomy, and end ileostomy creation in Jan 2021 and ilestomy reversal (ileosigmoid anastomosis) October 2021 presenting with abdominal pain and n/v.      General Surgery Consult 02/16, filed 02/18       Provider, please further specify the bowel obstruction diagnosis:  [   x] Partial or incomplete intestinal obstruction, due to adhesions   [   ] Partial or incomplete intestinal obstruction, due to other (please specify): ____________   [   ] Partial or incomplete intestinal obstruction, unknown or unspecified etiology   [   ] Other intestinal condition (please specify): _____________________   [   ]  Clinically Undetermined           Please document in your progress notes daily for the duration of treatment until resolved, and include in your discharge summary.

## 2022-06-27 ENCOUNTER — HOSPITAL ENCOUNTER (INPATIENT)
Facility: HOSPITAL | Age: 72
LOS: 1 days | Discharge: LEFT BEFORE DISCHARGE | DRG: 389 | End: 2022-06-28
Attending: EMERGENCY MEDICINE | Admitting: SURGERY
Payer: MEDICARE

## 2022-06-27 DIAGNOSIS — K56.600 PARTIAL SMALL BOWEL OBSTRUCTION: ICD-10-CM

## 2022-06-27 DIAGNOSIS — R10.84 GENERALIZED ABDOMINAL PAIN: Primary | ICD-10-CM

## 2022-06-27 DIAGNOSIS — R11.2 NAUSEA AND VOMITING, INTRACTABILITY OF VOMITING NOT SPECIFIED, UNSPECIFIED VOMITING TYPE: ICD-10-CM

## 2022-06-27 DIAGNOSIS — R19.7 DIARRHEA, UNSPECIFIED TYPE: ICD-10-CM

## 2022-06-27 DIAGNOSIS — K56.609 SBO (SMALL BOWEL OBSTRUCTION): ICD-10-CM

## 2022-06-27 LAB
ALBUMIN SERPL BCP-MCNC: 3.8 G/DL (ref 3.5–5.2)
ALP SERPL-CCNC: 77 U/L (ref 55–135)
ALT SERPL W/O P-5'-P-CCNC: 7 U/L (ref 10–44)
ANION GAP SERPL CALC-SCNC: 14 MMOL/L (ref 8–16)
ANION GAP SERPL CALC-SCNC: 8 MMOL/L (ref 8–16)
AST SERPL-CCNC: 15 U/L (ref 10–40)
BACTERIA #/AREA URNS AUTO: ABNORMAL /HPF
BASOPHILS # BLD AUTO: 0.03 K/UL (ref 0–0.2)
BASOPHILS # BLD AUTO: 0.04 K/UL (ref 0–0.2)
BASOPHILS NFR BLD: 0.2 % (ref 0–1.9)
BASOPHILS NFR BLD: 0.2 % (ref 0–1.9)
BILIRUB SERPL-MCNC: 1.2 MG/DL (ref 0.1–1)
BILIRUB UR QL STRIP: NEGATIVE
BUN SERPL-MCNC: 29 MG/DL (ref 8–23)
BUN SERPL-MCNC: 29 MG/DL (ref 8–23)
CALCIUM SERPL-MCNC: 10.3 MG/DL (ref 8.7–10.5)
CALCIUM SERPL-MCNC: 8.7 MG/DL (ref 8.7–10.5)
CHLORIDE SERPL-SCNC: 105 MMOL/L (ref 95–110)
CHLORIDE SERPL-SCNC: 94 MMOL/L (ref 95–110)
CLARITY UR REFRACT.AUTO: ABNORMAL
CO2 SERPL-SCNC: 26 MMOL/L (ref 23–29)
CO2 SERPL-SCNC: 31 MMOL/L (ref 23–29)
COLOR UR AUTO: YELLOW
CREAT SERPL-MCNC: 1.3 MG/DL (ref 0.5–1.4)
CREAT SERPL-MCNC: 1.8 MG/DL (ref 0.5–1.4)
DIFFERENTIAL METHOD: ABNORMAL
DIFFERENTIAL METHOD: ABNORMAL
EOSINOPHIL # BLD AUTO: 0 K/UL (ref 0–0.5)
EOSINOPHIL # BLD AUTO: 0.4 K/UL (ref 0–0.5)
EOSINOPHIL NFR BLD: 0.3 % (ref 0–8)
EOSINOPHIL NFR BLD: 1.8 % (ref 0–8)
ERYTHROCYTE [DISTWIDTH] IN BLOOD BY AUTOMATED COUNT: 13.7 % (ref 11.5–14.5)
ERYTHROCYTE [DISTWIDTH] IN BLOOD BY AUTOMATED COUNT: 14 % (ref 11.5–14.5)
EST. GFR  (AFRICAN AMERICAN): 32.4 ML/MIN/1.73 M^2
EST. GFR  (AFRICAN AMERICAN): 48 ML/MIN/1.73 M^2
EST. GFR  (NON AFRICAN AMERICAN): 28.1 ML/MIN/1.73 M^2
EST. GFR  (NON AFRICAN AMERICAN): 41.7 ML/MIN/1.73 M^2
GLUCOSE SERPL-MCNC: 132 MG/DL (ref 70–110)
GLUCOSE SERPL-MCNC: 93 MG/DL (ref 70–110)
GLUCOSE UR QL STRIP: NEGATIVE
HCT VFR BLD AUTO: 37.9 % (ref 37–48.5)
HCT VFR BLD AUTO: 47.2 % (ref 37–48.5)
HGB BLD-MCNC: 12.5 G/DL (ref 12–16)
HGB BLD-MCNC: 15.7 G/DL (ref 12–16)
HGB UR QL STRIP: NEGATIVE
HYALINE CASTS UR QL AUTO: 12 /LPF
IMM GRANULOCYTES # BLD AUTO: 0.06 K/UL (ref 0–0.04)
IMM GRANULOCYTES # BLD AUTO: 0.14 K/UL (ref 0–0.04)
IMM GRANULOCYTES NFR BLD AUTO: 0.5 % (ref 0–0.5)
IMM GRANULOCYTES NFR BLD AUTO: 0.7 % (ref 0–0.5)
KETONES UR QL STRIP: NEGATIVE
LACTATE SERPL-SCNC: 2.2 MMOL/L (ref 0.5–2.2)
LEUKOCYTE ESTERASE UR QL STRIP: NEGATIVE
LIPASE SERPL-CCNC: 10 U/L (ref 4–60)
LYMPHOCYTES # BLD AUTO: 1.5 K/UL (ref 1–4.8)
LYMPHOCYTES # BLD AUTO: 2.5 K/UL (ref 1–4.8)
LYMPHOCYTES NFR BLD: 18.7 % (ref 18–48)
LYMPHOCYTES NFR BLD: 7.7 % (ref 18–48)
MCH RBC QN AUTO: 31.8 PG (ref 27–31)
MCH RBC QN AUTO: 32.3 PG (ref 27–31)
MCHC RBC AUTO-ENTMCNC: 33 G/DL (ref 32–36)
MCHC RBC AUTO-ENTMCNC: 33.3 G/DL (ref 32–36)
MCV RBC AUTO: 96 FL (ref 82–98)
MCV RBC AUTO: 98 FL (ref 82–98)
MICROSCOPIC COMMENT: ABNORMAL
MONOCYTES # BLD AUTO: 0.8 K/UL (ref 0.3–1)
MONOCYTES # BLD AUTO: 1 K/UL (ref 0.3–1)
MONOCYTES NFR BLD: 5.3 % (ref 4–15)
MONOCYTES NFR BLD: 5.9 % (ref 4–15)
NEUTROPHILS # BLD AUTO: 16.2 K/UL (ref 1.8–7.7)
NEUTROPHILS # BLD AUTO: 9.9 K/UL (ref 1.8–7.7)
NEUTROPHILS NFR BLD: 74.4 % (ref 38–73)
NEUTROPHILS NFR BLD: 84.3 % (ref 38–73)
NITRITE UR QL STRIP: NEGATIVE
NRBC BLD-RTO: 0 /100 WBC
NRBC BLD-RTO: 0 /100 WBC
PH UR STRIP: 6 [PH] (ref 5–8)
PLATELET # BLD AUTO: 274 K/UL (ref 150–450)
PLATELET # BLD AUTO: 414 K/UL (ref 150–450)
PMV BLD AUTO: 9.4 FL (ref 9.2–12.9)
PMV BLD AUTO: 9.8 FL (ref 9.2–12.9)
POTASSIUM SERPL-SCNC: 3.6 MMOL/L (ref 3.5–5.1)
POTASSIUM SERPL-SCNC: 4.1 MMOL/L (ref 3.5–5.1)
PROT SERPL-MCNC: 7.5 G/DL (ref 6–8.4)
PROT UR QL STRIP: ABNORMAL
RBC # BLD AUTO: 3.87 M/UL (ref 4–5.4)
RBC # BLD AUTO: 4.93 M/UL (ref 4–5.4)
RBC #/AREA URNS AUTO: 1 /HPF (ref 0–4)
SODIUM SERPL-SCNC: 139 MMOL/L (ref 136–145)
SODIUM SERPL-SCNC: 139 MMOL/L (ref 136–145)
SP GR UR STRIP: 1.02 (ref 1–1.03)
URN SPEC COLLECT METH UR: ABNORMAL
WBC # BLD AUTO: 13.24 K/UL (ref 3.9–12.7)
WBC # BLD AUTO: 19.24 K/UL (ref 3.9–12.7)
WBC #/AREA URNS AUTO: 1 /HPF (ref 0–5)

## 2022-06-27 PROCEDURE — 83605 ASSAY OF LACTIC ACID: CPT

## 2022-06-27 PROCEDURE — 96374 THER/PROPH/DIAG INJ IV PUSH: CPT

## 2022-06-27 PROCEDURE — 99285 EMERGENCY DEPT VISIT HI MDM: CPT | Mod: 25

## 2022-06-27 PROCEDURE — 96361 HYDRATE IV INFUSION ADD-ON: CPT

## 2022-06-27 PROCEDURE — 36415 COLL VENOUS BLD VENIPUNCTURE: CPT | Performed by: STUDENT IN AN ORGANIZED HEALTH CARE EDUCATION/TRAINING PROGRAM

## 2022-06-27 PROCEDURE — 81001 URINALYSIS AUTO W/SCOPE: CPT

## 2022-06-27 PROCEDURE — 25000003 PHARM REV CODE 250: Performed by: STUDENT IN AN ORGANIZED HEALTH CARE EDUCATION/TRAINING PROGRAM

## 2022-06-27 PROCEDURE — 80053 COMPREHEN METABOLIC PANEL: CPT

## 2022-06-27 PROCEDURE — 63600175 PHARM REV CODE 636 W HCPCS: Performed by: STUDENT IN AN ORGANIZED HEALTH CARE EDUCATION/TRAINING PROGRAM

## 2022-06-27 PROCEDURE — 87040 BLOOD CULTURE FOR BACTERIA: CPT

## 2022-06-27 PROCEDURE — 11000001 HC ACUTE MED/SURG PRIVATE ROOM

## 2022-06-27 PROCEDURE — 99285 PR EMERGENCY DEPT VISIT,LEVEL V: ICD-10-PCS | Mod: GC,,, | Performed by: EMERGENCY MEDICINE

## 2022-06-27 PROCEDURE — 83690 ASSAY OF LIPASE: CPT

## 2022-06-27 PROCEDURE — 99285 EMERGENCY DEPT VISIT HI MDM: CPT | Mod: GC,,, | Performed by: EMERGENCY MEDICINE

## 2022-06-27 PROCEDURE — 25000003 PHARM REV CODE 250

## 2022-06-27 PROCEDURE — 85025 COMPLETE CBC W/AUTO DIFF WBC: CPT | Mod: 91 | Performed by: STUDENT IN AN ORGANIZED HEALTH CARE EDUCATION/TRAINING PROGRAM

## 2022-06-27 PROCEDURE — 96375 TX/PRO/DX INJ NEW DRUG ADDON: CPT

## 2022-06-27 PROCEDURE — 63600175 PHARM REV CODE 636 W HCPCS

## 2022-06-27 PROCEDURE — 80048 BASIC METABOLIC PNL TOTAL CA: CPT | Mod: XB | Performed by: STUDENT IN AN ORGANIZED HEALTH CARE EDUCATION/TRAINING PROGRAM

## 2022-06-27 PROCEDURE — 85025 COMPLETE CBC W/AUTO DIFF WBC: CPT

## 2022-06-27 RX ORDER — KETOROLAC TROMETHAMINE 15 MG/ML
15 INJECTION, SOLUTION INTRAMUSCULAR; INTRAVENOUS EVERY 6 HOURS PRN
Status: DISCONTINUED | OUTPATIENT
Start: 2022-06-28 | End: 2022-06-28 | Stop reason: HOSPADM

## 2022-06-27 RX ORDER — CLONAZEPAM 1 MG/1
1 TABLET ORAL 2 TIMES DAILY
Status: DISCONTINUED | OUTPATIENT
Start: 2022-06-27 | End: 2022-06-28 | Stop reason: HOSPADM

## 2022-06-27 RX ORDER — LOPERAMIDE HYDROCHLORIDE 2 MG/1
4 CAPSULE ORAL
Status: COMPLETED | OUTPATIENT
Start: 2022-06-27 | End: 2022-06-27

## 2022-06-27 RX ORDER — TALC
6 POWDER (GRAM) TOPICAL NIGHTLY PRN
Status: DISCONTINUED | OUTPATIENT
Start: 2022-06-27 | End: 2022-06-28 | Stop reason: HOSPADM

## 2022-06-27 RX ORDER — MORPHINE SULFATE 2 MG/ML
2 INJECTION, SOLUTION INTRAMUSCULAR; INTRAVENOUS
Status: COMPLETED | OUTPATIENT
Start: 2022-06-27 | End: 2022-06-27

## 2022-06-27 RX ORDER — MORPHINE SULFATE 4 MG/ML
4 INJECTION, SOLUTION INTRAMUSCULAR; INTRAVENOUS
Status: DISCONTINUED | OUTPATIENT
Start: 2022-06-27 | End: 2022-06-27

## 2022-06-27 RX ORDER — ACETAMINOPHEN 325 MG/1
650 TABLET ORAL
Status: COMPLETED | OUTPATIENT
Start: 2022-06-27 | End: 2022-06-27

## 2022-06-27 RX ORDER — ONDANSETRON 8 MG/1
8 TABLET, ORALLY DISINTEGRATING ORAL EVERY 8 HOURS PRN
Status: DISCONTINUED | OUTPATIENT
Start: 2022-06-27 | End: 2022-06-28 | Stop reason: HOSPADM

## 2022-06-27 RX ORDER — MORPHINE SULFATE 4 MG/ML
4 INJECTION, SOLUTION INTRAMUSCULAR; INTRAVENOUS
Status: COMPLETED | OUTPATIENT
Start: 2022-06-27 | End: 2022-06-27

## 2022-06-27 RX ORDER — PROMETHAZINE HYDROCHLORIDE 25 MG/1
25 TABLET ORAL EVERY 6 HOURS PRN
Status: DISCONTINUED | OUTPATIENT
Start: 2022-06-27 | End: 2022-06-28 | Stop reason: HOSPADM

## 2022-06-27 RX ORDER — SODIUM CHLORIDE 0.9 % (FLUSH) 0.9 %
10 SYRINGE (ML) INJECTION
Status: DISCONTINUED | OUTPATIENT
Start: 2022-06-27 | End: 2022-06-28 | Stop reason: HOSPADM

## 2022-06-27 RX ORDER — ONDANSETRON 2 MG/ML
4 INJECTION INTRAMUSCULAR; INTRAVENOUS
Status: COMPLETED | OUTPATIENT
Start: 2022-06-27 | End: 2022-06-27

## 2022-06-27 RX ORDER — ACETAMINOPHEN 325 MG/1
650 TABLET ORAL EVERY 8 HOURS PRN
Status: DISCONTINUED | OUTPATIENT
Start: 2022-06-27 | End: 2022-06-28 | Stop reason: HOSPADM

## 2022-06-27 RX ORDER — HEPARIN SODIUM 5000 [USP'U]/ML
5000 INJECTION, SOLUTION INTRAVENOUS; SUBCUTANEOUS EVERY 8 HOURS
Status: DISCONTINUED | OUTPATIENT
Start: 2022-06-27 | End: 2022-06-28 | Stop reason: HOSPADM

## 2022-06-27 RX ORDER — SODIUM CHLORIDE, SODIUM LACTATE, POTASSIUM CHLORIDE, CALCIUM CHLORIDE 600; 310; 30; 20 MG/100ML; MG/100ML; MG/100ML; MG/100ML
INJECTION, SOLUTION INTRAVENOUS CONTINUOUS
Status: DISCONTINUED | OUTPATIENT
Start: 2022-06-27 | End: 2022-06-28 | Stop reason: HOSPADM

## 2022-06-27 RX ORDER — OXYCODONE HYDROCHLORIDE 5 MG/1
5 TABLET ORAL EVERY 6 HOURS PRN
Status: DISCONTINUED | OUTPATIENT
Start: 2022-06-27 | End: 2022-06-27

## 2022-06-27 RX ORDER — ACETAMINOPHEN 325 MG/1
650 TABLET ORAL EVERY 4 HOURS PRN
Status: DISCONTINUED | OUTPATIENT
Start: 2022-06-27 | End: 2022-06-28 | Stop reason: HOSPADM

## 2022-06-27 RX ORDER — SODIUM CHLORIDE 450 MG/100ML
INJECTION, SOLUTION INTRAVENOUS
Status: DISCONTINUED | OUTPATIENT
Start: 2022-06-27 | End: 2022-06-27

## 2022-06-27 RX ORDER — OXYCODONE HYDROCHLORIDE 5 MG/1
5 TABLET ORAL ONCE
Status: COMPLETED | OUTPATIENT
Start: 2022-06-27 | End: 2022-06-27

## 2022-06-27 RX ORDER — HYDROMORPHONE HYDROCHLORIDE 1 MG/ML
0.2 INJECTION, SOLUTION INTRAMUSCULAR; INTRAVENOUS; SUBCUTANEOUS EVERY 6 HOURS PRN
Status: COMPLETED | OUTPATIENT
Start: 2022-06-28 | End: 2022-06-28

## 2022-06-27 RX ADMIN — OXYCODONE 5 MG: 5 TABLET ORAL at 07:06

## 2022-06-27 RX ADMIN — PIPERACILLIN SODIUM AND TAZOBACTAM SODIUM 4.5 G: 4; .5 INJECTION, POWDER, LYOPHILIZED, FOR SOLUTION INTRAVENOUS at 06:06

## 2022-06-27 RX ADMIN — ONDANSETRON 8 MG: 8 TABLET, ORALLY DISINTEGRATING ORAL at 03:06

## 2022-06-27 RX ADMIN — CLONAZEPAM 1 MG: 1 TABLET ORAL at 09:06

## 2022-06-27 RX ADMIN — HEPARIN SODIUM 5000 UNITS: 5000 INJECTION INTRAVENOUS; SUBCUTANEOUS at 09:06

## 2022-06-27 RX ADMIN — MORPHINE SULFATE 2 MG: 2 INJECTION, SOLUTION INTRAMUSCULAR; INTRAVENOUS at 02:06

## 2022-06-27 RX ADMIN — SODIUM CHLORIDE, SODIUM LACTATE, POTASSIUM CHLORIDE, AND CALCIUM CHLORIDE 1000 ML: .6; .31; .03; .02 INJECTION, SOLUTION INTRAVENOUS at 03:06

## 2022-06-27 RX ADMIN — SODIUM CHLORIDE, SODIUM LACTATE, POTASSIUM CHLORIDE, AND CALCIUM CHLORIDE: .6; .31; .03; .02 INJECTION, SOLUTION INTRAVENOUS at 04:06

## 2022-06-27 RX ADMIN — LOPERAMIDE HYDROCHLORIDE 4 MG: 2 CAPSULE ORAL at 09:06

## 2022-06-27 RX ADMIN — HYDROMORPHONE HYDROCHLORIDE 0.2 MG: 1 INJECTION, SOLUTION INTRAMUSCULAR; INTRAVENOUS; SUBCUTANEOUS at 11:06

## 2022-06-27 RX ADMIN — ACETAMINOPHEN 650 MG: 325 TABLET ORAL at 09:06

## 2022-06-27 RX ADMIN — Medication 6 MG: at 09:06

## 2022-06-27 RX ADMIN — SODIUM CHLORIDE 1000 ML: 0.9 INJECTION, SOLUTION INTRAVENOUS at 01:06

## 2022-06-27 RX ADMIN — ONDANSETRON 4 MG: 2 INJECTION INTRAMUSCULAR; INTRAVENOUS at 08:06

## 2022-06-27 RX ADMIN — SODIUM CHLORIDE 1000 ML: 0.9 INJECTION, SOLUTION INTRAVENOUS at 09:06

## 2022-06-27 RX ADMIN — MORPHINE SULFATE 4 MG: 4 INJECTION INTRAVENOUS at 08:06

## 2022-06-27 RX ADMIN — ACETAMINOPHEN 650 MG: 325 TABLET ORAL at 05:06

## 2022-06-27 NOTE — ED NOTES
"Pt yelling "I asked you for more immodium it's not working I need it now and I am so tired of walking to the restroom every 5 minutes!"  RN urging patient to wait for medicine to work. Bedside commode offered to patient, patient states "that's gross no"    MD aware  "

## 2022-06-27 NOTE — ASSESSMENT & PLAN NOTE
71 y.o. female with history of perforated gastric ulcer and colon ischemia s/p ex lap with duodenal ulcer repair and right colectomy, and end ileostomy creation in Jan 2021 and ilestomy reversal (ileosigmoid anastomosis) October 2021 presenting with abdominal pain and n/v. Concern for pSBO, no focal transition point on imaging.      -Admit to general surgery   -NPO   -will hold off on NGT placement at this time. Will place if she started to have nausea/emesis/increased abdominal pain/distenstion  -mIVF  -PRN pain control   -Restart home medications as needed   -Daily labs- close monitoring of GABBI and WBC  -DVT ppx

## 2022-06-27 NOTE — ED NOTES
LOC: The patient is awake, alert and aware of environment with an appropriate affect, the patient is oriented x 3 and speaking appropriately.  APPEARANCE: Patient resting comfortably and in no acute distress, patient is clean and well groomed, patient's clothing is properly fastened.  SKIN: The skin is warm and dry, color consistent with ethnicity, patient has normal skin turgor and moist mucus membranes, skin intact, no breakdown or bruising noted.  MUSCULOSKELETAL: Patient moving all extremities spontaneously, no obvious swelling or deformities noted.  RESPIRATORY: Airway is open and patent, respirations are spontaneous, patient has a normal effort and rate, no accessory muscle use noted.  ABDOMEN: Soft and + tender to palpation, no distention noted.

## 2022-06-27 NOTE — HPI
Juan Antonio Rowe is a 71 y.o. 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 Reports she ate a large baked potato Saturday and shortly after started having abdominal pain. She is no longer having emesis but not has had multiple loose stools. Unsure if she is passing flatus. Minimal PO intake since Saturday. She was last admitted 02/2022 with similar symptoms which resolved with conservative management. Denies decrease UOP or dysuria. No fever or chills. No chest pain, sob, weakness.     On presentation patient HDS. Afebrile. WBC 19 H/h stable. GABBI with Cr 1.8. Lactate 2.2. UA w/ hyaline cast and 1+ protein. CT scan revealing stomach and proximal small bowel distention with some decompression of distal sb. No focal transition point

## 2022-06-27 NOTE — SUBJECTIVE & OBJECTIVE
No current facility-administered medications on file prior to encounter.     Current Outpatient Medications on File Prior to Encounter   Medication Sig    acetaminophen (TYLENOL) 325 MG tablet Take 2 tablets (650 mg total) by mouth every 6 (six) hours as needed for Pain.    ascorbic acid, vitamin C, (VITAMIN C) 500 MG tablet Take 500 mg by mouth once daily.     calcium carbonate (OS-TORI) 600 mg (1,500 mg) Tab Take 600 mg by mouth 2 (two) times daily with meals.    clonazePAM (KLONOPIN) 1 MG tablet Take 1 tablet (1 mg total) by mouth 2 (two) times daily as needed for Anxiety. (Patient taking differently: Take 1 mg by mouth 2 (two) times daily. )    gabapentin (NEURONTIN) 300 MG capsule Take 1 capsule (300 mg total) by mouth 3 (three) times daily. for 7 days    lisinopriL-hydrochlorothiazide (PRINZIDE,ZESTORETIC) 20-12.5 mg per tablet Take 1 tablet by mouth once daily.    loperamide (IMODIUM) 2 mg capsule Take 1 capsule (2 mg total) by mouth 2 (two) times a day.    multivitamin (ONE DAILY MULTIVITAMIN) per tablet Take 1 tablet by mouth once daily.    oxyCODONE (ROXICODONE) 5 MG immediate release tablet Take 1 tablet (5 mg total) by mouth every 4 (four) hours as needed.    potassium chloride SA (K-DUR,KLOR-CON) 20 MEQ tablet Take 1 tablet (20 mEq total) by mouth once daily.    tamsulosin (FLOMAX) 0.4 mg Cap Take 1 capsule (0.4 mg total) by mouth once daily. (Patient not taking: Reported on 10/20/2021)       Review of patient's allergies indicates:  No Known Allergies    Past Medical History:   Diagnosis Date    Acute gastric ulcer with perforation     Acute kidney failure with tubular necrosis     GABBI (acute kidney injury)     Anxiety     Diarrhea     HLD (hyperlipidemia)     Hypertension     Nausea & vomiting     Peritonitis     PUD (peptic ulcer disease)     Renal disorder     Urine retention     Vascular disorder of intestine, unspecified      Past Surgical History:   Procedure Laterality Date    APPENDECTOMY       APPLICATION OF WOUND VACUUM-ASSISTED CLOSURE DEVICE  1/21/2021    Procedure: APPLICATION, WOUND VAC;  Surgeon: Abel Francis MD;  [facility];  Service: General;;    APPLICATION OF WOUND VACUUM-ASSISTED CLOSURE DEVICE  1/25/2021    Procedure: APPLICATION, WOUND VAC;  Surgeon: Abel Francis MD;  [facility];  Service: General;;    CLOSURE OF PERFORATED ULCER OF DUODENUM USING OMENTAL PATCH  1/21/2021    Procedure: CLOSURE, ULCER, PERFORATED, DUODENUM, USING OMENTAL PATCH;  Surgeon: Abel Francis MD;  [facility];  Service: General;;    COLECTOMY, RIGHT  1/21/2021    Procedure: COLECTOMY, RIGHT;  Surgeon: Abel Fracnis MD;  [facility];  Service: General;;    cyst removed from neck      GASTROSTOMY  1/25/2021    Procedure: GASTROSTOMY;  Surgeon: Abel Francis MD;  [facility];  Service: General;;    HYSTERECTOMY  1986    ILEOSTOMY CLOSURE  10/13/2021    Procedure: CLOSURE, ILEOSTOMY;  Surgeon: Abel Francis MD;  [facility];  Service: General;;    LYSIS OF ADHESIONS  10/13/2021    Procedure: LYSIS, ADHESIONS;  Surgeon: Abel Francis MD;  [facility];  Service: General;;    OOPHORECTOMY Bilateral 1989    OPEN REDUCTION AND INTERNAL FIXATION (ORIF) OF LISFRANC INJURY OF FOOT Left 10/11/2019    Procedure: ORIF, LISFRANC INJURY, FOOT;  Surgeon: Ferdinand Stauffer DPM;  Location: [facility] OR [address] [place] [address];  Service: Podiatry;  Laterality: Left;    TONSILLECTOMY, ADENOIDECTOMY       Family History       Problem Relation (Age of Onset)    Hypertension Mother          Tobacco Use    Smoking status: Current Every Day Smoker    Smokeless tobacco: Never Used   Substance and Sexual Activity    Alcohol use: Not Currently     Comment: socially    Drug use: No    Sexual activity: Yes     Partners: Male     Comment:      Review of Systems   Constitutional:  Positive for appetite change. Negative for chills,  diaphoresis, fatigue and fever.   HENT: Negative.     Eyes: Negative.    Respiratory: Negative.     Cardiovascular: Negative.    Gastrointestinal:  Positive for abdominal pain, diarrhea, nausea and vomiting. Negative for constipation.   Endocrine: Negative.    Genitourinary:  Negative for decreased urine volume, difficulty urinating and dysuria.   Musculoskeletal: Negative.    Skin: Negative.    Allergic/Immunologic: Negative.    Neurological: Negative.    Hematological: Negative.    Psychiatric/Behavioral: Negative.     Objective:     Vital Signs (Most Recent):  Temp: 100.1 °F (37.8 °C) (06/27/22 0827)  Pulse: 83 (06/27/22 1103)  Resp: 14 (06/27/22 1103)  BP: 108/62 (06/27/22 1414)  SpO2: 96 % (06/27/22 1103) Vital Signs (24h Range):  Temp:  [100.1 °F (37.8 °C)] 100.1 °F (37.8 °C)  Pulse:  [] 83  Resp:  [14-16] 14  SpO2:  [96 %-98 %] 96 %  BP: (100-126)/(56-75) 108/62     Weight: 55.2 kg (121 lb 11.1 oz)  Body mass index is 20.89 kg/m².    Physical Exam  Vitals and nursing note reviewed.   Constitutional:       Appearance: Normal appearance.   HENT:      Head: Normocephalic and atraumatic.   Cardiovascular:      Rate and Rhythm: Normal rate and regular rhythm.   Pulmonary:      Effort: Pulmonary effort is normal. No respiratory distress.   Abdominal:      General: There is no distension.      Palpations: Abdomen is soft. There is no mass.      Tenderness: There is abdominal tenderness (epigastric/suprapubic). There is no guarding or rebound.      Hernia: No hernia is present.      Comments: Healed midline incision   Skin:     General: Skin is warm and dry.      Capillary Refill: Capillary refill takes less than 2 seconds.   Neurological:      Mental Status: She is alert and oriented to person, place, and time.   Psychiatric:         Mood and Affect: Mood normal.   I have reviewed all pertinent lab results within the past 24 hours.  CBC:   Recent Labs   Lab 06/27/22  0857   WBC 19.24*   RBC 4.93   HGB 15.7    HCT 47.2      MCV 96   MCH 31.8*   MCHC 33.3     CMP:   Recent Labs   Lab 06/27/22  0857   *   CALCIUM 10.3   ALBUMIN 3.8   PROT 7.5      K 4.1   CO2 31*   CL 94*   BUN 29*   CREATININE 1.8*   ALKPHOS 77   ALT 7*   AST 15   BILITOT 1.2*       Significant Labs:  I have reviewed all pertinent lab results within the past 24 hours.    Significant Diagnostics:  I have reviewed all pertinent imaging results/findings within the past 24 hours.

## 2022-06-27 NOTE — ED PROVIDER NOTES
Encounter Date: 6/27/2022       History     Chief Complaint   Patient presents with    Abdominal Pain     Hx of perforated ulcer. Pt reports she isn't supposed to eat large amounts, she did, had vomiting on Saturday, has since quit vomiting, but continues with abdominal pain.        HPI: Juan Antonio Rowe is a 70 yo female known to surgery team with history of perforated gastric ulcer and colon ischemia s/p ex lap with duodenal ulcer repair and right colectomy, and end ileostomy creation in Jan 2021 and ilestomy reversal (ileosigmoid anastomosis) October 2021, GABBI, anxiety, HLD, HTN who presents to ED for abdominal pain, nausea, vomiting.  Patient reports Saturday she ate too much which she was recommended not to in started having multiple episodes of emesis overnight.  She reports several hours of nonbloody emesis.  She states that Sunday she had no more episodes of emesis but felt extremely fatigued with abdominal pain.  Patient reports that this morning the abdominal pain was severe and had a presents to the emergency department.  She also notes 1 episode of nonbloody emesis.  Patient reports that the pain is diffuse in denies 1 specific location that it is worst.  She notes that her last bowel movement was Saturday and she typically has to take Imodium for diarrhea.  She is unsure if she is passing gas at this time.  Patient denies any recent antibiotic use.  She denies any urinary symptoms at this time.        Review of patient's allergies indicates:  No Known Allergies  Past Medical History:   Diagnosis Date    Acute gastric ulcer with perforation     Acute kidney failure with tubular necrosis     GABBI (acute kidney injury)     Anxiety     Diarrhea     HLD (hyperlipidemia)     Hypertension     Nausea & vomiting     Peritonitis     PUD (peptic ulcer disease)     Renal disorder     Urine retention     Vascular disorder of intestine, unspecified      Past Surgical History:   Procedure Laterality Date     APPENDECTOMY      APPLICATION OF WOUND VACUUM-ASSISTED CLOSURE DEVICE  1/21/2021    Procedure: APPLICATION, WOUND VAC;  Surgeon: Abel Francis MD;  [facility];  Service: General;;    APPLICATION OF WOUND VACUUM-ASSISTED CLOSURE DEVICE  1/25/2021    Procedure: APPLICATION, WOUND VAC;  Surgeon: Abel Francis MD;  [facility];  Service: General;;    CLOSURE OF PERFORATED ULCER OF DUODENUM USING OMENTAL PATCH  1/21/2021    Procedure: CLOSURE, ULCER, PERFORATED, DUODENUM, USING OMENTAL PATCH;  Surgeon: Abel Francis MD;  [facility];  Service: General;;    COLECTOMY, RIGHT  1/21/2021    Procedure: COLECTOMY, RIGHT;  Surgeon: Abel Francis MD;  [facility];  Service: General;;    cyst removed from neck      GASTROSTOMY  1/25/2021    Procedure: GASTROSTOMY;  Surgeon: Aebl Francis MD;  [facility];  Service: General;;    HYSTERECTOMY  1986    ILEOSTOMY CLOSURE  10/13/2021    Procedure: CLOSURE, ILEOSTOMY;  Surgeon: Abel Francis MD;  Location: [facility] OR [address] [place] [address];  Service: General;;    LYSIS OF ADHESIONS  10/13/2021    Procedure: LYSIS, ADHESIONS;  Surgeon: Abel Francis MD;  [facility];  Service: General;;    OOPHORECTOMY Bilateral 1989    OPEN REDUCTION AND INTERNAL FIXATION (ORIF) OF LISFRANC INJURY OF FOOT Left 10/11/2019    Procedure: ORIF, LISFRANC INJURY, FOOT;  Surgeon: Ferdinand Stauffer DPM;  Location: [facility] OR [address] [place] [address];  Service: Podiatry;  Laterality: Left;    TONSILLECTOMY, ADENOIDECTOMY       Family History   Problem Relation Age of Onset    Hypertension Mother      Social History     Tobacco Use    Smoking status: Current Every Day Smoker    Smokeless tobacco: Never Used   Substance Use Topics    Alcohol use: Not Currently     Comment: socially    Drug use: No     Review of Systems   Constitutional: Negative for fever.   HENT: Positive for congestion and rhinorrhea. Negative for  sore throat.    Respiratory: Negative for shortness of breath.    Cardiovascular: Negative for chest pain.   Gastrointestinal: Positive for abdominal distention, abdominal pain, constipation, nausea and vomiting.   Genitourinary: Negative for difficulty urinating, dysuria and flank pain.   Musculoskeletal: Negative for back pain.   Skin: Negative for rash.   Neurological: Negative for weakness and headaches.   Hematological: Does not bruise/bleed easily.       Physical Exam     Initial Vitals [06/27/22 0827]   BP Pulse Resp Temp SpO2   126/75 (!) 116 16 100.1 °F (37.8 °C) 98 %      MAP       --         Physical Exam    Nursing note and vitals reviewed.  Constitutional: She appears well-developed and well-nourished.   HENT:   Head: Normocephalic and atraumatic.   Eyes: EOM are normal. Pupils are equal, round, and reactive to light.   Neck: Neck supple. No JVD present.   Normal range of motion.  Cardiovascular: Normal rate, regular rhythm, normal heart sounds and intact distal pulses.   Pulmonary/Chest: Breath sounds normal. No respiratory distress. She has no wheezes.   Abdominal: Abdomen is soft. Bowel sounds are normal. There is abdominal tenderness.   Diffuse abdominal tenderness   No right CVA tenderness.  No left CVA tenderness.   Musculoskeletal:         General: No tenderness. Normal range of motion.      Cervical back: Normal range of motion and neck supple.     Lymphadenopathy:     She has no cervical adenopathy.   Neurological: She is alert and oriented to person, place, and time. She has normal strength and normal reflexes. GCS score is 15. GCS eye subscore is 4. GCS verbal subscore is 5. GCS motor subscore is 6.   Skin: Skin is warm and dry. Capillary refill takes less than 2 seconds.         ED Course   Procedures  Labs Reviewed   CBC W/ AUTO DIFFERENTIAL - Abnormal; Notable for the following components:       Result Value    WBC 19.24 (*)     MCH 31.8 (*)     Immature Granulocytes 0.7 (*)     Gran #  (ANC) 16.2 (*)     Immature Grans (Abs) 0.14 (*)     Gran % 84.3 (*)     Lymph % 7.7 (*)     All other components within normal limits   COMPREHENSIVE METABOLIC PANEL - Abnormal; Notable for the following components:    Chloride 94 (*)     CO2 31 (*)     Glucose 132 (*)     BUN 29 (*)     Creatinine 1.8 (*)     Total Bilirubin 1.2 (*)     ALT 7 (*)     eGFR if  32.4 (*)     eGFR if non  28.1 (*)     All other components within normal limits   URINALYSIS, REFLEX TO URINE CULTURE - Abnormal; Notable for the following components:    Appearance, UA Hazy (*)     Protein, UA 1+ (*)     All other components within normal limits    Narrative:     Specimen Source->Urine   URINALYSIS MICROSCOPIC - Abnormal; Notable for the following components:    Hyaline Casts, UA 12 (*)     All other components within normal limits    Narrative:     Specimen Source->Urine   CULTURE, BLOOD   CULTURE, BLOOD   LIPASE   LACTIC ACID, PLASMA   ISTAT CHEM8          Imaging Results           CT Abdomen Pelvis  Without Contrast (Final result)  Result time 06/27/22 12:00:41    Final result by Mario Lopez IV, MD (06/27/22 12:00:41)                 Impression:      Postoperative changes of partial colectomy with ileocolonic anastomosis.  Dilatation of air/fluid-filled proximal small bowel loops measuring up to 3.0 cm without definite transition, less prominent as compared to CT from 02/16/2022.  Findings may represent ileus however partial small bowel obstruction not excluded.    Mild small patchy ground-glass opacities in the lingula new from prior which may represent infectious or noninfectious inflammatory process.    This report was flagged in Epic as abnormal.    Electronically signed by resident: Ronal Toledo  Date:    06/27/2022  Time:    10:51    Electronically signed by: Mario Lopez  Date:    06/27/2022  Time:    12:00             Narrative:    EXAMINATION:  CT ABDOMEN PELVIS WITHOUT  CONTRAST    CLINICAL HISTORY:  Abdominal pain, acute, nonlocalized;    TECHNIQUE:  Routine axial CT images of the abdomen and pelvis were obtained without contrast.  .  Coronal and Sagittal reformatted images were also obtained.    COMPARISON:  Abdominal radiograph 02/17/2022; CT abdomen pelvis 02/16/2022    FINDINGS:  Heart: Partially imaged demonstrate no cardiomegaly.  Interval decreased pericardial trace volume fluid.    Lungs: Mild small patchy ground-glass opacities in the lingula new from prior.  Bandlike opacities in the right middle lobe likely atelectasis.    Liver: Normal in size and attenuation, with no focal hepatic lesions.    Gallbladder: Contracted limiting evaluation.    Bile ducts: Continued distension of extrahepatic common duct measuring up to 1.5 cm, unchanged.  No intrahepatic ductal dilatation.    Pancreas: No mass or peripancreatic fat stranding.    Spleen: Normal.    Adrenals: Normal.    GI Tract/ Mesentery: Fluid opacification of the distal esophagus.  Postoperative changes of partial colectomy with ileocolonic anastomosis.  Appendix is reportedly surgically absent.  Diffuse distension of small bowel with air-fluid levels, more prominent proximally measuring up to 3.0 cm.  No identified transition point however there are nondistended small bowel loops in the lower anterior abdomen.  No large bowel obstruction.  No focal wall thickening or new inflammation.    Kidneys/ Ureters: Normal in location.  Atrophic changes of the left kidney similar to prior.  No hydronephrosis or nephrolithiasis.  No hydroureter.    Bladder: Partially distended and unremarkable.    Reproductive organs: Hysterectomy.    Retroperitoneum: No significant adenopathy.    Peritoneal space: No ascites. No free air.    Abdominal wall: Midline abdominal postsurgical changes.    Vasculature: Extensive calcific atherosclerosis of the aorta and branches.  Severe calcification near the infrarenal abdominal aorta (2:69) similar  to prior suggestive of hemodynamically significant stenosis.  No aneurysm.    Bones: Advanced degenerative changes throughout the lumbar spine.  Continued grade 1 anterolisthesis of L3 on L4.  No acute fracture.  No aggressive osseous lesion.                                 Medications   promethazine (PHENERGAN) 25 mg in dextrose 5 % 50 mL IVPB (has no administration in time range)   acetaminophen tablet 650 mg (650 mg Oral Given 6/27/22 0907)   morphine injection 4 mg (4 mg Intravenous Given 6/27/22 0859)   ondansetron injection 4 mg (4 mg Intravenous Given 6/27/22 0858)   sodium chloride 0.9% bolus 1,000 mL (0 mLs Intravenous Stopped 6/27/22 1110)   loperamide capsule 4 mg (4 mg Oral Given 6/27/22 0915)   sodium chloride 0.9% bolus 1,000 mL (1,000 mLs Intravenous New Bag 6/27/22 1305)     Medical Decision Making:   Initial Assessment:   Patient is a 71-year-old female who presented for abdominal pain, nausea, vomiting.  Patient is alert oriented in no acute distress.  Patient is tachycardic with an elevated temperature of a 100.1°.  All other vitals within normal limits.  Physical exam findings noted above.  Differential Diagnosis:   Small-bowel obstruction  UTI  Diverticulitis  Pancreatitis    Clinical Tests:   Lab Tests: Ordered and Reviewed  Radiological Study: Ordered and Reviewed  Medical Tests: Ordered and Reviewed  ED Management:  Patient given Tylenol for antipyretic.  Patient complaining of severe pain and given 4 mg IV morphine.  Zofran 4 mg provided for nausea.  Patient has had poor oral intake with signs of dehydration.  Patient given 1 L IV fluids.  Labs obtained to evaluate for leukocytosis, anemia, metabolic derangements.  Given patient's complex abdominal history with increased risk of small-bowel obstruction, and other intra-abdominal processes CT abdomen pelvis ordered to evaluate for any acute intra-abdominal processes.    Patient reports severe diarrhea and requested Imodium during her stay.   Imodium provided.Dilatation of air/fluid-filled proximal small bowel loops measuring up to 3.0 cm without definite transition, ileus versus small-bowel obstruction.  Consulted general surgery who will evaluate at bedside and give recommendations.  Patient reports persistent pain and nausea.  Patient given Phenergan.  General surgery evaluated patient and will admit patient for continued observation and evaluation.  Plan discussed with patient is agreeable plan.            Attending Attestation:             Attending ED Notes:   Attending Note:  I have seen the patient, have repeated the key portions of the history and physical, reviewed and agree with the medical documentation, and supervised and managed the medical care of the patient. Additionally, I was present for the critical portion of any procedure(s) performed.    71 F hx above here for abdominal pain  On exam, appears uncomfortable w/ generalized tenderness  Labs w/ leukocytosis, GABBI  Treated with abx, IVF  CT concerning for ileus, possible partial sob.   Consulted, surgery.  obs to surgery for further treatment.    MARIA GUADALUPE Dawson MD  Staff ED Physician  06/28/2022 2:52 PM                   Clinical Impression:   Final diagnoses:  [R10.84] Generalized abdominal pain (Primary)  [R11.2] Nausea and vomiting, intractability of vomiting not specified, unspecified vomiting type  [R19.7] Diarrhea, unspecified type  [K56.609] SBO (small bowel obstruction)          ED Disposition Condition    Admit               Jakub Estes MD  Resident  06/27/22 1432       Elvira Dawson MD  06/28/22 3227

## 2022-06-27 NOTE — NURSING
Patient arrived to the floor via stretcher. AAOX4 vitals stable respirations even and unlabored. Can voice needs and pain. No pain noted. No signs of distress noted. Assessed skin intact dry warm to touch. Mucosa pink and moist. Continent to bowel and bladder. Safety measures in place. Call light within reach. Bed to lowest position.

## 2022-06-27 NOTE — ED TRIAGE NOTES
Saturday started with vomiting after eating too much food.  Pt with extensive GI history and surgery, last in 2021 for resection.  Pt states nausea continues today with low abd pain .

## 2022-06-27 NOTE — CONSULTS
[facility] - Emergency Dept  General Surgery  Consult Note    Patient Name: Juan Antonio Rowe  MRN: [id number]  Code Status: Full Code  Admission Date: 6/27/2022  Hospital Length of Stay: 0 days  Attending Physician: Elvira Dawson MD  Primary Care Provider: Brendon Hardy MD    Patient information was obtained from patient, past medical records and ER records.     Inpatient consult to General surgery  Consult performed by: Holly Ocasio MD  Consult ordered by: Jakub Estes MD        Subjective:     Principal Problem: Partial small bowel obstruction    History of Present Illness: Juan Antonio Rowe is a 71 y.o. 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 Reports she ate a large baked potato Saturday and shortly after started having abdominal pain. She is no longer having emesis but not has had multiple loose stools. Unsure if she is passing flatus. Minimal PO intake since Saturday. She was last admitted 02/2022 with similar symptoms which resolved with conservative management. Denies decrease UOP or dysuria. No fever or chills. No chest pain, sob, weakness.     On presentation patient HDS. Afebrile. WBC 19 H/h stable. GABBI with Cr 1.8. Lactate 2.2. UA w/ hyaline cast and 1+ protein. CT scan revealing stomach and proximal small bowel distention with some decompression of distal sb. No focal transition point         No current facility-administered medications on file prior to encounter.     Current Outpatient Medications on File Prior to Encounter   Medication Sig    acetaminophen (TYLENOL) 325 MG tablet Take 2 tablets (650 mg total) by mouth every 6 (six) hours as needed for Pain.    ascorbic acid, vitamin C, (VITAMIN C) 500 MG tablet  Take 500 mg by mouth once daily.     calcium carbonate (OS-TORI) 600 mg (1,500 mg) Tab Take 600 mg by mouth 2 (two) times daily with meals.    clonazePAM (KLONOPIN) 1 MG tablet Take 1 tablet (1 mg total) by mouth 2 (two) times daily as needed for Anxiety. (Patient taking differently: Take 1 mg by mouth 2 (two) times daily. )    gabapentin (NEURONTIN) 300 MG capsule Take 1 capsule (300 mg total) by mouth 3 (three) times daily. for 7 days    lisinopriL-hydrochlorothiazide (PRINZIDE,ZESTORETIC) 20-12.5 mg per tablet Take 1 tablet by mouth once daily.    loperamide (IMODIUM) 2 mg capsule Take 1 capsule (2 mg total) by mouth 2 (two) times a day.    multivitamin (ONE DAILY MULTIVITAMIN) per tablet Take 1 tablet by mouth once daily.    oxyCODONE (ROXICODONE) 5 MG immediate release tablet Take 1 tablet (5 mg total) by mouth every 4 (four) hours as needed.    potassium chloride SA (K-DUR,KLOR-CON) 20 MEQ tablet Take 1 tablet (20 mEq total) by mouth once daily.    tamsulosin (FLOMAX) 0.4 mg Cap Take 1 capsule (0.4 mg total) by mouth once daily. (Patient not taking: Reported on 10/20/2021)       Review of patient's allergies indicates:  No Known Allergies    Past Medical History:   Diagnosis Date    Acute gastric ulcer with perforation     Acute kidney failure with tubular necrosis     GABBI (acute kidney injury)     Anxiety     Diarrhea     HLD (hyperlipidemia)     Hypertension     Nausea & vomiting     Peritonitis     PUD (peptic ulcer disease)     Renal disorder     Urine retention     Vascular disorder of intestine, unspecified      Past Surgical History:   Procedure Laterality Date    APPENDECTOMY      APPLICATION OF WOUND VACUUM-ASSISTED CLOSURE DEVICE  1/21/2021    Procedure: APPLICATION, WOUND VAC;  Surgeon: Abel Francis MD;  Location: [facility] OR [address] [place] [address];  Service: General;;    APPLICATION OF WOUND VACUUM-ASSISTED CLOSURE DEVICE  1/25/2021    Procedure: APPLICATION, WOUND VAC;  Surgeon:  Abel Francis MD;  Location: [address];  Service: General;;    CLOSURE OF PERFORATED ULCER OF DUODENUM USING OMENTAL PATCH  1/21/2021    Procedure: CLOSURE, ULCER, PERFORATED, DUODENUM, USING OMENTAL PATCH;  Surgeon: Abel Francis MD;  Location: [address];  Service: General;;    COLECTOMY, RIGHT  1/21/2021    Procedure: COLECTOMY, RIGHT;  Surgeon: Abel Francis MD;  Location: [address];  Service: General;;    cyst removed from neck      GASTROSTOMY  1/25/2021    Procedure: GASTROSTOMY;  Surgeon: Abel Francis MD;  Location: [address];  Service: General;;    HYSTERECTOMY  1986    ILEOSTOMY CLOSURE  10/13/2021    Procedure: CLOSURE, ILEOSTOMY;  Surgeon: Abel Francis MD;  Location: [address];  Service: General;;    LYSIS OF ADHESIONS  10/13/2021    Procedure: LYSIS, ADHESIONS;  Surgeon: Abel Francis MD;  Location: [address];  Service: General;;    OOPHORECTOMY Bilateral 1989    OPEN REDUCTION AND INTERNAL FIXATION (ORIF) OF LISFRANC INJURY OF FOOT Left 10/11/2019    Procedure: ORIF, LISFRANC INJURY, FOOT;  Surgeon: Ferdinand Stauffer DPM;  Location: [address];  Service: Podiatry;  Laterality: Left;    TONSILLECTOMY, ADENOIDECTOMY       Family History       Problem Relation (Age of Onset)    Hypertension Mother          Tobacco Use    Smoking status: Current Every Day Smoker    Smokeless tobacco: Never Used   Substance and Sexual Activity    Alcohol use: Not Currently     Comment: socially    Drug use: No    Sexual activity: Yes     Partners: Male     Comment:      Review of Systems   Constitutional:  Positive for appetite change. Negative for chills, diaphoresis, fatigue and fever.   HENT: Negative.     Eyes: Negative.    Respiratory: Negative.     Cardiovascular: Negative.    Gastrointestinal:  Positive for abdominal pain, diarrhea, nausea and vomiting. Negative for constipation.   Endocrine: Negative.    Genitourinary:   Negative for decreased urine volume, difficulty urinating and dysuria.   Musculoskeletal: Negative.    Skin: Negative.    Allergic/Immunologic: Negative.    Neurological: Negative.    Hematological: Negative.    Psychiatric/Behavioral: Negative.     Objective:     Vital Signs (Most Recent):  Temp: 100.1 °F (37.8 °C) (06/27/22 0827)  Pulse: 83 (06/27/22 1103)  Resp: 14 (06/27/22 1103)  BP: 108/62 (06/27/22 1414)  SpO2: 96 % (06/27/22 1103) Vital Signs (24h Range):  Temp:  [100.1 °F (37.8 °C)] 100.1 °F (37.8 °C)  Pulse:  [] 83  Resp:  [14-16] 14  SpO2:  [96 %-98 %] 96 %  BP: (100-126)/(56-75) 108/62     Weight: 55.2 kg (121 lb 11.1 oz)  Body mass index is 20.89 kg/m².    Physical Exam  Vitals and nursing note reviewed.   Constitutional:       Appearance: Normal appearance.   HENT:      Head: Normocephalic and atraumatic.   Cardiovascular:      Rate and Rhythm: Normal rate and regular rhythm.   Pulmonary:      Effort: Pulmonary effort is normal. No respiratory distress.   Abdominal:      General: There is no distension.      Palpations: Abdomen is soft. There is no mass.      Tenderness: There is abdominal tenderness (epigastric/suprapubic). There is no guarding or rebound.      Hernia: No hernia is present.      Comments: Healed midline incision   Skin:     General: Skin is warm and dry.      Capillary Refill: Capillary refill takes less than 2 seconds.   Neurological:      Mental Status: She is alert and oriented to person, place, and time.   Psychiatric:         Mood and Affect: Mood normal.   I have reviewed all pertinent lab results within the past 24 hours.  CBC:   Recent Labs   Lab 06/27/22  0857   WBC 19.24*   RBC 4.93   HGB 15.7   HCT 47.2      MCV 96   MCH 31.8*   MCHC 33.3     CMP:   Recent Labs   Lab 06/27/22  0857   *   CALCIUM 10.3   ALBUMIN 3.8   PROT 7.5      K 4.1   CO2 31*   CL 94*   BUN 29*   CREATININE 1.8*   ALKPHOS 77   ALT 7*   AST 15   BILITOT 1.2*       Significant  Labs:  I have reviewed all pertinent lab results within the past 24 hours.    Significant Diagnostics:  I have reviewed all pertinent imaging results/findings within the past 24 hours.      Assessment/Plan:     * Partial small bowel obstruction  71 y.o. female with history of perforated gastric ulcer and colon ischemia s/p ex lap with duodenal ulcer repair and right colectomy, and end ileostomy creation in Jan 2021 and ilestomy reversal (ileosigmoid anastomosis) October 2021 presenting with abdominal pain and n/v. Concern for pSBO, no focal transition point on imaging.      -Admit to general surgery   -NPO   -will hold off on NGT placement at this time. Will place if she started to have nausea/emesis/increased abdominal pain/distenstion  -mIVF  -PRN pain control   -Restart home medications as needed   -Daily labs- close monitoring of GABBI and WBC  -DVT ppx          VTE Risk Mitigation (From admission, onward)         Ordered     heparin (porcine) injection 5,000 Units  Every 8 hours         06/27/22 1437     IP VTE HIGH RISK PATIENT  Once         06/27/22 1434     Place sequential compression device  Until discontinued         06/27/22 1434                Thank you for your consult. I will follow-up with patient. Please contact us if you have any additional questions.    Holly Ocasio MD  General Surgery  [facility] - Emergency Dept

## 2022-06-27 NOTE — Clinical Note
Diagnosis: Partial small bowel obstruction [255949]   Future Attending Provider: GORAN BEAVERS [[id number]]   Is the patient being sent to ED Observation?: No   Admitting Provider:: GORAN BEAVERS [[id number]]   Special Needs:: No Special Needs [1]

## 2022-06-27 NOTE — ED NOTES
Bed: [facility]  Expected date:   Expected time:   Means of arrival:   Comments:

## 2022-06-28 VITALS
SYSTOLIC BLOOD PRESSURE: 148 MMHG | RESPIRATION RATE: 17 BRPM | WEIGHT: 121.69 LBS | HEART RATE: 64 BPM | HEIGHT: 64 IN | BODY MASS INDEX: 20.78 KG/M2 | TEMPERATURE: 97 F | DIASTOLIC BLOOD PRESSURE: 72 MMHG | OXYGEN SATURATION: 90 %

## 2022-06-28 LAB
ANION GAP SERPL CALC-SCNC: 12 MMOL/L (ref 8–16)
BASOPHILS # BLD AUTO: 0.03 K/UL (ref 0–0.2)
BASOPHILS NFR BLD: 0.4 % (ref 0–1.9)
BUN SERPL-MCNC: 27 MG/DL (ref 8–23)
CALCIUM SERPL-MCNC: 8.8 MG/DL (ref 8.7–10.5)
CHLORIDE SERPL-SCNC: 104 MMOL/L (ref 95–110)
CO2 SERPL-SCNC: 23 MMOL/L (ref 23–29)
CREAT SERPL-MCNC: 1 MG/DL (ref 0.5–1.4)
DIFFERENTIAL METHOD: ABNORMAL
EOSINOPHIL # BLD AUTO: 0.2 K/UL (ref 0–0.5)
EOSINOPHIL NFR BLD: 2.4 % (ref 0–8)
ERYTHROCYTE [DISTWIDTH] IN BLOOD BY AUTOMATED COUNT: 13.6 % (ref 11.5–14.5)
EST. GFR  (AFRICAN AMERICAN): >60 ML/MIN/1.73 M^2
EST. GFR  (NON AFRICAN AMERICAN): 57.2 ML/MIN/1.73 M^2
GLUCOSE SERPL-MCNC: 80 MG/DL (ref 70–110)
HCT VFR BLD AUTO: 36.8 % (ref 37–48.5)
HGB BLD-MCNC: 11.7 G/DL (ref 12–16)
IMM GRANULOCYTES # BLD AUTO: 0.03 K/UL (ref 0–0.04)
IMM GRANULOCYTES NFR BLD AUTO: 0.4 % (ref 0–0.5)
LYMPHOCYTES # BLD AUTO: 2.5 K/UL (ref 1–4.8)
LYMPHOCYTES NFR BLD: 30.3 % (ref 18–48)
MAGNESIUM SERPL-MCNC: 1.7 MG/DL (ref 1.6–2.6)
MCH RBC QN AUTO: 32 PG (ref 27–31)
MCHC RBC AUTO-ENTMCNC: 31.8 G/DL (ref 32–36)
MCV RBC AUTO: 101 FL (ref 82–98)
MONOCYTES # BLD AUTO: 0.5 K/UL (ref 0.3–1)
MONOCYTES NFR BLD: 5.7 % (ref 4–15)
NEUTROPHILS # BLD AUTO: 5.1 K/UL (ref 1.8–7.7)
NEUTROPHILS NFR BLD: 60.8 % (ref 38–73)
NRBC BLD-RTO: 0 /100 WBC
PHOSPHATE SERPL-MCNC: 2.8 MG/DL (ref 2.7–4.5)
PLATELET # BLD AUTO: 239 K/UL (ref 150–450)
PMV BLD AUTO: 9.9 FL (ref 9.2–12.9)
POTASSIUM SERPL-SCNC: 3.4 MMOL/L (ref 3.5–5.1)
RBC # BLD AUTO: 3.66 M/UL (ref 4–5.4)
SODIUM SERPL-SCNC: 139 MMOL/L (ref 136–145)
WBC # BLD AUTO: 8.35 K/UL (ref 3.9–12.7)

## 2022-06-28 PROCEDURE — 63600175 PHARM REV CODE 636 W HCPCS: Performed by: STUDENT IN AN ORGANIZED HEALTH CARE EDUCATION/TRAINING PROGRAM

## 2022-06-28 PROCEDURE — 84100 ASSAY OF PHOSPHORUS: CPT | Performed by: STUDENT IN AN ORGANIZED HEALTH CARE EDUCATION/TRAINING PROGRAM

## 2022-06-28 PROCEDURE — 85025 COMPLETE CBC W/AUTO DIFF WBC: CPT | Performed by: STUDENT IN AN ORGANIZED HEALTH CARE EDUCATION/TRAINING PROGRAM

## 2022-06-28 PROCEDURE — 36415 COLL VENOUS BLD VENIPUNCTURE: CPT | Performed by: STUDENT IN AN ORGANIZED HEALTH CARE EDUCATION/TRAINING PROGRAM

## 2022-06-28 PROCEDURE — 25500020 PHARM REV CODE 255: Performed by: STUDENT IN AN ORGANIZED HEALTH CARE EDUCATION/TRAINING PROGRAM

## 2022-06-28 PROCEDURE — 80048 BASIC METABOLIC PNL TOTAL CA: CPT | Performed by: STUDENT IN AN ORGANIZED HEALTH CARE EDUCATION/TRAINING PROGRAM

## 2022-06-28 PROCEDURE — 25000003 PHARM REV CODE 250: Performed by: STUDENT IN AN ORGANIZED HEALTH CARE EDUCATION/TRAINING PROGRAM

## 2022-06-28 PROCEDURE — 63600175 PHARM REV CODE 636 W HCPCS

## 2022-06-28 PROCEDURE — 83735 ASSAY OF MAGNESIUM: CPT | Performed by: STUDENT IN AN ORGANIZED HEALTH CARE EDUCATION/TRAINING PROGRAM

## 2022-06-28 RX ADMIN — HYDROMORPHONE HYDROCHLORIDE 0.2 MG: 1 INJECTION, SOLUTION INTRAMUSCULAR; INTRAVENOUS; SUBCUTANEOUS at 09:06

## 2022-06-28 RX ADMIN — POTASSIUM BICARBONATE 25 MEQ: 978 TABLET, EFFERVESCENT ORAL at 10:06

## 2022-06-28 RX ADMIN — DIATRIZOATE MEGLUMINE AND DIATRIZOATE SODIUM 50 ML: 660; 100 LIQUID ORAL; RECTAL at 07:06

## 2022-06-28 RX ADMIN — KETOROLAC TROMETHAMINE 15 MG: 15 INJECTION, SOLUTION INTRAMUSCULAR; INTRAVENOUS at 04:06

## 2022-06-28 RX ADMIN — PIPERACILLIN SODIUM AND TAZOBACTAM SODIUM 4.5 G: 4; .5 INJECTION, POWDER, LYOPHILIZED, FOR SOLUTION INTRAVENOUS at 03:06

## 2022-06-28 RX ADMIN — HEPARIN SODIUM 5000 UNITS: 5000 INJECTION INTRAVENOUS; SUBCUTANEOUS at 06:06

## 2022-06-28 RX ADMIN — CLONAZEPAM 1 MG: 1 TABLET ORAL at 07:06

## 2022-06-28 RX ADMIN — HYDROMORPHONE HYDROCHLORIDE 0.2 MG: 1 INJECTION, SOLUTION INTRAMUSCULAR; INTRAVENOUS; SUBCUTANEOUS at 04:06

## 2022-06-28 NOTE — ASSESSMENT & PLAN NOTE
71 y.o. female with history of perforated gastric ulcer and colon ischemia s/p ex lap with duodenal ulcer repair and right colectomy, and end ileostomy creation in Jan 2021 and ilestomy reversal (ileosigmoid anastomosis) October 2021 presenting with abdominal pain and n/v. Concern for pSBO, no focal transition point on imaging. Clinically stable      -NPO  -Gastrograffin challenge this AM   -will hold off on NGT placement at this time. Will place if she started to have nausea/emesis/increased abdominal pain/distenstion  -mIVF  -PRN pain control   -Restart home medications as needed   -Daily labs- close monitoring of GABBI and WBC, both downtrending   -DVT ppx (SCDs and heparin)

## 2022-06-28 NOTE — PLAN OF CARE
[facility]  Initial Discharge Assessment       Primary Care Provider: Brendon Hardy MD    Admission Diagnosis: SBO (small bowel obstruction) [K56.609]  Generalized abdominal pain [R10.84]  Partial small bowel obstruction [K56.600]  Diarrhea, unspecified type [R19.7]  Nausea and vomiting, intractability of vomiting not specified, unspecified vomiting type [R11.2]    Admission Date: 6/27/2022  Expected Discharge Date: 6/28/2022         Payor: MEDICARE / Plan: MEDICARE PART A & B / Product Type: Government /     Extended Emergency Contact Information  Primary Emergency Contact: Thierno Mckay  [employer] Phone: [phone]  Relation: Brother  Preferred language: English   needed? No    Discharge Plan A: Home  Discharge Plan B: Home      [facility] - [place], [place] - [address]  [address]  [place] [address]  Phone: [phone] Fax: [phone]    [facility]  [address]  [place] [address]      [facility] [place]  [address]  [place] [address]  Phone: [phone] Fax: [phone]      Initial Assessment (most recent)     Adult Discharge Assessment - 06/28/22 1120        Discharge Assessment    Assessment Type Discharge Planning Assessment     Confirmed/corrected address, phone number and insurance Yes     Confirmed Demographics Correct on Facesheet     Source of Information patient     Does patient/caregiver understand observation status Yes     Communicated CATHY with patient/caregiver Yes     Lives With alone     Do you expect to return to your current living situation? Yes     Do you have help at home or someone to help you manage your care at home? Yes     Who are your caregiver(s) and their phone number(s)? Thierno Felder (Brother) [phone]     Prior to hospitilization cognitive status: Alert/Oriented     Current cognitive status: Alert/Oriented     Walking or Climbing Stairs Difficulty none   "   Dressing/Bathing Difficulty none     Home Layout Able to live on 1st floor     Equipment Currently Used at Home none     Readmission within 30 days? No     Patient currently being followed by outpatient case management? No     Do you currently have service(s) that help you manage your care at home? No     Do you take prescription medications? Yes     Do you have prescription coverage? Yes     Do you have any problems affording any of your prescribed medications? No     Is the patient taking medications as prescribed? yes     Who is going to help you get home at discharge? Brother-Thierno     How do you get to doctors appointments? family or friend will provide     Are you on dialysis? No     Do you take coumadin? No     Discharge Plan A Home     Discharge Plan B Home                 Spoke with patient at bedside to complete d/c planning assessment. Patient lives alone in a two story home with no steps to enter. Master is located on the second floor of home with 14 steps between first and second levels. Independent with ADL'S. No DME in Home. Verified PCP, Pharmacy and Health insurance.  Patient reports she will have help at home from brother who will also be her ride home today. PCP appt unable to be made as patient's primary Dr. Hardy takes walk in only M-F 9am-4PM, patient reports she will see MD on 7/5/22 @ 9AM. Team ordered an X-ray for patient following her GGC. Patient very angry that she is still NPO and is refusing to stay for x-ray stating "They already did an X-Ray when I drank that stuff, Im not staying for anything else I already called my brother" Patient Irate and RNCM unable to reason with her, charge nurse and bedside nurse have also spoken to patient to no avail. Team Paged. No d/c needs noted.        "

## 2022-06-28 NOTE — PROGRESS NOTES
"[facility]  General Surgery  Progress Note    Subjective:     History of Present Illness:  Juan Antonio Rowe is a 71 y.o. 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 Reports she ate a large baked potato Saturday and shortly after started having abdominal pain. She is no longer having emesis but not has had multiple loose stools. Unsure if she is passing flatus. Minimal PO intake since Saturday. She was last admitted 02/2022 with similar symptoms which resolved with conservative management. Denies decrease UOP or dysuria. No fever or chills. No chest pain, sob, weakness.     On presentation patient HDS. Afebrile. WBC 19 H/h stable. GABBI with Cr 1.8. Lactate 2.2. UA w/ hyaline cast and 1+ protein. CT scan revealing stomach and proximal small bowel distention with some decompression of distal sb. No focal transition point         Post-Op Info:  * No surgery found *         Interval History: Patient upset on team rounds in regards to pain regimen. Reports that her pain slightly improved since last night but states that "we don't understand her body" and that no PO meds work for her, only dilaudid IV. Demanding a diet. Denies n/v. Has not had a BM. Surgical resident attempted to explain rationale for current pain regimen which upset patient. He removed himself from situation and other teams also tried to explain and calm patient. Patient then followed the team and attempted to enter another patient's room. This was prevented by another staff member.     Shortly after, called to bedside by nursing as patient stating that she would like to leave AMA. Presented to bedside with charge nurse, Scotty. Provided empathy and " "discussed that she is not medically cleared for dc. Explained current pain regimen and POC. Discussed that IV opioids can contribute to delayed GI motility. Patient tearful. States "they aren't even real doctors, I want someone who has been around for longer." States "you don't even know how my body works" and that she would like to "wring his neck and kick him down the corner." Informed patient that we would not tolerate threats to any of our hospital staff. Patient calmed down a little and stated that she would like to stay for treatment.     Medications:  Continuous Infusions:   lactated ringers 125 mL/hr at 06/27/22 1649     Scheduled Meds:   clonazePAM  1 mg Oral BID    diatrizoate meglumineand-diatrizoate sodium  50 mL Oral Once    heparin (porcine)  5,000 Units Subcutaneous Q8H     PRN Meds:acetaminophen, acetaminophen, HYDROmorphone, ketorolac, melatonin, ondansetron, promethazine, sodium chloride 0.9%     Review of patient's allergies indicates:  No Known Allergies  Objective:     Vital Signs (Most Recent):  Temp: 96.8 °F (36 °C) (06/28/22 0433)  Pulse: 62 (06/28/22 0433)  Resp: 16 (06/28/22 0456)  BP: (!) 151/74 (06/28/22 0433)  SpO2: 96 % (06/28/22 0433)   Vital Signs (24h Range):  Temp:  [96.1 °F (35.6 °C)-100.1 °F (37.8 °C)] 96.8 °F (36 °C)  Pulse:  [] 62  Resp:  [14-20] 16  SpO2:  [94 %-98 %] 96 %  BP: (100-151)/(56-75) 151/74     Weight: 55.2 kg (121 lb 11.1 oz)  Body mass index is 20.89 kg/m².    Intake/Output - Last 3 Shifts         06/26 0700 06/27 0659 06/27 0700 06/28 0659 06/28 0700 06/29 0659    IV Piggyback  1000     Total Intake(mL/kg)  1000 (18.1)     Net  +1000                    Physical Exam  Patient would not allow physical exam     Significant Labs:  I have reviewed all pertinent lab results within the past 24 hours.  CBC:   Recent Labs   Lab 06/28/22  0407   WBC 8.35   RBC 3.66*   HGB 11.7*   HCT 36.8*      *   MCH 32.0*   MCHC 31.8*     CMP:   Recent Labs "   Lab 06/27/22  0857 06/27/22  1549 06/28/22  0408   *   < > 80   CALCIUM 10.3   < > 8.8   ALBUMIN 3.8  --   --    PROT 7.5  --   --       < > 139   K 4.1   < > 3.4*   CO2 31*   < > 23   CL 94*   < > 104   BUN 29*   < > 27*   CREATININE 1.8*   < > 1.0   ALKPHOS 77  --   --    ALT 7*  --   --    AST 15  --   --    BILITOT 1.2*  --   --     < > = values in this interval not displayed.       Significant Diagnostics:  I have reviewed all pertinent imaging results/findings within the past 24 hours.    Assessment/Plan:     * Partial small bowel obstruction  71 y.o. female with history of perforated gastric ulcer and colon ischemia s/p ex lap with duodenal ulcer repair and right colectomy, and end ileostomy creation in Jan 2021 and ilestomy reversal (ileosigmoid anastomosis) October 2021 presenting with abdominal pain and n/v. Concern for pSBO, no focal transition point on imaging. Clinically stable      -NPO  -Gastrograffin challenge this AM   -will hold off on NGT placement at this time. Will place if she started to have nausea/emesis/increased abdominal pain/distenstion  -mIVF  -PRN pain control   -Restart home medications as needed   -Daily labs- close monitoring of GABBI and WBC, both downtrending   -DVT ppx (SCDs and heparin)       Case discussed with Dr. Plata.       MARCELLUS AngelC  General Surgery  [facility]

## 2022-06-28 NOTE — SUBJECTIVE & OBJECTIVE
"Interval History: Patient upset on team rounds in regards to pain regimen. Reports that her pain slightly improved since last night but states that "we don't understand her body" and that no PO meds work for her, only dilaudid IV. Demanding a diet. Denies n/v. Has not had a BM. Surgical resident attempted to explain rationale for current pain regimen which upset patient. He removed himself from situation and other teams also tried to explain and calm patient. Patient then followed the team and attempted to enter another patient's room. This was prevented by another staff member.     Shortly after, called to bedside by nursing as patient stating that she would like to leave AMA. Presented to bedside with charge nurse, Scotty. Provided empathy and discussed that she is not medically cleared for dc. Explained current pain regimen and POC. Discussed that IV opioids can contribute to delayed GI motility. Patient tearful. States "they aren't even real doctors, I want someone who has been around for longer." States "you don't even know how my body works" and that she would like to "wring his neck and kick him down the corner." Informed patient that we would not tolerate threats to any of our hospital staff. Patient calmed down a little and stated that she would like to stay for treatment.     Medications:  Continuous Infusions:   lactated ringers 125 mL/hr at 06/27/22 1649     Scheduled Meds:   clonazePAM  1 mg Oral BID    diatrizoate meglumineand-diatrizoate sodium  50 mL Oral Once    heparin (porcine)  5,000 Units Subcutaneous Q8H     PRN Meds:acetaminophen, acetaminophen, HYDROmorphone, ketorolac, melatonin, ondansetron, promethazine, sodium chloride 0.9%     Review of patient's allergies indicates:  No Known Allergies  Objective:     Vital Signs (Most Recent):  Temp: 96.8 °F (36 °C) (06/28/22 0433)  Pulse: 62 (06/28/22 0433)  Resp: 16 (06/28/22 0456)  BP: (!) 151/74 (06/28/22 0433)  SpO2: 96 % (06/28/22 0433)   Vital " Signs (24h Range):  Temp:  [96.1 °F (35.6 °C)-100.1 °F (37.8 °C)] 96.8 °F (36 °C)  Pulse:  [] 62  Resp:  [14-20] 16  SpO2:  [94 %-98 %] 96 %  BP: (100-151)/(56-75) 151/74     Weight: 55.2 kg (121 lb 11.1 oz)  Body mass index is 20.89 kg/m².    Intake/Output - Last 3 Shifts         06/26 0700 06/27 0659 06/27 0700 06/28 0659 06/28 0700 06/29 0659    IV Piggyback  1000     Total Intake(mL/kg)  1000 (18.1)     Net  +1000                    Physical Exam  Patient would not allow physical exam     Significant Labs:  I have reviewed all pertinent lab results within the past 24 hours.  CBC:   Recent Labs   Lab 06/28/22  0407   WBC 8.35   RBC 3.66*   HGB 11.7*   HCT 36.8*      *   MCH 32.0*   MCHC 31.8*     CMP:   Recent Labs   Lab 06/27/22  0857 06/27/22  1549 06/28/22  0408   *   < > 80   CALCIUM 10.3   < > 8.8   ALBUMIN 3.8  --   --    PROT 7.5  --   --       < > 139   K 4.1   < > 3.4*   CO2 31*   < > 23   CL 94*   < > 104   BUN 29*   < > 27*   CREATININE 1.8*   < > 1.0   ALKPHOS 77  --   --    ALT 7*  --   --    AST 15  --   --    BILITOT 1.2*  --   --     < > = values in this interval not displayed.       Significant Diagnostics:  I have reviewed all pertinent imaging results/findings within the past 24 hours.

## 2022-06-28 NOTE — PLAN OF CARE
[facility]  Discharge Final Note    Primary Care Provider: Brendon Hardy MD    Expected Discharge Date: 6/28/2022    Final Discharge Note (most recent)     Final Note - 06/28/22 1318        Final Note    Assessment Type Final Discharge Note     Anticipated Discharge Disposition Home or Self Care     What phone number can be called within the next 1-3 days to see how you are doing after discharge? --   [phone]    Hospital Resources/Appts/Education Provided Appointments scheduled and added to AVS                 Important Message from Medicare             Contact Info     Brendon Hardy MD   Specialty: Internal Medicine   Relationship: PCP - General    [place] [address]   Phone: [phone]       Next Steps: Follow up    Instructions: Monday-thursday 9a-4PM walk-in only patient to see MD on 7/5/22 @ 9AM            Patient discharged home to care of self on 6/28/22.

## 2022-06-28 NOTE — NURSING
Patient calls this nurse to her room and states she needs to talk to the Dr about her scan.  I informed her I would call them.  She states she is leaving.  Provider called and provider states she will review her scan and come up and talk to patient.  Message is given to patient and she states she is ready to leave.  Patient approaches the nurses station and states she has to leave because she is starving.  This nurse again tells patient providers are reviewing her scan and she states she wants to leave.  IV is removed and patient is told she is leaving against medical advise.  I explained that she is leaving without discharge orders.

## 2022-06-28 NOTE — NURSING
Pt informed me that she would leave AMA. Pt signed AMA paperwork. NADEEM Ambriz notified. IV removed by Nurse.

## 2022-06-28 NOTE — PROGRESS NOTES
Patient complaining of abdominal pain unrelieved by present medication regimen. Intern on call notified of situation. Will note new orders and administer to patient, and note response.

## 2022-06-28 NOTE — PLAN OF CARE
Problem: Adult Inpatient Plan of Care  Goal: Plan of Care Review  Outcome: Ongoing, Progressing  Goal: Patient-Specific Goal (Individualized)  Outcome: Ongoing, Progressing  Goal: Absence of Hospital-Acquired Illness or Injury  Outcome: Ongoing, Progressing  Goal: Optimal Comfort and Wellbeing  Outcome: Ongoing, Progressing  Goal: Readiness for Transition of Care  Outcome: Ongoing, Progressing     Patient complained of pain only relieved by dilaudid & morphine throughout the night, VSS, WCTM

## 2022-06-29 NOTE — DISCHARGE SUMMARY
"[facility]  General Surgery  Discharge Summary      Patient Name: Juan Antonio Rowe  MRN: [id number]  Admission Date: 6/27/2022  Hospital Length of Stay: 1 days  Discharge Date and Time: 6/28/2022  1:53 PM  Attending Physician: Hunter Plata MD   Discharging Provider: Maycol Ambriz PA-C  Primary Care Provider: Brendon Hardy MD     HPI:   Juan Antonio Rowe is a 71 y.o. 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 Reports she ate a large baked potato Saturday and shortly after started having abdominal pain. She is no longer having emesis but not has had multiple loose stools. Unsure if she is passing flatus. Minimal PO intake since Saturday. She was last admitted 02/2022 with similar symptoms which resolved with conservative management. Denies decrease UOP or dysuria. No fever or chills. No chest pain, sob, weakness.     On presentation patient HDS. Afebrile. WBC 19 H/h stable. GABBI with Cr 1.8. Lactate 2.2. UA w/ hyaline cast and 1+ protein. CT scan revealing stomach and proximal small bowel distention with some decompression of distal sb. No focal transition point    * No surgery found *     Hospital Course:   Patient was admitted to the general surgery service. She underwent conservative treatment of pSBO vs ileus with bowel rest and gastrograffin challenge. She left AMA prior to completing treatment because she was "starving." She is currently alert and oriented to person, place, time, and situation. She has full capacity to make medical decisions. Despite discussions of risks of leaving AMA and benefits of continued inpatient treatment, expressed the desire to leave AMA.     Please see hospital and op " notes for further detail regarding patient's admission.        Consults (From admission, onward)        Status Ordering Provider     Inpatient consult to General surgery  Once        Provider:  (Not yet assigned)    Completed ELIEZER FOREMAN            Indwelling Lines/Drains at time of discharge:   Lines/Drains/Airways     None                 Significant Diagnostic Studies: Labs:   CMP   Recent Labs   Lab 06/27/22  1549 06/28/22  0408    139   K 3.6 3.4*    104   CO2 26 23   GLU 93 80   BUN 29* 27*   CREATININE 1.3 1.0   CALCIUM 8.7 8.8   ANIONGAP 8 12   ESTGFRAFRICA 48.0* >60.0   EGFRNONAA 41.7* 57.2*   , CBC   Recent Labs   Lab 06/27/22  1549 06/28/22  0407   WBC 13.24* 8.35   HGB 12.5 11.7*   HCT 37.9 36.8*    239    and All labs within the past 24 hours have been reviewed  Radiology:  X-Ray Abdomen AP 1 View [[id number]] Resulted: 06/28/22 1043   Order Status: Completed Updated: 06/28/22 1046   Narrative:     EXAMINATION:   XR ABDOMEN AP 1 VIEW     CLINICAL HISTORY:   GGC. Please comment on movement of contrast;     TECHNIQUE:   Single AP View of the abdomen was performed.     COMPARISON:   CT abdomen/pelvis from 06/27/2022.  Abdominal radiograph from 02/17/2022.     FINDINGS:   No evidence of free intraperitoneal air.  Gaseous distension of loops of small bowel in the mid abdomen measuring up to 3.4 cm.  Distal small bowel in the lower abdomen and pelvis with minimal gaseous distension and contrast opacification.  No definite contrast within the colon or rectum although likely difficult to confirm in patient with partial colectomy.  No definite air-fluid levels.  No significant distension of the stomach noting residual contrast opacification.  Overall findings are more suggestive of ileus.  Recommend clinical correlation.    Impression:       As above.       Electronically signed by: Mario Lopez   Date: 06/28/2022   Time: 10:43   CT Abdomen Pelvis Without Contrast [[id number]] (Abnormal)  Resulted: 06/27/22 1200   Order Status: Completed Updated: 06/27/22 1203   Narrative:     EXAMINATION:   CT ABDOMEN PELVIS WITHOUT CONTRAST     CLINICAL HISTORY:   Abdominal pain, acute, nonlocalized;     TECHNIQUE:   Routine axial CT images of the abdomen and pelvis were obtained without contrast.  .  Coronal and Sagittal reformatted images were also obtained.     COMPARISON:   Abdominal radiograph 02/17/2022; CT abdomen pelvis 02/16/2022     FINDINGS:   Heart: Partially imaged demonstrate no cardiomegaly.  Interval decreased pericardial trace volume fluid.     Lungs: Mild small patchy ground-glass opacities in the lingula new from prior.  Bandlike opacities in the right middle lobe likely atelectasis.     Liver: Normal in size and attenuation, with no focal hepatic lesions.     Gallbladder: Contracted limiting evaluation.     Bile ducts: Continued distension of extrahepatic common duct measuring up to 1.5 cm, unchanged.  No intrahepatic ductal dilatation.     Pancreas: No mass or peripancreatic fat stranding.     Spleen: Normal.     Adrenals: Normal.     GI Tract/ Mesentery: Fluid opacification of the distal esophagus.  Postoperative changes of partial colectomy with ileocolonic anastomosis.  Appendix is reportedly surgically absent.  Diffuse distension of small bowel with air-fluid levels, more prominent proximally measuring up to 3.0 cm.  No identified transition point however there are nondistended small bowel loops in the lower anterior abdomen.  No large bowel obstruction.  No focal wall thickening or new inflammation.     Kidneys/ Ureters: Normal in location.  Atrophic changes of the left kidney similar to prior.  No hydronephrosis or nephrolithiasis.  No hydroureter.     Bladder: Partially distended and unremarkable.     Reproductive organs: Hysterectomy.     Retroperitoneum: No significant adenopathy.     Peritoneal space: No ascites. No free air.     Abdominal wall: Midline abdominal postsurgical changes.      Vasculature: Extensive calcific atherosclerosis of the aorta and branches.  Severe calcification near the infrarenal abdominal aorta (2:69) similar to prior suggestive of hemodynamically significant stenosis.  No aneurysm.     Bones: Advanced degenerative changes throughout the lumbar spine.  Continued grade 1 anterolisthesis of L3 on L4.  No acute fracture.  No aggressive osseous lesion.    Impression:       Postoperative changes of partial colectomy with ileocolonic anastomosis.  Dilatation of air/fluid-filled proximal small bowel loops measuring up to 3.0 cm without definite transition, less prominent as compared to CT from 02/16/2022.  Findings may represent ileus however partial small bowel obstruction not excluded.     Mild small patchy ground-glass opacities in the lingula new from prior which may represent infectious or noninfectious inflammatory process.     This report was flagged in Epic as abnormal.          Pending Diagnostic Studies:     None          Final Active Diagnoses:    Diagnosis Date Noted POA    PRINCIPAL PROBLEM:  Partial small bowel obstruction [K56.600] 02/16/2022 Yes      Problems Resolved During this Admission:        Discharged Condition: against medical advice    Disposition: Left Against Medical Advice    Follow Up:   Follow-up Information     Brendon Hardy MD Follow up.    Specialty: Internal Medicine  Why: Monday-thursday 9a-4PM walk-in only patient to see MD on 7/5/22 @ 9AM  Contact information:  [place] [address] [phone]                         Medications:  None - left AMA    Patient was seen and examined on the date of discharge and determined to be suitable for discharge.  Total time spent preparing discharge services: 35 minutes.  Time was spent speaking with consultants and case management, reviewing records, and/or discussing the plan of care with patient/family.        Maycol Ambriz PA-C  General Surgery   [facility]

## 2022-07-02 LAB
BACTERIA BLD CULT: NORMAL
BACTERIA BLD CULT: NORMAL

## 2022-08-14 ENCOUNTER — HOSPITAL ENCOUNTER (OUTPATIENT)
Facility: HOSPITAL | Age: 72
Discharge: HOME | End: 2022-08-16
Attending: EMERGENCY MEDICINE | Admitting: EMERGENCY MEDICINE
Payer: MEDICARE

## 2022-08-14 DIAGNOSIS — S82.821A CLOSED TORUS FRACTURE OF DISTAL END OF RIGHT FIBULA, INITIAL ENCOUNTER: ICD-10-CM

## 2022-08-14 DIAGNOSIS — R55 PRE-SYNCOPE: ICD-10-CM

## 2022-08-14 DIAGNOSIS — I95.9 HYPOTENSION: ICD-10-CM
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
## 2022-08-14 DIAGNOSIS — I95.9 HYPOTENSION, UNSPECIFIED HYPOTENSION TYPE: ICD-10-CM

## 2022-08-14 DIAGNOSIS — S82.891A CLOSED FRACTURE OF RIGHT ANKLE, INITIAL ENCOUNTER: Primary | ICD-10-CM

## 2022-08-14 DIAGNOSIS — R07.9 CHEST PAIN: ICD-10-CM

## 2022-08-14 PROBLEM — S82.831A CLOSED FRACTURE OF DISTAL END OF RIGHT FIBULA: Status: ACTIVE | Noted: 2022-08-14

## 2022-08-14 LAB
ALBUMIN SERPL BCP-MCNC: 3.7 G/DL (ref 3.5–5.2)
ALLENS TEST: ABNORMAL
ALLENS TEST: NORMAL
ALP SERPL-CCNC: 78 U/L (ref 55–135)
ALT SERPL W/O P-5'-P-CCNC: 35 U/L (ref 10–44)
ANION GAP SERPL CALC-SCNC: 14 MMOL/L (ref 8–16)
AST SERPL-CCNC: 30 U/L (ref 10–40)
BASOPHILS # BLD AUTO: 0.06 K/UL (ref 0–0.2)
BASOPHILS NFR BLD: 0.9 % (ref 0–1.9)
BILIRUB SERPL-MCNC: 0.7 MG/DL (ref 0.1–1)
BILIRUB UR QL STRIP: NEGATIVE
BNP SERPL-MCNC: 45 PG/ML (ref 0–99)
BUN SERPL-MCNC: 33 MG/DL (ref 8–23)
CALCIUM SERPL-MCNC: 8.9 MG/DL (ref 8.7–10.5)
CHLORIDE SERPL-SCNC: 104 MMOL/L (ref 95–110)
CLARITY UR REFRACT.AUTO: CLEAR
CO2 SERPL-SCNC: 19 MMOL/L (ref 23–29)
COLOR UR AUTO: COLORLESS
CREAT SERPL-MCNC: 1.5 MG/DL (ref 0.5–1.4)
DIFFERENTIAL METHOD: ABNORMAL
EOSINOPHIL # BLD AUTO: 0.1 K/UL (ref 0–0.5)
EOSINOPHIL NFR BLD: 1.3 % (ref 0–8)
ERYTHROCYTE [DISTWIDTH] IN BLOOD BY AUTOMATED COUNT: 14 % (ref 11.5–14.5)
EST. GFR  (NO RACE VARIABLE): 37.3 ML/MIN/1.73 M^2
GLUCOSE SERPL-MCNC: 79 MG/DL (ref 70–110)
GLUCOSE UR QL STRIP: NEGATIVE
HCO3 UR-SCNC: 25 MMOL/L (ref 24–28)
HCT VFR BLD AUTO: 39.7 % (ref 37–48.5)
HGB BLD-MCNC: 13.3 G/DL (ref 12–16)
HGB UR QL STRIP: NEGATIVE
IMM GRANULOCYTES # BLD AUTO: 0.01 K/UL (ref 0–0.04)
IMM GRANULOCYTES NFR BLD AUTO: 0.1 % (ref 0–0.5)
KETONES UR QL STRIP: NEGATIVE
LACTATE SERPL-SCNC: 4.5 MMOL/L (ref 0.5–2.2)
LDH SERPL L TO P-CCNC: 1.79 MMOL/L (ref 0.5–2.2)
LEUKOCYTE ESTERASE UR QL STRIP: NEGATIVE
LYMPHOCYTES # BLD AUTO: 3.2 K/UL (ref 1–4.8)
LYMPHOCYTES NFR BLD: 46.7 % (ref 18–48)
MCH RBC QN AUTO: 33.3 PG (ref 27–31)
MCHC RBC AUTO-ENTMCNC: 33.5 G/DL (ref 32–36)
MCV RBC AUTO: 99 FL (ref 82–98)
MONOCYTES # BLD AUTO: 0.2 K/UL (ref 0.3–1)
MONOCYTES NFR BLD: 3.2 % (ref 4–15)
NEUTROPHILS # BLD AUTO: 3.3 K/UL (ref 1.8–7.7)
NEUTROPHILS NFR BLD: 47.8 % (ref 38–73)
NITRITE UR QL STRIP: NEGATIVE
NRBC BLD-RTO: 0 /100 WBC
PCO2 BLDA: 51.9 MMHG (ref 35–45)
PH SMN: 7.29 [PH] (ref 7.35–7.45)
PH UR STRIP: 6 [PH] (ref 5–8)
PLATELET # BLD AUTO: 312 K/UL (ref 150–450)
PMV BLD AUTO: 9.7 FL (ref 9.2–12.9)
PO2 BLDA: 27 MMHG (ref 40–60)
POC BE: -2 MMOL/L
POC SATURATED O2: 42 % (ref 95–100)
POC TCO2: 27 MMOL/L (ref 24–29)
POTASSIUM SERPL-SCNC: 4.5 MMOL/L (ref 3.5–5.1)
PROT SERPL-MCNC: 6.3 G/DL (ref 6–8.4)
PROT UR QL STRIP: NEGATIVE
RBC # BLD AUTO: 4 M/UL (ref 4–5.4)
SAMPLE: ABNORMAL
SAMPLE: NORMAL
SITE: ABNORMAL
SITE: NORMAL
SODIUM SERPL-SCNC: 137 MMOL/L (ref 136–145)
SP GR UR STRIP: 1.01 (ref 1–1.03)
TROPONIN I SERPL DL<=0.01 NG/ML-MCNC: <0.006 NG/ML (ref 0–0.03)
URN SPEC COLLECT METH UR: ABNORMAL
WBC # BLD AUTO: 6.89 K/UL (ref 3.9–12.7)

## 2022-08-14 PROCEDURE — 99152 PR MOD CONSCIOUS SEDATION, SAME PHYS, 5+ YRS, FIRST 15 MIN: ICD-10-PCS | Mod: 59,,, | Performed by: EMERGENCY MEDICINE

## 2022-08-14 PROCEDURE — 94761 N-INVAS EAR/PLS OXIMETRY MLT: CPT

## 2022-08-14 PROCEDURE — 27788 PR CLOSED RX DIST FIBULA FX,MANIP: ICD-10-PCS | Mod: 54,59,RT, | Performed by: EMERGENCY MEDICINE

## 2022-08-14 PROCEDURE — 96374 THER/PROPH/DIAG INJ IV PUSH: CPT | Mod: 59

## 2022-08-14 PROCEDURE — 81003 URINALYSIS AUTO W/O SCOPE: CPT

## 2022-08-14 PROCEDURE — 27788 TREATMENT OF ANKLE FRACTURE: CPT | Mod: 54,59,RT, | Performed by: EMERGENCY MEDICINE

## 2022-08-14 PROCEDURE — G0378 HOSPITAL OBSERVATION PER HR: HCPCS

## 2022-08-14 PROCEDURE — 99285 EMERGENCY DEPT VISIT HI MDM: CPT | Mod: 25,CS

## 2022-08-14 PROCEDURE — 99285 EMERGENCY DEPT VISIT HI MDM: CPT | Mod: 57,25,GC, | Performed by: EMERGENCY MEDICINE

## 2022-08-14 PROCEDURE — 25000003 PHARM REV CODE 250

## 2022-08-14 PROCEDURE — 27788 TREATMENT OF ANKLE FRACTURE: CPT | Mod: RT

## 2022-08-14 PROCEDURE — 80053 COMPREHEN METABOLIC PANEL: CPT

## 2022-08-14 PROCEDURE — 99285 PR EMERGENCY DEPT VISIT,LEVEL V: ICD-10-PCS | Mod: 57,25,GC, | Performed by: EMERGENCY MEDICINE

## 2022-08-14 PROCEDURE — 96376 TX/PRO/DX INJ SAME DRUG ADON: CPT

## 2022-08-14 PROCEDURE — 85025 COMPLETE CBC W/AUTO DIFF WBC: CPT

## 2022-08-14 PROCEDURE — 82803 BLOOD GASES ANY COMBINATION: CPT

## 2022-08-14 PROCEDURE — 83880 ASSAY OF NATRIURETIC PEPTIDE: CPT

## 2022-08-14 PROCEDURE — 83605 ASSAY OF LACTIC ACID: CPT

## 2022-08-14 PROCEDURE — 99900035 HC TECH TIME PER 15 MIN (STAT)

## 2022-08-14 PROCEDURE — 63600175 PHARM REV CODE 636 W HCPCS: Performed by: EMERGENCY MEDICINE

## 2022-08-14 PROCEDURE — 96375 TX/PRO/DX INJ NEW DRUG ADDON: CPT

## 2022-08-14 PROCEDURE — 96361 HYDRATE IV INFUSION ADD-ON: CPT

## 2022-08-14 PROCEDURE — 63600175 PHARM REV CODE 636 W HCPCS

## 2022-08-14 PROCEDURE — 99220 PR INITIAL OBSERVATION CARE,LEVL III: ICD-10-PCS | Mod: ,,, | Performed by: PHYSICIAN ASSISTANT

## 2022-08-14 PROCEDURE — 84484 ASSAY OF TROPONIN QUANT: CPT

## 2022-08-14 PROCEDURE — 99220 PR INITIAL OBSERVATION CARE,LEVL III: CPT | Mod: ,,, | Performed by: PHYSICIAN ASSISTANT

## 2022-08-14 PROCEDURE — 99152 MOD SED SAME PHYS/QHP 5/>YRS: CPT | Mod: 59,,, | Performed by: EMERGENCY MEDICINE

## 2022-08-14 PROCEDURE — 99156 MOD SED OTH PHYS/QHP 5/>YRS: CPT

## 2022-08-14 RX ORDER — IBUPROFEN 200 MG
24 TABLET ORAL
Status: DISCONTINUED | OUTPATIENT
Start: 2022-08-15 | End: 2022-08-16 | Stop reason: HOSPADM

## 2022-08-14 RX ORDER — ACETAMINOPHEN 325 MG/1
650 TABLET ORAL EVERY 4 HOURS PRN
Status: DISCONTINUED | OUTPATIENT
Start: 2022-08-15 | End: 2022-08-15

## 2022-08-14 RX ORDER — ONDANSETRON 2 MG/ML
4 INJECTION INTRAMUSCULAR; INTRAVENOUS
Status: COMPLETED | OUTPATIENT
Start: 2022-08-14 | End: 2022-08-14

## 2022-08-14 RX ORDER — GLUCAGON 1 MG
1 KIT INJECTION
Status: DISCONTINUED | OUTPATIENT
Start: 2022-08-15 | End: 2022-08-16 | Stop reason: HOSPADM

## 2022-08-14 RX ORDER — IBUPROFEN 200 MG
1 TABLET ORAL DAILY PRN
Status: DISCONTINUED | OUTPATIENT
Start: 2022-08-15 | End: 2022-08-16 | Stop reason: HOSPADM

## 2022-08-14 RX ORDER — MORPHINE SULFATE 4 MG/ML
4 INJECTION, SOLUTION INTRAMUSCULAR; INTRAVENOUS
Status: DISCONTINUED | OUTPATIENT
Start: 2022-08-14 | End: 2022-08-14

## 2022-08-14 RX ORDER — MORPHINE SULFATE 4 MG/ML
4 INJECTION, SOLUTION INTRAMUSCULAR; INTRAVENOUS
Status: COMPLETED | OUTPATIENT
Start: 2022-08-14 | End: 2022-08-15

## 2022-08-14 RX ORDER — SENNOSIDES 8.6 MG/1
8.6 TABLET ORAL DAILY
Status: DISCONTINUED | OUTPATIENT
Start: 2022-08-15 | End: 2022-08-15

## 2022-08-14 RX ORDER — IPRATROPIUM BROMIDE AND ALBUTEROL SULFATE 2.5; .5 MG/3ML; MG/3ML
3 SOLUTION RESPIRATORY (INHALATION) EVERY 4 HOURS PRN
Status: DISCONTINUED | OUTPATIENT
Start: 2022-08-15 | End: 2022-08-16 | Stop reason: HOSPADM

## 2022-08-14 RX ORDER — IBUPROFEN 200 MG
16 TABLET ORAL
Status: DISCONTINUED | OUTPATIENT
Start: 2022-08-15 | End: 2022-08-16 | Stop reason: HOSPADM

## 2022-08-14 RX ORDER — HYDROMORPHONE HYDROCHLORIDE 1 MG/ML
0.5 INJECTION, SOLUTION INTRAMUSCULAR; INTRAVENOUS; SUBCUTANEOUS EVERY 6 HOURS PRN
Status: DISCONTINUED | OUTPATIENT
Start: 2022-08-15 | End: 2022-08-15

## 2022-08-14 RX ORDER — FENTANYL CITRATE 50 UG/ML
50 INJECTION, SOLUTION INTRAMUSCULAR; INTRAVENOUS
Status: COMPLETED | OUTPATIENT
Start: 2022-08-14 | End: 2022-08-14

## 2022-08-14 RX ORDER — KETAMINE HCL IN 0.9 % NACL 50 MG/5 ML
SYRINGE (ML) INTRAVENOUS
Status: COMPLETED
Start: 2022-08-14 | End: 2022-08-14

## 2022-08-14 RX ORDER — BISACODYL 10 MG
10 SUPPOSITORY, RECTAL RECTAL DAILY PRN
Status: DISCONTINUED | OUTPATIENT
Start: 2022-08-15 | End: 2022-08-16 | Stop reason: HOSPADM

## 2022-08-14 RX ORDER — POLYETHYLENE GLYCOL 3350 17 G/17G
17 POWDER, FOR SOLUTION ORAL DAILY
Status: DISCONTINUED | OUTPATIENT
Start: 2022-08-15 | End: 2022-08-15

## 2022-08-14 RX ORDER — TALC
6 POWDER (GRAM) TOPICAL NIGHTLY PRN
Status: DISCONTINUED | OUTPATIENT
Start: 2022-08-15 | End: 2022-08-16 | Stop reason: HOSPADM

## 2022-08-14 RX ORDER — OXYCODONE HYDROCHLORIDE 5 MG/1
5 TABLET ORAL EVERY 6 HOURS PRN
Status: DISCONTINUED | OUTPATIENT
Start: 2022-08-15 | End: 2022-08-15

## 2022-08-14 RX ORDER — NALOXONE HCL 0.4 MG/ML
0.4 VIAL (ML) INJECTION
Status: DISCONTINUED | OUTPATIENT
Start: 2022-08-15 | End: 2022-08-16 | Stop reason: HOSPADM

## 2022-08-14 RX ORDER — ENOXAPARIN SODIUM 100 MG/ML
40 INJECTION SUBCUTANEOUS EVERY 24 HOURS
Status: DISCONTINUED | OUTPATIENT
Start: 2022-08-15 | End: 2022-08-16 | Stop reason: HOSPADM

## 2022-08-14 RX ORDER — PROMETHAZINE HYDROCHLORIDE 25 MG/1
25 TABLET ORAL EVERY 6 HOURS PRN
Status: DISCONTINUED | OUTPATIENT
Start: 2022-08-15 | End: 2022-08-16 | Stop reason: HOSPADM

## 2022-08-14 RX ORDER — OXYCODONE HYDROCHLORIDE 10 MG/1
10 TABLET ORAL EVERY 6 HOURS PRN
Status: DISCONTINUED | OUTPATIENT
Start: 2022-08-15 | End: 2022-08-15

## 2022-08-14 RX ORDER — KETAMINE HYDROCHLORIDE 10 MG/ML
100 INJECTION, SOLUTION INTRAMUSCULAR; INTRAVENOUS
Status: COMPLETED | OUTPATIENT
Start: 2022-08-14 | End: 2022-08-14

## 2022-08-14 RX ORDER — ONDANSETRON 8 MG/1
8 TABLET, ORALLY DISINTEGRATING ORAL EVERY 8 HOURS PRN
Status: DISCONTINUED | OUTPATIENT
Start: 2022-08-15 | End: 2022-08-16 | Stop reason: HOSPADM

## 2022-08-14 RX ORDER — MORPHINE SULFATE 4 MG/ML
4 INJECTION, SOLUTION INTRAMUSCULAR; INTRAVENOUS
Status: COMPLETED | OUTPATIENT
Start: 2022-08-14 | End: 2022-08-14

## 2022-08-14 RX ADMIN — ONDANSETRON 4 MG: 2 INJECTION INTRAMUSCULAR; INTRAVENOUS at 08:08

## 2022-08-14 RX ADMIN — FENTANYL CITRATE 50 MCG: 50 INJECTION INTRAMUSCULAR; INTRAVENOUS at 08:08

## 2022-08-14 RX ADMIN — SODIUM CHLORIDE 1000 ML: 0.9 INJECTION, SOLUTION INTRAVENOUS at 07:08

## 2022-08-14 RX ADMIN — Medication 100 MG: at 09:08

## 2022-08-14 RX ADMIN — MORPHINE SULFATE 4 MG: 4 INJECTION INTRAVENOUS at 10:08

## 2022-08-15 PROBLEM — E87.20 LACTIC ACIDOSIS: Status: ACTIVE | Noted: 2022-08-15

## 2022-08-15 LAB
ANION GAP SERPL CALC-SCNC: 12 MMOL/L (ref 8–16)
ASCENDING AORTA: 2.84 CM
AV INDEX (PROSTH): 0.86
AV MEAN GRADIENT: 7 MMHG
AV PEAK GRADIENT: 10 MMHG
AV VALVE AREA: 3.02 CM2
AV VELOCITY RATIO: 0.83
BASOPHILS # BLD AUTO: 0.05 K/UL (ref 0–0.2)
BASOPHILS NFR BLD: 0.4 % (ref 0–1.9)
BSA FOR ECHO PROCEDURE: 1.57 M2
BUN SERPL-MCNC: 29 MG/DL (ref 8–23)
CALCIUM SERPL-MCNC: 9.1 MG/DL (ref 8.7–10.5)
CHLORIDE SERPL-SCNC: 104 MMOL/L (ref 95–110)
CK SERPL-CCNC: 42 U/L (ref 20–180)
CO2 SERPL-SCNC: 21 MMOL/L (ref 23–29)
CREAT SERPL-MCNC: 0.9 MG/DL (ref 0.5–1.4)
CV ECHO LV RWT: 0.47 CM
DIFFERENTIAL METHOD: ABNORMAL
DOP CALC AO PEAK VEL: 1.6 M/S
DOP CALC AO VTI: 33.38 CM
DOP CALC LVOT AREA: 3.5 CM2
DOP CALC LVOT DIAMETER: 2.12 CM
DOP CALC LVOT PEAK VEL: 1.33 M/S
DOP CALC LVOT STROKE VOLUME: 100.83 CM3
DOP CALCLVOT PEAK VEL VTI: 28.58 CM
E WAVE DECELERATION TIME: 228.2 MSEC
E/A RATIO: 0.98
E/E' RATIO: 11.25 M/S
ECHO LV POSTERIOR WALL: 0.88 CM (ref 0.6–1.1)
EJECTION FRACTION: 65 %
EOSINOPHIL # BLD AUTO: 0 K/UL (ref 0–0.5)
EOSINOPHIL NFR BLD: 0.3 % (ref 0–8)
ERYTHROCYTE [DISTWIDTH] IN BLOOD BY AUTOMATED COUNT: 13.9 % (ref 11.5–14.5)
EST. GFR  (NO RACE VARIABLE): >60 ML/MIN/1.73 M^2
ESTIMATED AVG GLUCOSE: 82 MG/DL (ref 68–131)
FRACTIONAL SHORTENING: 40 % (ref 28–44)
GLUCOSE SERPL-MCNC: 79 MG/DL (ref 70–110)
HBA1C MFR BLD: 4.5 % (ref 4–5.6)
HCT VFR BLD AUTO: 38.8 % (ref 37–48.5)
HGB BLD-MCNC: 12.7 G/DL (ref 12–16)
IMM GRANULOCYTES # BLD AUTO: 0.05 K/UL (ref 0–0.04)
IMM GRANULOCYTES NFR BLD AUTO: 0.4 % (ref 0–0.5)
INTERVENTRICULAR SEPTUM: 0.98 CM (ref 0.6–1.1)
LA MAJOR: 4.33 CM
LA MINOR: 4.6 CM
LA WIDTH: 3.35 CM
LACTATE SERPL-SCNC: 1.2 MMOL/L (ref 0.5–2.2)
LEFT ATRIUM SIZE: 3.16 CM
LEFT ATRIUM VOLUME INDEX MOD: 19.7 ML/M2
LEFT ATRIUM VOLUME INDEX: 25.4 ML/M2
LEFT ATRIUM VOLUME MOD: 31.15 CM3
LEFT ATRIUM VOLUME: 40.14 CM3
LEFT INTERNAL DIMENSION IN SYSTOLE: 2.26 CM (ref 2.1–4)
LEFT VENTRICLE DIASTOLIC VOLUME INDEX: 38.56 ML/M2
LEFT VENTRICLE DIASTOLIC VOLUME: 60.93 ML
LEFT VENTRICLE MASS INDEX: 66 G/M2
LEFT VENTRICLE SYSTOLIC VOLUME INDEX: 11 ML/M2
LEFT VENTRICLE SYSTOLIC VOLUME: 17.42 ML
LEFT VENTRICULAR INTERNAL DIMENSION IN DIASTOLE: 3.77 CM (ref 3.5–6)
LEFT VENTRICULAR MASS: 104.49 G
LV LATERAL E/E' RATIO: 10 M/S
LV SEPTAL E/E' RATIO: 12.86 M/S
LYMPHOCYTES # BLD AUTO: 3.9 K/UL (ref 1–4.8)
LYMPHOCYTES NFR BLD: 31.4 % (ref 18–48)
MAGNESIUM SERPL-MCNC: 2 MG/DL (ref 1.6–2.6)
MCH RBC QN AUTO: 32.3 PG (ref 27–31)
MCHC RBC AUTO-ENTMCNC: 32.7 G/DL (ref 32–36)
MCV RBC AUTO: 99 FL (ref 82–98)
MONOCYTES # BLD AUTO: 0.7 K/UL (ref 0.3–1)
MONOCYTES NFR BLD: 6 % (ref 4–15)
MV PEAK A VEL: 0.92 M/S
MV PEAK E VEL: 0.9 M/S
MV STENOSIS PRESSURE HALF TIME: 66.18 MS
MV VALVE AREA P 1/2 METHOD: 3.32 CM2
NEUTROPHILS # BLD AUTO: 7.7 K/UL (ref 1.8–7.7)
NEUTROPHILS NFR BLD: 61.5 % (ref 38–73)
NRBC BLD-RTO: 0 /100 WBC
PISA TR MAX VEL: 2.07 M/S
PLATELET # BLD AUTO: 278 K/UL (ref 150–450)
PMV BLD AUTO: 10.1 FL (ref 9.2–12.9)
POCT GLUCOSE: 106 MG/DL (ref 70–110)
POCT GLUCOSE: 84 MG/DL (ref 70–110)
POCT GLUCOSE: 99 MG/DL (ref 70–110)
POTASSIUM SERPL-SCNC: 3.9 MMOL/L (ref 3.5–5.1)
RA MAJOR: 4.2 CM
RA PRESSURE: 8 MMHG
RA WIDTH: 3.25 CM
RBC # BLD AUTO: 3.93 M/UL (ref 4–5.4)
RIGHT VENTRICULAR END-DIASTOLIC DIMENSION: 3.12 CM
RV TISSUE DOPPLER FREE WALL SYSTOLIC VELOCITY 1 (APICAL 4 CHAMBER VIEW): 18.85 CM/S
SARS-COV-2 RDRP RESP QL NAA+PROBE: NEGATIVE
SINUS: 2.9 CM
SODIUM SERPL-SCNC: 137 MMOL/L (ref 136–145)
STJ: 2.24 CM
TDI LATERAL: 0.09 M/S
TDI SEPTAL: 0.07 M/S
TDI: 0.08 M/S
TR MAX PG: 17 MMHG
TRICUSPID ANNULAR PLANE SYSTOLIC EXCURSION: 2.22 CM
TSH SERPL DL<=0.005 MIU/L-ACNC: 1.61 UIU/ML (ref 0.4–4)
TV REST PULMONARY ARTERY PRESSURE: 25 MMHG
WBC # BLD AUTO: 12.43 K/UL (ref 3.9–12.7)

## 2022-08-15 PROCEDURE — 94761 N-INVAS EAR/PLS OXIMETRY MLT: CPT

## 2022-08-15 PROCEDURE — 99226 PR SUBSEQUENT OBSERVATION CARE,LEVEL III: ICD-10-PCS | Mod: ,,, | Performed by: PHYSICIAN ASSISTANT

## 2022-08-15 PROCEDURE — 96372 THER/PROPH/DIAG INJ SC/IM: CPT | Mod: 59 | Performed by: PHYSICIAN ASSISTANT

## 2022-08-15 PROCEDURE — 83735 ASSAY OF MAGNESIUM: CPT | Performed by: PHYSICIAN ASSISTANT

## 2022-08-15 PROCEDURE — 80048 BASIC METABOLIC PNL TOTAL CA: CPT | Performed by: PHYSICIAN ASSISTANT

## 2022-08-15 PROCEDURE — 82962 GLUCOSE BLOOD TEST: CPT | Mod: 91

## 2022-08-15 PROCEDURE — 99226 PR SUBSEQUENT OBSERVATION CARE,LEVEL III: CPT | Mod: ,,, | Performed by: PHYSICIAN ASSISTANT

## 2022-08-15 PROCEDURE — 96376 TX/PRO/DX INJ SAME DRUG ADON: CPT | Mod: 59

## 2022-08-15 PROCEDURE — 96374 THER/PROPH/DIAG INJ IV PUSH: CPT | Mod: 59

## 2022-08-15 PROCEDURE — 27788 TREATMENT OF ANKLE FRACTURE: CPT | Mod: RT

## 2022-08-15 PROCEDURE — 83036 HEMOGLOBIN GLYCOSYLATED A1C: CPT | Performed by: PHYSICIAN ASSISTANT

## 2022-08-15 PROCEDURE — 63600175 PHARM REV CODE 636 W HCPCS

## 2022-08-15 PROCEDURE — 63600175 PHARM REV CODE 636 W HCPCS: Performed by: PHYSICIAN ASSISTANT

## 2022-08-15 PROCEDURE — 96361 HYDRATE IV INFUSION ADD-ON: CPT

## 2022-08-15 PROCEDURE — 97530 THERAPEUTIC ACTIVITIES: CPT

## 2022-08-15 PROCEDURE — 83605 ASSAY OF LACTIC ACID: CPT | Performed by: PHYSICIAN ASSISTANT

## 2022-08-15 PROCEDURE — 25000003 PHARM REV CODE 250: Performed by: PHYSICIAN ASSISTANT

## 2022-08-15 PROCEDURE — 99900035 HC TECH TIME PER 15 MIN (STAT)

## 2022-08-15 PROCEDURE — U0002 COVID-19 LAB TEST NON-CDC: HCPCS | Performed by: EMERGENCY MEDICINE

## 2022-08-15 PROCEDURE — 97165 OT EVAL LOW COMPLEX 30 MIN: CPT

## 2022-08-15 PROCEDURE — 96376 TX/PRO/DX INJ SAME DRUG ADON: CPT

## 2022-08-15 PROCEDURE — 82550 ASSAY OF CK (CPK): CPT

## 2022-08-15 PROCEDURE — G0378 HOSPITAL OBSERVATION PER HR: HCPCS

## 2022-08-15 PROCEDURE — 25000003 PHARM REV CODE 250

## 2022-08-15 PROCEDURE — 84443 ASSAY THYROID STIM HORMONE: CPT | Performed by: PHYSICIAN ASSISTANT

## 2022-08-15 PROCEDURE — 85025 COMPLETE CBC W/AUTO DIFF WBC: CPT | Performed by: PHYSICIAN ASSISTANT

## 2022-08-15 PROCEDURE — 97162 PT EVAL MOD COMPLEX 30 MIN: CPT

## 2022-08-15 RX ORDER — ACETAMINOPHEN 500 MG
1000 TABLET ORAL EVERY 8 HOURS
Status: DISCONTINUED | OUTPATIENT
Start: 2022-08-15 | End: 2022-08-16 | Stop reason: HOSPADM

## 2022-08-15 RX ORDER — HYDROMORPHONE HYDROCHLORIDE 1 MG/ML
0.5 INJECTION, SOLUTION INTRAMUSCULAR; INTRAVENOUS; SUBCUTANEOUS ONCE
Status: COMPLETED | OUTPATIENT
Start: 2022-08-15 | End: 2022-08-15

## 2022-08-15 RX ORDER — METHOCARBAMOL 500 MG/1
500 TABLET, FILM COATED ORAL 4 TIMES DAILY
Status: DISCONTINUED | OUTPATIENT
Start: 2022-08-15 | End: 2022-08-15

## 2022-08-15 RX ORDER — CELECOXIB 100 MG/1
100 CAPSULE ORAL DAILY
Status: DISCONTINUED | OUTPATIENT
Start: 2022-08-15 | End: 2022-08-16 | Stop reason: HOSPADM

## 2022-08-15 RX ORDER — METHOCARBAMOL 500 MG/1
1000 TABLET, FILM COATED ORAL 4 TIMES DAILY
Status: DISCONTINUED | OUTPATIENT
Start: 2022-08-15 | End: 2022-08-16 | Stop reason: HOSPADM

## 2022-08-15 RX ORDER — HYDROCHLOROTHIAZIDE 12.5 MG/1
12.5 TABLET ORAL 2 TIMES DAILY
Status: DISCONTINUED | OUTPATIENT
Start: 2022-08-15 | End: 2022-08-16 | Stop reason: HOSPADM

## 2022-08-15 RX ORDER — HYDROMORPHONE HYDROCHLORIDE 1 MG/ML
1 INJECTION, SOLUTION INTRAMUSCULAR; INTRAVENOUS; SUBCUTANEOUS EVERY 4 HOURS PRN
Status: DISCONTINUED | OUTPATIENT
Start: 2022-08-15 | End: 2022-08-16 | Stop reason: HOSPADM

## 2022-08-15 RX ORDER — GABAPENTIN 600 MG/1
600 TABLET ORAL 3 TIMES DAILY PRN
COMMUNITY

## 2022-08-15 RX ORDER — OXYCODONE HYDROCHLORIDE 5 MG/1
5 TABLET ORAL EVERY 4 HOURS PRN
Status: DISCONTINUED | OUTPATIENT
Start: 2022-08-15 | End: 2022-08-16 | Stop reason: HOSPADM

## 2022-08-15 RX ORDER — HYDROMORPHONE HYDROCHLORIDE 1 MG/ML
0.5 INJECTION, SOLUTION INTRAMUSCULAR; INTRAVENOUS; SUBCUTANEOUS EVERY 4 HOURS PRN
Status: DISCONTINUED | OUTPATIENT
Start: 2022-08-15 | End: 2022-08-15

## 2022-08-15 RX ORDER — TIZANIDINE 4 MG/1
8 TABLET ORAL 2 TIMES DAILY PRN
COMMUNITY
Start: 2022-08-05

## 2022-08-15 RX ORDER — CLONAZEPAM 0.5 MG/1
1 TABLET ORAL 2 TIMES DAILY
Status: DISCONTINUED | OUTPATIENT
Start: 2022-08-15 | End: 2022-08-16 | Stop reason: HOSPADM

## 2022-08-15 RX ORDER — OXYCODONE HYDROCHLORIDE 10 MG/1
10 TABLET ORAL EVERY 4 HOURS PRN
Status: DISCONTINUED | OUTPATIENT
Start: 2022-08-15 | End: 2022-08-16 | Stop reason: HOSPADM

## 2022-08-15 RX ORDER — LOPERAMIDE HYDROCHLORIDE 2 MG/1
2 CAPSULE ORAL 2 TIMES DAILY PRN
Status: DISCONTINUED | OUTPATIENT
Start: 2022-08-15 | End: 2022-08-16 | Stop reason: HOSPADM

## 2022-08-15 RX ORDER — SODIUM CHLORIDE 9 MG/ML
INJECTION, SOLUTION INTRAVENOUS CONTINUOUS
Status: DISCONTINUED | OUTPATIENT
Start: 2022-08-15 | End: 2022-08-15

## 2022-08-15 RX ORDER — LISINOPRIL 20 MG/1
20 TABLET ORAL 2 TIMES DAILY
Status: DISCONTINUED | OUTPATIENT
Start: 2022-08-15 | End: 2022-08-16 | Stop reason: HOSPADM

## 2022-08-15 RX ORDER — HYDROCODONE BITARTRATE AND ACETAMINOPHEN 7.5; 325 MG/1; MG/1
1 TABLET ORAL EVERY 4 HOURS PRN
Status: ON HOLD | COMMUNITY
Start: 2022-08-09 | End: 2022-08-16 | Stop reason: SDUPTHER

## 2022-08-15 RX ADMIN — Medication 6 MG: at 01:08

## 2022-08-15 RX ADMIN — HYDROCHLOROTHIAZIDE 12.5 MG: 12.5 TABLET ORAL at 08:08

## 2022-08-15 RX ADMIN — ACETAMINOPHEN 1000 MG: 500 TABLET ORAL at 05:08

## 2022-08-15 RX ADMIN — OXYCODONE HYDROCHLORIDE 10 MG: 10 TABLET ORAL at 09:08

## 2022-08-15 RX ADMIN — CLONAZEPAM 1 MG: 0.5 TABLET ORAL at 12:08

## 2022-08-15 RX ADMIN — ACETAMINOPHEN 1000 MG: 500 TABLET ORAL at 02:08

## 2022-08-15 RX ADMIN — OXYCODONE 5 MG: 5 TABLET ORAL at 01:08

## 2022-08-15 RX ADMIN — ENOXAPARIN SODIUM 40 MG: 100 INJECTION SUBCUTANEOUS at 05:08

## 2022-08-15 RX ADMIN — METHOCARBAMOL 500 MG: 500 TABLET ORAL at 12:08

## 2022-08-15 RX ADMIN — ACETAMINOPHEN 1000 MG: 500 TABLET ORAL at 09:08

## 2022-08-15 RX ADMIN — CELECOXIB 100 MG: 100 CAPSULE ORAL at 01:08

## 2022-08-15 RX ADMIN — HYDROMORPHONE HYDROCHLORIDE 1 MG: 1 INJECTION, SOLUTION INTRAMUSCULAR; INTRAVENOUS; SUBCUTANEOUS at 11:08

## 2022-08-15 RX ADMIN — SODIUM CHLORIDE: 0.9 INJECTION, SOLUTION INTRAVENOUS at 01:08

## 2022-08-15 RX ADMIN — OXYCODONE HYDROCHLORIDE 10 MG: 10 TABLET ORAL at 05:08

## 2022-08-15 RX ADMIN — LISINOPRIL 20 MG: 20 TABLET ORAL at 08:08

## 2022-08-15 RX ADMIN — CLONAZEPAM 1 MG: 0.5 TABLET ORAL at 08:08

## 2022-08-15 RX ADMIN — HYDROMORPHONE HYDROCHLORIDE 0.5 MG: 1 INJECTION, SOLUTION INTRAMUSCULAR; INTRAVENOUS; SUBCUTANEOUS at 02:08

## 2022-08-15 RX ADMIN — HYDROMORPHONE HYDROCHLORIDE 0.5 MG: 1 INJECTION, SOLUTION INTRAMUSCULAR; INTRAVENOUS; SUBCUTANEOUS at 09:08

## 2022-08-15 RX ADMIN — METHOCARBAMOL 500 MG: 500 TABLET ORAL at 09:08

## 2022-08-15 RX ADMIN — THERA TABS 1 TABLET: TAB at 09:08

## 2022-08-15 RX ADMIN — METHOCARBAMOL 1000 MG: 500 TABLET ORAL at 05:08

## 2022-08-15 RX ADMIN — HYDROMORPHONE HYDROCHLORIDE 0.5 MG: 1 INJECTION, SOLUTION INTRAMUSCULAR; INTRAVENOUS; SUBCUTANEOUS at 11:08

## 2022-08-15 RX ADMIN — MORPHINE SULFATE 4 MG: 4 INJECTION INTRAVENOUS at 12:08

## 2022-08-15 RX ADMIN — OXYCODONE HYDROCHLORIDE 10 MG: 10 TABLET ORAL at 12:08

## 2022-08-15 RX ADMIN — METHOCARBAMOL 1000 MG: 500 TABLET ORAL at 08:08

## 2022-08-15 RX ADMIN — CLONAZEPAM 1 MG: 0.5 TABLET ORAL at 09:08

## 2022-08-15 RX ADMIN — OXYCODONE HYDROCHLORIDE 10 MG: 10 TABLET ORAL at 07:08

## 2022-08-15 RX ADMIN — HYDROMORPHONE HYDROCHLORIDE 0.5 MG: 1 INJECTION, SOLUTION INTRAMUSCULAR; INTRAVENOUS; SUBCUTANEOUS at 03:08

## 2022-08-15 RX ADMIN — LOPERAMIDE HYDROCHLORIDE 2 MG: 2 CAPSULE ORAL at 09:08

## 2022-08-15 NOTE — PROGRESS NOTES
[facility] - Emergency Dept  Hospital Medicine  Progress Note    Patient Name: Juan Antonio Rowe  MRN: [id number]  Patient Class: OP- Observation   Admission Date: 8/14/2022  Length of Stay: 0 days  Attending Physician: Ana Whitlock MD  Primary Care Provider: Brendon Hardy MD        Subjective:     Principal Problem:Pre-syncope        HPI:  Juan Antonio Rowe is a 71 y.o. female with a PMHx of perforated gastric ulcer and colon ischemia s/p ex lap with duodenal ulcer repair and right colectomy, and end ileostomy creation in Jan 2021 and ilestomy reversal (ileosigmoid anastomosis) October 2021, anxiety, HLD, HTN, recent S[place] [facility] [place] s/p [place]. Patient got up quickly from a seated position causing her to become lightheaded/ dizzy, and she subsequently twisted and fell onto her right ankle. No actual LOC or head trauma.She endorses immediate pain to her R ankle and inability to ambulate after the incident. She occasionally gets lightheaded when she stands up too quickly, endorses good PO intake recently. She continues to have severe pain in the Ed despite multiple doses of pain medications. Denies fever, chills, N/V, abdominal pain, HA, vision changes, numbness/ tingling, or new back pain.     ED: hypotension to BP 79/43 which improved to SBP 130s s/p 1L IVFs. BP then hypertensive to 219/95 which improved with pain control. No leukocytosis. Lactate 4.5>> 1.79 s/p IVFs. Plain films show right patel B fibula fracture with lateral subluxation of the talus. Ortho consulted and fracture reduced at bedside in the ED.       Overview/Hospital Course:  71 year old female admitted to observation for right ankle fracture secondary to pre-syncope and fall. Hypotensive in the ED given fluids with improvement. X ray showing right patel B spiral fracture of the distal fibula with distal fragment laterally displaced, manually replaced per ortho in the ED. CMP initially showing GABBI, improved after fluid  "resuscitation. Patient with poor pain control on IV and PO pain medications.       Interval History: NAEON. Patient evaluated at bedside. Hypertensive this morning, likely secondary to pain. Increased methocarbamol 1000 mg QID, and frequency of pain meds. Reports 9/10 pain to right ankle, improved to 7-8/10 with current pain control regimen. Patient endorsing decreased appetite 2/2 grief from recent death of cousin, which also brings up feelings of grief related to her 's death 4 years ago. No SI, or plans of self harm; "I would never hurt myself I am [employer]". GABBI resolved, restarted home BP meds.    Review of Systems   Constitutional:  Negative for activity change, chills and fever.   HENT:  Negative for trouble swallowing.    Eyes:  Negative for photophobia and visual disturbance.   Respiratory:  Negative for cough, chest tightness and shortness of breath.    Cardiovascular:  Negative for chest pain, palpitations and leg swelling.   Gastrointestinal:  Negative for abdominal pain, constipation, diarrhea, nausea and vomiting.   Genitourinary:  Negative for dysuria, frequency and hematuria.   Musculoskeletal:  Positive for arthralgias (right ankle, throbbing). Negative for back pain, gait problem and neck pain.   Skin:  Negative for rash and wound.   Neurological:  Negative for dizziness, syncope, speech difficulty and light-headedness.   Psychiatric/Behavioral:  Negative for agitation and confusion. The patient is not nervous/anxious.    All other systems reviewed and are negative.  Objective:     Vital Signs (Most Recent):  Temp: 98.3 °F (36.8 °C) (08/15/22 1201)  Pulse: 85 (08/15/22 1201)  Resp: 20 (08/15/22 1201)  BP: (!) 169/74 (08/15/22 1201)  SpO2: 99 % (08/15/22 1201)   Vital Signs (24h Range):  Temp:  [98 °F (36.7 °C)-98.4 °F (36.9 °C)] 98.3 °F (36.8 °C)  Pulse:  [] 85  Resp:  [13-39] 20  SpO2:  [94 %-100 %] 99 %  BP: ()/() 169/74     Weight: 54.9 kg (121 lb)  Body mass index is " 20.77 kg/m².    Intake/Output Summary (Last 24 hours) at 8/15/2022 1239  Last data filed at 8/14/2022 2028  Gross per 24 hour   Intake 999 ml   Output --   Net 999 ml      Physical Exam  Vitals and nursing note reviewed.   Constitutional:       General: She is not in acute distress.     Appearance: She is well-developed and normal weight. She is not toxic-appearing.      Comments: Uncomfortable appearing   HENT:      Head: Normocephalic and atraumatic.      Mouth/Throat:      Mouth: Mucous membranes are moist.      Pharynx: No oropharyngeal exudate.   Eyes:      Conjunctiva/sclera: Conjunctivae normal.      Pupils: Pupils are equal, round, and reactive to light.   Cardiovascular:      Rate and Rhythm: Normal rate and regular rhythm.      Pulses: Normal pulses.      Heart sounds: Normal heart sounds. No murmur heard.    No gallop.   Pulmonary:      Effort: Pulmonary effort is normal. No respiratory distress.      Breath sounds: Normal breath sounds. No wheezing.   Abdominal:      General: Bowel sounds are normal. There is no distension.      Palpations: Abdomen is soft.      Tenderness: There is no abdominal tenderness.   Musculoskeletal:      Cervical back: Normal range of motion and neck supple.      Comments: Right ankle and foot immobilized in splint   Lymphadenopathy:      Cervical: No cervical adenopathy.   Skin:     General: Skin is warm and dry.      Capillary Refill: Capillary refill takes less than 2 seconds.      Findings: No rash.   Neurological:      General: No focal deficit present.      Mental Status: She is alert and oriented to person, place, and time.      Cranial Nerves: No cranial nerve deficit.      Sensory: Sensation is intact. No sensory deficit.      Motor: No weakness.      Coordination: Coordination normal.   Psychiatric:         Behavior: Behavior normal.         Thought Content: Thought content normal.         Judgment: Judgment normal.       Significant Labs: All pertinent labs within  the past 24 hours have been reviewed.  CBC:   Recent Labs   Lab 08/14/22  1933 08/15/22  0322   WBC 6.89 12.43   HGB 13.3 12.7   HCT 39.7 38.8    278     CMP:   Recent Labs   Lab 08/14/22  1933 08/15/22  0322    137   K 4.5 3.9    104   CO2 19* 21*   GLU 79 79   BUN 33* 29*   CREATININE 1.5* 0.9   CALCIUM 8.9 9.1   PROT 6.3  --    ALBUMIN 3.7  --    BILITOT 0.7  --    ALKPHOS 78  --    AST 30  --    ALT 35  --    ANIONGAP 14 12       Significant Imaging: I have reviewed all pertinent imaging results/findings within the past 24 hours.      Assessment/Plan:      * Pre-syncope  Hypotension   - suspect 2/2 hypotension/ hypovolemia  - BP improved s/p IV fluids  - check 2D echo   - no acute structural changes   - orthostatic vitals q shift  - telemetry     Lactic acidosis  - lactic 4.4 on admit, suspect 2/2 hypoperfusion from hypotension/ hypovolemia  - afebrile without leukocytosis  - infectious work up unremarkable  - improved to 1.7 s/p 1L IVFs    Anxiety  - continue home Klonopin 1mg BID  - patient with grief related to personal loss, would benefit from seeing grief counselor    Closed fracture of distal end of right fibula  - presents w/ R ankle pain s/p fall and found to have patel B fibula fracture with lateral subluxation of the talus on imaging  - ortho consulted; successfully reduced and splinted at bedside  - NWB RLE  - ice and elevate extremity  - multi-modal pain regimen: tylenol 1g q8hr, 100 mg celecoxib QD, [id number] mg QID, oxycodone 5 mg q4h PRN moderate, 10 mg q4h PRN severe, dilaudid 1 mg q4h PRN break through pain  - PT/OT eval- rehab  - patient prefers home with HH  - discharge pending pain control    Hypotension  - likely secondary to hypovolemia, improved  - volume resuscitated with IVFs  - encouraged enteral fluid intake     GABBI (acute kidney injury)  Resolved  - Cr 1.5 >> 0.9 (baseline ~1.0)  - suspect prerenal from hypoperfusion 2/2 hypotension/ hypovolemia  - volume  resuscitated with IVFs  - avoid nephrotoxins, renally dose meds  - trend BMP    Hypertension  - initially hypotensive which improved s/p IVFs, then became hypertensive  - suspect BP elevated in the setting of severe pain  - lisinopril-HCTZ restarted, GABBI resolved  - cardiac diet      VTE Risk Mitigation (From admission, onward)         Ordered     enoxaparin injection 40 mg  Daily         08/14/22 2354     IP VTE HIGH RISK PATIENT  Once         08/14/22 235                Discharge Planning   CATHY: 8/16/2022     Code Status: Full Code   Is the patient medically ready for discharge?: No    Reason for patient still in hospital (select all that apply): Patient trending condition and Treatment                     Mary Velasquez PA-C  Department of Hospital Medicine   [facility] - [facility] Dept

## 2022-08-15 NOTE — ASSESSMENT & PLAN NOTE
- presents w/ R ankle pain s/p fall and found to have patel B fibula fracture with lateral subluxation of the talus on imaging  - ortho consulted; successfully reduced and splinted at bedside  - NWB RLE  - ice and elevate extremity  - multi-modal pain regimen: tylenol 1g q8hr, robaxin 500mg QID, PRN oxy and dilaudid   - PT/OT eval

## 2022-08-15 NOTE — ASSESSMENT & PLAN NOTE
- continue home Klonopin 1mg BID  - patient with grief related to personal loss, would benefit from seeing grief counselor

## 2022-08-15 NOTE — ASSESSMENT & PLAN NOTE
- Cr 1.5, baseline ~1.0  - suspect prerenal from hypoperfusion 2/2 hypotension/ hypovolemia  - IVFs  - avoid nephrotoxins, renally dose meds  - trend BMP

## 2022-08-15 NOTE — ED PROVIDER NOTES
ED Resident HAND-OFF NOTE:  10:05 PM 8/14/2022  Juan Antonio Rowe is a 71 y.o. female who presented to the ED on 8/14/2022 and has been managed by , who reports patient C/O fall. I assumed care of patient from off-going ED physician team at 10:05 PM pending lactate, chest x-ray, UA results.    On my evaluation, Juan Antonio Rowe appears well, hemodynamically stable and in NAD. Thus far, Juan Antonio Rowe has received:  Medications   morphine injection 4 mg (has no administration in time range)   sodium chloride 0.9% bolus 1,000 mL (0 mLs Intravenous Stopped 8/14/22 2028)   ondansetron injection 4 mg (4 mg Intravenous Given 8/14/22 2002)   fentaNYL 50 mcg/mL injection 50 mcg (50 mcg Intravenous Given 8/14/22 2001)   ketamine injection 100 mg (100 mg Intravenous Given 8/14/22 2115)   ketamine in 0.9 % sod chloride 50 mg/5 mL (10 mg/mL) injection (100 mg  Given 8/14/22 2132)   morphine injection 4 mg (4 mg Intravenous Given 8/14/22 2210)       On my exam, I appreciate:  BP (!) 179/58   Pulse 80   Temp 98.4 °F (36.9 °C) (Oral)   Resp 16   Wt 54.9 kg (121 lb)   LMP 01/01/1986   SpO2 98%   BMI 20.77 kg/m²         Disposition: I anticipate patient will be admitted for observation.  I have discussed and counseled Juan Antonio Rowe regarding exam, results, diagnosis, treatment, and plan.  ______________________  Leonie Davenport MD   Emergency Medicine Resident  11:35 PM 8/14/2022      UPDATE:   Evaluated patient, patient complaining of 10/10 pain.  4 mg morphine given.    Lactate, UA, chest x-ray were all normal.  Admit orders placed.      :  Hypotension  Fall  Hypotension, unspecified hypotension type (Primary)  Closed fracture of right ankle, initial encounter       Leonie Davenport MD  Resident  08/15/22 8315

## 2022-08-15 NOTE — ASSESSMENT & PLAN NOTE
- initially hypotensive which improved s/p IVFs, then became hypertensive  - suspect BP elevated in the setting of severe pain  - holding lisinopril-HCTZ  - will avoid anti-hypertensive agents for now and focus on pain control given severity of hypotension on arrival

## 2022-08-15 NOTE — PLAN OF CARE
Patient received on unit from ED via stretcher.  Slint noted to RLE. Able to wiggle toes, denies any numbness or tingling. Good cap refill noted. Toes warm to touch. Will medicate with pain meds as ordered. BP elevated, provider to be notified. Bed locked and in lowest position with call bell within reach.  NAD noted. Will continue monitoring.  Problem: Pain Acute  Goal: Acceptable Pain Control and Functional Ability  Outcome: Ongoing, Progressing       Problem: Fall Injury Risk  Goal: Absence of Fall and Fall-Related Injury  Outcome: Ongoing, Progressing

## 2022-08-15 NOTE — ED NOTES
First pt contact, taking over pt care, pt is sitting upright on stretcher with appearance of no distress. Pt is complaining of right low leg pain, right leg has appearance of splint in place. Distal pms is present to right leg. Pt complains of no other symptoms. Pt states her right leg is broken. Advised by previous rn urine was sent, and straight cath is no longer needed.

## 2022-08-15 NOTE — ED NOTES
Patient identifiers for Juan Antonio Rowe 71 y.o. female checked and correct.  Chief Complaint   Patient presents with    Fall     Fall out of bed. Dizzy. Right ankle deformity     Past Medical History:   Diagnosis Date    Acute gastric ulcer with perforation     Acute kidney failure with tubular necrosis     GABBI (acute kidney injury)     Anxiety     Diarrhea     HLD (hyperlipidemia)     Hypertension     Nausea & vomiting     Peritonitis     PUD (peptic ulcer disease)     Renal disorder     Urine retention     Vascular disorder of intestine, unspecified      Allergies reported: Review of patient's allergies indicates:  No Known Allergies      LOC: Patient is awake, alert, and aware of environment with an appropriate affect. Patient is oriented x 4 and speaking appropriately.  APPEARANCE: Patient resting comfortably and in no acute distress. Patient is clean and well groomed, patient's clothing is properly fastened.  HEENT:   SKIN: The skin is warm and dry. Patient has normal skin turgor and moist mucus membranes.   MUSKULOSKELETAL: Patient is moving all extremities well. Right ankle notably swollen and rotated outwardly Pulses intact.   RESPIRATORY: Airway is open and patent. Respirations are spontaneous and non-labored with normal effort and rate.  CARDIAC: Patient has a normal rate and rhythm.  No peripheral edema noted.   ABDOMEN: No distention noted. Soft and non-tender upon palpation.  NEUROLOGICAL: pupils 3 mm, PERRL. Facial expression is symmetrical. Hand grasps are equal bilaterally. Normal sensation in all extremities when touched with finger.

## 2022-08-15 NOTE — ED NOTES
Pt transported to [facility] with escort.  All belongings sent with pt.  No distress noted upon transfer.

## 2022-08-15 NOTE — ED NOTES
Pt care assumed.  Pt lying on stretcher, AAO x 4.  Pt on cardiac, blood pressure & pulse ox monitoring.  Pt requesting pain meds for 10/10 pain.  Splint noted to right lower leg-neurovascular intact.  Pt updated on plan of care-states understanding.  Pt denies any new c/o at this time.  Call button within reach. Will continue to monitor.

## 2022-08-15 NOTE — PT/OT/SLP EVAL
"Physical Therapy Evaluation and Treatment    Patient Name:  Juan Antonio Rowe   MRN:  [id number]    Recommendations:     Discharge Recommendations:  rehabilitation facility   Discharge Equipment Recommendations:  (TBD)   Barriers to discharge: None    Assessment:     Juan Antonio Rowe is a 71 y.o. female admitted with a medical diagnosis of Pre-syncope.  She presents with the following impairments/functional limitations:  weakness, impaired endurance, impaired functional mobility, gait instability, impaired balance, pain, decreased lower extremity function, orthopedic precautions. Pt performed bed mobility with mod-min assistance for management of NWB of RLE, and sit <> stand with min assistance of 2 persons with bilateral hand held assistance. Pt reports dizziness upon seated EOB which resolved with deep breathing. Pt apprehensive about mobility 2/2 anxiety as seen with pt shakiness with movement, increased motivation required to stand, and per pt report. Pt is functioning below their baseline and is a good candidate for rehab facility to address functional and mobility deficits.    Rehab Prognosis: Good; patient would benefit from acute skilled PT services to address these deficits and reach maximum level of function.    Recent Surgery: * No surgery found *      Plan:     During this hospitalization, patient to be seen 4 x/week to address the identified rehab impairments via gait training, therapeutic activities, therapeutic exercises, neuromuscular re-education and progress toward the following goals:    · Plan of Care Expires:  08/29/22    Subjective     Chief Complaint: Decreased functional mobility  Patient/Family Comments/goals: " You have to be careful with my leg"  Pain/Comfort:  Pain Rating 1: 10/10  Location - Side 1: Right  Location - Orientation 1: lower  Location 1: ankle  Pain Addressed 1: Pre-medicate for activity, Distraction, Cessation of Activity  Pain Rating Post-Intervention 1: 10/10     Patients " cultural, spiritual, Anglican conflicts given the current situation: no    Living Environment:  Pt lives alone in two story home with a flight of stairs to enter. Pt has a bathroom/shower combo with a detachable shower railing.  Prior to admission, patients level of function was Independent.  Equipment used at home: none.  DME owned (not currently used): rolling walker, single point cane and shower chair.  Upon discharge, patient will have assistance from brother to check in.    Objective:     Communicated with RN prior to session.  Patient found supine with peripheral IV, telemetry, pulse ox (continuous)  upon PT entry to room.    General Precautions: Standard, fall   Orthopedic Precautions:RLE non weight bearing   Braces:    Respiratory Status: Room air    Exams:  · Cognitive Exam:  Patient is oriented to Person, Place, Time and Situation  · Sensation:    · -       Intact  · RUE ROM: WFL  · RUE Strength: WFL  · LUE ROM: WFL  · LUE Strength: WFL  · RLE ROM: WFL at hip and knee, not tested at ankle 2/2 WB precautions  · RLE Strength: not fully assessed 2/2 NWB status  · LLE ROM: WFL  · LLE Strength: WFL    Functional Mobility:  · Bed Mobility:     · Rolling Left:  minimum assistance to RLE  · Scooting: supervision scooting forward and scooting up in bed with verbal cueing for sequencing and maintaining NWB status   · Supine to Sit: minimum assistance to RLE and trunk positioning. Pt shaky once seated 2/2 apprehension with mobility  · Sit to Supine: moderate assistance to RLE and trunk positioning  · Transfers:     · Sit to Stand:  minimum assistance and of 2 persons with hand-held assist with encouragement to initiate movement. Pt compensated with posterior lean EOB. Verbal cueing to maintain NWB status.  · Stand to Sit:  minimum assistance and of 2 persons with hand-held assist and verbal cueing for controlled descent and WB precautions  · Lateral scooting: Pt scooted twice with LLE EOB anteriorly with bilateral  hand held assist with CGA.    Therapeutic Activities and Exercises:   Pt performed static sitting balance with CGA EOB. Pt performed static standing balance with bilateral hand held assistance with CGA at EOB for 1 minute.    Pt educated on role of PT and POC.    AM-PAC 6 CLICK MOBILITY  Total Score:15     Patient left supine with all lines intact and call button in reach.    GOALS:   Multidisciplinary Problems     Physical Therapy Goals        Problem: Physical Therapy    Goal Priority Disciplines Outcome Goal Variances Interventions   Physical Therapy Goal     PT, PT/OT Ongoing, Progressing     Description: Goals to be met by: 2022    Patient will increase functional independence with mobility by performin. Supine to sit with Modified Page   2. Sit to supine with Modified Page using Rolling Walker  3. Rolling to Right with Modified Page.  4. Sit to stand transfer with Modified Page using Rolling Walker  5. Gait  x 50 feet with Contact Guard Assistance using Rolling Walker.   6. Ascend/descend 12 stair with bilateral Handrails Contact Guard Assistance.                     History:     Past Medical History:   Diagnosis Date    Acute gastric ulcer with perforation     Acute kidney failure with tubular necrosis     GABBI (acute kidney injury)     Anxiety     Diarrhea     HLD (hyperlipidemia)     Hypertension     Nausea & vomiting     Peritonitis     PUD (peptic ulcer disease)     Renal disorder     Urine retention     Vascular disorder of intestine, unspecified        Past Surgical History:   Procedure Laterality Date    APPENDECTOMY      APPLICATION OF WOUND VACUUM-ASSISTED CLOSURE DEVICE  2021    Procedure: APPLICATION, WOUND VAC;  Surgeon: Abel Francis MD;  Location: [facility] OR [address] [place] [address];  Service: General;;    APPLICATION OF WOUND VACUUM-ASSISTED CLOSURE DEVICE  2021    Procedure: APPLICATION, WOUND VAC;  Surgeon: Abel Francis MD;   [facility];  Service: General;;    CLOSURE OF PERFORATED ULCER OF DUODENUM USING OMENTAL PATCH  1/21/2021    Procedure: CLOSURE, ULCER, PERFORATED, DUODENUM, USING OMENTAL PATCH;  Surgeon: Abel Francis MD;  [facility];  Service: General;;    COLECTOMY, RIGHT  1/21/2021    Procedure: COLECTOMY, RIGHT;  Surgeon: Abel Francis MD;  [facility];  Service: General;;    cyst removed from neck      GASTROSTOMY  1/25/2021    Procedure: GASTROSTOMY;  Surgeon: Abel Francis MD;  Location: [facility] OR [address] [place] [address];  Service: General;;    HYSTERECTOMY  1986    ILEOSTOMY CLOSURE  10/13/2021    Procedure: CLOSURE, ILEOSTOMY;  Surgeon: Abel Francis MD;  Location: [facility] OR [address] [place] [address];  Service: General;;    LYSIS OF ADHESIONS  10/13/2021    Procedure: LYSIS, ADHESIONS;  Surgeon: Abel Francis MD;  Location: [address];  Service: General;;    OOPHORECTOMY Bilateral 1989    OPEN REDUCTION AND INTERNAL FIXATION (ORIF) OF LISFRANC INJURY OF FOOT Left 10/11/2019    Procedure: ORIF, LISFRANC INJURY, FOOT;  Surgeon: Ferdinand Stauffer DPM;  Location: [address];  Service: Podiatry;  Laterality: Left;    TONSILLECTOMY, ADENOIDECTOMY         Time Tracking:     PT Received On: 08/15/22  PT Start Time: 1314     PT Stop Time: 1337  PT Total Time (min): 23 min     Billable Minutes: Evaluation 8 and Therapeutic Activity 15      08/15/2022

## 2022-08-15 NOTE — HOSPITAL COURSE
71 year old female admitted to observation for right ankle fracture secondary to pre-syncope and fall. Hypotensive in the ED given fluids with improvement. X ray showing right patel B spiral fracture of the distal fibula with distal fragment laterally displaced, manually replaced per ortho in the ED. CMP initially showing GABBI, improved after fluid resuscitation. Patient with poor pain control on IV and PO pain medications.  PT and OT consulted.  Patient was recommended for inpatient rehab out however decline.  Patient wishes to go home.  PT recommended walker.  Patient received meds for pain control.  She is medically stable for discharge with outpatient follow-up with Ortho.

## 2022-08-15 NOTE — PT/OT/SLP EVAL
"Occupational Therapy  Co- Evaluation    Name: Juan Antonio Rowe  MRN: [id number]  Admitting Diagnosis:  Pre-syncope  Pt is s/p fall in the home with R Gonsalez fracture with lateral subluxation of the talus. It was reduced and splinted at the bedside in ED.     Recommendations:     Discharge Recommendations: rehabilitation facility      Assessment:     Juan Antonio Rowe is a 71 y.o. female with a medical diagnosis of Pre-syncope.   Performance deficits affecting function: weakness, impaired endurance, impaired self care skills, impaired functional mobility, gait instability, impaired balance, pain.    Pt tolerated session fairly well. Pt with increased pain and anxiety throughout session. Pt lives alone and anticipate she is not ready for d/c home alone at this time. Pt to benefit from post acute therapy prior to return home.     Rehab Prognosis: Good; patient would benefit from acute skilled OT services to address these deficits and reach maximum level of function.       Plan:     Patient to be seen 4 x/week to address the above listed problems via self-care/home management, therapeutic activities, therapeutic exercises  · Plan of Care Expires:    · Plan of Care Reviewed with: patient    Subjective     "i'm scared" pt states.  "you have to be very careful with my foot. I hurts" pt states.   Occupational Profile:  Pt resides in 2 story apartment. Pt was independent prior to arrival. Pt reports her brother owns the apartment and can come check on her but otherwise she has limited family support.   Pt has RW, SPC which she does not use. Pt has TTB in walk in shower with grab bars which she does use.   Pt drives and is retired.     Pain/Comfort:  · Pain Rating 1: 10/10  · Location - Side 1: Right  · Location 1: ankle  · Pain Addressed 1: Reposition, Distraction    Patients cultural, spiritual, Voodoo conflicts given the current situation: no    Objective:     Communicated with: nsg prior to session.  Patient found on stretcher " with tele and pulse ox.   Coeval completed this date optimize functional performance and safety given acuity of medical status.   General Precautions: Standard, fall   Orthopedic Precautions:RLE non weight bearing     Occupational Performance:    Bed Mobility:    Supine>sit with MIN A   Sit>supine with MOD A     Functional Mobility/Transfers:  Sit>stand with MIN A x 2     Activities of Daily Living:  · Feeding: set-up  · G/H: simulated with set-up in supported sitting; pt declined task performance  · LE dressing: TOTAL A     Cognitive/Visual Perceptual:  Pt awake alert and following commands. Pt easily frustrated and somwhat emotional during session. Pt reports anxiety with participation with therapy.   Cues needed for attention to task and minimal cues needed for participation with therapy as able.     Physical Exam:  Pt is right hand dominant and demo WFL UE strength/ROM, coordination and sensaiton.   AMPAC 6 Click ADL:  AMPAC Total Score: 12    Treatment & Education:  Pt demo SBA sitting EOB approx 8 min. One standing trial with MIN A x 2 and unable to take any steps. With cues and initially with assist, pt able to keep right LE in NWB. Pt was using the stretcher on the posterior portion of upper LE's to keep her balance.     Pt noted to be trembling/shaking at times. Pt reports this is due to anxiety. Cues provided re: reassurance and encouragement re: therapy participation and goals.     Education provided re: OT POC and safety with functional mobility/ADl skills.     Patient left supine with all lines intact, call button in reach and nsg notified    GOALS:   Multidisciplinary Problems     Occupational Therapy Goals        Problem: Occupational Therapy    Goal Priority Disciplines Outcome Interventions   Occupational Therapy Goal     OT, PT/OT Ongoing, Progressing    Description: Goals to be met by: 7 days 8/22/22     Patient will increase functional independence with ADLs by performing:    Pt to complete to  t/f commode with supervision.  Pt to complete toileting with supervision.  Pt to complete UE dressing with set-up  Pt to complete LE dressing with SBA with AE as needed.  Pt to complete oral care with set-up seated   Pt to tolerated OOB to chair 3-4 hours daily to increase endurance for functional activity.                    History:     Past Medical History:   Diagnosis Date    Acute gastric ulcer with perforation     Acute kidney failure with tubular necrosis     GABBI (acute kidney injury)     Anxiety     Diarrhea     HLD (hyperlipidemia)     Hypertension     Nausea & vomiting     Peritonitis     PUD (peptic ulcer disease)     Renal disorder     Urine retention     Vascular disorder of intestine, unspecified        Past Surgical History:   Procedure Laterality Date    APPENDECTOMY      APPLICATION OF WOUND VACUUM-ASSISTED CLOSURE DEVICE  1/21/2021    Procedure: APPLICATION, WOUND VAC;  Surgeon: Abel Francis MD;  Location: [facility] OR [address] [place] [address];  Service: General;;    APPLICATION OF WOUND VACUUM-ASSISTED CLOSURE DEVICE  1/25/2021    Procedure: APPLICATION, WOUND VAC;  Surgeon: Abel Francis MD;  Location: [address];  Service: General;;    CLOSURE OF PERFORATED ULCER OF DUODENUM USING OMENTAL PATCH  1/21/2021    Procedure: CLOSURE, ULCER, PERFORATED, DUODENUM, USING OMENTAL PATCH;  Surgeon: Abel Francis MD;  Location: [address];  Service: General;;    COLECTOMY, RIGHT  1/21/2021    Procedure: COLECTOMY, RIGHT;  Surgeon: Abel Francis MD;  Location: [facility] OR [address] [place] [address];  Service: General;;    cyst removed from neck      GASTROSTOMY  1/25/2021    Procedure: GASTROSTOMY;  Surgeon: Abel Francis MD;  Location: [address];  Service: General;;    HYSTERECTOMY  1986    ILEOSTOMY CLOSURE  10/13/2021    Procedure: CLOSURE, ILEOSTOMY;  Surgeon: Abel Francis MD;  Location: [address];  Service: General;;    LYSIS OF ADHESIONS  10/13/2021    Procedure: LYSIS,  ADHESIONS;  Surgeon: Abel Francis MD;  Location: [facility] OR [address] [facility];  Service: General;;    OOPHORECTOMY Bilateral 1989    OPEN REDUCTION AND INTERNAL FIXATION (ORIF) OF LISFRANC INJURY OF FOOT Left 10/11/2019    Procedure: ORIF, LISFRANC INJURY, FOOT;  Surgeon: Ferdinand Stauffer DPM;  Location: [facility] OR [address] [facility];  Service: Podiatry;  Laterality: Left;    TONSILLECTOMY, ADENOIDECTOMY         Time Tracking:     OT Date of Treatment: 08/15/22  OT Start Time: 1317  OT Stop Time: 1333  OT Total Time (min): 16 min    Billable Minutes:Evaluation 16    8/15/2022

## 2022-08-15 NOTE — PLAN OF CARE
Problem: Occupational Therapy  Goal: Occupational Therapy Goal  Description: Goals to be met by: 7 days 8/22/22     Patient will increase functional independence with ADLs by performing:    Pt to complete to t/f commode with supervision.  Pt to complete toileting with supervision.  Pt to complete UE dressing with set-up  Pt to complete LE dressing with SBA with AE as needed.  Pt to complete oral care with set-up seated   Pt to tolerated OOB to chair 3-4 hours daily to increase endurance for functional activity.   Outcome: Ongoing, Progressing

## 2022-08-15 NOTE — HPI
Juan Antonio Rowe is a 71 y.o. female with PMH significant for previous L lisfranc injury s/p operative fixation in 2019 with Dr. Stauffer, HTN, and SBO presenting with right ankle pain after a fall. Pt states she was rising from a seated position when she twisted her ankle. She had the immediate onset of pain and deformity. She had to scoot down the stairs and then used a boot from a previous injury/encounter onto her right foot before EMS was called. Reports pain over the lateral malleolus. Patient denies any head trauma or LOC. The patient denies prior hx of falls. Patient denies numbness and tingling. Denies any other musculoskeletal pain or injuries. No known history of prior right ankle injury or surgery.  Walks w/out assisted devices at baseline. Doesn't take any anticoagulation at baseline.   They deny IV drug use.  They report tobacco use, 1/2 PPD currently, tobacco use since age 21.   They report occasional alcohol use.   They deny immunosuppressant medications.  They deny chemotherapy.  They deny radiation therapy.   She lives alone at this time.

## 2022-08-15 NOTE — ASSESSMENT & PLAN NOTE
- lactic 4.4 on admit, suspect 2/2 hypoperfusion from hypotension/ hypovolemia  - afebrile without leukocytosis  - infectious work up unremarkable  - improved to 1.7 s/p 1L IVFs

## 2022-08-15 NOTE — H&P
[facility]  [facility] Medicine  History & Physical    Patient Name: Juan Antonio Rowe  MRN: [id number]  Patient Class: OP- Observation  Admission Date: 8/14/2022  Attending Physician: Jaylan Carreon MD   Primary Care Provider: Brendon Hardy MD         Patient information was obtained from patient, past medical records and ER records.     Subjective:     Principal Problem:Pre-syncope    Chief Complaint:   Chief Complaint   Patient presents with    Fall     Fall out of bed. Dizzy. Right ankle deformity        HPI: Juan Antonio Rowe is a 71 y.o. female with a PMHx of perforated gastric ulcer and colon ischemia s/p ex lap with duodenal ulcer repair and right colectomy, and end ileostomy creation in Jan 2021 and ilestomy reversal (ileosigmoid anastomosis) October 2021, anxiety, HLD, HTN, recent SBO who presents to [facility] with right ankle pain s/p fall. Patient got up quickly from a seated position causing her to become lightheaded/ dizzy, and she subsequently twisted and fell onto her right ankle. No actual LOC or head trauma.She endorses immediate pain to her R ankle and inability to ambulate after the incident. She occasionally gets lightheaded when she stands up too quickly, endorses good PO intake recently. She continues to have severe pain in the Ed despite multiple doses of pain medications. Denies fever, chills, N/V, abdominal pain, HA, vision changes, numbness/ tingling, or new back pain.     ED: hypotension to BP 79/43 which improved to SBP 130s s/p 1L IVFs. BP then hypertensive to 219/95 which improved with pain control. No leukocytosis. Lactate 4.5>> 1.79 s/p IVFs. Plain films show right patel B fibula fracture with lateral subluxation of the talus. Ortho consulted and fracture reduced at bedside in the ED.       Past Medical History:   Diagnosis Date    Acute gastric ulcer with perforation     Acute kidney failure with tubular necrosis     GABBI (acute kidney injury)     Anxiety     Diarrhea     HLD  (hyperlipidemia)     Hypertension     Nausea & vomiting     Peritonitis     PUD (peptic ulcer disease)     Renal disorder     Urine retention     Vascular disorder of intestine, unspecified        Past Surgical History:   Procedure Laterality Date    APPENDECTOMY      APPLICATION OF WOUND VACUUM-ASSISTED CLOSURE DEVICE  1/21/2021    Procedure: APPLICATION, WOUND VAC;  Surgeon: Abel Francis MD;  Location: [facility] [address] [place] [address];  Service: General;;    APPLICATION OF WOUND VACUUM-ASSISTED CLOSURE DEVICE  1/25/2021    Procedure: APPLICATION, WOUND VAC;  Surgeon: Abel Francis MD;  Location: [address];  Service: General;;    CLOSURE OF PERFORATED ULCER OF DUODENUM USING OMENTAL PATCH  1/21/2021    Procedure: CLOSURE, ULCER, PERFORATED, DUODENUM, USING OMENTAL PATCH;  Surgeon: Abel Francis MD;  Location: [address];  Service: General;;    COLECTOMY, RIGHT  1/21/2021    Procedure: COLECTOMY, RIGHT;  Surgeon: Abel Francis MD;  Location: [address];  Service: General;;    cyst removed from neck      GASTROSTOMY  1/25/2021    Procedure: GASTROSTOMY;  Surgeon: Abel Francis MD;  Location: [address];  Service: General;;    HYSTERECTOMY  1986    ILEOSTOMY CLOSURE  10/13/2021    Procedure: CLOSURE, ILEOSTOMY;  Surgeon: Abel Francis MD;  Location: [address];  Service: General;;    LYSIS OF ADHESIONS  10/13/2021    Procedure: LYSIS, ADHESIONS;  Surgeon: Abel Francis MD;  Location: [address];  Service: General;;    OOPHORECTOMY Bilateral 1989    OPEN REDUCTION AND INTERNAL FIXATION (ORIF) OF LISFRANC INJURY OF FOOT Left 10/11/2019    Procedure: ORIF, LISFRANC INJURY, FOOT;  Surgeon: Ferdinand Stauffer DPM;  Location: [facility] [address] [place] [address];  Service: Podiatry;  Laterality: Left;    TONSILLECTOMY, ADENOIDECTOMY         Review of patient's allergies indicates:  No Known Allergies    No current facility-administered medications on file prior to encounter.      Current Outpatient Medications on File Prior to Encounter   Medication Sig    acetaminophen (TYLENOL) 325 MG tablet Take 2 tablets (650 mg total) by mouth every 6 (six) hours as needed for Pain.    ascorbic acid, vitamin C, (VITAMIN C) 500 MG tablet Take 500 mg by mouth once daily.     calcium carbonate (OS-TORI) 600 mg (1,500 mg) Tab Take 600 mg by mouth 2 (two) times daily with meals.    clonazePAM (KLONOPIN) 1 MG tablet Take 1 tablet (1 mg total) by mouth 2 (two) times daily as needed for Anxiety. (Patient taking differently: Take 1 mg by mouth 2 (two) times daily. )    gabapentin (NEURONTIN) 300 MG capsule Take 1 capsule (300 mg total) by mouth 3 (three) times daily. for 7 days    lisinopriL-hydrochlorothiazide (PRINZIDE,ZESTORETIC) 20-12.5 mg per tablet Take 1 tablet by mouth once daily.    loperamide (IMODIUM) 2 mg capsule Take 1 capsule (2 mg total) by mouth 2 (two) times a day.    multivitamin (ONE DAILY MULTIVITAMIN) per tablet Take 1 tablet by mouth once daily.    oxyCODONE (ROXICODONE) 5 MG immediate release tablet Take 1 tablet (5 mg total) by mouth every 4 (four) hours as needed.    potassium chloride SA (K-DUR,KLOR-CON) 20 MEQ tablet Take 1 tablet (20 mEq total) by mouth once daily.    tamsulosin (FLOMAX) 0.4 mg Cap Take 1 capsule (0.4 mg total) by mouth once daily. (Patient not taking: Reported on 10/20/2021)     Family History       Problem Relation (Age of Onset)    Hypertension Mother          Tobacco Use    Smoking status: Current Every Day Smoker    Smokeless tobacco: Never Used   Substance and Sexual Activity    Alcohol use: Not Currently     Comment: socially    Drug use: No    Sexual activity: Yes     Partners: Male     Comment:      Review of Systems   Constitutional:  Negative for activity change, chills, fatigue and fever.   HENT:  Negative for congestion and trouble swallowing.    Eyes:  Negative for photophobia and visual disturbance.   Respiratory:  Negative for  cough, chest tightness, shortness of breath and wheezing.    Cardiovascular:  Negative for chest pain, palpitations and leg swelling.   Gastrointestinal:  Negative for abdominal distention, abdominal pain, constipation, diarrhea, nausea and vomiting.   Endocrine: Negative for polyphagia and polyuria.   Genitourinary:  Negative for decreased urine volume, difficulty urinating, dysuria, flank pain, frequency, menstrual problem and vaginal bleeding.   Musculoskeletal:  Positive for arthralgias, back pain (chronic) and gait problem.   Skin:  Positive for color change. Negative for rash.   Neurological:  Positive for dizziness and light-headedness. Negative for seizures, syncope, speech difficulty, weakness and headaches.   Psychiatric/Behavioral:  Negative for behavioral problems, confusion and decreased concentration.    Objective:     Vital Signs (Most Recent):  Temp: 98.4 °F (36.9 °C) (08/14/22 1857)  Pulse: 80 (08/14/22 2211)  Resp: 17 (08/15/22 0015)  BP: (!) 179/58 (08/14/22 2211)  SpO2: 98 % (08/14/22 2211)   Vital Signs (24h Range):  Temp:  [98.4 °F (36.9 °C)] 98.4 °F (36.9 °C)  Pulse:  [] 80  Resp:  [16-39] 17  SpO2:  [97 %-100 %] 98 %  BP: ()/() 179/58     Weight: 54.9 kg (121 lb)  Body mass index is 20.77 kg/m².    Physical Exam  Vitals and nursing note reviewed.   Constitutional:       General: She is not in acute distress.     Appearance: She is well-developed.      Comments: Appears to be uncomfortable 2/2 pain   HENT:      Head: Normocephalic and atraumatic.      Mouth/Throat:      Pharynx: No oropharyngeal exudate.   Eyes:      Extraocular Movements: Extraocular movements intact.      Conjunctiva/sclera: Conjunctivae normal.   Cardiovascular:      Rate and Rhythm: Normal rate and regular rhythm.      Heart sounds: Normal heart sounds.   Pulmonary:      Effort: Pulmonary effort is normal. No respiratory distress.      Breath sounds: Normal breath sounds. No wheezing.   Abdominal:       General: Bowel sounds are normal. There is no distension.      Palpations: Abdomen is soft.      Tenderness: There is no abdominal tenderness.   Musculoskeletal:         General: No tenderness. Normal range of motion.      Cervical back: Normal range of motion and neck supple.      Comments: Exam limited 2/2 RLE in splint   Lymphadenopathy:      Cervical: No cervical adenopathy.   Skin:     General: Skin is warm and dry.      Capillary Refill: Capillary refill takes less than 2 seconds.      Findings: No rash.      Comments: Good perfusion to RLE, sensation intact   Neurological:      Mental Status: She is alert and oriented to person, place, and time.      Cranial Nerves: No cranial nerve deficit.      Sensory: No sensory deficit.      Coordination: Coordination normal.   Psychiatric:         Behavior: Behavior normal.         Thought Content: Thought content normal.         Judgment: Judgment normal.           Significant Labs: All pertinent labs within the past 24 hours have been reviewed.  CBC:   Recent Labs   Lab 08/14/22 1933   WBC 6.89   HGB 13.3   HCT 39.7        CMP:   Recent Labs   Lab 08/14/22 1933      K 4.5      CO2 19*   GLU 79   BUN 33*   CREATININE 1.5*   CALCIUM 8.9   PROT 6.3   ALBUMIN 3.7   BILITOT 0.7   ALKPHOS 78   AST 30   ALT 35   ANIONGAP 14       Significant Imaging: I have reviewed all pertinent imaging results/findings within the past 24 hours.  X-Ray Chest AP Portable  Narrative: EXAMINATION:  XR CHEST AP PORTABLE    CLINICAL HISTORY:  Hypotension, unspecified    TECHNIQUE:  Single frontal view of the chest was performed.    COMPARISON:  02/01/2021    FINDINGS:  The lungs are clear, with normal appearance of pulmonary vasculature and no pleural effusion or pneumothorax.    The cardiac silhouette is normal in size. The hilar and mediastinal contours are unremarkable.    Bones are intact.  Impression: No acute abnormality.    Electronically signed by: Kojo  Marta  Date:    08/14/2022  Time:    23:13  X-Ray Ankle Complete Right  Narrative: EXAMINATION:  XR ANKLE COMPLETE 3 VIEW RIGHT    CLINICAL HISTORY:  post-red;    TECHNIQUE:  AP, lateral, and oblique images of the right ankle were performed.    COMPARISON:  08/14/2022 21:05 hours.    FINDINGS:  Improved position and alignment of previously described minimally displaced Gonsalez B fracture distal right fibula.  Interval decreased medial widening of the ankle mortise compared to prior following closed reduction and splinting.  Impression: As above.    Electronically signed by: Mark Bolanos MD  Date:    08/14/2022  Time:    22:11  X-Ray Tibia Fibula 2 View Right  Narrative: EXAMINATION:  XR ANKLE COMPLETE 3 VIEW RIGHT; XR FOOT COMPLETE 3 VIEW RIGHT; XR TIBIA FIBULA 2 VIEW RIGHT    CLINICAL HISTORY:  Unspecified fall, initial encounter    TECHNIQUE:  AP, lateral, and oblique images of the right ankle and right foot were performed.  AP and lateral views right tibia and fibula were performed.    COMPARISON:  None    FINDINGS:  Gonsalez B spiral fracture distal fibula with the distal fracture fragment minimally displaced laterally.  No additional acute fracture.  No dislocation.  Minimal widening of the medial ankle mortise.  Talar dome is maintained.  Plantar calcaneal spur.  Lisfranc joints appear congruent.  Advanced degenerative changes at the 1st MTP joint with joint space narrowing, spurring and sclerosis.  Soft tissue swelling laterally over the ankle.  Impression: Gonsalez B spiral fracture distal fibula with the distal fracture fragment minimally displaced laterally.  Soft tissue swelling laterally over the ankle.    No additional acute fracture. No dislocation.    Minimal widening of the medial ankle mortise.    Advanced degenerative changes at the 1st MTP joint with joint space narrowing, spurring and sclerosis.    Electronically signed by: Mark Bolanos MD  Date:    08/14/2022  Time:    21:32  X-Ray Foot  Complete Right  Narrative: EXAMINATION:  XR ANKLE COMPLETE 3 VIEW RIGHT; XR FOOT COMPLETE 3 VIEW RIGHT; XR TIBIA FIBULA 2 VIEW RIGHT    CLINICAL HISTORY:  Unspecified fall, initial encounter    TECHNIQUE:  AP, lateral, and oblique images of the right ankle and right foot were performed.  AP and lateral views right tibia and fibula were performed.    COMPARISON:  None    FINDINGS:  Gonsalez B spiral fracture distal fibula with the distal fracture fragment minimally displaced laterally.  No additional acute fracture.  No dislocation.  Minimal widening of the medial ankle mortise.  Talar dome is maintained.  Plantar calcaneal spur.  Lisfranc joints appear congruent.  Advanced degenerative changes at the 1st MTP joint with joint space narrowing, spurring and sclerosis.  Soft tissue swelling laterally over the ankle.  Impression: Gonsalez B spiral fracture distal fibula with the distal fracture fragment minimally displaced laterally.  Soft tissue swelling laterally over the ankle.    No additional acute fracture. No dislocation.    Minimal widening of the medial ankle mortise.    Advanced degenerative changes at the 1st MTP joint with joint space narrowing, spurring and sclerosis.    Electronically signed by: Mark Bolanos MD  Date:    08/14/2022  Time:    21:32  X-Ray Ankle Complete Right  Narrative: EXAMINATION:  XR ANKLE COMPLETE 3 VIEW RIGHT; XR FOOT COMPLETE 3 VIEW RIGHT; XR TIBIA FIBULA 2 VIEW RIGHT    CLINICAL HISTORY:  Unspecified fall, initial encounter    TECHNIQUE:  AP, lateral, and oblique images of the right ankle and right foot were performed.  AP and lateral views right tibia and fibula were performed.    COMPARISON:  None    FINDINGS:  Gonsalez B spiral fracture distal fibula with the distal fracture fragment minimally displaced laterally.  No additional acute fracture.  No dislocation.  Minimal widening of the medial ankle mortise.  Talar dome is maintained.  Plantar calcaneal spur.  Lisfranc joints appear  congruent.  Advanced degenerative changes at the 1st MTP joint with joint space narrowing, spurring and sclerosis.  Soft tissue swelling laterally over the ankle.  Impression: Patel B spiral fracture distal fibula with the distal fracture fragment minimally displaced laterally.  Soft tissue swelling laterally over the ankle.    No additional acute fracture. No dislocation.    Minimal widening of the medial ankle mortise.    Advanced degenerative changes at the 1st MTP joint with joint space narrowing, spurring and sclerosis.    Electronically signed by: Mark Bolanos MD  Date:    08/14/2022  Time:    21:32      Assessment/Plan:     * Pre-syncope  Hypotension   - suspect 2/2 hypotension/ hypovolemia  - BP improved s/p 1L IVFs  - will give an additional 1L IVFs overnight  - check 2D echo  - hold lisinopril- HCTZ  - orthostatic vitals q shift  - telemetry     Lactic acidosis  - lactic 4.4 on admit, suspect 2/2 hypoperfusion from hypotension/ hypovolemia  - afebrile without leukocytosis  - infectious work up unremarkable thus far  - improved to 1.7 s/p 1L IVFs  - cIVFs overnight    Closed fracture of distal end of right fibula  - presents w/ R ankle pain s/p fall and found to have patel B fibula fracture with lateral subluxation of the talus on imaging  - ortho consulted; successfully reduced and splinted at bedside  - NWB RLE  - ice and elevate extremity  - multi-modal pain regimen: tylenol 1g q8hr, robaxin 500mg QID, PRN oxy and dilaudid   - PT/OT eval    GABBI (acute kidney injury)  - Cr 1.5, baseline ~1.0  - suspect prerenal from hypoperfusion 2/2 hypotension/ hypovolemia  - IVFs  - avoid nephrotoxins, renally dose meds  - trend BMP    Hypertension  - initially hypotensive which improved s/p IVFs, then became hypertensive  - suspect BP elevated in the setting of severe pain  - holding lisinopril-HCTZ  - will avoid anti-hypertensive agents for now and focus on pain control given severity of hypotension on  arrival    Anxiety  - continue home Klonopin 1mg BID    VTE Risk Mitigation (From admission, onward)         Ordered     enoxaparin injection 40 mg  Daily         08/14/22 2359     IP VTE HIGH RISK PATIENT  Once         08/14/22 2359                   Rosa Rodriguez PA-C  Department of Hospital Medicine   [facility] Dept

## 2022-08-15 NOTE — ASSESSMENT & PLAN NOTE
Juan Antonio Rowe is a 71 y.o. female with a patel B fibula fracture with lateral subluxation of the talus.    - Reduced and splinted at bedside, see procedure note  - NWB RLE, ice and elevation  - DVT PPx: per primary  - PT/OT for ambulation assistance and training while in obs  - Follow up in orthopedic clinic in one week, pt will be contacted with appointment details      Procedure Note: Right ankle reduction under conscious sedation  All risks, benefits, and alternatives to treatment explained to patient. Verbalized consent to proceed was given by patient. Time out was performed and patient name, , site, and procedure were confirmed. Patient was sedated by ER staff physician. The right ankle was reduced under fluoroscopy. A short leg splint was applied in typical fashion. Post-reduction films were performed and confirmed adequate reduction. Patient tolerated procedure well. No complications from sedation were encountered by the ED staff physician during the procedure.

## 2022-08-15 NOTE — ASSESSMENT & PLAN NOTE
Hypotension   - suspect 2/2 hypotension/ hypovolemia  - BP improved s/p 1L IVFs  - will give an additional 1L IVFs overnight  - check 2D echo  - hold lisinopril- HCTZ  - orthostatic vitals q shift  - telemetry

## 2022-08-15 NOTE — PLAN OF CARE
Problem: Physical Therapy  Goal: Physical Therapy Goal  Description: Goals to be met by: 2022    Patient will increase functional independence with mobility by performin. Supine to sit with Modified [place]   2. Sit to supine with Modified [place] using Rolling Walker  3. Rolling to Right with Modified [place].  4. Sit to stand transfer with Modified [place] using Rolling Walker  5. Gait  x 50 feet with Contact Guard Assistance using Rolling Walker.   6. Ascend/descend 12 stair with bilateral Handrails Contact Guard Assistance.     Outcome: Ongoing, Progressing     Pt evaluated and appropriate goals established.

## 2022-08-15 NOTE — ASSESSMENT & PLAN NOTE
- likely secondary to hypovolemia, improved  - volume resuscitated with IVFs  - encouraged enteral fluid intake

## 2022-08-15 NOTE — SUBJECTIVE & OBJECTIVE
Past Medical History:   Diagnosis Date    Acute gastric ulcer with perforation     Acute kidney failure with tubular necrosis     GABBI (acute kidney injury)     Anxiety     Diarrhea     HLD (hyperlipidemia)     Hypertension     Nausea & vomiting     Peritonitis     PUD (peptic ulcer disease)     Renal disorder     Urine retention     Vascular disorder of intestine, unspecified        Past Surgical History:   Procedure Laterality Date    APPENDECTOMY      APPLICATION OF WOUND VACUUM-ASSISTED CLOSURE DEVICE  1/21/2021    Procedure: APPLICATION, WOUND VAC;  Surgeon: Abel Francis MD;  Location: [address];  Service: General;;    APPLICATION OF WOUND VACUUM-ASSISTED CLOSURE DEVICE  1/25/2021    Procedure: APPLICATION, WOUND VAC;  Surgeon: Abel Francis MD;  [facility];  Service: General;;    CLOSURE OF PERFORATED ULCER OF DUODENUM USING OMENTAL PATCH  1/21/2021    Procedure: CLOSURE, ULCER, PERFORATED, DUODENUM, USING OMENTAL PATCH;  Surgeon: Abel Francis MD;  Location: [facility] OR [address] [place] [address];  Service: General;;    COLECTOMY, RIGHT  1/21/2021    Procedure: COLECTOMY, RIGHT;  Surgeon: Abel Francis MD;  Location: [address];  Service: General;;    cyst removed from neck      GASTROSTOMY  1/25/2021    Procedure: GASTROSTOMY;  Surgeon: Abel Francis MD;  Location: [address];  Service: General;;    HYSTERECTOMY  1986    ILEOSTOMY CLOSURE  10/13/2021    Procedure: CLOSURE, ILEOSTOMY;  Surgeon: Abel Francis MD;  Location: [address];  Service: General;;    LYSIS OF ADHESIONS  10/13/2021    Procedure: LYSIS, ADHESIONS;  Surgeon: Abel Francis MD;  Location: [address];  Service: General;;    OOPHORECTOMY Bilateral 1989    OPEN REDUCTION AND INTERNAL FIXATION (ORIF) OF LISFRANC INJURY OF FOOT Left 10/11/2019    Procedure: ORIF, LISFRANC INJURY, FOOT;  Surgeon: Ferdinand Stauffer DPM;  Location: [facility] OR [facility];  Service: Podiatry;  Laterality: Left;     TONSILLECTOMY, ADENOIDECTOMY         Review of patient's allergies indicates:  No Known Allergies    Current Facility-Administered Medications   Medication    morphine injection 4 mg     Current Outpatient Medications   Medication Sig    acetaminophen (TYLENOL) 325 MG tablet Take 2 tablets (650 mg total) by mouth every 6 (six) hours as needed for Pain.    ascorbic acid, vitamin C, (VITAMIN C) 500 MG tablet Take 500 mg by mouth once daily.     calcium carbonate (OS-TORI) 600 mg (1,500 mg) Tab Take 600 mg by mouth 2 (two) times daily with meals.    clonazePAM (KLONOPIN) 1 MG tablet Take 1 tablet (1 mg total) by mouth 2 (two) times daily as needed for Anxiety. (Patient taking differently: Take 1 mg by mouth 2 (two) times daily. )    gabapentin (NEURONTIN) 300 MG capsule Take 1 capsule (300 mg total) by mouth 3 (three) times daily. for 7 days    lisinopriL-hydrochlorothiazide (PRINZIDE,ZESTORETIC) 20-12.5 mg per tablet Take 1 tablet by mouth once daily.    loperamide (IMODIUM) 2 mg capsule Take 1 capsule (2 mg total) by mouth 2 (two) times a day.    multivitamin (ONE DAILY MULTIVITAMIN) per tablet Take 1 tablet by mouth once daily.    oxyCODONE (ROXICODONE) 5 MG immediate release tablet Take 1 tablet (5 mg total) by mouth every 4 (four) hours as needed.    potassium chloride SA (K-DUR,KLOR-CON) 20 MEQ tablet Take 1 tablet (20 mEq total) by mouth once daily.    tamsulosin (FLOMAX) 0.4 mg Cap Take 1 capsule (0.4 mg total) by mouth once daily. (Patient not taking: Reported on 10/20/2021)     Family History       Problem Relation (Age of Onset)    Hypertension Mother          Tobacco Use    Smoking status: Current Every Day Smoker    Smokeless tobacco: Never Used   Substance and Sexual Activity    Alcohol use: Not Currently     Comment: socially    Drug use: No    Sexual activity: Yes     Partners: Male     Comment:      ROS  Constitutional: negative for fevers or chills  Eyes: negative visual changes or eye  "discharge  ENT: negative for ear pain or sore throat  Respiratory: negative for shortness of breath or cough  Cardiovascular: negative for chest pain or palpitations  Gastrointestinal: negative for abdominal pain, nausea, or vomiting, positive for diarrhea   Genitourinary: negative for dysuria and flank pain  Neurological: negative for headaches or dizziness  Musculoskeletal: positive for right ankle pain    Objective:     Vital Signs (Most Recent):  Temp: 98.4 °F (36.9 °C) (08/14/22 1857)  Pulse: 80 (08/14/22 2211)  Resp: 16 (08/14/22 2210)  BP: (!) 179/58 (08/14/22 2211)  SpO2: 98 % (08/14/22 2211)   Vital Signs (24h Range):  Temp:  [98.4 °F (36.9 °C)] 98.4 °F (36.9 °C)  Pulse:  [] 80  Resp:  [16-39] 16  SpO2:  [97 %-100 %] 98 %  BP: ()/() 179/58     Weight: 54.9 kg (121 lb)     Body mass index is 20.77 kg/m².      Intake/Output Summary (Last 24 hours) at 8/14/2022 2350  Last data filed at 8/14/2022 2028  Gross per 24 hour   Intake 999 ml   Output --   Net 999 ml       Ortho/SPM Exam  Gen:  No acute distress, well-developed, well nourished.  CV:  Peripherally well-perfused.   Respiratory:  Normal respiratory effort. No accessory muscle use.   Head/Neck:  Normocephalic.  Atraumatic. Sclera anicteric. TM. Neck supple.  Neuro: CN 2-12 grossly intact. No FND. Awake. Alert. Oriented to person, place, time, and situation.  Abdomen: Soft, NTND.      MSK:  Right Upper extremity  Inspection  - Skin intact throughout, no open wounds  - No swelling  - No ecchymosis, erythema, or signs of cellulitis  Palpation  - NonTTP throughout, no palpable abnormality  Range of motion  - AROM and PROM of the shoulder, elbow, wrist, and hand intact without pain  Stability  - No evidence of joint dislocation or abnormal laxity  Neurovascular  - AIN/PIN/Radial/Median/Ulnar Nerves assessed in isolation without deficit  - Able to give thumbs up, make "OK" sign, cross IF/LF, abduct/adduct fingers, make fist  - SILT " "throughout  - Compartments soft  - Radial artery palpated  - Muscle tone normal    Left Upper extremity  Inspection  - Skin intact throughout, no open wounds  - No swelling  - No ecchymosis, erythema, or signs of cellulitis  Palpation  - NonTTP throughout, no palpable abnormality  Range of motion  - AROM and PROM of the shoulder, elbow, wrist, and hand intact without pain  Stability  - No evidence of joint dislocation or abnormal laxity  Neurovascular  - AIN/PIN/Radial/Median/Ulnar Nerves assessed in isolation without deficit  - Able to give thumbs up, make "OK" sign, cross IF/LF, abduct/adduct fingers, make fist  - SILT throughout  - Compartments soft  - Radial artery palpated  - Muscle tone normal      Left Lower Extremity  Inspection  - Skin intact throughout, no open wounds  - No swelling  - No ecchymosis, erythema, or signs of cellulitis  Palpation  - NonTTP throughout, no palpable abnormality  Range of motion  - AROM and PROM of the hip, knee, ankle, and foot intact without pain  Stability  - No evidence of joint dislocation or abnormal laxity  Neurovascular  - TA/EHL/Gastroc/FHL assessed in isolation without deficit  - SILT throughout  - Compartments soft  - DP palpated   - Muscle tone normal    Right Lower Extremity  Inspection  - Skin intact throughout, no open wounds  - Edema to lateral ankle  - No ecchymosis, erythema, or signs of cellulitis  Palpation  - TTP to lateral mal and diffusely throughout ankle  Range of motion  - AROM and PROM of the hip, knee, and foot intact without pain, minimal ROM of ankle due to pain but DF and PF remain intact  Stability  - No evidence of joint dislocation   Neurovascular  - TA/EHL/Gastroc/FHL assessed in isolation without deficit  - SILT throughout  - Compartments soft  - DP palpated   - Muscle tone normal      Significant Labs: CBC:   Recent Labs   Lab 08/14/22 1933   WBC 6.89   HGB 13.3   HCT 39.7        CMP:   Recent Labs   Lab 08/14/22 1933      K 4.5 "      CO2 19*   GLU 79   BUN 33*   CREATININE 1.5*   CALCIUM 8.9   PROT 6.3   ALBUMIN 3.7   BILITOT 0.7   ALKPHOS 78   AST 30   ALT 35   ANIONGAP 14     All pertinent labs within the past 24 hours have been reviewed.    Significant Imaging: X-Ray: I have reviewed all pertinent results/findings and my personal findings are:  lateral malleolus fracture with lateral subluxation of the talus

## 2022-08-15 NOTE — SUBJECTIVE & OBJECTIVE
"Interval History: NAEON. Patient evaluated at bedside. Hypertensive this morning, likely secondary to pain. Increased methocarbamol 1000 mg QID, and frequency of pain meds. Reports 9/10 pain to right ankle, improved to 7-8/10 with current pain control regimen. Patient endorsing decreased appetite 2/2 grief from recent death of cousin, which also brings up feelings of grief related to her 's death 4 years ago. No SI, or plans of self harm; "I would never hurt myself I am [employer]". GABBI resolved, restarted home BP meds.    Review of Systems   Constitutional:  Negative for activity change, chills and fever.   HENT:  Negative for trouble swallowing.    Eyes:  Negative for photophobia and visual disturbance.   Respiratory:  Negative for cough, chest tightness and shortness of breath.    Cardiovascular:  Negative for chest pain, palpitations and leg swelling.   Gastrointestinal:  Negative for abdominal pain, constipation, diarrhea, nausea and vomiting.   Genitourinary:  Negative for dysuria, frequency and hematuria.   Musculoskeletal:  Positive for arthralgias (right ankle, throbbing). Negative for back pain, gait problem and neck pain.   Skin:  Negative for rash and wound.   Neurological:  Negative for dizziness, syncope, speech difficulty and light-headedness.   Psychiatric/Behavioral:  Negative for agitation and confusion. The patient is not nervous/anxious.    All other systems reviewed and are negative.  Objective:     Vital Signs (Most Recent):  Temp: 98.3 °F (36.8 °C) (08/15/22 1201)  Pulse: 85 (08/15/22 1201)  Resp: 20 (08/15/22 1201)  BP: (!) 169/74 (08/15/22 1201)  SpO2: 99 % (08/15/22 1201)   Vital Signs (24h Range):  Temp:  [98 °F (36.7 °C)-98.4 °F (36.9 °C)] 98.3 °F (36.8 °C)  Pulse:  [] 85  Resp:  [13-39] 20  SpO2:  [94 %-100 %] 99 %  BP: ()/() 169/74     Weight: 54.9 kg (121 lb)  Body mass index is 20.77 kg/m².    Intake/Output Summary (Last 24 hours) at 8/15/2022 1239  Last data " filed at 8/14/2022 2028  Gross per 24 hour   Intake 999 ml   Output --   Net 999 ml      Physical Exam  Vitals and nursing note reviewed.   Constitutional:       General: She is not in acute distress.     Appearance: She is well-developed and normal weight. She is not toxic-appearing.      Comments: Uncomfortable appearing   HENT:      Head: Normocephalic and atraumatic.      Mouth/Throat:      Mouth: Mucous membranes are moist.      Pharynx: No oropharyngeal exudate.   Eyes:      Conjunctiva/sclera: Conjunctivae normal.      Pupils: Pupils are equal, round, and reactive to light.   Cardiovascular:      Rate and Rhythm: Normal rate and regular rhythm.      Pulses: Normal pulses.      Heart sounds: Normal heart sounds. No murmur heard.    No gallop.   Pulmonary:      Effort: Pulmonary effort is normal. No respiratory distress.      Breath sounds: Normal breath sounds. No wheezing.   Abdominal:      General: Bowel sounds are normal. There is no distension.      Palpations: Abdomen is soft.      Tenderness: There is no abdominal tenderness.   Musculoskeletal:      Cervical back: Normal range of motion and neck supple.      Comments: Right ankle and foot immobilized in splint   Lymphadenopathy:      Cervical: No cervical adenopathy.   Skin:     General: Skin is warm and dry.      Capillary Refill: Capillary refill takes less than 2 seconds.      Findings: No rash.   Neurological:      General: No focal deficit present.      Mental Status: She is alert and oriented to person, place, and time.      Cranial Nerves: No cranial nerve deficit.      Sensory: Sensation is intact. No sensory deficit.      Motor: No weakness.      Coordination: Coordination normal.   Psychiatric:         Behavior: Behavior normal.         Thought Content: Thought content normal.         Judgment: Judgment normal.       Significant Labs: All pertinent labs within the past 24 hours have been reviewed.  CBC:   Recent Labs   Lab 08/14/22  1933  08/15/22  0322   WBC 6.89 12.43   HGB 13.3 12.7   HCT 39.7 38.8    278     CMP:   Recent Labs   Lab 08/14/22  1933 08/15/22  0322    137   K 4.5 3.9    104   CO2 19* 21*   GLU 79 79   BUN 33* 29*   CREATININE 1.5* 0.9   CALCIUM 8.9 9.1   PROT 6.3  --    ALBUMIN 3.7  --    BILITOT 0.7  --    ALKPHOS 78  --    AST 30  --    ALT 35  --    ANIONGAP 14 12       Significant Imaging: I have reviewed all pertinent imaging results/findings within the past 24 hours.

## 2022-08-15 NOTE — ED PROVIDER NOTES
Encounter Date: 8/14/2022       History     Chief Complaint   Patient presents with    Fall     Fall out of bed. Dizzy. Right ankle deformity     71 y.o. female known to surgery team with history of perforated gastric ulcer and colon ischemia s/p ex lap with duodenal ulcer repair and right colectomy, and end ileostomy creation in Jan 2021 and ilestomy reversal (ileosigmoid anastomosis) October 2021, anxiety, HLD, HTN, admission for SBO 2 months ago is presenting to the ED for right ankle pain after fall. Pt explains that she often experiences lightheadedness upon standing quickly.  She explains that she is waking up from a nap when she got up too fast to go to the bathroom, became dizzy her legs crossed and she fell onto her right ankle.  She denies hitting her head.  There was no loss of consciousness.  She denies head pain, neck pain, back pain, arm pain.  Reports 10/10 pain of her right ankle.  No medications prior to arrival.  She denies any recent illness, fever, cough, congestion, abdominal pain, nausea or vomiting.  States she has been eating and drinking normally.    The history is provided by the patient. No  was used.     Review of patient's allergies indicates:  No Known Allergies  Past Medical History:   Diagnosis Date    Acute gastric ulcer with perforation     Acute kidney failure with tubular necrosis     GABBI (acute kidney injury)     Anxiety     Diarrhea     HLD (hyperlipidemia)     Hypertension     Nausea & vomiting     Peritonitis     PUD (peptic ulcer disease)     Renal disorder     Urine retention     Vascular disorder of intestine, unspecified      Past Surgical History:   Procedure Laterality Date    APPENDECTOMY      APPLICATION OF WOUND VACUUM-ASSISTED CLOSURE DEVICE  1/21/2021    Procedure: APPLICATION, WOUND VAC;  Surgeon: Abel Francis MD;  Location: [facility] OR [address] [place] [address];  Service: General;;    APPLICATION OF WOUND VACUUM-ASSISTED CLOSURE DEVICE   1/25/2021    Procedure: APPLICATION, WOUND VAC;  Surgeon: Abel Francis MD;  Location: [facility] OR [facility];  Service: General;;    CLOSURE OF PERFORATED ULCER OF DUODENUM USING OMENTAL PATCH  1/21/2021    Procedure: CLOSURE, ULCER, PERFORATED, DUODENUM, USING OMENTAL PATCH;  Surgeon: Abel Francis MD;  [facility];  Service: General;;    COLECTOMY, RIGHT  1/21/2021    Procedure: COLECTOMY, RIGHT;  Surgeon: Abel Francis MD;  [facility];  Service: General;;    cyst removed from neck      GASTROSTOMY  1/25/2021    Procedure: GASTROSTOMY;  Surgeon: Abel Francis MD;  [facility];  Service: General;;    HYSTERECTOMY  1986    ILEOSTOMY CLOSURE  10/13/2021    Procedure: CLOSURE, ILEOSTOMY;  Surgeon: Abel Francis MD;  Location: [facility] OR [address] [place] [address];  Service: General;;    LYSIS OF ADHESIONS  10/13/2021    Procedure: LYSIS, ADHESIONS;  Surgeon: Abel Francis MD;  Location: [facility] OR [address] [place] [address];  Service: General;;    OOPHORECTOMY Bilateral 1989    OPEN REDUCTION AND INTERNAL FIXATION (ORIF) OF LISFRANC INJURY OF FOOT Left 10/11/2019    Procedure: ORIF, LISFRANC INJURY, FOOT;  Surgeon: Ferdinand Stauffer DPM;  Location: [facility] OR [address] [place] [address];  Service: Podiatry;  Laterality: Left;    TONSILLECTOMY, ADENOIDECTOMY       Family History   Problem Relation Age of Onset    Hypertension Mother      Social History     Tobacco Use    Smoking status: Current Every Day Smoker    Smokeless tobacco: Never Used   Substance Use Topics    Alcohol use: Not Currently     Comment: socially    Drug use: No     Review of Systems   Constitutional: Negative for chills and fever.   HENT: Negative for rhinorrhea and sore throat.    Respiratory: Negative for cough and shortness of breath.    Cardiovascular: Negative for chest pain and leg swelling.   Gastrointestinal: Negative for abdominal pain, diarrhea, nausea and vomiting.   Genitourinary: Negative for dysuria and hematuria.    Musculoskeletal: Positive for arthralgias, gait problem and joint swelling. Negative for back pain and neck pain.        Right ankle pain   Skin: Negative for rash and wound.   Neurological: Positive for light-headedness. Negative for seizures and headaches.   Psychiatric/Behavioral: Negative for agitation and behavioral problems.       Physical Exam     Initial Vitals [08/14/22 1857]   BP Pulse Resp Temp SpO2   (!) 79/43 74 18 98.4 °F (36.9 °C) 97 %      MAP       --         Physical Exam    Nursing note and vitals reviewed.  Constitutional: She appears well-developed and well-nourished.   HENT:   Head: Normocephalic and atraumatic.   Dry MM   Eyes: Conjunctivae and EOM are normal.   Neck: Neck supple.   Normal range of motion.  Cardiovascular: Normal rate, regular rhythm, normal heart sounds and intact distal pulses.   Pulmonary/Chest: No respiratory distress. She has no wheezes. She has no rhonchi. She has no rales.   Abdominal: Abdomen is soft. Bowel sounds are normal. She exhibits no distension. There is no abdominal tenderness. There is no rebound and no guarding.   Musculoskeletal:         General: No tenderness or edema. Normal range of motion.      Cervical back: Normal range of motion and neck supple.      Comments: R ankle lateral tenderness and swelling, no laceration or skin tenting, neurovascualarly intact. DP and PT pulses 2+, sensation intact. Pt able to wiggle toes.      Neurological: She is alert and oriented to person, place, and time. She has normal strength.   Skin: Skin is warm and dry.   Psychiatric: She has a normal mood and affect. Thought content normal.         ED Course   Pre-Assessment for Procedural Sedation    Date/Time: 8/14/2022 9:24 PM  Performed by: Jaylan Carreon MD  Authorized by: Jaylan Carreon MD         ASA Class::  Class 2 - Mild Illness without functional impairment.       Mallampati Score::  Class 1 - Visualization of the soft palate, fauces, uvula, and  "anterior/posterior pillars.    Date/Time of last solid:  2022 10:00 AM    Patient/Family history of anesthesia or sedation complications: No    Procedural Sedation        Date/Time: 2022 10:09 PM  Performed by: Marisela Neal MD  Authorized by: Jaylan Carreon MD   Consent Done: Yes  Consent: Written consent obtained.  Risks and benefits: risks, benefits and alternatives were discussed  Consent given by: patient  Required items: required blood products, implants, devices, and special equipment available  Patient identity confirmed: , MRN, name and verbally with patient  Time out: Immediately prior to procedure a "time out" was called to verify the correct patient, procedure, equipment, support staff and site/side marked as required.    Equipment: on cardiac monitor., on BP monitor., on CO2 monitor., airway equipment available. and suction available.     Sedation type: moderate (conscious) sedation    Sedatives: ketamine and see MAR for details  Vitals: Vital signs were monitored during sedation.  Complications: No complications.         Labs Reviewed   COMPREHENSIVE METABOLIC PANEL - Abnormal; Notable for the following components:       Result Value    CO2 19 (*)     BUN 33 (*)     Creatinine 1.5 (*)     eGFR 37.3 (*)     All other components within normal limits   CBC W/ AUTO DIFFERENTIAL - Abnormal; Notable for the following components:    MCV 99 (*)     MCH 33.3 (*)     Mono # 0.2 (*)     Mono % 3.2 (*)     All other components within normal limits   URINALYSIS, REFLEX TO URINE CULTURE - Abnormal; Notable for the following components:    Color, UA Colorless (*)     All other components within normal limits    Narrative:     Specimen Source->Urine   LACTIC ACID, PLASMA - Abnormal; Notable for the following components:    Lactate (Lactic Acid) 4.5 (*)     All other components within normal limits   BASIC METABOLIC PANEL - Abnormal; Notable for the following components:    CO2 21 (*)     BUN 29 (*)  "    All other components within normal limits   CBC W/ AUTO DIFFERENTIAL - Abnormal; Notable for the following components:    RBC 3.93 (*)     MCV 99 (*)     MCH 32.3 (*)     Immature Grans (Abs) 0.05 (*)     All other components within normal limits   ISTAT PROCEDURE - Abnormal; Notable for the following components:    POC PH 7.292 (*)     POC PCO2 51.9 (*)     POC PO2 27 (*)     POC SATURATED O2 42 (*)     All other components within normal limits   TROPONIN I   B-TYPE NATRIURETIC PEPTIDE   SARS-COV-2 RNA AMPLIFICATION, QUAL   MAGNESIUM   TSH   HEMOGLOBIN A1C   LACTIC ACID, PLASMA   CK   POCT GLUCOSE, HAND-HELD DEVICE   POCT GLUCOSE, HAND-HELD DEVICE   ISTAT LACTATE   POCT GLUCOSE   POCT GLUCOSE          Imaging Results          X-Ray Chest AP Portable (Final result)  Result time 08/14/22 23:13:08    Final result by Kojo Griffin MD (08/14/22 23:13:08)                 Impression:      No acute abnormality.      Electronically signed by: Kojo Griffin  Date:    08/14/2022  Time:    23:13             Narrative:    EXAMINATION:  XR CHEST AP PORTABLE    CLINICAL HISTORY:  Hypotension, unspecified    TECHNIQUE:  Single frontal view of the chest was performed.    COMPARISON:  02/01/2021    FINDINGS:  The lungs are clear, with normal appearance of pulmonary vasculature and no pleural effusion or pneumothorax.    The cardiac silhouette is normal in size. The hilar and mediastinal contours are unremarkable.    Bones are intact.                               X-Ray Ankle Complete Right (Final result)  Result time 08/14/22 22:11:45    Final result by Mark Bolanos MD (08/14/22 22:11:45)                 Impression:      As above.      Electronically signed by: Mark Bolanos MD  Date:    08/14/2022  Time:    22:11             Narrative:    EXAMINATION:  XR ANKLE COMPLETE 3 VIEW RIGHT    CLINICAL HISTORY:  post-red;    TECHNIQUE:  AP, lateral, and oblique images of the right ankle were  performed.    COMPARISON:  08/14/2022 21:05 hours.    FINDINGS:  Improved position and alignment of previously described minimally displaced Gonsalez B fracture distal right fibula.  Interval decreased medial widening of the ankle mortise compared to prior following closed reduction and splinting.                               X-Ray Tibia Fibula 2 View Right (Final result)  Result time 08/14/22 21:32:35    Final result by Mark Bolanos MD (08/14/22 21:32:35)                 Impression:      Gonsalez B spiral fracture distal fibula with the distal fracture fragment minimally displaced laterally.  Soft tissue swelling laterally over the ankle.    No additional acute fracture. No dislocation.    Minimal widening of the medial ankle mortise.    Advanced degenerative changes at the 1st MTP joint with joint space narrowing, spurring and sclerosis.      Electronically signed by: Mark Bolanos MD  Date:    08/14/2022  Time:    21:32             Narrative:    EXAMINATION:  XR ANKLE COMPLETE 3 VIEW RIGHT; XR FOOT COMPLETE 3 VIEW RIGHT; XR TIBIA FIBULA 2 VIEW RIGHT    CLINICAL HISTORY:  Unspecified fall, initial encounter    TECHNIQUE:  AP, lateral, and oblique images of the right ankle and right foot were performed.  AP and lateral views right tibia and fibula were performed.    COMPARISON:  None    FINDINGS:  Gonsalez B spiral fracture distal fibula with the distal fracture fragment minimally displaced laterally.  No additional acute fracture.  No dislocation.  Minimal widening of the medial ankle mortise.  Talar dome is maintained.  Plantar calcaneal spur.  Lisfranc joints appear congruent.  Advanced degenerative changes at the 1st MTP joint with joint space narrowing, spurring and sclerosis.  Soft tissue swelling laterally over the ankle.                               X-Ray Foot Complete Right (Final result)  Result time 08/14/22 21:32:35    Final result by Mark Bolanos MD (08/14/22 21:32:35)                  Impression:      Gonsalez B spiral fracture distal fibula with the distal fracture fragment minimally displaced laterally.  Soft tissue swelling laterally over the ankle.    No additional acute fracture. No dislocation.    Minimal widening of the medial ankle mortise.    Advanced degenerative changes at the 1st MTP joint with joint space narrowing, spurring and sclerosis.      Electronically signed by: Mark Bolanos MD  Date:    08/14/2022  Time:    21:32             Narrative:    EXAMINATION:  XR ANKLE COMPLETE 3 VIEW RIGHT; XR FOOT COMPLETE 3 VIEW RIGHT; XR TIBIA FIBULA 2 VIEW RIGHT    CLINICAL HISTORY:  Unspecified fall, initial encounter    TECHNIQUE:  AP, lateral, and oblique images of the right ankle and right foot were performed.  AP and lateral views right tibia and fibula were performed.    COMPARISON:  None    FINDINGS:  Gonsalez B spiral fracture distal fibula with the distal fracture fragment minimally displaced laterally.  No additional acute fracture.  No dislocation.  Minimal widening of the medial ankle mortise.  Talar dome is maintained.  Plantar calcaneal spur.  Lisfranc joints appear congruent.  Advanced degenerative changes at the 1st MTP joint with joint space narrowing, spurring and sclerosis.  Soft tissue swelling laterally over the ankle.                               X-Ray Ankle Complete Right (Final result)  Result time 08/14/22 21:32:35    Final result by Mark Bolanos MD (08/14/22 21:32:35)                 Impression:      Gonsalez B spiral fracture distal fibula with the distal fracture fragment minimally displaced laterally.  Soft tissue swelling laterally over the ankle.    No additional acute fracture. No dislocation.    Minimal widening of the medial ankle mortise.    Advanced degenerative changes at the 1st MTP joint with joint space narrowing, spurring and sclerosis.      Electronically signed by: Mark Bolanos MD  Date:    08/14/2022  Time:    21:32             Narrative:     EXAMINATION:  XR ANKLE COMPLETE 3 VIEW RIGHT; XR FOOT COMPLETE 3 VIEW RIGHT; XR TIBIA FIBULA 2 VIEW RIGHT    CLINICAL HISTORY:  Unspecified fall, initial encounter    TECHNIQUE:  AP, lateral, and oblique images of the right ankle and right foot were performed.  AP and lateral views right tibia and fibula were performed.    COMPARISON:  None    FINDINGS:  Gonsalez B spiral fracture distal fibula with the distal fracture fragment minimally displaced laterally.  No additional acute fracture.  No dislocation.  Minimal widening of the medial ankle mortise.  Talar dome is maintained.  Plantar calcaneal spur.  Lisfranc joints appear congruent.  Advanced degenerative changes at the 1st MTP joint with joint space narrowing, spurring and sclerosis.  Soft tissue swelling laterally over the ankle.                                 Medications   enoxaparin injection 40 mg (has no administration in time range)   albuterol-ipratropium 2.5 mg-0.5 mg/3 mL nebulizer solution 3 mL (has no administration in time range)   melatonin tablet 6 mg (6 mg Oral Given 8/15/22 0109)   ondansetron disintegrating tablet 8 mg (has no administration in time range)   promethazine tablet 25 mg (has no administration in time range)   polyethylene glycol packet 17 g (17 g Oral Not Given 8/15/22 0900)   bisacodyL suppository 10 mg (has no administration in time range)   glucose chewable tablet 16 g (has no administration in time range)   glucose chewable tablet 24 g (has no administration in time range)   glucagon (human recombinant) injection 1 mg (has no administration in time range)   nicotine 14 mg/24 hr 1 patch (has no administration in time range)   dextrose 10% bolus 125 mL (has no administration in time range)   dextrose 10% bolus 250 mL (has no administration in time range)   naloxone 0.4 mg/mL injection 0.4 mg (has no administration in time range)   senna tablet 8.6 mg (8.6 mg Oral Not Given 8/15/22 0900)   acetaminophen tablet 1,000 mg (1,000 mg  Oral Given 8/15/22 1406)   methocarbamoL tablet 500 mg (500 mg Oral Given 8/15/22 1214)   clonazePAM tablet 1 mg (1 mg Oral Given 8/15/22 0931)   multivitamin tablet (1 tablet Oral Given 8/15/22 0931)   HYDROmorphone injection 0.5 mg (0.5 mg Intravenous Given 8/15/22 0931)   celecoxib capsule 100 mg (100 mg Oral Given 8/15/22 1300)   oxyCODONE immediate release tablet 5 mg (has no administration in time range)   oxyCODONE immediate release tablet Tab 10 mg (10 mg Oral Given 8/15/22 1242)   lisinopriL tablet 20 mg (has no administration in time range)   hydroCHLOROthiazide tablet 12.5 mg (has no administration in time range)   sodium chloride 0.9% bolus 1,000 mL (0 mLs Intravenous Stopped 8/14/22 2028)   ondansetron injection 4 mg (4 mg Intravenous Given 8/14/22 2002)   fentaNYL 50 mcg/mL injection 50 mcg (50 mcg Intravenous Given 8/14/22 2001)   ketamine injection 100 mg (100 mg Intravenous Given 8/14/22 2115)   ketamine in 0.9 % sod chloride 50 mg/5 mL (10 mg/mL) injection (100 mg  Given 8/14/22 2132)   morphine injection 4 mg (4 mg Intravenous Given 8/14/22 2210)   morphine injection 4 mg (4 mg Intravenous Given 8/15/22 0015)   HYDROmorphone injection 0.5 mg (0.5 mg Intravenous Given 8/15/22 1138)     Medical Decision Making:   History:   Old Medical Records: I decided to obtain old medical records.  Initial Assessment:   71-year-old female with history of hypertension, hyperlipidemia is presents to the emergency department status post fall.  On arrival she is hypotensive, but afebrile and otherwise with normal vital signs.  She is complaining of right ankle pain after fall with swelling and tenderness to her right lateral ankle.  IV fluids started, labs and x-rays ordered.  Differential Diagnosis:   Hypovolemia, UTI, pneumonia, fracture, dislocation, contusion  Clinical Tests:   Lab Tests: Reviewed and Ordered       <> Summary of Lab: Lactate 4.4, suspect due to hypoperfusion with patient's  hypotension.  Radiological Study: Ordered and Reviewed  ED Management:  Patient evaluated by Orthopedics, right ankle was reduced under sedation.  Patient blood pressure improved in the emergency department after IV fluids.  Urinalysis and chest x-ray pending at the time of sign-out to complete infectious workup for admission.  Suspect hypotension secondary to hypovolemia, but will continue to monitor blood pressure overnight.  Patient will also be brought in for pain control and continued monitoring.  Repeat lactate, urinalysis and chest x-ray pending at the time of sign-out.  This will be followed by oncoming team.  Other:   I have discussed this case with another health care provider.            Attending Attestation:   Physician Attestation Statement for Resident:  As the supervising MD   Physician Attestation Statement: I have personally seen and examined this patient.   I agree with the above history. -:   As the supervising MD I agree with the above PE.    As the supervising MD I agree with the above treatment, course, plan, and disposition.  I was personally present during the entire procedure.            Attending ED Notes:   Patient presents with ankle pain after  A fall. Fall likely due to hypotension. Unclear ertiology of ypotension. No apparent infectious etiology of ongoing losses. BP stable in the ED, and fluid responsive. Work up with non specific elevation in lactic.   Ankle fracture noted. eval'd with ortho. Sedated and reduced. I supervised these procedures.   Given patient's presenting hypotension, will admit for monitoring, especially while receive pain control for fracture.       ED Course as of 08/15/22 1518   Sun Aug 14, 2022   2032 BP: 124/71 [KL]   2032 BNP: 45 [KL]   2033 Troponin I: <0.006 [KL]   2033 WBC: 6.89 [KL]   2033 Creatinine(!): 1.5  From 1 a month prior.  [KL]   2049 Lactate, David(!!): 4.5 [KL]      ED Course User Index  [KL] Marisela Neal MD             Clinical Impression:    Final diagnoses:  [I95.9] Hypotension  [W19.XXXA] Fall  [I95.9] Hypotension, unspecified hypotension type (Primary)  [S82.891A] Closed fracture of right ankle, initial encounter          ED Disposition Condition    Observation               Marisela Neal MD  Resident  08/14/22 2252       Marisela Neal MD  Resident  08/14/22 2252       Jaylan Carreon MD  08/15/22 6712

## 2022-08-15 NOTE — SUBJECTIVE & OBJECTIVE
Past Medical History:   Diagnosis Date    Acute gastric ulcer with perforation     Acute kidney failure with tubular necrosis     GABBI (acute kidney injury)     Anxiety     Diarrhea     HLD (hyperlipidemia)     Hypertension     Nausea & vomiting     Peritonitis     PUD (peptic ulcer disease)     Renal disorder     Urine retention     Vascular disorder of intestine, unspecified        Past Surgical History:   Procedure Laterality Date    APPENDECTOMY      APPLICATION OF WOUND VACUUM-ASSISTED CLOSURE DEVICE  1/21/2021    Procedure: APPLICATION, WOUND VAC;  Surgeon: Abel Francis MD;  Location: [address];  Service: General;;    APPLICATION OF WOUND VACUUM-ASSISTED CLOSURE DEVICE  1/25/2021    Procedure: APPLICATION, WOUND VAC;  Surgeon: Abel Francis MD;  [facility];  Service: General;;    CLOSURE OF PERFORATED ULCER OF DUODENUM USING OMENTAL PATCH  1/21/2021    Procedure: CLOSURE, ULCER, PERFORATED, DUODENUM, USING OMENTAL PATCH;  Surgeon: Abel Francis MD;  Location: [facility] OR [address] [place] [address];  Service: General;;    COLECTOMY, RIGHT  1/21/2021    Procedure: COLECTOMY, RIGHT;  Surgeon: Abel Francis MD;  Location: [address];  Service: General;;    cyst removed from neck      GASTROSTOMY  1/25/2021    Procedure: GASTROSTOMY;  Surgeon: Abel Francis MD;  Location: [address];  Service: General;;    HYSTERECTOMY  1986    ILEOSTOMY CLOSURE  10/13/2021    Procedure: CLOSURE, ILEOSTOMY;  Surgeon: Abel Francis MD;  Location: [address];  Service: General;;    LYSIS OF ADHESIONS  10/13/2021    Procedure: LYSIS, ADHESIONS;  Surgeon: Abel Francis MD;  Location: [address];  Service: General;;    OOPHORECTOMY Bilateral 1989    OPEN REDUCTION AND INTERNAL FIXATION (ORIF) OF LISFRANC INJURY OF FOOT Left 10/11/2019    Procedure: ORIF, LISFRANC INJURY, FOOT;  Surgeon: Ferdinand Stauffer DPM;  Location: [facility] OR [address] [facility];  Service: Podiatry;  Laterality: Left;     TONSILLECTOMY, ADENOIDECTOMY         Review of patient's allergies indicates:  No Known Allergies    No current facility-administered medications on file prior to encounter.     Current Outpatient Medications on File Prior to Encounter   Medication Sig    acetaminophen (TYLENOL) 325 MG tablet Take 2 tablets (650 mg total) by mouth every 6 (six) hours as needed for Pain.    ascorbic acid, vitamin C, (VITAMIN C) 500 MG tablet Take 500 mg by mouth once daily.     calcium carbonate (OS-TORI) 600 mg (1,500 mg) Tab Take 600 mg by mouth 2 (two) times daily with meals.    clonazePAM (KLONOPIN) 1 MG tablet Take 1 tablet (1 mg total) by mouth 2 (two) times daily as needed for Anxiety. (Patient taking differently: Take 1 mg by mouth 2 (two) times daily. )    gabapentin (NEURONTIN) 300 MG capsule Take 1 capsule (300 mg total) by mouth 3 (three) times daily. for 7 days    lisinopriL-hydrochlorothiazide (PRINZIDE,ZESTORETIC) 20-12.5 mg per tablet Take 1 tablet by mouth once daily.    loperamide (IMODIUM) 2 mg capsule Take 1 capsule (2 mg total) by mouth 2 (two) times a day.    multivitamin (ONE DAILY MULTIVITAMIN) per tablet Take 1 tablet by mouth once daily.    oxyCODONE (ROXICODONE) 5 MG immediate release tablet Take 1 tablet (5 mg total) by mouth every 4 (four) hours as needed.    potassium chloride SA (K-DUR,KLOR-CON) 20 MEQ tablet Take 1 tablet (20 mEq total) by mouth once daily.    tamsulosin (FLOMAX) 0.4 mg Cap Take 1 capsule (0.4 mg total) by mouth once daily. (Patient not taking: Reported on 10/20/2021)     Family History       Problem Relation (Age of Onset)    Hypertension Mother          Tobacco Use    Smoking status: Current Every Day Smoker    Smokeless tobacco: Never Used   Substance and Sexual Activity    Alcohol use: Not Currently     Comment: socially    Drug use: No    Sexual activity: Yes     Partners: Male     Comment:      Review of Systems   Constitutional:  Negative for activity change, chills,  fatigue and fever.   HENT:  Negative for congestion and trouble swallowing.    Eyes:  Negative for photophobia and visual disturbance.   Respiratory:  Negative for cough, chest tightness, shortness of breath and wheezing.    Cardiovascular:  Negative for chest pain, palpitations and leg swelling.   Gastrointestinal:  Negative for abdominal distention, abdominal pain, constipation, diarrhea, nausea and vomiting.   Endocrine: Negative for polyphagia and polyuria.   Genitourinary:  Negative for decreased urine volume, difficulty urinating, dysuria, flank pain, frequency, menstrual problem and vaginal bleeding.   Musculoskeletal:  Positive for arthralgias, back pain (chronic) and gait problem.   Skin:  Positive for color change. Negative for rash.   Neurological:  Positive for dizziness and light-headedness. Negative for seizures, syncope, speech difficulty, weakness and headaches.   Psychiatric/Behavioral:  Negative for behavioral problems, confusion and decreased concentration.    Objective:     Vital Signs (Most Recent):  Temp: 98.4 °F (36.9 °C) (08/14/22 1857)  Pulse: 80 (08/14/22 2211)  Resp: 17 (08/15/22 0015)  BP: (!) 179/58 (08/14/22 2211)  SpO2: 98 % (08/14/22 2211)   Vital Signs (24h Range):  Temp:  [98.4 °F (36.9 °C)] 98.4 °F (36.9 °C)  Pulse:  [] 80  Resp:  [16-39] 17  SpO2:  [97 %-100 %] 98 %  BP: ()/() 179/58     Weight: 54.9 kg (121 lb)  Body mass index is 20.77 kg/m².    Physical Exam  Vitals and nursing note reviewed.   Constitutional:       General: She is not in acute distress.     Appearance: She is well-developed.      Comments: Appears to be uncomfortable 2/2 pain   HENT:      Head: Normocephalic and atraumatic.      Mouth/Throat:      Pharynx: No oropharyngeal exudate.   Eyes:      Extraocular Movements: Extraocular movements intact.      Conjunctiva/sclera: Conjunctivae normal.   Cardiovascular:      Rate and Rhythm: Normal rate and regular rhythm.      Heart sounds: Normal  heart sounds.   Pulmonary:      Effort: Pulmonary effort is normal. No respiratory distress.      Breath sounds: Normal breath sounds. No wheezing.   Abdominal:      General: Bowel sounds are normal. There is no distension.      Palpations: Abdomen is soft.      Tenderness: There is no abdominal tenderness.   Musculoskeletal:         General: No tenderness. Normal range of motion.      Cervical back: Normal range of motion and neck supple.      Comments: Exam limited 2/2 RLE in splint   Lymphadenopathy:      Cervical: No cervical adenopathy.   Skin:     General: Skin is warm and dry.      Capillary Refill: Capillary refill takes less than 2 seconds.      Findings: No rash.      Comments: Good perfusion to RLE, sensation intact   Neurological:      Mental Status: She is alert and oriented to person, place, and time.      Cranial Nerves: No cranial nerve deficit.      Sensory: No sensory deficit.      Coordination: Coordination normal.   Psychiatric:         Behavior: Behavior normal.         Thought Content: Thought content normal.         Judgment: Judgment normal.           Significant Labs: All pertinent labs within the past 24 hours have been reviewed.  CBC:   Recent Labs   Lab 08/14/22 1933   WBC 6.89   HGB 13.3   HCT 39.7        CMP:   Recent Labs   Lab 08/14/22 1933      K 4.5      CO2 19*   GLU 79   BUN 33*   CREATININE 1.5*   CALCIUM 8.9   PROT 6.3   ALBUMIN 3.7   BILITOT 0.7   ALKPHOS 78   AST 30   ALT 35   ANIONGAP 14       Significant Imaging: I have reviewed all pertinent imaging results/findings within the past 24 hours.  X-Ray Chest AP Portable  Narrative: EXAMINATION:  XR CHEST AP PORTABLE    CLINICAL HISTORY:  Hypotension, unspecified    TECHNIQUE:  Single frontal view of the chest was performed.    COMPARISON:  02/01/2021    FINDINGS:  The lungs are clear, with normal appearance of pulmonary vasculature and no pleural effusion or pneumothorax.    The cardiac silhouette is normal  in size. The hilar and mediastinal contours are unremarkable.    Bones are intact.  Impression: No acute abnormality.    Electronically signed by: Kojo Lozano  Date:    08/14/2022  Time:    23:13  X-Ray Ankle Complete Right  Narrative: EXAMINATION:  XR ANKLE COMPLETE 3 VIEW RIGHT    CLINICAL HISTORY:  post-red;    TECHNIQUE:  AP, lateral, and oblique images of the right ankle were performed.    COMPARISON:  08/14/2022 21:05 hours.    FINDINGS:  Improved position and alignment of previously described minimally displaced Gonaslez B fracture distal right fibula.  Interval decreased medial widening of the ankle mortise compared to prior following closed reduction and splinting.  Impression: As above.    Electronically signed by: Mark Bolanos MD  Date:    08/14/2022  Time:    22:11  X-Ray Tibia Fibula 2 View Right  Narrative: EXAMINATION:  XR ANKLE COMPLETE 3 VIEW RIGHT; XR FOOT COMPLETE 3 VIEW RIGHT; XR TIBIA FIBULA 2 VIEW RIGHT    CLINICAL HISTORY:  Unspecified fall, initial encounter    TECHNIQUE:  AP, lateral, and oblique images of the right ankle and right foot were performed.  AP and lateral views right tibia and fibula were performed.    COMPARISON:  None    FINDINGS:  Gonsalez B spiral fracture distal fibula with the distal fracture fragment minimally displaced laterally.  No additional acute fracture.  No dislocation.  Minimal widening of the medial ankle mortise.  Talar dome is maintained.  Plantar calcaneal spur.  Lisfranc joints appear congruent.  Advanced degenerative changes at the 1st MTP joint with joint space narrowing, spurring and sclerosis.  Soft tissue swelling laterally over the ankle.  Impression: Gonsalez B spiral fracture distal fibula with the distal fracture fragment minimally displaced laterally.  Soft tissue swelling laterally over the ankle.    No additional acute fracture. No dislocation.    Minimal widening of the medial ankle mortise.    Advanced degenerative changes at the 1st MTP joint  with joint space narrowing, spurring and sclerosis.    Electronically signed by: Mark Bolanos MD  Date:    08/14/2022  Time:    21:32  X-Ray Foot Complete Right  Narrative: EXAMINATION:  XR ANKLE COMPLETE 3 VIEW RIGHT; XR FOOT COMPLETE 3 VIEW RIGHT; XR TIBIA FIBULA 2 VIEW RIGHT    CLINICAL HISTORY:  Unspecified fall, initial encounter    TECHNIQUE:  AP, lateral, and oblique images of the right ankle and right foot were performed.  AP and lateral views right tibia and fibula were performed.    COMPARISON:  None    FINDINGS:  Gonsalez B spiral fracture distal fibula with the distal fracture fragment minimally displaced laterally.  No additional acute fracture.  No dislocation.  Minimal widening of the medial ankle mortise.  Talar dome is maintained.  Plantar calcaneal spur.  Lisfranc joints appear congruent.  Advanced degenerative changes at the 1st MTP joint with joint space narrowing, spurring and sclerosis.  Soft tissue swelling laterally over the ankle.  Impression: Gonsalez B spiral fracture distal fibula with the distal fracture fragment minimally displaced laterally.  Soft tissue swelling laterally over the ankle.    No additional acute fracture. No dislocation.    Minimal widening of the medial ankle mortise.    Advanced degenerative changes at the 1st MTP joint with joint space narrowing, spurring and sclerosis.    Electronically signed by: Mark Bolanos MD  Date:    08/14/2022  Time:    21:32  X-Ray Ankle Complete Right  Narrative: EXAMINATION:  XR ANKLE COMPLETE 3 VIEW RIGHT; XR FOOT COMPLETE 3 VIEW RIGHT; XR TIBIA FIBULA 2 VIEW RIGHT    CLINICAL HISTORY:  Unspecified fall, initial encounter    TECHNIQUE:  AP, lateral, and oblique images of the right ankle and right foot were performed.  AP and lateral views right tibia and fibula were performed.    COMPARISON:  None    FINDINGS:  Gonsalez B spiral fracture distal fibula with the distal fracture fragment minimally displaced laterally.  No additional acute  fracture.  No dislocation.  Minimal widening of the medial ankle mortise.  Talar dome is maintained.  Plantar calcaneal spur.  Lisfranc joints appear congruent.  Advanced degenerative changes at the 1st MTP joint with joint space narrowing, spurring and sclerosis.  Soft tissue swelling laterally over the ankle.  Impression: Gonsalez B spiral fracture distal fibula with the distal fracture fragment minimally displaced laterally.  Soft tissue swelling laterally over the ankle.    No additional acute fracture. No dislocation.    Minimal widening of the medial ankle mortise.    Advanced degenerative changes at the 1st MTP joint with joint space narrowing, spurring and sclerosis.    Electronically signed by: Mark Bolanos MD  Date:    08/14/2022  Time:    21:32

## 2022-08-15 NOTE — ASSESSMENT & PLAN NOTE
Hypotension   - suspect 2/2 hypotension/ hypovolemia  - BP improved s/p IV fluids  - check 2D echo   - no acute structural changes   - orthostatic vitals q shift  - telemetry

## 2022-08-15 NOTE — CONSULTS
[facility] Dept  Orthopedics  Consult Note    Patient Name: Juan Antonio Rowe  MRN: [id number]  Admission Date: 8/14/2022  Hospital Length of Stay: 0 days  Attending Provider: Jaylan Carreon MD  Primary Care Provider: Brendon Hardy MD        Inpatient consult to Orthopedic Surgery  Consult performed by: Rodolfo Farias MD  Consult ordered by: Marisela Neal MD        Subjective:     Principal Problem:Hypotension    Chief Complaint:   Chief Complaint   Patient presents with    Fall     Fall out of bed. Dizzy. Right ankle deformity        HPI: Juan Antonio Rowe is a 71 y.o. female with PMH significant for previous L lisfranc injury s/p operative fixation in 2019 with Dr. Stauffer, HTN, and SBO presenting with right ankle pain after a fall. Pt states she was rising from a seated position when she twisted her ankle. She had the immediate onset of pain and deformity. She had to scoot down the stairs and then used a boot from a previous injury/encounter onto her right foot before EMS was called. Reports pain over the lateral malleolus. Patient denies any head trauma or LOC. The patient denies prior hx of falls. Patient denies numbness and tingling. Denies any other musculoskeletal pain or injuries. No known history of prior right ankle injury or surgery.  Walks w/out assisted devices at baseline. Doesn't take any anticoagulation at baseline.   They deny IV drug use.  They report tobacco use, 1/2 PPD currently, tobacco use since age 21.   They report occasional alcohol use.   They deny immunosuppressant medications.  They deny chemotherapy.  They deny radiation therapy.   She lives alone at this time.        Past Medical History:   Diagnosis Date    Acute gastric ulcer with perforation     Acute kidney failure with tubular necrosis     GABBI (acute kidney injury)     Anxiety     Diarrhea     HLD (hyperlipidemia)     Hypertension     Nausea & vomiting     Peritonitis     PUD (peptic ulcer disease)     Renal  disorder     Urine retention     Vascular disorder of intestine, unspecified        Past Surgical History:   Procedure Laterality Date    APPENDECTOMY      APPLICATION OF WOUND VACUUM-ASSISTED CLOSURE DEVICE  1/21/2021    Procedure: APPLICATION, WOUND VAC;  Surgeon: Abel Francis MD;  [facility];  Service: General;;    APPLICATION OF WOUND VACUUM-ASSISTED CLOSURE DEVICE  1/25/2021    Procedure: APPLICATION, WOUND VAC;  Surgeon: Abel Francis MD;  [facility];  Service: General;;    CLOSURE OF PERFORATED ULCER OF DUODENUM USING OMENTAL PATCH  1/21/2021    Procedure: CLOSURE, ULCER, PERFORATED, DUODENUM, USING OMENTAL PATCH;  Surgeon: Abel Francis MD;  Location: [facility] OR [address] [place] [address];  Service: General;;    COLECTOMY, RIGHT  1/21/2021    Procedure: COLECTOMY, RIGHT;  Surgeon: Abel Francis MD;  Location: [facility] OR [address] [place] [address];  Service: General;;    cyst removed from neck      GASTROSTOMY  1/25/2021    Procedure: GASTROSTOMY;  Surgeon: Abel Francis MD;  [facility];  Service: General;;    HYSTERECTOMY  1986    ILEOSTOMY CLOSURE  10/13/2021    Procedure: CLOSURE, ILEOSTOMY;  Surgeon: Abel Francis MD;  Location: [address];  Service: General;;    LYSIS OF ADHESIONS  10/13/2021    Procedure: LYSIS, ADHESIONS;  Surgeon: Abel Francis MD;  [facility];  Service: General;;    OOPHORECTOMY Bilateral 1989    OPEN REDUCTION AND INTERNAL FIXATION (ORIF) OF LISFRANC INJURY OF FOOT Left 10/11/2019    Procedure: ORIF, LISFRANC INJURY, FOOT;  Surgeon: Ferdinand Stauffer DPM;  Location: [facility] OR [facility];  Service: Podiatry;  Laterality: Left;    TONSILLECTOMY, ADENOIDECTOMY         Review of patient's allergies indicates:  No Known Allergies    Current Facility-Administered Medications   Medication    morphine injection 4 mg     Current Outpatient Medications   Medication Sig    acetaminophen (TYLENOL) 325 MG tablet Take 2 tablets  (650 mg total) by mouth every 6 (six) hours as needed for Pain.    ascorbic acid, vitamin C, (VITAMIN C) 500 MG tablet Take 500 mg by mouth once daily.     calcium carbonate (OS-TORI) 600 mg (1,500 mg) Tab Take 600 mg by mouth 2 (two) times daily with meals.    clonazePAM (KLONOPIN) 1 MG tablet Take 1 tablet (1 mg total) by mouth 2 (two) times daily as needed for Anxiety. (Patient taking differently: Take 1 mg by mouth 2 (two) times daily. )    gabapentin (NEURONTIN) 300 MG capsule Take 1 capsule (300 mg total) by mouth 3 (three) times daily. for 7 days    lisinopriL-hydrochlorothiazide (PRINZIDE,ZESTORETIC) 20-12.5 mg per tablet Take 1 tablet by mouth once daily.    loperamide (IMODIUM) 2 mg capsule Take 1 capsule (2 mg total) by mouth 2 (two) times a day.    multivitamin (ONE DAILY MULTIVITAMIN) per tablet Take 1 tablet by mouth once daily.    oxyCODONE (ROXICODONE) 5 MG immediate release tablet Take 1 tablet (5 mg total) by mouth every 4 (four) hours as needed.    potassium chloride SA (K-DUR,KLOR-CON) 20 MEQ tablet Take 1 tablet (20 mEq total) by mouth once daily.    tamsulosin (FLOMAX) 0.4 mg Cap Take 1 capsule (0.4 mg total) by mouth once daily. (Patient not taking: Reported on 10/20/2021)     Family History       Problem Relation (Age of Onset)    Hypertension Mother          Tobacco Use    Smoking status: Current Every Day Smoker    Smokeless tobacco: Never Used   Substance and Sexual Activity    Alcohol use: Not Currently     Comment: socially    Drug use: No    Sexual activity: Yes     Partners: Male     Comment:      ROS  Constitutional: negative for fevers or chills  Eyes: negative visual changes or eye discharge  ENT: negative for ear pain or sore throat  Respiratory: negative for shortness of breath or cough  Cardiovascular: negative for chest pain or palpitations  Gastrointestinal: negative for abdominal pain, nausea, or vomiting, positive for diarrhea   Genitourinary: negative  "for dysuria and flank pain  Neurological: negative for headaches or dizziness  Musculoskeletal: positive for right ankle pain    Objective:     Vital Signs (Most Recent):  Temp: 98.4 °F (36.9 °C) (08/14/22 1857)  Pulse: 80 (08/14/22 2211)  Resp: 16 (08/14/22 2210)  BP: (!) 179/58 (08/14/22 2211)  SpO2: 98 % (08/14/22 2211)   Vital Signs (24h Range):  Temp:  [98.4 °F (36.9 °C)] 98.4 °F (36.9 °C)  Pulse:  [] 80  Resp:  [16-39] 16  SpO2:  [97 %-100 %] 98 %  BP: ()/() 179/58     Weight: 54.9 kg (121 lb)     Body mass index is 20.77 kg/m².      Intake/Output Summary (Last 24 hours) at 8/14/2022 2350  Last data filed at 8/14/2022 2028  Gross per 24 hour   Intake 999 ml   Output --   Net 999 ml       Ortho/SPM Exam  Gen:  No acute distress, well-developed, well nourished.  CV:  Peripherally well-perfused.   Respiratory:  Normal respiratory effort. No accessory muscle use.   Head/Neck:  Normocephalic.  Atraumatic. Sclera anicteric. TM. Neck supple.  Neuro: CN 2-12 grossly intact. No FND. Awake. Alert. Oriented to person, place, time, and situation.  Abdomen: Soft, NTND.      MSK:  Right Upper extremity  Inspection  - Skin intact throughout, no open wounds  - No swelling  - No ecchymosis, erythema, or signs of cellulitis  Palpation  - NonTTP throughout, no palpable abnormality  Range of motion  - AROM and PROM of the shoulder, elbow, wrist, and hand intact without pain  Stability  - No evidence of joint dislocation or abnormal laxity  Neurovascular  - AIN/PIN/Radial/Median/Ulnar Nerves assessed in isolation without deficit  - Able to give thumbs up, make "OK" sign, cross IF/LF, abduct/adduct fingers, make fist  - SILT throughout  - Compartments soft  - Radial artery palpated  - Muscle tone normal    Left Upper extremity  Inspection  - Skin intact throughout, no open wounds  - No swelling  - No ecchymosis, erythema, or signs of cellulitis  Palpation  - NonTTP throughout, no palpable abnormality  Range of " "motion  - AROM and PROM of the shoulder, elbow, wrist, and hand intact without pain  Stability  - No evidence of joint dislocation or abnormal laxity  Neurovascular  - AIN/PIN/Radial/Median/Ulnar Nerves assessed in isolation without deficit  - Able to give thumbs up, make "OK" sign, cross IF/LF, abduct/adduct fingers, make fist  - SILT throughout  - Compartments soft  - Radial artery palpated  - Muscle tone normal      Left Lower Extremity  Inspection  - Skin intact throughout, no open wounds  - No swelling  - No ecchymosis, erythema, or signs of cellulitis  Palpation  - NonTTP throughout, no palpable abnormality  Range of motion  - AROM and PROM of the hip, knee, ankle, and foot intact without pain  Stability  - No evidence of joint dislocation or abnormal laxity  Neurovascular  - TA/EHL/Gastroc/FHL assessed in isolation without deficit  - SILT throughout  - Compartments soft  - DP palpated   - Muscle tone normal    Right Lower Extremity  Inspection  - Skin intact throughout, no open wounds  - Edema to lateral ankle  - No ecchymosis, erythema, or signs of cellulitis  Palpation  - TTP to lateral mal and diffusely throughout ankle  Range of motion  - AROM and PROM of the hip, knee, and foot intact without pain, minimal ROM of ankle due to pain but DF and PF remain intact  Stability  - No evidence of joint dislocation   Neurovascular  - TA/EHL/Gastroc/FHL assessed in isolation without deficit  - SILT throughout  - Compartments soft  - DP palpated   - Muscle tone normal      Significant Labs: CBC:   Recent Labs   Lab 08/14/22 1933   WBC 6.89   HGB 13.3   HCT 39.7        CMP:   Recent Labs   Lab 08/14/22 1933      K 4.5      CO2 19*   GLU 79   BUN 33*   CREATININE 1.5*   CALCIUM 8.9   PROT 6.3   ALBUMIN 3.7   BILITOT 0.7   ALKPHOS 78   AST 30   ALT 35   ANIONGAP 14     All pertinent labs within the past 24 hours have been reviewed.    Significant Imaging: X-Ray: I have reviewed all pertinent " results/findings and my personal findings are:  lateral malleolus fracture with lateral subluxation of the talus    Assessment/Plan:     Closed fracture of distal end of right fibula  Juan Antonio Rowe is a 71 y.o. female with a patel B fibula fracture with lateral subluxation of the talus.    - Reduced and splinted at bedside, see procedure note  - NWB RLE, ice and elevation  - DVT PPx: per primary  - PT/OT for ambulation assistance and training while in obs  - Follow up in orthopedic clinic in one week, pt will be contacted with appointment details      Procedure Note: Right ankle reduction under conscious sedation  All risks, benefits, and alternatives to treatment explained to patient. Verbalized consent to proceed was given by patient. Time out was performed and patient name, , site, and procedure were confirmed. Patient was sedated by ER staff physician. The right ankle was reduced under fluoroscopy. A short leg splint was applied in typical fashion. Post-reduction films were performed and confirmed adequate reduction. Patient tolerated procedure well. No complications from sedation were encountered by the ED staff physician during the procedure.             Rodolfo Farias MD  Orthopedics  [facility]

## 2022-08-15 NOTE — PHARMACY MED REC
"Admission Medication History     The home medication history was taken by Kaity Garcia.    You may go to "Admission" then "Reconcile Home Medications" tabs to review and/or act upon these items.      The home medication list has been updated by the Pharmacy department.    Please read ALL comments highlighted in yellow.    Please address this information as you see fit.     Feel free to contact us if you have any questions or require assistance.      The medications listed below were removed from the home medication list. Please reorder if appropriate:  Patient reports no longer taking the following medication(s):   ASCORBIC ACID 500 MG   OXYCODONE 5 MG IR   TAMSULOSIN 0.4 MG      Medications listed below were obtained from: Patient/family  Medication Sig    acetaminophen (TYLENOL) 325 MG tablet Take 2 tablets (650 mg total) by mouth every 6 (six) hours as needed for Pain.    clonazePAM (KLONOPIN) 1 MG tablet Take 1 mg by mouth 2 (two) times daily.    docosahexaenoic acid/epa (FISH OIL ORAL) Take 1 capsule by mouth once daily.    gabapentin (NEURONTIN) 600 MG tablet Take 600 mg by mouth 3 (three) times daily as needed (back pain).    HYDROcodone-acetaminophen (NORCO) 7.5-325 mg per tablet Take 1 tablet by mouth every 4 (four) hours as needed for Pain.    lisinopriL-hydrochlorothiazide (PRINZIDE,ZESTORETIC) 20-12.5 mg per tablet Take 1 tablet by mouth 2 (two) times a day.    loperamide (IMODIUM) 2 mg capsule Take 2 mg by mouth 2 (two) times daily as needed for Diarrhea.    MAGNESIUM ORAL Take 1 tablet by mouth once daily.    multivitamin (THERAGRAN) per tablet Take 1 tablet by mouth once daily.    potassium chloride SA (K-DUR,KLOR-CON) 20 MEQ tablet Take 1 tablet (20 mEq total) by mouth once daily.    tiZANidine (ZANAFLEX) 4 MG tablet Take 8 mg by mouth 2 (two) times daily as needed for Muscle spasms or Pain.             Kaity Garcia  EXT [id number]                    .          "

## 2022-08-15 NOTE — ASSESSMENT & PLAN NOTE
- initially hypotensive which improved s/p IVFs, then became hypertensive  - suspect BP elevated in the setting of severe pain  - lisinopril-HCTZ restarted, GABBI resolved  - cardiac diet

## 2022-08-15 NOTE — ASSESSMENT & PLAN NOTE
- presents w/ R ankle pain s/p fall and found to have patel B fibula fracture with lateral subluxation of the talus on imaging  - ortho consulted; successfully reduced and splinted at bedside  - NWDARION RLE  - ice and elevate extremity  - multi-modal pain regimen: tylenol 1g q8hr, 100 mg celecoxib QD, [id number] mg QID, oxycodone 5 mg q4h PRN moderate, 10 mg q4h PRN severe, dilaudid 1 mg q4h PRN break through pain  - PT/OT eval- rehab  - patient prefers home with HH  - discharge pending pain control

## 2022-08-15 NOTE — ASSESSMENT & PLAN NOTE
- lactic 4.4 on admit, suspect 2/2 hypoperfusion from hypotension/ hypovolemia  - afebrile without leukocytosis  - infectious work up unremarkable thus far  - improved to 1.7 s/p 1L IVFs  - cIVFs overnight

## 2022-08-15 NOTE — ASSESSMENT & PLAN NOTE
Resolved  - Cr 1.5 >> 0.9 (baseline ~1.0)  - suspect prerenal from hypoperfusion 2/2 hypotension/ hypovolemia  - volume resuscitated with IVFs  - avoid nephrotoxins, renally dose meds  - trend BMP

## 2022-08-16 VITALS
WEIGHT: 120.81 LBS | RESPIRATION RATE: 20 BRPM | HEIGHT: 64 IN | SYSTOLIC BLOOD PRESSURE: 153 MMHG | DIASTOLIC BLOOD PRESSURE: 70 MMHG | BODY MASS INDEX: 20.63 KG/M2 | OXYGEN SATURATION: 95 % | HEART RATE: 79 BPM | TEMPERATURE: 98 F

## 2022-08-16 LAB
ANION GAP SERPL CALC-SCNC: 8 MMOL/L (ref 8–16)
BASOPHILS # BLD AUTO: 0.02 K/UL (ref 0–0.2)
BASOPHILS NFR BLD: 0.4 % (ref 0–1.9)
BUN SERPL-MCNC: 21 MG/DL (ref 8–23)
CALCIUM SERPL-MCNC: 9.3 MG/DL (ref 8.7–10.5)
CHLORIDE SERPL-SCNC: 107 MMOL/L (ref 95–110)
CO2 SERPL-SCNC: 26 MMOL/L (ref 23–29)
CREAT SERPL-MCNC: 0.8 MG/DL (ref 0.5–1.4)
DIFFERENTIAL METHOD: ABNORMAL
EOSINOPHIL # BLD AUTO: 0.1 K/UL (ref 0–0.5)
EOSINOPHIL NFR BLD: 2 % (ref 0–8)
ERYTHROCYTE [DISTWIDTH] IN BLOOD BY AUTOMATED COUNT: 14.3 % (ref 11.5–14.5)
EST. GFR  (NO RACE VARIABLE): >60 ML/MIN/1.73 M^2
GLUCOSE SERPL-MCNC: 94 MG/DL (ref 70–110)
HCT VFR BLD AUTO: 36.9 % (ref 37–48.5)
HGB BLD-MCNC: 12.2 G/DL (ref 12–16)
IMM GRANULOCYTES # BLD AUTO: 0.01 K/UL (ref 0–0.04)
IMM GRANULOCYTES NFR BLD AUTO: 0.2 % (ref 0–0.5)
LYMPHOCYTES # BLD AUTO: 3 K/UL (ref 1–4.8)
LYMPHOCYTES NFR BLD: 56.3 % (ref 18–48)
MAGNESIUM SERPL-MCNC: 1.8 MG/DL (ref 1.6–2.6)
MCH RBC QN AUTO: 33.2 PG (ref 27–31)
MCHC RBC AUTO-ENTMCNC: 33.1 G/DL (ref 32–36)
MCV RBC AUTO: 100 FL (ref 82–98)
MONOCYTES # BLD AUTO: 0.4 K/UL (ref 0.3–1)
MONOCYTES NFR BLD: 7.6 % (ref 4–15)
NEUTROPHILS # BLD AUTO: 1.8 K/UL (ref 1.8–7.7)
NEUTROPHILS NFR BLD: 33.5 % (ref 38–73)
NRBC BLD-RTO: 0 /100 WBC
PLATELET # BLD AUTO: 238 K/UL (ref 150–450)
PMV BLD AUTO: 9.6 FL (ref 9.2–12.9)
POTASSIUM SERPL-SCNC: 4 MMOL/L (ref 3.5–5.1)
RBC # BLD AUTO: 3.68 M/UL (ref 4–5.4)
SODIUM SERPL-SCNC: 141 MMOL/L (ref 136–145)
WBC # BLD AUTO: 5.4 K/UL (ref 3.9–12.7)

## 2022-08-16 PROCEDURE — 36415 COLL VENOUS BLD VENIPUNCTURE: CPT | Performed by: PHYSICIAN ASSISTANT

## 2022-08-16 PROCEDURE — 63600175 PHARM REV CODE 636 W HCPCS: Performed by: PHYSICIAN ASSISTANT

## 2022-08-16 PROCEDURE — 96376 TX/PRO/DX INJ SAME DRUG ADON: CPT

## 2022-08-16 PROCEDURE — 25000003 PHARM REV CODE 250

## 2022-08-16 PROCEDURE — 99222 PR INITIAL HOSPITAL CARE,LEVL II: ICD-10-PCS | Mod: ,,, | Performed by: NURSE PRACTITIONER

## 2022-08-16 PROCEDURE — 99222 1ST HOSP IP/OBS MODERATE 55: CPT | Mod: ,,, | Performed by: NURSE PRACTITIONER

## 2022-08-16 PROCEDURE — G0378 HOSPITAL OBSERVATION PER HR: HCPCS

## 2022-08-16 PROCEDURE — 99900035 HC TECH TIME PER 15 MIN (STAT)

## 2022-08-16 PROCEDURE — 97530 THERAPEUTIC ACTIVITIES: CPT

## 2022-08-16 PROCEDURE — 25000003 PHARM REV CODE 250: Performed by: PHYSICIAN ASSISTANT

## 2022-08-16 PROCEDURE — 85025 COMPLETE CBC W/AUTO DIFF WBC: CPT | Performed by: PHYSICIAN ASSISTANT

## 2022-08-16 PROCEDURE — 83735 ASSAY OF MAGNESIUM: CPT | Performed by: PHYSICIAN ASSISTANT

## 2022-08-16 PROCEDURE — 80048 BASIC METABOLIC PNL TOTAL CA: CPT | Performed by: PHYSICIAN ASSISTANT

## 2022-08-16 PROCEDURE — 99217 PR OBSERVATION CARE DISCHARGE: CPT | Mod: ,,, | Performed by: NURSE PRACTITIONER

## 2022-08-16 PROCEDURE — 99217 PR OBSERVATION CARE DISCHARGE: ICD-10-PCS | Mod: ,,, | Performed by: NURSE PRACTITIONER

## 2022-08-16 PROCEDURE — 94761 N-INVAS EAR/PLS OXIMETRY MLT: CPT

## 2022-08-16 RX ORDER — ONDANSETRON 4 MG/1
4 TABLET, ORALLY DISINTEGRATING ORAL EVERY 8 HOURS PRN
Qty: 10 TABLET | Refills: 0 | Status: SHIPPED | OUTPATIENT
Start: 2022-08-16

## 2022-08-16 RX ORDER — HYDROCODONE BITARTRATE AND ACETAMINOPHEN 7.5; 325 MG/1; MG/1
1 TABLET ORAL EVERY 4 HOURS PRN
Qty: 18 TABLET | Refills: 0 | Status: SHIPPED | OUTPATIENT
Start: 2022-08-16 | End: 2022-08-19

## 2022-08-16 RX ADMIN — HYDROCHLOROTHIAZIDE 12.5 MG: 12.5 TABLET ORAL at 08:08

## 2022-08-16 RX ADMIN — ACETAMINOPHEN 1000 MG: 500 TABLET ORAL at 02:08

## 2022-08-16 RX ADMIN — METHOCARBAMOL 1000 MG: 500 TABLET ORAL at 08:08

## 2022-08-16 RX ADMIN — OXYCODONE HYDROCHLORIDE 10 MG: 10 TABLET ORAL at 04:08

## 2022-08-16 RX ADMIN — CLONAZEPAM 1 MG: 0.5 TABLET ORAL at 08:08

## 2022-08-16 RX ADMIN — OXYCODONE HYDROCHLORIDE 10 MG: 10 TABLET ORAL at 10:08

## 2022-08-16 RX ADMIN — LOPERAMIDE HYDROCHLORIDE 2 MG: 2 CAPSULE ORAL at 08:08

## 2022-08-16 RX ADMIN — CELECOXIB 100 MG: 100 CAPSULE ORAL at 08:08

## 2022-08-16 RX ADMIN — THERA TABS 1 TABLET: TAB at 08:08

## 2022-08-16 RX ADMIN — LISINOPRIL 20 MG: 20 TABLET ORAL at 08:08

## 2022-08-16 RX ADMIN — METHOCARBAMOL 1000 MG: 500 TABLET ORAL at 12:08

## 2022-08-16 RX ADMIN — HYDROMORPHONE HYDROCHLORIDE 1 MG: 1 INJECTION, SOLUTION INTRAMUSCULAR; INTRAVENOUS; SUBCUTANEOUS at 05:08

## 2022-08-16 NOTE — HOSPITAL COURSE
08/15/2022: Bed mobility mod-sv  Sit to stand minx 2ppl.  No gait. Feed setupA. G/H setupA.  and LBD TA.

## 2022-08-16 NOTE — HPI
Per chart review, Juan Antonio Rowe is a 71-year-old female with PMHx of PUD, tobacco abuse, HTN, pneumoperitoneum s/p exploratory laparotomy, modified angelita patch, extended Rt colectomy, washout on 1/21/22 with return to OR on 1/23/22 with L hemicolectomy and closed 1/25/22 with end ileostomy, type 2 NSTEMI, GABBI, recent SBO, and delirium/AMS. Pressented to [facility] on 8/14 after falling out of bed with R ankle injury. She was hypotensive with an elevated lactic acid level in the ED now s/p IVFs. Etiology like hypotension/hypovolemia. R ankle xrays revealed a right patel B fibula fracture with lateral subluxation of the talus. Ortho consulted and fracture reduced at bedside in the ED with split placed. NWB RLE. Hospital course complicated by pain, impaired functional mobility, and resolved GABBI.        Functional History: Patient lives alone in a 2 story apt with bed/bath on 2nd level.  Prior to admission, (I) with ADLs and mobility. DME: , RW

## 2022-08-16 NOTE — PLAN OF CARE
Referral sent to [facility] via [facility].     08/16/22 0843   Post-Acute Status   Post-Acute Authorization Placement   Post-Acute Placement Status Referrals Sent   Discharge Plan   Discharge Plan A Rehab   Discharge Plan B Rehab     Ronal Kim RN,BSN

## 2022-08-16 NOTE — CARE UPDATE
[facility] unable to schedule PCP hospital follow up visit. The PCP office is strictly walk in only, no appoitments

## 2022-08-16 NOTE — PLAN OF CARE
[facility] Tele[facility] ([facility]  Discharge Final Note    Primary Care Provider: Brendon Hardy MD    Expected Discharge Date: 8/16/2022     Future Appointments   Date Time Provider Department Center   8/22/2022  1:00 PM Aki Zavala NP [facility]       Pt discharged home with no services.    Ronal Kim, RN,BSN        Final Discharge Note (most recent)     Final Note - 08/16/22 1537        Final Note    Assessment Type Discharge Planning Assessment     Anticipated Discharge Disposition Home or Self Care     Hospital Resources/Appts/Education Provided Provided patient/caregiver with written discharge plan information;Appointments scheduled and added to AVS        Post-Acute Status    Discharge Delays None known at this time                 Important Message from Medicare             Contact Info     Brendon Hardy MD   Specialty: Internal Medicine   Relationship: PCP - General    [place] [id number]   Phone: [phone]       Next Steps: Follow up in 1 week(s)

## 2022-08-16 NOTE — CONSULTS
Inpatient consult to Physical Medicine Rehab  Consult performed by: Evangelina Vale NP  Consult ordered by: Ana Whitlock MD  Reason for consult: assess rehab needs        Reviewed patient history and current admission.  PM&R following.     ARMANDO Sommer, FNP-C  Physical Medicine & Rehabilitation   08/16/2022

## 2022-08-16 NOTE — ASSESSMENT & PLAN NOTE
-primary team managing  -presented after falling out of bed with R ankle injury  - hypotensive with an elevated lactic acid level in the ED now s/p IVFs  - Etiology like hypotension/hypovolemia

## 2022-08-16 NOTE — NURSING
"Patient initially requesting RW but is now adamantly refusing it. Emi with equipment billing with RW however patient is refusing it. States "I have one at home and my brother went to get it.  I don't need it".  Awaiting faimliy for discharge.  "

## 2022-08-16 NOTE — PT/OT/SLP PROGRESS
Occupational Therapy   Treatment    Name: Juan Antonio Rowe  MRN: [id number]  Admitting Diagnosis:  Pre-syncope       Recommendations:     Discharge Recommendations: rehabilitation facility  Discharge Equipment Recommendations:  walker, rolling  Barriers to discharge:  Inaccessible home environment    Assessment:     Juan Antonio Rowe is a 71 y.o. female with a medical diagnosis of Pre-syncope. Pt tolerated OT session well with good participation. OT provided demonstration and instruction on completing functional mobility, including R LE NWB precautions, with RW. Pt demonstrated good carryover. Pt remains limited in self-care, functional mobility, and ADLs and is currently not performing tasks at [facility]. Performance deficits affecting function are weakness, impaired endurance, impaired self care skills, impaired functional mobility, gait instability, pain, impaired balance.     Rehab Prognosis:  Good; patient would benefit from acute skilled OT services to address these deficits and reach maximum level of function.       Plan:     Patient to be seen 4 x/week to address the above listed problems via self-care/home management, therapeutic activities, therapeutic exercises  · Plan of Care Expires: 09/14/22  · Plan of Care Reviewed with: patient    Subjective     Pain/Comfort:  · Pain Rating 1:  (Pt did not rate)  · Location - Side 1: Right  · Location - Orientation 1: lower  · Location 1: ankle  · Pain Addressed 1: Pre-medicate for activity, Reposition, Distraction, Cessation of Activity  · Pain Rating Post-Intervention 1:  (Pt did not rate)    Objective:     Communicated with: RN prior to session.  Patient found HOB elevated with telemetry upon OT entry to room.    General Precautions: Standard, fall   Orthopedic Precautions:RLE non weight bearing   Braces:    Respiratory Status: Room air     Occupational Performance:     Bed Mobility:    · Patient completed Scooting/Bridging with supervision  · Patient completed Supine to Sit  with supervision     Functional Mobility/Transfers:  · Patient completed Sit <> Stand Transfer with contact guard assistance  with  rolling walker   · Toilet Transfer, including functional ambulation EOB<>toilet, step/hop technique with contact guard assistance with  rolling walker  · Pt utilized step/hop technique in order to maintain WB'ng precautions    Activities of Daily Living:  · Toileting: contact guard assistance for functional balance/endurance as pt manipulated clothing above/below hips in standing with RW      [facility] 6 Click ADL: 21    Treatment & Education:  -Education on energy conservation and task modification to maximize safety and (I) during ADLs and mobility  -Education on WB'ng precautions and modified functional mobility  -Education on importance of OOB activity to improve overall activity tolerance and promote recovery  -Pt educated to call for assistance and to transfer with hospital staff only  -Provided education regarding role of OT, POC, & discharge recommendations with pt verbalizing understanding.  Pt had no further questions & when asked whether there were any concerns pt reported none.    Patient left sitting edge of bed with all lines intact, call button in reach and RN notifiedEducation:      GOALS:   Multidisciplinary Problems     Occupational Therapy Goals        Problem: Occupational Therapy    Goal Priority Disciplines Outcome Interventions   Occupational Therapy Goal     OT, PT/OT Ongoing, Progressing    Description: Goals to be met by: 7 days 8/22/22     Patient will increase functional independence with ADLs by performing:    Pt to complete to t/f commode with supervision.  Pt to complete toileting with supervision.  Pt to complete UE dressing with set-up  Pt to complete LE dressing with SBA with AE as needed.  Pt to complete oral care with set-up seated   Pt to tolerated OOB to chair 3-4 hours daily to increase endurance for functional activity.                    Time  Tracking:     OT Date of Treatment: 08/16/22  OT Start Time: 1401  OT Stop Time: 1410  OT Total Time (min): 9 min    Billable Minutes:Therapeutic Activity 9    OT/IAIN: OT          8/16/2022

## 2022-08-16 NOTE — CONSULTS
[facility] - Tele[facility]down ([place] [address])  Physical Medicine & Rehab  Consult Note    Patient Name: Juan Antonio Rowe  MRN: [id number]  Admission Date: 8/14/2022  Hospital Length of Stay: 0 days  Attending Physician: Ana Whitlock MD     Inpatient consult to Physical Medicine & Rehabilitation  Consult performed by: Evangelina Vale NP  Consult requested by:  Ana Whitlock MD    Reason for Consult:  assess rehabilitation needs    Consults  Subjective:     Principal Problem: Pre-syncope    HPI:  Per chart review, Juan Antonio Rowe is a 71-year-old female with PMHx of PUD, tobacco abuse, HTN, pneumoperitoneum s/p exploratory laparotomy, modified angelita patch, extended Rt colectomy, washout on 1/21/22 with return to OR on 1/23/22 with L hemicolectomy and closed 1/25/22 with end ileostomy, type 2 NSTEMI, GABBI, recent SBO, and delirium/AMS. Pressented to [facility] [place] [facility] on 8/14 after falling out of bed with R ankle injury. She was hypotensive with an elevated lactic acid level in the ED now s/p IVFs. Etiology like hypotension/hypovolemia. R ankle xrays revealed a right patel B fibula fracture with lateral subluxation of the talus. Ortho consulted and fracture reduced at bedside in the ED with split placed. NWB RLE. Hospital course complicated by pain, impaired functional mobility, and resolved GABBI.       Functional History: Patient lives alone in a 2 story apt with bed/bath on 2nd level.  Prior to admission, (I) with ADLs and mobility. DME: KATERINE GERMAIN    Hospital Course: 08/15/2022: Bed mobility mod-sv  Sit to stand minx 2ppl.  No gait. Feed setupA. G/H setupA.  and LBD TA.    Past Medical History:   Diagnosis Date    Acute gastric ulcer with perforation     Acute kidney failure with tubular necrosis     GABBI (acute kidney injury)     Anxiety     Diarrhea     HLD (hyperlipidemia)     Hypertension     Nausea & vomiting     Peritonitis     PUD (peptic ulcer disease)     Renal disorder     Urine retention     Vascular disorder of  intestine, unspecified      Past Surgical History:   Procedure Laterality Date    APPENDECTOMY      APPLICATION OF WOUND VACUUM-ASSISTED CLOSURE DEVICE  1/21/2021    Procedure: APPLICATION, WOUND VAC;  Surgeon: Abel Francis MD;  Location: [facility];  Service: General;;    APPLICATION OF WOUND VACUUM-ASSISTED CLOSURE DEVICE  1/25/2021    Procedure: APPLICATION, WOUND VAC;  Surgeon: Abel Francis MD;  [facility];  Service: General;;    CLOSURE OF PERFORATED ULCER OF DUODENUM USING OMENTAL PATCH  1/21/2021    Procedure: CLOSURE, ULCER, PERFORATED, DUODENUM, USING OMENTAL PATCH;  Surgeon: Abel Francis MD;  [facility];  Service: General;;    COLECTOMY, RIGHT  1/21/2021    Procedure: COLECTOMY, RIGHT;  Surgeon: Abel Francis MD;  [facility];  Service: General;;    cyst removed from neck      GASTROSTOMY  1/25/2021    Procedure: GASTROSTOMY;  Surgeon: Abel Francis MD;  [facility];  Service: General;;    HYSTERECTOMY  1986    ILEOSTOMY CLOSURE  10/13/2021    Procedure: CLOSURE, ILEOSTOMY;  Surgeon: Abel Francis MD;  [facility];  Service: General;;    LYSIS OF ADHESIONS  10/13/2021    Procedure: LYSIS, ADHESIONS;  Surgeon: Abel Francis MD;  [facility];  Service: General;;    OOPHORECTOMY Bilateral 1989    OPEN REDUCTION AND INTERNAL FIXATION (ORIF) OF LISFRANC INJURY OF FOOT Left 10/11/2019    Procedure: ORIF, LISFRANC INJURY, FOOT;  Surgeon: Ferdinand Stauffer DPM;  [facility];  Service: Podiatry;  Laterality: Left;    TONSILLECTOMY, ADENOIDECTOMY       Review of patient's allergies indicates:  No Known Allergies    Scheduled Medications:    acetaminophen  1,000 mg Oral Q8H    celecoxib  100 mg Oral Daily    clonazePAM  1 mg Oral BID    enoxaparin  40 mg Subcutaneous Daily    hydroCHLOROthiazide  12.5 mg Oral BID    lisinopriL  20 mg Oral BID     methocarbamoL  1,000 mg Oral QID    multivitamin  1 tablet Oral Daily       PRN Medications: albuterol-ipratropium, bisacodyL, dextrose 10%, dextrose 10%, glucagon (human recombinant), glucose, glucose, HYDROmorphone, loperamide, melatonin, naloxone, nicotine, ondansetron, oxyCODONE, oxyCODONE, promethazine    Family History       Problem Relation (Age of Onset)    Hypertension Mother          Tobacco Use    Smoking status: Current Every Day Smoker    Smokeless tobacco: Never Used   Substance and Sexual Activity    Alcohol use: Not Currently     Comment: socially    Drug use: No    Sexual activity: Yes     Partners: Male     Comment:      Review of Systems   Constitutional:  Positive for activity change. Negative for fatigue and fever.   HENT:  Negative for trouble swallowing and voice change.    Eyes:  Negative for photophobia and visual disturbance.   Respiratory:  Negative for cough and shortness of breath.    Cardiovascular:  Negative for chest pain and palpitations.   Gastrointestinal:  Negative for abdominal distention, nausea and vomiting.   Genitourinary:  Negative for difficulty urinating and flank pain.   Musculoskeletal:  Positive for arthralgias and gait problem.   Skin:  Negative for color change and rash.   Neurological:  Positive for weakness. Negative for speech difficulty.   Psychiatric/Behavioral:  Negative for agitation and confusion.    Objective:     Vital Signs (Most Recent):  Temp: 97.9 °F (36.6 °C) (08/16/22 0730)  Pulse: 72 (08/16/22 0730)  Resp: 18 (08/16/22 1006)  BP: 138/69 (08/16/22 0730)  SpO2: 97 % (08/16/22 0730)    Vital Signs (24h Range):  Temp:  [97.1 °F (36.2 °C)-98.6 °F (37 °C)] 97.9 °F (36.6 °C)  Pulse:  [59-88] 72  Resp:  [14-20] 18  SpO2:  [94 %-100 %] 97 %  BP: (122-191)/(58-84) 138/69     Body mass index is 20.74 kg/m².    Physical Exam  Vitals reviewed.   Constitutional:       General: She is not in acute distress.     Appearance: She is well-developed.   HENT:       Head: Normocephalic and atraumatic.      Right Ear: External ear normal.      Left Ear: External ear normal.   Eyes:      General:         Right eye: No discharge.         Left eye: No discharge.   Cardiovascular:      Rate and Rhythm: Normal rate.      Pulses: Normal pulses.   Pulmonary:      Effort: Pulmonary effort is normal. No respiratory distress.   Abdominal:      Palpations: Abdomen is soft.      Tenderness: There is no abdominal tenderness.   Musculoskeletal:         General: No deformity.      Cervical back: Neck supple.      Comments: RLE distal splint in place, decreased ROM/tenderness   Skin:     General: Skin is warm and dry.   Neurological:      Mental Status: She is alert and oriented to person, place, and time.      Motor: Weakness present.      Comments: Follows commands    Psychiatric:         Mood and Affect: Mood normal.         Behavior: Behavior normal. Behavior is cooperative.         Cognition and Memory: Cognition is not impaired.       Diagnostic Results:   Labs: Reviewed  X-Ray: Reviewed    Assessment/Plan:     * Pre-syncope  -primary team managing  -presented after falling out of bed with R ankle injury  - hypotensive with an elevated lactic acid level in the ED now s/p IVFs  - Etiology like hypotension/hypovolemia    Closed fracture of distal end of right fibula  -ortho consulted  -R ankle xrays revealed a right patel B fibula fracture with lateral subluxation of the talus  -fracture reduced at bedside in the ED with split placed, NWB RLE    GABBI (acute kidney injury)  -resolved    Impaired functional mobility and endurance  See hospital course for functional status.      Recommendations  -  Encourage mobility, OOB in chair, and early ambulation as appropriate  -  PT/OT evaluate and treat  -  Pain management  -  DVT prophylaxis if appropriate   -  Monitor for and prevent skin breakdown and pressure ulcers  · Early mobility, repositioning/weight shifting every 20-30 minutes when  sitting, turn patient every 2 hours, proper mattress/overlay and chair cushioning, pressure relief/heel protector boots    PM&R Recommendation:     At this time, the PM&R team has reviewed this patient's ongoing medical case including inpatient diagnosis, medical history, clinical examination, labs, vitals, current social and functional history.    Patient has rehab goals but requesting home health therapy and not interested in IRF. Benefits of IRF explained to patient. Primary team notified.     Thank you for your consult.     Evangelina Vale NP  Department of Physical Medicine & Rehab  [facility] - [facility] ([place])

## 2022-08-16 NOTE — PLAN OF CARE
"[facility] - Tele[facility] ([facility])  Discharge Assessment    Primary Care Provider: Brendon Hardy MD     Discharge Assessment (most recent)     BRIEF DISCHARGE ASSESSMENT - 08/16/22 0944        Discharge Planning    Assessment Type Discharge Planning Brief Assessment     Resource/Environmental Concerns none     Support Systems Family members     Equipment Currently Used at Home walker, standard     Current Living Arrangements home/apartment/condo     Patient/Family Anticipates Transition to home     Patient/Family Anticipated Services at Transition none     DME Needed Upon Discharge  crutches     Discharge Plan A Rehab     Discharge Plan B Home               Pt is a 71 y.o. female admitted with Presyncope and has a PMH of HTN, R Colectomy and GABBI. She lives alone in a single story house. He brother lives "around the corner" Prior to her fall she was independent with her ADls and IADl;s. She still drives. [facility] Discharge Packet given to patient and/or family with understanding verbalized.   name and number and estimated discharge date written on white board in patient's room with request to call for any questions or concerns.  Will continue to follow for needs.  Ronal Kim RN,BSN          "

## 2022-08-16 NOTE — SUBJECTIVE & OBJECTIVE
Past Medical History:   Diagnosis Date    Acute gastric ulcer with perforation     Acute kidney failure with tubular necrosis     GABBI (acute kidney injury)     Anxiety     Diarrhea     HLD (hyperlipidemia)     Hypertension     Nausea & vomiting     Peritonitis     PUD (peptic ulcer disease)     Renal disorder     Urine retention     Vascular disorder of intestine, unspecified      Past Surgical History:   Procedure Laterality Date    APPENDECTOMY      APPLICATION OF WOUND VACUUM-ASSISTED CLOSURE DEVICE  1/21/2021    Procedure: APPLICATION, WOUND VAC;  Surgeon: Abel Francis MD;  Location: [address];  Service: General;;    APPLICATION OF WOUND VACUUM-ASSISTED CLOSURE DEVICE  1/25/2021    Procedure: APPLICATION, WOUND VAC;  Surgeon: Abel Francis MD;  [facility];  Service: General;;    CLOSURE OF PERFORATED ULCER OF DUODENUM USING OMENTAL PATCH  1/21/2021    Procedure: CLOSURE, ULCER, PERFORATED, DUODENUM, USING OMENTAL PATCH;  Surgeon: Abel Francis MD;  Location: [facility] OR [address] [place] [address];  Service: General;;    COLECTOMY, RIGHT  1/21/2021    Procedure: COLECTOMY, RIGHT;  Surgeon: Abel Francis MD;  Location: [address];  Service: General;;    cyst removed from neck      GASTROSTOMY  1/25/2021    Procedure: GASTROSTOMY;  Surgeon: Abel Francis MD;  Location: [address];  Service: General;;    HYSTERECTOMY  1986    ILEOSTOMY CLOSURE  10/13/2021    Procedure: CLOSURE, ILEOSTOMY;  Surgeon: Abel Francis MD;  Location: [address];  Service: General;;    LYSIS OF ADHESIONS  10/13/2021    Procedure: LYSIS, ADHESIONS;  Surgeon: Abel Francis MD;  Location: [address];  Service: General;;    OOPHORECTOMY Bilateral 1989    OPEN REDUCTION AND INTERNAL FIXATION (ORIF) OF LISFRANC INJURY OF FOOT Left 10/11/2019    Procedure: ORIF, LISFRANC INJURY, FOOT;  Surgeon: Ferdinand Stauffer DPM;  [facility];  Service: Podiatry;  Laterality: Left;     TONSILLECTOMY, ADENOIDECTOMY       Review of patient's allergies indicates:  No Known Allergies    Scheduled Medications:    acetaminophen  1,000 mg Oral Q8H    celecoxib  100 mg Oral Daily    clonazePAM  1 mg Oral BID    enoxaparin  40 mg Subcutaneous Daily    hydroCHLOROthiazide  12.5 mg Oral BID    lisinopriL  20 mg Oral BID    methocarbamoL  1,000 mg Oral QID    multivitamin  1 tablet Oral Daily       PRN Medications: albuterol-ipratropium, bisacodyL, dextrose 10%, dextrose 10%, glucagon (human recombinant), glucose, glucose, HYDROmorphone, loperamide, melatonin, naloxone, nicotine, ondansetron, oxyCODONE, oxyCODONE, promethazine    Family History       Problem Relation (Age of Onset)    Hypertension Mother          Tobacco Use    Smoking status: Current Every Day Smoker    Smokeless tobacco: Never Used   Substance and Sexual Activity    Alcohol use: Not Currently     Comment: socially    Drug use: No    Sexual activity: Yes     Partners: Male     Comment:      Review of Systems   Constitutional:  Positive for activity change. Negative for fatigue and fever.   HENT:  Negative for trouble swallowing and voice change.    Eyes:  Negative for photophobia and visual disturbance.   Respiratory:  Negative for cough and shortness of breath.    Cardiovascular:  Negative for chest pain and palpitations.   Gastrointestinal:  Negative for abdominal distention, nausea and vomiting.   Genitourinary:  Negative for difficulty urinating and flank pain.   Musculoskeletal:  Positive for arthralgias and gait problem.   Skin:  Negative for color change and rash.   Neurological:  Positive for weakness. Negative for speech difficulty.   Psychiatric/Behavioral:  Negative for agitation and confusion.    Objective:     Vital Signs (Most Recent):  Temp: 97.9 °F (36.6 °C) (08/16/22 0730)  Pulse: 72 (08/16/22 0730)  Resp: 18 (08/16/22 1006)  BP: 138/69 (08/16/22 0730)  SpO2: 97 % (08/16/22 0730)    Vital Signs (24h Range):  Temp:   [97.1 °F (36.2 °C)-98.6 °F (37 °C)] 97.9 °F (36.6 °C)  Pulse:  [59-88] 72  Resp:  [14-20] 18  SpO2:  [94 %-100 %] 97 %  BP: (122-191)/(58-84) 138/69     Body mass index is 20.74 kg/m².    Physical Exam  Vitals reviewed.   Constitutional:       General: She is not in acute distress.     Appearance: She is well-developed.   HENT:      Head: Normocephalic and atraumatic.      Right Ear: External ear normal.      Left Ear: External ear normal.   Eyes:      General:         Right eye: No discharge.         Left eye: No discharge.   Cardiovascular:      Rate and Rhythm: Normal rate.      Pulses: Normal pulses.   Pulmonary:      Effort: Pulmonary effort is normal. No respiratory distress.   Abdominal:      Palpations: Abdomen is soft.      Tenderness: There is no abdominal tenderness.   Musculoskeletal:         General: No deformity.      Cervical back: Neck supple.      Comments: RLE distal splint in place, decreased ROM/tenderness   Skin:     General: Skin is warm and dry.   Neurological:      Mental Status: She is alert and oriented to person, place, and time.      Motor: Weakness present.      Comments: Follows commands    Psychiatric:         Mood and Affect: Mood normal.         Behavior: Behavior normal. Behavior is cooperative.         Cognition and Memory: Cognition is not impaired.     NEUROLOGICAL EXAMINATION:     MENTAL STATUS   Oriented to person, place, and time.     Diagnostic Results:   Labs: Reviewed  X-Ray: Reviewed

## 2022-08-16 NOTE — PLAN OF CARE
Pt alert, stable; scheduled meds given without difficulty; prn pain meds given during shift; pain well controlled; no distress noted during shift; safety maintained.    Problem: Infection  Goal: Absence of Infection Signs and Symptoms  Outcome: Ongoing, Progressing     Problem: Adult Inpatient Plan of Care  Goal: Plan of Care Review  Outcome: Ongoing, Progressing  Goal: Patient-Specific Goal (Individualized)  Outcome: Ongoing, Progressing  Goal: Absence of Hospital-Acquired Illness or Injury  Outcome: Ongoing, Progressing  Goal: Optimal Comfort and Wellbeing  Outcome: Ongoing, Progressing  Goal: Readiness for Transition of Care  Outcome: Ongoing, Progressing     Problem: Fluid and Electrolyte Imbalance (Acute Kidney Injury/Impairment)  Goal: Fluid and Electrolyte Balance  Outcome: Ongoing, Progressing     Problem: Oral Intake Inadequate (Acute Kidney Injury/Impairment)  Goal: Optimal Nutrition Intake  Outcome: Ongoing, Progressing     Problem: Renal Function Impairment (Acute Kidney Injury/Impairment)  Goal: Effective Renal Function  Outcome: Ongoing, Progressing     Problem: Skin Injury Risk Increased  Goal: Skin Health and Integrity  Outcome: Ongoing, Progressing     Problem: Pain Acute  Goal: Acceptable Pain Control and Functional Ability  Outcome: Ongoing, Progressing     Problem: Fall Injury Risk  Goal: Absence of Fall and Fall-Related Injury  Outcome: Ongoing, Progressing

## 2022-08-16 NOTE — ASSESSMENT & PLAN NOTE
-ortho consulted  -R ankle xrays revealed a right patel B fibula fracture with lateral subluxation of the talus  -fracture reduced at bedside in the ED with split placed, DEBI SCHRADER

## 2022-08-18 NOTE — DISCHARGE SUMMARY
[facility] - Telemetry Stepdown ([place] [id number])  [facility] Medicine  Discharge Summary      Patient Name: Juan Antonio Rowe  MRN: [id number]  Patient Class: OP- Observation  Admission Date: 8/14/2022  Hospital Length of Stay: 0 days  Discharge Date and Time: 8/16/2022  3:25 PM  Attending Physician: No att. providers found   Discharging Provider: Sylvia Dominguez NP  Primary Care Provider: Brendon Hardy MD  [place] [facility] [place] Sylvia Dominguez NP    HPI:   Juan Antonio Rowe is a 71 y.o. female with a PMHx of perforated gastric ulcer and colon ischemia s/p ex lap with duodenal ulcer repair and right colectomy, and end ileostomy creation in Jan 2021 and ilestomy reversal (ileosigmoid anastomosis) October 2021, anxiety, HLD, HTN, recent SBO who [place] [facility] [place] s/p fall. Patient got up quickly from a seated position causing her to become lightheaded/ dizzy, and she subsequently twisted and fell onto her right ankle. No actual LOC or head trauma.She endorses immediate pain to her R ankle and inability to ambulate after the incident. She occasionally gets lightheaded when she stands up too quickly, endorses good PO intake recently. She continues to have severe pain in the Ed despite multiple doses of pain medications. Denies fever, chills, N/V, abdominal pain, HA, vision changes, numbness/ tingling, or new back pain.     ED: hypotension to BP 79/43 which improved to SBP 130s s/p 1L IVFs. BP then hypertensive to 219/95 which improved with pain control. No leukocytosis. Lactate 4.5>> 1.79 s/p IVFs. Plain films show right patel B fibula fracture with lateral subluxation of the talus. Ortho consulted and fracture reduced at bedside in the ED.       * No surgery found *      Hospital Course:   71 year old female admitted to observation for right ankle fracture secondary to pre-syncope and fall. Hypotensive in the ED given fluids with improvement. X ray showing right patel B spiral fracture of the  "distal fibula with distal fragment laterally displaced, manually replaced per ortho in the ED. CMP initially showing GABBI, improved after fluid resuscitation. Patient with poor pain control on IV and PO pain medications.  PT and OT consulted.  Patient was recommended for inpatient rehab out however decline.  Patient wishes to go home.  PT recommended walker.  Patient received meds for pain control.  She is medically stable for discharge with outpatient follow-up with Ortho.       Goals of Care Treatment Preferences:  Code Status: Full Code      Consults:   Consults (From admission, onward)        Status Ordering Provider     Inpatient consult to Physical Medicine Rehab  Once        Provider:  (Not yet assigned)    Completed APPLE BARRON     Inpatient consult to Orthopedic Surgery  Once        Provider:  (Not yet assigned)    Completed ZAYDA PHELPS new Assessment & Plan notes have been filed under this hospital service since the last note was generated.  Service: Hospital Medicine    Final Active Diagnoses:    Diagnosis Date Noted POA    PRINCIPAL PROBLEM:  Pre-syncope [R55] 08/14/2022 Yes    Lactic acidosis [E87.2] 08/15/2022 Yes    Hypotension [I95.9] 08/14/2022 Yes    Closed fracture of distal end of right fibula [S82.831A] 08/14/2022 Yes    Anxiety [F41.9]  Yes    GABBI (acute kidney injury) [N17.9] 02/25/2021 Yes    Hypertension [I10]  Yes    Impaired functional mobility and endurance [Z74.09] 02/05/2021 Yes      Problems Resolved During this Admission:       Discharged Condition: stable    Disposition: Home or Self Care    Follow Up:    Patient Instructions:      HME - OTHER     Order Specific Question Answer Comments   Type of Equipment: knee scooter    Height: 5' 4" (1.626 m)    Weight: 54.8 kg (120 lb 13 oz)    Does patient have medical equipment at home? none      WALKER FOR HOME USE     Order Specific Question Answer Comments   Type of Walker: Adult (5'4"-6'6")    With wheels? Yes  " "  Height: 5' 4" (1.626 m)    Weight: 54.8 kg (120 lb 13 oz)    Length of need (1-99 months): 90    Does patient have medical equipment at home? walker, standard    Please check all that apply: Patient is unable to safely ambulate without equipment.    Please check all that apply: Patient's condition impairs ambulation.    Please check all that apply: Patient needs help to get in and out of chair.    Please check all that apply: Walker will be used for gait training.      COMMODE FOR HOME USE     Order Specific Question Answer Comments   Type: Standard    Height: 5' 4" (1.626 m)    Weight: 54.8 kg (120 lb 13 oz)    Does patient have medical equipment at home? walker, standard    Length of need (1-99 months): 90      Ambulatory referral/consult to Orthopedics   Standing Status: Future   Referral Priority: Routine Referral Type: Consultation   Requested Specialty: Orthopedic Surgery   Number of Visits Requested: 1     Ambulatory referral/consult to [facility]   Standing Status: Future   Referral Priority: Routine Referral Type: Consultation   Referral Reason: Specialty Services Required   Number of Visits Requested: 1     Diet Cardiac     Notify your health care provider if you experience any of the following:  redness, tenderness, or signs of infection (pain, swelling, redness, odor or green/yellow discharge around incision site)     Activity as tolerated       Significant Diagnostic Studies: Labs:   BMP:   Recent Labs   Lab 08/16/22  0523   GLU 94      K 4.0      CO2 26   BUN 21   CREATININE 0.8   CALCIUM 9.3   MG 1.8    and CMP   Recent Labs   Lab 08/16/22  0523      K 4.0      CO2 26   GLU 94   BUN 21   CREATININE 0.8   CALCIUM 9.3   ANIONGAP 8       Pending Diagnostic Studies:     None         Medications:  Reconciled Home Medications:      Medication List      START taking these medications    ondansetron 4 MG Tbdl  Commonly known as: ZOFRAN-ODT  Dissolve 1 " tablet (4 mg total) by mouth every 8 (eight) hours as needed (nausea).        CHANGE how you take these medications    clonazePAM 1 MG tablet  Commonly known as: KlonoPIN  Take 1 tablet (1 mg total) by mouth 2 (two) times daily as needed for Anxiety.  What changed: when to take this     loperamide 2 mg capsule  Commonly known as: IMODIUM  Take 1 capsule (2 mg total) by mouth 2 (two) times a day.  What changed:   · when to take this  · reasons to take this        CONTINUE taking these medications    acetaminophen 325 MG tablet  Commonly known as: TYLENOL  Take 2 tablets (650 mg total) by mouth every 6 (six) hours as needed for Pain.     calcium carbonate 600 mg calcium (1,500 mg) Tab  Commonly known as: OS-TORI  Take 600 mg by mouth 2 (two) times daily with meals.     FISH OIL ORAL  Take 1 capsule by mouth once daily.     gabapentin 600 MG tablet  Commonly known as: NEURONTIN  Take 600 mg by mouth 3 (three) times daily as needed (back pain).     HYDROcodone-acetaminophen 7.5-325 mg per tablet  Commonly known as: NORCO  Take 1 tablet by mouth every 4 (four) hours as needed for Pain.     lisinopriL-hydrochlorothiazide 20-12.5 mg per tablet  Commonly known as: PRINZIDE,ZESTORETIC  Take 1 tablet by mouth 2 (two) times a day.     MAGNESIUM ORAL  Take 1 tablet by mouth once daily.     multivitamin per tablet  Commonly known as: THERAGRAN  Take 1 tablet by mouth once daily.     potassium chloride SA 20 MEQ tablet  Commonly known as: K-DUR,KLOR-CON  Take 1 tablet (20 mEq total) by mouth once daily.     tiZANidine 4 MG tablet  Commonly known as: ZANAFLEX  Take 8 mg by mouth 2 (two) times daily as needed for Muscle spasms or Pain.            Indwelling Lines/Drains at time of discharge:   Lines/Drains/Airways     None                 Time spent on the discharge of patient: 35 minutes         Sylvia Dominguez NP  Department of Hospital Medicine  [facility] - Telemetry Stepdown ([place]-[facility])

## 2022-08-22 ENCOUNTER — HOSPITAL ENCOUNTER (OUTPATIENT)
Dept: RADIOLOGY | Facility: HOSPITAL | Age: 72
Discharge: HOME | End: 2022-08-22
Attending: NURSE PRACTITIONER
Payer: MEDICARE

## 2022-08-22 ENCOUNTER — PES CALL (OUTPATIENT)
Dept: ADMINISTRATIVE | Facility: CLINIC | Age: 72
End: 2022-08-22
Payer: MEDICARE

## 2022-08-22 ENCOUNTER — OFFICE VISIT (OUTPATIENT)
Dept: ORTHOPEDICS | Facility: CLINIC | Age: 72
End: 2022-08-22
Payer: MEDICARE

## 2022-08-22 DIAGNOSIS — S82.841A CLOSED BIMALLEOLAR FRACTURE OF RIGHT ANKLE, INITIAL ENCOUNTER: Primary | ICD-10-CM

## 2022-08-22 DIAGNOSIS — M25.571 ACUTE RIGHT ANKLE PAIN: ICD-10-CM

## 2022-08-22 DIAGNOSIS — M25.571 ACUTE RIGHT ANKLE PAIN: Primary | ICD-10-CM

## 2022-08-22 DIAGNOSIS — S82.891A CLOSED FRACTURE OF RIGHT ANKLE, INITIAL ENCOUNTER: ICD-10-CM

## 2022-08-22 PROCEDURE — 73600 XR ANKLE 2 VIEW RIGHT: ICD-10-PCS | Mod: 26,RT,, | Performed by: RADIOLOGY

## 2022-08-22 PROCEDURE — 99213 OFFICE O/P EST LOW 20 MIN: CPT | Mod: S$PBB,,, | Performed by: NURSE PRACTITIONER

## 2022-08-22 PROCEDURE — 99212 OFFICE O/P EST SF 10 MIN: CPT | Mod: PBBFAC | Performed by: NURSE PRACTITIONER

## 2022-08-22 PROCEDURE — 99999 PR PBB SHADOW E&M-EST. PATIENT-LVL II: CPT | Mod: PBBFAC,,, | Performed by: NURSE PRACTITIONER

## 2022-08-22 PROCEDURE — 99213 PR OFFICE/OUTPT VISIT, EST, LEVL III, 20-29 MIN: ICD-10-PCS | Mod: S$PBB,,, | Performed by: NURSE PRACTITIONER

## 2022-08-22 PROCEDURE — 99999 PR PBB SHADOW E&M-EST. PATIENT-LVL II: ICD-10-PCS | Mod: PBBFAC,,, | Performed by: NURSE PRACTITIONER

## 2022-08-22 PROCEDURE — 73600 X-RAY EXAM OF ANKLE: CPT | Mod: TC,RT

## 2022-08-22 PROCEDURE — 73600 X-RAY EXAM OF ANKLE: CPT | Mod: 26,RT,, | Performed by: RADIOLOGY

## 2022-08-22 RX ORDER — CELECOXIB 200 MG/1
200 CAPSULE ORAL DAILY
Qty: 14 CAPSULE | Refills: 0 | Status: SHIPPED | OUTPATIENT
Start: 2022-08-22 | End: 2022-08-22 | Stop reason: CLARIF

## 2022-08-22 RX ORDER — HYDROCODONE BITARTRATE AND ACETAMINOPHEN 7.5; 325 MG/1; MG/1
1 TABLET ORAL EVERY 6 HOURS PRN
Qty: 28 TABLET | Refills: 0 | Status: SHIPPED | OUTPATIENT
Start: 2022-08-22 | End: 2022-08-29 | Stop reason: SDUPTHER

## 2022-08-22 NOTE — PROGRESS NOTES
SUBJECTIVE:     Chief Complaint & History of Present Illness:  Juan Antonio Rowe is a New 71 y.o. year old female patient here for constant right foot/ankle pain which has been present for 1 week.  There is a history of injury.  She reports she jumped out of her bed and got dizzy and fell down.  The fall caused her to twist her ankle.  She immediately had pain in her ankle when trying to stand and went to the ED.  She had an x-ray which showed a Gonsalez B fracture with lateral malleolar widening.  Orthopedics was consulted.  Per their note, she was reduced and placed into a posterior splint with stirrups and told to follow up in the clinic in 1 week for repeat x-rays.    Currently patient reports she is having a lot of pain in her ankle.  She is quite angry that the ED did not discharge her home with enough pain medications and has had to without medication for several days.  She admits she was walking on the splint depsite told not to do so.  She states she lives alone and has no children to help with her care.  She is  and has a brother who can help some of the time.      The pain is located in the medial and lateral aspect of the foot/ankle.  The pain is described as achy, 10/10.  It is aggravated by weight bearing and ROM.  There is not radiation, numbness or tingling into the toes.  Associated symptoms include worsens with activity. Previous treatments include brace and Norco 7.5/325 mg PRN which have provided minimal relief.  There is not a history of previous injury or surgery to the foot.   The patient does use an assistive device.    Review of patient's allergies indicates:  No Known Allergies      Current Outpatient Medications   Medication Sig Dispense Refill    acetaminophen (TYLENOL) 325 MG tablet Take 2 tablets (650 mg total) by mouth every 6 (six) hours as needed for Pain. 30 tablet 0    calcium carbonate (OS-TORI) 600 mg (1,500 mg) Tab Take 600 mg by mouth 2 (two) times daily with meals.       clonazePAM (KLONOPIN) 1 MG tablet Take 1 tablet (1 mg total) by mouth 2 (two) times daily as needed for Anxiety. (Patient taking differently: Take 1 mg by mouth 2 (two) times daily.) 30 tablet 0    docosahexaenoic acid/epa (FISH OIL ORAL) Take 1 capsule by mouth once daily.      gabapentin (NEURONTIN) 600 MG tablet Take 600 mg by mouth 3 (three) times daily as needed (back pain).      lisinopriL-hydrochlorothiazide (PRINZIDE,ZESTORETIC) 20-12.5 mg per tablet Take 1 tablet by mouth 2 (two) times a day.      loperamide (IMODIUM) 2 mg capsule Take 1 capsule (2 mg total) by mouth 2 (two) times a day. (Patient taking differently: Take 2 mg by mouth 2 (two) times daily as needed for Diarrhea.) 14 capsule 0    MAGNESIUM ORAL Take 1 tablet by mouth once daily.      multivitamin (THERAGRAN) per tablet Take 1 tablet by mouth once daily.      ondansetron (ZOFRAN-ODT) 4 MG TbDL Dissolve 1 tablet (4 mg total) by mouth every 8 (eight) hours as needed (nausea). 10 tablet 0    potassium chloride SA (K-DUR,KLOR-CON) 20 MEQ tablet Take 1 tablet (20 mEq total) by mouth once daily. 14 tablet 0    tiZANidine (ZANAFLEX) 4 MG tablet Take 8 mg by mouth 2 (two) times daily as needed for Muscle spasms or Pain.       No current facility-administered medications for this visit.       Past Medical History:   Diagnosis Date    Acute gastric ulcer with perforation     Acute kidney failure with tubular necrosis     GABBI (acute kidney injury)     Anxiety     Diarrhea     HLD (hyperlipidemia)     Hypertension     Nausea & vomiting     Peritonitis     PUD (peptic ulcer disease)     Renal disorder     Urine retention     Vascular disorder of intestine, unspecified        Past Surgical History:   Procedure Laterality Date    APPENDECTOMY      APPLICATION OF WOUND VACUUM-ASSISTED CLOSURE DEVICE  1/21/2021    Procedure: APPLICATION, WOUND VAC;  Surgeon: Abel Francis MD;  Location: [facility] OR [address] [place] [address];  Service: General;;     APPLICATION OF WOUND VACUUM-ASSISTED CLOSURE DEVICE  1/25/2021    Procedure: APPLICATION, WOUND VAC;  Surgeon: Abel Francis MD;  [facility];  Service: General;;    CLOSURE OF PERFORATED ULCER OF DUODENUM USING OMENTAL PATCH  1/21/2021    Procedure: CLOSURE, ULCER, PERFORATED, DUODENUM, USING OMENTAL PATCH;  Surgeon: Abel Francis MD;  [facility];  Service: General;;    COLECTOMY, RIGHT  1/21/2021    Procedure: COLECTOMY, RIGHT;  Surgeon: Abel Francis MD;  [facility];  Service: General;;    cyst removed from neck      GASTROSTOMY  1/25/2021    Procedure: GASTROSTOMY;  Surgeon: Aebl Francis MD;  [facility];  Service: General;;    HYSTERECTOMY  1986    ILEOSTOMY CLOSURE  10/13/2021    Procedure: CLOSURE, ILEOSTOMY;  Surgeon: Abel Francis MD;  [facility];  Service: General;;    LYSIS OF ADHESIONS  10/13/2021    Procedure: LYSIS, ADHESIONS;  Surgeon: Abel Francis MD;  [facility];  Service: General;;    OOPHORECTOMY Bilateral 1989    OPEN REDUCTION AND INTERNAL FIXATION (ORIF) OF LISFRANC INJURY OF FOOT Left 10/11/2019    Procedure: ORIF, LISFRANC INJURY, FOOT;  Surgeon: Ferdinand Stauffer DPM;  Location: [facility] OR [address] [place] [address];  Service: Podiatry;  Laterality: Left;    TONSILLECTOMY, ADENOIDECTOMY         Family History   Problem Relation Age of Onset    Hypertension Mother          Review of Systems:  ROS:  Constitutional: no fever or chills  Eyes: no visual changes  ENT: no nasal congestion or sore throat  Respiratory: no cough or shortness of breath  Cardiovascular: no chest pain or palpitations  Gastrointestinal: no nausea or vomiting, tolerating diet  Genitourinary: no hematuria or dysuria  Integument/Breast: no rash or pruritis  Hematologic/Lymphatic: no easy bruising or lymphadenopathy  Musculoskeletal: right ankle fracture  Neurological: no seizures or tremors  Behavioral/Psych: no  "auditory or visual hallucinations  Endocrine: no heat or cold intolerance      OBJECTIVE:     PHYSICAL EXAM:  Vital Signs (Most Recent)  There were no vitals filed for this visit.     ,   Estimated body mass index is 20.74 kg/m² as calculated from the following:    Height as of 8/15/22: 5' 4" (1.626 m).    Weight as of 8/15/22: 54.8 kg (120 lb 13 oz).   General Appearance: Well nourished, well developed, in no acute distress.  HENT: Normal cephalic, oropharynx pink and moist  Eyes: PERRLA bilaterally and EOM x 4  Respiratory: Even and unlabored  Skin: Warm and Dry. Bruising on the lateral ankle  Psychiatric: AAO x 4, Mood & affect are normal.    right  Foot/Ankle    General appearance: no acute distress, alert/oriented x3, appropriate mood and affect, looks stated age and well nourished  The examination was performed out of splint/cast  Skin: bruising  Swelling: minimal  Warmth: no warmth  Tenderness: mild and moderate  ROM: limited by pain  Strength: limited by pain  Stability: stable to testing and Cotton test: negative  Crepitus: no  Neurological Exam: normal  Vascular Exam: normal and pulse present    soft tissue swelling noted over the lateral ankle with tenderness      RADIOGRAPHS:  X-ray of the right ankle non standing obtained, findings shows a distal fibula fracture at the joint line with mild lateral widening consistent with a bimalleolar ankle fracture.  All radiographs were personally reviewed by me.    ASSESSMENT/PLAN:       ICD-10-CM ICD-9-CM   1. Closed bimalleolar fracture of right ankle, initial encounter  S82.841A 824.4       Plan:  -Juan Antonio Rowe presents to clinic today with c/c right bimalleolar ankle fracture for the past 1 week.  -X-ray as above.  I discussed x-ray findings with Dr. Bright.  He reviewed her prior x-rays as well as the one today and has recommended surgical fixation.  Asked to place her on Friday with Dr. Benton.  -Recommend RICE therapy.  -I explained the recommendations " with patient, she has requested something for her pain.  I advised her that I will be prescribing her something for her pain.  I explained the pain management, per clinic guidelines and she said she only wants opioids.  She explains that she cannot use NSAIDs due to previous GIB.  -In review of her records, there is documentation of prolong QT interval and therefore Tramadol is not recommended.  QTC dated 2/16/22 is 445 ms.  She had a normal Echo on 8/15/22.  -Patient was then placed into a posterior splint with stirrups and Norco 7.5/325 mg q6h PRN was called in for her.  In review of her home record, she is currently on Zanaflex and has Neurontin 600 mg tid and therefore these class of medications were not needed.  -Consents were signed.    Prior to completing her note and at the clinical day, the patient returned to the desk indicating she does NOT want to proceed with surgery.  She said she will call me the following day to discuss further.  Case request has not been placed and will discuss further with her when she calls the clinic.

## 2022-08-24 ENCOUNTER — TELEPHONE (OUTPATIENT)
Dept: ORTHOPEDICS | Facility: CLINIC | Age: 72
End: 2022-08-24
Payer: MEDICARE

## 2022-08-24 NOTE — TELEPHONE ENCOUNTER
"Spoke with patient, advised her I will see her as scheduled and repeat standing x-rays.  Based on those x-ray findings will determine if I can place her into a boot or will have to apply a cast.  She verb. Understanding.  She also reported her brother has gotten her a knee scooter which she has been using to get around.  I cautioned her to be careful with the knee scooter as it can lead to falls, especially when going around corners.  I also reminded her that she needs to remain NWB to her LLE.  She said she has and reports she feels like her "pain has gotten better".    ----- Message from Nikky Forrest MA sent at 8/24/2022 10:23 AM CDT -----  Regarding: FW: DARLENE Prabhakar pt would like a callback.  ----- Message -----  From: Felicia Castro  Sent: 8/24/2022   8:19 AM CDT  To: Lucas DUNCAN Staff  Subject: ADVISE                                           Pt stated staff - Nikky was suppose to call her to schedule an appointment for next week to be fitted w/ a boot for her right ankle due to not having surgery Next available appointment - 9/12/2022 Pt ask for a call back from Nikky      Contact info  [phone] (home)           "

## 2022-08-24 NOTE — TELEPHONE ENCOUNTER
Called and informed pt she can be seen by Aki in a week but she cannot transition into a boot due to it's not being recommended. Pt verbally understood and decided to be seen next week and would like a callback from Aki. Informed pt I will let Aki know.

## 2022-08-29 ENCOUNTER — TELEPHONE (OUTPATIENT)
Dept: ORTHOPEDICS | Facility: CLINIC | Age: 72
End: 2022-08-29
Payer: MEDICARE

## 2022-08-29 DIAGNOSIS — S82.841A CLOSED BIMALLEOLAR FRACTURE OF RIGHT ANKLE, INITIAL ENCOUNTER: ICD-10-CM

## 2022-08-29 RX ORDER — HYDROCODONE BITARTRATE AND ACETAMINOPHEN 7.5; 325 MG/1; MG/1
1 TABLET ORAL EVERY 6 HOURS PRN
Qty: 28 TABLET | Refills: 0 | Status: SHIPPED | OUTPATIENT
Start: 2022-08-29 | End: 2022-09-01 | Stop reason: SDUPTHER

## 2022-08-29 NOTE — TELEPHONE ENCOUNTER
----- Message from Aki Zavala NP sent at 8/29/2022  9:24 AM CDT -----  Regarding: RE: refill  Contact: pt @ [phone]  Please let her know a refill was sent in for her.    Aki  ----- Message -----  From: Nikky Forrest MA  Sent: 8/29/2022   8:58 AM CDT  To: Aki Zavala NP  Subject: FW: refill                                         ----- Message -----  From: Tremontana Chevalier  Sent: 8/29/2022   8:29 AM CDT  To: Lucas DUNCAN Staff  Subject: refill                                            Pt calling to s/w Nikky in Dr. Zavala's office . Pt says she is extreme pain, R ankle. Pt req pain medicine called into pharm. Pls call pt [phone]    Rx HYDROcodone-acetaminophen (NORCO) 7.5-325 mg per tablet    [facility] - [address]   Phone:  [phone]  Fax:  [phone]

## 2022-09-01 ENCOUNTER — HOSPITAL ENCOUNTER (OUTPATIENT)
Dept: RADIOLOGY | Facility: HOSPITAL | Age: 72
Discharge: HOME | End: 2022-09-01
Attending: NURSE PRACTITIONER
Payer: MEDICARE

## 2022-09-01 ENCOUNTER — OFFICE VISIT (OUTPATIENT)
Dept: ORTHOPEDICS | Facility: CLINIC | Age: 72
End: 2022-09-01
Payer: MEDICARE

## 2022-09-01 DIAGNOSIS — S82.841D CLOSED BIMALLEOLAR FRACTURE OF RIGHT ANKLE WITH ROUTINE HEALING, SUBSEQUENT ENCOUNTER: Primary | ICD-10-CM

## 2022-09-01 DIAGNOSIS — M25.571 ACUTE RIGHT ANKLE PAIN: Primary | ICD-10-CM

## 2022-09-01 DIAGNOSIS — M25.571 ACUTE RIGHT ANKLE PAIN: ICD-10-CM

## 2022-09-01 PROCEDURE — 99214 OFFICE O/P EST MOD 30 MIN: CPT | Mod: S$PBB,,, | Performed by: NURSE PRACTITIONER

## 2022-09-01 PROCEDURE — 73610 X-RAY EXAM OF ANKLE: CPT | Mod: 26,RT,, | Performed by: RADIOLOGY

## 2022-09-01 PROCEDURE — 73610 XR ANKLE COMPLETE 3 VIEW RIGHT: ICD-10-PCS | Mod: 26,RT,, | Performed by: RADIOLOGY

## 2022-09-01 PROCEDURE — 99212 OFFICE O/P EST SF 10 MIN: CPT | Mod: PBBFAC | Performed by: NURSE PRACTITIONER

## 2022-09-01 PROCEDURE — 73610 X-RAY EXAM OF ANKLE: CPT | Mod: TC,RT

## 2022-09-01 PROCEDURE — 99999 PR PBB SHADOW E&M-EST. PATIENT-LVL II: CPT | Mod: PBBFAC,,, | Performed by: NURSE PRACTITIONER

## 2022-09-01 PROCEDURE — 99999 PR PBB SHADOW E&M-EST. PATIENT-LVL II: ICD-10-PCS | Mod: PBBFAC,,, | Performed by: NURSE PRACTITIONER

## 2022-09-01 PROCEDURE — 99214 PR OFFICE/OUTPT VISIT, EST, LEVL IV, 30-39 MIN: ICD-10-PCS | Mod: S$PBB,,, | Performed by: NURSE PRACTITIONER

## 2022-09-01 RX ORDER — HYDROCODONE BITARTRATE AND ACETAMINOPHEN 7.5; 325 MG/1; MG/1
1 TABLET ORAL EVERY 6 HOURS PRN
Qty: 28 TABLET | Refills: 0 | Status: SHIPPED | OUTPATIENT
Start: 2022-09-03 | End: 2022-09-12

## 2022-09-01 NOTE — PROGRESS NOTES
SUBJECTIVE:     Chief Complaint & History of Present Illness:  Juan Antonio Rowe is a Established 71 y.o. year old female patient here for follow up for her right distal fibula fracture.  She was last seen in the clinic last week, at that visit, she had pain with standing and movement of the ankle and therefore surgery was recommended.  She was placed back into a posterior splint with stirrups and consents were obtained.  She called the clinic at the end of the day and decided she did not want to do surgery.  She was told to follow up in 1 week with repeat xray of her left ankle.      She returns today and admits she has been walking on the foot despite advised not to do so. She denies falls or injuries since her last clinic visit.  She reports her pain has been well controlled with Norco 7.5/325 mg PRN.  She denies foot or toe numbness.  She has been using a knee scooter to get around.  She said she still wants to avoid surgery.    Review of patient's allergies indicates:  No Known Allergies      Current Outpatient Medications   Medication Sig Dispense Refill    acetaminophen (TYLENOL) 325 MG tablet Take 2 tablets (650 mg total) by mouth every 6 (six) hours as needed for Pain. 30 tablet 0    calcium carbonate (OS-TORI) 600 mg (1,500 mg) Tab Take 600 mg by mouth 2 (two) times daily with meals.      clonazePAM (KLONOPIN) 1 MG tablet Take 1 tablet (1 mg total) by mouth 2 (two) times daily as needed for Anxiety. (Patient taking differently: Take 1 mg by mouth 2 (two) times daily.) 30 tablet 0    docosahexaenoic acid/epa (FISH OIL ORAL) Take 1 capsule by mouth once daily.      gabapentin (NEURONTIN) 600 MG tablet Take 600 mg by mouth 3 (three) times daily as needed (back pain).      HYDROcodone-acetaminophen (NORCO) 7.5-325 mg per tablet Take 1 tablet by mouth every 6 (six) hours as needed for Pain. 28 tablet 0    lisinopriL-hydrochlorothiazide (PRINZIDE,ZESTORETIC) 20-12.5 mg per tablet Take 1 tablet by mouth 2 (two)  times a day.      loperamide (IMODIUM) 2 mg capsule Take 1 capsule (2 mg total) by mouth 2 (two) times a day. (Patient taking differently: Take 2 mg by mouth 2 (two) times daily as needed for Diarrhea.) 14 capsule 0    MAGNESIUM ORAL Take 1 tablet by mouth once daily.      multivitamin (THERAGRAN) per tablet Take 1 tablet by mouth once daily.      ondansetron (ZOFRAN-ODT) 4 MG TbDL Dissolve 1 tablet (4 mg total) by mouth every 8 (eight) hours as needed (nausea). 10 tablet 0    potassium chloride SA (K-DUR,KLOR-CON) 20 MEQ tablet Take 1 tablet (20 mEq total) by mouth once daily. 14 tablet 0    tiZANidine (ZANAFLEX) 4 MG tablet Take 8 mg by mouth 2 (two) times daily as needed for Muscle spasms or Pain.       No current facility-administered medications for this visit.       Past Medical History:   Diagnosis Date    Acute gastric ulcer with perforation     Acute kidney failure with tubular necrosis     GABBI (acute kidney injury)     Anxiety     Diarrhea     HLD (hyperlipidemia)     Hypertension     Nausea & vomiting     Peritonitis     PUD (peptic ulcer disease)     Renal disorder     Urine retention     Vascular disorder of intestine, unspecified        Past Surgical History:   Procedure Laterality Date    APPENDECTOMY      APPLICATION OF WOUND VACUUM-ASSISTED CLOSURE DEVICE  1/21/2021    Procedure: APPLICATION, WOUND VAC;  Surgeon: Abel Francis MD;  Location: [address];  Service: General;;    APPLICATION OF WOUND VACUUM-ASSISTED CLOSURE DEVICE  1/25/2021    Procedure: APPLICATION, WOUND VAC;  Surgeon: Abel Francis MD;  Location: [address];  Service: General;;    CLOSURE OF PERFORATED ULCER OF DUODENUM USING OMENTAL PATCH  1/21/2021    Procedure: CLOSURE, ULCER, PERFORATED, DUODENUM, USING OMENTAL PATCH;  Surgeon: Abel Francis MD;  Location: [address];  Service: General;;    COLECTOMY, RIGHT  1/21/2021    Procedure: COLECTOMY, RIGHT;  Surgeon: Abel Francis MD;  Location: [facility]  "OR 2ND FLR;  Service: General;;    cyst removed from neck      GASTROSTOMY  1/25/2021    Procedure: GASTROSTOMY;  Surgeon: Abel Francis MD;  Location: [facility] OR 2ND FLR;  Service: General;;    HYSTERECTOMY  1986    ILEOSTOMY CLOSURE  10/13/2021    Procedure: CLOSURE, ILEOSTOMY;  Surgeon: Abel Francis MD;  [facility];  Service: General;;    LYSIS OF ADHESIONS  10/13/2021    Procedure: LYSIS, ADHESIONS;  Surgeon: Abel Francis MD;  Location: [facility] OR 2ND FLR;  Service: General;;    OOPHORECTOMY Bilateral 1989    OPEN REDUCTION AND INTERNAL FIXATION (ORIF) OF LISFRANC INJURY OF FOOT Left 10/11/2019    Procedure: ORIF, LISFRANC INJURY, FOOT;  Surgeon: Ferdinand Stauffer DPM;  Location: [facility] OR 1ST FLR;  Service: Podiatry;  Laterality: Left;    TONSILLECTOMY, ADENOIDECTOMY         Family History   Problem Relation Age of Onset    Hypertension Mother          Review of Systems:  ROS:  Constitutional: no fever or chills  Eyes: no visual changes  ENT: no nasal congestion or sore throat  Respiratory: no cough or shortness of breath  Cardiovascular: no chest pain or palpitations  Gastrointestinal: no nausea or vomiting, tolerating diet  Genitourinary: no hematuria or dysuria  Integument/Breast: no rash or pruritis  Hematologic/Lymphatic: no easy bruising or lymphadenopathy  Musculoskeletal:  right ankle fracture  Neurological: no seizures or tremors  Behavioral/Psych: no auditory or visual hallucinations  Endocrine: no heat or cold intolerance      OBJECTIVE:     PHYSICAL EXAM:  Vital Signs (Most Recent)  There were no vitals filed for this visit.     ,   Estimated body mass index is 20.74 kg/m² as calculated from the following:    Height as of 8/15/22: 5' 4" (1.626 m).    Weight as of 8/15/22: 54.8 kg (120 lb 13 oz).   General Appearance: Well nourished, well developed, in no acute distress.  HENT: Normal cephalic, oropharynx pink and moist  Eyes: PERRLA bilaterally and EOM x 4  Respiratory: Even " and unlabored  Skin: Warm and Dry. Bruising on the lateral ankle  Psychiatric: AAO x 4, Mood & affect are normal.    right  Foot/Ankle    General appearance: no acute distress, alert/oriented x3, appropriate mood and affect, looks stated age and well nourished  The examination was performed out of splint/cast  Skin: normal  Swelling: minimal  Warmth: no warmth  Tenderness: none  ROM: 15 degrees in all planes  Strength: 3/5  Stability: stable to testing and Cotton test: negative  Crepitus: no  Neurological Exam: normal  Vascular Exam: normal and pulse present    Mild tenderness to lateral ankle      RADIOGRAPHS:  X-ray of the right ankle standing obtained, findings shows a distal fibula fracture that appears stable.  Mortise is symmetrical.  No new fractures seen.  All radiographs were personally reviewed by me.    ASSESSMENT/PLAN:       ICD-10-CM ICD-9-CM   1. Closed bimalleolar fracture of right ankle with routine healing, subsequent encounter  S82.841D V54.19         Plan:  -Juan Antonio Rowe presents to clinic today with c/c right bimalleolar ankle fracture for the past 1 week.  -X-ray as above.  I discussed x-ray findings with Dr. Bright.  He reviewed her x-ray and said ok to proceed non-operative.  -I will place her into a tall cam boot and allow her to weight bear as tolerated.  Recommend she come out of the boot at home and perform gentle ROM of her ankle to prevent stiffness.    -Recommend RICE therapy.  -I offered to send her to PT and she declined, said she can do on her own.  -I will refill her Norco 7.5/325 mg PRN.  She was informed that I can only prescribe a 1 week supply.  -She cannot use NSAIDs due to previous GIB.  -I will see her back in 4 weeks, at time repeat right ankle x-ray standing out of boot.  -Call for questions.    Future Appointments   Date Time Provider Department Center   9/29/2022  1:30 PM Aki Zavala NP [facility]

## 2022-09-12 ENCOUNTER — TELEPHONE (OUTPATIENT)
Dept: ORTHOPEDICS | Facility: CLINIC | Age: 72
End: 2022-09-12
Payer: MEDICARE

## 2022-09-12 DIAGNOSIS — S82.841D CLOSED BIMALLEOLAR FRACTURE OF RIGHT ANKLE WITH ROUTINE HEALING, SUBSEQUENT ENCOUNTER: ICD-10-CM

## 2022-09-12 RX ORDER — HYDROCODONE BITARTRATE AND ACETAMINOPHEN 7.5; 325 MG/1; MG/1
1 TABLET ORAL EVERY 8 HOURS PRN
Qty: 21 TABLET | Refills: 0 | Status: SHIPPED | OUTPATIENT
Start: 2022-09-12

## 2022-09-12 NOTE — TELEPHONE ENCOUNTER
Called and informed pt her medication has been refilled and sent to her pharmacy. Pt verbally understood and says she no longer need the medication.

## 2022-09-29 ENCOUNTER — OFFICE VISIT (OUTPATIENT)
Dept: ORTHOPEDICS | Facility: CLINIC | Age: 72
End: 2022-09-29
Payer: MEDICARE

## 2022-09-29 ENCOUNTER — HOSPITAL ENCOUNTER (OUTPATIENT)
Dept: RADIOLOGY | Facility: HOSPITAL | Age: 72
Discharge: HOME | End: 2022-09-29
Attending: NURSE PRACTITIONER
Payer: MEDICARE

## 2022-09-29 DIAGNOSIS — M25.571 ACUTE RIGHT ANKLE PAIN: Primary | ICD-10-CM

## 2022-09-29 DIAGNOSIS — M25.571 ACUTE RIGHT ANKLE PAIN: ICD-10-CM

## 2022-09-29 DIAGNOSIS — S82.841D CLOSED BIMALLEOLAR FRACTURE OF RIGHT ANKLE WITH ROUTINE HEALING, SUBSEQUENT ENCOUNTER: Primary | ICD-10-CM

## 2022-09-29 PROCEDURE — 73610 X-RAY EXAM OF ANKLE: CPT | Mod: TC,RT

## 2022-09-29 PROCEDURE — 99213 PR OFFICE/OUTPT VISIT, EST, LEVL III, 20-29 MIN: ICD-10-PCS | Mod: S$PBB,,, | Performed by: NURSE PRACTITIONER

## 2022-09-29 PROCEDURE — 99212 OFFICE O/P EST SF 10 MIN: CPT | Mod: PBBFAC | Performed by: NURSE PRACTITIONER

## 2022-09-29 PROCEDURE — 73610 X-RAY EXAM OF ANKLE: CPT | Mod: 26,RT,, | Performed by: RADIOLOGY

## 2022-09-29 PROCEDURE — 73610 XR ANKLE COMPLETE 3 VIEW RIGHT: ICD-10-PCS | Mod: 26,RT,, | Performed by: RADIOLOGY

## 2022-09-29 PROCEDURE — 99999 PR PBB SHADOW E&M-EST. PATIENT-LVL II: ICD-10-PCS | Mod: PBBFAC,,, | Performed by: NURSE PRACTITIONER

## 2022-09-29 PROCEDURE — 99999 PR PBB SHADOW E&M-EST. PATIENT-LVL II: CPT | Mod: PBBFAC,,, | Performed by: NURSE PRACTITIONER

## 2022-09-29 PROCEDURE — 99213 OFFICE O/P EST LOW 20 MIN: CPT | Mod: S$PBB,,, | Performed by: NURSE PRACTITIONER

## 2022-09-29 NOTE — PROGRESS NOTES
SUBJECTIVE:     Chief Complaint & History of Present Illness:  Juan Antonio Rowe is a Established 71 y.o. year old female patient here for follow up for her right distal fibula fracture.  She was last seen in the clinic 4 weeks ago, at that visit, she was placed into a tall cam boot and allowed to weight bear as tolerated.  She was offered therapy but she declined.    She returns today, denies falls or injuries since last seen in the clinic.  She is requesting more pain medication, states her PCP had given her Norco 5/325 mg and she finds that does not help as much.  She denies foot or toe numbness.      Review of patient's allergies indicates:  No Known Allergies      Current Outpatient Medications   Medication Sig Dispense Refill    acetaminophen (TYLENOL) 325 MG tablet Take 2 tablets (650 mg total) by mouth every 6 (six) hours as needed for Pain. 30 tablet 0    calcium carbonate (OS-TORI) 600 mg (1,500 mg) Tab Take 600 mg by mouth 2 (two) times daily with meals.      clonazePAM (KLONOPIN) 1 MG tablet Take 1 tablet (1 mg total) by mouth 2 (two) times daily as needed for Anxiety. (Patient taking differently: Take 1 mg by mouth 2 (two) times daily.) 30 tablet 0    docosahexaenoic acid/epa (FISH OIL ORAL) Take 1 capsule by mouth once daily.      gabapentin (NEURONTIN) 600 MG tablet Take 600 mg by mouth 3 (three) times daily as needed (back pain).      HYDROcodone-acetaminophen (NORCO) 7.5-325 mg per tablet Take 1 tablet by mouth every 8 (eight) hours as needed for Pain. 21 tablet 0    lisinopriL-hydrochlorothiazide (PRINZIDE,ZESTORETIC) 20-12.5 mg per tablet Take 1 tablet by mouth 2 (two) times a day.      loperamide (IMODIUM) 2 mg capsule Take 1 capsule (2 mg total) by mouth 2 (two) times a day. (Patient taking differently: Take 2 mg by mouth 2 (two) times daily as needed for Diarrhea.) 14 capsule 0    MAGNESIUM ORAL Take 1 tablet by mouth once daily.      multivitamin (THERAGRAN) per tablet Take 1 tablet by mouth  once daily.      ondansetron (ZOFRAN-ODT) 4 MG TbDL Dissolve 1 tablet (4 mg total) by mouth every 8 (eight) hours as needed (nausea). 10 tablet 0    potassium chloride SA (K-DUR,KLOR-CON) 20 MEQ tablet Take 1 tablet (20 mEq total) by mouth once daily. 14 tablet 0    tiZANidine (ZANAFLEX) 4 MG tablet Take 8 mg by mouth 2 (two) times daily as needed for Muscle spasms or Pain.       No current facility-administered medications for this visit.       Past Medical History:   Diagnosis Date    Acute gastric ulcer with perforation     Acute kidney failure with tubular necrosis     GABBI (acute kidney injury)     Anxiety     Diarrhea     HLD (hyperlipidemia)     Hypertension     Nausea & vomiting     Peritonitis     PUD (peptic ulcer disease)     Renal disorder     Urine retention     Vascular disorder of intestine, unspecified        Past Surgical History:   Procedure Laterality Date    APPENDECTOMY      APPLICATION OF WOUND VACUUM-ASSISTED CLOSURE DEVICE  1/21/2021    Procedure: APPLICATION, WOUND VAC;  Surgeon: Abel Francis MD;  Location: [address];  Service: General;;    APPLICATION OF WOUND VACUUM-ASSISTED CLOSURE DEVICE  1/25/2021    Procedure: APPLICATION, WOUND VAC;  Surgeon: Abel Francis MD;  Location: [address];  Service: General;;    CLOSURE OF PERFORATED ULCER OF DUODENUM USING OMENTAL PATCH  1/21/2021    Procedure: CLOSURE, ULCER, PERFORATED, DUODENUM, USING OMENTAL PATCH;  Surgeon: Abel Francis MD;  Location: [address];  Service: General;;    COLECTOMY, RIGHT  1/21/2021    Procedure: COLECTOMY, RIGHT;  Surgeon: Abel Francis MD;  Location: [address];  Service: General;;    cyst removed from neck      GASTROSTOMY  1/25/2021    Procedure: GASTROSTOMY;  Surgeon: Abel Francis MD;  Location: [address];  Service: General;;    HYSTERECTOMY  1986    ILEOSTOMY CLOSURE  10/13/2021    Procedure: CLOSURE, ILEOSTOMY;  Surgeon: Abel Francis MD;  Location: [facility]  "2ND FLR;  Service: General;;    LYSIS OF ADHESIONS  10/13/2021    Procedure: LYSIS, ADHESIONS;  Surgeon: Abel Francis MD;  Location: [facility] OR 2ND FLR;  Service: General;;    OOPHORECTOMY Bilateral 1989    OPEN REDUCTION AND INTERNAL FIXATION (ORIF) OF LISFRANC INJURY OF FOOT Left 10/11/2019    Procedure: ORIF, LISFRANC INJURY, FOOT;  Surgeon: Ferdinand Stauffer DPM;  Location: [facility] OR 1ST FLR;  Service: Podiatry;  Laterality: Left;    TONSILLECTOMY, ADENOIDECTOMY         Family History   Problem Relation Age of Onset    Hypertension Mother          Review of Systems:  ROS:  Constitutional: no fever or chills  Eyes: no visual changes  ENT: no nasal congestion or sore throat  Respiratory: no cough or shortness of breath  Cardiovascular: no chest pain or palpitations  Gastrointestinal: no nausea or vomiting, tolerating diet  Genitourinary: no hematuria or dysuria  Integument/Breast: no rash or pruritis  Hematologic/Lymphatic: no easy bruising or lymphadenopathy  Musculoskeletal:  right ankle fracture  Neurological: no seizures or tremors  Behavioral/Psych: no auditory or visual hallucinations  Endocrine: no heat or cold intolerance      OBJECTIVE:     PHYSICAL EXAM:  Vital Signs (Most Recent)  There were no vitals filed for this visit.     ,   Estimated body mass index is 20.74 kg/m² as calculated from the following:    Height as of 8/15/22: 5' 4" (1.626 m).    Weight as of 8/15/22: 54.8 kg (120 lb 13 oz).   General Appearance: Well nourished, well developed, in no acute distress.  HENT: Normal cephalic, oropharynx pink and moist  Eyes: PERRLA bilaterally and EOM x 4  Respiratory: Even and unlabored  Skin: Warm and Dry. Bruising on the lateral ankle  Psychiatric: AAO x 4, Mood & affect are normal.    right  Foot/Ankle    General appearance: no acute distress, alert/oriented x3, appropriate mood and affect, looks stated age and well nourished  The examination was performed out of splint/cast  Skin: normal  Swelling: " minimal  Warmth: no warmth  Tenderness: none  ROM: 25 degrees in all planes  Strength: 4/5  Stability: stable to testing and Cotton test: negative  Crepitus: no  Neurological Exam: normal  Vascular Exam: normal and pulse present    Mild tenderness to lateral ankle      RADIOGRAPHS:  X-ray of the right ankle standing obtained, findings shows a distal fibula fracture that appears stable.  Mortise is symmetrical.  There is callus formation.  No new fractures seen.  All radiographs were personally reviewed by me.    ASSESSMENT/PLAN:       ICD-10-CM ICD-9-CM   1. Closed bimalleolar fracture of right ankle with routine healing, subsequent encounter  S82.841D V54.19       Plan:  -Juan Antonio Rowe presents to clinic today with c/c right bimalleolar ankle fracture for the past 5 weeks.  -X-ray as above.  I had previously discussed x-ray findings with Dr. Bright.  He reviewed her x-ray and said ok to proceed non-operative.  -I will transition her into a lace up ankle brace, continue to weight bear as tolerated and ROMAT.  I offered to send her back to PT but she declined.  -I recommend she speak to her PCP regarding adjustment of her pain medications.  I checked her  and see she was given 60 tabs of Norco 5/325 mg on 9/26/22.  -Recommend RICE therapy.  -She cannot use NSAIDs due to previous GIB.  -I will see her back in 6 weeks, at time repeat right ankle x-ray standing.  -Call for questions.    Future Appointments   Date Time Provider Department Center   11/10/2022  1:30 PM Aki Zavala NP [facility]

## 2022-11-14 PROBLEM — N17.9 AKI (ACUTE KIDNEY INJURY): Status: RESOLVED | Noted: 2021-02-25 | Resolved: 2022-11-14

## 2023-06-03 NOTE — HPI
Juan Antonio Rowe is a 71 y.o. female with a PMHx of perforated gastric ulcer and colon ischemia s/p ex lap with duodenal ulcer repair and right colectomy, and end ileostomy creation in Jan 2021 and ilestomy reversal (ileosigmoid anastomosis) October 2021, anxiety, HLD, HTN, recent SBO who presents to [facility] with right ankle pain s/p fall. Patient got up quickly from a seated position causing her to become lightheaded/ dizzy, and she subsequently twisted and fell onto her right ankle. No actual LOC or head trauma.She endorses immediate pain to her R ankle and inability to ambulate after the incident. She occasionally gets lightheaded when she stands up too quickly, endorses good PO intake recently. She continues to have severe pain in the Ed despite multiple doses of pain medications. Denies fever, chills, N/V, abdominal pain, HA, vision changes, numbness/ tingling, or new back pain.     ED: hypotension to BP 79/43 which improved to SBP 130s s/p 1L IVFs. BP then hypertensive to 219/95 which improved with pain control. No leukocytosis. Lactate 4.5>> 1.79 s/p IVFs. Plain films show right patel B fibula fracture with lateral subluxation of the talus. Ortho consulted and fracture reduced at bedside in the ED.    show

## 2024-01-13 ENCOUNTER — HOSPITAL ENCOUNTER (INPATIENT)
Facility: HOSPITAL | Age: 74
LOS: 2 days | Discharge: HOME | DRG: 390 | End: 2024-01-15
Attending: EMERGENCY MEDICINE | Admitting: SURGERY
Payer: MEDICARE

## 2024-01-13 DIAGNOSIS — R11.2 NAUSEA AND VOMITING, UNSPECIFIED VOMITING TYPE: ICD-10-CM

## 2024-01-13 DIAGNOSIS — K56.609 SMALL BOWEL OBSTRUCTION: Primary | ICD-10-CM

## 2024-01-13 DIAGNOSIS — R10.9 ABDOMINAL PAIN: ICD-10-CM

## 2024-01-13 PROBLEM — R94.31 PROLONGED Q-T INTERVAL ON ECG: Chronic | Status: ACTIVE | Noted: 2021-02-20

## 2024-01-13 PROBLEM — Z98.890 S/P CLOSURE OF ILEOSTOMY: Chronic | Status: ACTIVE | Noted: 2021-10-13

## 2024-01-13 PROBLEM — K56.600 PARTIAL SMALL BOWEL OBSTRUCTION: Chronic | Status: ACTIVE | Noted: 2022-02-16

## 2024-01-13 LAB
ALBUMIN SERPL BCP-MCNC: 4.3 G/DL (ref 3.5–5.2)
ALP SERPL-CCNC: 91 U/L (ref 55–135)
ALT SERPL W/O P-5'-P-CCNC: 9 U/L (ref 10–44)
ANION GAP SERPL CALC-SCNC: 14 MMOL/L (ref 8–16)
AST SERPL-CCNC: 12 U/L (ref 10–40)
BASOPHILS # BLD AUTO: 0.05 K/UL (ref 0–0.2)
BASOPHILS NFR BLD: 0.3 % (ref 0–1.9)
BILIRUB SERPL-MCNC: 0.6 MG/DL (ref 0.1–1)
BILIRUB UR QL STRIP: NEGATIVE
BUN SERPL-MCNC: 24 MG/DL (ref 8–23)
CALCIUM SERPL-MCNC: 10.6 MG/DL (ref 8.7–10.5)
CHLORIDE SERPL-SCNC: 102 MMOL/L (ref 95–110)
CLARITY UR REFRACT.AUTO: CLEAR
CO2 SERPL-SCNC: 28 MMOL/L (ref 23–29)
COLOR UR AUTO: YELLOW
CREAT SERPL-MCNC: 1 MG/DL (ref 0.5–1.4)
DIFFERENTIAL METHOD BLD: ABNORMAL
EOSINOPHIL # BLD AUTO: 0 K/UL (ref 0–0.5)
EOSINOPHIL NFR BLD: 0.1 % (ref 0–8)
ERYTHROCYTE [DISTWIDTH] IN BLOOD BY AUTOMATED COUNT: 15.7 % (ref 11.5–14.5)
EST. GFR  (NO RACE VARIABLE): 59.9 ML/MIN/1.73 M^2
GLUCOSE SERPL-MCNC: 133 MG/DL (ref 70–110)
GLUCOSE UR QL STRIP: NEGATIVE
HCT VFR BLD AUTO: 54.2 % (ref 37–48.5)
HGB BLD-MCNC: 18.2 G/DL (ref 12–16)
HGB UR QL STRIP: NEGATIVE
IMM GRANULOCYTES # BLD AUTO: 0.05 K/UL (ref 0–0.04)
IMM GRANULOCYTES NFR BLD AUTO: 0.3 % (ref 0–0.5)
KETONES UR QL STRIP: NEGATIVE
LACTATE SERPL-SCNC: 1.6 MMOL/L (ref 0.5–2.2)
LEUKOCYTE ESTERASE UR QL STRIP: NEGATIVE
LIPASE SERPL-CCNC: 17 U/L (ref 4–60)
LYMPHOCYTES # BLD AUTO: 1.9 K/UL (ref 1–4.8)
LYMPHOCYTES NFR BLD: 13.3 % (ref 18–48)
MCH RBC QN AUTO: 32.5 PG (ref 27–31)
MCHC RBC AUTO-ENTMCNC: 33.6 G/DL (ref 32–36)
MCV RBC AUTO: 97 FL (ref 82–98)
MONOCYTES # BLD AUTO: 0.6 K/UL (ref 0.3–1)
MONOCYTES NFR BLD: 4.3 % (ref 4–15)
NEUTROPHILS # BLD AUTO: 11.9 K/UL (ref 1.8–7.7)
NEUTROPHILS NFR BLD: 81.7 % (ref 38–73)
NITRITE UR QL STRIP: NEGATIVE
NRBC BLD-RTO: 0 /100 WBC
PH UR STRIP: 5 [PH] (ref 5–8)
PLATELET # BLD AUTO: 421 K/UL (ref 150–450)
PMV BLD AUTO: 9.6 FL (ref 9.2–12.9)
POTASSIUM SERPL-SCNC: 3.4 MMOL/L (ref 3.5–5.1)
PROT SERPL-MCNC: 7.8 G/DL (ref 6–8.4)
PROT UR QL STRIP: NEGATIVE
RBC # BLD AUTO: 5.6 M/UL (ref 4–5.4)
SODIUM SERPL-SCNC: 144 MMOL/L (ref 136–145)
SP GR UR STRIP: 1.01 (ref 1–1.03)
URN SPEC COLLECT METH UR: NORMAL
WBC # BLD AUTO: 14.53 K/UL (ref 3.9–12.7)

## 2024-01-13 PROCEDURE — 63600175 PHARM REV CODE 636 W HCPCS

## 2024-01-13 PROCEDURE — 83605 ASSAY OF LACTIC ACID: CPT | Performed by: EMERGENCY MEDICINE

## 2024-01-13 PROCEDURE — 96376 TX/PRO/DX INJ SAME DRUG ADON: CPT

## 2024-01-13 PROCEDURE — 81003 URINALYSIS AUTO W/O SCOPE: CPT | Performed by: EMERGENCY MEDICINE

## 2024-01-13 PROCEDURE — 83690 ASSAY OF LIPASE: CPT | Performed by: EMERGENCY MEDICINE

## 2024-01-13 PROCEDURE — 99285 EMERGENCY DEPT VISIT HI MDM: CPT | Mod: 25

## 2024-01-13 PROCEDURE — 0D9670Z DRAINAGE OF STOMACH WITH DRAINAGE DEVICE, VIA NATURAL OR ARTIFICIAL OPENING: ICD-10-PCS | Performed by: SURGERY

## 2024-01-13 PROCEDURE — 93005 ELECTROCARDIOGRAM TRACING: CPT

## 2024-01-13 PROCEDURE — 96374 THER/PROPH/DIAG INJ IV PUSH: CPT

## 2024-01-13 PROCEDURE — 25000003 PHARM REV CODE 250

## 2024-01-13 PROCEDURE — 21400001 HC TELEMETRY ROOM

## 2024-01-13 PROCEDURE — 85025 COMPLETE CBC W/AUTO DIFF WBC: CPT | Performed by: EMERGENCY MEDICINE

## 2024-01-13 PROCEDURE — S5010 5% DEXTROSE AND 0.45% SALINE: HCPCS

## 2024-01-13 PROCEDURE — 80053 COMPREHEN METABOLIC PANEL: CPT | Performed by: EMERGENCY MEDICINE

## 2024-01-13 PROCEDURE — 96375 TX/PRO/DX INJ NEW DRUG ADDON: CPT

## 2024-01-13 PROCEDURE — 63600175 PHARM REV CODE 636 W HCPCS: Performed by: STUDENT IN AN ORGANIZED HEALTH CARE EDUCATION/TRAINING PROGRAM

## 2024-01-13 PROCEDURE — C9113 INJ PANTOPRAZOLE SODIUM, VIA: HCPCS | Performed by: STUDENT IN AN ORGANIZED HEALTH CARE EDUCATION/TRAINING PROGRAM

## 2024-01-13 PROCEDURE — 93010 ELECTROCARDIOGRAM REPORT: CPT | Mod: ,,, | Performed by: INTERNAL MEDICINE

## 2024-01-13 PROCEDURE — 25500020 PHARM REV CODE 255: Performed by: EMERGENCY MEDICINE

## 2024-01-13 RX ORDER — LABETALOL HCL 20 MG/4 ML
10 SYRINGE (ML) INTRAVENOUS EVERY 4 HOURS PRN
Status: DISCONTINUED | OUTPATIENT
Start: 2024-01-13 | End: 2024-01-14

## 2024-01-13 RX ORDER — LIDOCAINE HYDROCHLORIDE 10 MG/ML
1 INJECTION, SOLUTION EPIDURAL; INFILTRATION; INTRACAUDAL; PERINEURAL ONCE AS NEEDED
Status: DISCONTINUED | OUTPATIENT
Start: 2024-01-13 | End: 2024-01-15 | Stop reason: HOSPADM

## 2024-01-13 RX ORDER — ONDANSETRON 8 MG/1
8 TABLET, ORALLY DISINTEGRATING ORAL EVERY 8 HOURS PRN
Status: DISCONTINUED | OUTPATIENT
Start: 2024-01-13 | End: 2024-01-13

## 2024-01-13 RX ORDER — HYDRALAZINE HYDROCHLORIDE 20 MG/ML
10 INJECTION INTRAMUSCULAR; INTRAVENOUS EVERY 6 HOURS PRN
Status: DISCONTINUED | OUTPATIENT
Start: 2024-01-13 | End: 2024-01-13

## 2024-01-13 RX ORDER — MORPHINE SULFATE 2 MG/ML
6 INJECTION, SOLUTION INTRAMUSCULAR; INTRAVENOUS
Status: COMPLETED | OUTPATIENT
Start: 2024-01-13 | End: 2024-01-13

## 2024-01-13 RX ORDER — ENOXAPARIN SODIUM 100 MG/ML
40 INJECTION SUBCUTANEOUS EVERY 24 HOURS
Status: DISCONTINUED | OUTPATIENT
Start: 2024-01-13 | End: 2024-01-15 | Stop reason: HOSPADM

## 2024-01-13 RX ORDER — HYDRALAZINE HYDROCHLORIDE 20 MG/ML
10 INJECTION INTRAMUSCULAR; INTRAVENOUS EVERY 4 HOURS PRN
Status: DISCONTINUED | OUTPATIENT
Start: 2024-01-13 | End: 2024-01-14

## 2024-01-13 RX ORDER — PANTOPRAZOLE SODIUM 40 MG/10ML
40 INJECTION, POWDER, LYOPHILIZED, FOR SOLUTION INTRAVENOUS DAILY
Status: DISCONTINUED | OUTPATIENT
Start: 2024-01-13 | End: 2024-01-15 | Stop reason: HOSPADM

## 2024-01-13 RX ORDER — HYDROMORPHONE HYDROCHLORIDE 1 MG/ML
0.5 INJECTION, SOLUTION INTRAMUSCULAR; INTRAVENOUS; SUBCUTANEOUS EVERY 4 HOURS PRN
Status: DISCONTINUED | OUTPATIENT
Start: 2024-01-13 | End: 2024-01-13

## 2024-01-13 RX ORDER — ONDANSETRON HYDROCHLORIDE 2 MG/ML
8 INJECTION, SOLUTION INTRAVENOUS EVERY 6 HOURS PRN
Status: DISCONTINUED | OUTPATIENT
Start: 2024-01-13 | End: 2024-01-15 | Stop reason: HOSPADM

## 2024-01-13 RX ORDER — HYDROMORPHONE HYDROCHLORIDE 1 MG/ML
1 INJECTION, SOLUTION INTRAMUSCULAR; INTRAVENOUS; SUBCUTANEOUS EVERY 4 HOURS PRN
Status: DISCONTINUED | OUTPATIENT
Start: 2024-01-13 | End: 2024-01-15 | Stop reason: HOSPADM

## 2024-01-13 RX ORDER — DEXTROSE MONOHYDRATE AND SODIUM CHLORIDE 5; .45 G/100ML; G/100ML
INJECTION, SOLUTION INTRAVENOUS CONTINUOUS
Status: DISCONTINUED | OUTPATIENT
Start: 2024-01-13 | End: 2024-01-14

## 2024-01-13 RX ORDER — ONDANSETRON HYDROCHLORIDE 2 MG/ML
4 INJECTION, SOLUTION INTRAVENOUS
Status: COMPLETED | OUTPATIENT
Start: 2024-01-13 | End: 2024-01-13

## 2024-01-13 RX ORDER — SODIUM CHLORIDE 0.9 % (FLUSH) 0.9 %
10 SYRINGE (ML) INJECTION
Status: DISCONTINUED | OUTPATIENT
Start: 2024-01-13 | End: 2024-01-15 | Stop reason: HOSPADM

## 2024-01-13 RX ORDER — MORPHINE SULFATE 2 MG/ML
10 INJECTION, SOLUTION INTRAMUSCULAR; INTRAVENOUS
Status: COMPLETED | OUTPATIENT
Start: 2024-01-13 | End: 2024-01-13

## 2024-01-13 RX ORDER — HYDROMORPHONE HYDROCHLORIDE 1 MG/ML
0.5 INJECTION, SOLUTION INTRAMUSCULAR; INTRAVENOUS; SUBCUTANEOUS ONCE
Status: COMPLETED | OUTPATIENT
Start: 2024-01-13 | End: 2024-01-13

## 2024-01-13 RX ADMIN — ENOXAPARIN SODIUM 40 MG: 40 INJECTION SUBCUTANEOUS at 05:01

## 2024-01-13 RX ADMIN — HYDROMORPHONE HYDROCHLORIDE 0.5 MG: 1 INJECTION, SOLUTION INTRAMUSCULAR; INTRAVENOUS; SUBCUTANEOUS at 03:01

## 2024-01-13 RX ADMIN — DEXTROSE AND SODIUM CHLORIDE: 5; 450 INJECTION, SOLUTION INTRAVENOUS at 03:01

## 2024-01-13 RX ADMIN — MORPHINE SULFATE 10 MG: 2 INJECTION, SOLUTION INTRAMUSCULAR; INTRAVENOUS at 07:01

## 2024-01-13 RX ADMIN — IOHEXOL 75 ML: 350 INJECTION, SOLUTION INTRAVENOUS at 06:01

## 2024-01-13 RX ADMIN — ONDANSETRON 8 MG: 2 INJECTION INTRAMUSCULAR; INTRAVENOUS at 08:01

## 2024-01-13 RX ADMIN — MORPHINE SULFATE 6 MG: 2 INJECTION, SOLUTION INTRAMUSCULAR; INTRAVENOUS at 05:01

## 2024-01-13 RX ADMIN — ONDANSETRON 4 MG: 2 INJECTION INTRAMUSCULAR; INTRAVENOUS at 04:01

## 2024-01-13 RX ADMIN — HYDROMORPHONE HYDROCHLORIDE 0.5 MG: 0.5 INJECTION, SOLUTION INTRAMUSCULAR; INTRAVENOUS; SUBCUTANEOUS at 05:01

## 2024-01-13 RX ADMIN — HYDROMORPHONE HYDROCHLORIDE 1 MG: 1 INJECTION, SOLUTION INTRAMUSCULAR; INTRAVENOUS; SUBCUTANEOUS at 07:01

## 2024-01-13 RX ADMIN — PANTOPRAZOLE SODIUM 40 MG: 40 INJECTION, POWDER, FOR SOLUTION INTRAVENOUS at 03:01

## 2024-01-13 RX ADMIN — LABETALOL HYDROCHLORIDE 10 MG: 5 INJECTION, SOLUTION INTRAVENOUS at 03:01

## 2024-01-13 RX ADMIN — MORPHINE SULFATE 6 MG: 2 INJECTION, SOLUTION INTRAMUSCULAR; INTRAVENOUS at 04:01

## 2024-01-13 RX ADMIN — HYDROMORPHONE HYDROCHLORIDE 0.5 MG: 1 INJECTION, SOLUTION INTRAMUSCULAR; INTRAVENOUS; SUBCUTANEOUS at 11:01

## 2024-01-13 NOTE — NURSING
Pt arrived to floor via stretcher with ER tech and transferred to bed. IVF started, pt refused SCDs, oriented to room, call light placed within reach, bed low and locked, . Pt complains of pain 7/10. NG tube intact to intermittent suction No acute distress noted at this time.

## 2024-01-13 NOTE — ED PROVIDER NOTES
Encounter Date: 1/13/2024  ED Resident HAND-OFF NOTE:  Juan Antonio Rowe is a 73 y.o. female who presented to the ED on 1/13/2024, patient C/O abdominal pain. I assumed care of patient from off-going ED physician team patient pending CT and UA.    On my evaluation, Juan Antonio Rowe appears well, hemodynamically stable and in NAD. Thus far, Juan Antonio Rowe has received:  Medications   ondansetron injection 4 mg (4 mg Intravenous Given 1/13/24 0450)   morphine injection 6 mg (6 mg Intravenous Given 1/13/24 0450)   morphine injection 6 mg (6 mg Intravenous Given 1/13/24 0548)       BP (!) 167/89 (BP Location: Right arm, Patient Position: Lying)   Pulse 78   Temp 98.4 °F (36.9 °C) (Oral)   Resp 17   Wt 54.4 kg (120 lb)   LMP 01/01/1986   SpO2 96%   Breastfeeding No   BMI 20.60 kg/m²         Disposition: I anticipate patient will admitted  ______________________  Raymond Stanford MD   Emergency Medicine Resident      UPDATE:  CT scan concerning for a small-bowel obstruction.  Discussed the case with surgery who has admitted her to the general surgery service.        :  Abdominal pain (Primary)  Nausea and vomiting, unspecified vomiting type       History     Chief Complaint   Patient presents with    Abdominal Pain     HPI  Review of patient's allergies indicates:  No Known Allergies  Past Medical History:   Diagnosis Date    Acute gastric ulcer with perforation     Acute kidney failure with tubular necrosis     GABBI (acute kidney injury)     Anxiety     Diarrhea     HLD (hyperlipidemia)     Hypertension     Nausea & vomiting     Peritonitis     PUD (peptic ulcer disease)     Renal disorder     Urine retention     Vascular disorder of intestine, unspecified      Past Surgical History:   Procedure Laterality Date    APPENDECTOMY      APPLICATION OF WOUND VACUUM-ASSISTED CLOSURE DEVICE  1/21/2021    Procedure: APPLICATION, WOUND VAC;  Surgeon: Abel Francis MD;  Location: [facility] OR [address] [place] [address];  Service: General;;     APPLICATION OF WOUND VACUUM-ASSISTED CLOSURE DEVICE  1/25/2021    Procedure: APPLICATION, WOUND VAC;  Surgeon: Abel Francis MD;  [facility];  Service: General;;    CLOSURE OF PERFORATED ULCER OF DUODENUM USING OMENTAL PATCH  1/21/2021    Procedure: CLOSURE, ULCER, PERFORATED, DUODENUM, USING OMENTAL PATCH;  Surgeon: Abel Francis MD;  [facility];  Service: General;;    COLECTOMY, RIGHT  1/21/2021    Procedure: COLECTOMY, RIGHT;  Surgeon: Abel Francis MD;  [facility];  Service: General;;    cyst removed from neck      GASTROSTOMY  1/25/2021    Procedure: GASTROSTOMY;  Surgeon: Abel Francis MD;  [facility];  Service: General;;    HYSTERECTOMY  1986    ILEOSTOMY CLOSURE  10/13/2021    Procedure: CLOSURE, ILEOSTOMY;  Surgeon: Abel Francis MD;  [address];  Service: General;;    LYSIS OF ADHESIONS  10/13/2021    Procedure: LYSIS, ADHESIONS;  Surgeon: Abel Francis MD;  Location: [facility] OR [address] [place] [address];  Service: General;;    OOPHORECTOMY Bilateral 1989    OPEN REDUCTION AND INTERNAL FIXATION (ORIF) OF LISFRANC INJURY OF FOOT Left 10/11/2019    Procedure: ORIF, LISFRANC INJURY, FOOT;  Surgeon: Ferdinand Stauffer DPM;  Location: [address];  Service: Podiatry;  Laterality: Left;    TONSILLECTOMY, ADENOIDECTOMY       Family History   Problem Relation Age of Onset    Hypertension Mother      Social History     Tobacco Use    Smoking status: Every Day    Smokeless tobacco: Never   Substance Use Topics    Alcohol use: Not Currently     Comment: socially    Drug use: No     Review of Systems    Physical Exam     Initial Vitals [01/13/24 0402]   BP Pulse Resp Temp SpO2   (!) 180/100 88 18 98 °F (36.7 °C) 98 %      MAP       --         Physical Exam    ED Course   Procedures  Labs Reviewed   CBC W/ AUTO DIFFERENTIAL - Abnormal; Notable for the following components:       Result Value    WBC 14.53 (*)     RBC 5.60 (*)      Hemoglobin 18.2 (*)     Hematocrit 54.2 (*)     MCH 32.5 (*)     RDW 15.7 (*)     Gran # (ANC) 11.9 (*)     Immature Grans (Abs) 0.05 (*)     Gran % 81.7 (*)     Lymph % 13.3 (*)     All other components within normal limits   COMPREHENSIVE METABOLIC PANEL - Abnormal; Notable for the following components:    Potassium 3.4 (*)     Glucose 133 (*)     BUN 24 (*)     Calcium 10.6 (*)     ALT 9 (*)     eGFR 59.9 (*)     All other components within normal limits   LIPASE   LACTIC ACID, PLASMA   URINALYSIS, REFLEX TO URINE CULTURE          Imaging Results    None          Medications   ondansetron injection 4 mg (4 mg Intravenous Given 1/13/24 0450)   morphine injection 6 mg (6 mg Intravenous Given 1/13/24 0450)   morphine injection 6 mg (6 mg Intravenous Given 1/13/24 0548)     Medical Decision Making  Amount and/or Complexity of Data Reviewed  Labs: ordered. Decision-making details documented in ED Course.  Radiology: ordered.    Risk  Prescription drug management.  Decision regarding hospitalization.               ED Course as of 01/13/24 1143   Sat Jan 13, 2024   0550 WBC(!): 14.53 [AW]   0550 EKG (independently interpreted by me): Rhythm:  NSR, rate of  73 BPM, no ST elevations or depressions, QTc 458  [AW]   0836 CT scan remarkable for possible small-bowel obstruction.  We will consult and discuss case with General surgery. [VC]      ED Course User Index  [AW] Clement Qiu MD  [VC] Tonio Stanford MD                           Clinical Impression:  Final diagnoses:  [R10.9] Abdominal pain (Primary)  [R11.2] Nausea and vomiting, unspecified vomiting type                 Tonio Stanford MD  Resident  01/13/24 1143

## 2024-01-13 NOTE — ED TRIAGE NOTES
Juan Antonio Rowe, a 73 y.o. female presents to the ED w/ complaint of abdominal pain started last night around 7pm. Pt has hx of perf ulcer in 2021    Triage note:  Chief Complaint   Patient presents with    Abdominal Pain     Review of patient's allergies indicates:  No Known Allergies  Past Medical History:   Diagnosis Date    Acute gastric ulcer with perforation     Acute kidney failure with tubular necrosis     GABBI (acute kidney injury)     Anxiety     Diarrhea     HLD (hyperlipidemia)     Hypertension     Nausea & vomiting     Peritonitis     PUD (peptic ulcer disease)     Renal disorder     Urine retention     Vascular disorder of intestine, unspecified    Patient identifiers for Juan Antonio Rowe checked and correct.    LOC: The patient is awake, alert and aware of environment with an appropriate affect, the patient is oriented x 4 and speaking appropriately.    APPEARANCE: Patient resting comfortably and in no acute distress, patient is clean and well groomed, patient's clothing is properly fastened.    SKIN: The skin is warm and dry, color consistent with ethnicity, patient has normal skin turgor and moist mucus membranes, skin intact, no breakdown or bruising noted.    MUSCULOSKELETAL: Patient moving all extremities well, no obvious swelling or deformities noted.    RESPIRATORY: Airway is open and patent, respirations are spontaneous and even, patient has a normal effort and rate.    CARDIAC: Patient has a normal rate and rhythm, no periphreal edema noted, capillary refill < 3 seconds.    ABDOMEN: Soft and non tender to palpation, no distention noted. Patient has some nausea, vomiting, but no diarrhea, or constipation. Abdominal pain     NEUROLOGIC: Eyes open spontaneously, PERRL, behavior appropriate to situation, follows commands, facial expression symmetrical, bilateral hand grasp equal and even, purposeful motor response noted, normal sensation in all extremities.     HEENT: No abnormalities noted. White  sclera and pupils equal round and reactive to light. Denies headache, dizziness.     : Pt voids independently, denies dysuria, hematuria, frequency.

## 2024-01-13 NOTE — ED PROVIDER NOTES
Encounter Date: 1/13/2024     Source of History:   Patient, EMS, and medical record, without language barrier or      Chief complaint:  Abdominal Pain    HPI:  Juan Antonio Rowe is a 73 y.o. female with history of multiple abdominal surgeries, ruptured peptic ulcer, chronic diarrhea presenting with chief complaint of abdominal pain.  Patient states around 7:00 p.m. shortly after dinner she developed severe abdominal pain.  She describes the pain as dull and aching with intermittent exacerbation that is worse than a 10/10.  She describes her pain as most intense over her surgical scars.  She had 2 episodes of nonbloody emesis prior to arrival.  She has not taken any medications to control her abdominal pain.  Patient had a normal bowel movement 2 days ago.  She is endorsing mild nausea at this time.  She denies chest pain, shortness of breath, fevers    This is the extent to the patients complaints today here in the emergency department.    ROS: As per HPI and below:  ROS    Review of patient's allergies indicates:  No Known Allergies  PMH:  As per HPI and below:  Past Medical History:   Diagnosis Date    Acute gastric ulcer with perforation     Acute kidney failure with tubular necrosis     GABBI (acute kidney injury)     Anxiety     Diarrhea     HLD (hyperlipidemia)     Hypertension     Nausea & vomiting     Peritonitis     PUD (peptic ulcer disease)     Renal disorder     Urine retention     Vascular disorder of intestine, unspecified      Past Surgical History:   Procedure Laterality Date    APPENDECTOMY      APPLICATION OF WOUND VACUUM-ASSISTED CLOSURE DEVICE  1/21/2021    Procedure: APPLICATION, WOUND VAC;  Surgeon: Abel Francis MD;  Location: [address];  Service: General;;    APPLICATION OF WOUND VACUUM-ASSISTED CLOSURE DEVICE  1/25/2021    Procedure: APPLICATION, WOUND VAC;  Surgeon: Abel Francis MD;  Location: [facility] OR [address] [place] [address];  Service: General;;    CLOSURE OF PERFORATED ULCER OF  DUODENUM USING OMENTAL PATCH  1/21/2021    Procedure: CLOSURE, ULCER, PERFORATED, DUODENUM, USING OMENTAL PATCH;  Surgeon: Abel Francis MD;  [facility];  Service: General;;    COLECTOMY, RIGHT  1/21/2021    Procedure: COLECTOMY, RIGHT;  Surgeon: Abel Francis MD;  [facility];  Service: General;;    cyst removed from neck      GASTROSTOMY  1/25/2021    Procedure: GASTROSTOMY;  Surgeon: Abel Francis MD;  Location: [facility] OR 2ND FLR;  Service: General;;    HYSTERECTOMY  1986    ILEOSTOMY CLOSURE  10/13/2021    Procedure: CLOSURE, ILEOSTOMY;  Surgeon: Abel Francis MD;  [facility];  Service: General;;    LYSIS OF ADHESIONS  10/13/2021    Procedure: LYSIS, ADHESIONS;  Surgeon: Abel Francis MD;  [facility];  Service: General;;    OOPHORECTOMY Bilateral 1989    OPEN REDUCTION AND INTERNAL FIXATION (ORIF) OF LISFRANC INJURY OF FOOT Left 10/11/2019    Procedure: ORIF, LISFRANC INJURY, FOOT;  Surgeon: Ferdinand Stauffer DPM;  [facility];  Service: Podiatry;  Laterality: Left;    TONSILLECTOMY, ADENOIDECTOMY       Social History     Tobacco Use    Smoking status: Every Day    Smokeless tobacco: Never   Substance Use Topics    Alcohol use: Not Currently     Comment: socially    Drug use: No     Vitals:    BP (!) 167/89 (BP Location: Right arm, Patient Position: Lying)   Pulse 78   Temp 98.4 °F (36.9 °C) (Oral)   Resp 17   Wt 54.4 kg (120 lb)   LMP 01/01/1986   SpO2 96%   Breastfeeding No   BMI 20.60 kg/m²     Physical Exam  Vitals and nursing note reviewed.   Constitutional:       General: She is not in acute distress.     Appearance: She is not toxic-appearing.   HENT:      Head: Normocephalic and atraumatic.      Mouth/Throat:      Mouth: Mucous membranes are moist.   Eyes:      General: No scleral icterus.  Cardiovascular:      Rate and Rhythm: Normal rate and regular rhythm.      Pulses: Normal pulses.      Heart  sounds: Normal heart sounds. No murmur heard.     No friction rub. No gallop.   Pulmonary:      Effort: Pulmonary effort is normal. No respiratory distress.      Breath sounds: Normal breath sounds. No stridor. No wheezing, rhonchi or rales.   Abdominal:      General: Abdomen is flat. There is no distension.      Palpations: Abdomen is soft.      Tenderness: There is abdominal tenderness in the periumbilical area. There is guarding.   Musculoskeletal:         General: No swelling or deformity.      Cervical back: Normal range of motion and neck supple.   Skin:     General: Skin is warm and dry.      Capillary Refill: Capillary refill takes less than 2 seconds.      Coloration: Skin is not jaundiced.      Findings: No rash.   Neurological:      Mental Status: She is alert. Mental status is at baseline.       Procedures    Laboratory Studies:  Labs that have been ordered have been independently reviewed and interpreted by myself.  Labs Reviewed   CBC W/ AUTO DIFFERENTIAL - Abnormal; Notable for the following components:       Result Value    WBC 14.53 (*)     RBC 5.60 (*)     Hemoglobin 18.2 (*)     Hematocrit 54.2 (*)     MCH 32.5 (*)     RDW 15.7 (*)     Gran # (ANC) 11.9 (*)     Immature Grans (Abs) 0.05 (*)     Gran % 81.7 (*)     Lymph % 13.3 (*)     All other components within normal limits   COMPREHENSIVE METABOLIC PANEL - Abnormal; Notable for the following components:    Potassium 3.4 (*)     Glucose 133 (*)     BUN 24 (*)     Calcium 10.6 (*)     ALT 9 (*)     eGFR 59.9 (*)     All other components within normal limits   LIPASE   LACTIC ACID, PLASMA   URINALYSIS, REFLEX TO URINE CULTURE     Imaging Results    None       I decided to obtain the patient's medical records.  Summary of Medical Records:    Medications   ondansetron injection 4 mg (4 mg Intravenous Given 1/13/24 0450)   morphine injection 6 mg (6 mg Intravenous Given 1/13/24 2490)   morphine injection 6 mg (6 mg Intravenous Given 1/13/24 6140)      MDM:    73 y.o. female with abdominal pain    Differential Dx:  Differential includes but is not limited to bowel obstruction, gastroenteritis, diverticulitis    ED Management:  Abdominal pain workup started for an acute presentation of an emergent condition with multiple comorbidities. Morphine and zofran ordered for pain and nausea/vomiting.  Will obtain CT abdomen pelvis to assess for emergent cause of patient's abdominal pain.    Medical Decision Making  Problems Addressed:  Abdominal pain: acute illness or injury  Nausea and vomiting, unspecified vomiting type: acute illness or injury    Amount and/or Complexity of Data Reviewed  Labs: ordered. Decision-making details documented in ED Course.  Radiology: ordered and independent interpretation performed. Decision-making details documented in ED Course.  ECG/medicine tests: ordered and independent interpretation performed. Decision-making details documented in ED Course.    Risk  Prescription drug management.  Parenteral controlled substances.  Decision regarding hospitalization.         ED Course as of 01/13/24 2109   Sat Jan 13, 2024   0550 WBC(!): 14.53 [AW]   0550 EKG (independently interpreted by me): Rhythm:  NSR, rate of  73 BPM, no ST elevations or depressions, QTc 458  [AW]   0836 CT scan remarkable for possible small-bowel obstruction.  We will consult and discuss case with General surgery. [VC]      ED Course User Index  [AW] Clement Qiu MD  [VC] Tonio Stanford MD     Diagnostic Impression:    Final diagnoses:  [R10.9] Abdominal pain (Primary)  [R11.2] Nausea and vomiting, unspecified vomiting type             Attending Attestation:   Physician Attestation Statement for Resident:  As the supervising MD   Physician Attestation Statement: I have personally seen and examined this patient.   I agree with the above history.  -:   As the supervising MD I agree with the above PE.     As the supervising MD I agree with the above treatment,  course, plan, and disposition.    I have reviewed and agree with the residents interpretation of the following: lab data.  I have reviewed the following: old records at this facility.                 Clement Qiu MD  Resident  01/13/24 2134       Jolanta Arreguin MD  01/13/24 1302

## 2024-01-13 NOTE — CONSULTS
[facility] - Emergency Dept  General Surgery  Consult Note    Inpatient consult to General Surgery  Consult performed by: Alfonzo Live MD  Consult ordered by: Tonio Stanford MD        Subjective:     Chief Complaint/Reason for Admission: Nausea, vomiting, and abdominal pain    History of Present Illness:   Juan Antonio Rowe is a 73 y.o. female with history of a perforated gastric ulcer of colon ischemia s/p ex lap with gastric ulcer repair, subtotal colectomy and end ileostomy creation 01/2021 and ileostomy reversal (ileosigmoid anastomosis) 10/2021, presents with a chief complaint of abdominal pain. Patient states around 7:00 p.m. shortly after dinner she developed severe abdominal pain. She describes the pain as dull and aching with intermittent exacerbation that is worse than a 10/10. She describes her pain as most intense at the RLQ. She had one episodes of nonbloody emesis prior to arrival. She reports having one episode of emesis in the ED. Patient had a normal bowel movement 2 days ago. She is endorsing mild nausea at this time. She denies chest pain, shortness of breath, fevers.    General Surgery has been consulted to evaluate for possible SBO.    No current facility-administered medications on file prior to encounter.     Current Outpatient Medications on File Prior to Encounter   Medication Sig    acetaminophen (TYLENOL) 325 MG tablet Take 2 tablets (650 mg total) by mouth every 6 (six) hours as needed for Pain.    calcium carbonate (OS-TORI) 600 mg (1,500 mg) Tab Take 600 mg by mouth 2 (two) times daily with meals.    clonazePAM (KLONOPIN) 1 MG tablet Take 1 tablet (1 mg total) by mouth 2 (two) times daily as needed for Anxiety. (Patient taking differently: Take 1 mg by mouth 2 (two) times daily.)    docosahexaenoic acid/epa (FISH OIL ORAL) Take 1 capsule by mouth once daily.    gabapentin (NEURONTIN) 600 MG tablet Take 600 mg by mouth 3 (three) times daily as needed (back pain).     HYDROcodone-acetaminophen (NORCO) 7.5-325 mg per tablet Take 1 tablet by mouth every 8 (eight) hours as needed for Pain.    lisinopriL-hydrochlorothiazide (PRINZIDE,ZESTORETIC) 20-12.5 mg per tablet Take 1 tablet by mouth 2 (two) times a day.    loperamide (IMODIUM) 2 mg capsule Take 1 capsule (2 mg total) by mouth 2 (two) times a day. (Patient taking differently: Take 2 mg by mouth 2 (two) times daily as needed for Diarrhea.)    MAGNESIUM ORAL Take 1 tablet by mouth once daily.    multivitamin (THERAGRAN) per tablet Take 1 tablet by mouth once daily.    ondansetron (ZOFRAN-ODT) 4 MG TbDL Dissolve 1 tablet (4 mg total) by mouth every 8 (eight) hours as needed (nausea).    potassium chloride SA (K-DUR,KLOR-CON) 20 MEQ tablet Take 1 tablet (20 mEq total) by mouth once daily.    tiZANidine (ZANAFLEX) 4 MG tablet Take 8 mg by mouth 2 (two) times daily as needed for Muscle spasms or Pain.       Review of patient's allergies indicates:  No Known Allergies    Past Medical History:   Diagnosis Date    Acute gastric ulcer with perforation     Acute kidney failure with tubular necrosis     GABBI (acute kidney injury)     Anxiety     Diarrhea     HLD (hyperlipidemia)     Hypertension     Nausea & vomiting     Peritonitis     PUD (peptic ulcer disease)     Renal disorder     Urine retention     Vascular disorder of intestine, unspecified      Past Surgical History:   Procedure Laterality Date    APPENDECTOMY      APPLICATION OF WOUND VACUUM-ASSISTED CLOSURE DEVICE  1/21/2021    Procedure: APPLICATION, WOUND VAC;  Surgeon: Abel Francis MD;  Location: [address];  Service: General;;    APPLICATION OF WOUND VACUUM-ASSISTED CLOSURE DEVICE  1/25/2021    Procedure: APPLICATION, WOUND VAC;  Surgeon: Abel Francis MD;  Location: [facility] OR [address] [place] [address];  Service: General;;    CLOSURE OF PERFORATED ULCER OF DUODENUM USING OMENTAL PATCH  1/21/2021    Procedure: CLOSURE, ULCER, PERFORATED, DUODENUM, USING OMENTAL PATCH;  Surgeon:  Abel Francis MD;  [facility];  Service: General;;    COLECTOMY, RIGHT  1/21/2021    Procedure: COLECTOMY, RIGHT;  Surgeon: Abel Francis MD;  Location: [facility] OR 2ND FLR;  Service: General;;    cyst removed from neck      GASTROSTOMY  1/25/2021    Procedure: GASTROSTOMY;  Surgeon: Abel Francis MD;  [facility];  Service: General;;    HYSTERECTOMY  1986    ILEOSTOMY CLOSURE  10/13/2021    Procedure: CLOSURE, ILEOSTOMY;  Surgeon: Abel Francis MD;  [facility];  Service: General;;    LYSIS OF ADHESIONS  10/13/2021    Procedure: LYSIS, ADHESIONS;  Surgeon: Abel Francsi MD;  [facility];  Service: General;;    OOPHORECTOMY Bilateral 1989    OPEN REDUCTION AND INTERNAL FIXATION (ORIF) OF LISFRANC INJURY OF FOOT Left 10/11/2019    Procedure: ORIF, LISFRANC INJURY, FOOT;  Surgeon: Ferdinand Stauffer DPM;  [facility];  Service: Podiatry;  Laterality: Left;    TONSILLECTOMY, ADENOIDECTOMY       Family History       Problem Relation (Age of Onset)    Hypertension Mother          Tobacco Use    Smoking status: Every Day    Smokeless tobacco: Never   Substance and Sexual Activity    Alcohol use: Not Currently     Comment: socially    Drug use: No    Sexual activity: Yes     Partners: Male     Comment:      Review of Systems   Constitutional:  Negative for chills and fever.   Respiratory:  Negative for shortness of breath.    Cardiovascular:  Negative for chest pain.   Gastrointestinal:  Positive for abdominal pain, nausea and vomiting.   Genitourinary:  Negative for dysuria.     Objective:     Vital Signs (Most Recent):  Temp: 99 °F (37.2 °C) (01/13/24 0710)  Pulse: 66 (01/13/24 0830)  Resp: 19 (01/13/24 0830)  BP: 130/68 (01/13/24 0830)  SpO2: (!) 94 % (01/13/24 0830) Vital Signs (24h Range):  Temp:  [98 °F (36.7 °C)-99 °F (37.2 °C)] 99 °F (37.2 °C)  Pulse:  [66-88] 66  Resp:  [15-20] 19  SpO2:  [94 %-98 %] 94 %  BP:  (130-199)/() 130/68     Weight: 54.4 kg (120 lb)  Body mass index is 20.6 kg/m².    No intake or output data in the 24 hours ending 01/13/24 0941    Physical Exam  Constitutional:       Appearance: Normal appearance.   HENT:      Head: Normocephalic and atraumatic.      Comments: Laceration on forehead  Eyes:      Extraocular Movements: Extraocular movements intact.      Conjunctiva/sclera: Conjunctivae normal.   Pulmonary:      Effort: Pulmonary effort is normal.   Abdominal:      Comments: Midline incision scar well healed  Previous ostomy site at the right lower quadrant  Tenderness in the RLQ   Musculoskeletal:      Cervical back: Normal range of motion.   Skin:     General: Skin is warm and dry.   Neurological:      General: No focal deficit present.      Mental Status: She is alert and oriented to person, place, and time.   Psychiatric:         Mood and Affect: Mood normal.         Behavior: Behavior normal.         Significant Labs:  CBC:   Recent Labs   Lab 01/13/24  0440   WBC 14.53*   RBC 5.60*   HGB 18.2*   HCT 54.2*      MCV 97   MCH 32.5*   MCHC 33.6       Significant Diagnostics:  CT: I have reviewed all pertinent results/findings within the past 24 hours.      Assessment/Plan:   Mrs. Rowe is 73 y.o. female with HTN, HLD, and history of a perforated gastric ulcer and ischemic colon s/p modified angelita patch repair and subtotal colectomy with ileosigmoid anastomosis. CT scan shows dilated loops of small bowel with signs of obstruction.    Plan:  - Admit to general surgery  - NG tube placement for decompression  - Will do a gastrograffin challenge tomorrow  - Diet: NPO  - mIVF: @100 ml/hr  (D5 1/2NS)  - Pain: Multimodals  - Labs: Daily CBC, BMP, Mg, Ph - replete lytes prn  - PRN nausea control    Dispo: Continue inpatient care.    Alfonzo Live MD  General Surgery  [facility] - Emergency Dept    Holly Ocasio MD  General Surgery PGY5

## 2024-01-13 NOTE — NURSING
Nurses Note -- 4 Eyes      1/13/2024   4:08 PM      Skin assessed during: Admit      [x] No Altered Skin Integrity Present    []Prevention Measures Documented      [] Yes- Altered Skin Integrity Present or Discovered   [] LDA Added if Not in Epic (Describe Wound)   [] New Altered Skin Integrity was Present on Admit and Documented in LDA   [] Wound Image Taken    Wound Care Consulted? No    Attending Nurse:  Gill Baugh lpn    Second RN/Staff Member:  Heather AJ

## 2024-01-13 NOTE — HPI
Juan Antonio Rowe is a 73 y.o. female with history of a perforated peptic ulcer and necrotic left colon s/p repair with angelita patch and left hemicolectomy with end ileostomy in 2021 with reversal in 2021 as well, presents with a chief complaint of abdominal pain. Patient states around 7:00 p.m. shortly after dinner she developed severe abdominal pain. She describes the pain as dull and aching with intermittent exacerbation that is worse than a 10/10. She describes her pain as most intense at the RLQ. She had one episodes of nonbloody emesis prior to arrival. She reports having one episode of emesis in the ED. Patient had a normal bowel movement 2 days ago. She is endorsing mild nausea at this time. She denies chest pain, shortness of breath, fevers.     General Surgery has been consulted to evaluate for possible SBO.

## 2024-01-14 LAB
ANION GAP SERPL CALC-SCNC: 9 MMOL/L (ref 8–16)
BASOPHILS # BLD AUTO: 0.02 K/UL (ref 0–0.2)
BASOPHILS NFR BLD: 0.2 % (ref 0–1.9)
BUN SERPL-MCNC: 17 MG/DL (ref 8–23)
CALCIUM SERPL-MCNC: 9.2 MG/DL (ref 8.7–10.5)
CHLORIDE SERPL-SCNC: 102 MMOL/L (ref 95–110)
CO2 SERPL-SCNC: 27 MMOL/L (ref 23–29)
CREAT SERPL-MCNC: 0.8 MG/DL (ref 0.5–1.4)
DIFFERENTIAL METHOD BLD: ABNORMAL
EOSINOPHIL # BLD AUTO: 0.1 K/UL (ref 0–0.5)
EOSINOPHIL NFR BLD: 0.6 % (ref 0–8)
ERYTHROCYTE [DISTWIDTH] IN BLOOD BY AUTOMATED COUNT: 15.3 % (ref 11.5–14.5)
EST. GFR  (NO RACE VARIABLE): >60 ML/MIN/1.73 M^2
GLUCOSE SERPL-MCNC: 125 MG/DL (ref 70–110)
HCT VFR BLD AUTO: 47.9 % (ref 37–48.5)
HGB BLD-MCNC: 15.6 G/DL (ref 12–16)
IMM GRANULOCYTES # BLD AUTO: 0.04 K/UL (ref 0–0.04)
IMM GRANULOCYTES NFR BLD AUTO: 0.4 % (ref 0–0.5)
LYMPHOCYTES # BLD AUTO: 1.6 K/UL (ref 1–4.8)
LYMPHOCYTES NFR BLD: 14.5 % (ref 18–48)
MAGNESIUM SERPL-MCNC: 1.7 MG/DL (ref 1.6–2.6)
MCH RBC QN AUTO: 32.2 PG (ref 27–31)
MCHC RBC AUTO-ENTMCNC: 32.6 G/DL (ref 32–36)
MCV RBC AUTO: 99 FL (ref 82–98)
MONOCYTES # BLD AUTO: 0.7 K/UL (ref 0.3–1)
MONOCYTES NFR BLD: 6.6 % (ref 4–15)
NEUTROPHILS # BLD AUTO: 8.7 K/UL (ref 1.8–7.7)
NEUTROPHILS NFR BLD: 77.7 % (ref 38–73)
NRBC BLD-RTO: 0 /100 WBC
PHOSPHATE SERPL-MCNC: 2.8 MG/DL (ref 2.7–4.5)
PLATELET # BLD AUTO: 345 K/UL (ref 150–450)
PMV BLD AUTO: 9.9 FL (ref 9.2–12.9)
POTASSIUM SERPL-SCNC: 3.1 MMOL/L (ref 3.5–5.1)
RBC # BLD AUTO: 4.85 M/UL (ref 4–5.4)
SODIUM SERPL-SCNC: 138 MMOL/L (ref 136–145)
WBC # BLD AUTO: 11.14 K/UL (ref 3.9–12.7)

## 2024-01-14 PROCEDURE — 84100 ASSAY OF PHOSPHORUS: CPT | Performed by: STUDENT IN AN ORGANIZED HEALTH CARE EDUCATION/TRAINING PROGRAM

## 2024-01-14 PROCEDURE — 80048 BASIC METABOLIC PNL TOTAL CA: CPT

## 2024-01-14 PROCEDURE — 63600175 PHARM REV CODE 636 W HCPCS: Performed by: STUDENT IN AN ORGANIZED HEALTH CARE EDUCATION/TRAINING PROGRAM

## 2024-01-14 PROCEDURE — 25500020 PHARM REV CODE 255: Performed by: STUDENT IN AN ORGANIZED HEALTH CARE EDUCATION/TRAINING PROGRAM

## 2024-01-14 PROCEDURE — 63600175 PHARM REV CODE 636 W HCPCS

## 2024-01-14 PROCEDURE — 83735 ASSAY OF MAGNESIUM: CPT | Performed by: STUDENT IN AN ORGANIZED HEALTH CARE EDUCATION/TRAINING PROGRAM

## 2024-01-14 PROCEDURE — 36415 COLL VENOUS BLD VENIPUNCTURE: CPT

## 2024-01-14 PROCEDURE — 21400001 HC TELEMETRY ROOM

## 2024-01-14 PROCEDURE — C9113 INJ PANTOPRAZOLE SODIUM, VIA: HCPCS | Performed by: STUDENT IN AN ORGANIZED HEALTH CARE EDUCATION/TRAINING PROGRAM

## 2024-01-14 PROCEDURE — 85025 COMPLETE CBC W/AUTO DIFF WBC: CPT

## 2024-01-14 PROCEDURE — 25000003 PHARM REV CODE 250

## 2024-01-14 PROCEDURE — S5010 5% DEXTROSE AND 0.45% SALINE: HCPCS

## 2024-01-14 RX ORDER — LABETALOL HCL 20 MG/4 ML
20 SYRINGE (ML) INTRAVENOUS EVERY 4 HOURS PRN
Status: DISCONTINUED | OUTPATIENT
Start: 2024-01-14 | End: 2024-01-15 | Stop reason: HOSPADM

## 2024-01-14 RX ORDER — DEXTROSE MONOHYDRATE, SODIUM CHLORIDE, AND POTASSIUM CHLORIDE 50; 1.49; 4.5 G/1000ML; G/1000ML; G/1000ML
INJECTION, SOLUTION INTRAVENOUS CONTINUOUS
Status: DISCONTINUED | OUTPATIENT
Start: 2024-01-14 | End: 2024-01-15

## 2024-01-14 RX ORDER — CLONAZEPAM 1 MG/1
1 TABLET ORAL 2 TIMES DAILY PRN
Status: DISCONTINUED | OUTPATIENT
Start: 2024-01-14 | End: 2024-01-14

## 2024-01-14 RX ORDER — CLONAZEPAM 0.5 MG/1
0.5 TABLET ORAL 2 TIMES DAILY PRN
Status: DISCONTINUED | OUTPATIENT
Start: 2024-01-14 | End: 2024-01-15 | Stop reason: HOSPADM

## 2024-01-14 RX ORDER — HYDRALAZINE HYDROCHLORIDE 20 MG/ML
20 INJECTION INTRAMUSCULAR; INTRAVENOUS EVERY 4 HOURS PRN
Status: DISCONTINUED | OUTPATIENT
Start: 2024-01-14 | End: 2024-01-15 | Stop reason: HOSPADM

## 2024-01-14 RX ORDER — GABAPENTIN 300 MG/1
300 CAPSULE ORAL 3 TIMES DAILY
Status: DISCONTINUED | OUTPATIENT
Start: 2024-01-14 | End: 2024-01-15 | Stop reason: HOSPADM

## 2024-01-14 RX ORDER — POTASSIUM CHLORIDE 7.45 MG/ML
10 INJECTION INTRAVENOUS
Status: COMPLETED | OUTPATIENT
Start: 2024-01-14 | End: 2024-01-14

## 2024-01-14 RX ORDER — POTASSIUM CHLORIDE 7.45 MG/ML
10 INJECTION INTRAVENOUS
Status: DISCONTINUED | OUTPATIENT
Start: 2024-01-14 | End: 2024-01-14

## 2024-01-14 RX ADMIN — HYDROMORPHONE HYDROCHLORIDE 1 MG: 1 INJECTION, SOLUTION INTRAMUSCULAR; INTRAVENOUS; SUBCUTANEOUS at 08:01

## 2024-01-14 RX ADMIN — POTASSIUM CHLORIDE 10 MEQ: 7.46 INJECTION, SOLUTION INTRAVENOUS at 09:01

## 2024-01-14 RX ADMIN — PANTOPRAZOLE SODIUM 40 MG: 40 INJECTION, POWDER, FOR SOLUTION INTRAVENOUS at 08:01

## 2024-01-14 RX ADMIN — HYDROMORPHONE HYDROCHLORIDE 1 MG: 1 INJECTION, SOLUTION INTRAMUSCULAR; INTRAVENOUS; SUBCUTANEOUS at 12:01

## 2024-01-14 RX ADMIN — CLONAZEPAM 1 MG: 1 TABLET ORAL at 06:01

## 2024-01-14 RX ADMIN — POTASSIUM CHLORIDE 10 MEQ: 7.46 INJECTION, SOLUTION INTRAVENOUS at 11:01

## 2024-01-14 RX ADMIN — ENOXAPARIN SODIUM 40 MG: 40 INJECTION SUBCUTANEOUS at 05:01

## 2024-01-14 RX ADMIN — LABETALOL HYDROCHLORIDE 20 MG: 5 INJECTION, SOLUTION INTRAVENOUS at 08:01

## 2024-01-14 RX ADMIN — POTASSIUM CHLORIDE 10 MEQ: 7.46 INJECTION, SOLUTION INTRAVENOUS at 01:01

## 2024-01-14 RX ADMIN — LABETALOL HYDROCHLORIDE 20 MG: 5 INJECTION, SOLUTION INTRAVENOUS at 07:01

## 2024-01-14 RX ADMIN — POTASSIUM CHLORIDE, DEXTROSE MONOHYDRATE AND SODIUM CHLORIDE: 150; 5; 450 INJECTION, SOLUTION INTRAVENOUS at 08:01

## 2024-01-14 RX ADMIN — GABAPENTIN 300 MG: 300 CAPSULE ORAL at 02:01

## 2024-01-14 RX ADMIN — GABAPENTIN 300 MG: 300 CAPSULE ORAL at 08:01

## 2024-01-14 RX ADMIN — HYDROMORPHONE HYDROCHLORIDE 1 MG: 1 INJECTION, SOLUTION INTRAMUSCULAR; INTRAVENOUS; SUBCUTANEOUS at 04:01

## 2024-01-14 RX ADMIN — POTASSIUM CHLORIDE 10 MEQ: 7.46 INJECTION, SOLUTION INTRAVENOUS at 02:01

## 2024-01-14 RX ADMIN — DIATRIZOATE MEGLUMINE AND DIATRIZOATE SODIUM 100 ML: 660; 100 LIQUID ORAL; RECTAL at 05:01

## 2024-01-14 RX ADMIN — HYDRALAZINE HYDROCHLORIDE 20 MG: 20 INJECTION, SOLUTION INTRAMUSCULAR; INTRAVENOUS at 04:01

## 2024-01-14 RX ADMIN — POTASSIUM CHLORIDE 10 MEQ: 7.46 INJECTION, SOLUTION INTRAVENOUS at 03:01

## 2024-01-14 RX ADMIN — CLONAZEPAM 1 MG: 1 TABLET ORAL at 01:01

## 2024-01-14 RX ADMIN — DEXTROSE AND SODIUM CHLORIDE: 5; 450 INJECTION, SOLUTION INTRAVENOUS at 12:01

## 2024-01-14 NOTE — PROGRESS NOTES
[facility] - Surgery  General Surgery  Progress Note    Subjective:     History of Present Illness:  Juan Antonio Rowe is a 73 y.o. female with history of a perforated peptic ulcer and necrotic left colon s/p repair with angelita patch and left hemicolectomy with end ileostomy in 2021 with reversal in 2021 as well, presents with a chief complaint of abdominal pain. Patient states around 7:00 p.m. shortly after dinner she developed severe abdominal pain. She describes the pain as dull and aching with intermittent exacerbation that is worse than a 10/10. She describes her pain as most intense at the RLQ. She had one episodes of nonbloody emesis prior to arrival. She reports having one episode of emesis in the ED. Patient had a normal bowel movement 2 days ago. She is endorsing mild nausea at this time. She denies chest pain, shortness of breath, fevers.     General Surgery has been consulted to evaluate for possible SBO.    Post-Op Info:  * No surgery found *         Interval History:   Hypertensive overnight  GGC today, given oral contrast this morning. NG clamped this morning.  Complains of increased abdominal pain this morning since being clamped.  Reconnected NG tube to LIWS    Medications:  Continuous Infusions:   dextrose 5 % and 0.45 % NaCl 100 mL/hr at 01/14/24 0059     Scheduled Meds:   enoxparin  40 mg Subcutaneous Q24H (prophylaxis, 1700)    pantoprazole  40 mg Intravenous Daily     PRN Meds:hydrALAZINE, HYDROmorphone, labetalol, LIDOcaine (PF) 10 mg/ml (1%), ondansetron, sodium chloride 0.9%     Review of patient's allergies indicates:  No Known Allergies  Objective:     Vital Signs (Most Recent):  Temp: 98.4 °F (36.9 °C) (01/14/24 0712)  Pulse: 79 (01/14/24 0725)  Resp: 17 (01/14/24 0712)  BP: (!) 208/88 (01/14/24 0712)  SpO2: (!) 94 % (01/14/24 0712) Vital Signs (24h Range):  Temp:  [97.3 °F (36.3 °C)-98.4 °F (36.9 °C)] 98.4 °F (36.9 °C)  Pulse:  [66-85] 79  Resp:  [12-20] 17  SpO2:  [92 %-98 %] 94 %  BP:  (124-208)/(63-94) 208/88     Weight: 54.4 kg (120 lb)  Body mass index is 20.6 kg/m².    Intake/Output - Last 3 Shifts         01/12 0700  01/13 0659 01/13 0700 01/14 0659 01/14 0700  01/15 0659    P.O.   0    I.V. (mL/kg)  862.5 (15.9)     NG/GT  150     Total Intake(mL/kg)  1012.5 (18.6) 0 (0)    Drains  650     Total Output  650     Net  +362.5 0           Urine Occurrence   2 x             Physical Exam  Constitutional:       Appearance: Normal appearance.   HENT:      Head: Normocephalic and atraumatic.      Comments: Laceration on forehead     Nose:      Comments: NG tube in place to LIWS  Eyes:      Extraocular Movements: Extraocular movements intact.      Conjunctiva/sclera: Conjunctivae normal.   Pulmonary:      Effort: Pulmonary effort is normal.   Abdominal:      Palpations: Abdomen is soft.      Tenderness: There is abdominal tenderness.      Comments: Midline incision scar well healed  Previous ostomy site at the right lower quadrant     Skin:     General: Skin is warm and dry.   Neurological:      General: No focal deficit present.      Mental Status: She is alert and oriented to person, place, and time.   Psychiatric:         Mood and Affect: Mood normal.         Behavior: Behavior normal.          Significant Labs:  I have reviewed all pertinent lab results within the past 24 hours.    Significant Diagnostics:  I have reviewed all pertinent imaging results/findings within the past 24 hours.  Assessment/Plan:     * Small bowel obstruction  Mrs. Rowe is 73 y.o. female with HTN, HLD, and history of a perforated gastric ulcer and ischemic colon s/p modified angelita patch repair and subtotal colectomy with ileosigmoid anastomosis. CT scan shows dilated loops of small bowel with signs of obstruction.     Plan:  - NG tube to LIWS  - Will defer GGC given increased abdominal pain, will still get a KUB this morning  - Diet: NPO  - mIVF: @100 ml/hr  (D5 1/2NS with 20 KCl)  - Pain: Multimodals  - Labs: Daily  CBC, BMP, Mg, Ph - replete lytes prn  - PRN nausea control     Dispo: Continue inpatient care.        Alfonzo Live MD  General Surgery  [facility] - Surgery

## 2024-01-14 NOTE — ASSESSMENT & PLAN NOTE
Mrs. Rowe is 73 y.o. female with HTN, HLD, and history of a perforated gastric ulcer and ischemic colon s/p modified angelita patch repair and subtotal colectomy with ileosigmoid anastomosis. CT scan shows dilated loops of small bowel with signs of obstruction.     Plan:  - NG tube to LIWS  - Will defer GGC given increased abdominal pain, will still get a KUB this morning  - Diet: NPO  - mIVF: @100 ml/hr  (D5 1/2NS with 20 KCl)  - Pain: Multimodals  - Labs: Daily CBC, BMP, Mg, Ph - replete lytes prn  - PRN nausea control     Dispo: Continue inpatient care.

## 2024-01-14 NOTE — PLAN OF CARE
Pt A&Ox4. Pt's pain was 10/10 at the start of shift and their BP was also extremely elevated. Nurse administered 1mg hydromorphone and the pt's BP returned to acceptable parameters. Pt is currently resting, pt care ongoing.  Problem: Adult Inpatient Plan of Care  Goal: Plan of Care Review  Outcome: Ongoing, Progressing  Goal: Patient-Specific Goal (Individualized)  Outcome: Ongoing, Progressing  Goal: Absence of Hospital-Acquired Illness or Injury  Outcome: Ongoing, Progressing  Goal: Optimal Comfort and Wellbeing  Outcome: Ongoing, Progressing  Goal: Readiness for Transition of Care  Outcome: Ongoing, Progressing

## 2024-01-14 NOTE — NURSING
Nurses Note -- 4 Eyes      1/14/2024   1:38 AM      Skin assessed during: Daily Assessment      [x] No Altered Skin Integrity Present    []Prevention Measures Documented      [] Yes- Altered Skin Integrity Present or Discovered   [] LDA Added if Not in Epic (Describe Wound)   [] New Altered Skin Integrity was Present on Admit and Documented in LDA   [] Wound Image Taken    Wound Care Consulted? No    Attending Nurse:  Mor AJ    Second RN/Staff Member:   Gladys ANAYA

## 2024-01-14 NOTE — SUBJECTIVE & OBJECTIVE
Interval History:   Hypertensive overnight  GGC today, given oral contrast this morning. NG clamped this morning.  Complains of increased abdominal pain this morning since being clamped.  Reconnected NG tube to LIWS    Medications:  Continuous Infusions:   dextrose 5 % and 0.45 % NaCl 100 mL/hr at 01/14/24 0059     Scheduled Meds:   enoxparin  40 mg Subcutaneous Q24H (prophylaxis, 1700)    pantoprazole  40 mg Intravenous Daily     PRN Meds:hydrALAZINE, HYDROmorphone, labetalol, LIDOcaine (PF) 10 mg/ml (1%), ondansetron, sodium chloride 0.9%     Review of patient's allergies indicates:  No Known Allergies  Objective:     Vital Signs (Most Recent):  Temp: 98.4 °F (36.9 °C) (01/14/24 0712)  Pulse: 79 (01/14/24 0725)  Resp: 17 (01/14/24 0712)  BP: (!) 208/88 (01/14/24 0712)  SpO2: (!) 94 % (01/14/24 0712) Vital Signs (24h Range):  Temp:  [97.3 °F (36.3 °C)-98.4 °F (36.9 °C)] 98.4 °F (36.9 °C)  Pulse:  [66-85] 79  Resp:  [12-20] 17  SpO2:  [92 %-98 %] 94 %  BP: (124-208)/(63-94) 208/88     Weight: 54.4 kg (120 lb)  Body mass index is 20.6 kg/m².    Intake/Output - Last 3 Shifts         01/12 0700  01/13 0659 01/13 0700  01/14 0659 01/14 0700  01/15 0659    P.O.   0    I.V. (mL/kg)  862.5 (15.9)     NG/GT  150     Total Intake(mL/kg)  1012.5 (18.6) 0 (0)    Drains  650     Total Output  650     Net  +362.5 0           Urine Occurrence   2 x             Physical Exam  Constitutional:       Appearance: Normal appearance.   HENT:      Head: Normocephalic and atraumatic.      Comments: Laceration on forehead     Nose:      Comments: NG tube in place to LIWS  Eyes:      Extraocular Movements: Extraocular movements intact.      Conjunctiva/sclera: Conjunctivae normal.   Pulmonary:      Effort: Pulmonary effort is normal.   Abdominal:      Palpations: Abdomen is soft.      Tenderness: There is abdominal tenderness.      Comments: Midline incision scar well healed  Previous ostomy site at the right lower quadrant     Skin:      General: Skin is warm and dry.   Neurological:      General: No focal deficit present.      Mental Status: She is alert and oriented to person, place, and time.   Psychiatric:         Mood and Affect: Mood normal.         Behavior: Behavior normal.          Significant Labs:  I have reviewed all pertinent lab results within the past 24 hours.    Significant Diagnostics:  I have reviewed all pertinent imaging results/findings within the past 24 hours.

## 2024-01-15 VITALS
OXYGEN SATURATION: 95 % | RESPIRATION RATE: 18 BRPM | TEMPERATURE: 98 F | BODY MASS INDEX: 20.49 KG/M2 | HEIGHT: 64 IN | DIASTOLIC BLOOD PRESSURE: 70 MMHG | WEIGHT: 120 LBS | HEART RATE: 89 BPM | SYSTOLIC BLOOD PRESSURE: 137 MMHG

## 2024-01-15 LAB
ANION GAP SERPL CALC-SCNC: 10 MMOL/L (ref 8–16)
BASOPHILS # BLD AUTO: 0.03 K/UL (ref 0–0.2)
BASOPHILS NFR BLD: 0.3 % (ref 0–1.9)
BUN SERPL-MCNC: 10 MG/DL (ref 8–23)
CALCIUM SERPL-MCNC: 9.9 MG/DL (ref 8.7–10.5)
CHLORIDE SERPL-SCNC: 101 MMOL/L (ref 95–110)
CO2 SERPL-SCNC: 25 MMOL/L (ref 23–29)
CREAT SERPL-MCNC: 0.7 MG/DL (ref 0.5–1.4)
DIFFERENTIAL METHOD BLD: ABNORMAL
EOSINOPHIL # BLD AUTO: 0.1 K/UL (ref 0–0.5)
EOSINOPHIL NFR BLD: 0.4 % (ref 0–8)
ERYTHROCYTE [DISTWIDTH] IN BLOOD BY AUTOMATED COUNT: 14.8 % (ref 11.5–14.5)
EST. GFR  (NO RACE VARIABLE): >60 ML/MIN/1.73 M^2
GLUCOSE SERPL-MCNC: 104 MG/DL (ref 70–110)
HCT VFR BLD AUTO: 48.8 % (ref 37–48.5)
HGB BLD-MCNC: 16 G/DL (ref 12–16)
IMM GRANULOCYTES # BLD AUTO: 0.03 K/UL (ref 0–0.04)
IMM GRANULOCYTES NFR BLD AUTO: 0.3 % (ref 0–0.5)
LYMPHOCYTES # BLD AUTO: 2.4 K/UL (ref 1–4.8)
LYMPHOCYTES NFR BLD: 20.6 % (ref 18–48)
MAGNESIUM SERPL-MCNC: 1.8 MG/DL (ref 1.6–2.6)
MCH RBC QN AUTO: 31.6 PG (ref 27–31)
MCHC RBC AUTO-ENTMCNC: 32.8 G/DL (ref 32–36)
MCV RBC AUTO: 96 FL (ref 82–98)
MONOCYTES # BLD AUTO: 0.9 K/UL (ref 0.3–1)
MONOCYTES NFR BLD: 7.5 % (ref 4–15)
NEUTROPHILS # BLD AUTO: 8.2 K/UL (ref 1.8–7.7)
NEUTROPHILS NFR BLD: 70.9 % (ref 38–73)
NRBC BLD-RTO: 0 /100 WBC
PHOSPHATE SERPL-MCNC: 2.6 MG/DL (ref 2.7–4.5)
PLATELET # BLD AUTO: 373 K/UL (ref 150–450)
PMV BLD AUTO: 10 FL (ref 9.2–12.9)
POTASSIUM SERPL-SCNC: 4.1 MMOL/L (ref 3.5–5.1)
RBC # BLD AUTO: 5.07 M/UL (ref 4–5.4)
SODIUM SERPL-SCNC: 136 MMOL/L (ref 136–145)
WBC # BLD AUTO: 11.5 K/UL (ref 3.9–12.7)

## 2024-01-15 PROCEDURE — 83735 ASSAY OF MAGNESIUM: CPT | Performed by: STUDENT IN AN ORGANIZED HEALTH CARE EDUCATION/TRAINING PROGRAM

## 2024-01-15 PROCEDURE — 25000003 PHARM REV CODE 250: Performed by: SURGERY

## 2024-01-15 PROCEDURE — 80048 BASIC METABOLIC PNL TOTAL CA: CPT

## 2024-01-15 PROCEDURE — C9113 INJ PANTOPRAZOLE SODIUM, VIA: HCPCS | Performed by: STUDENT IN AN ORGANIZED HEALTH CARE EDUCATION/TRAINING PROGRAM

## 2024-01-15 PROCEDURE — 63600175 PHARM REV CODE 636 W HCPCS: Performed by: STUDENT IN AN ORGANIZED HEALTH CARE EDUCATION/TRAINING PROGRAM

## 2024-01-15 PROCEDURE — 85025 COMPLETE CBC W/AUTO DIFF WBC: CPT

## 2024-01-15 PROCEDURE — 63600175 PHARM REV CODE 636 W HCPCS

## 2024-01-15 PROCEDURE — 36415 COLL VENOUS BLD VENIPUNCTURE: CPT

## 2024-01-15 PROCEDURE — 25000003 PHARM REV CODE 250

## 2024-01-15 PROCEDURE — 84100 ASSAY OF PHOSPHORUS: CPT | Performed by: STUDENT IN AN ORGANIZED HEALTH CARE EDUCATION/TRAINING PROGRAM

## 2024-01-15 RX ORDER — SODIUM,POTASSIUM PHOSPHATES 280-250MG
2 POWDER IN PACKET (EA) ORAL EVERY 4 HOURS
Status: DISCONTINUED | OUTPATIENT
Start: 2024-01-15 | End: 2024-01-15 | Stop reason: HOSPADM

## 2024-01-15 RX ORDER — HYDROCHLOROTHIAZIDE 12.5 MG/1
12.5 TABLET ORAL DAILY
Status: DISCONTINUED | OUTPATIENT
Start: 2024-01-15 | End: 2024-01-15 | Stop reason: HOSPADM

## 2024-01-15 RX ORDER — LISINOPRIL AND HYDROCHLOROTHIAZIDE 12.5; 2 MG/1; MG/1
1 TABLET ORAL 2 TIMES DAILY
Status: DISCONTINUED | OUTPATIENT
Start: 2024-01-15 | End: 2024-01-15

## 2024-01-15 RX ORDER — LISINOPRIL 20 MG/1
20 TABLET ORAL DAILY
Status: DISCONTINUED | OUTPATIENT
Start: 2024-01-15 | End: 2024-01-15 | Stop reason: HOSPADM

## 2024-01-15 RX ADMIN — PANTOPRAZOLE SODIUM 40 MG: 40 INJECTION, POWDER, FOR SOLUTION INTRAVENOUS at 07:01

## 2024-01-15 RX ADMIN — GABAPENTIN 300 MG: 300 CAPSULE ORAL at 07:01

## 2024-01-15 RX ADMIN — HYDROCHLOROTHIAZIDE 12.5 MG: 12.5 TABLET ORAL at 07:01

## 2024-01-15 RX ADMIN — CLONAZEPAM 0.5 MG: 0.5 TABLET ORAL at 06:01

## 2024-01-15 RX ADMIN — HYDROMORPHONE HYDROCHLORIDE 1 MG: 1 INJECTION, SOLUTION INTRAMUSCULAR; INTRAVENOUS; SUBCUTANEOUS at 06:01

## 2024-01-15 RX ADMIN — HYDROMORPHONE HYDROCHLORIDE 1 MG: 1 INJECTION, SOLUTION INTRAMUSCULAR; INTRAVENOUS; SUBCUTANEOUS at 02:01

## 2024-01-15 RX ADMIN — LISINOPRIL 20 MG: 20 TABLET ORAL at 07:01

## 2024-01-15 RX ADMIN — POTASSIUM & SODIUM PHOSPHATES POWDER PACK 280-160-250 MG 2 PACKET: 280-160-250 PACK at 10:01

## 2024-01-15 RX ADMIN — HYDROMORPHONE HYDROCHLORIDE 1 MG: 1 INJECTION, SOLUTION INTRAMUSCULAR; INTRAVENOUS; SUBCUTANEOUS at 10:01

## 2024-01-15 NOTE — PROGRESS NOTES
[facility]  General Surgery  Progress Note    Subjective:     History of Present Illness:  Juan Antonio Rowe is a 73 y.o. female with history of a perforated peptic ulcer and necrotic left colon s/p repair with angelita patch and left hemicolectomy with end ileostomy in 2021 with reversal in 2021 as well, presents with a chief complaint of abdominal pain. Patient states around 7:00 p.m. shortly after dinner she developed severe abdominal pain. She describes the pain as dull and aching with intermittent exacerbation that is worse than a 10/10. She describes her pain as most intense at the RLQ. She had one episodes of nonbloody emesis prior to arrival. She reports having one episode of emesis in the ED. Patient had a normal bowel movement 2 days ago. She is endorsing mild nausea at this time. She denies chest pain, shortness of breath, fevers.     General Surgery has been consulted to evaluate for possible SBO.    Post-Op Info:  * No surgery found *         Interval History:   Hypertensive overnight  Pulled out NG tube overnight  Had a bowel movement overnight  Passing flatus    Medications:  Continuous Infusions:   dextrose 5 % and 0.45 % NaCl with KCl 20 mEq 100 mL/hr at 01/14/24 0858     Scheduled Meds:   enoxparin  40 mg Subcutaneous Q24H (prophylaxis, 1700)    gabapentin  300 mg Oral TID    lisinopriL-hydrochlorothiazide  1 tablet Oral BID    pantoprazole  40 mg Intravenous Daily     PRN Meds:clonazePAM, hydrALAZINE, HYDROmorphone, labetalol, LIDOcaine (PF) 10 mg/ml (1%), ondansetron, sodium chloride 0.9%     Review of patient's allergies indicates:  No Known Allergies  Objective:     Vital Signs (Most Recent):  Temp: 98.4 °F (36.9 °C) (01/15/24 0730)  Pulse: 75 (01/15/24 0730)  Resp: 18 (01/15/24 0730)  BP: 137/70 (01/15/24 0730)  SpO2: 95 % (01/15/24 0730) Vital Signs (24h Range):  Temp:  [97.6 °F (36.4 °C)-98.9 °F (37.2 °C)] 98.4 °F (36.9 °C)  Pulse:  [] 75  Resp:  [16-20] 18  SpO2:  [93 %-98 %] 95  %  BP: (137-218)/(58-94) 137/70     Weight: 54.4 kg (120 lb)  Body mass index is 20.6 kg/m².    Intake/Output - Last 3 Shifts         01/13 0700 01/14 0659 01/14 0700  01/15 0659 01/15 0700 01/16 0659    P.O.  0     I.V. (mL/kg) 862.5 (15.9)      NG/ 100     Total Intake(mL/kg) 1012.5 (18.6) 100 (1.8)     Urine (mL/kg/hr)  0 (0)     Drains 650 700     Total Output 650 700     Net +362.5 -600            Urine Occurrence  2 x             Physical Exam  Constitutional:       Appearance: Normal appearance.   HENT:      Head: Normocephalic and atraumatic.      Comments: Laceration on forehead  Eyes:      Extraocular Movements: Extraocular movements intact.      Conjunctiva/sclera: Conjunctivae normal.   Pulmonary:      Effort: Pulmonary effort is normal.   Abdominal:      Palpations: Abdomen is soft.      Tenderness: There is abdominal tenderness.      Comments: Midline incision scar well healed  Previous ostomy site at the right lower quadrant     Skin:     General: Skin is warm and dry.   Neurological:      General: No focal deficit present.      Mental Status: She is alert and oriented to person, place, and time.   Psychiatric:         Mood and Affect: Mood normal.         Behavior: Behavior normal.          Significant Labs:  I have reviewed all pertinent lab results within the past 24 hours.    Significant Diagnostics:  I have reviewed all pertinent imaging results/findings within the past 24 hours.  Assessment/Plan:     * Small bowel obstruction  Mrs. Rowe is 73 y.o. female with HTN, HLD, and history of a perforated gastric ulcer and ischemic colon s/p modified angelita patch repair and subtotal colectomy with ileosigmoid anastomosis. CT scan shows dilated loops of small bowel with signs of obstruction.     Plan:  - Diet: Regular diet  - mIVF: @100 ml/hr  (D5 1/2NS with 20 KCl)  - Pain: Multimodals  - Labs: Daily CBC, BMP, Mg, Ph - replete lytes prn  - PRN nausea control     Dispo: D/C later this afternoon  if tolerating diet        Alfonzo Live MD  General Surgery  [facility]

## 2024-01-15 NOTE — ASSESSMENT & PLAN NOTE
Mrs. Rowe is 73 y.o. female with HTN, HLD, and history of a perforated gastric ulcer and ischemic colon s/p modified angelita patch repair and subtotal colectomy with ileosigmoid anastomosis. CT scan shows dilated loops of small bowel with signs of obstruction.     Plan:  - Diet: Regular diet  - mIVF: @100 ml/hr  (D5 1/2NS with 20 KCl)  - Pain: Multimodals  - Labs: Daily CBC, BMP, Mg, Ph - replete lytes prn  - PRN nausea control     Dispo: D/C later this afternoon if tolerating diet

## 2024-01-15 NOTE — SUBJECTIVE & OBJECTIVE
Interval History:   Hypertensive overnight  Pulled out NG tube overnight  Had a bowel movement overnight  Passing flatus    Medications:  Continuous Infusions:   dextrose 5 % and 0.45 % NaCl with KCl 20 mEq 100 mL/hr at 01/14/24 0858     Scheduled Meds:   enoxparin  40 mg Subcutaneous Q24H (prophylaxis, 1700)    gabapentin  300 mg Oral TID    lisinopriL-hydrochlorothiazide  1 tablet Oral BID    pantoprazole  40 mg Intravenous Daily     PRN Meds:clonazePAM, hydrALAZINE, HYDROmorphone, labetalol, LIDOcaine (PF) 10 mg/ml (1%), ondansetron, sodium chloride 0.9%     Review of patient's allergies indicates:  No Known Allergies  Objective:     Vital Signs (Most Recent):  Temp: 98.4 °F (36.9 °C) (01/15/24 0730)  Pulse: 75 (01/15/24 0730)  Resp: 18 (01/15/24 0730)  BP: 137/70 (01/15/24 0730)  SpO2: 95 % (01/15/24 0730) Vital Signs (24h Range):  Temp:  [97.6 °F (36.4 °C)-98.9 °F (37.2 °C)] 98.4 °F (36.9 °C)  Pulse:  [] 75  Resp:  [16-20] 18  SpO2:  [93 %-98 %] 95 %  BP: (137-218)/(58-94) 137/70     Weight: 54.4 kg (120 lb)  Body mass index is 20.6 kg/m².    Intake/Output - Last 3 Shifts         01/13 0700  01/14 0659 01/14 0700  01/15 0659 01/15 0700  01/16 0659    P.O.  0     I.V. (mL/kg) 862.5 (15.9)      NG/ 100     Total Intake(mL/kg) 1012.5 (18.6) 100 (1.8)     Urine (mL/kg/hr)  0 (0)     Drains 650 700     Total Output 650 700     Net +362.5 -600            Urine Occurrence  2 x              Physical Exam  Constitutional:       Appearance: Normal appearance.   HENT:      Head: Normocephalic and atraumatic.      Comments: Laceration on forehead  Eyes:      Extraocular Movements: Extraocular movements intact.      Conjunctiva/sclera: Conjunctivae normal.   Pulmonary:      Effort: Pulmonary effort is normal.   Abdominal:      Palpations: Abdomen is soft.      Tenderness: There is abdominal tenderness.      Comments: Midline incision scar well healed  Previous ostomy site at the right lower quadrant     Skin:      General: Skin is warm and dry.   Neurological:      General: No focal deficit present.      Mental Status: She is alert and oriented to person, place, and time.   Psychiatric:         Mood and Affect: Mood normal.         Behavior: Behavior normal.          Significant Labs:  I have reviewed all pertinent lab results within the past 24 hours.    Significant Diagnostics:  I have reviewed all pertinent imaging results/findings within the past 24 hours.

## 2024-01-15 NOTE — PLAN OF CARE
Patient continues to refuse continuous IV fluids. Charge nurse notified. VSS. Pain medications given as ordered per MAR. Fall and safety precautions maintained. POC discussed with patient. Bed in lowest locked position. Call light within reach. Will continue care as ordered.      Problem: Adult Inpatient Plan of Care  Goal: Plan of Care Review  1/15/2024 0451 by Sylvia Sousa LPN  Outcome: Ongoing, Progressing  1/15/2024 0451 by Sylvia Sousa LPN  Outcome: Ongoing, Progressing  Goal: Patient-Specific Goal (Individualized)  1/15/2024 0451 by Sylvia Sousa LPN  Outcome: Ongoing, Progressing  1/15/2024 0451 by Sylvia Sousa LPN  Outcome: Ongoing, Progressing  Goal: Absence of Hospital-Acquired Illness or Injury  1/15/2024 0451 by Sylvia Sousa LPN  Outcome: Ongoing, Progressing  1/15/2024 0451 by Sylvia Sousa LPN  Outcome: Ongoing, Progressing  Goal: Optimal Comfort and Wellbeing  1/15/2024 0451 by Sylvia Sousa LPN  Outcome: Ongoing, Progressing  1/15/2024 0451 by Sylvia Sousa LPN  Outcome: Ongoing, Progressing  Goal: Readiness for Transition of Care  1/15/2024 0451 by Sylvia Sousa LPN  Outcome: Ongoing, Progressing  1/15/2024 0451 by Sylvia Sousa LPN  Outcome: Ongoing, Progressing

## 2024-01-15 NOTE — PLAN OF CARE
Patient AAOx4.VS stable, afrebrile. Discharge teaching completed. Pain managed. Neurovascular intact. Free from falls. Frequent rounds made for safety, pain and comfort. Bed at lowest position, call light within reach, side rails up x2.    Problem: Adult Inpatient Plan of Care  Goal: Plan of Care Review  Outcome: Adequate for Care Transition  Goal: Absence of Hospital-Acquired Illness or Injury  Outcome: Adequate for Care Transition  Goal: Readiness for Transition of Care  Outcome: Adequate for Care Transition

## 2024-01-15 NOTE — DISCHARGE SUMMARY
"[facility]  DISCHARGE SUMMARY  General Surgery      Admit Date:  1/13/2024    Discharge Date and Time:  1/15/2024  12:00 PM    Attending Physician:  Aron Hopper MD     Discharge Provider:  Alfonzo Live MD     Reason for Admission:  Small bowel obstruction       Hospital Course:  Mrs. Rowe is 73 y.o. female with HTN, HLD, and history of a perforated gastric ulcer and ischemic colon s/p modified angelita patch repair and subtotal colectomy with ileosigmoid anastomosis. CT scan shows dilated loops of small bowel with signs of obstruction.  She was admitted to the general surgery service. An NG tube was placed for decompression. She passed gas and had a bowel movement on 1/14, the NG tube was removed. She tolerated a diet today and is medically stable for discharge.    Consults:  None.    Significant Diagnostic Studies:   Recent Labs   Lab 01/13/24 0440 01/14/24  0548   WBC 14.53* 11.14   HGB 18.2* 15.6   HCT 54.2* 47.9    345     Recent Labs   Lab 01/13/24 0440 01/14/24  0548    138   K 3.4* 3.1*    102   CO2 28 27   BUN 24* 17   CREATININE 1.0 0.8   * 125*   CALCIUM 10.6* 9.2   MG  --  1.7   PHOS  --  2.8   AST 12  --    ALT 9*  --    ALKPHOS 91  --    BILITOT 0.6  --    PROT 7.8  --    ALBUMIN 4.3  --    LIPASE 17  --      Recent Labs   Lab 01/13/24  0440   LACTATE 1.6   No results for input(s): "PH", "PCO2", "PO2", "HCO3" in the last 72 hours.      Final Diagnoses:   Principal Problem:  Small bowel obstruction   Secondary Diagnoses:    Active Hospital Problems    Diagnosis  POA    *Small bowel obstruction [K56.609]  Yes      Resolved Hospital Problems   No resolved problems to display.       Discharged Condition:  Good    Disposition:  Home or Self Care    Follow Up/Patient Instructions:     Medications:  Reconciled Home Medications:    Current Discharge Medication List        CONTINUE these medications which have NOT CHANGED    Details   acetaminophen (TYLENOL) 325 MG " tablet Take 2 tablets (650 mg total) by mouth every 6 (six) hours as needed for Pain.  Qty: 30 tablet, Refills: 0      calcium carbonate (OS-TORI) 600 mg (1,500 mg) Tab Take 600 mg by mouth 2 (two) times daily with meals.      clonazePAM (KLONOPIN) 1 MG tablet Take 1 tablet (1 mg total) by mouth 2 (two) times daily as needed for Anxiety.  Qty: 30 tablet, Refills: 0      docosahexaenoic acid/epa (FISH OIL ORAL) Take 1 capsule by mouth once daily.      gabapentin (NEURONTIN) 600 MG tablet Take 600 mg by mouth 3 (three) times daily as needed (back pain).      HYDROcodone-acetaminophen (NORCO) 7.5-325 mg per tablet Take 1 tablet by mouth every 8 (eight) hours as needed for Pain.  Qty: 21 tablet, Refills: 0    Comments: Quantity prescribed more than 7 day supply? No  Associated Diagnoses: Closed bimalleolar fracture of right ankle with routine healing, subsequent encounter      lisinopriL-hydrochlorothiazide (PRINZIDE,ZESTORETIC) 20-12.5 mg per tablet Take 1 tablet by mouth 2 (two) times a day.      loperamide (IMODIUM) 2 mg capsule Take 1 capsule (2 mg total) by mouth 2 (two) times a day.  Qty: 14 capsule, Refills: 0      MAGNESIUM ORAL Take 1 tablet by mouth once daily.      multivitamin (THERAGRAN) per tablet Take 1 tablet by mouth once daily.      ondansetron (ZOFRAN-ODT) 4 MG TbDL Dissolve 1 tablet (4 mg total) by mouth every 8 (eight) hours as needed (nausea).  Qty: 10 tablet, Refills: 0      potassium chloride SA (K-DUR,KLOR-CON) 20 MEQ tablet Take 1 tablet (20 mEq total) by mouth once daily.  Qty: 14 tablet, Refills: 0      tiZANidine (ZANAFLEX) 4 MG tablet Take 8 mg by mouth 2 (two) times daily as needed for Muscle spasms or Pain.           Discharge Procedure Orders   Diet Adult Regular     Notify your health care provider if you experience any of the following:  temperature >100.4     Notify your health care provider if you experience any of the following:  persistent nausea and vomiting or diarrhea     Notify  your health care provider if you experience any of the following:  severe uncontrolled pain     Notify your health care provider if you experience any of the following:  redness, tenderness, or signs of infection (pain, swelling, redness, odor or green/yellow discharge around incision site)     Notify your health care provider if you experience any of the following:  difficulty breathing or increased cough     Notify your health care provider if you experience any of the following:  severe persistent headache     Notify your health care provider if you experience any of the following:  worsening rash     Notify your health care provider if you experience any of the following:  persistent dizziness, light-headedness, or visual disturbances     No dressing needed     Activity as tolerated         Alfonzo Live MD

## 2024-03-13 ENCOUNTER — ANESTHESIA EVENT (OUTPATIENT)
Dept: ENDOSCOPY | Facility: HOSPITAL | Age: 74
End: 2024-03-13
Payer: MEDICARE

## 2024-03-13 ENCOUNTER — HOSPITAL ENCOUNTER (OUTPATIENT)
Facility: HOSPITAL | Age: 74
Discharge: LEFT BEFORE DISCHARGE | End: 2024-03-13
Attending: EMERGENCY MEDICINE | Admitting: HOSPITALIST
Payer: MEDICARE

## 2024-03-13 ENCOUNTER — ANESTHESIA (OUTPATIENT)
Dept: ENDOSCOPY | Facility: HOSPITAL | Age: 74
End: 2024-03-13
Payer: MEDICARE

## 2024-03-13 VITALS
WEIGHT: 112 LBS | HEART RATE: 64 BPM | RESPIRATION RATE: 14 BRPM | BODY MASS INDEX: 18.66 KG/M2 | SYSTOLIC BLOOD PRESSURE: 177 MMHG | TEMPERATURE: 98 F | DIASTOLIC BLOOD PRESSURE: 77 MMHG | OXYGEN SATURATION: 99 % | HEIGHT: 65 IN

## 2024-03-13 DIAGNOSIS — R10.33 PERIUMBILICAL ABDOMINAL PAIN: Primary | ICD-10-CM

## 2024-03-13 DIAGNOSIS — R93.5 ABNORMAL ABDOMINAL CT SCAN: ICD-10-CM

## 2024-03-13 DIAGNOSIS — R11.0 NAUSEA: ICD-10-CM

## 2024-03-13 DIAGNOSIS — R07.9 CHEST PAIN: ICD-10-CM

## 2024-03-13 PROBLEM — R93.3 ABNORMAL FINDING ON GI TRACT IMAGING: Status: ACTIVE | Noted: 2024-03-13

## 2024-03-13 PROBLEM — R10.13 EPIGASTRIC PAIN: Status: ACTIVE | Noted: 2024-03-13

## 2024-03-13 LAB
ALBUMIN SERPL BCP-MCNC: 4 G/DL (ref 3.5–5.2)
ALP SERPL-CCNC: 95 U/L (ref 55–135)
ALT SERPL W/O P-5'-P-CCNC: 14 U/L (ref 10–44)
ANION GAP SERPL CALC-SCNC: 11 MMOL/L (ref 8–16)
AST SERPL-CCNC: 20 U/L (ref 10–40)
BASOPHILS # BLD AUTO: 0.06 K/UL (ref 0–0.2)
BASOPHILS NFR BLD: 0.4 % (ref 0–1.9)
BILIRUB SERPL-MCNC: 0.5 MG/DL (ref 0.1–1)
BILIRUB UR QL STRIP: NEGATIVE
BUN SERPL-MCNC: 26 MG/DL (ref 8–23)
CALCIUM SERPL-MCNC: 10.5 MG/DL (ref 8.7–10.5)
CHLORIDE SERPL-SCNC: 108 MMOL/L (ref 95–110)
CLARITY UR REFRACT.AUTO: CLEAR
CO2 SERPL-SCNC: 24 MMOL/L (ref 23–29)
COLOR UR AUTO: YELLOW
CREAT SERPL-MCNC: 0.9 MG/DL (ref 0.5–1.4)
DIFFERENTIAL METHOD BLD: ABNORMAL
EOSINOPHIL # BLD AUTO: 0.1 K/UL (ref 0–0.5)
EOSINOPHIL NFR BLD: 0.7 % (ref 0–8)
ERYTHROCYTE [DISTWIDTH] IN BLOOD BY AUTOMATED COUNT: 14.6 % (ref 11.5–14.5)
EST. GFR  (NO RACE VARIABLE): >60 ML/MIN/1.73 M^2
GLUCOSE SERPL-MCNC: 120 MG/DL (ref 70–110)
GLUCOSE UR QL STRIP: NEGATIVE
HCT VFR BLD AUTO: 45.9 % (ref 37–48.5)
HGB BLD-MCNC: 14.5 G/DL (ref 12–16)
HGB UR QL STRIP: NEGATIVE
IMM GRANULOCYTES # BLD AUTO: 0.05 K/UL (ref 0–0.04)
IMM GRANULOCYTES NFR BLD AUTO: 0.4 % (ref 0–0.5)
KETONES UR QL STRIP: NEGATIVE
LACTATE SERPL-SCNC: 0.8 MMOL/L (ref 0.5–2.2)
LEUKOCYTE ESTERASE UR QL STRIP: NEGATIVE
LIPASE SERPL-CCNC: 14 U/L (ref 4–60)
LYMPHOCYTES # BLD AUTO: 1.5 K/UL (ref 1–4.8)
LYMPHOCYTES NFR BLD: 10.4 % (ref 18–48)
MCH RBC QN AUTO: 31 PG (ref 27–31)
MCHC RBC AUTO-ENTMCNC: 31.6 G/DL (ref 32–36)
MCV RBC AUTO: 98 FL (ref 82–98)
MONOCYTES # BLD AUTO: 0.5 K/UL (ref 0.3–1)
MONOCYTES NFR BLD: 3.3 % (ref 4–15)
NEUTROPHILS # BLD AUTO: 11.9 K/UL (ref 1.8–7.7)
NEUTROPHILS NFR BLD: 84.8 % (ref 38–73)
NITRITE UR QL STRIP: NEGATIVE
NRBC BLD-RTO: 0 /100 WBC
OHS QRS DURATION: 70 MS
OHS QRS DURATION: 82 MS
OHS QTC CALCULATION: 453 MS
OHS QTC CALCULATION: 484 MS
PH UR STRIP: 6 [PH] (ref 5–8)
PLATELET # BLD AUTO: 326 K/UL (ref 150–450)
PMV BLD AUTO: 11.2 FL (ref 9.2–12.9)
POTASSIUM SERPL-SCNC: 3.9 MMOL/L (ref 3.5–5.1)
PROT SERPL-MCNC: 7.5 G/DL (ref 6–8.4)
PROT UR QL STRIP: ABNORMAL
RBC # BLD AUTO: 4.68 M/UL (ref 4–5.4)
SODIUM SERPL-SCNC: 143 MMOL/L (ref 136–145)
SP GR UR STRIP: >1.03 (ref 1–1.03)
URN SPEC COLLECT METH UR: ABNORMAL
WBC # BLD AUTO: 14.01 K/UL (ref 3.9–12.7)

## 2024-03-13 PROCEDURE — 83605 ASSAY OF LACTIC ACID: CPT | Performed by: EMERGENCY MEDICINE

## 2024-03-13 PROCEDURE — 63600175 PHARM REV CODE 636 W HCPCS

## 2024-03-13 PROCEDURE — 43239 EGD BIOPSY SINGLE/MULTIPLE: CPT | Performed by: INTERNAL MEDICINE

## 2024-03-13 PROCEDURE — 25500020 PHARM REV CODE 255: Performed by: EMERGENCY MEDICINE

## 2024-03-13 PROCEDURE — 93010 ELECTROCARDIOGRAM REPORT: CPT | Mod: 76,,, | Performed by: INTERNAL MEDICINE

## 2024-03-13 PROCEDURE — 94761 N-INVAS EAR/PLS OXIMETRY MLT: CPT

## 2024-03-13 PROCEDURE — 37000009 HC ANESTHESIA EA ADD 15 MINS: Performed by: INTERNAL MEDICINE

## 2024-03-13 PROCEDURE — 27000221 HC OXYGEN, UP TO 24 HOURS

## 2024-03-13 PROCEDURE — D9220A PRA ANESTHESIA: Mod: ANES,,, | Performed by: ANESTHESIOLOGY

## 2024-03-13 PROCEDURE — 88305 TISSUE EXAM BY PATHOLOGIST: CPT | Mod: 26,,, | Performed by: PATHOLOGY

## 2024-03-13 PROCEDURE — 63600175 PHARM REV CODE 636 W HCPCS: Performed by: EMERGENCY MEDICINE

## 2024-03-13 PROCEDURE — C9113 INJ PANTOPRAZOLE SODIUM, VIA: HCPCS

## 2024-03-13 PROCEDURE — 99285 EMERGENCY DEPT VISIT HI MDM: CPT | Mod: 25

## 2024-03-13 PROCEDURE — 93005 ELECTROCARDIOGRAM TRACING: CPT

## 2024-03-13 PROCEDURE — 88305 TISSUE EXAM BY PATHOLOGIST: CPT | Performed by: PATHOLOGY

## 2024-03-13 PROCEDURE — 25000003 PHARM REV CODE 250: Performed by: EMERGENCY MEDICINE

## 2024-03-13 PROCEDURE — G0378 HOSPITAL OBSERVATION PER HR: HCPCS

## 2024-03-13 PROCEDURE — 27201012 HC FORCEPS, HOT/COLD, DISP: Performed by: INTERNAL MEDICINE

## 2024-03-13 PROCEDURE — 25000003 PHARM REV CODE 250: Performed by: NURSE ANESTHETIST, CERTIFIED REGISTERED

## 2024-03-13 PROCEDURE — 43239 EGD BIOPSY SINGLE/MULTIPLE: CPT | Mod: GC,,, | Performed by: INTERNAL MEDICINE

## 2024-03-13 PROCEDURE — 37000008 HC ANESTHESIA 1ST 15 MINUTES: Performed by: INTERNAL MEDICINE

## 2024-03-13 PROCEDURE — C9113 INJ PANTOPRAZOLE SODIUM, VIA: HCPCS | Performed by: EMERGENCY MEDICINE

## 2024-03-13 PROCEDURE — 25000003 PHARM REV CODE 250

## 2024-03-13 PROCEDURE — 96361 HYDRATE IV INFUSION ADD-ON: CPT

## 2024-03-13 PROCEDURE — 85025 COMPLETE CBC W/AUTO DIFF WBC: CPT | Performed by: EMERGENCY MEDICINE

## 2024-03-13 PROCEDURE — 96376 TX/PRO/DX INJ SAME DRUG ADON: CPT

## 2024-03-13 PROCEDURE — 96374 THER/PROPH/DIAG INJ IV PUSH: CPT

## 2024-03-13 PROCEDURE — 80053 COMPREHEN METABOLIC PANEL: CPT | Performed by: EMERGENCY MEDICINE

## 2024-03-13 PROCEDURE — 83690 ASSAY OF LIPASE: CPT | Performed by: EMERGENCY MEDICINE

## 2024-03-13 PROCEDURE — 96375 TX/PRO/DX INJ NEW DRUG ADDON: CPT

## 2024-03-13 PROCEDURE — 81003 URINALYSIS AUTO W/O SCOPE: CPT | Performed by: EMERGENCY MEDICINE

## 2024-03-13 PROCEDURE — 99205 OFFICE O/P NEW HI 60 MIN: CPT | Mod: 25,GC,, | Performed by: INTERNAL MEDICINE

## 2024-03-13 PROCEDURE — 63600175 PHARM REV CODE 636 W HCPCS: Performed by: NURSE ANESTHETIST, CERTIFIED REGISTERED

## 2024-03-13 PROCEDURE — D9220A PRA ANESTHESIA: Mod: CRNA,,, | Performed by: NURSE ANESTHETIST, CERTIFIED REGISTERED

## 2024-03-13 RX ORDER — GLUCAGON 1 MG
1 KIT INJECTION
Status: DISCONTINUED | OUTPATIENT
Start: 2024-03-13 | End: 2024-03-13 | Stop reason: HOSPADM

## 2024-03-13 RX ORDER — SODIUM CHLORIDE 0.9 % (FLUSH) 0.9 %
3 SYRINGE (ML) INJECTION
Status: DISCONTINUED | OUTPATIENT
Start: 2024-03-13 | End: 2024-03-13 | Stop reason: HOSPADM

## 2024-03-13 RX ORDER — POLYETHYLENE GLYCOL 3350 17 G/17G
17 POWDER, FOR SOLUTION ORAL 2 TIMES DAILY PRN
Status: DISCONTINUED | OUTPATIENT
Start: 2024-03-13 | End: 2024-03-13 | Stop reason: HOSPADM

## 2024-03-13 RX ORDER — IBUPROFEN 200 MG
16 TABLET ORAL
Status: DISCONTINUED | OUTPATIENT
Start: 2024-03-13 | End: 2024-03-13 | Stop reason: HOSPADM

## 2024-03-13 RX ORDER — FENTANYL CITRATE 50 UG/ML
25 INJECTION, SOLUTION INTRAMUSCULAR; INTRAVENOUS EVERY 5 MIN PRN
Status: DISCONTINUED | OUTPATIENT
Start: 2024-03-13 | End: 2024-03-13 | Stop reason: HOSPADM

## 2024-03-13 RX ORDER — PROCHLORPERAZINE EDISYLATE 5 MG/ML
5 INJECTION INTRAMUSCULAR; INTRAVENOUS EVERY 6 HOURS PRN
Status: DISCONTINUED | OUTPATIENT
Start: 2024-03-13 | End: 2024-03-13 | Stop reason: HOSPADM

## 2024-03-13 RX ORDER — PANTOPRAZOLE SODIUM 40 MG/10ML
40 INJECTION, POWDER, LYOPHILIZED, FOR SOLUTION INTRAVENOUS ONCE
Status: COMPLETED | OUTPATIENT
Start: 2024-03-13 | End: 2024-03-13

## 2024-03-13 RX ORDER — ONDANSETRON 8 MG/1
8 TABLET, ORALLY DISINTEGRATING ORAL EVERY 8 HOURS PRN
Status: DISCONTINUED | OUTPATIENT
Start: 2024-03-13 | End: 2024-03-13 | Stop reason: HOSPADM

## 2024-03-13 RX ORDER — HYDROMORPHONE HYDROCHLORIDE 1 MG/ML
1 INJECTION, SOLUTION INTRAMUSCULAR; INTRAVENOUS; SUBCUTANEOUS
Status: COMPLETED | OUTPATIENT
Start: 2024-03-13 | End: 2024-03-13

## 2024-03-13 RX ORDER — SODIUM CHLORIDE 0.9 % (FLUSH) 0.9 %
10 SYRINGE (ML) INJECTION EVERY 12 HOURS PRN
Status: DISCONTINUED | OUTPATIENT
Start: 2024-03-13 | End: 2024-03-13 | Stop reason: HOSPADM

## 2024-03-13 RX ORDER — LIDOCAINE HYDROCHLORIDE 20 MG/ML
INJECTION INTRAVENOUS
Status: DISCONTINUED | OUTPATIENT
Start: 2024-03-13 | End: 2024-03-13

## 2024-03-13 RX ORDER — PANTOPRAZOLE SODIUM 40 MG/10ML
40 INJECTION, POWDER, LYOPHILIZED, FOR SOLUTION INTRAVENOUS
Status: COMPLETED | OUTPATIENT
Start: 2024-03-13 | End: 2024-03-13

## 2024-03-13 RX ORDER — SUCRALFATE 1 G/1
1 TABLET ORAL 4 TIMES DAILY
Status: DISCONTINUED | OUTPATIENT
Start: 2024-03-13 | End: 2024-03-13

## 2024-03-13 RX ORDER — ONDANSETRON HYDROCHLORIDE 2 MG/ML
4 INJECTION, SOLUTION INTRAVENOUS
Status: COMPLETED | OUTPATIENT
Start: 2024-03-13 | End: 2024-03-13

## 2024-03-13 RX ORDER — SUCRALFATE 1 G/1
1 TABLET ORAL
Status: DISCONTINUED | OUTPATIENT
Start: 2024-03-13 | End: 2024-03-13 | Stop reason: HOSPADM

## 2024-03-13 RX ORDER — OXYCODONE HYDROCHLORIDE 5 MG/1
5 TABLET ORAL EVERY 6 HOURS PRN
Status: DISCONTINUED | OUTPATIENT
Start: 2024-03-13 | End: 2024-03-13

## 2024-03-13 RX ORDER — LISINOPRIL AND HYDROCHLOROTHIAZIDE 10; 12.5 MG/1; MG/1
1 TABLET ORAL 2 TIMES DAILY
Status: DISCONTINUED | OUTPATIENT
Start: 2024-03-13 | End: 2024-03-13 | Stop reason: HOSPADM

## 2024-03-13 RX ORDER — ESCITALOPRAM OXALATE 5 MG/1
5 TABLET ORAL DAILY
Status: DISCONTINUED | OUTPATIENT
Start: 2024-03-13 | End: 2024-03-13 | Stop reason: HOSPADM

## 2024-03-13 RX ORDER — HYDROMORPHONE HYDROCHLORIDE 1 MG/ML
1 INJECTION, SOLUTION INTRAMUSCULAR; INTRAVENOUS; SUBCUTANEOUS EVERY 6 HOURS PRN
Status: DISCONTINUED | OUTPATIENT
Start: 2024-03-13 | End: 2024-03-13

## 2024-03-13 RX ORDER — DEXAMETHASONE SODIUM PHOSPHATE 4 MG/ML
INJECTION, SOLUTION INTRA-ARTICULAR; INTRALESIONAL; INTRAMUSCULAR; INTRAVENOUS; SOFT TISSUE
Status: DISCONTINUED | OUTPATIENT
Start: 2024-03-13 | End: 2024-03-13

## 2024-03-13 RX ORDER — IBUPROFEN 200 MG
24 TABLET ORAL
Status: DISCONTINUED | OUTPATIENT
Start: 2024-03-13 | End: 2024-03-13 | Stop reason: HOSPADM

## 2024-03-13 RX ORDER — ALUMINUM HYDROXIDE, MAGNESIUM HYDROXIDE, AND SIMETHICONE 1200; 120; 1200 MG/30ML; MG/30ML; MG/30ML
30 SUSPENSION ORAL 4 TIMES DAILY PRN
Status: DISCONTINUED | OUTPATIENT
Start: 2024-03-13 | End: 2024-03-13 | Stop reason: HOSPADM

## 2024-03-13 RX ORDER — TIZANIDINE 4 MG/1
8 TABLET ORAL 2 TIMES DAILY PRN
Status: DISCONTINUED | OUTPATIENT
Start: 2024-03-13 | End: 2024-03-13 | Stop reason: HOSPADM

## 2024-03-13 RX ORDER — TALC
6 POWDER (GRAM) TOPICAL NIGHTLY PRN
Status: DISCONTINUED | OUTPATIENT
Start: 2024-03-13 | End: 2024-03-13 | Stop reason: HOSPADM

## 2024-03-13 RX ORDER — MORPHINE SULFATE 4 MG/ML
4 INJECTION, SOLUTION INTRAMUSCULAR; INTRAVENOUS
Status: COMPLETED | OUTPATIENT
Start: 2024-03-13 | End: 2024-03-13

## 2024-03-13 RX ORDER — FENTANYL CITRATE 50 UG/ML
INJECTION, SOLUTION INTRAMUSCULAR; INTRAVENOUS
Status: DISCONTINUED | OUTPATIENT
Start: 2024-03-13 | End: 2024-03-13

## 2024-03-13 RX ORDER — ACETAMINOPHEN 500 MG
1000 TABLET ORAL EVERY 8 HOURS
Status: DISCONTINUED | OUTPATIENT
Start: 2024-03-13 | End: 2024-03-13 | Stop reason: HOSPADM

## 2024-03-13 RX ORDER — ONDANSETRON HYDROCHLORIDE 2 MG/ML
INJECTION, SOLUTION INTRAVENOUS
Status: DISCONTINUED | OUTPATIENT
Start: 2024-03-13 | End: 2024-03-13

## 2024-03-13 RX ORDER — BISACODYL 10 MG/1
10 SUPPOSITORY RECTAL DAILY PRN
Status: DISCONTINUED | OUTPATIENT
Start: 2024-03-13 | End: 2024-03-13 | Stop reason: HOSPADM

## 2024-03-13 RX ORDER — CLONAZEPAM 0.5 MG/1
1 TABLET ORAL 2 TIMES DAILY PRN
Status: DISCONTINUED | OUTPATIENT
Start: 2024-03-13 | End: 2024-03-13 | Stop reason: HOSPADM

## 2024-03-13 RX ORDER — OXYCODONE HYDROCHLORIDE 5 MG/1
10 TABLET ORAL EVERY 6 HOURS PRN
Status: DISCONTINUED | OUTPATIENT
Start: 2024-03-13 | End: 2024-03-13

## 2024-03-13 RX ORDER — PROPOFOL 10 MG/ML
VIAL (ML) INTRAVENOUS
Status: DISCONTINUED | OUTPATIENT
Start: 2024-03-13 | End: 2024-03-13

## 2024-03-13 RX ORDER — METHOCARBAMOL 750 MG/1
750 TABLET, FILM COATED ORAL 4 TIMES DAILY
Status: DISCONTINUED | OUTPATIENT
Start: 2024-03-13 | End: 2024-03-13 | Stop reason: HOSPADM

## 2024-03-13 RX ORDER — SIMETHICONE 80 MG
1 TABLET,CHEWABLE ORAL 4 TIMES DAILY PRN
Status: DISCONTINUED | OUTPATIENT
Start: 2024-03-13 | End: 2024-03-13 | Stop reason: HOSPADM

## 2024-03-13 RX ORDER — GABAPENTIN 300 MG/1
600 CAPSULE ORAL 3 TIMES DAILY PRN
Status: DISCONTINUED | OUTPATIENT
Start: 2024-03-13 | End: 2024-03-13 | Stop reason: HOSPADM

## 2024-03-13 RX ORDER — HALOPERIDOL 5 MG/ML
0.5 INJECTION INTRAMUSCULAR EVERY 10 MIN PRN
Status: DISCONTINUED | OUTPATIENT
Start: 2024-03-13 | End: 2024-03-13 | Stop reason: HOSPADM

## 2024-03-13 RX ORDER — NALOXONE HCL 0.4 MG/ML
0.02 VIAL (ML) INJECTION
Status: DISCONTINUED | OUTPATIENT
Start: 2024-03-13 | End: 2024-03-13 | Stop reason: HOSPADM

## 2024-03-13 RX ORDER — SUCCINYLCHOLINE CHLORIDE 20 MG/ML
INJECTION INTRAMUSCULAR; INTRAVENOUS
Status: DISCONTINUED | OUTPATIENT
Start: 2024-03-13 | End: 2024-03-13

## 2024-03-13 RX ADMIN — METHOCARBAMOL 750 MG: 750 TABLET ORAL at 01:03

## 2024-03-13 RX ADMIN — SUCCINYLCHOLINE CHLORIDE 100 MG: 20 INJECTION, SOLUTION INTRAMUSCULAR; INTRAVENOUS at 11:03

## 2024-03-13 RX ADMIN — ACETAMINOPHEN 1000 MG: 500 TABLET ORAL at 10:03

## 2024-03-13 RX ADMIN — GABAPENTIN 600 MG: 300 CAPSULE ORAL at 01:03

## 2024-03-13 RX ADMIN — THERA TABS 1 TABLET: TAB at 10:03

## 2024-03-13 RX ADMIN — OXYCODONE 10 MG: 5 TABLET ORAL at 10:03

## 2024-03-13 RX ADMIN — CLONAZEPAM 1 MG: 0.5 TABLET ORAL at 01:03

## 2024-03-13 RX ADMIN — LIDOCAINE HYDROCHLORIDE 60 MG: 20 INJECTION INTRAVENOUS at 11:03

## 2024-03-13 RX ADMIN — PANTOPRAZOLE SODIUM 40 MG: 40 INJECTION, POWDER, FOR SOLUTION INTRAVENOUS at 09:03

## 2024-03-13 RX ADMIN — FENTANYL CITRATE 50 MCG: 50 INJECTION, SOLUTION INTRAMUSCULAR; INTRAVENOUS at 11:03

## 2024-03-13 RX ADMIN — IOHEXOL 75 ML: 350 INJECTION, SOLUTION INTRAVENOUS at 05:03

## 2024-03-13 RX ADMIN — ONDANSETRON 4 MG: 2 INJECTION INTRAMUSCULAR; INTRAVENOUS at 03:03

## 2024-03-13 RX ADMIN — SODIUM CHLORIDE 1000 ML: 9 INJECTION, SOLUTION INTRAVENOUS at 03:03

## 2024-03-13 RX ADMIN — ONDANSETRON 4 MG: 2 INJECTION INTRAMUSCULAR; INTRAVENOUS at 11:03

## 2024-03-13 RX ADMIN — FENTANYL CITRATE 50 MCG: 50 INJECTION, SOLUTION INTRAMUSCULAR; INTRAVENOUS at 10:03

## 2024-03-13 RX ADMIN — SUCRALFATE 1 G: 1 TABLET ORAL at 10:03

## 2024-03-13 RX ADMIN — METHOCARBAMOL 750 MG: 750 TABLET ORAL at 10:03

## 2024-03-13 RX ADMIN — MORPHINE SULFATE 4 MG: 4 INJECTION INTRAVENOUS at 05:03

## 2024-03-13 RX ADMIN — MORPHINE SULFATE 4 MG: 4 INJECTION INTRAVENOUS at 03:03

## 2024-03-13 RX ADMIN — HYDROMORPHONE HYDROCHLORIDE 1 MG: 1 INJECTION, SOLUTION INTRAMUSCULAR; INTRAVENOUS; SUBCUTANEOUS at 08:03

## 2024-03-13 RX ADMIN — SODIUM CHLORIDE: 0.9 INJECTION, SOLUTION INTRAVENOUS at 10:03

## 2024-03-13 RX ADMIN — PANTOPRAZOLE SODIUM 40 MG: 40 INJECTION, POWDER, FOR SOLUTION INTRAVENOUS at 10:03

## 2024-03-13 RX ADMIN — PROPOFOL 100 MG: 10 INJECTION, EMULSION INTRAVENOUS at 11:03

## 2024-03-13 RX ADMIN — DEXAMETHASONE SODIUM PHOSPHATE 4 MG: 4 INJECTION, SOLUTION INTRAMUSCULAR; INTRAVENOUS at 11:03

## 2024-03-13 NOTE — PROGRESS NOTES
Pt awake and alert.  Bed in lowest position. Side rails up x2.  Call bell and personal belongings within reach.  Safety precautions maintained.  Pt states she is overdue for dilaudid and is leaving if she doesn't get any.  Pt educated about the plan of care and which medications are available for her.  Pt states she is leaving, changed out of her hospital gown into her clothes, and gathered her belongings.  Pt advised against leaving and re-educated about her plan of care.  RIA Bailey was notified.     03/13/24 1614   Vital Signs   Temp 97.6 °F (36.4 °C)   Temp Source Oral   Pulse 64   Heart Rate Source Monitor   Resp 14   SpO2 99 %   BP (!) 177/77   MAP (mmHg) 111   BP Location Right arm   Patient Position Lying

## 2024-03-13 NOTE — H&P
Short Stay Endoscopy History and Physical    PCP - Brendon Hardy MD  Referring Physician - Scotty Gamble MD  [address] [place] [address]    Procedure - Endoscopy  ASA - per anesthesia  Mallampati - per anesthesia  History of Anesthesia problems - no  Family history Anesthesia problems -  no   Plan of anesthesia - General    HPI  73 y.o. female  Reason for procedure:   Nausea vomiting abdominal pain    ROS:  Constitutional: No fevers, chills, No weight loss  CV: No chest pain  Pulm: No cough, No shortness of breath  GI: see HPI    Medical History:  has a past medical history of Acute gastric ulcer with perforation, Acute kidney failure with tubular necrosis, GABBI (acute kidney injury), Anxiety, Diarrhea, HLD (hyperlipidemia), Hypertension, Nausea & vomiting, Peritonitis, PUD (peptic ulcer disease), Renal disorder, Urine retention, and Vascular disorder of intestine, unspecified.    Surgical History:  has a past surgical history that includes TONSILLECTOMY, ADENOIDECTOMY; Appendectomy; cyst removed from neck; Hysterectomy (1986); Oophorectomy (Bilateral, 1989); Open reduction and internal fixation (ORIF) of Lisfranc injury of foot (Left, 10/11/2019); Closure of perforated ulcer of duodenum using omental patch (1/21/2021); colectomy, right (1/21/2021); Application of wound vacuum-assisted closure device (1/21/2021); Application of wound vacuum-assisted closure device (1/25/2021); Gastrostomy (1/25/2021); Ileostomy closure (10/13/2021); and Lysis of adhesions (10/13/2021).    Family History: family history includes Hypertension in her mother..    Social History:  reports that she has been smoking. She has never used smokeless tobacco. She reports that she does not currently use alcohol. She reports that she does not use drugs.    Review of patient's allergies indicates:  No Known Allergies    Medications:   (Not in a hospital admission)      Physical Exam:    Vital Signs:   Vitals:    03/13/24 1004    BP:    Pulse:    Resp: 18   Temp:        General Appearance: Well appearing in no acute distress  Abdomen: Soft, non tender, non distended with normal bowel sounds, no masses    Labs:  Lab Results   Component Value Date    WBC 14.01 (H) 03/13/2024    HGB 14.5 03/13/2024    HCT 45.9 03/13/2024     03/13/2024    CHOL 115 (L) 01/22/2021    TRIG 258 (H) 01/22/2021    HDL 23 (L) 01/22/2021    ALT 14 03/13/2024    AST 20 03/13/2024     03/13/2024    K 3.9 03/13/2024     03/13/2024    CREATININE 0.9 03/13/2024    BUN 26 (H) 03/13/2024    CO2 24 03/13/2024    TSH 0.48 02/11/2023    INR 1.1 02/19/2021    HGBA1C 4.6 02/12/2023       I have explained the risks and benefits of this endoscopic procedure to the patient including but not limited to bleeding, inflammation, infection, perforation, and death.      Donovan Lemos MD

## 2024-03-13 NOTE — ANESTHESIA POSTPROCEDURE EVALUATION
Anesthesia Post Evaluation    Patient: Juan Antonio Rowe    Procedure(s) Performed: Procedure(s) (LRB):  EGD (ESOPHAGOGASTRODUODENOSCOPY) (N/A)    Final Anesthesia Type: general      Patient location during evaluation: PACU  Patient participation: Yes- Able to Participate  Level of consciousness: awake and alert and oriented  Post-procedure vital signs: reviewed and stable  Pain management: adequate  Airway patency: patent    PONV status at discharge: No PONV  Anesthetic complications: no      Cardiovascular status: blood pressure returned to baseline  Respiratory status: unassisted, room air and spontaneous ventilation  Hydration status: euvolemic  Follow-up not needed.              Vitals Value Taken Time   /69 03/13/24 1141   Temp 37 03/13/24 1146   Pulse 69 03/13/24 1146   Resp 16 03/13/24 1146   SpO2 99 % 03/13/24 1146   Vitals shown include unvalidated device data.      No case tracking events are documented in the log.      Pain/Teodoro Score: Pain Rating Prior to Med Admin: 8 (3/13/2024 10:04 AM)  Pain Rating Post Med Admin: 5 (3/13/2024  3:58 AM)

## 2024-03-13 NOTE — HOSPITAL COURSE
Juan Antonio Rowe is a 73 y.o. female who was admitted to hospital medicine for abdominal pain. GI consulted. CT abdomen and pelvis with wall thickening of the lower esophagus, distal stomach, and scattered small bowel wall thickening, mesenteric edema and small volume abdominopelvic free fluid, similar but worse when compared with 01/13/2024, decompressed gallbladder with mild diffuse gallbladder wall thickening, similar to prior study, intra and extrahepatic biliary duct dilatation, similar to slightly worse when compared with 01/13/2024. EGD done with erythematous mucosa in the stomach, normal examined duodenum, procedure is consistent with delayed gastric emptying. GI recommending CT enterography. Patient left AMA prior work up completion. Discussed risk of leaving prior to discharge, states that she understands and will return if symptoms worsen.     Physical Exam  Gen: in NAD, appears stated age, walking throughout the andrade to the elevators

## 2024-03-13 NOTE — CONSULTS
[facility] - Surgery ([facility])  Gastroenterology  Consult Note    Patient Name: Juan Antonio Rowe  MRN: [id number]  Admission Date: 3/13/2024  Hospital Length of Stay: 0 days  Code Status: Full Code   Attending Provider: Scotty Gamble MD   Consulting Provider: Helen Kirkland MD  Primary Care Physician: Brendon Hardy MD  Principal Problem:Epigastric pain    Inpatient consult to Gastroenterology  Consult performed by: Helen Kirkland MD  Consult ordered by: Lata Stearns MD        Subjective:     HPI:  Juan Antonio Rowe is a 73 year old female for whom GI is consulted for abnormal CT findings. She has a history of HTN, HLD, PUD, perforated gastric ulcer and ischemic colon s/p modified angelita patch repair and subtotal colectomy with ileosigmoid anastomosis in 2021, SBO 1/2024 which resolved without intervention.    On 3/12, she developed acute onset of cramping mid abdominal pain with associated nausea and two episodes of nonbloody vomiting. Denies any acid reflux symptoms, use of NSAIDs or blood thinners. On arrival to [facility], she was afebrile and HDS. Laboratory workup notable for leucocytosis (14.01), BUN 26 otherwise normal platelets, Hgb and electrolytes. CTAP with lower esophageal wall thickening in the distal stomach and scattered bowel wall thickening.           Past Medical History:   Diagnosis Date    Acute gastric ulcer with perforation     Acute kidney failure with tubular necrosis     GABBI (acute kidney injury)     Anxiety     Diarrhea     HLD (hyperlipidemia)     Hypertension     Nausea & vomiting     Peritonitis     PUD (peptic ulcer disease)     Renal disorder     Urine retention     Vascular disorder of intestine, unspecified        Past Surgical History:   Procedure Laterality Date    APPENDECTOMY      APPLICATION OF WOUND VACUUM-ASSISTED CLOSURE DEVICE  1/21/2021    Procedure: APPLICATION, WOUND VAC;  Surgeon: Abel Francis MD;  Location: [facility] OR [address] [place] [address];  Service: General;;    APPLICATION OF  WOUND VACUUM-ASSISTED CLOSURE DEVICE  1/25/2021    Procedure: APPLICATION, WOUND VAC;  Surgeon: Abel Francis MD;  [facility];  Service: General;;    CLOSURE OF PERFORATED ULCER OF DUODENUM USING OMENTAL PATCH  1/21/2021    Procedure: CLOSURE, ULCER, PERFORATED, DUODENUM, USING OMENTAL PATCH;  Surgeon: Abel Francis MD;  [facility];  Service: General;;    COLECTOMY, RIGHT  1/21/2021    Procedure: COLECTOMY, RIGHT;  Surgeon: Abel Francis MD;  [facility];  Service: General;;    cyst removed from neck      GASTROSTOMY  1/25/2021    Procedure: GASTROSTOMY;  Surgeon: Abel Francis MD;  [facility];  Service: General;;    HYSTERECTOMY  1986    ILEOSTOMY CLOSURE  10/13/2021    Procedure: CLOSURE, ILEOSTOMY;  Surgeon: Abel Francis MD;  [facility];  Service: General;;    LYSIS OF ADHESIONS  10/13/2021    Procedure: LYSIS, ADHESIONS;  Surgeon: Abel Francis MD;  [facility];  Service: General;;    OOPHORECTOMY Bilateral 1989    OPEN REDUCTION AND INTERNAL FIXATION (ORIF) OF LISFRANC INJURY OF FOOT Left 10/11/2019    Procedure: ORIF, LISFRANC INJURY, FOOT;  Surgeon: Ferdinand Stauffer DPM;  Location: [facility] OR [address] [place] [address];  Service: Podiatry;  Laterality: Left;    TONSILLECTOMY, ADENOIDECTOMY         Review of patient's allergies indicates:  No Known Allergies  Family History       Problem Relation (Age of Onset)    Hypertension Mother          Tobacco Use    Smoking status: Every Day    Smokeless tobacco: Never   Substance and Sexual Activity    Alcohol use: Not Currently     Comment: socially    Drug use: No    Sexual activity: Yes     Partners: Male     Comment:      Review of Systems   Constitutional:  Negative for chills and fever.   Gastrointestinal:  Positive for abdominal pain, nausea and vomiting.     Objective:     Vital Signs (Most Recent):  Temp: 98.2 °F (36.8 °C) (03/13/24 1049)  Pulse: 69  (03/13/24 1049)  Resp: 17 (03/13/24 1049)  BP: (!) 144/67 (03/13/24 1049)  SpO2: 95 % (03/13/24 1049) Vital Signs (24h Range):  Temp:  [98 °F (36.7 °C)-98.2 °F (36.8 °C)] 98.2 °F (36.8 °C)  Pulse:  [62-80] 69  Resp:  [16-25] 17  SpO2:  [95 %-99 %] 95 %  BP: (124-220)/() 144/67     Weight: 50.8 kg (112 lb) (03/13/24 1049)  Body mass index is 18.64 kg/m².      Intake/Output Summary (Last 24 hours) at 3/13/2024 1145  Last data filed at 3/13/2024 1118  Gross per 24 hour   Intake 300 ml   Output --   Net 300 ml       Lines/Drains/Airways       Peripheral Intravenous Line  Duration                  Peripheral IV - Single Lumen 03/13/24 18 G Left Antecubital <1 day                     Physical Exam  Vitals reviewed.   Constitutional:       General: She is not in acute distress.     Appearance: Normal appearance. She is not ill-appearing.   HENT:      Head: Normocephalic.   Eyes:      Extraocular Movements: Extraocular movements intact.   Cardiovascular:      Rate and Rhythm: Normal rate.   Pulmonary:      Effort: Pulmonary effort is normal. No respiratory distress.   Abdominal:      General: There is no distension.      Palpations: Abdomen is soft.      Tenderness: There is no abdominal tenderness. There is no guarding or rebound.   Neurological:      Mental Status: She is alert and oriented to person, place, and time. Mental status is at baseline.          Significant Labs:  All pertinent lab results from the last 24 hours have been reviewed.    Significant Imaging:  Imaging results within the past 24 hours have been reviewed.  Assessment/Plan:     GI  Abnormal finding on GI tract imaging  73 year old female with perforated gastric ulcer and ischemic colon s/p modified angelita patch repair and subtotal colectomy with ileosigmoid anastomosis in 2021, SBO 1/2024 which resolved without intervention presenting with a one day history of abdominal pain, nausea and vomiting. CTAP with lower esophageal wall thickening in the  distal stomach and scattered bowel wall thickening, for whom GI is consulted.     Recommendations:  -Keep NPO, plan for EGD today.        Thank you for your consult. I will follow-up with patient. Please contact us if you have any additional questions.    Helen Kirkland MD  Gastroenterology  [facility] - Surgery (2nd Fl)

## 2024-03-13 NOTE — ASSESSMENT & PLAN NOTE
- 73 y.o. F with known history of perforated PUD/duodenal ulcer s/p ex lap now presenting with severe epigastric abdominal pain c/f PUD   - HDS, hypertensive to 220s/110s o/w VSSAF on admission, mild leukocytosis   - Hgb 14.5 and no evidence of GI bleeding   - CTAP with wall thickening of the lower esophagus, distal stomach, and scattered small bowel wall thickening with mesenteric edema and small volume abdominopelvic free fluid   - Protonix 80mg IV bolus x 1 then Protonix 40mg IV BID   - Hold all NSAIDs and heparin products  - Keep NPO for anticipated EGD with GI today   - GI and gen surg consulted in the ED, appreciate recs  - IVF hydration; maintain access with 2 large bore IVs  - Anti-emetics as needed

## 2024-03-13 NOTE — PROVATION PATIENT INSTRUCTIONS
Discharge Summary/Instructions after an Endoscopic Procedure  Patient Name: Juan Antonio Rowe  Patient MRN: [id number]  Patient YOB: 1950 Wednesday, March 13, 2024  Braxton Contreras MD  Dear patient,  As a result of recent federal legislation (The Federal Cures Act), you may   receive lab or pathology results from your procedure in your [facility]   account before your physician is able to contact you. Your physician or   their representative will relay the results to you with their   recommendations at their soonest availability.  Thank you,  RESTRICTIONS:  During your procedure today, you received medications for sedation.  These   medications may affect your judgment, balance and coordination.  Therefore,   for 24 hours, you have the following restrictions:   - DO NOT drive a car, operate machinery, make legal/financial decisions,   sign important papers or drink alcohol.    ACTIVITY:  Today: no heavy lifting, straining or running due to procedural   sedation/anesthesia.  The following day: return to full activity including work.  DIET:  Eat and drink normally unless instructed otherwise.     TREATMENT FOR COMMON SIDE EFFECTS:  - Mild abdominal pain, nausea, belching, bloating or excessive gas:  rest,   eat lightly and use a heating pad.  - Sore Throat: treat with throat lozenges and/or gargle with warm salt   water.  - Because air was used during the procedure, expelling large amounts of air   from your rectum or belching is normal.  - If a bowel prep was taken, you may not have a bowel movement for 1-3 days.    This is normal.  SYMPTOMS TO WATCH FOR AND REPORT TO YOUR PHYSICIAN:  1. Abdominal pain or bloating, other than gas cramps.  2. Chest pain.  3. Back pain.  4. Signs of infection such as: chills or fever occurring within 24 hours   after the procedure.  5. Rectal bleeding, which would show as bright red, maroon, or black stools.   (A tablespoon of blood from the rectum is not serious, especially if    hemorrhoids are present.)  6. Vomiting.  7. Weakness or dizziness.  GO DIRECTLY TO THE NEAREST EMERGENCY ROOM IF YOU HAVE ANY OF THE FOLLOWING:      Difficulty breathing              Chills and/or fever over 101 F   Persistent vomiting and/or vomiting blood   Severe abdominal pain   Severe chest pain   Black, tarry stools   Bleeding- more than one tablespoon   Any other symptom or condition that you feel may need urgent attention  Your doctor recommends these additional instructions:  If any biopsies were taken, your doctors clinic will contact you in 1 to 2   weeks with any results.  - Return patient to [facility] for ongoing care.   - Clear liquid diet today.   - Continue present medications.   - Await pathology results.   - Perform CT enterography for further evaluation of the small bowel.   - Avoid opiate/narcotic or antidiarrheal medications.   For questions, problems or results please call your physician - Braxton Contreras MD at Work:  [phone].  [facility], EMERGENCY ROOM PHONE NUMBER: [phone]  IF A COMPLICATION OR EMERGENCY SITUATION ARISES AND YOU ARE UNABLE TO REACH   YOUR PHYSICIAN - GO DIRECTLY TO THE EMERGENCY ROOM.  Braxton Contreras MD  3/13/2024 11:46:31 AM  This report has been verified and signed electronically.  Dear patient,  As a result of recent federal legislation (The Federal Cures Act), you may   receive lab or pathology results from your procedure in your [facility]   account before your physician is able to contact you. Your physician or   their representative will relay the results to you with their   recommendations at their soonest availability.  Thank you,  PROVATION

## 2024-03-13 NOTE — NURSING TRANSFER
Nursing Transfer Note      3/13/2024   12:28 PM    Nurse giving handoff:JEAN MARIE Pablo  Nurse receiving handoff:Dimitri    Reason patient is being transferred: post procedure    Transfer To: ED    Transfer via stretcher    Transfer with cardiac monitoring    Transported by HELGA Ibarra    Telemetry: Box Number [id number]  Order for Tele Monitor? Yes    Medicines sent: N/A    Any special needs or follow-up needed: No    Patient belongings transferred with patient: Yes    Chart send with patient: Yes    Patient reassessed at: 1221

## 2024-03-13 NOTE — SUBJECTIVE & OBJECTIVE
Past Medical History:   Diagnosis Date    Acute gastric ulcer with perforation     Acute kidney failure with tubular necrosis     GABBI (acute kidney injury)     Anxiety     Diarrhea     HLD (hyperlipidemia)     Hypertension     Nausea & vomiting     Peritonitis     PUD (peptic ulcer disease)     Renal disorder     Urine retention     Vascular disorder of intestine, unspecified        Past Surgical History:   Procedure Laterality Date    APPENDECTOMY      APPLICATION OF WOUND VACUUM-ASSISTED CLOSURE DEVICE  1/21/2021    Procedure: APPLICATION, WOUND VAC;  Surgeon: Abel Francis MD;  Location: [address];  Service: General;;    APPLICATION OF WOUND VACUUM-ASSISTED CLOSURE DEVICE  1/25/2021    Procedure: APPLICATION, WOUND VAC;  Surgeon: Abel Francis MD;  [facility];  Service: General;;    CLOSURE OF PERFORATED ULCER OF DUODENUM USING OMENTAL PATCH  1/21/2021    Procedure: CLOSURE, ULCER, PERFORATED, DUODENUM, USING OMENTAL PATCH;  Surgeon: Abel Francis MD;  Location: [facility] OR [address] [place] [address];  Service: General;;    COLECTOMY, RIGHT  1/21/2021    Procedure: COLECTOMY, RIGHT;  Surgeon: Abel Francis MD;  Location: [address];  Service: General;;    cyst removed from neck      GASTROSTOMY  1/25/2021    Procedure: GASTROSTOMY;  Surgeon: Abel Francis MD;  Location: [address];  Service: General;;    HYSTERECTOMY  1986    ILEOSTOMY CLOSURE  10/13/2021    Procedure: CLOSURE, ILEOSTOMY;  Surgeon: Abel Francis MD;  Location: [address];  Service: General;;    LYSIS OF ADHESIONS  10/13/2021    Procedure: LYSIS, ADHESIONS;  Surgeon: Abel Francis MD;  Location: [address];  Service: General;;    OOPHORECTOMY Bilateral 1989    OPEN REDUCTION AND INTERNAL FIXATION (ORIF) OF LISFRANC INJURY OF FOOT Left 10/11/2019    Procedure: ORIF, LISFRANC INJURY, FOOT;  Surgeon: Ferdinand Stauffer DPM;  [facility]: [facility] OR [facility];  Service: Podiatry;  Laterality: Left;     TONSILLECTOMY, ADENOIDECTOMY         Review of patient's allergies indicates:  No Known Allergies    No current facility-administered medications on file prior to encounter.     Current Outpatient Medications on File Prior to Encounter   Medication Sig    acetaminophen (TYLENOL) 325 MG tablet Take 2 tablets (650 mg total) by mouth every 6 (six) hours as needed for Pain.    calcium carbonate (OS-TORI) 600 mg (1,500 mg) Tab Take 600 mg by mouth 2 (two) times daily with meals.    clonazePAM (KLONOPIN) 1 MG tablet Take 1 tablet (1 mg total) by mouth 2 (two) times daily as needed for Anxiety. (Patient taking differently: Take 1 mg by mouth 2 (two) times daily.)    docosahexaenoic acid/epa (FISH OIL ORAL) Take 1 capsule by mouth once daily.    gabapentin (NEURONTIN) 600 MG tablet Take 600 mg by mouth 3 (three) times daily as needed (back pain).    HYDROcodone-acetaminophen (NORCO) 7.5-325 mg per tablet Take 1 tablet by mouth every 8 (eight) hours as needed for Pain.    lisinopriL-hydrochlorothiazide (PRINZIDE,ZESTORETIC) 20-12.5 mg per tablet Take 1 tablet by mouth 2 (two) times a day.    loperamide (IMODIUM) 2 mg capsule Take 1 capsule (2 mg total) by mouth 2 (two) times a day. (Patient taking differently: Take 2 mg by mouth 2 (two) times daily as needed for Diarrhea.)    MAGNESIUM ORAL Take 1 tablet by mouth once daily.    multivitamin (THERAGRAN) per tablet Take 1 tablet by mouth once daily.    ondansetron (ZOFRAN-ODT) 4 MG TbDL Dissolve 1 tablet (4 mg total) by mouth every 8 (eight) hours as needed (nausea).    potassium chloride SA (K-DUR,KLOR-CON) 20 MEQ tablet Take 1 tablet (20 mEq total) by mouth once daily.    tiZANidine (ZANAFLEX) 4 MG tablet Take 8 mg by mouth 2 (two) times daily as needed for Muscle spasms or Pain.     Family History       Problem Relation (Age of Onset)    Hypertension Mother          Tobacco Use    Smoking status: Every Day    Smokeless tobacco: Never   Substance and Sexual Activity     Alcohol use: Not Currently     Comment: socially    Drug use: No    Sexual activity: Yes     Partners: Male     Comment:      Review of Systems   Constitutional:  Positive for activity change and appetite change. Negative for chills and fever.   HENT:  Negative for trouble swallowing.    Eyes:  Negative for photophobia and visual disturbance.   Respiratory:  Negative for cough, chest tightness and shortness of breath.    Cardiovascular:  Negative for chest pain, palpitations and leg swelling.   Gastrointestinal:  Positive for abdominal pain, nausea and vomiting. Negative for blood in stool, constipation and diarrhea.   Genitourinary:  Negative for dysuria, frequency and hematuria.   Musculoskeletal:  Negative for back pain, gait problem and neck pain.   Skin:  Negative for rash and wound.   Neurological:  Negative for dizziness, syncope, speech difficulty and light-headedness.   Psychiatric/Behavioral:  Negative for agitation and confusion. The patient is not nervous/anxious.      Objective:     Vital Signs (Most Recent):  Temp: 98.2 °F (36.8 °C) (03/13/24 0700)  Pulse: 64 (03/13/24 0915)  Resp: 18 (03/13/24 1004)  BP: (!) 144/71 (03/13/24 0915)  SpO2: 96 % (03/13/24 0915) Vital Signs (24h Range):  Temp:  [98 °F (36.7 °C)-98.2 °F (36.8 °C)] 98.2 °F (36.8 °C)  Pulse:  [62-80] 64  Resp:  [16-25] 18  SpO2:  [95 %-99 %] 96 %  BP: (124-220)/() 144/71        There is no height or weight on file to calculate BMI.     Physical Exam  Vitals and nursing note reviewed.   Constitutional:       General: She is not in acute distress.     Appearance: She is well-developed. She is ill-appearing (2/2 abdominal pain).   HENT:      Head: Normocephalic and atraumatic.      Mouth/Throat:      Mouth: Mucous membranes are moist.   Eyes:      Extraocular Movements: Extraocular movements intact.      Conjunctiva/sclera: Conjunctivae normal.      Pupils: Pupils are equal, round, and reactive to light.   Cardiovascular:      Rate  and Rhythm: Normal rate and regular rhythm.      Heart sounds: Normal heart sounds.   Pulmonary:      Effort: Pulmonary effort is normal. No respiratory distress.      Breath sounds: Normal breath sounds. No wheezing.   Abdominal:      General: Bowel sounds are normal. There is no distension.      Palpations: Abdomen is soft.      Tenderness: There is abdominal tenderness (diffuse, worse in epigastrum). There is guarding. There is no rebound.   Musculoskeletal:         General: No tenderness. Normal range of motion.      Cervical back: Normal range of motion and neck supple.      Right lower leg: No edema.      Left lower leg: No edema.   Skin:     General: Skin is warm and dry.      Capillary Refill: Capillary refill takes less than 2 seconds.      Findings: No rash.   Neurological:      Mental Status: She is alert and oriented to person, place, and time.      Cranial Nerves: No cranial nerve deficit.      Sensory: No sensory deficit.      Coordination: Coordination normal.   Psychiatric:         Behavior: Behavior normal.         Thought Content: Thought content normal.         Judgment: Judgment normal.              CRANIAL NERVES     CN III, IV, VI   Pupils are equal, round, and reactive to light.       Significant Labs: All pertinent labs within the past 24 hours have been reviewed.    Significant Imaging: I have reviewed all pertinent imaging results/findings within the past 24 hours.

## 2024-03-13 NOTE — HPI
Juan Antonio Rowe is a 73 year old female for whom GI is consulted for abnormal CT findings. She has a history of HTN, HLD, PUD, perforated gastric ulcer and ischemic colon s/p modified angelita patch repair and subtotal colectomy with ileosigmoid anastomosis in 2021, SBO 1/2024 which resolved without intervention.    On 3/12, she developed acute onset of cramping mid abdominal pain with associated nausea and two episodes of nonbloody vomiting. Denies any acid reflux symptoms, use of NSAIDs or blood thinners. On arrival to [facility], she was afebrile and HDS. Laboratory workup notable for leucocytosis (14.01), BUN 26 otherwise normal platelets, Hgb and electrolytes. CTAP with lower esophageal wall thickening in the distal stomach and scattered bowel wall thickening.

## 2024-03-13 NOTE — PLAN OF CARE
ID band verified. Fall risk and allergy band placed. Plan of care reviewed with patient. Patient verbalizes understanding and no questions at this time.

## 2024-03-13 NOTE — HPI
Juan Antonio Rowe is a 73 y.o. F with history of PUD and perforated gastric ulcer and ischemic colon s/p modified angelita patch repair and subtotal colectomy with ileosigmoid anastomosis in 2021, SBO 1/2024 which resolved spontaneously, HTN, diet controlled HLD, and anxiety who presented to the ED for evaluation of abdominal pain, nausea, and vommiting, which started abruptly last night. She developed acute onset of cramping, mid abdominal pain with associated nausea and two episodes of nonbloody nonbilious vomiting. She denies any acid reflux symptoms, use of NSAIDs or blood thinners. Pt denies fever, chills, chest pain, palpitations, SOB, cough, dysuria, leg swelling or pain, fatigue, weakness, confusion, HA, syncope or recent sick contacts.       In the ED: Hypertensive to 220s/110s in the setting of severe pain, which improved with pain control, o/w VSSAF. Mild leukocytosis to 14 o/w labs grossly unremarkable. CTAP with wall thickening of the lower esophagus, distal stomach, and scattered small bowel wall thickening as well as mesenteric edema and small volume abdominopelvic free fluid, similar but worse than prior. GI and general surgery were consulted. Pt was given morphine x2, dilaudid, zofran, and IVFs and placed in observation for further management.

## 2024-03-13 NOTE — ANESTHESIA PROCEDURE NOTES
Intubation    Date/Time: 3/13/2024 11:01 AM    Performed by: Liberty Sunshine CRNA  Authorized by: David Lees MD    Intubation:     Induction:  Rapid sequence induction    Intubated:  Postinduction    Mask Ventilation:  Not attempted    Attempts:  1    Attempted By:  Student    Method of Intubation:  Video laryngoscopy    Blade:  Roldan 3    Laryngeal View Grade: Grade I - full view of cords      Difficult Airway Encountered?: No      Airway Device:  Oral endotracheal tube    Airway Device Size:  7.0    Style/Cuff Inflation:  Cuffed (inflated to minimal occlusive pressure)    Tube secured:  21    Secured at:  The lips    Placement Verified By:  Capnometry    Complicating Factors:  None    Findings Post-Intubation:  BS equal bilateral

## 2024-03-13 NOTE — TRANSFER OF CARE
"Anesthesia Transfer of Care Note    Patient: Juan Antonio Rowe    Procedure(s) Performed: Procedure(s) (LRB):  EGD (ESOPHAGOGASTRODUODENOSCOPY) (N/A)    Patient location: PACU    Anesthesia Type: general    Transport from OR: Transported from OR on 6-10 L/min O2 by face mask with adequate spontaneous ventilation    Post pain: adequate analgesia    Post assessment: tolerated procedure well and no apparent anesthetic complications    Post vital signs: stable    Level of consciousness: awake and alert    Nausea/Vomiting: no nausea/vomiting    Complications: none    Transfer of care protocol was followed      Last vitals: Visit Vitals  BP (!) 144/67 (BP Location: Left arm, Patient Position: Lying)   Pulse 69   Temp 36.8 °C (98.2 °F) (Tympanic)   Resp 17   Ht 5' 5" (1.651 m)   Wt 50.8 kg (112 lb)   LMP 01/01/1986   SpO2 95%   Breastfeeding No   BMI 18.64 kg/m²     "

## 2024-03-13 NOTE — ASSESSMENT & PLAN NOTE
73 year old female with perforated gastric ulcer and ischemic colon s/p modified angelita patch repair and subtotal colectomy with ileosigmoid anastomosis in 2021, SBO 1/2024 which resolved without intervention presenting with a one day history of abdominal pain, nausea and vomiting. CTAP with lower esophageal wall thickening in the distal stomach and scattered bowel wall thickening, for whom GI is consulted.     Recommendations:  -Keep NPO, plan for EGD today.

## 2024-03-13 NOTE — ED TRIAGE NOTES
Juan Antonio Rowe, a 73 y.o. female presents to the ED w/ complaint of lower abdominal pain that started yesterday at 1700.  PT endorses 2 episodes of vomiting.       Triage note:  Chief Complaint   Patient presents with    Abdominal Pain     Pt comes to the ED with complaints of lower abdominal pain that started yesterday at 1700.  PT endorses 2 episodes of vomiting.       Review of patient's allergies indicates:  No Known Allergies  Past Medical History:   Diagnosis Date    Acute gastric ulcer with perforation     Acute kidney failure with tubular necrosis     GABBI (acute kidney injury)     Anxiety     Diarrhea     HLD (hyperlipidemia)     Hypertension     Nausea & vomiting     Peritonitis     PUD (peptic ulcer disease)     Renal disorder     Urine retention     Vascular disorder of intestine, unspecified    Patient identifiers for Juan Antonio Rowe checked and correct.    LOC: The patient is awake, alert and aware of environment with an appropriate affect, the patient is oriented x 4 and speaking appropriately.    APPEARANCE: Patient resting comfortably and in no acute distress, patient is clean and well groomed, patient's clothing is properly fastened.    SKIN: The skin is warm and dry, color consistent with ethnicity, patient has normal skin turgor and moist mucus membranes, skin intact, no breakdown or bruising noted.    MUSCULOSKELETAL: Patient moving all extremities well, no obvious swelling or deformities noted.    RESPIRATORY: Airway is open and patent, respirations are spontaneous and even, patient has a normal effort and rate.    CARDIAC: Patient has a normal rate and rhythm, no periphreal edema noted, capillary refill < 3 seconds.    ABDOMEN: Soft and non tender to palpation, no distention noted. Patient had some nausea, vomiting, but no diarrhea, or constipation. Lower abdominal pain started yesterday at 1700    NEUROLOGIC: Eyes open spontaneously, PERRL, behavior appropriate to situation, follows commands,  facial expression symmetrical, bilateral hand grasp equal and even, purposeful motor response noted, normal sensation in all extremities.     HEENT: No abnormalities noted. White sclera and pupils equal round and reactive to light. Denies headache, dizziness.     : Pt voids independently, denies dysuria, hematuria, frequency.

## 2024-03-13 NOTE — ED PROVIDER NOTES
Encounter Date: 3/13/2024       History     Chief Complaint   Patient presents with    Abdominal Pain     Pt comes to the ED with complaints of lower abdominal pain that started yesterday at 1700.  PT endorses 2 episodes of vomiting.       HPI    73-year-old female multiple medical problems including perforated peptic ulcer disease, hypertension presents to the ER for evaluation of lower abdominal pain nausea vomiting.  Onset 5:00 p.m..  Reports significantly worsening pain and  discomfort.  Came to the ER for further evaluation.    Review of patient's allergies indicates:  No Known Allergies  Past Medical History:   Diagnosis Date    Acute gastric ulcer with perforation     Acute kidney failure with tubular necrosis     GABBI (acute kidney injury)     Anxiety     Diarrhea     HLD (hyperlipidemia)     Hypertension     Nausea & vomiting     Peritonitis     PUD (peptic ulcer disease)     Renal disorder     Urine retention     Vascular disorder of intestine, unspecified      Past Surgical History:   Procedure Laterality Date    APPENDECTOMY      APPLICATION OF WOUND VACUUM-ASSISTED CLOSURE DEVICE  1/21/2021    Procedure: APPLICATION, WOUND VAC;  Surgeon: Abel Francis MD;  Location: [address];  Service: General;;    APPLICATION OF WOUND VACUUM-ASSISTED CLOSURE DEVICE  1/25/2021    Procedure: APPLICATION, WOUND VAC;  Surgeon: Abel Francis MD;  Location: [address];  Service: General;;    CLOSURE OF PERFORATED ULCER OF DUODENUM USING OMENTAL PATCH  1/21/2021    Procedure: CLOSURE, ULCER, PERFORATED, DUODENUM, USING OMENTAL PATCH;  Surgeon: Abel Francis MD;  Location: [address];  Service: General;;    COLECTOMY, RIGHT  1/21/2021    Procedure: COLECTOMY, RIGHT;  Surgeon: Abel Francis MD;  Location: [address];  Service: General;;    cyst removed from neck      GASTROSTOMY  1/25/2021    Procedure: GASTROSTOMY;  Surgeon: Abel Francis MD;  Location: [address];  Service:  General;;    HYSTERECTOMY  1986    ILEOSTOMY CLOSURE  10/13/2021    Procedure: CLOSURE, ILEOSTOMY;  Surgeon: Abel Francis MD;  [facility];  Service: General;;    LYSIS OF ADHESIONS  10/13/2021    Procedure: LYSIS, ADHESIONS;  Surgeon: Abel Francis MD;  Location: [facility] OR 2ND FLR;  Service: General;;    OOPHORECTOMY Bilateral 1989    OPEN REDUCTION AND INTERNAL FIXATION (ORIF) OF LISFRANC INJURY OF FOOT Left 10/11/2019    Procedure: ORIF, LISFRANC INJURY, FOOT;  Surgeon: Ferdinand Stauffer DPM;  Location: [facility] OR 1ST FLR;  Service: Podiatry;  Laterality: Left;    TONSILLECTOMY, ADENOIDECTOMY       Family History   Problem Relation Age of Onset    Hypertension Mother      Social History     Tobacco Use    Smoking status: Every Day    Smokeless tobacco: Never   Substance Use Topics    Alcohol use: Not Currently     Comment: socially    Drug use: No     Review of Systems   Respiratory:  Negative for shortness of breath.    Cardiovascular:  Negative for chest pain.   Gastrointestinal:  Positive for abdominal pain, nausea and vomiting.   All other systems reviewed and are negative.      Physical Exam     Initial Vitals [03/13/24 0238]   BP Pulse Resp Temp SpO2   (!) 220/110 80 16 98 °F (36.7 °C) 99 %      MAP       --         Physical Exam    Nursing note and vitals reviewed.  Constitutional: She appears well-developed and well-nourished. No distress.   HENT:   Head: Normocephalic and atraumatic.   Eyes: Pupils are equal, round, and reactive to light.   Neck:   Normal range of motion.  Cardiovascular:  Normal rate, regular rhythm and normal heart sounds.           Pulmonary/Chest: Breath sounds normal. No respiratory distress.   Abdominal:   Extensive postsurgical changes noted, diffuse abdominal tenderness appears worsened lower quad   Musculoskeletal:         General: Normal range of motion.      Cervical back: Normal range of motion.     Neurological: She is alert and oriented to person, place,  and time. She has normal strength. GCS score is 15. GCS eye subscore is 4. GCS verbal subscore is 5. GCS motor subscore is 6.   Skin: Skin is warm. Capillary refill takes less than 2 seconds.   Psychiatric: She has a normal mood and affect. Thought content normal.         ED Course   Procedures  Labs Reviewed   CBC W/ AUTO DIFFERENTIAL - Abnormal; Notable for the following components:       Result Value    WBC 14.01 (*)     MCHC 31.6 (*)     RDW 14.6 (*)     Gran # (ANC) 11.9 (*)     Immature Grans (Abs) 0.05 (*)     Gran % 84.8 (*)     Lymph % 10.4 (*)     Mono % 3.3 (*)     All other components within normal limits   COMPREHENSIVE METABOLIC PANEL - Abnormal; Notable for the following components:    Glucose 120 (*)     BUN 26 (*)     All other components within normal limits   URINALYSIS, REFLEX TO URINE CULTURE - Abnormal; Notable for the following components:    Specific Gravity, UA >1.030 (*)     Protein, UA Trace (*)     All other components within normal limits    Narrative:     Specimen Source->Urine   LACTIC ACID, PLASMA   LIPASE          Imaging Results              CT Abdomen Pelvis With IV Contrast NO Oral Contrast (In process)                      Medications   sodium chloride 0.9% bolus 1,000 mL 1,000 mL (0 mLs Intravenous Stopped 3/13/24 0419)   morphine injection 4 mg (4 mg Intravenous Given 3/13/24 0315)   ondansetron injection 4 mg (4 mg Intravenous Given 3/13/24 0315)   iohexoL (OMNIPAQUE 350) injection 75 mL (75 mLs Intravenous Given 3/13/24 2023)   morphine injection 4 mg (4 mg Intravenous Given 3/13/24 0542)     Medical Decision Making  Pleasant 73-year-old female presents the ER for evaluation abdominal pain nausea vomiting.  Extensive surgical history including perforated peptic ulcer, bowel obstruction presents the ER for evaluation of abdominal pain nausea vomiting.  Onset today, reports he episode of nausea vomiting, last bowel movements over 24 hours ago.  No fever chills chest pain.  Came  the ER for further evaluation.  Moderate abdominal pain on exam no significant distention.  Differential includes SBO appendicitis perforation pancreatitis other cause.  Will plan blood work, CT scan, symptomatic support reassess.    Amount and/or Complexity of Data Reviewed  Independent Historian: EMS  External Data Reviewed: labs, radiology, ECG and notes.  Labs: ordered. Decision-making details documented in ED Course.  Radiology: ordered and independent interpretation performed. Decision-making details documented in ED Course.  ECG/medicine tests: ordered and independent interpretation performed. Decision-making details documented in ED Course.    Risk  Prescription drug management.               ED Course as of 03/13/24 0631   Wed Mar 13, 2024   0617 Urinalysis, Reflex to Urine Culture Urine, Clean Catch(!) [SE]   0617 Lactic acid, plasma [SE]   0617 CBC auto differential(!) [SE]   0617 Lipase [SE]   0617 Comprehensive metabolic panel(!)  Resting in bed no significant distress had some recurrent pain which required morphine.  Currently resting in bed labs reviewed slight leukocytosis, rest of blood work reassuring.  Awaiting CT abdomen pelvis and reassessment and dispo. [SE]      ED Course User Index  [SE] Óscar Grimaldo MD                           Clinical Impression:  Final diagnoses:  [R11.0] Nausea                 Óscar Grimaldo MD  03/13/24 0631

## 2024-03-13 NOTE — ED NOTES
Assumed care of pt at this time. VSS, RR even and unlabored. Resting in bed comfortably. No voiced compaints of pain or discomfort at this time. Safety protocols remain, will continue to monitor. Patient identifiers verified and correct for Juan Antonio Rowe    LOC: The patient is awake, alert and aware of environment with an appropriate affect, the patient is oriented x 4 and speaking appropriately.   APPEARANCE: Patient appears comfortable and in no acute distress, patient is clean and well groomed.  SKIN: The skin is warm and dry, color consistent with ethnicity, patient has normal skin turgor and moist mucus membranes, skin intact, no breakdown or bruising noted.   MUSCULOSKELETAL: Patient moving all extremities spontaneously, no swelling noted.  RESPIRATORY: Airway is open and patent, respirations are spontaneous, patient has a normal effort and rate, no accessory muscle use noted, pt placed on continuous pulse ox with O2 sats noted at 99% on room air.  CARDIAC: Pt placed on cardiac monitor. Patient has a normal rate and regular rhythm, no edema noted, capillary refill < 3 seconds.   GASTRO: Soft and tender to touch, no distention noted, normoactive bowel sounds present in all four quadrants. Pt states bowel movements have been regular. Endorses intermittent nausea  : Pt denies any pain or frequency with urination.  NEURO: Pt opens eyes spontaneously, behavior appropriate to situation, follows commands, facial expression symmetrical, bilateral hand grasp equal and even, purposeful motor response noted, normal sensation in all extremities when touched with a finger.  
Bed: [facility]  Expected date:   Expected time:   Means of arrival:   Comments:  
Nurses Note -- 4 Eyes      3/13/2024   7:53 AM      Skin assessed during: Q Shift Change      [x] No Altered Skin Integrity Present    []Prevention Measures Documented      [] Yes- Altered Skin Integrity Present or Discovered   [] LDA Added if Not in Epic (Describe Wound)   [] New Altered Skin Integrity was Present on Admit and Documented in LDA   [] Wound Image Taken    Wound Care Consulted? No    Attending Nurse:  Brown Root RN/Staff Member:   Jolanta Rodriguez         
Pt placed on cardiac monitor [id number]  
none
Porsha Lion  (RN)  2021 22:44:45

## 2024-03-13 NOTE — CARE UPDATE
Called to bedside due to patient requesting to leave AMA. Patient already dressed, pulled out IV. States that since she isnt able to get IV pain medications here that she can go home and take tylenol. Discussed with patient the risks of leaving the hospital prior to work up completion. Patient signed AMA paperwork and left.     Lynn Albert PA-C  Department of Hospital Medicine  [facility]

## 2024-03-13 NOTE — TREATMENT PLAN
GI Post Procedure Treatment Plan  Procedure Performed: EGD    Impression:    - Hypertonic lower esophageal sphincter.                          - Z-line regular, 42 cm from the incisors.                          - A large amount of food residue in the stomach.                          - Erythematous mucosa in the stomach. Biopsied.                          - Normal examined duodenum.                          - This procedure is consistent with delayed                          gastric emptying. There is no evidence of gastric                          outlet obstruction. Delayed emptying could be                          caused by medications, enteritis, ileus or bowel                          obstruction. This patient had a recent                          presentation for partial SBO, and prior                          presentations suggesting either ileus or partial                          SBO.     Recommendations:                                 - Return patient to hospital [facility] for ongoing care.                          - Clear liquid diet today.                          - Continue present medications.                          - Await pathology results.                          - Perform CT enterography for further evaluation                          of the small bowel.                          - Avoid opiate/narcotic or antidiarrheal                          medications.     Helen Kirkland MD  Gastroenterology, PGY-4

## 2024-03-13 NOTE — PROVIDER PROGRESS NOTES - EMERGENCY DEPT.
Encounter Date: 3/13/2024    ED Physician Progress Notes        ED Physician Hand-off Note:    ED Course: I assumed care of patient from off-going ED physician team. Briefly, Patient is a 72 yo F with PMH of perforated duodenal ulcer and is now s/p repair/right colectomy that admission due to ischemia who presents with abdominal pain, nausea, vomiting. Presented 2 months ago similarly and had an SBO that resolved after NG tube placement.    At the time of signout plan was pending - CT abdomen, pelvis, reassessment.    Medications given in the ED:    Medications   sodium chloride 0.9% bolus 1,000 mL 1,000 mL (0 mLs Intravenous Stopped 3/13/24 0419)   morphine injection 4 mg (4 mg Intravenous Given 3/13/24 0315)   ondansetron injection 4 mg (4 mg Intravenous Given 3/13/24 0315)   iohexoL (OMNIPAQUE 350) injection 75 mL (75 mLs Intravenous Given 3/13/24 0523)   morphine injection 4 mg (4 mg Intravenous Given 3/13/24 0542)   HYDROmorphone injection 1 mg (1 mg Intravenous Given 3/13/24 0823)   pantoprazole injection 40 mg (40 mg Intravenous Given 3/13/24 0910)   pantoprazole injection 40 mg (40 mg Intravenous Given 3/13/24 1018)     8:13 AM  I re-evaluated the patient and she is still complaining of pain despite morphine x2  She has rebound tenderness on my exam  She tells me she is still having diarrhea which is chronic for her.    8:42 AM  I spoke with General surgery and they feel like her CT findings are more chronic despite her acute symtoms, and in the setting of her not having a bowel obstruction they recommend Hospital Medicine admission and a GI consult. They will come evaluate the patient though     Disposition: admit    Patient comfortable with plan for admission. Patient counseled regarding exam, results, diagnosis, treatment, and plan.    Impression: Final diagnoses:  [R11.0] Nausea  [R10.33] Periumbilical abdominal pain (Primary)

## 2024-03-13 NOTE — H&P
[facility] - Emergency Dept  [facility]  History & Physical    Patient Name: Juan Antonio Rowe  MRN: [id number]  Patient Class: OP- Observation  Admission Date: 3/13/2024  Attending Physician: No att. providers found   Primary Care Provider: Brendon Hardy MD         Patient information was obtained from patient, past medical records, and ER records.     Subjective:     Principal Problem:Epigastric pain    Chief Complaint:   Chief Complaint   Patient presents with    Abdominal Pain     Pt comes to the ED with complaints of lower abdominal pain that started yesterday at 1700.  PT endorses 2 episodes of vomiting.          HPI: Juan Antonio Rowe is a 73 y.o. F with history of PUD and perforated gastric ulcer and ischemic colon s/p modified angelita patch repair and subtotal colectomy with ileosigmoid anastomosis in 2021, SBO 1/2024 which resolved spontaneously, HTN, diet controlled HLD, and anxiety who presented to the ED for evaluation of abdominal pain, nausea, and vommiting, which started abruptly last night. She developed acute onset of cramping, mid abdominal pain with associated nausea and two episodes of nonbloody nonbilious vomiting. She denies any acid reflux symptoms, use of NSAIDs or blood thinners. Pt denies fever, chills, chest pain, palpitations, SOB, cough, dysuria, leg swelling or pain, fatigue, weakness, confusion, HA, syncope or recent sick contacts.       In the ED: Hypertensive to 220s/110s in the setting of severe pain, which improved with pain control, o/w VSSAF. Mild leukocytosis to 14 o/w labs grossly unremarkable. CTAP with wall thickening of the lower esophagus, distal stomach, and scattered small bowel wall thickening as well as mesenteric edema and small volume abdominopelvic free fluid, similar but worse than prior. GI and general surgery were consulted. Pt was given morphine x2, dilaudid, zofran, and IVFs and placed in observation for further management.     Past Medical History:   Diagnosis  Date    Acute gastric ulcer with perforation     Acute kidney failure with tubular necrosis     GABBI (acute kidney injury)     Anxiety     Diarrhea     HLD (hyperlipidemia)     Hypertension     Nausea & vomiting     Peritonitis     PUD (peptic ulcer disease)     Renal disorder     Urine retention     Vascular disorder of intestine, unspecified        Past Surgical History:   Procedure Laterality Date    APPENDECTOMY      APPLICATION OF WOUND VACUUM-ASSISTED CLOSURE DEVICE  1/21/2021    Procedure: APPLICATION, WOUND VAC;  Surgeon: Abel Francis MD;  [facility];  Service: General;;    APPLICATION OF WOUND VACUUM-ASSISTED CLOSURE DEVICE  1/25/2021    Procedure: APPLICATION, WOUND VAC;  Surgeon: Abel Francis MD;  [facility];  Service: General;;    CLOSURE OF PERFORATED ULCER OF DUODENUM USING OMENTAL PATCH  1/21/2021    Procedure: CLOSURE, ULCER, PERFORATED, DUODENUM, USING OMENTAL PATCH;  Surgeon: Abel Francis MD;  Location: [facility] OR [address] [place] [address];  Service: General;;    COLECTOMY, RIGHT  1/21/2021    Procedure: COLECTOMY, RIGHT;  Surgeon: Abel Francis MD;  Location: [address];  Service: General;;    cyst removed from neck      GASTROSTOMY  1/25/2021    Procedure: GASTROSTOMY;  Surgeon: Abel Francis MD;  Location: [facility] OR [address] [place] [address];  Service: General;;    HYSTERECTOMY  1986    ILEOSTOMY CLOSURE  10/13/2021    Procedure: CLOSURE, ILEOSTOMY;  Surgeon: Abel Francis MD;  Location: [address];  Service: General;;    LYSIS OF ADHESIONS  10/13/2021    Procedure: LYSIS, ADHESIONS;  Surgeon: Abel Francis MD;  Location: [address];  Service: General;;    OOPHORECTOMY Bilateral 1989    OPEN REDUCTION AND INTERNAL FIXATION (ORIF) OF LISFRANC INJURY OF FOOT Left 10/11/2019    Procedure: ORIF, LISFRANC INJURY, FOOT;  Surgeon: Ferdinand Stauffer DPM;  [facility]: [facility];  Service: Podiatry;  Laterality: Left;    TONSILLECTOMY, ADENOIDECTOMY          Review of patient's allergies indicates:  No Known Allergies    No current facility-administered medications on file prior to encounter.     Current Outpatient Medications on File Prior to Encounter   Medication Sig    acetaminophen (TYLENOL) 325 MG tablet Take 2 tablets (650 mg total) by mouth every 6 (six) hours as needed for Pain.    calcium carbonate (OS-TORI) 600 mg (1,500 mg) Tab Take 600 mg by mouth 2 (two) times daily with meals.    clonazePAM (KLONOPIN) 1 MG tablet Take 1 tablet (1 mg total) by mouth 2 (two) times daily as needed for Anxiety. (Patient taking differently: Take 1 mg by mouth 2 (two) times daily.)    docosahexaenoic acid/epa (FISH OIL ORAL) Take 1 capsule by mouth once daily.    gabapentin (NEURONTIN) 600 MG tablet Take 600 mg by mouth 3 (three) times daily as needed (back pain).    HYDROcodone-acetaminophen (NORCO) 7.5-325 mg per tablet Take 1 tablet by mouth every 8 (eight) hours as needed for Pain.    lisinopriL-hydrochlorothiazide (PRINZIDE,ZESTORETIC) 20-12.5 mg per tablet Take 1 tablet by mouth 2 (two) times a day.    loperamide (IMODIUM) 2 mg capsule Take 1 capsule (2 mg total) by mouth 2 (two) times a day. (Patient taking differently: Take 2 mg by mouth 2 (two) times daily as needed for Diarrhea.)    MAGNESIUM ORAL Take 1 tablet by mouth once daily.    multivitamin (THERAGRAN) per tablet Take 1 tablet by mouth once daily.    ondansetron (ZOFRAN-ODT) 4 MG TbDL Dissolve 1 tablet (4 mg total) by mouth every 8 (eight) hours as needed (nausea).    potassium chloride SA (K-DUR,KLOR-CON) 20 MEQ tablet Take 1 tablet (20 mEq total) by mouth once daily.    tiZANidine (ZANAFLEX) 4 MG tablet Take 8 mg by mouth 2 (two) times daily as needed for Muscle spasms or Pain.     Family History       Problem Relation (Age of Onset)    Hypertension Mother          Tobacco Use    Smoking status: Every Day    Smokeless tobacco: Never   Substance and Sexual Activity    Alcohol use: Not Currently      Comment: socially    Drug use: No    Sexual activity: Yes     Partners: Male     Comment:      Review of Systems   Constitutional:  Positive for activity change and appetite change. Negative for chills and fever.   HENT:  Negative for trouble swallowing.    Eyes:  Negative for photophobia and visual disturbance.   Respiratory:  Negative for cough, chest tightness and shortness of breath.    Cardiovascular:  Negative for chest pain, palpitations and leg swelling.   Gastrointestinal:  Positive for abdominal pain, nausea and vomiting. Negative for blood in stool, constipation and diarrhea.   Genitourinary:  Negative for dysuria, frequency and hematuria.   Musculoskeletal:  Negative for back pain, gait problem and neck pain.   Skin:  Negative for rash and wound.   Neurological:  Negative for dizziness, syncope, speech difficulty and light-headedness.   Psychiatric/Behavioral:  Negative for agitation and confusion. The patient is not nervous/anxious.      Objective:     Vital Signs (Most Recent):  Temp: 98.2 °F (36.8 °C) (03/13/24 0700)  Pulse: 64 (03/13/24 0915)  Resp: 18 (03/13/24 1004)  BP: (!) 144/71 (03/13/24 0915)  SpO2: 96 % (03/13/24 0915) Vital Signs (24h Range):  Temp:  [98 °F (36.7 °C)-98.2 °F (36.8 °C)] 98.2 °F (36.8 °C)  Pulse:  [62-80] 64  Resp:  [16-25] 18  SpO2:  [95 %-99 %] 96 %  BP: (124-220)/() 144/71        There is no height or weight on file to calculate BMI.     Physical Exam  Vitals and nursing note reviewed.   Constitutional:       General: She is not in acute distress.     Appearance: She is well-developed. She is ill-appearing (2/2 abdominal pain).   HENT:      Head: Normocephalic and atraumatic.      Mouth/Throat:      Mouth: Mucous membranes are moist.   Eyes:      Extraocular Movements: Extraocular movements intact.      Conjunctiva/sclera: Conjunctivae normal.      Pupils: Pupils are equal, round, and reactive to light.   Cardiovascular:      Rate and Rhythm: Normal rate and  regular rhythm.      Heart sounds: Normal heart sounds.   Pulmonary:      Effort: Pulmonary effort is normal. No respiratory distress.      Breath sounds: Normal breath sounds. No wheezing.   Abdominal:      General: Bowel sounds are normal. There is no distension.      Palpations: Abdomen is soft.      Tenderness: There is abdominal tenderness (diffuse, worse in epigastrum). There is guarding. There is no rebound.   Musculoskeletal:         General: No tenderness. Normal range of motion.      Cervical back: Normal range of motion and neck supple.      Right lower leg: No edema.      Left lower leg: No edema.   Skin:     General: Skin is warm and dry.      Capillary Refill: Capillary refill takes less than 2 seconds.      Findings: No rash.   Neurological:      Mental Status: She is alert and oriented to person, place, and time.      Cranial Nerves: No cranial nerve deficit.      Sensory: No sensory deficit.      Coordination: Coordination normal.   Psychiatric:         Behavior: Behavior normal.         Thought Content: Thought content normal.         Judgment: Judgment normal.              CRANIAL NERVES     CN III, IV, VI   Pupils are equal, round, and reactive to light.       Significant Labs: All pertinent labs within the past 24 hours have been reviewed.    Significant Imaging: I have reviewed all pertinent imaging results/findings within the past 24 hours.  Assessment/Plan:     * Epigastric pain  - 73 y.o. F with known history of perforated PUD/duodenal ulcer s/p ex lap now presenting with severe epigastric abdominal pain c/f PUD   - HDS, hypertensive to 220s/110s o/w VSSAF on admission, mild leukocytosis   - Hgb 14.5 and no evidence of GI bleeding   - CTAP with wall thickening of the lower esophagus, distal stomach, and scattered small bowel wall thickening with mesenteric edema and small volume abdominopelvic free fluid   - Protonix 80mg IV bolus x 1 then Protonix 40mg IV BID   - Hold all NSAIDs and heparin  products  - Keep NPO for anticipated EGD with GI today   - GI and gen surg consulted in the ED, appreciate recs  - IVF hydration; maintain access with 2 large bore IVs  - Anti-emetics as needed    Anxiety  - Chronic issue  - Continue home lexapro 5mg daily & klonopin BID PRN     HLD (hyperlipidemia)  - Diet controlled    Hypertension  Chronic, controlled. Latest blood pressure and vitals reviewed-     Temp:  [98 °F (36.7 °C)-98.2 °F (36.8 °C)]   Pulse:  [62-80]   Resp:  [16-25]   BP: (124-220)/()   SpO2:  [95 %-99 %] .   Home meds for hypertension were reviewed and noted below.   Hypertension Medications               lisinopriL-hydrochlorothiazide (PRINZIDE,ZESTORETIC) 10-12.5 mg per tablet Take 1 tablet by mouth 2 (two) times a day.     While in the hospital, will manage blood pressure as follows; Continue home antihypertensive regimen    Will utilize p.r.n. blood pressure medication only if patient's blood pressure greater than 180/110 and she develops symptoms such as worsening chest pain or shortness of breath.      VTE Risk Mitigation (From admission, onward)           Ordered     IP VTE HIGH RISK PATIENT  Once         03/13/24 0912     Place sequential compression device  Until discontinued         03/13/24 0912                         On 03/13/2024, patient should be placed in hospital observation services under my care in collaboration with Scotty Gamble MD.           MARCELLUS PendletonC  Department of Hospital Medicine  [facility] - Emergency Dept

## 2024-03-13 NOTE — SUBJECTIVE & OBJECTIVE
Past Medical History:   Diagnosis Date    Acute gastric ulcer with perforation     Acute kidney failure with tubular necrosis     GABBI (acute kidney injury)     Anxiety     Diarrhea     HLD (hyperlipidemia)     Hypertension     Nausea & vomiting     Peritonitis     PUD (peptic ulcer disease)     Renal disorder     Urine retention     Vascular disorder of intestine, unspecified        Past Surgical History:   Procedure Laterality Date    APPENDECTOMY      APPLICATION OF WOUND VACUUM-ASSISTED CLOSURE DEVICE  1/21/2021    Procedure: APPLICATION, WOUND VAC;  Surgeon: Abel Francis MD;  Location: [address];  Service: General;;    APPLICATION OF WOUND VACUUM-ASSISTED CLOSURE DEVICE  1/25/2021    Procedure: APPLICATION, WOUND VAC;  Surgeon: Abel Francis MD;  [facility];  Service: General;;    CLOSURE OF PERFORATED ULCER OF DUODENUM USING OMENTAL PATCH  1/21/2021    Procedure: CLOSURE, ULCER, PERFORATED, DUODENUM, USING OMENTAL PATCH;  Surgeon: Abel Francis MD;  Location: [facility] OR [address] [place] [address];  Service: General;;    COLECTOMY, RIGHT  1/21/2021    Procedure: COLECTOMY, RIGHT;  Surgeon: Abel Francis MD;  Location: [address];  Service: General;;    cyst removed from neck      GASTROSTOMY  1/25/2021    Procedure: GASTROSTOMY;  Surgeon: Abel Francis MD;  Location: [address];  Service: General;;    HYSTERECTOMY  1986    ILEOSTOMY CLOSURE  10/13/2021    Procedure: CLOSURE, ILEOSTOMY;  Surgeon: Abel Francis MD;  Location: [address];  Service: General;;    LYSIS OF ADHESIONS  10/13/2021    Procedure: LYSIS, ADHESIONS;  Surgeon: Abel Francis MD;  Location: [address];  Service: General;;    OOPHORECTOMY Bilateral 1989    OPEN REDUCTION AND INTERNAL FIXATION (ORIF) OF LISFRANC INJURY OF FOOT Left 10/11/2019    Procedure: ORIF, LISFRANC INJURY, FOOT;  Surgeon: Ferdinand Stauffer DPM;  [facility]: [facility];  Service: Podiatry;  Laterality: Left;     TONSILLECTOMY, ADENOIDECTOMY         Review of patient's allergies indicates:  No Known Allergies  Family History       Problem Relation (Age of Onset)    Hypertension Mother          Tobacco Use    Smoking status: Every Day    Smokeless tobacco: Never   Substance and Sexual Activity    Alcohol use: Not Currently     Comment: socially    Drug use: No    Sexual activity: Yes     Partners: Male     Comment:      Review of Systems   Constitutional:  Negative for chills and fever.   Gastrointestinal:  Positive for abdominal pain, nausea and vomiting.     Objective:     Vital Signs (Most Recent):  Temp: 98.2 °F (36.8 °C) (03/13/24 1049)  Pulse: 69 (03/13/24 1049)  Resp: 17 (03/13/24 1049)  BP: (!) 144/67 (03/13/24 1049)  SpO2: 95 % (03/13/24 1049) Vital Signs (24h Range):  Temp:  [98 °F (36.7 °C)-98.2 °F (36.8 °C)] 98.2 °F (36.8 °C)  Pulse:  [62-80] 69  Resp:  [16-25] 17  SpO2:  [95 %-99 %] 95 %  BP: (124-220)/() 144/67     Weight: 50.8 kg (112 lb) (03/13/24 1049)  Body mass index is 18.64 kg/m².      Intake/Output Summary (Last 24 hours) at 3/13/2024 1145  Last data filed at 3/13/2024 1118  Gross per 24 hour   Intake 300 ml   Output --   Net 300 ml       Lines/Drains/Airways       Peripheral Intravenous Line  Duration                  Peripheral IV - Single Lumen 03/13/24 18 G Left Antecubital <1 day                     Physical Exam  Vitals reviewed.   Constitutional:       General: She is not in acute distress.     Appearance: Normal appearance. She is not ill-appearing.   HENT:      Head: Normocephalic.   Eyes:      Extraocular Movements: Extraocular movements intact.   Cardiovascular:      Rate and Rhythm: Normal rate.   Pulmonary:      Effort: Pulmonary effort is normal. No respiratory distress.   Abdominal:      General: There is no distension.      Palpations: Abdomen is soft.      Tenderness: There is no abdominal tenderness. There is no guarding or rebound.   Neurological:      Mental Status: She  is alert and oriented to person, place, and time. Mental status is at baseline.          Significant Labs:  All pertinent lab results from the last 24 hours have been reviewed.    Significant Imaging:  Imaging results within the past 24 hours have been reviewed.

## 2024-03-13 NOTE — ASSESSMENT & PLAN NOTE
Chronic, controlled. Latest blood pressure and vitals reviewed-     Temp:  [98 °F (36.7 °C)-98.2 °F (36.8 °C)]   Pulse:  [62-80]   Resp:  [16-25]   BP: (124-220)/()   SpO2:  [95 %-99 %] .   Home meds for hypertension were reviewed and noted below.   Hypertension Medications               lisinopriL-hydrochlorothiazide (PRINZIDE,ZESTORETIC) 10-12.5 mg per tablet Take 1 tablet by mouth 2 (two) times a day.     While in the hospital, will manage blood pressure as follows; Continue home antihypertensive regimen    Will utilize p.r.n. blood pressure medication only if patient's blood pressure greater than 180/110 and she develops symptoms such as worsening chest pain or shortness of breath.

## 2024-03-13 NOTE — DISCHARGE SUMMARY
[facility] - Observation [address] [place] [address] Medicine  Discharge Summary      Patient Name: Juan Antonio Rowe  MRN: [id number]  YARITZA: [id number]  Patient Class: OP- Observation  Admission Date: 3/13/2024  Hospital Length of Stay: 0 days  Discharge Date and Time: 3/13/2024  4:35 PM  Attending Physician: Kelly att. providers found   Discharging Provider: Lynn Albert PA-C  Primary Care Provider: Brendon Hardy MD  [facility] Medicine Team: [facility] Lynn Albert PA-C  Primary Care Team: [facility]    HPI:   Juan Antonio Rowe is a 73 y.o. F with history of PUD and perforated gastric ulcer and ischemic colon s/p modified angelita patch repair and subtotal colectomy with ileosigmoid anastomosis in 2021, SBO 1/2024 which resolved spontaneously, HTN, diet controlled HLD, and anxiety who presented to the ED for evaluation of abdominal pain, nausea, and vommiting, which started abruptly last night. She developed acute onset of cramping, mid abdominal pain with associated nausea and two episodes of nonbloody nonbilious vomiting. She denies any acid reflux symptoms, use of NSAIDs or blood thinners. Pt denies fever, chills, chest pain, palpitations, SOB, cough, dysuria, leg swelling or pain, fatigue, weakness, confusion, HA, syncope or recent sick contacts.       In the ED: Hypertensive to 220s/110s in the setting of severe pain, which improved with pain control, o/w VSSAF. Mild leukocytosis to 14 o/w labs grossly unremarkable. CTAP with wall thickening of the lower esophagus, distal stomach, and scattered small bowel wall thickening as well as mesenteric edema and small volume abdominopelvic free fluid, similar but worse than prior. GI and general surgery were consulted. Pt was given morphine x2, dilaudid, zofran, and IVFs and placed in observation for further management.     Procedure(s) (LRB):  EGD (ESOPHAGOGASTRODUODENOSCOPY) (N/A)      Hospital Course:   Juan Antonio Rowe is a 73 y.o. female who was admitted to hospital medicine for abdominal  pain. GI consulted. CT abdomen and pelvis with wall thickening of the lower esophagus, distal stomach, and scattered small bowel wall thickening, mesenteric edema and small volume abdominopelvic free fluid, similar but worse when compared with 01/13/2024, decompressed gallbladder with mild diffuse gallbladder wall thickening, similar to prior study, intra and extrahepatic biliary duct dilatation, similar to slightly worse when compared with 01/13/2024. EGD done with erythematous mucosa in the stomach, normal examined duodenum, procedure is consistent with delayed gastric emptying. GI recommending CT enterography. Patient left AMA prior work up completion. Discussed risk of leaving prior to discharge, states that she understands and will return if symptoms worsen.     Physical Exam  Gen: in NAD, appears stated age, walking throughout the andrade to the elevators         Goals of Care Treatment Preferences:  Code Status: Full Code      Consults:   Consults (From admission, onward)          Status Ordering Provider     Inpatient consult to Gastroenterology  Once        Provider:  (Not yet assigned)    MARITZA Olmedo            No new Assessment & Plan notes have been filed under this hospital service since the last note was generated.  Service: Hospital Medicine    Final Active Diagnoses:    Diagnosis Date Noted POA    PRINCIPAL PROBLEM:  Epigastric pain [R10.13] 03/13/2024 Yes    Abnormal finding on GI tract imaging [R93.3] 03/13/2024 Yes    HLD (hyperlipidemia) [E78.5]  Yes     Chronic    Anxiety [F41.9]  Yes    Hypertension [I10]  Yes     Chronic      Problems Resolved During this Admission:       Discharged Condition: stable    Disposition: Left Against Medical Adv*    Follow Up:    Patient Instructions:      Ambulatory referral/consult to Gastroenterology   Standing Status: Future   Referral Priority: Urgent Referral Type: Consultation   Referral Reason: Specialty Services Required   Requested  Specialty: Gastroenterology   Number of Visits Requested: 1     Notify your health care provider if you experience any of the following:  persistent nausea and vomiting or diarrhea     Notify your health care provider if you experience any of the following:  severe uncontrolled pain     Notify your health care provider if you experience any of the following:  temperature >100.4     Activity as tolerated       Significant Diagnostic Studies: Labs: All labs within the past 24 hours have been reviewed    Pending Diagnostic Studies:       Procedure Component Value Units Date/Time    CT Enterography Abd_Pelvis With Contrast [[id number]]     Order Status: Sent Lab Status: No result     Specimen to Pathology, Surgery Gastrointestinal tract [[id number]] Collected: 03/13/24 1124    Order Status: Sent Lab Status: In process Updated: 03/13/24 1427    Specimen: Tissue            Medications:  Reconciled Home Medications:      Medication List        CONTINUE taking these medications      acetaminophen 325 MG tablet  Commonly known as: TYLENOL  Take 2 tablets (650 mg total) by mouth every 6 (six) hours as needed for Pain.     calcium carbonate 600 mg calcium (1,500 mg) Tab  Commonly known as: OS-TORI  Take 600 mg by mouth 2 (two) times daily with meals.     clonazePAM 1 MG tablet  Commonly known as: KlonoPIN  Take 1 tablet (1 mg total) by mouth 2 (two) times daily as needed for Anxiety.     FISH OIL ORAL  Take 1 capsule by mouth once daily.     gabapentin 600 MG tablet  Commonly known as: NEURONTIN  Take 600 mg by mouth 3 (three) times daily as needed (back pain).     HYDROcodone-acetaminophen 7.5-325 mg per tablet  Commonly known as: NORCO  Take 1 tablet by mouth every 8 (eight) hours as needed for Pain.     lisinopriL-hydrochlorothiazide 20-12.5 mg per tablet  Commonly known as: PRINZIDE,ZESTORETIC  Take 1 tablet by mouth 2 (two) times a day.     loperamide 2 mg capsule  Commonly known as: IMODIUM  Take 1 capsule (2 mg total) by  mouth 2 (two) times a day.     MAGNESIUM ORAL  Take 1 tablet by mouth once daily.     multivitamin per tablet  Commonly known as: THERAGRAN  Take 1 tablet by mouth once daily.     ondansetron 4 MG Tbdl  Commonly known as: ZOFRAN-ODT  Dissolve 1 tablet (4 mg total) by mouth every 8 (eight) hours as needed (nausea).     potassium chloride SA 20 MEQ tablet  Commonly known as: K-DUR,KLOR-CON  Take 1 tablet (20 mEq total) by mouth once daily.     tiZANidine 4 MG tablet  Commonly known as: ZANAFLEX  Take 8 mg by mouth 2 (two) times daily as needed for Muscle spasms or Pain.              Indwelling Lines/Drains at time of discharge:   Lines/Drains/Airways       None                   Time spent on the discharge of patient: 36 minutes         Lynn Albert PA-C  Department of Hospital Medicine  [facility] - Observation 11H

## 2024-03-13 NOTE — ANESTHESIA PREPROCEDURE EVALUATION
03/13/2024  Juan Antonio Rowe is a 73 y.o., female.      Pre-op Assessment    I have reviewed the Patient Summary Reports.     I have reviewed the Nursing Notes. I have reviewed the NPO Status.   I have reviewed the Medications.     Review of Systems  Anesthesia Hx:  No problems with previous Anesthesia   History of prior surgery of interest to airway management or planning:          Denies Family Hx of Anesthesia complications.    Denies Personal Hx of Anesthesia complications.                    Hematology/Oncology:  Hematology Normal   Oncology Normal                                   EENT/Dental:  EENT/Dental Normal           Cardiovascular:  Exercise tolerance: good   Hypertension           hyperlipidemia   ECG has been reviewed.                          Pulmonary:  Pulmonary Normal                       Renal/:  Chronic Renal Disease                Hepatic/GI:   PUD,               Musculoskeletal:  Musculoskeletal Normal                Neurological:  Neurology Normal                                      Endocrine:  Endocrine Normal            Dermatological:  Skin Normal    Psych:  Psychiatric Normal                    Physical Exam  General: Well nourished, Cooperative, Alert and Oriented    Airway:  Mallampati: II   Mouth Opening: Normal  TM Distance: Normal  Tongue: Normal  Neck ROM: Normal ROM    Dental:  Intact    Abdomen:  Tenderness, Distention        Anesthesia Plan  Type of Anesthesia, risks & benefits discussed:    Anesthesia Type: Gen ETT  Intra-op Monitoring Plan: Standard ASA Monitors  Post Op Pain Control Plan: multimodal analgesia and IV/PO Opioids PRN  Induction:  IV and rapid sequence  Airway Plan: Direct, Post-Induction  Informed Consent: Informed consent signed with the Patient and all parties understand the risks and agree with anesthesia plan.  All questions answered.   ASA Score:  3  Day of Surgery Review of History & Physical: H&P Update referred to the surgeon/provider.    Ready For Surgery From Anesthesia Perspective.     .

## 2024-03-17 LAB
FINAL PATHOLOGIC DIAGNOSIS: NORMAL
GROSS: NORMAL
Lab: NORMAL

## 2024-06-19 ENCOUNTER — HOSPITAL ENCOUNTER (EMERGENCY)
Facility: HOSPITAL | Age: 74
Discharge: LEFT BEFORE DISCHARGE | End: 2024-06-19
Attending: STUDENT IN AN ORGANIZED HEALTH CARE EDUCATION/TRAINING PROGRAM
Payer: MEDICARE

## 2024-06-19 VITALS
SYSTOLIC BLOOD PRESSURE: 167 MMHG | WEIGHT: 112 LBS | BODY MASS INDEX: 18.66 KG/M2 | HEIGHT: 65 IN | RESPIRATION RATE: 17 BRPM | HEART RATE: 66 BPM | DIASTOLIC BLOOD PRESSURE: 74 MMHG | TEMPERATURE: 98 F | OXYGEN SATURATION: 97 %

## 2024-06-19 DIAGNOSIS — R10.9 ABDOMINAL PAIN, UNSPECIFIED ABDOMINAL LOCATION: Primary | ICD-10-CM

## 2024-06-19 LAB
ALBUMIN SERPL BCP-MCNC: 3.5 G/DL (ref 3.5–5.2)
ALP SERPL-CCNC: 127 U/L (ref 55–135)
ALT SERPL W/O P-5'-P-CCNC: 11 U/L (ref 10–44)
ANION GAP SERPL CALC-SCNC: 13 MMOL/L (ref 8–16)
AST SERPL-CCNC: 27 U/L (ref 10–40)
BACTERIA #/AREA URNS AUTO: ABNORMAL /HPF
BASOPHILS # BLD AUTO: 0.06 K/UL (ref 0–0.2)
BASOPHILS NFR BLD: 0.3 % (ref 0–1.9)
BILIRUB SERPL-MCNC: 0.4 MG/DL (ref 0.1–1)
BILIRUB UR QL STRIP: NEGATIVE
BUN SERPL-MCNC: 12 MG/DL (ref 8–23)
CALCIUM SERPL-MCNC: 9.7 MG/DL (ref 8.7–10.5)
CHLORIDE SERPL-SCNC: 102 MMOL/L (ref 95–110)
CLARITY UR REFRACT.AUTO: CLEAR
CO2 SERPL-SCNC: 25 MMOL/L (ref 23–29)
COLOR UR AUTO: YELLOW
CREAT SERPL-MCNC: 1 MG/DL (ref 0.5–1.4)
DIFFERENTIAL METHOD BLD: ABNORMAL
EOSINOPHIL # BLD AUTO: 0.2 K/UL (ref 0–0.5)
EOSINOPHIL NFR BLD: 1.1 % (ref 0–8)
ERYTHROCYTE [DISTWIDTH] IN BLOOD BY AUTOMATED COUNT: 15.4 % (ref 11.5–14.5)
EST. GFR  (NO RACE VARIABLE): 59.9 ML/MIN/1.73 M^2
GLUCOSE SERPL-MCNC: 102 MG/DL (ref 70–110)
GLUCOSE UR QL STRIP: NEGATIVE
HCT VFR BLD AUTO: 45.9 % (ref 37–48.5)
HCV AB SERPL QL IA: NORMAL
HGB BLD-MCNC: 15.5 G/DL (ref 12–16)
HGB UR QL STRIP: NEGATIVE
HIV 1+2 AB+HIV1 P24 AG SERPL QL IA: NORMAL
HYALINE CASTS UR QL AUTO: 56 /LPF
IMM GRANULOCYTES # BLD AUTO: 0.08 K/UL (ref 0–0.04)
IMM GRANULOCYTES NFR BLD AUTO: 0.4 % (ref 0–0.5)
KETONES UR QL STRIP: ABNORMAL
LACTATE SERPL-SCNC: 1 MMOL/L (ref 0.5–2.2)
LEUKOCYTE ESTERASE UR QL STRIP: NEGATIVE
LIPASE SERPL-CCNC: 11 U/L (ref 4–60)
LYMPHOCYTES # BLD AUTO: 2.3 K/UL (ref 1–4.8)
LYMPHOCYTES NFR BLD: 12.6 % (ref 18–48)
MCH RBC QN AUTO: 32.4 PG (ref 27–31)
MCHC RBC AUTO-ENTMCNC: 33.8 G/DL (ref 32–36)
MCV RBC AUTO: 96 FL (ref 82–98)
MICROSCOPIC COMMENT: ABNORMAL
MONOCYTES # BLD AUTO: 0.6 K/UL (ref 0.3–1)
MONOCYTES NFR BLD: 3.1 % (ref 4–15)
NEUTROPHILS # BLD AUTO: 14.7 K/UL (ref 1.8–7.7)
NEUTROPHILS NFR BLD: 82.5 % (ref 38–73)
NITRITE UR QL STRIP: NEGATIVE
NRBC BLD-RTO: 0 /100 WBC
PH UR STRIP: 6 [PH] (ref 5–8)
PLATELET # BLD AUTO: 376 K/UL (ref 150–450)
PMV BLD AUTO: 10.1 FL (ref 9.2–12.9)
POTASSIUM SERPL-SCNC: 3.5 MMOL/L (ref 3.5–5.1)
PROT SERPL-MCNC: 7.1 G/DL (ref 6–8.4)
PROT UR QL STRIP: ABNORMAL
RBC # BLD AUTO: 4.78 M/UL (ref 4–5.4)
RBC #/AREA URNS AUTO: 3 /HPF (ref 0–4)
SODIUM SERPL-SCNC: 140 MMOL/L (ref 136–145)
SP GR UR STRIP: >1.03 (ref 1–1.03)
SQUAMOUS #/AREA URNS AUTO: 0 /HPF
URN SPEC COLLECT METH UR: ABNORMAL
WBC # BLD AUTO: 17.87 K/UL (ref 3.9–12.7)
WBC #/AREA URNS AUTO: 2 /HPF (ref 0–5)

## 2024-06-19 PROCEDURE — 81001 URINALYSIS AUTO W/SCOPE: CPT | Performed by: STUDENT IN AN ORGANIZED HEALTH CARE EDUCATION/TRAINING PROGRAM

## 2024-06-19 PROCEDURE — 99285 EMERGENCY DEPT VISIT HI MDM: CPT | Mod: 25

## 2024-06-19 PROCEDURE — 96374 THER/PROPH/DIAG INJ IV PUSH: CPT

## 2024-06-19 PROCEDURE — 25500020 PHARM REV CODE 255: Performed by: STUDENT IN AN ORGANIZED HEALTH CARE EDUCATION/TRAINING PROGRAM

## 2024-06-19 PROCEDURE — 83605 ASSAY OF LACTIC ACID: CPT | Performed by: STUDENT IN AN ORGANIZED HEALTH CARE EDUCATION/TRAINING PROGRAM

## 2024-06-19 PROCEDURE — 96375 TX/PRO/DX INJ NEW DRUG ADDON: CPT

## 2024-06-19 PROCEDURE — 87389 HIV-1 AG W/HIV-1&-2 AB AG IA: CPT | Performed by: PHYSICIAN ASSISTANT

## 2024-06-19 PROCEDURE — 85025 COMPLETE CBC W/AUTO DIFF WBC: CPT | Performed by: STUDENT IN AN ORGANIZED HEALTH CARE EDUCATION/TRAINING PROGRAM

## 2024-06-19 PROCEDURE — 83690 ASSAY OF LIPASE: CPT | Performed by: STUDENT IN AN ORGANIZED HEALTH CARE EDUCATION/TRAINING PROGRAM

## 2024-06-19 PROCEDURE — 86803 HEPATITIS C AB TEST: CPT | Performed by: PHYSICIAN ASSISTANT

## 2024-06-19 PROCEDURE — 63600175 PHARM REV CODE 636 W HCPCS: Performed by: STUDENT IN AN ORGANIZED HEALTH CARE EDUCATION/TRAINING PROGRAM

## 2024-06-19 PROCEDURE — 80053 COMPREHEN METABOLIC PANEL: CPT | Performed by: STUDENT IN AN ORGANIZED HEALTH CARE EDUCATION/TRAINING PROGRAM

## 2024-06-19 PROCEDURE — 96361 HYDRATE IV INFUSION ADD-ON: CPT

## 2024-06-19 RX ORDER — MORPHINE SULFATE 4 MG/ML
4 INJECTION, SOLUTION INTRAMUSCULAR; INTRAVENOUS
Status: COMPLETED | OUTPATIENT
Start: 2024-06-19 | End: 2024-06-19

## 2024-06-19 RX ORDER — HYDROMORPHONE HYDROCHLORIDE 1 MG/ML
1 INJECTION, SOLUTION INTRAMUSCULAR; INTRAVENOUS; SUBCUTANEOUS
Status: COMPLETED | OUTPATIENT
Start: 2024-06-19 | End: 2024-06-19

## 2024-06-19 RX ORDER — ONDANSETRON HYDROCHLORIDE 2 MG/ML
4 INJECTION, SOLUTION INTRAVENOUS
Status: COMPLETED | OUTPATIENT
Start: 2024-06-19 | End: 2024-06-19

## 2024-06-19 RX ORDER — ACETAMINOPHEN 500 MG
500 TABLET ORAL EVERY 6 HOURS PRN
Qty: 28 TABLET | Refills: 0 | Status: SHIPPED | OUTPATIENT
Start: 2024-06-19 | End: 2024-06-26

## 2024-06-19 RX ORDER — ONDANSETRON 4 MG/1
4 TABLET, ORALLY DISINTEGRATING ORAL EVERY 8 HOURS PRN
Qty: 15 TABLET | Refills: 0 | Status: SHIPPED | OUTPATIENT
Start: 2024-06-19 | End: 2024-06-24

## 2024-06-19 RX ORDER — DICYCLOMINE HYDROCHLORIDE 20 MG/1
20 TABLET ORAL 2 TIMES DAILY
Qty: 14 TABLET | Refills: 0 | Status: SHIPPED | OUTPATIENT
Start: 2024-06-19 | End: 2024-06-26

## 2024-06-19 RX ORDER — DROPERIDOL 2.5 MG/ML
0.62 INJECTION, SOLUTION INTRAMUSCULAR; INTRAVENOUS
Status: COMPLETED | OUTPATIENT
Start: 2024-06-19 | End: 2024-06-19

## 2024-06-19 RX ADMIN — DROPERIDOL 0.62 MG: 2.5 INJECTION, SOLUTION INTRAMUSCULAR; INTRAVENOUS at 10:06

## 2024-06-19 RX ADMIN — SODIUM CHLORIDE, POTASSIUM CHLORIDE, SODIUM LACTATE AND CALCIUM CHLORIDE 1000 ML: 600; 310; 30; 20 INJECTION, SOLUTION INTRAVENOUS at 08:06

## 2024-06-19 RX ADMIN — IOHEXOL 75 ML: 350 INJECTION, SOLUTION INTRAVENOUS at 09:06

## 2024-06-19 RX ADMIN — ONDANSETRON 4 MG: 2 INJECTION INTRAMUSCULAR; INTRAVENOUS at 08:06

## 2024-06-19 RX ADMIN — HYDROMORPHONE HYDROCHLORIDE 1 MG: 1 INJECTION, SOLUTION INTRAMUSCULAR; INTRAVENOUS; SUBCUTANEOUS at 09:06

## 2024-06-19 RX ADMIN — MORPHINE SULFATE 4 MG: 4 INJECTION INTRAVENOUS at 08:06

## 2024-06-19 NOTE — ED NOTES
Pt witnessed trying to exit the department. Upon initial discussion with patient, pt states that she is tired of being here and wants to leave. She states that no one is doing anything for her. I asked the patient to please stay and she refused. Patient refused and walked out. She removed her own IV. Pt was ambulating with steady gait and answering questions appropriately. Dr. Dorado notified. Dr. Dorado called patient and convinced her to return.

## 2024-06-19 NOTE — ED PROVIDER NOTES
Chief Complaint   Abdominal Pain (Arrived via EMS abdominal pain onset Sunday hx of bowl obstruction. Denies emesis)      History Of Present Illness   Juan Antonio Rowe is a 73 y.o. female with a PMHx including Peptic ulcer disease, perforated gastric ulcer, ischemic colon, SBO, prior subtotal colectomy, hypertension, hyperlipidemia, and anxiety  presenting with  abdominal pain.  Patient states that since Sunday she has been having abdominal pain that she describes in the middle and lower abdomen.  She states that she has not had a bowel movement for a few days but thinks this is due to her decreased p.o. intake.  She reports nausea and nonbloody vomiting.  States that she passed some gas this morning.  She reports no known bloody bowel movements.   She otherwise reports no chest pain, shortness of breath, fevers, or chills.  She reports no dysuria, hematuria.    Independent Historian: Yes  Other Historian or Collateral: Chart review  Interpretor: No      Review of patient's allergies indicates:  No Known Allergies    No current facility-administered medications on file prior to encounter.     Current Outpatient Medications on File Prior to Encounter   Medication Sig Dispense Refill    calcium carbonate (OS-TORI) 600 mg (1,500 mg) Tab Take 600 mg by mouth 2 (two) times daily with meals.      clonazePAM (KLONOPIN) 1 MG tablet Take 1 tablet (1 mg total) by mouth 2 (two) times daily as needed for Anxiety. (Patient taking differently: Take 1 mg by mouth 2 (two) times daily.) 30 tablet 0    docosahexaenoic acid/epa (FISH OIL ORAL) Take 1 capsule by mouth once daily.      gabapentin (NEURONTIN) 600 MG tablet Take 600 mg by mouth 3 (three) times daily as needed (back pain).      HYDROcodone-acetaminophen (NORCO) 7.5-325 mg per tablet Take 1 tablet by mouth every 8 (eight) hours as needed for Pain. 21 tablet 0    lisinopriL-hydrochlorothiazide (PRINZIDE,ZESTORETIC) 20-12.5 mg per tablet Take 1 tablet by mouth 2 (two) times a  day.      loperamide (IMODIUM) 2 mg capsule Take 1 capsule (2 mg total) by mouth 2 (two) times a day. (Patient taking differently: Take 2 mg by mouth 2 (two) times daily as needed for Diarrhea.) 14 capsule 0    MAGNESIUM ORAL Take 1 tablet by mouth once daily.      multivitamin (THERAGRAN) per tablet Take 1 tablet by mouth once daily.      potassium chloride SA (K-DUR,KLOR-CON) 20 MEQ tablet Take 1 tablet (20 mEq total) by mouth once daily. 14 tablet 0    tiZANidine (ZANAFLEX) 4 MG tablet Take 8 mg by mouth 2 (two) times daily as needed for Muscle spasms or Pain.         Past History   As per HPI and below:  Past Medical History:   Diagnosis Date    Acute gastric ulcer with perforation     Acute kidney failure with tubular necrosis     GABBI (acute kidney injury)     Anxiety     Diarrhea     HLD (hyperlipidemia)     Hypertension     Nausea & vomiting     Peritonitis     PUD (peptic ulcer disease)     Renal disorder     Urine retention     Vascular disorder of intestine, unspecified      Past Surgical History:   Procedure Laterality Date    APPENDECTOMY      APPLICATION OF WOUND VACUUM-ASSISTED CLOSURE DEVICE  1/21/2021    Procedure: APPLICATION, WOUND VAC;  Surgeon: Abel Francis MD;  Location: [address];  Service: General;;    APPLICATION OF WOUND VACUUM-ASSISTED CLOSURE DEVICE  1/25/2021    Procedure: APPLICATION, WOUND VAC;  Surgeon: Abel Francis MD;  Location: [address];  Service: General;;    CLOSURE OF PERFORATED ULCER OF DUODENUM USING OMENTAL PATCH  1/21/2021    Procedure: CLOSURE, ULCER, PERFORATED, DUODENUM, USING OMENTAL PATCH;  Surgeon: Abel Francis MD;  Location: [address];  Service: General;;    COLECTOMY, RIGHT  1/21/2021    Procedure: COLECTOMY, RIGHT;  Surgeon: Abel Francis MD;  Location: [facility] OR [address] [place] [address];  Service: General;;    cyst removed from neck      ESOPHAGOGASTRODUODENOSCOPY N/A 3/13/2024    Procedure: EGD (ESOPHAGOGASTRODUODENOSCOPY);  Surgeon: Rice,  Braxton WHEELER MD;  Location: [facility] ENDO (2ND FLR);  Service: Endoscopy;  Laterality: N/A;    GASTROSTOMY  1/25/2021    Procedure: GASTROSTOMY;  Surgeon: Abel Francis MD;  Location: [facility] OR 2ND FLR;  Service: General;;    HYSTERECTOMY  1986    ILEOSTOMY CLOSURE  10/13/2021    Procedure: CLOSURE, ILEOSTOMY;  Surgeon: Abel Francis MD;  [facility];  Service: General;;    LYSIS OF ADHESIONS  10/13/2021    Procedure: LYSIS, ADHESIONS;  Surgeon: Abel Francis MD;  [facility];  Service: General;;    OOPHORECTOMY Bilateral 1989    OPEN REDUCTION AND INTERNAL FIXATION (ORIF) OF LISFRANC INJURY OF FOOT Left 10/11/2019    Procedure: ORIF, LISFRANC INJURY, FOOT;  Surgeon: Ferdinand Stauffer DPM;  Location: [facility] OR 1ST FLR;  Service: Podiatry;  Laterality: Left;    TONSILLECTOMY, ADENOIDECTOMY         Social History     Socioeconomic History    Marital status:    Tobacco Use    Smoking status: Every Day    Smokeless tobacco: Never   Substance and Sexual Activity    Alcohol use: Not Currently     Comment: socially    Drug use: No    Sexual activity: Yes     Partners: Male     Comment:        Family History   Problem Relation Name Age of Onset    Hypertension Mother         Physical Exam     Vitals:    06/19/24 1000 06/19/24 1030 06/19/24 1202 06/19/24 1302   BP: (!) 154/65 (!) 173/71 (!) 147/67 (!) 167/74   Pulse: 67 73 70 66   Resp: 17  18 17   Temp:       TempSrc:       SpO2: 98% 97% 97% 97%   Weight:       Height:           Physical Exam  Vitals reviewed.   Constitutional:       General: She is not in acute distress.     Appearance: She is not toxic-appearing.      Comments:   Appears uncomfortable   HENT:      Head: Normocephalic and atraumatic.      Nose: No congestion.      Mouth/Throat:      Mouth: Mucous membranes are moist.   Eyes:      General: No scleral icterus.     Extraocular Movements: Extraocular movements intact.   Cardiovascular:      Rate and Rhythm:  Normal rate and regular rhythm.   Pulmonary:      Effort: No respiratory distress.      Breath sounds: No wheezing or rhonchi.   Abdominal:      General: There is no distension.      Palpations: Abdomen is soft.      Tenderness: There is abdominal tenderness in the right lower quadrant, periumbilical area and left lower quadrant. There is guarding. There is no right CVA tenderness, left CVA tenderness or rebound.   Musculoskeletal:         General: No deformity.      Cervical back: No rigidity.   Skin:     General: Skin is warm and dry.      Capillary Refill: Capillary refill takes less than 2 seconds.   Neurological:      General: No focal deficit present.      Mental Status: She is alert and oriented to person, place, and time.             Results     Labs Reviewed   CBC W/ AUTO DIFFERENTIAL - Abnormal; Notable for the following components:       Result Value    WBC 17.87 (*)     MCH 32.4 (*)     RDW 15.4 (*)     Gran # (ANC) 14.7 (*)     Immature Grans (Abs) 0.08 (*)     Gran % 82.5 (*)     Lymph % 12.6 (*)     Mono % 3.1 (*)     All other components within normal limits    Narrative:     Release to patient->Immediate   COMPREHENSIVE METABOLIC PANEL - Abnormal; Notable for the following components:    eGFR 59.9 (*)     All other components within normal limits    Narrative:     Release to patient->Immediate   URINALYSIS, REFLEX TO URINE CULTURE - Abnormal; Notable for the following components:    Specific Gravity, UA >1.030 (*)     Protein, UA 1+ (*)     Ketones, UA Trace (*)     All other components within normal limits    Narrative:     Specimen Source->Urine   URINALYSIS MICROSCOPIC - Abnormal; Notable for the following components:    Hyaline Casts, UA 56 (*)     All other components within normal limits    Narrative:     Specimen Source->Urine   HIV 1 / 2 ANTIBODY    Narrative:     Release to patient->Immediate   HEPATITIS C ANTIBODY    Narrative:     Release to patient->Immediate   LIPASE    Narrative:      Release to patient->Immediate   LACTIC ACID, PLASMA       Imaging Results              CT Abdomen Pelvis With IV Contrast NO Oral Contrast (Final result)  Result time 06/19/24 10:42:46      Final result by Mario Lopez IV, MD (06/19/24 10:42:46)                   Impression:      Prominent distension of stomach, duodenum, and small bowel noting scattered air-fluid levels.  No focal transition point identified.  Findings may represent developing small bowel obstruction or ileus.  Recommend clinical correlation.    Persistent intra and extrahepatic biliary duct dilatation.  Recommend correlation with lab values.  Mild distension of gallbladder without evidence of acute cholecystitis.    Small volume ascites.    Additional findings as above.      Electronically signed by: Mario Lopez  Date:    06/19/2024  Time:    10:42               Narrative:    EXAMINATION:  CT ABDOMEN PELVIS WITH IV CONTRAST    CLINICAL HISTORY:  Abdominal pain, acute, nonlocalized;    TECHNIQUE:  Low dose axial images, sagittal and coronal reformations were obtained from the lung bases to the pubic symphysis following the IV administration of 75 mL of Omnipaque 350 .  Oral contrast was not given.    COMPARISON:  CT 03/13/2024.    FINDINGS:  Lung bases are well aerated.  No focal consolidation or mass.  No significant pleural fluid.  Partially imaged heart is normal in size.  Trace pericardial fluid.    Liver is normal in size with subtle nodular contour.  No focal lesion.  Gallbladder is mildly distended with punctate calcification in the fundal wall.  No additional sizable calcified gallstones.  No evidence of acute cholecystitis.  Similar intra and extrahepatic biliary duct dilatation.  Distal common bile duct measures up to 1 cm diameter, unchanged.  No obvious filling defect in the common bile duct.    Stable appearance of pancreas noting mild prominence of pancreatic duct.  No evidence of mass or peripancreatic inflammatory  change.  Spleen is normal in size.  Adrenal glands are unremarkable.    Stable appearance of bilateral kidneys.  No nephrolithiasis or mass.  No hydronephrosis.  Bladder is minimally distended.  No obvious wall thickening.    Uterus is surgically absent.  No adnexal mass.    Stomach and duodenum are distended with air-fluid levels.  Majority of small bowel is distended noting multiple loops with air-fluid levels scattered throughout.  Prominent loop in the proximal small bowel measures 3.6 cm in diameter.  Few loops of nondistended small bowel in the right hemiabdomen anteriorly.  No focal transition point identified.  Postsurgical change noted status post subtotal colectomy and appendectomy.  No significant distension of colon.  No convincing evidence of active bowel inflammation.  Trace ascites.  No free intraperitoneal air.    Mild wall thickening of visualized distal esophagus, less conspicuous.  Wall thickening of stomach is also less conspicuous.    Advanced calcific aortoiliac atherosclerotic plaque.  No aneurysm.  No new suspicious lymphadenopathy.    Degenerative changes, most prominent in the lumbar spine.  No acute fracture.  No aggressive osseous lesion.                                            Initial MDM   Medical Decision Making   Patient is a 73-year-old  female presenting with abdominal pain.  Given her prior surgical history, most concerning for SBO.  Also consider infective colitis, ischemic colitis.   Lower suspicion but consider peptic ulcer disease, pancreatitis, urinary infection,   Biliary pathology.  Will get broad workup including labs, and CT scan for further evaluation.  Will treat patient's pain and nausea at this time.    Amount and/or Complexity of Data Reviewed  Labs: ordered. Decision-making details documented in ED Course.  Radiology: ordered.    Risk  OTC drugs.  Prescription drug management.               Medications Given / Interventions     Medications   ondansetron injection  4 mg (4 mg Intravenous Given 6/19/24 0843)   lactated ringers bolus 1,000 mL (0 mLs Intravenous Stopped 6/19/24 0944)   morphine injection 4 mg (4 mg Intravenous Given 6/19/24 0843)   HYDROmorphone injection 1 mg (1 mg Intravenous Given 6/19/24 0916)   iohexoL (OMNIPAQUE 350) injection 75 mL (75 mLs Intravenous Given 6/19/24 0942)   droPERidol injection 0.625 mg (0.625 mg Intravenous Given 6/19/24 1056)       Procedures     ED POCUS Performed: No    Reassessment and ED Course     ED Course as of 06/21/24 1515   Wed Jun 19, 2024   0941 Lactic Acid Level: 1.0    Lipase okay, CMP okay [CH]   0941  UA concentrated but noninfectious [CH]   0941  CBC with significantly elevated white blood cell count and left shift [CH]   1135 Informed by nurse that patient eloped from the department.   I called the patient on the phone and discussed the results and pending surgery consult.  Patient voiced understanding and states that she will return to the emergency room. [CH]   1241   Patient had returned to the emergency department.  Re-evaluate the patient and again explained findings and pending surgery consult. Abdominal exam significantly improved without guarding or rebound. [CH]   1327   Surgery team has evaluated the patient and feels that her CT scan is consistent with what it was like a few months ago for similar presentation.  They have low suspicion for bowel obstruction in the sign but recommended medicine admission for serial abdominal exams.      Discussed plan with the patient who declined admission at this time.  She expressed frustration with how long she has been in the emergency department and stated that she did not want to wait any longer in the hospital.  Discussed findings and concern for possible progression of a surgical pathology with the patient but she again declined staying in the hospital.  Encouraged patient to return to the emergency room if her symptoms at all change or if she changes her mind.   Provided symptomatic management with prescriptions.   Patient signed out AMA and left [CH]      ED Course User Index  [CH] Anabelle Dorado MD              Final diagnoses:  [R10.9] Abdominal pain, unspecified abdominal location (Primary)           Dispo      ED Disposition Condition    AMA Stable                             Anabelle Dorado MD  06/21/24 7907

## 2024-06-19 NOTE — ED NOTES
Pt given new IV and a pillow and blanket. PT wants to know when the doctor will come speak with her. Nurse reassured patient that she will reach out to the team. Care on going.

## 2024-06-19 NOTE — ED NOTES
Patient requesting additional pain medications other than morphine for 10/10 abdominal pain, MD aware.

## 2024-06-19 NOTE — ED TRIAGE NOTES
Patient started having severe abdominal pain starting Sunday, with associated N/V. The patient has had one episode of emesis this morning but denies any blood in emesis.

## 2024-06-19 NOTE — ED NOTES
"Nurse at bedside. Pt found trying to remove her IV and states "she can't wait here anymore." Pt states she wants to leave because "nothing is happening." Ave WOOD, at bedside and educated pt about leaving AMA. Pt agreed and verbalized understanding.  "

## 2024-09-12 PROCEDURE — 99285 EMERGENCY DEPT VISIT HI MDM: CPT | Mod: 25

## 2024-09-13 ENCOUNTER — HOSPITAL ENCOUNTER (EMERGENCY)
Facility: HOSPITAL | Age: 74
Discharge: HOME | End: 2024-09-13
Attending: EMERGENCY MEDICINE
Payer: MEDICARE

## 2024-09-13 VITALS
SYSTOLIC BLOOD PRESSURE: 167 MMHG | HEART RATE: 69 BPM | WEIGHT: 112 LBS | DIASTOLIC BLOOD PRESSURE: 78 MMHG | TEMPERATURE: 98 F | BODY MASS INDEX: 18.66 KG/M2 | OXYGEN SATURATION: 95 % | HEIGHT: 65 IN | RESPIRATION RATE: 20 BRPM

## 2024-09-13 DIAGNOSIS — R11.0 NAUSEA: ICD-10-CM

## 2024-09-13 DIAGNOSIS — R10.13 EPIGASTRIC PAIN: Primary | ICD-10-CM

## 2024-09-13 LAB
ALBUMIN SERPL BCP-MCNC: 3.1 G/DL (ref 3.5–5.2)
ALLENS TEST: NORMAL
ALP SERPL-CCNC: 89 U/L (ref 55–135)
ALT SERPL W/O P-5'-P-CCNC: 15 U/L (ref 10–44)
ANION GAP SERPL CALC-SCNC: 15 MMOL/L (ref 8–16)
AST SERPL-CCNC: 29 U/L (ref 10–40)
BASOPHILS # BLD AUTO: 0.03 K/UL (ref 0–0.2)
BASOPHILS NFR BLD: 0.2 % (ref 0–1.9)
BILIRUB SERPL-MCNC: 0.4 MG/DL (ref 0.1–1)
BUN SERPL-MCNC: 19 MG/DL (ref 8–23)
CALCIUM SERPL-MCNC: 9.7 MG/DL (ref 8.7–10.5)
CHLORIDE SERPL-SCNC: 102 MMOL/L (ref 95–110)
CO2 SERPL-SCNC: 24 MMOL/L (ref 23–29)
CREAT SERPL-MCNC: 0.8 MG/DL (ref 0.5–1.4)
DIFFERENTIAL METHOD BLD: ABNORMAL
EOSINOPHIL # BLD AUTO: 0.2 K/UL (ref 0–0.5)
EOSINOPHIL NFR BLD: 1.6 % (ref 0–8)
ERYTHROCYTE [DISTWIDTH] IN BLOOD BY AUTOMATED COUNT: 14.8 % (ref 11.5–14.5)
EST. GFR  (NO RACE VARIABLE): >60 ML/MIN/1.73 M^2
GLUCOSE SERPL-MCNC: 100 MG/DL (ref 70–110)
HCT VFR BLD AUTO: 41.2 % (ref 37–48.5)
HGB BLD-MCNC: 13.8 G/DL (ref 12–16)
IMM GRANULOCYTES # BLD AUTO: 0.04 K/UL (ref 0–0.04)
IMM GRANULOCYTES NFR BLD AUTO: 0.3 % (ref 0–0.5)
LDH SERPL L TO P-CCNC: 0.67 MMOL/L (ref 0.5–2.2)
LIPASE SERPL-CCNC: 10 U/L (ref 4–60)
LYMPHOCYTES # BLD AUTO: 2.6 K/UL (ref 1–4.8)
LYMPHOCYTES NFR BLD: 20.9 % (ref 18–48)
MCH RBC QN AUTO: 32.3 PG (ref 27–31)
MCHC RBC AUTO-ENTMCNC: 33.5 G/DL (ref 32–36)
MCV RBC AUTO: 97 FL (ref 82–98)
MONOCYTES # BLD AUTO: 0.6 K/UL (ref 0.3–1)
MONOCYTES NFR BLD: 5 % (ref 4–15)
NEUTROPHILS # BLD AUTO: 8.9 K/UL (ref 1.8–7.7)
NEUTROPHILS NFR BLD: 72 % (ref 38–73)
NRBC BLD-RTO: 0 /100 WBC
PLATELET # BLD AUTO: 330 K/UL (ref 150–450)
PMV BLD AUTO: 9.7 FL (ref 9.2–12.9)
POTASSIUM SERPL-SCNC: 2.8 MMOL/L (ref 3.5–5.1)
PROT SERPL-MCNC: 6.2 G/DL (ref 6–8.4)
RBC # BLD AUTO: 4.27 M/UL (ref 4–5.4)
SAMPLE: NORMAL
SITE: NORMAL
SODIUM SERPL-SCNC: 141 MMOL/L (ref 136–145)
WBC # BLD AUTO: 12.39 K/UL (ref 3.9–12.7)

## 2024-09-13 PROCEDURE — 63600175 PHARM REV CODE 636 W HCPCS

## 2024-09-13 PROCEDURE — 96376 TX/PRO/DX INJ SAME DRUG ADON: CPT

## 2024-09-13 PROCEDURE — 99900035 HC TECH TIME PER 15 MIN (STAT)

## 2024-09-13 PROCEDURE — 96375 TX/PRO/DX INJ NEW DRUG ADDON: CPT

## 2024-09-13 PROCEDURE — 25000003 PHARM REV CODE 250

## 2024-09-13 PROCEDURE — 25000003 PHARM REV CODE 250: Performed by: EMERGENCY MEDICINE

## 2024-09-13 PROCEDURE — 83690 ASSAY OF LIPASE: CPT

## 2024-09-13 PROCEDURE — 83605 ASSAY OF LACTIC ACID: CPT

## 2024-09-13 PROCEDURE — 96361 HYDRATE IV INFUSION ADD-ON: CPT

## 2024-09-13 PROCEDURE — 96365 THER/PROPH/DIAG IV INF INIT: CPT | Mod: 59

## 2024-09-13 PROCEDURE — 63600175 PHARM REV CODE 636 W HCPCS: Performed by: EMERGENCY MEDICINE

## 2024-09-13 PROCEDURE — 85025 COMPLETE CBC W/AUTO DIFF WBC: CPT

## 2024-09-13 PROCEDURE — 80053 COMPREHEN METABOLIC PANEL: CPT

## 2024-09-13 PROCEDURE — 25500020 PHARM REV CODE 255: Performed by: EMERGENCY MEDICINE

## 2024-09-13 RX ORDER — ONDANSETRON 4 MG/1
4 TABLET, ORALLY DISINTEGRATING ORAL ONCE
Status: DISCONTINUED | OUTPATIENT
Start: 2024-09-13 | End: 2024-09-13

## 2024-09-13 RX ORDER — ONDANSETRON HYDROCHLORIDE 2 MG/ML
4 INJECTION, SOLUTION INTRAVENOUS
Status: COMPLETED | OUTPATIENT
Start: 2024-09-13 | End: 2024-09-13

## 2024-09-13 RX ORDER — HYDROMORPHONE HYDROCHLORIDE 1 MG/ML
1 INJECTION, SOLUTION INTRAMUSCULAR; INTRAVENOUS; SUBCUTANEOUS
Status: COMPLETED | OUTPATIENT
Start: 2024-09-13 | End: 2024-09-13

## 2024-09-13 RX ORDER — TRAZODONE HYDROCHLORIDE 100 MG/1
100 TABLET ORAL NIGHTLY
COMMUNITY
Start: 2024-07-24

## 2024-09-13 RX ORDER — MORPHINE SULFATE 4 MG/ML
4 INJECTION, SOLUTION INTRAMUSCULAR; INTRAVENOUS
Status: COMPLETED | OUTPATIENT
Start: 2024-09-13 | End: 2024-09-13

## 2024-09-13 RX ORDER — TRAMADOL HYDROCHLORIDE 50 MG/1
50-100 TABLET ORAL EVERY 6 HOURS PRN
COMMUNITY
Start: 2024-07-31

## 2024-09-13 RX ORDER — HYOSCYAMINE SULFATE 0.12 MG/ML
0.25 LIQUID ORAL
Status: COMPLETED | OUTPATIENT
Start: 2024-09-13 | End: 2024-09-13

## 2024-09-13 RX ORDER — ESCITALOPRAM OXALATE 5 MG/1
5 TABLET ORAL
COMMUNITY
Start: 2024-07-30

## 2024-09-13 RX ADMIN — ONDANSETRON 4 MG: 2 INJECTION INTRAMUSCULAR; INTRAVENOUS at 01:09

## 2024-09-13 RX ADMIN — MORPHINE SULFATE 4 MG: 4 INJECTION INTRAVENOUS at 06:09

## 2024-09-13 RX ADMIN — IOHEXOL 75 ML: 350 INJECTION, SOLUTION INTRAVENOUS at 02:09

## 2024-09-13 RX ADMIN — HYDROMORPHONE HYDROCHLORIDE 1 MG: 1 INJECTION, SOLUTION INTRAMUSCULAR; INTRAVENOUS; SUBCUTANEOUS at 03:09

## 2024-09-13 RX ADMIN — ONDANSETRON 4 MG: 2 INJECTION INTRAMUSCULAR; INTRAVENOUS at 03:09

## 2024-09-13 RX ADMIN — HYOSCYAMINE SULFATE 0.25 MG: 0.12 SOLUTION/ DROPS ORAL at 07:09

## 2024-09-13 RX ADMIN — POTASSIUM BICARBONATE 40 MEQ: 391 TABLET, EFFERVESCENT ORAL at 06:09

## 2024-09-13 RX ADMIN — HYDROMORPHONE HYDROCHLORIDE 1 MG: 1 INJECTION, SOLUTION INTRAMUSCULAR; INTRAVENOUS; SUBCUTANEOUS at 01:09

## 2024-09-13 RX ADMIN — SODIUM CHLORIDE 1000 ML: 9 INJECTION, SOLUTION INTRAVENOUS at 01:09

## 2024-09-13 NOTE — ED TRIAGE NOTES
Juan Antonio Rowe, a 73 y.o. female presents to the ED w/ complaint of abdm pain 10/10 that started yesterday. Pt reports had diarrhea earlier today. Reports 1 episode of vomiting and is still feeling nauseous. Denies fever, CP, SOB.     Triage note:  Chief Complaint   Patient presents with    Abdominal Pain     Lower abd cramping x2 days, describes as waves/spasms. Diarrhea earlier this morning.      Review of patient's allergies indicates:  No Known Allergies  Past Medical History:   Diagnosis Date    Acute gastric ulcer with perforation     Acute kidney failure with tubular necrosis     GABBI (acute kidney injury)     Anxiety     Diarrhea     HLD (hyperlipidemia)     Hypertension     Nausea & vomiting     Peritonitis     PUD (peptic ulcer disease)     Renal disorder     Urine retention     Vascular disorder of intestine, unspecified

## 2024-09-13 NOTE — ED NOTES
Patient identifiers for Juan Antonio Rowe 73 y.o. female checked and correct.  Chief Complaint   Patient presents with    Abdominal Pain     Lower abd cramping x2 days, describes as waves/spasms. Diarrhea earlier this morning.      Past Medical History:   Diagnosis Date    Acute gastric ulcer with perforation     Acute kidney failure with tubular necrosis     GABBI (acute kidney injury)     Anxiety     Diarrhea     HLD (hyperlipidemia)     Hypertension     Nausea & vomiting     Peritonitis     PUD (peptic ulcer disease)     Renal disorder     Urine retention     Vascular disorder of intestine, unspecified      Allergies reported: Review of patient's allergies indicates:  No Known Allergies    Appearance: Pt awake, alert & oriented to person, place & time. Pt in no acute distress at present time. Pt is clean and well groomed with clothes appropriately fastened.   Skin: Skin warm, dry & intact. Color consistent with ethnicity. Mucous membranes moist. No breakdown or brusing noted.   Musculoskeletal: Patient moving all extremities well, no obvious swelling or deformities noted.   Respiratory: Respirations spontaneous, even, and non-labored. Visible chest rise noted. Airway is open and patent. No accessory muscle use noted.   Neurologic: Sensation is intact. Speech is clear and appropriate. Eyes open spontaneously, behavior appropriate to situation, follows commands, facial expression symmetrical, bilateral hand grasp equal and even, purposeful motor response noted.  Cardiac: All peripheral pulses present. No Bilateral lower extremity edema. Cap refill is <3 seconds.  Abdomen: Abdomen soft, non distended, non tender to palpation.   : Pt voids independently, denies dysuria, hematuria, frequency.
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
## 2025-01-08 ENCOUNTER — HOSPITAL ENCOUNTER (EMERGENCY)
Facility: HOSPITAL | Age: 75
Discharge: LEFT BEFORE DISCHARGE | End: 2025-01-08
Attending: EMERGENCY MEDICINE
Payer: MEDICARE

## 2025-01-08 ENCOUNTER — HOSPITAL ENCOUNTER (EMERGENCY)
Facility: HOSPITAL | Age: 75
Discharge: PSYCHIATRIC OR SUBSTANCE USE FACILITY | End: 2025-01-08
Attending: EMERGENCY MEDICINE
Payer: MEDICARE

## 2025-01-08 VITALS
OXYGEN SATURATION: 99 % | TEMPERATURE: 98 F | RESPIRATION RATE: 26 BRPM | WEIGHT: 120 LBS | DIASTOLIC BLOOD PRESSURE: 69 MMHG | OXYGEN SATURATION: 98 % | SYSTOLIC BLOOD PRESSURE: 156 MMHG | SYSTOLIC BLOOD PRESSURE: 158 MMHG | RESPIRATION RATE: 17 BRPM | HEART RATE: 58 BPM | TEMPERATURE: 98 F | DIASTOLIC BLOOD PRESSURE: 80 MMHG | HEIGHT: 65 IN | BODY MASS INDEX: 19.99 KG/M2 | HEART RATE: 82 BPM

## 2025-01-08 DIAGNOSIS — F11.90 OPIATE USE: ICD-10-CM

## 2025-01-08 DIAGNOSIS — T14.91XA SUICIDE ATTEMPT: Primary | ICD-10-CM

## 2025-01-08 DIAGNOSIS — R10.33 PERIUMBILICAL ABDOMINAL PAIN: Primary | ICD-10-CM

## 2025-01-08 DIAGNOSIS — R45.851 SUICIDAL IDEATION: ICD-10-CM

## 2025-01-08 LAB
ALBUMIN SERPL BCP-MCNC: 3.2 G/DL (ref 3.5–5.2)
ALBUMIN SERPL BCP-MCNC: 3.2 G/DL (ref 3.5–5.2)
ALP SERPL-CCNC: 102 U/L (ref 40–150)
ALP SERPL-CCNC: 106 U/L (ref 40–150)
ALT SERPL W/O P-5'-P-CCNC: 5 U/L (ref 10–44)
ALT SERPL W/O P-5'-P-CCNC: 7 U/L (ref 10–44)
AMPHET+METHAMPHET UR QL: NEGATIVE
ANION GAP SERPL CALC-SCNC: 8 MMOL/L (ref 8–16)
ANION GAP SERPL CALC-SCNC: 9 MMOL/L (ref 8–16)
APAP SERPL-MCNC: <3 UG/ML (ref 10–20)
AST SERPL-CCNC: 11 U/L (ref 10–40)
AST SERPL-CCNC: 16 U/L (ref 10–40)
BARBITURATES UR QL SCN>200 NG/ML: NEGATIVE
BASOPHILS # BLD AUTO: 0.05 K/UL (ref 0–0.2)
BASOPHILS # BLD AUTO: 0.05 K/UL (ref 0–0.2)
BASOPHILS NFR BLD: 0.5 % (ref 0–1.9)
BASOPHILS NFR BLD: 0.6 % (ref 0–1.9)
BENZODIAZ UR QL SCN>200 NG/ML: ABNORMAL
BILIRUB SERPL-MCNC: 0.2 MG/DL (ref 0.1–1)
BILIRUB SERPL-MCNC: 0.2 MG/DL (ref 0.1–1)
BILIRUB UR QL STRIP: NEGATIVE
BUN SERPL-MCNC: 21 MG/DL (ref 8–23)
BUN SERPL-MCNC: 23 MG/DL (ref 8–23)
BZE UR QL SCN: NEGATIVE
CALCIUM SERPL-MCNC: 8.7 MG/DL (ref 8.7–10.5)
CALCIUM SERPL-MCNC: 8.9 MG/DL (ref 8.7–10.5)
CANNABINOIDS UR QL SCN: NEGATIVE
CHLORIDE SERPL-SCNC: 105 MMOL/L (ref 95–110)
CHLORIDE SERPL-SCNC: 106 MMOL/L (ref 95–110)
CLARITY UR REFRACT.AUTO: CLEAR
CO2 SERPL-SCNC: 24 MMOL/L (ref 23–29)
CO2 SERPL-SCNC: 25 MMOL/L (ref 23–29)
COLOR UR AUTO: COLORLESS
CREAT SERPL-MCNC: 0.7 MG/DL (ref 0.5–1.4)
CREAT SERPL-MCNC: 0.7 MG/DL (ref 0.5–1.4)
CREAT UR-MCNC: 26 MG/DL (ref 15–325)
DIFFERENTIAL METHOD BLD: ABNORMAL
DIFFERENTIAL METHOD BLD: ABNORMAL
EOSINOPHIL # BLD AUTO: 0.1 K/UL (ref 0–0.5)
EOSINOPHIL # BLD AUTO: 0.1 K/UL (ref 0–0.5)
EOSINOPHIL NFR BLD: 0.6 % (ref 0–8)
EOSINOPHIL NFR BLD: 1.7 % (ref 0–8)
ERYTHROCYTE [DISTWIDTH] IN BLOOD BY AUTOMATED COUNT: 14.6 % (ref 11.5–14.5)
ERYTHROCYTE [DISTWIDTH] IN BLOOD BY AUTOMATED COUNT: 14.7 % (ref 11.5–14.5)
EST. GFR  (NO RACE VARIABLE): >60 ML/MIN/1.73 M^2
EST. GFR  (NO RACE VARIABLE): >60 ML/MIN/1.73 M^2
ETHANOL SERPL-MCNC: <10 MG/DL
GLUCOSE SERPL-MCNC: 81 MG/DL (ref 70–110)
GLUCOSE SERPL-MCNC: 89 MG/DL (ref 70–110)
GLUCOSE UR QL STRIP: NEGATIVE
HCT VFR BLD AUTO: 42.1 % (ref 37–48.5)
HCT VFR BLD AUTO: 42.2 % (ref 37–48.5)
HGB BLD-MCNC: 13.5 G/DL (ref 12–16)
HGB BLD-MCNC: 13.7 G/DL (ref 12–16)
HGB UR QL STRIP: NEGATIVE
IMM GRANULOCYTES # BLD AUTO: 0.01 K/UL (ref 0–0.04)
IMM GRANULOCYTES # BLD AUTO: 0.04 K/UL (ref 0–0.04)
IMM GRANULOCYTES NFR BLD AUTO: 0.1 % (ref 0–0.5)
IMM GRANULOCYTES NFR BLD AUTO: 0.4 % (ref 0–0.5)
KETONES UR QL STRIP: NEGATIVE
LEUKOCYTE ESTERASE UR QL STRIP: NEGATIVE
LIPASE SERPL-CCNC: 26 U/L (ref 4–60)
LYMPHOCYTES # BLD AUTO: 2.2 K/UL (ref 1–4.8)
LYMPHOCYTES # BLD AUTO: 2.6 K/UL (ref 1–4.8)
LYMPHOCYTES NFR BLD: 22.2 % (ref 18–48)
LYMPHOCYTES NFR BLD: 32.1 % (ref 18–48)
MCH RBC QN AUTO: 31.3 PG (ref 27–31)
MCH RBC QN AUTO: 31.7 PG (ref 27–31)
MCHC RBC AUTO-ENTMCNC: 32.1 G/DL (ref 32–36)
MCHC RBC AUTO-ENTMCNC: 32.5 G/DL (ref 32–36)
MCV RBC AUTO: 98 FL (ref 82–98)
MCV RBC AUTO: 98 FL (ref 82–98)
METHADONE UR QL SCN>300 NG/ML: NEGATIVE
MONOCYTES # BLD AUTO: 0.3 K/UL (ref 0.3–1)
MONOCYTES # BLD AUTO: 0.3 K/UL (ref 0.3–1)
MONOCYTES NFR BLD: 3 % (ref 4–15)
MONOCYTES NFR BLD: 3.6 % (ref 4–15)
NEUTROPHILS # BLD AUTO: 5 K/UL (ref 1.8–7.7)
NEUTROPHILS # BLD AUTO: 7.1 K/UL (ref 1.8–7.7)
NEUTROPHILS NFR BLD: 61.9 % (ref 38–73)
NEUTROPHILS NFR BLD: 73.3 % (ref 38–73)
NITRITE UR QL STRIP: NEGATIVE
NRBC BLD-RTO: 0 /100 WBC
NRBC BLD-RTO: 0 /100 WBC
OHS QRS DURATION: 80 MS
OHS QTC CALCULATION: 479 MS
OPIATES UR QL SCN: ABNORMAL
PCP UR QL SCN>25 NG/ML: NEGATIVE
PH UR STRIP: 6 [PH] (ref 5–8)
PLATELET # BLD AUTO: 372 K/UL (ref 150–450)
PLATELET # BLD AUTO: 401 K/UL (ref 150–450)
PMV BLD AUTO: 9 FL (ref 9.2–12.9)
PMV BLD AUTO: 9 FL (ref 9.2–12.9)
POTASSIUM SERPL-SCNC: 3.9 MMOL/L (ref 3.5–5.1)
POTASSIUM SERPL-SCNC: 4.5 MMOL/L (ref 3.5–5.1)
PROT SERPL-MCNC: 6.2 G/DL (ref 6–8.4)
PROT SERPL-MCNC: 6.4 G/DL (ref 6–8.4)
PROT UR QL STRIP: NEGATIVE
RBC # BLD AUTO: 4.31 M/UL (ref 4–5.4)
RBC # BLD AUTO: 4.32 M/UL (ref 4–5.4)
SALICYLATES SERPL-MCNC: <5 MG/DL (ref 15–30)
SODIUM SERPL-SCNC: 138 MMOL/L (ref 136–145)
SODIUM SERPL-SCNC: 139 MMOL/L (ref 136–145)
SP GR UR STRIP: 1.01 (ref 1–1.03)
TOXICOLOGY INFORMATION: ABNORMAL
TSH SERPL DL<=0.005 MIU/L-ACNC: 0.7 UIU/ML (ref 0.4–4)
URN SPEC COLLECT METH UR: ABNORMAL
WBC # BLD AUTO: 8.1 K/UL (ref 3.9–12.7)
WBC # BLD AUTO: 9.72 K/UL (ref 3.9–12.7)

## 2025-01-08 PROCEDURE — 96375 TX/PRO/DX INJ NEW DRUG ADDON: CPT

## 2025-01-08 PROCEDURE — 80143 DRUG ASSAY ACETAMINOPHEN: CPT | Performed by: PHYSICIAN ASSISTANT

## 2025-01-08 PROCEDURE — 80053 COMPREHEN METABOLIC PANEL: CPT | Mod: 91 | Performed by: PHYSICIAN ASSISTANT

## 2025-01-08 PROCEDURE — 85025 COMPLETE CBC W/AUTO DIFF WBC: CPT | Mod: 91 | Performed by: PHYSICIAN ASSISTANT

## 2025-01-08 PROCEDURE — 93005 ELECTROCARDIOGRAM TRACING: CPT | Performed by: INTERNAL MEDICINE

## 2025-01-08 PROCEDURE — 80053 COMPREHEN METABOLIC PANEL: CPT | Performed by: PHYSICIAN ASSISTANT

## 2025-01-08 PROCEDURE — 93010 ELECTROCARDIOGRAM REPORT: CPT | Mod: ,,, | Performed by: INTERNAL MEDICINE

## 2025-01-08 PROCEDURE — 81003 URINALYSIS AUTO W/O SCOPE: CPT | Mod: 59 | Performed by: PHYSICIAN ASSISTANT

## 2025-01-08 PROCEDURE — 63600175 PHARM REV CODE 636 W HCPCS: Performed by: PHYSICIAN ASSISTANT

## 2025-01-08 PROCEDURE — 80179 DRUG ASSAY SALICYLATE: CPT | Performed by: PHYSICIAN ASSISTANT

## 2025-01-08 PROCEDURE — 80307 DRUG TEST PRSMV CHEM ANLYZR: CPT | Performed by: PHYSICIAN ASSISTANT

## 2025-01-08 PROCEDURE — 84443 ASSAY THYROID STIM HORMONE: CPT | Performed by: PHYSICIAN ASSISTANT

## 2025-01-08 PROCEDURE — 85025 COMPLETE CBC W/AUTO DIFF WBC: CPT | Performed by: PHYSICIAN ASSISTANT

## 2025-01-08 PROCEDURE — 83690 ASSAY OF LIPASE: CPT | Performed by: PHYSICIAN ASSISTANT

## 2025-01-08 PROCEDURE — 82077 ASSAY SPEC XCP UR&BREATH IA: CPT | Performed by: PHYSICIAN ASSISTANT

## 2025-01-08 PROCEDURE — 96374 THER/PROPH/DIAG INJ IV PUSH: CPT

## 2025-01-08 PROCEDURE — 99285 EMERGENCY DEPT VISIT HI MDM: CPT | Mod: 25,27

## 2025-01-08 PROCEDURE — 99284 EMERGENCY DEPT VISIT MOD MDM: CPT | Mod: 25

## 2025-01-08 RX ORDER — HYDROMORPHONE HYDROCHLORIDE 1 MG/ML
0.5 INJECTION, SOLUTION INTRAMUSCULAR; INTRAVENOUS; SUBCUTANEOUS
Status: COMPLETED | OUTPATIENT
Start: 2025-01-08 | End: 2025-01-08

## 2025-01-08 RX ORDER — HYDROMORPHONE HYDROCHLORIDE 1 MG/ML
1 INJECTION, SOLUTION INTRAMUSCULAR; INTRAVENOUS; SUBCUTANEOUS
Status: DISCONTINUED | OUTPATIENT
Start: 2025-01-08 | End: 2025-01-08

## 2025-01-08 RX ORDER — HALOPERIDOL 5 MG/ML
5 INJECTION INTRAMUSCULAR
Status: COMPLETED | OUTPATIENT
Start: 2025-01-08 | End: 2025-01-08

## 2025-01-08 RX ORDER — DIPHENHYDRAMINE HYDROCHLORIDE 50 MG/ML
25 INJECTION INTRAMUSCULAR; INTRAVENOUS
Status: COMPLETED | OUTPATIENT
Start: 2025-01-08 | End: 2025-01-08

## 2025-01-08 RX ORDER — ONDANSETRON HYDROCHLORIDE 2 MG/ML
4 INJECTION, SOLUTION INTRAVENOUS
Status: COMPLETED | OUTPATIENT
Start: 2025-01-08 | End: 2025-01-08

## 2025-01-08 RX ADMIN — DIPHENHYDRAMINE HYDROCHLORIDE 25 MG: 50 INJECTION, SOLUTION INTRAMUSCULAR; INTRAVENOUS at 06:01

## 2025-01-08 RX ADMIN — HALOPERIDOL LACTATE 5 MG: 5 INJECTION, SOLUTION INTRAMUSCULAR at 06:01

## 2025-01-08 RX ADMIN — ONDANSETRON 4 MG: 2 INJECTION INTRAMUSCULAR; INTRAVENOUS at 08:01

## 2025-01-08 RX ADMIN — HYDROMORPHONE HYDROCHLORIDE 0.5 MG: 1 INJECTION, SOLUTION INTRAMUSCULAR; INTRAVENOUS; SUBCUTANEOUS at 08:01

## 2025-01-08 NOTE — ED NOTES
Nurse arrived in room but pt had not returned from bathroom  ED bathrooms checked for patient, ED halls and waiting room were checked for pt but pt was not found
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
## 2025-01-08 NOTE — ED NOTES
Pt. Changed into hospital gown due to medical necessity. Pt. And her belongings wanded and checked by security upon arrival to ED.   Pt. Belongings placed in belongings bag and placed in closet.   - Shirt  - pants  - 1 pair of shoes  - robe  - purse

## 2025-01-08 NOTE — ED NOTES
Pt was walking in andrade asking for directions to bathroom  Pt ambulating in andrade with out difficulty

## 2025-01-08 NOTE — ED PROVIDER NOTES
"Encounter Date: 1/8/2025       History     Chief Complaint   Patient presents with    Abdominal Pain     Pt states surgery for perforated stomach ulcer in 2021 and is complaining of pain to her abdomen and around the surgical site. Pt states frequent pain "attacks" since the surgery. Pt denies N/V/diarrhea      74-year-old female with history of peptic ulcer disease, perforated gastric ulcer, ischemic colon, SBO, prior subtotal colectomy, hypertension, hyperlipidemia, and anxiety presents for severe periumbilical abdominal pain which started several days ago but became severe around 3:00 a.m..  She is not able to describe the quality of the pain but notes that she has had similar "attacks" in the past without clear etiology.  She reports nausea but no vomiting. Denies changes in bowel movements, chest pain, fever or urinary symptoms.      Review of patient's allergies indicates:  No Known Allergies  Past Medical History:   Diagnosis Date    Acute gastric ulcer with perforation     Acute kidney failure with tubular necrosis     GABBI (acute kidney injury)     Anxiety     Diarrhea     HLD (hyperlipidemia)     Hypertension     Nausea & vomiting     Peritonitis     PUD (peptic ulcer disease)     Renal disorder     Urine retention     Vascular disorder of intestine, unspecified      Past Surgical History:   Procedure Laterality Date    APPENDECTOMY      APPLICATION OF WOUND VACUUM-ASSISTED CLOSURE DEVICE  1/21/2021    Procedure: APPLICATION, WOUND VAC;  Surgeon: Abel Francis MD;  Location: [address];  Service: General;;    APPLICATION OF WOUND VACUUM-ASSISTED CLOSURE DEVICE  1/25/2021    Procedure: APPLICATION, WOUND VAC;  Surgeon: Abel Francis MD;  Location: [facility] OR [address] [place] [address];  Service: General;;    CLOSURE OF PERFORATED ULCER OF DUODENUM USING OMENTAL PATCH  1/21/2021    Procedure: CLOSURE, ULCER, PERFORATED, DUODENUM, USING OMENTAL PATCH;  Surgeon: Abel Francis MD;  Location: [facility] OR [address] [place] [address];  " Service: General;;    COLECTOMY, RIGHT  1/21/2021    Procedure: COLECTOMY, RIGHT;  Surgeon: Abel Francis MD;  Location: [facility] OR 2ND FLR;  Service: General;;    cyst removed from neck      ESOPHAGOGASTRODUODENOSCOPY N/A 3/13/2024    Procedure: EGD (ESOPHAGOGASTRODUODENOSCOPY);  Surgeon: Braxton Contreras MD;  Location: [facility] ENDO (2ND FLR);  Service: Endoscopy;  Laterality: N/A;    GASTROSTOMY  1/25/2021    Procedure: GASTROSTOMY;  Surgeon: Abel Francis MD;  Location: [facility] OR 2ND FLR;  Service: General;;    HYSTERECTOMY  1986    ILEOSTOMY CLOSURE  10/13/2021    Procedure: CLOSURE, ILEOSTOMY;  Surgeon: Abel Francis MD;  Location: [facility] OR 2ND FLR;  Service: General;;    LYSIS OF ADHESIONS  10/13/2021    Procedure: LYSIS, ADHESIONS;  Surgeon: Abel Francis MD;  [facility];  Service: General;;    OOPHORECTOMY Bilateral 1989    OPEN REDUCTION AND INTERNAL FIXATION (ORIF) OF LISFRANC INJURY OF FOOT Left 10/11/2019    Procedure: ORIF, LISFRANC INJURY, FOOT;  Surgeon: Ferdinand Stauffer DPM;  Location: [facility] OR 1ST FLR;  Service: Podiatry;  Laterality: Left;    TONSILLECTOMY, ADENOIDECTOMY       Family History   Problem Relation Name Age of Onset    Hypertension Mother       Social History     Tobacco Use    Smoking status: Every Day    Smokeless tobacco: Never   Substance Use Topics    Alcohol use: Not Currently     Comment: socially    Drug use: No     Review of Systems    Physical Exam     Initial Vitals [01/08/25 0653]   BP Pulse Resp Temp SpO2   (!) 158/80 82 18 98.4 °F (36.9 °C) 98 %      MAP       --         Physical Exam    Nursing note and vitals reviewed.  Constitutional: She appears well-developed and well-nourished. She is not diaphoretic. No distress.   HENT:   Head: Normocephalic and atraumatic.   Cardiovascular:  Normal rate, regular rhythm, normal heart sounds and intact distal pulses.     Exam reveals no gallop and no friction rub.       No murmur heard.  Pulmonary/Chest:  Breath sounds normal. No respiratory distress. She has no wheezes. She has no rhonchi. She has no rales. She exhibits no tenderness.   Abdominal: Abdomen is soft. Bowel sounds are normal. She exhibits no distension and no mass. There is abdominal tenderness in the periumbilical area, suprapubic area, left upper quadrant and left lower quadrant. There is no rebound and no guarding.   Musculoskeletal:         General: Normal range of motion.     Neurological: She is alert and oriented to person, place, and time.   Skin: Skin is warm and dry.   Psychiatric: She has a normal mood and affect.         ED Course   Procedures  Labs Reviewed   CBC W/ AUTO DIFFERENTIAL - Abnormal       Result Value    WBC 9.72      RBC 4.32      Hemoglobin 13.7      Hematocrit 42.2      MCV 98      MCH 31.7 (*)     MCHC 32.5      RDW 14.7 (*)     Platelets 401      MPV 9.0 (*)     Immature Granulocytes 0.4      Gran # (ANC) 7.1      Immature Grans (Abs) 0.04      Lymph # 2.2      Mono # 0.3      Eos # 0.1      Baso # 0.05      nRBC 0      Gran % 73.3 (*)     Lymph % 22.2      Mono % 3.0 (*)     Eosinophil % 0.6      Basophil % 0.5      Differential Method Automated     COMPREHENSIVE METABOLIC PANEL - Abnormal    Sodium 139      Potassium 3.9      Chloride 106      CO2 25      Glucose 89      BUN 23      Creatinine 0.7      Calcium 8.9      Total Protein 6.4      Albumin 3.2 (*)     Total Bilirubin 0.2      Alkaline Phosphatase 106      AST 11      ALT 5 (*)     eGFR >60.0      Anion Gap 8     LIPASE    Lipase 26            Imaging Results    None          Medications   ondansetron injection 4 mg (4 mg Intravenous Given 1/8/25 0813)   HYDROmorphone injection 0.5 mg (0.5 mg Intravenous Given 1/8/25 0812)     Medical Decision Making  74-year-old female presenting for severe abdominal pain.  Hypertensive at 158/80 with otherwise normal vitals.  She appears uncomfortable but nontoxic.    Differential diagnosis:  Unclear presentation, concerns  include SBO, pancreatitis, diverticulitis     Will check labs, give analgesics, CT abdomen pelvis reassess.     Patient left the room to go to the restroom and seems to have left the emergency department.  I searched through the emergency department of find her and called her without an answer.  The charge nurse and security were notified.  Unfortunately, we were unable to find her.    Amount and/or Complexity of Data Reviewed  Labs: ordered. Decision-making details documented in ED Course.  Radiology: ordered.    Risk  Prescription drug management.               ED Course as of 01/08/25 1138   Wed Jan 08, 2025   0827  Pain improved with therapy [CC]   0901  Patient not in room.  I checked throughout the emergency department, in the bathroom  and waiting room and am not able to find her.  Charge nurse and security alerted [CC]   0909 I attempted to call the patient at [phone], no answer. VM left [CC]   0927 Searched the ED for the patient, unable to find her.  I suspect that she eloped. [CC]      ED Course User Index  [CC] Jasmine Garcias PA-C                           Clinical Impression:  Final diagnoses:  [R10.33] Periumbilical abdominal pain (Primary)          ED Disposition Condition    Eloped Stable                Jasmine Garcias PA-C  01/08/25 1138

## 2025-01-08 NOTE — ED PROVIDER NOTES
"Encounter Date: 1/8/2025       History     Chief Complaint   Patient presents with    Suicide Attempt     Took bottle of clonazepam 1 mg this morning per ems, bottle of 90 filled on the second of this month.     The history is provided by the patient and medical records. No  was used.     Juan Antonio Rowe is a 74 y.o. female with medical history of PUD, perforated gastric ulcer, ischemic colon, SBO, colectomy, HTN, HLD, Anxiety presenting to the ED with the chief complaint of suicide attempt.     Arrives via EMS from home. Patient was seen in the ED this morning for abdominal pain and eloped. She reports her brother picked her up and she returned home. She estimates to have taken 60 tablets of Lorazepam 1mg around 9am. Patient reports feeling depressed and overwhelmed by taking care of her mother who has cancer. Reports being a psychiatric hospital in the past for depression but denies history of self harm. Denies headache, chest pain, SOB, abdominal pain, vomiting, urinary or bowel movement changes. Reports her abdominal nico from this morning has improved.     I spoke with patient's brother Thierno who reports bringing the patient home from the ED this AM around 9am. He expresses concern she has been taking "feel free" tablets that she purchases from the [employer] that has [employer] in it. Reports she spends all her money on the tablets and he has to buy her groceries.       Review of patient's allergies indicates:  No Known Allergies  Past Medical History:   Diagnosis Date    Acute gastric ulcer with perforation     Acute kidney failure with tubular necrosis     GABBI (acute kidney injury)     Anxiety     Diarrhea     HLD (hyperlipidemia)     Hypertension     Nausea & vomiting     Peritonitis     PUD (peptic ulcer disease)     Renal disorder     Urine retention     Vascular disorder of intestine, unspecified      Past Surgical History:   Procedure Laterality Date    APPENDECTOMY      APPLICATION OF " WOUND VACUUM-ASSISTED CLOSURE DEVICE  1/21/2021    Procedure: APPLICATION, WOUND VAC;  Surgeon: Abel Francis MD;  Location: [facility] OR 2ND FLR;  Service: General;;    APPLICATION OF WOUND VACUUM-ASSISTED CLOSURE DEVICE  1/25/2021    Procedure: APPLICATION, WOUND VAC;  Surgeon: Abel Francis MD;  Location: [facility] OR [facility] FLR;  Service: General;;    CLOSURE OF PERFORATED ULCER OF DUODENUM USING OMENTAL PATCH  1/21/2021    Procedure: CLOSURE, ULCER, PERFORATED, DUODENUM, USING OMENTAL PATCH;  Surgeon: Abel Francis MD;  [facility];  Service: General;;    COLECTOMY, RIGHT  1/21/2021    Procedure: COLECTOMY, RIGHT;  Surgeon: Abel Francis MD;  [facility];  Service: General;;    cyst removed from neck      ESOPHAGOGASTRODUODENOSCOPY N/A 3/13/2024    Procedure: EGD (ESOPHAGOGASTRODUODENOSCOPY);  Surgeon: Braxton Contreras MD;  Location: [facility] (2ND FLR);  Service: Endoscopy;  Laterality: N/A;    GASTROSTOMY  1/25/2021    Procedure: GASTROSTOMY;  Surgeon: Abel Francis MD;  [facility];  Service: General;;    HYSTERECTOMY  1986    ILEOSTOMY CLOSURE  10/13/2021    Procedure: CLOSURE, ILEOSTOMY;  Surgeon: Abel Francis MD;  [facility];  Service: General;;    LYSIS OF ADHESIONS  10/13/2021    Procedure: LYSIS, ADHESIONS;  Surgeon: Abel Francis MD;  [facility];  Service: General;;    OOPHORECTOMY Bilateral 1989    OPEN REDUCTION AND INTERNAL FIXATION (ORIF) OF LISFRANC INJURY OF FOOT Left 10/11/2019    Procedure: ORIF, LISFRANC INJURY, FOOT;  Surgeon: Ferdinand Stauffer DPM;  Location: [facility] OR 1ST FLR;  Service: Podiatry;  Laterality: Left;    TONSILLECTOMY, ADENOIDECTOMY       Family History   Problem Relation Name Age of Onset    Hypertension Mother       Social History     Tobacco Use    Smoking status: Every Day    Smokeless tobacco: Never   Substance Use Topics    Alcohol use: Not Currently     Comment: socially     Drug use: No     Review of Systems   Psychiatric/Behavioral:  Positive for dysphoric mood and suicidal ideas.        Physical Exam     Initial Vitals [01/08/25 1112]   BP Pulse Resp Temp SpO2   (!) 144/78 80 14 97.8 °F (36.6 °C) 98 %      MAP       --         Physical Exam    Constitutional: She appears well-developed and well-nourished. She is not diaphoretic. No distress.   HENT:   Head: Normocephalic and atraumatic. Mouth/Throat: Oropharynx is clear and moist. No oropharyngeal exudate.   Eyes: Conjunctivae and EOM are normal. Pupils are equal, round, and reactive to light. No scleral icterus.   Neck: Neck supple.   Normal range of motion.  Cardiovascular:  Normal rate and regular rhythm.           Pulmonary/Chest: Breath sounds normal. No respiratory distress. She has no wheezes.   Abdominal: Abdomen is soft. She exhibits no distension. There is no abdominal tenderness. There is no rebound.   Musculoskeletal:         General: No tenderness or edema. Normal range of motion.      Cervical back: Normal range of motion and neck supple.     Neurological: She is alert and oriented to person, place, and time. She has normal strength. No sensory deficit.   Skin: Skin is warm and dry. No rash noted. No erythema.   Psychiatric: She has a normal mood and affect. She expresses suicidal ideation. She expresses suicidal plans.   Tearful         ED Course   Procedures  Labs Reviewed   CBC W/ AUTO DIFFERENTIAL - Abnormal       Result Value    WBC 8.10      RBC 4.31      Hemoglobin 13.5      Hematocrit 42.1      MCV 98      MCH 31.3 (*)     MCHC 32.1      RDW 14.6 (*)     Platelets 372      MPV 9.0 (*)     Immature Granulocytes 0.1      Gran # (ANC) 5.0      Immature Grans (Abs) 0.01      Lymph # 2.6      Mono # 0.3      Eos # 0.1      Baso # 0.05      nRBC 0      Gran % 61.9      Lymph % 32.1      Mono % 3.6 (*)     Eosinophil % 1.7      Basophil % 0.6      Differential Method Automated     COMPREHENSIVE METABOLIC PANEL -  Abnormal    Sodium 138      Potassium 4.5      Chloride 105      CO2 24      Glucose 81      BUN 21      Creatinine 0.7      Calcium 8.7      Total Protein 6.2      Albumin 3.2 (*)     Total Bilirubin 0.2      Alkaline Phosphatase 102      AST 16      ALT 7 (*)     eGFR >60.0      Anion Gap 9     URINALYSIS, REFLEX TO URINE CULTURE - Abnormal    Specimen UA Urine, Clean Catch      Color, UA Colorless (*)     Appearance, UA Clear      pH, UA 6.0      Specific Gravity, UA 1.010      Protein, UA Negative      Glucose, UA Negative      Ketones, UA Negative      Bilirubin (UA) Negative      Occult Blood UA Negative      Nitrite, UA Negative      Leukocytes, UA Negative      Narrative:     Specimen Source->Urine   DRUG SCREEN PANEL, URINE EMERGENCY - Abnormal    Benzodiazepines Presumptive Positive (*)     Methadone metabolites Negative      Cocaine (Metab.) Negative      Opiate Scrn, Ur Presumptive Positive (*)     Barbiturate Screen, Ur Negative      Amphetamine Screen, Ur Negative      THC Negative      Phencyclidine Negative      Creatinine, Urine 26.0      Toxicology Information SEE COMMENT      Narrative:     Specimen Source->Urine   ACETAMINOPHEN LEVEL - Abnormal    Acetaminophen (Tylenol), Serum <3.0 (*)    SALICYLATE LEVEL - Abnormal    Salicylate Lvl <5.0 (*)    TSH    TSH 0.703     ALCOHOL,MEDICAL (ETHANOL)    Alcohol, Serum <10          ECG Results              EKG 12-lead (Final result)        Collection Time Result Time QRS Duration OHS QTC Calculation    01/08/25 12:17:12 01/08/25 13:02:39 80 479                     Final result by Interface, Lab In [facility] (01/08/25 13:02:47)                   Narrative:    Test Reason : R45.851,    Vent. Rate :  70 BPM     Atrial Rate :  70 BPM     P-R Int : 180 ms          QRS Dur :  80 ms      QT Int : 444 ms       P-R-T Axes :  81  36  50 degrees    QTcB Int : 479 ms    Normal sinus rhythm  Minimal voltage criteria for LVH, may be normal variant  Nonspecific T wave  abnormality  Anteroseptal infarct (cited on or before 13-Jan-2024)  Abnormal ECG  When compared with ECG of 13-Mar-2024 03:20,  Questionable change in initial forces of Septal leads  Nonspecific T wave abnormality now evident in Inferior leads  Nonspecific T wave abnormality, worse in Lateral leads  Confirmed by Fantasma Mera ([id number]) on 1/8/2025 1:02:39 PM    Referred By:            Confirmed By: Fantasma Mera                                  Imaging Results    None          Medications   haloperidol lactate injection 5 mg (5 mg Intravenous Given 1/8/25 1804)   diphenhydrAMINE injection 25 mg (25 mg Intravenous Given 1/8/25 1804)     Medical Decision Making  74 y.o. female with medical history of PUD, perforated gastric ulcer, ischemic colon, SBO, colectomy, HTN, HLD, Anxiety presenting to the ED via EMS with the chief complaint of suicide attempt. Reports taking 60 tablets of 1mg Lorazepam around 9am.     Patient AAOx3 on my exam. She is asking for a blanket and asking her sitter what book she is reading. POx is 100% on RA. No lethargy, hypotension, or AMS. Do not feel emergent reversal is indicated at this time. Will continue to monitor. PEC placed for suicidal ideations. Will obtain psychiatric medical clearance labs.     Amount and/or Complexity of Data Reviewed  External Data Reviewed: labs and notes.  Labs: ordered. Decision-making details documented in ED Course.    Risk  Prescription drug management.              Attending Attestation:     Physician Attestation Statement for NP/PA:   I personally made/approved the management plan and take responsibility for the patient management.    Other NP/PA Attestation Additions:    History of Present Illness: Patient took overdose of benzos   Physical Exam: Patient is sleepy but easily arousable and conversant   Medical Decision Making: Patient placed under pec.  She is medically clear and stable for psych.  Did require Haldol for sedation later     Attending Critical  Care:   Critical Care Times:   Direct Patient Care (initial evaluation, reassessments, and time considering the case)................................................................26 minutes.   Additional History from reviewing old medical records or taking additional history from the family, EMS, PCP, etc.......................4 minutes.   Ordering, Reviewing, and Interpreting Diagnostic Studies...............................................................................................................4 minutes.   ==============================================================  Total Critical Care Time - exclusive of procedural time: 34 minutes.  ==============================================================          ED Course as of 01/08/25 2119 Wed Jan 08, 2025   5510 Patient continued to be stable after >4 hour observation period in the ED. No intervention needed. Continues to be conversational on exam. Laboratory work reviewed. UDS +Benzo and Opiates. UA negative for UTI. Patient is medically cleared for placement. I have discussed the care of this patient with my supervising physician.  [BA]      ED Course User Index  [BA] Dwight Sanchez, RIA       Medically cleared for psychiatry placement: 1/8/2025  3:58 PM                   Clinical Impression:  Final diagnoses:  [R45.851] Suicidal ideation  [T14.91XA] Suicide attempt (Primary)  [F11.90] Opiate use          ED Disposition Condition    Transfer to Psych Facility Stable          ED Prescriptions    None       Follow-up Information    None          Dwight Sanchez PA-C  01/08/25 [id number]       Boris Hansen MD  01/08/25 0321

## 2025-01-09 PROBLEM — H91.90 HARD OF HEARING: Status: ACTIVE | Noted: 2025-01-09

## 2025-01-09 PROBLEM — Z13.9 ENCOUNTER FOR MEDICAL SCREENING EXAMINATION: Status: ACTIVE | Noted: 2025-01-09

## 2025-01-09 PROBLEM — R63.8 INADEQUATE ORAL INTAKE: Status: ACTIVE | Noted: 2025-01-09

## 2025-01-09 PROBLEM — K27.9 PUD (PEPTIC ULCER DISEASE): Status: ACTIVE | Noted: 2025-01-09

## 2025-01-09 NOTE — ED NOTES
"Patient very upset about being transferred for a psych eval vs being discharged home. Patient screaming "I just want to die" repeatedly. Provider notified.   "

## 2025-03-25 ENCOUNTER — HOSPITAL ENCOUNTER (INPATIENT)
Facility: HOSPITAL | Age: 75
LOS: 5 days | Discharge: HOME | DRG: 641 | End: 2025-03-30
Attending: EMERGENCY MEDICINE | Admitting: INTERNAL MEDICINE
Payer: MEDICARE

## 2025-03-25 DIAGNOSIS — E87.1 HYPONATREMIA: Primary | ICD-10-CM

## 2025-03-25 DIAGNOSIS — R94.31 QT PROLONGATION: ICD-10-CM

## 2025-03-25 DIAGNOSIS — F13.930 BENZODIAZEPINE WITHDRAWAL WITHOUT COMPLICATION: ICD-10-CM

## 2025-03-25 DIAGNOSIS — Z51.81 MEDICATION MONITORING ENCOUNTER: ICD-10-CM

## 2025-03-25 DIAGNOSIS — R41.82 AMS (ALTERED MENTAL STATUS): ICD-10-CM

## 2025-03-25 PROBLEM — D72.829 LEUKOCYTOSIS: Status: ACTIVE | Noted: 2025-03-25

## 2025-03-25 PROBLEM — F19.90 SUBSTANCE USE DISORDER: Status: ACTIVE | Noted: 2025-03-25

## 2025-03-25 PROBLEM — F33.2 SEVERE EPISODE OF RECURRENT MAJOR DEPRESSIVE DISORDER: Status: ACTIVE | Noted: 2025-03-25

## 2025-03-25 PROBLEM — F13.939 BENZODIAZEPINE WITHDRAWAL: Status: ACTIVE | Noted: 2025-03-25

## 2025-03-25 LAB
ABSOLUTE EOSINOPHIL (OHS): 0.01 K/UL
ABSOLUTE EOSINOPHIL (OHS): 0.03 K/UL
ABSOLUTE MONOCYTE (OHS): 1.01 K/UL (ref 0.3–1)
ABSOLUTE MONOCYTE (OHS): 1.41 K/UL (ref 0.3–1)
ABSOLUTE NEUTROPHIL COUNT (OHS): 11.41 K/UL (ref 1.8–7.7)
ABSOLUTE NEUTROPHIL COUNT (OHS): 18.4 K/UL (ref 1.8–7.7)
ALBUMIN SERPL BCP-MCNC: 3.7 G/DL (ref 3.5–5.2)
ALP SERPL-CCNC: 109 UNIT/L (ref 40–150)
ALT SERPL W/O P-5'-P-CCNC: 12 UNIT/L (ref 10–44)
AMPHET UR QL SCN: NEGATIVE
ANION GAP (OHS): 10 MMOL/L (ref 8–16)
ANION GAP (OHS): 12 MMOL/L (ref 8–16)
ANION GAP (OHS): 13 MMOL/L (ref 8–16)
ANION GAP (OHS): 16 MMOL/L (ref 8–16)
ANION GAP (OHS): ABNORMAL
APAP SERPL-MCNC: 9.8 UG/ML (ref 10–20)
AST SERPL-CCNC: 36 UNIT/L (ref 11–45)
BACTERIA #/AREA URNS AUTO: ABNORMAL /HPF
BARBITURATE SCN PRESENT UR: NEGATIVE
BASOPHILS # BLD AUTO: 0.02 K/UL
BASOPHILS # BLD AUTO: 0.04 K/UL
BASOPHILS NFR BLD AUTO: 0.1 %
BASOPHILS NFR BLD AUTO: 0.2 %
BENZODIAZ UR QL SCN: ABNORMAL
BILIRUB SERPL-MCNC: 0.9 MG/DL (ref 0.1–1)
BILIRUB UR QL STRIP.AUTO: NEGATIVE
BIPAP: 0
BUN SERPL-MCNC: 19 MG/DL (ref 8–23)
BUN SERPL-MCNC: 19 MG/DL (ref 8–23)
BUN SERPL-MCNC: 21 MG/DL (ref 8–23)
BUN SERPL-MCNC: 22 MG/DL (ref 8–23)
BUN SERPL-MCNC: 24 MG/DL (ref 8–23)
CALCIUM SERPL-MCNC: 8.3 MG/DL (ref 8.7–10.5)
CALCIUM SERPL-MCNC: 8.4 MG/DL (ref 8.7–10.5)
CALCIUM SERPL-MCNC: 9.2 MG/DL (ref 8.7–10.5)
CANNABINOIDS UR QL SCN: ABNORMAL
CHLORIDE SERPL-SCNC: 75 MMOL/L (ref 95–110)
CHLORIDE SERPL-SCNC: 83 MMOL/L (ref 95–110)
CHLORIDE SERPL-SCNC: 89 MMOL/L (ref 95–110)
CHLORIDE SERPL-SCNC: 89 MMOL/L (ref 95–110)
CHLORIDE SERPL-SCNC: <70 MMOL/L (ref 95–110)
CHLORIDE UR-SCNC: 29 MMOL/L (ref 25–200)
CLARITY UR: CLEAR
CO2 SERPL-SCNC: 21 MMOL/L (ref 23–29)
CO2 SERPL-SCNC: 22 MMOL/L (ref 23–29)
CO2 SERPL-SCNC: 24 MMOL/L (ref 23–29)
COCAINE UR QL SCN: NEGATIVE
COLOR UR AUTO: YELLOW
CORRECTED TEMPERATURE (PCO2): 38.5 MMHG
CORRECTED TEMPERATURE (PH): 7.51
CORRECTED TEMPERATURE (PO2): 37.7 MMHG
CORTIS SERPL-MCNC: 46.3 UG/DL (ref 4.3–22.4)
CREAT SERPL-MCNC: 0.7 MG/DL (ref 0.5–1.4)
CREAT SERPL-MCNC: 0.8 MG/DL (ref 0.5–1.4)
CREAT SERPL-MCNC: 1.1 MG/DL (ref 0.5–1.4)
CREAT UR-MCNC: 48 MG/DL (ref 15–325)
ERYTHROCYTE [DISTWIDTH] IN BLOOD BY AUTOMATED COUNT: 14.6 % (ref 11.5–14.5)
ERYTHROCYTE [DISTWIDTH] IN BLOOD BY AUTOMATED COUNT: 14.7 % (ref 11.5–14.5)
ETHANOL UR-MCNC: <10 MG/DL
FIO2: 21 %
GFR SERPLBLD CREATININE-BSD FMLA CKD-EPI: 53 ML/MIN/1.73/M2
GFR SERPLBLD CREATININE-BSD FMLA CKD-EPI: >60 ML/MIN/1.73/M2
GLUCOSE SERPL-MCNC: 100 MG/DL (ref 70–110)
GLUCOSE SERPL-MCNC: 75 MG/DL (ref 70–110)
GLUCOSE SERPL-MCNC: 83 MG/DL (ref 70–110)
GLUCOSE SERPL-MCNC: 89 MG/DL (ref 70–110)
GLUCOSE SERPL-MCNC: 90 MG/DL (ref 70–110)
GLUCOSE UR QL STRIP: NEGATIVE
HCT VFR BLD AUTO: 39.9 % (ref 37–48.5)
HCT VFR BLD AUTO: 41.5 % (ref 37–48.5)
HCT VFR BLD CALC: 45.1 %
HGB BLD-MCNC: 14.4 GM/DL (ref 12–16)
HGB BLD-MCNC: 15.2 GM/DL (ref 12–16)
HGB UR QL STRIP: ABNORMAL
HYALINE CASTS UR QL AUTO: 6 /LPF (ref 0–1)
IMM GRANULOCYTES # BLD AUTO: 0.1 K/UL (ref 0–0.04)
IMM GRANULOCYTES # BLD AUTO: 0.19 K/UL (ref 0–0.04)
IMM GRANULOCYTES NFR BLD AUTO: 0.7 % (ref 0–0.5)
IMM GRANULOCYTES NFR BLD AUTO: 0.9 % (ref 0–0.5)
KETONES UR QL STRIP: NEGATIVE
LACTATE SERPL-SCNC: 1.3 MMOL/L (ref 0.5–2.2)
LEUKOCYTE ESTERASE UR QL STRIP: NEGATIVE
LYMPHOCYTES # BLD AUTO: 1.82 K/UL (ref 1–4.8)
LYMPHOCYTES # BLD AUTO: 2.11 K/UL (ref 1–4.8)
MAGNESIUM SERPL-MCNC: 2 MG/DL (ref 1.6–2.6)
MCH RBC QN AUTO: 32 PG (ref 27–50)
MCH RBC QN AUTO: 32.4 PG (ref 27–50)
MCHC RBC AUTO-ENTMCNC: 36.1 G/DL (ref 32–36)
MCHC RBC AUTO-ENTMCNC: 36.6 G/DL (ref 32–36)
MCV RBC AUTO: 87 FL (ref 82–98)
MCV RBC AUTO: 90 FL (ref 82–98)
METHADONE UR QL SCN: NEGATIVE
MICROSCOPIC COMMENT: ABNORMAL
NITRITE UR QL STRIP: NEGATIVE
NUCLEATED RBC (/100WBC) (OHS): 0 /100 WBC
NUCLEATED RBC (/100WBC) (OHS): 0 /100 WBC
OHS QRS DURATION: 94 MS
OHS QTC CALCULATION: 500 MS
OPIATES UR QL SCN: NEGATIVE
OSMOLALITY SERPL: 245 MOSM/KG (ref 275–295)
OSMOLALITY UR: 307 MOSM/KG (ref 50–1200)
PCO2 BLDA: 38.5 MMHG
PCP UR QL: NEGATIVE
PH SMN: 7.51 [PH]
PH UR STRIP: 7 [PH]
PLATELET # BLD AUTO: 354 K/UL (ref 150–450)
PLATELET # BLD AUTO: 544 K/UL (ref 150–450)
PMV BLD AUTO: 8.7 FL (ref 9.2–12.9)
PMV BLD AUTO: 8.8 FL (ref 9.2–12.9)
PO2 BLDA: 37.7 MMHG
POC BASE DEFICIT: 7.6 MMOL/L
POC HCO3: 30.8 MMOL/L
POC IONIZED CALCIUM: 0.92 MMOL/L (ref 1.06–1.42)
POC PERFORMED BY: ABNORMAL
POC TEMPERATURE: 37 C
POTASSIUM BLD-SCNC: 4.2 MMOL/L (ref 3.5–5.1)
POTASSIUM SERPL-SCNC: 2.6 MMOL/L (ref 3.5–5.1)
POTASSIUM SERPL-SCNC: 3 MMOL/L (ref 3.5–5.1)
POTASSIUM SERPL-SCNC: 3.3 MMOL/L (ref 3.5–5.1)
POTASSIUM SERPL-SCNC: 3.4 MMOL/L (ref 3.5–5.1)
POTASSIUM SERPL-SCNC: 3.6 MMOL/L (ref 3.5–5.1)
PROT SERPL-MCNC: 7 GM/DL (ref 6–8.4)
PROT UR QL STRIP: ABNORMAL
RBC # BLD AUTO: 4.44 M/UL (ref 4–5.4)
RBC # BLD AUTO: 4.75 M/UL (ref 4–5.4)
RBC #/AREA URNS AUTO: 2 /HPF (ref 0–4)
RELATIVE EOSINOPHIL (OHS): 0 %
RELATIVE EOSINOPHIL (OHS): 0.2 %
RELATIVE LYMPHOCYTE (OHS): 12.6 % (ref 18–48)
RELATIVE LYMPHOCYTE (OHS): 9.5 % (ref 18–48)
RELATIVE MONOCYTE (OHS): 6.4 % (ref 4–15)
RELATIVE MONOCYTE (OHS): 7 % (ref 4–15)
RELATIVE NEUTROPHIL (OHS): 79.4 % (ref 38–73)
RELATIVE NEUTROPHIL (OHS): 83 % (ref 38–73)
SODIUM BLD-SCNC: 106 MMOL/L (ref 136–145)
SODIUM SERPL-SCNC: 110 MMOL/L (ref 136–145)
SODIUM SERPL-SCNC: 111 MMOL/L (ref 136–145)
SODIUM SERPL-SCNC: 120 MMOL/L (ref 136–145)
SODIUM SERPL-SCNC: 123 MMOL/L (ref 136–145)
SODIUM SERPL-SCNC: 123 MMOL/L (ref 136–145)
SODIUM UR-SCNC: 19 MMOL/L (ref 20–250)
SP GR UR STRIP: 1.01
SPECIMEN SOURCE: ABNORMAL
UROBILINOGEN UR STRIP-ACNC: NEGATIVE EU/DL
WBC # BLD AUTO: 14.39 K/UL (ref 3.9–12.7)
WBC # BLD AUTO: 22.16 K/UL (ref 3.9–12.7)
WBC #/AREA URNS AUTO: 1 /HPF (ref 0–5)

## 2025-03-25 PROCEDURE — 36415 COLL VENOUS BLD VENIPUNCTURE: CPT

## 2025-03-25 PROCEDURE — 87040 BLOOD CULTURE FOR BACTERIA: CPT

## 2025-03-25 PROCEDURE — 99900035 HC TECH TIME PER 15 MIN (STAT)

## 2025-03-25 PROCEDURE — 63600175 PHARM REV CODE 636 W HCPCS

## 2025-03-25 PROCEDURE — 99291 CRITICAL CARE FIRST HOUR: CPT | Mod: ,,, | Performed by: INTERNAL MEDICINE

## 2025-03-25 PROCEDURE — 85025 COMPLETE CBC W/AUTO DIFF WBC: CPT

## 2025-03-25 PROCEDURE — 20000000 HC ICU ROOM

## 2025-03-25 PROCEDURE — 93010 ELECTROCARDIOGRAM REPORT: CPT | Mod: ,,, | Performed by: INTERNAL MEDICINE

## 2025-03-25 PROCEDURE — 83930 ASSAY OF BLOOD OSMOLALITY: CPT

## 2025-03-25 PROCEDURE — 94761 N-INVAS EAR/PLS OXIMETRY MLT: CPT | Mod: XB

## 2025-03-25 PROCEDURE — 25000003 PHARM REV CODE 250

## 2025-03-25 PROCEDURE — 51702 INSERT TEMP BLADDER CATH: CPT

## 2025-03-25 PROCEDURE — 84300 ASSAY OF URINE SODIUM: CPT

## 2025-03-25 PROCEDURE — 96375 TX/PRO/DX INJ NEW DRUG ADDON: CPT

## 2025-03-25 PROCEDURE — 83735 ASSAY OF MAGNESIUM: CPT | Performed by: EMERGENCY MEDICINE

## 2025-03-25 PROCEDURE — 80143 DRUG ASSAY ACETAMINOPHEN: CPT

## 2025-03-25 PROCEDURE — 99285 EMERGENCY DEPT VISIT HI MDM: CPT | Mod: 25

## 2025-03-25 PROCEDURE — 81001 URINALYSIS AUTO W/SCOPE: CPT | Mod: XB | Performed by: EMERGENCY MEDICINE

## 2025-03-25 PROCEDURE — 82374 ASSAY BLOOD CARBON DIOXIDE: CPT

## 2025-03-25 PROCEDURE — 63700000 PHARM REV CODE 250 ALT 637 W/O HCPCS

## 2025-03-25 PROCEDURE — 82436 ASSAY OF URINE CHLORIDE: CPT

## 2025-03-25 PROCEDURE — 93005 ELECTROCARDIOGRAM TRACING: CPT

## 2025-03-25 PROCEDURE — 80053 COMPREHEN METABOLIC PANEL: CPT

## 2025-03-25 PROCEDURE — 82803 BLOOD GASES ANY COMBINATION: CPT

## 2025-03-25 PROCEDURE — 83605 ASSAY OF LACTIC ACID: CPT | Performed by: INTERNAL MEDICINE

## 2025-03-25 PROCEDURE — 80321 ALCOHOLS BIOMARKERS 1OR 2: CPT

## 2025-03-25 PROCEDURE — 96361 HYDRATE IV INFUSION ADD-ON: CPT

## 2025-03-25 PROCEDURE — 99222 1ST HOSP IP/OBS MODERATE 55: CPT | Mod: ,,, | Performed by: PSYCHIATRY & NEUROLOGY

## 2025-03-25 PROCEDURE — 80307 DRUG TEST PRSMV CHEM ANLYZR: CPT

## 2025-03-25 PROCEDURE — 83935 ASSAY OF URINE OSMOLALITY: CPT

## 2025-03-25 PROCEDURE — 63600175 PHARM REV CODE 636 W HCPCS: Performed by: EMERGENCY MEDICINE

## 2025-03-25 PROCEDURE — 82533 TOTAL CORTISOL: CPT

## 2025-03-25 PROCEDURE — 96374 THER/PROPH/DIAG INJ IV PUSH: CPT

## 2025-03-25 RX ORDER — CYANOCOBALAMIN 1000 UG/ML
1000 INJECTION, SOLUTION INTRAMUSCULAR; SUBCUTANEOUS ONCE
Status: COMPLETED | OUTPATIENT
Start: 2025-03-25 | End: 2025-03-25

## 2025-03-25 RX ORDER — POTASSIUM CHLORIDE 20 MEQ/1
40 TABLET, EXTENDED RELEASE ORAL ONCE
Status: COMPLETED | OUTPATIENT
Start: 2025-03-25 | End: 2025-03-25

## 2025-03-25 RX ORDER — CHLORDIAZEPOXIDE HYDROCHLORIDE 25 MG/1
25 CAPSULE, GELATIN COATED ORAL
Status: COMPLETED | OUTPATIENT
Start: 2025-03-28 | End: 2025-03-29

## 2025-03-25 RX ORDER — DIAZEPAM 10 MG/2ML
5 INJECTION INTRAMUSCULAR
Status: COMPLETED | OUTPATIENT
Start: 2025-03-25 | End: 2025-03-25

## 2025-03-25 RX ORDER — MAGNESIUM SULFATE HEPTAHYDRATE 40 MG/ML
2 INJECTION, SOLUTION INTRAVENOUS ONCE
Status: COMPLETED | OUTPATIENT
Start: 2025-03-25 | End: 2025-03-25

## 2025-03-25 RX ORDER — CHLORDIAZEPOXIDE HYDROCHLORIDE 25 MG/1
25 CAPSULE, GELATIN COATED ORAL
Status: DISPENSED | OUTPATIENT
Start: 2025-03-27 | End: 2025-03-28

## 2025-03-25 RX ORDER — CHLORDIAZEPOXIDE HYDROCHLORIDE 25 MG/1
25 CAPSULE, GELATIN COATED ORAL
Status: COMPLETED | OUTPATIENT
Start: 2025-03-29 | End: 2025-03-30

## 2025-03-25 RX ORDER — CHLORDIAZEPOXIDE HYDROCHLORIDE 25 MG/1
25 CAPSULE, GELATIN COATED ORAL
Status: COMPLETED | OUTPATIENT
Start: 2025-03-30 | End: 2025-03-30

## 2025-03-25 RX ORDER — POTASSIUM CHLORIDE 7.45 MG/ML
10 INJECTION INTRAVENOUS ONCE
Status: COMPLETED | OUTPATIENT
Start: 2025-03-25 | End: 2025-03-25

## 2025-03-25 RX ORDER — DIAZEPAM 10 MG/2ML
2.5 INJECTION INTRAMUSCULAR
Status: COMPLETED | OUTPATIENT
Start: 2025-03-25 | End: 2025-03-25

## 2025-03-25 RX ORDER — SODIUM CHLORIDE 0.9 % (FLUSH) 0.9 %
10 SYRINGE (ML) INJECTION
Status: DISCONTINUED | OUTPATIENT
Start: 2025-03-25 | End: 2025-03-30 | Stop reason: HOSPADM

## 2025-03-25 RX ORDER — CYANOCOBALAMIN (VITAMIN B-12) 250 MCG
250 TABLET ORAL DAILY
Status: DISCONTINUED | OUTPATIENT
Start: 2025-03-26 | End: 2025-03-30 | Stop reason: HOSPADM

## 2025-03-25 RX ORDER — FOLIC ACID 1 MG/1
1 TABLET ORAL DAILY
Status: DISCONTINUED | OUTPATIENT
Start: 2025-03-26 | End: 2025-03-30 | Stop reason: HOSPADM

## 2025-03-25 RX ORDER — AZITHROMYCIN 250 MG/1
500 TABLET, FILM COATED ORAL DAILY
Status: COMPLETED | OUTPATIENT
Start: 2025-03-25 | End: 2025-03-27

## 2025-03-25 RX ORDER — THIAMINE HCL 100 MG
100 TABLET ORAL DAILY
Status: DISCONTINUED | OUTPATIENT
Start: 2025-03-25 | End: 2025-03-30 | Stop reason: HOSPADM

## 2025-03-25 RX ORDER — CHLORDIAZEPOXIDE HYDROCHLORIDE 25 MG/1
50 CAPSULE, GELATIN COATED ORAL
Status: COMPLETED | OUTPATIENT
Start: 2025-03-25 | End: 2025-03-26

## 2025-03-25 RX ORDER — LORAZEPAM 2 MG/ML
2 INJECTION INTRAMUSCULAR EVERY 4 HOURS PRN
Status: DISCONTINUED | OUTPATIENT
Start: 2025-03-25 | End: 2025-03-26

## 2025-03-25 RX ORDER — CHLORDIAZEPOXIDE HYDROCHLORIDE 25 MG/1
100 CAPSULE, GELATIN COATED ORAL ONCE
Status: COMPLETED | OUTPATIENT
Start: 2025-03-25 | End: 2025-03-25

## 2025-03-25 RX ORDER — CHLORDIAZEPOXIDE HYDROCHLORIDE 25 MG/1
50 CAPSULE, GELATIN COATED ORAL
Status: COMPLETED | OUTPATIENT
Start: 2025-03-26 | End: 2025-03-27

## 2025-03-25 RX ORDER — ENOXAPARIN SODIUM 100 MG/ML
40 INJECTION SUBCUTANEOUS EVERY 24 HOURS
Status: DISCONTINUED | OUTPATIENT
Start: 2025-03-25 | End: 2025-03-30 | Stop reason: HOSPADM

## 2025-03-25 RX ORDER — CLONAZEPAM 1 MG/1
1 TABLET ORAL 2 TIMES DAILY PRN
Status: ON HOLD | COMMUNITY
End: 2025-03-30 | Stop reason: HOSPADM

## 2025-03-25 RX ORDER — TRAZODONE HYDROCHLORIDE 100 MG/1
100 TABLET ORAL NIGHTLY
Status: ON HOLD | COMMUNITY
End: 2025-03-30 | Stop reason: HOSPADM

## 2025-03-25 RX ORDER — CEFTRIAXONE 2 G/1
2 INJECTION, POWDER, FOR SOLUTION INTRAMUSCULAR; INTRAVENOUS
Status: COMPLETED | OUTPATIENT
Start: 2025-03-25 | End: 2025-03-27

## 2025-03-25 RX ADMIN — FOLIC ACID 1 MG: 5 INJECTION, SOLUTION INTRAMUSCULAR; INTRAVENOUS; SUBCUTANEOUS at 06:03

## 2025-03-25 RX ADMIN — LORAZEPAM 2 MG: 2 INJECTION INTRAMUSCULAR; INTRAVENOUS at 07:03

## 2025-03-25 RX ADMIN — SODIUM CHLORIDE 500 ML: 9 INJECTION, SOLUTION INTRAVENOUS at 09:03

## 2025-03-25 RX ADMIN — MAGNESIUM SULFATE HEPTAHYDRATE 2 G: 40 INJECTION, SOLUTION INTRAVENOUS at 09:03

## 2025-03-25 RX ADMIN — CHLORDIAZEPOXIDE HYDROCHLORIDE 100 MG: 25 CAPSULE ORAL at 06:03

## 2025-03-25 RX ADMIN — CHLORDIAZEPOXIDE HYDROCHLORIDE 50 MG: 25 CAPSULE ORAL at 11:03

## 2025-03-25 RX ADMIN — POTASSIUM CHLORIDE 10 MEQ: 7.46 INJECTION, SOLUTION INTRAVENOUS at 09:03

## 2025-03-25 RX ADMIN — DIAZEPAM 5 MG: 10 INJECTION, SOLUTION INTRAMUSCULAR; INTRAVENOUS at 03:03

## 2025-03-25 RX ADMIN — CYANOCOBALAMIN 1000 MCG: 1000 INJECTION INTRAMUSCULAR; SUBCUTANEOUS at 10:03

## 2025-03-25 RX ADMIN — AZITHROMYCIN DIHYDRATE 500 MG: 250 TABLET ORAL at 11:03

## 2025-03-25 RX ADMIN — SODIUM CHLORIDE 1000 ML: 9 INJECTION, SOLUTION INTRAVENOUS at 11:03

## 2025-03-25 RX ADMIN — DIAZEPAM 2.5 MG: 10 INJECTION, SOLUTION INTRAMUSCULAR; INTRAVENOUS at 05:03

## 2025-03-25 RX ADMIN — SODIUM CHLORIDE 500 ML: 9 INJECTION, SOLUTION INTRAVENOUS at 03:03

## 2025-03-25 RX ADMIN — POTASSIUM BICARBONATE 40 MEQ: 391 TABLET, EFFERVESCENT ORAL at 07:03

## 2025-03-25 RX ADMIN — POTASSIUM CHLORIDE 40 MEQ: 1500 TABLET, EXTENDED RELEASE ORAL at 07:03

## 2025-03-25 RX ADMIN — CHLORDIAZEPOXIDE HYDROCHLORIDE 50 MG: 25 CAPSULE ORAL at 05:03

## 2025-03-25 RX ADMIN — LORAZEPAM 2 MG: 2 INJECTION INTRAMUSCULAR; INTRAVENOUS at 11:03

## 2025-03-25 RX ADMIN — POTASSIUM BICARBONATE 40 MEQ: 391 TABLET, EFFERVESCENT ORAL at 10:03

## 2025-03-25 RX ADMIN — CEFTRIAXONE SODIUM 2 G: 2 INJECTION, POWDER, FOR SOLUTION INTRAMUSCULAR; INTRAVENOUS at 11:03

## 2025-03-25 RX ADMIN — ENOXAPARIN SODIUM 40 MG: 40 INJECTION SUBCUTANEOUS at 04:03

## 2025-03-25 NOTE — Clinical Note
"Suhas Bautista MD  Resident  Psychiatry     Consults  Incomplete     Creation Time: 2/10/2025  8:16 AM     Incomplete         INITIAL VISIT: ADDICTION PSYCHIATRY CONSULTATION SERVICE        ASSESSMENT AND PLAN:      HPI: Ms. Juan Antonio Rowe is a 73 yo F with PMH PUD, chronic benzodiazepine use, anxiety, depression, and prior suicide attempts who presented to ED today for concerns of benzodiazepine withdrawal. Psychiatry was consulted for recommendations with withdrawal management.      On initial interview, pt was lying in bed in acute distress. She was friendly, cooperative, and attentive to conversation. She says she's "not feeling great" and says she currently feels severe anxiety and uneasiness. She says she takes Klonopin TID (prescribed BID) and ran out on Thursday. Since then, she has been experiencing insomnia, inability to concentrate and increasing anxiety. In the middle of the interview      REVIEW OF SYSTEMS:  I[]I Patient denies any pertinent ROS, and none is known.  I[]I Patient unable or unwilling to provide any ROS.     [x] Y  [] N  sleep disturbance: ** {Globally:45477:::1} Positive for: insomnia  [x] Y  [] N  appetite/weight change: ** {Globally:26361:::1} Positive for: decreased appetite  [x] Y  [] N  fatigue/anergia: ** {Globally:06720:::1} {Positive for:64703:::1}  [x] Y  [] N  impairment in focus/concentration: ** {Globally:09287:::1} Positive for: diminished ability to think or concentrate      [x] Y  [] N  depression: ** {Globally:40584:::1} Positive for: depressed mood {Negative for:86560:::1}  [x] Y  [] N  anxiety/worry: ** {Globally:07708:::1} Positive for: more anxiety than the average person {Negative for:89285:::1}  [] Y  [x] N  dysregulated mood/behavior: ** {Globally:28470:::1} {Positive for:57354:::1} {Negative for:10714:::1}  [] Y  [x] N  manic symptomatology: ** {Globally:52291:::1} {Positive for:20802:::1} {Negative for:86506:::1}  [] Y  [x] N  " "psychosis: ** {Globally:55324:::1} {Positive for:08713:::1} {Negative for:99156:::1}  {Pertinent +/-:45887::" "} {Positive for:43939:::1} {Negative for:05281:::1}     A pertinent medical review of systems was performed with the following notable findings: ***     CURRENT PSYCHOTROPIC REGIMEN:  I[x]I Y  I[]I N  I[]I U    ***        ADDICTION:      I[]I Y  I[x]I N  I[]I U  I[]I Current  I[]I Former  Nicotine Use:   I[x]I Y  I[]I N  I[]I U  I[]I Current  I[]I Former  Alcohol Use: 2-3 beers/2-3x weekly  I[]I Y  I[x]I N  I[]I U  I[]I Current  I[]I Former  Alcohol Misuse/Abuse:   I[]I Y  I[x]I N  I[]I U  I[]I Current  I[]I Former  Illicit Drug Use/Misuse/Abuse:   I[x]I Y  I[]I N  I[]I U  I[]I Current  I[]I Former  Misuse/Abuse of Rx Medications:   I[]I Cannabis  I[]I Cocaine  I[]I Heroin  I[]I Meth  I[]I Opioids  I[]I Stimulants  I[x]I Benzos  I[]I Other:      I[x]I N/A  I[]I U  Substance(s) of Choice: ***  I[]I N/A  I[]I U  Substance(s) Used Currently/Recently: ***  I[]I N/A  I[]I U  Alcohol Consumption: multiple times weekly ***  I[]I N/A  I[]I U  Last Drink: ***  I[]I N/A  I[]I U  Last Drug Use: ***  I[]I N/A  I[]I U  Duration of Sobriety/Abstinence: ***     I[]I Y  I[]I N  I[]I U  Hx of Detox:   I[]I Y  I[]I N  I[]I U  Hx of Rehab:   I[]I Y  I[]I N  I[]I U  Hx of IVDU:   I[]I Y  I[]I N  I[]I U  Hx of Accidental Overdose:   I[]I Y  I[]I N  I[x]I U  Hx of DUI:   I[]I Y  I[]I N  I[]I U  Hx of Complicated Withdrawal (i.e. Seizures and/or Delirium Tremens):   I[x]I Y  I[]I N  I[]I U  Hx of Known/Suspected Substance-Induced Psychiatric Disorder:   I[]I Y  I[]I N  I[]I U  Hx of Medication Assisted Treatment:   I[]I Y  I[]I N  I[]I U  Hx of Twelve Step Program (or Equivalent) Involvement:   I[]I Y  I[]I N  I[]I U  Currently Exhibits Signs of Intoxication:   I[]I Y  I[]I N  I[]I U  Currently Exhibits Signs of Withdrawal:   I[]I Y  I[]I N  I[]I U  Currently Active in Recovery:   I[]I Y  I[]I N  I[]I U  Social Support:   I[]I Y  " "I[x]I N  I[]I U  Spouse/Partner Consumption:      I[]I N/A  I[x]I Y  I[]I N  I[]I U  Acknowledges/Accepts Addiction:   I[]I N/A  I[]I Y  I[]I N  I[]I U  Advised to Quit/Cut Back:   I[]I N/A  I[]I Y  I[]I N  I[]I U  Alcohol/Drug Cessation ("Wants to Quit"): {CT-Mxvbjwluep-Lnxrsryoa:74811}  I[]I N/A  I[]I Y  I[]I N  I[]I U  Motivation to Pursue Treatment: {XX-Additional-Motivation:43041}  I[]I N/A  I[]I Y  I[]I N  I[]I U  Tobacco Cessation ("Wants to Quit"): {Cessation Counselin}        HISTORY:      I[]I Patient denies any history, and none is known.  I[]I Patient unable or unwilling to provide any history.     I[x]I Y  I[]I N  I[]I U  Psychiatric Diagnoses: ***  I[]I Y  I[]I N  I[]I U  Current Psychiatric Provider (if Applicable):   I[]I Y  I[]I N  I[]I U  Hx of Psychiatric Hospitalization:   I[]I Y  I[]I N  I[]I U  Hx of Outpatient Psychiatric Treatment (psychiatry/psychotherapy):   I[]I Y  I[]I N  I[]I U  Psychotropic Trials: ***  I[]I Y  I[]I N  I[]I U  Prior Suicide Attempts:   I[]I Y  I[]I N  I[]I U  Hx of Suicidal Ideation:   I[]I Y  I[]I N  I[]I U  Hx of Homicidal Ideation:   I[]I Y  I[]I N  I[]I U  Hx of Self-Injurious Behavior (Non-Suicidal):   I[]I Y  I[]I N  I[]I U  Hx of Violence:   I[]I Y  I[]I N  I[]I U  Documented Hx of Malingering:      FAMILY HISTORY:  I[]I Y  I[]I N  I[]I U    ***     I[]I Y  I[]I N  I[]I U  Hx of Trauma/Neglect:   I[]I Y  I[]I N  I[x]I U  Hx of Physical Abuse:   I[]I Y  I[]I N  I[x]I U  Hx of Sexual Abuse:   I[]I Y  I[]I N  I[]I U  Happy Childhood:   I[]I Y  I[]I N  I[]I U  Significant Developmental Delay/Disability:   I[]I Y  I[]I N  I[]I U  GED/High School Diploma or Beyond:   I[]I Y  I[]I N  I[]I U  Currently Employed:   I[]I Y  I[]I N  I[]I U  On or Applying for Disability:   I[]I Y  I[]I N  I[]I U  Functions Independently:   I[]I Y  I[]I N  I[]I U  Financially Stable:   I[]I Y  I[]I N  I[]I U  Domiciled:   I[]I Y  I[]I N  I[]I U  Lives Alone:   I[]I Y  I[]I N  I[]I U  " "Intact Support System:   I[x]I Y  I[]I N  I[]I U  Heterosexual/Cisgender:   I[]I Y  I[x]I N  I[]I U  Currently in a Romantic Relationship:   I[x]I Y  I[]I N  I[]I U  Ever :   I[]I Y  I[x]I N  I[]I U  Children/Dependents:   I[]I Y  I[]I N  I[x]I U  Hoahaoism/Spiritual:   I[]I Y  I[]I N  I[]I U   History:   I[]I Y  I[]I N  I[]I U  Current Legal Issues:   I[]I Y  I[]I N  I[]I U  Past Charges/Convictions:   I[]I Y  I[]I N  I[]I U  Hx of Incarceration:   I[]I Y  I[]I N  I[]I U  Access to a Gun?: ***  {XX-GunSafety:52461::"NOTE: patient counseled on gun safety, including safe storage.","NOTE: patient counseled on inherent risks associated with gun ownership."}     I[]I Y  I[]I N  I[]I U  Hx of Seizure:   I[]I Y  I[]I N  I[]I U  Hx of Significant Head Injury (e.g., Loss of Consciousness, Concussion, Coma):   I[]I Y  I[]I N  I[]I U  Medical History & Diagnoses:           Allergies:  Penicillins        EXAMINATION:      /66   Pulse 106   Temp 98.5 °F (36.9 °C) (Oral)   Resp 18   Ht 6' (1.829 m)   Wt 65.3 kg (143 lb 15.4 oz)   SpO2 97%   BMI 19.52 kg/m²      MENTAL STATUS EXAMINATION:  General Appearance: lying in bed, in acute distress, somnolent  Behavior: ** {Free Text:86290::"***"} cooperative, friendly  Involuntary Movements and Motor Activity: ** {Free Text:99831::"***"} +tremors  Gait and Station: ** {Free Text:24139::"***"} unable to assess - patient lying down or seated  Speech and Language: ** {Free Text:30774::"***"} normal rate, rhythm, volume, tone, and pitch  Mood: "anxious"  Affect: ** {Free Text:03316::"***"} mood-congruent  Thought Process and Associations: ** {Free Text:84576::"***"} linear and goal-directed, with no loosening of associations  Thought Content and Perceptions: ** {Free Text:12049::"***"} no suicidal or homicidal ideation, no evidence of psychosis  Sensorium: ** {Free Text:99482::"***"} intact; alert with clear sensorium; oriented fully to person, place, time " "and situation  Recent and Remote Memory: ** {Free Text:80721::"***"} grossly intact, no significant impairments noted  Attention and Concentration: ** {Free Text:43183::"***"} attentive to conversation  Fund of Knowledge: ** unable to assess {Drop Down:98543}  Insight: ** {Free Text:03820::"***"} unable to assess  Judgment: ** {Free Text:05810::"***"} unable to assess        RISK MANAGEMENT:      I[]I Y  I[x]I N  I[]I U  I[]I A  Suicidal Ideation/Behavior: ** {Specifiers:17798}  I[]I Y  I[x]I N  I[]I U  I[]I A  Homicidal Ideation/Behavior: **  I[]I Y  I[x]I N  I[]I U  I[]I A  Violence: **  I[]I Y  I[x]I N  I[]I U  I[]I A  Self-Injurious Behavior: **     The patient is deemed to be {Reliability:20344}.     I[]I Y  I[]I N  I[]I U  I[]I A  I[]I N/A  Minimization of Risk Parameters Suspected/Evident: **  I[]I Y  I[]I N  I[]I U  I[]I A  I[]I N/A  Exaggeration of Risk Parameters Suspected/Evident: **  ***     [] Y  [] N  Danger to Self:   [] Y  [x] N  Danger to Others:   [] Y  [] N  Grave Disability:                                    "

## 2025-03-25 NOTE — ASSESSMENT & PLAN NOTE
H/o benzo use disorder, with suicide attempt via klonopin in Jan 2025 requiring inpatient psych admission. Discharged on lexapro and ambien for MDD and insomnia respectively. Benzos discontinued after inpatient detoxification. Restarted in March by PCP per . Has been adherent with lexapro but not ambien.   - addiction psych consult in AM if patient amenable.   - may warrant discussion with outpatient provider when nearing discharge  - urine tox, PETH

## 2025-03-25 NOTE — HPI
Ms. Rowe is a 74 y.o. F with a PMHx of HLD, HTN, PUD, chronic benzodiazepine use, colectomy, anxiety, depression, prior suicide attempts who presented to the ED for concern for benzodiazepine withdrawal. She is prescribed 1mg Klonopin BID though has been taking it TID as she needs it to help her sleep. She ran out x4 days ago. She reports drinking 2 beers this morning though is not a daily drinker. She is prescribed Ambien to sleep though does not take it and takes the Klonopin instead. She had an admission in January after a suicide attempt. She took 60 tablets of Ativan at that time. She was discharged from the psychiatric facility on a Librium taper. Pt has not had anything to eat in x3 days. She lives alone and has no children. She is currently complaining of chest pain that radiates to her whole body. She has had multiple falls over the past few days.     [facility] consulted for admission for hyponatremia and benzodiazepine withdrawal.

## 2025-03-25 NOTE — CONSULTS
"  [facility]   DEPARTMENT OF PSYCHIATRY     IDENTIFIERS & DEMOGRAPHICS:     ENCOUNTER: initial    -- PATIENT IDENTIFIERS: Juan Antonio Rowe  [id number]  1950  74 y.o.  female  -- SOURCES OF INFORMATION: PATIENT, EHR/chart, provider(s)  -- ENCOUNTER PROVIDER: Vadim Mendiola DO        PRESENTATION:   History of Present Illness   **  OVERVIEW OF THE HPI:    Ms. Rowe is a 74 y.o. F with a PMHx of HLD, HTN, PUD, chronic benzodiazepine use, colectomy, anxiety, depression, prior suicide attempts (most recent Jan 2025) who presented to the ED for concern for benzodiazepine withdrawal.     SUBJECTIVE/CURRENT FINDINGS:    Patient seen at bedside. Patient immediately requesting medications (benzos). Appears restless, complaining of withdrawal, unable to elaborate on symptoms further than "anxiety". Vital signs stable.    She is prescribed 1mg Klonopin BID though has been taking it TID as she needs it to help her sleep. She ran out x4 days ago. As per the , her PCP is prescribing her benzos and recently started to taper,  data below. She reports drinking 2 beers this morning though is not a daily drinker. She is prescribed Ambien to sleep though does not take it, instead using Klonopin. Pt has not had anything to eat in x3 days.     Patient currently admitted to CCM team 1 for hyponatremia and benzodiazepine withdrawal, on a librium taper. Addiction psych consulted for evaluation and recs for this patient with a history of benzo withdrawals and hx of suicide attempt via drug overdose.     In January, she had an admission following a suicide attempt where she took "the rest of the bottle" of Ativan but states there was only a few left. She was discharged from the psychiatric facility on a Librium taper.     Today the patient states she has been experiencing withdrawals since Thursday of last week, and ran out of her medications Friday of last week. Patient states she is feeling anxious, short of breath, and has " "not slept in 3 days.     Patient is not sure if she wants to quit and has been on benzos for about 20 years following a diagnosis of anxiety and the death of her mother. Notably, she later states her mother  recently.     Patient reports drinking Etoh every other day but limiting her intake d/t her hx of PUD.       Per Chart Review:    As per chart review: "Patient reports feeling depressed and overwhelmed by taking care of her mother who has cancer. Reports being a psychiatric hospital in the past for depression but denies history of self harm.  [Patients brother] expresses concern she has been taking "feel free" tablets that she purchases from the [employer] that has [employer] in it. Reports she spends all her money on the tablets and he has to buy her groceries. POx is 100% on RA. No lethargy, hypotension, or AMS. Do not feel emergent reversal is indicated at this time. Will continue to monitor. PEC placed for suicidal ideations. Patient continued to be stable after >4 hour observation period in the ED.     She has a history of depression, with previous psychiatric hospitalizations following her 's death six years ago. The patient was  for 43 years and still experiences significant grief. She has not attempted suicide before this incident. The patient mentioned that her brother is unsupportive, which adds to her stress.    Her sleep is currently managed with clonazepam, and she expressed a desire to be free from medications except for Lisinopril. Her affect is markedly dysphoric and sad. Thought process is linear. Patient is of significant risk of harm to herself due to suicidal ideations with attempt by overdose as well as need for close supervision during benzodiazepine and opioid withdrawals.     2025 IP Psychiatric Plan: COWS monitoring, Librium taper x 5 days, Lexapro 5 mg PO daily, Remeron 15 mg QHS"    REVIEW OF SYSTEMS:     Y   Sleep Disturbance/Disruption  +insomnia     Y   " Appetite/Weight Change  +decreased appetite     U   Alterations in Energy Level   U   Impaired Focus/Concentration   U   Depressive Symptomatology   Y   Excessive Anxiety/Worry  +generalized anxiety/worry     Y   Dysregulated Mood/Behavior  +irritability     N   Manic Symptomatology   N   Psychosis   U   Trauma-Related   N   Impulsivity/Compulsivity/Obsessionality   N   Disordered Eating   N   Dissociation   Y   Pain  +opioids     Y   Cardiopulmonary Symptoms  +shortness of breath     N   Abnormal Involuntary Movements  no tremor      Regarding the current presentation, no other significant issues or complaints are voiced or known at this time.      ADD-ON PSYCHOTHERAPY:     N/A         HISTORY:   Patient Information   **  >> SOURCES: patient       PSYCH  SUBSTANCE  FAMILY  SOCIAL  MEDICAL     Y   Previous/Pre-Existing Psychiatric Diagnoses  Depression, axniety   Y   Past Psychotropic Trials  Lexapro, Remeron   N   Current Psychiatric Provider   N   Hx of Outpatient Psychiatric Treatment   Y   Hx of Psychiatric Hospitalization  Hospitalized 2/2 benzo overdose/[facility]   Y   Hx of Suicidal Ideation/Threats   Y   Hx of Suicide Attempts/Gestures   N   Hx of Homicidal Ideation/Threats   N   Hx of Homicidal Behavior   N   Hx of Non-Suicidal Self-Injurious Behavior   N   Hx of Perpetrated Violence   N   Documented Hx of Malingering   N   Hx of Psychosis   N   Hx of Bipolar Diathesis   Y   Hx of Depression   Y   Hx of Anxiety   Y   Hx of Insomnia   Y   Hx of Delirium     Y   Hx of Formal AILYN Treatment  Benzo withdrawl   Y   Recent Alcohol Consumption  Recent   N   Hx of Nicotine Use   U   Hx of Alcohol Misuse/Abuse   U   Hx of Illicit Drug Misuse/Abuse   Y   Hx of Prescription Drug Misuse/Abuse  BEnzos   +   Drug Experimentation/Usage  +cannabis, +rx  "opioids, +benzodiazepines, +kratom  no cocaine, no heroin, no fentanyl, no methamphetamine, no stimulants, no barbiturates, no PCP, no LSD, no MDMA, no psilocybin, no mescaline, no DMT, no ketamine, no dextromethorphan, no nitrous oxide, no GHB, no inhalants, no steroids, no synthetic cannabinoids, no bath salts, no other (unclassified)       U   Family Psychiatric History      U   Hx of Trauma   U   Hx of Abuse     U   Developmental Delay/Disability   U   GED/High School Diploma   U   Post-Secondary Education   U   Currently Employed   U   Currently on Disability   U   Financially Stable   Y   Functions Independently   U   Domiciled   Y   Intact Support System   Y   Heterosexual/Cisgender   N   Currently in a Relationship   Y   Ever    Y   Ever /   N   Children/Dependents   U   Pentecostal/Spiritual   U   Hobbies/Recreational Activities   U   Hx of  Service     U   Ever Charged/Convicted   U   Current Probation/[place]/Diversion   U   Hx of Incarceration     N   Hx of Seizures   U   Hx of Head Trauma     Y   Medical Hx & Diagnoses  As per chart   N   Allergies    >> EHR (EPIC): reviewed/reconciled      The patient's past medical history has been reviewed and updated as appropriate within the electronic health record system.  See PROBLEM LIST & HISTORY for details.    Scheduled and PRN Medications: The electronic chart was reviewed and updated as appropriate.  See MEDCARD for details.    Allergies:  Patient has no known allergies.      EXAMINATION:   Objective   **  VITALS:  /88   Pulse 80   Temp 98 °F (36.7 °C) (Oral)   Resp (!) 22   Ht 5' 5" (1.651 m)   Wt 54 kg (119 lb)   LMP 01/01/1986   SpO2 100%   BMI 19.80 kg/m²     MENTAL STATUS EXAMINATION:  Appearance: appears stated age, normal weight  appropriately dressed, adequately groomed, in no apparent distress, " well-appearing    Behavior & Attitude: participative, under good behavioral control, able to redirect, appropriate eye contact  calm, engaged, agreeable, cooperative    Movements & Motor Activity: no psychomotor agitation, no psychomotor retardation, normal gait, normal station, ambulates without assistance, not wheelchair bound (able to ambulate), no weakness, no spasticity, no rigidity, no tics, no tremor, no akathisia, no dyskinesia, no ataxia, no parkinsonism    Speech & Language: normal rate, normal volume, normal quantity, normal latency, spontaneous, reciprocal, fluent    Mood: anxious  Affect: reactive, full range, anxious  mood congruent    Thought Process & Associations: linear, goal-directed, organized, logical, coherent, relevant, abstract  no loosening of associations    Thought Content & Perceptions: no delusions, no paranoid ideation, no ideas of reference, no grandiosity, no hyperreligiosity, no hallucinations, no responding to internal stimuli    Sensorium: awake, alert, clear  no confusion, no delirium    Orientation: grossly intact, oriented to person, oriented to place, oriented to time, oriented to situation    Recent & Remote Memory: intact (recent), intact (remote)    Attention & Concentration: intact  attentive to conversation, not easily distracted    Fund of Knowledge: intact, vocabulary proficient    Insight: intact, good  demonstrates sufficient awareness of condition/situation    Judgment: intact, good  heeds instructions/advice        RISK & REGULATORY:   Impression   **   RISK PARAMETERS:     N   Suicidal Ideation/Threats   N   Suicide Attempts/Gestures   N   Homicidal Ideation/Threats   N   Homicidal Behavior   N   Non-Suicidal Self-Injurious Behavior   N   Perpetrated Violence     NOTE: RISK PARAMETERS are current to the encounter/episode  NOT inclusive of past history.       FIREARMS & WEAPONS:     N   Ready Access to Firearms      SAFETY SCREENINGS:    -- PROTECTIVE FACTORS: IDENTIFIED       - AWARENESS  REINFORCEMENT: ADDRESSED       *The patient has acknowledged and exhibited an understanding of identified protective factors. Furthermore, steps have been taken to bolster and reinforce these protective factors.    -- RISK FACTORS: IDENTIFIED     - AWARENESS  MITIGATION: ADDRESSED       *The patient has acknowledged and exhibited an understanding of identified modifiable and/or non-modifiable risk factors. Furthermore, steps have been taken to mitigate or address these risk factors.    -- RISK MITIGATION & PREVENTION:      - INTERVENTIONS: 988/911/ED (info), advice/counseling, harm reduction     - GLOBAL FUNCTIONING: at or near baseline     MEDICAL DECISION MAKING:     - RISK: MODERATE     - COMPLEXITY: MODERATE   - DATA: +review of prior external note(s) from each unique source   > LEVEL: MODERATE     REGULATORY:    -- : REVIEWED  no recent discrepancies or irregularities are noted      INFORMED CONSENT & SHARED DECISION MAKING are the hallmark and bedrock of good clinical care, and as such have been employed and obtained, respectively, to the degree possible.  Discussed, to the extent possible, diagnosis, risks and benefits of proposed treatment (e.g., medication, therapy) vs alternative treatments vs no treatment, potential side effects of these treatments, and the inherent unpredictability of treatment.  Resources have been provided via the patient instructions in the AVS to supplement, augment, and reinforce discussions, counseling, and/or interventions.       - ABILITY TO UNDERSTAND, PARTICIPATE & ENGAGE: present and intact     - AGREEABLE TO TREATMENT (consent/assent): the patient consents to treatment     - RELIABILITY/ACCURACY: the patient is deemed to be a reliable and factually accurate historian      WARNINGS & PRECAUTIONS:  >> In cases of emergencies (e.g. SI/HI resulting in danger to self or others, functioning  deteriorating to the level of grave disability), call 911 or [phone], or present to the emergency department for immediate assistance.    >> Individuals should not operate a motor vehicle or heavy machinery if effects of medications or underlying symptoms/condition impair the ability to do so safely.    >> FULLY comply with ANY/ALL medication as prescribed/instructed and report ANY/ALL suspected adverse effects to appropriate health care providers.      ASSESSMENT & PLAN:   Assessment & Plan   **  DIAGNOSES & PROBLEMS:     Severe Benzodiazepine use disorder  Benzodiazepine withdrawal without complication  R/o Alcohol use disorder  R/o Cannabis use disorder  R/o Opiate use disorder   Hx of Depression  Hx of Anxiety    -- PLAN (goals  recommentations):     >>Continue Librium taper, management as per primary.   >> agree with holding Lexapro and Remeron right now given profound hyponatremia.   >>Discussed rehab with patient, resources for addiction placed in d/c instructions.   >> Spoke with OP PCP about prior hospitalization and current hospitalization for benzo withdrawal  >> Reasonable to consider thiamine and folate supplementation d/t malnutrition  - WITH REASONABLE MEDICAL CERTAINTY, based on history, chart review, available collateral information, and a present-state examination:   -- does not currently meet the criteria for psychiatric hospitalization, as can be successfully managed in a less restrictive level of care or in the community    * PEC is NOT INDICATED - rescind if in place    * treatment is VOLUNTARY - no formal legal status is indicated     >> attended to therapeutic alliance, via the building and maintenance of rapport and trust  >> risk mitigation strategies and safety netting were employed, explored, and reinforced  >> psychotherapy was provided during the session  >> contingent on accessibility and willingness, patient would benefit from the following referrals/services: outpatient psychiatric  services, addiction rehab program, mutual self-help groups (e.g. Alcoholics Anonymous, SMART Recovery), and psychiatric intensive outpatient or partial hospital program (IOP/PHP)  >> advised to abstain from alcohol and illicit drug use  >> harm reduction techniques discussed, as warranted, to mitigate risk from problematic behaviors  >> importance of lifelong, active engagement in 12 step (or equivalent) mutual self-help program(s) was stressed/reinforced, including regular meeting attendance and acquisition/maintenance of a sponsor  >> education and/or resources discussed and/or provided for various addiction recovery and rehabilitation options  >> motivational interviewing applied, relapse prevention provided  >> successfully complete a licensed accredited addiction rehab program and follow all aftercare recommendations  >> continue alcohol (or sedative-hypnotic) withdrawal protocol  >> tolerating alcohol (or sedative-hypnotic) detox well with no s/sx of complicated withdrawal at this time, nor are any anticipated moving forward  >> case discussed with staff psychiatrist  >> case discussed with Primary Team  >> will sign off at this time, thank you    See below for pertinent laboratory and radiographic findings.      CHART REVIEW: available documentation has been reviewed, and pertinent elements of the chart have been incorporated into this evaluation where appropriate.       KEY & LINKS:        Y  = yes/endorses     N  = no/denies     U  = unknown/unable to assess    ADHD   AIMS   AUDIT   AUDIT-C   C-SSRS (Screen)   C-SSRS (Short)   C-SSRS (Full)   DAST   DAST-10   CRYSTAL-7   MoCA   PCL-5   PHQ-9   AILYN   YMRS           DIAGNOSTIC TESTING:   Results   **   Glu 81  1/8/2025  Li *   *  TSH 0.703  1/8/2025    HgA1c 4.6  2/12/2023  VPA *   *   FT4 *   *    Na 110 (LL)  3/25/2025  CLZ *   *  WBC 22.16 (H)  3/25/2025    Cr 0.8  3/25/2025  ANC 5.0; 61.9;   1/8/2025   Hgb 15.2  3/25/2025     BUN 22   3/25/2025  Trop I <0.006  8/14/2022  HCT 45.1  3/25/2025     GFR >60  3/25/2025   CPK 42  8/15/2022   (H)  3/25/2025     Alb 3.7  3/25/2025   PRL *   *  B12 *   *     T Bili 0.9  3/25/2025  Chol *   *  B9 *   *      3/25/2025  TGs *   *  B1 *   *    AST 36  3/25/2025  HDL *   *  Vit D *   *     ALT 12  3/25/2025  LDL *   *  HIV Non-reactive  6/19/2024     INR *   *  Nivia *   *   Hep C Non-reactive  6/19/2024    GGT *   *  Lip 26  1/8/2025  RPR *   *    MCV 87  3/25/2025   NH4 *   *  UPT *   *      PETH *   *  THC Presumptive Positive (A)  3/25/2025    ETOH <10  1/8/2025  CLARKE Negative  3/25/2025    EtG *   *  AMP Negative  3/25/2025    ALC <10  3/25/2025  OPI Negative  3/25/2025    BZO Presumptive Positive (A)  1/8/2025  MTD Negative  1/8/2025     BAR Negative  3/25/2025  BUP *   *    PCP Negative  3/25/2025  FEN *   *     Results for orders placed or performed during the hospital encounter of 03/25/25   EKG 12-lead    Collection Time: 03/25/25  5:00 AM   Result Value Ref Range    QRS Duration 94 ms    OHS QTC Calculation 500 ms    Narrative    Test Reason : R41.82,    Vent. Rate :  83 BPM     Atrial Rate :  83 BPM     P-R Int : 186 ms          QRS Dur :  94 ms      QT Int : 426 ms       P-R-T Axes :   0  18  64 degrees    QTcB Int : 500 ms    Normal sinus rhythm  Possible Left atrial enlargement  LVH  Nonspecific T wave abnormality  Prolonged QT  Abnormal ECG  When compared with ECG of 08-Jan-2025 12:17,  Nonspecific T wave abnormality no longer evident in Inferior leads  Inverted T waves have replaced nonspecific T wave abnormality in Lateral  leads  Confirmed by Harjinder Azul ([id number]) on 3/25/2025 8:28:04 AM    Referred By: AAAREFERRAL SELF           Confirmed By: Harjinder Azul     Problem List[1]     Inpatient consult to Psychiatry  Consult performed by: Vadim Mendiola DO  Consult ordered by: Mishel Saenz MD  Reason for consult: Benzo  abuse/withdrawl        [facility] MED[facility] ICU ([facility]U)          [1]   Patient Active Problem List  Diagnosis    Lisfranc dislocation, left, initial encounter    Lisfranc dislocation, left, subsequent encounter    Impaired functional mobility and endurance    Prolonged Q-T interval on ECG    Hypertension    Hydronephrosis    S/P closure of ileostomy    Small bowel obstruction    Hypotension    Pre-syncope    Closed fracture of distal end of right fibula    HLD (hyperlipidemia)    Anxiety    Lactic acidosis    Epigastric pain    Abnormal finding on GI tract imaging    PUD (peptic ulcer disease)    Encounter for medical screening examination    Hard of hearing    Inadequate oral intake    Substance use disorder    Hyponatremia    Benzodiazepine withdrawal    Severe episode of recurrent major depressive disorder    Leukocytosis

## 2025-03-25 NOTE — HPI
"Ms. Rowe is a 74 y.o. F with a PMHx of HLD, HTN, PUD, chronic benzodiazepine use, colectomy, anxiety, depression, prior suicide attempts (most recent Jan 2025) who presented to the ED for concern for benzodiazepine withdrawal.     She is prescribed 1mg Klonopin BID though has been taking it TID as she needs it to help her sleep. She ran out x4 days ago. As per the , her PCP is prescribing her benzos and recently started to taper,  data below. She reports drinking 2 beers this morning though is not a daily drinker. She is prescribed Ambien to sleep though does not take it, instead using Klonopin. Pt has not had anything to eat in x3 days.     Patient currently admitted to CCM team 1 for hyponatremia and benzodiazepine withdrawal, on a librium taper. Addiction psych consulted for evaluation and recs for this patient with a history of benzo withdrawals and hx of suicide attempt via drug overdose.     In January, she had an admission following a suicide attempt where she took 60 tablets of Ativan at that time. She was discharged from the psychiatric facility on a Librium taper.     As per chart review: "Patient reports feeling depressed and overwhelmed by taking care of her mother who has cancer. Reports being a psychiatric hospital in the past for depression but denies history of self harm.  [Patients brother] expresses concern she has been taking "feel free" tablets that she purchases from the [employer] that has [employer] in it. Reports she spends all her money on the tablets and he has to buy her groceries. POx is 100% on RA. No lethargy, hypotension, or AMS. Do not feel emergent reversal is indicated at this time. Will continue to monitor. PEC placed for suicidal ideations. Patient continued to be stable after >4 hour observation period in the ED.     She has a history of depression, with previous psychiatric hospitalizations following her 's death six years ago. The patient was  for 43 years " "and still experiences significant grief. She has not attempted suicide before this incident. The patient mentioned that her brother is unsupportive, which adds to her stress.    Her sleep is currently managed with clonazepam, and she expressed a desire to be free from medications except for Lisinopril. Her affect is markedly dysphoric and sad. Thought process is linear. Patient is of significant risk of harm to herself due to suicidal ideations with attempt by overdose as well as need for close supervision during benzodiazepine and opioid withdrawals.     Jan 2025 IP Psychiatric Plan: COWS monitoring, Librium taper x 5 days, Lexapro 5 mg PO daily, Remeron 15 mg QHS"    :    "

## 2025-03-25 NOTE — ASSESSMENT & PLAN NOTE
MDD. Severe caregiver burden with chronically ill mother, complicated grief with death of spouse. Sister with bipolar. Slightly pressured speech on exam. Complicated by benzo use. See substance use disorder

## 2025-03-25 NOTE — H&P
[facility] - Emergency Dept  Critical Care Medicine  History and Physical    Patient Name: Juan Antonio Rowe  MRN: [id number]  Admission Date: 3/25/2025  Hospital Length of Stay: 0 days  Code Status: Full Code  Attending Provider: Zeinab Adame MD  Primary Care Provider: Brendon Hardy MD   Principal Problem: Hyponatremia    Subjective:     HPI:  Ms. Rowe is a 74 y.o. F with a PMHx of HLD, HTN, PUD, chronic benzodiazepine use, colectomy, anxiety, depression, prior suicide attempts who presented to the ED for concern for benzodiazepine withdrawal. She is prescribed 1mg Klonopin BID though has been taking it TID as she needs it to help her sleep. She ran out x4 days ago. She reports drinking 2 beers this morning though is not a daily drinker. She is prescribed Ambien to sleep though does not take it and takes the Klonopin instead. She had an admission in January after a suicide attempt. She took 60 tablets of Ativan at that time. She was discharged from the psychiatric facility on a Librium taper. Pt has not had anything to eat in x3 days. She lives alone and has no children. She is currently complaining of chest pain that radiates to her whole body. She has had multiple falls over the past few days.     [facility] consulted for admission for hyponatremia and benzodiazepine withdrawal.     Hospital/ICU Course:  No notes on file    No new subjective & objective note has been filed under this hospital service since the last note was generated.    ROS  As above in HPI, otherwise negative    Physical Exam  Vitals and nursing note reviewed.   Constitutional:       Appearance: She is ill-appearing. She is not toxic-appearing or diaphoretic.      Comments: Frail   HENT:      Head: Normocephalic and atraumatic.      Nose: Nose normal.      Mouth/Throat:      Mouth: Mucous membranes are dry.   Eyes:      Conjunctiva/sclera: Conjunctivae normal.   Cardiovascular:      Rate and Rhythm: Normal rate and regular rhythm.      Heart sounds: Normal  heart sounds. No murmur heard.  Pulmonary:      Effort: Pulmonary effort is normal.      Breath sounds: Normal breath sounds.   Abdominal:      Palpations: Abdomen is soft.      Tenderness: There is no abdominal tenderness. There is no guarding or rebound.   Musculoskeletal:      Right lower leg: No edema.      Left lower leg: No edema.   Skin:     General: Skin is warm and dry.      Capillary Refill: Capillary refill takes 2 to 3 seconds.      Comments: Skin tenting. Areas of ecchymosis to the BUE, chest wall, and bilateral knees   Neurological:      Mental Status: She is alert.      Comments: Oriented to person. Confused. Following commands. Tremulous          ABG  Recent Labs   Lab 03/25/25  0504   PH 7.515   PO2 37.7   PCO2 38.5   HCO3 30.8     Assessment/Plan:     Psychiatric  Severe episode of recurrent major depressive disorder  MDD. Severe caregiver burden with chronically ill mother, complicated grief with death of spouse. Sister with bipolar. Slightly pressured speech on exam. Complicated by benzo use. See substance use disorder    Benzodiazepine withdrawal  - CIWA monitoring  - Ativan 2mg q4 PRN if 2 criteria are met: Systolic BP>160, Diastolic BP >110 or Pulse >110 or CIWA > 8  - Recent inpatient psych admission for suicide attempt with klonopin, required librium taper.   - librium taper with ativan PRN     Substance use disorder  H/o benzo use disorder, with suicide attempt via klonopin in Jan 2025 requiring inpatient psych admission. Discharged on lexapro and ambien for MDD and insomnia respectively. Benzos discontinued after inpatient detoxification. Restarted in March by PCP per . Has been adherent with lexapro but not ambien.   - addiction psych consult in AM if patient amenable.   - may warrant discussion with outpatient provider when nearing discharge  - urine tox, PETH    Cardiac/Vascular  Hypertension  Hold home lisinopril-HCTZ    Oncology  Leukocytosis  WBC 22, CBC otherwise unremarkable. No  other SIRS criteria, no localizing symptoms for infectious source. Repeat CBC ordered.   - mild hypotension to 110/50s, LA ordered.  - f/u UA, CXR.     Endocrine  * Hyponatremia  Na 111 on arrival, worsened to 106 following 500mL NS bolus. Serum chloride undetectable (<70). Suspect SIADH 2/2 restarting lexapro vs beer potomania given minimal solute intake in setting of increasingly frequent alcohol use. Pt denies polydipsia. Doubt hypovolemic hyponatremia given poor response to IVF.  - pending urine studies. If urine Na appropriately low, likely beer potomania.    - urine Na, urine/serum osm, urine Cl   - no seizure, headache, or confusion albeit inebriated. Will defer hypertonic saline pending further workup given risk of overcorrection if beer potomania/primary polydipsia. Will consider if Na continues to downtrend  - holding lexapro, briefly taking in 2021, restarted after recent discharge.   - holding home HCTZ, possibly explains hypochloremia, but has been taking without issue previously.   - 1L fluid restriction  - Na goal 112  - BMP q4h       Critical Care Daily Checklist:    A: Awake: RASS Goal/Actual Goal:   0  Actual:   0   B: Spontaneous Breathing Trial Performed?   N/a   C: SAT & SBT Coordinated?  N/a                      D: Delirium: CAM-ICU     E: Early Mobility Performed? N/a   F: Feeding Goal:    Status:     Current Diet Order   Procedures    Diet NPO      AS: Analgesia/Sedation N/a   T: Thromboembolic Prophylaxis Lovenox   H: HOB > 300 Yes   U: Stress Ulcer Prophylaxis (if needed) N/a   G: Glucose Control 140-180   B: Bowel Function     I: Indwelling Catheter (Lines & Pugh) Necessity PIV   D: De-escalation of Antimicrobials/Pharmacotherapies N/A    Plan for the day/ETD Follow up labs, trend sodium, CIWA    Code Status:  Family/Goals of Care: Full Code  N/A       Critical secondary to Patient has a condition that poses threat to life and bodily function: severe hyponatremia      Critical care was  time spent personally by me on the following activities: development of treatment plan with patient or surrogate and bedside caregivers, discussions with consultants, evaluation of patient's response to treatment, examination of patient, ordering and performing treatments and interventions, ordering and review of laboratory studies, ordering and review of radiographic studies, pulse oximetry, re-evaluation of patient's condition. This critical care time did not overlap with that of any other provider or involve time for any procedures.     Tonja Aburto MD, PGY-2  [employer] [facility]  [facility]

## 2025-03-25 NOTE — ED PROVIDER NOTES
"Encounter Date: 3/25/2025       History     Chief Complaint   Patient presents with    Withdrawal     Patient reports that she is withdrawing for her Clonazepam. States that she has been out of her prescription since Friday and it is to soon to fill. Patient also had last drink this morning. Patient reports all over body pain.      HPI  We have depression and this psychiatric hospitalizations persistent chief complaint of benzodiazepine withdrawal.  Patient reports taking 3 Klonopin per day when she is prescribe only 2 per day.  She states that she taken an extra dose for sleep. Her last dose was on Thursday (4 days ago). Pt endorses tremors, anxiety, insomnia. Pt reports reduced po intake. Denies seizures, cp, sob, abdominal pain. Pt reports drinking 2 beers this morning. States she drinks " a beer every now and then"      Review of patient's allergies indicates:  No Known Allergies  Past Medical History:   Diagnosis Date    Acute gastric ulcer with perforation     Acute kidney failure with tubular necrosis     GABBI (acute kidney injury)     Anxiety     Diarrhea     Encounter for medical screening examination 1/9/2025    HLD (hyperlipidemia)     Hypertension     Nausea & vomiting     Peritonitis     PUD (peptic ulcer disease)     Renal disorder     Urine retention     Vascular disorder of intestine, unspecified      Past Surgical History:   Procedure Laterality Date    APPENDECTOMY      APPLICATION OF WOUND VACUUM-ASSISTED CLOSURE DEVICE  1/21/2021    Procedure: APPLICATION, WOUND VAC;  Surgeon: Abel Francis MD;  Location: [address];  Service: General;;    APPLICATION OF WOUND VACUUM-ASSISTED CLOSURE DEVICE  1/25/2021    Procedure: APPLICATION, WOUND VAC;  Surgeon: Abel Francis MD;  Location: [facility] OR [address] [place] [address];  Service: General;;    CLOSURE OF PERFORATED ULCER OF DUODENUM USING OMENTAL PATCH  1/21/2021    Procedure: CLOSURE, ULCER, PERFORATED, DUODENUM, USING OMENTAL PATCH;  Surgeon: Abel QUINTANA" MD Tito;  Location: [facility] OR 2ND FLR;  Service: General;;    COLECTOMY, RIGHT  1/21/2021    Procedure: COLECTOMY, RIGHT;  Surgeon: Abel Francis MD;  Location: [facility] OR 2ND FLR;  Service: General;;    cyst removed from neck      ESOPHAGOGASTRODUODENOSCOPY N/A 3/13/2024    Procedure: EGD (ESOPHAGOGASTRODUODENOSCOPY);  Surgeon: Braxton Contreras MD;  Location: [facility] ENDO (2ND FLR);  Service: Endoscopy;  Laterality: N/A;    GASTROSTOMY  1/25/2021    Procedure: GASTROSTOMY;  Surgeon: Abel Francis MD;  [facility];  Service: General;;    HYSTERECTOMY  1986    ILEOSTOMY CLOSURE  10/13/2021    Procedure: CLOSURE, ILEOSTOMY;  Surgeon: Abel Francis MD;  [facility];  Service: General;;    LYSIS OF ADHESIONS  10/13/2021    Procedure: LYSIS, ADHESIONS;  Surgeon: Abel Francis MD;  [facility];  Service: General;;    OOPHORECTOMY Bilateral 1989    OPEN REDUCTION AND INTERNAL FIXATION (ORIF) OF LISFRANC INJURY OF FOOT Left 10/11/2019    Procedure: ORIF, LISFRANC INJURY, FOOT;  Surgeon: Ferdinand Stauffer DPM;  Location: [facility] OR 1ST FLR;  Service: Podiatry;  Laterality: Left;    TONSILLECTOMY, ADENOIDECTOMY       Family History   Problem Relation Name Age of Onset    Hypertension Mother       Social History[1]      Physical Exam     Initial Vitals [03/25/25 0222]   BP Pulse Resp Temp SpO2   120/80 85 14 98.4 °F (36.9 °C) 100 %      MAP       --         Physical Exam  Physical Exam:  CONSTITUTIONAL: Well developed, thin female, anxious  HENT: Normocephalic, atraumatic. Dry mucous membranes.   EYES: Normal  NECK: Supple, no thyroid enlargement  CARDIOVASCULAR: Regular rate and rhythm without any murmurs, gallops, rubs.  RESPIRATORY: Speaking in full sentences. Breathing comfortably. Auscultation of the lungs revealed normal breath sounds b/l, no wheezing, no rales, no rhonchi.  ABDOMEN: Soft and nontender, no masses, no rebound or guarding   NEUROLOGIC: Alert. No facial  droop. Voice is clear. Speech is fluent.  MSK: Moving all four extremities. Hands in flexed position.   SKIN: Warm and dry. No visible rash on exposed areas of skin.    Psych: Mood and affect normal.       ED Course   Procedures  Labs Reviewed   COMPREHENSIVE METABOLIC PANEL - Abnormal       Result Value    Sodium 111 (*)     Potassium 3.4 (*)     Chloride <70 (*)     CO2 22 (*)     Glucose 100      BUN 24 (*)     Creatinine 1.1      Calcium 9.2      Protein Total 7.0      Albumin 3.7      Bilirubin Total 0.9            AST 36      ALT 12      Anion Gap        eGFR 53 (*)     Narrative:     Called to Aga Corea rn   CBC WITH DIFFERENTIAL - Abnormal    WBC 22.16 (*)     RBC 4.75      HGB 15.2      HCT 41.5      MCV 87      MCH 32.0      MCHC 36.6 (*)     RDW 14.6 (*)     Platelet Count 544 (*)     MPV 8.8 (*)     Nucleated RBC 0      Neut % 83.0 (*)     Lymph % 9.5 (*)     Mono % 6.4      Eos % 0.0      Basophil % 0.2      Imm Grans % 0.9 (*)     Neut # 18.40 (*)     Lymph # 2.11      Mono # 1.41 (*)     Eos # 0.01      Baso # 0.04      Imm Grans # 0.19 (*)    ACETAMINOPHEN LEVEL - Abnormal    Acetaminophen Level 9.8 (*)    MAGNESIUM - Normal    Magnesium  2.0     CBC W/ AUTO DIFFERENTIAL    Narrative:     The following orders were created for panel order CBC auto differential.  Procedure                               Abnormality         Status                     ---------                               -----------         ------                     CBC with Differential[[id number]]       Abnormal            Final result                 Please view results for these tests on the individual orders.   URINALYSIS, REFLEX TO URINE CULTURE   SODIUM, URINE, RANDOM   OSMOLALITY, URINE RANDOM   OSMOLALITY, SERUM   CHLORIDE, URINE, RANDOM   PHOSPHATIDYLETHANOL (PETH)   TOXICOLOGY SCREEN, URINE, RANDOM (COMPLIANCE)   BASIC METABOLIC PANEL   LACTIC ACID, PLASMA          Imaging Results              X-Ray Chest AP Portable  (Final result)  Result time 03/25/25 06:15:29      Final result by Ronal Holland MD (03/25/25 06:15:29)                   Impression:      No significant intrathoracic abnormality. No significant detrimental interval change in the appearance of the chest since 08/14/2022 is observed.      Electronically signed by: Ronal Holland MD  Date:    03/25/2025  Time:    06:15               Narrative:    EXAMINATION:  XR CHEST AP PORTABLE    CLINICAL HISTORY:  Rule out pneumonia;    TECHNIQUE:  One view    COMPARISON:  Comparison is made to 08/14/2022.    FINDINGS:  Heart size is normal, as is the appearance of the pulmonary vascularity. Lung zones are clear, and are free of significant airspace consolidation or volume loss. No pleural fluid. No hilar or mediastinal mass lesion.  Multiple skin folds are superimposed over the thorax, but no pneumothorax is appreciated. Calcification in the wall of the aortic arch is an incidental observation.                                       CT Head Without Contrast (Final result)  Result time 03/25/25 05:53:44      Final result by Evangelina Trujillo MD (03/25/25 05:53:44)                   Impression:      No CT evidence of acute intracranial abnormality. Clinical correlation and further evaluation as warranted.      Electronically signed by: Evangelina Trujillo MD  Date:    03/25/2025  Time:    05:53               Narrative:    EXAMINATION:  CT HEAD WITHOUT CONTRAST    CLINICAL HISTORY:  Mental status change, unknown cause;    TECHNIQUE:  Low dose axial images were obtained through the head.  Coronal and sagittal reformations were also performed. Contrast was not administered.    COMPARISON:  Head CT 01/21/2021    FINDINGS:  There is no acute intracranial hemorrhage, hydrocephalus, midline shift or mass effect. There is mild generalized cerebral volume loss and periventricular white matter hypoattenuation, nonspecific but suggestive of sequela of chronic microvascular ischemic change.  The basal  cisterns are patent. The visualized paranasal sinuses and mastoid air cells are clear of acute process.  The visualized bones of the calvarium demonstrate no acute osseous abnormality.                                       Medications   chlordiazepoxide capsule 100 mg (100 mg Oral Given 3/25/25 0606)     Followed by   chlordiazepoxide capsule 50 mg (has no administration in time range)     Followed by   chlordiazepoxide capsule 50 mg (has no administration in time range)     Followed by   chlordiazepoxide capsule 25 mg (has no administration in time range)     Followed by   chlordiazepoxide capsule 25 mg (has no administration in time range)     Followed by   chlordiazepoxide capsule 25 mg (has no administration in time range)     Followed by   chlordiazepoxide capsule 25 mg (has no administration in time range)   sodium chloride 0.9% flush 10 mL (has no administration in time range)   enoxaparin injection 40 mg (has no administration in time range)   folic acid 1 mg in D5W 100 mL IVPB (1 mg Intravenous New Bag 3/25/25 0606)   cyanocobalamin injection 1,000 mcg (has no administration in time range)   cyanocobalamin tablet 250 mcg (has no administration in time range)   LORazepam injection 2 mg (has no administration in time range)   potassium chloride SA CR tablet 40 mEq (has no administration in time range)   sodium chloride 0.9% bolus 500 mL 500 mL (0 mLs Intravenous Stopped 3/25/25 0409)   diazePAM injection 5 mg (5 mg Intravenous Given 3/25/25 0311)   diazePAM injection 2.5 mg (2.5 mg Intravenous Given 3/25/25 0509)     Medical Decision Making  75 yo female with hx and PE as noted above. VSS. Presents with CC of benzo withdrawal. Pt ran out of klonopin bc taking 3 per day rather than 2 per day as prescribed.     Ddx benzodiazepine w/drawal, electrolyte abnormality, alcohol intoxication.    W/u and for sodium 111.  Chloride less than 70.  Bicarb 22. WBC 22.16 likely 2/2 dehydration and hemoconcentration. Pt  remained stable during ED stay. Admission to MICU service for hyponatremia.           Amount and/or Complexity of Data Reviewed  Labs: ordered. Decision-making details documented in ED Course.  Radiology: ordered.    Risk  Prescription drug management.  Decision regarding hospitalization.               ED Course as of 03/25/25 0631   Tue Mar 25, 2025   0416 Sodium(!!): 111 [MK]   0416 Chloride(!!): <70 [MK]   0416 Potassium(!): 3.4 [MK]   0441 WBC(!): 22.16 [MK]      ED Course User Index  [MK] Mary Cruz MD                           Clinical Impression:  Final diagnoses:  [R41.82] AMS (altered mental status)          ED Disposition Condition    Admit                     [1]   Social History  Tobacco Use    Smoking status: Every Day    Smokeless tobacco: Never   Substance Use Topics    Alcohol use: Not Currently     Comment: socially    Drug use: Yes     Comment: Mary Jimenez MD  Resident  03/25/25 0621

## 2025-03-25 NOTE — PLAN OF CARE
MICU DAILY GOALS     Family/Goals of care/Code Status   Code Status: Full Code    24H Vital Sign Range  Temp:  [98 °F (36.7 °C)-98.7 °F (37.1 °C)]   Pulse:  [67-90]   Resp:  [14-42]   BP: (108-165)/(56-88)   SpO2:  [92 %-100 %]      Shift Events (include procedures and significant events)   Pt Na trending up with fluids. Pt continues to be anxious as well as restless. Pt AAO x 4. Will cont to monitor.     AWAKE RASS: Goal -    Actual -      Restraint necessity: Not necessary   BREATHE SBT: Not intubated    Coordinate A & B, analgesics/sedatives Pain:  n/a    SAT: Not intubated   Delirium CAM-ICU:     Early(intubated/ Progressive (non-intubated) Mobility MOVE Screen (INTUBATED ONLY): Not intubated    Activity: Activity Management: Arm raise - L1, Rolling - L1   Feeding/Nutrition Diet order: Diet/Nutrition Received: NPO,     Thrombus DVT prophylaxis: VTE Core Measure: Pharmacological prophylaxis initiated/maintained   HOB Elevation Head of Bed (HOB) Positioning: HOB at 30-45 degrees   Ulcer Prophylaxis GI: no   Glucose control N/a   Skin Skin assessment:     Sacrum intact/not altered? Yes  Heels intact/not altered? Yes  Surgical wound? No    CHECK ONE!   (no altered skin or altered skin) and sub boxes:  [] No Altered Skin Integrity Present    []Prevention Measures Documented    [x] Altered Skin Integrity Present or Discovered   [x] LDA present in EPIC, daily doc completed              [] LDA added if not in EPIC (describe wound).                    When describing wound, do not stage, use descriptive words only.    [] Wound Image Taken (required on admit,                   transfer/discharge and every Tuesday)    Wound Care Consulted? No   Bowel Function no issues    Indwelling Catheter Necessity      Urethral Catheter 03/25/25 0647-Reason for Continuing Urinary Catheterization: Critically ill in ICU and requiring hourly monitoring of intake/output          De-escalation Antibiotics No        VS and assessment per  flow sheet, patient progressing towards goals as tolerated, plan of care reviewed with  pt , all concerns addressed, will continue to monitor.

## 2025-03-25 NOTE — CONSULTS
--------------- APPROVED REPORT --------------





Date of service: 01/30/2019



EKG Measurement

Heart [id number]

[id number]

[id number]

[id number]

[id number]



<Conclusion>

Normal sinus rhythm

Possible Left atrial enlargement

Nonspecific ST abnormality

Abnormal ECG Patient evaluated by Critical Care. To be admitted to MICU team 1. Full H&P to follow.      Kenrick Ling,   Pulmonary Critical Care Fellow

## 2025-03-25 NOTE — Clinical Note
"Suhas Bautista MD  Resident  Psychiatry     Consults  Incomplete     Creation Time: 2/10/2025  8:16 AM     Incomplete         INITIAL VISIT: ADDICTION PSYCHIATRY CONSULTATION SERVICE        ASSESSMENT AND PLAN:      HPI:      REVIEW OF SYSTEMS:  I[]I Patient denies any pertinent ROS, and none is known.  I[]I Patient unable or unwilling to provide any ROS.     [] Y  [] N  sleep disturbance: ** {Globally:63890:::1} {Positive for:36489:::1}  [] Y  [] N  appetite/weight change: ** {Globally:90810:::1} {Positive for:21442:::1}  [] Y  [] N  fatigue/anergia: ** {Globally:05077:::1} {Positive for:69703:::1}  [] Y  [] N  impairment in focus/concentration: ** {Globally:27078:::1} {Positive for:38733:::1}      [] Y  [] N  depression: ** {Globally:17106:::1} {Positive for:54010:::1} {Negative for:31166:::1}  [] Y  [] N  anxiety/worry: ** {Globally:32725:::1} {Positive for:85981:::1} {Negative for:43569:::1}  [] Y  [] N  dysregulated mood/behavior: ** {Globally:05775:::1} {Positive for:85948:::1} {Negative for:40676:::1}  [] Y  [] N  manic symptomatology: ** {Globally:77195:::1} {Positive for:60901:::1} {Negative for:26754:::1}  [] Y  [] N  psychosis: ** {Globally:18464:::1} {Positive for:69037:::1} {Negative for:28496:::1}  {Pertinent +/-:98690::" "} {Positive for:46046:::1} {Negative for:61396:::1}     A pertinent medical review of systems was performed with the following notable findings: ***     CURRENT PSYCHOTROPIC REGIMEN:  I[]I Y  I[]I N  I[]I U    ***        ADDICTION:      I[]I Y  I[]I N  I[]I U  I[]I Current  I[]I Former  Nicotine Use:   I[]I Y  I[]I N  I[]I U  I[]I Current  I[]I Former  Alcohol Use:   I[]I Y  I[]I N  I[]I U  I[]I Current  I[]I Former  Alcohol Misuse/Abuse:   I[]I Y  I[]I N  I[]I U  I[]I Current  I[]I Former  Illicit Drug Use/Misuse/Abuse:   I[]I Y  I[]I N  I[]I U  I[]I Current  I[]I Former  Misuse/Abuse of Rx Medications:   I[]I Cannabis  I[]I Cocaine  I[]I Heroin  " "I[]I Meth  I[]I Opioids  I[]I Stimulants  I[]I Benzos  I[]I Other:      I[]I N/A  I[]I U  Substance(s) of Choice: ***  I[]I N/A  I[]I U  Substance(s) Used Currently/Recently: ***  I[]I N/A  I[]I U  Alcohol Consumption: {Alcohol Intake:60238} ***  I[]I N/A  I[]I U  Last Drink: ***  I[]I N/A  I[]I U  Last Drug Use: ***  I[]I N/A  I[]I U  Duration of Sobriety/Abstinence: ***     I[]I Y  I[]I N  I[]I U  Hx of Detox:   I[]I Y  I[]I N  I[]I U  Hx of Rehab:   I[]I Y  I[]I N  I[]I U  Hx of IVDU:   I[]I Y  I[]I N  I[]I U  Hx of Accidental Overdose:   I[]I Y  I[]I N  I[]I U  Hx of DUI:   I[]I Y  I[]I N  I[]I U  Hx of Complicated Withdrawal (i.e. Seizures and/or Delirium Tremens):   I[]I Y  I[]I N  I[]I U  Hx of Known/Suspected Substance-Induced Psychiatric Disorder:   I[]I Y  I[]I N  I[]I U  Hx of Medication Assisted Treatment:   I[]I Y  I[]I N  I[]I U  Hx of Twelve Step Program (or Equivalent) Involvement:   I[]I Y  I[]I N  I[]I U  Currently Exhibits Signs of Intoxication:   I[]I Y  I[]I N  I[]I U  Currently Exhibits Signs of Withdrawal:   I[]I Y  I[]I N  I[]I U  Currently Active in Recovery:   I[]I Y  I[]I N  I[]I U  Social Support:   I[]I Y  I[]I N  I[]I U  Spouse/Partner Consumption:      I[]I N/A  I[]I Y  I[]I N  I[]I U  Acknowledges/Accepts Addiction:   I[]I N/A  I[]I Y  I[]I N  I[]I U  Advised to Quit/Cut Back:   I[]I N/A  I[]I Y  I[]I N  I[]I U  Alcohol/Drug Cessation ("Wants to Quit"): {PC-Pgbaimsmdh-Gpukccqwv:46581}  I[]I N/A  I[]I Y  I[]I N  I[]I U  Motivation to Pursue Treatment: {XX-Additional-Motivation:19174}  I[]I N/A  I[]I Y  I[]I N  I[]I U  Tobacco Cessation ("Wants to Quit"): {Cessation Counselin}        HISTORY:      I[]I Patient denies any history, and none is known.  I[]I Patient unable or unwilling to provide any history.     I[]I Y  I[]I N  I[]I U  Psychiatric Diagnoses: ***  I[]I Y  I[]I N  I[]I U  Current Psychiatric Provider (if Applicable):   I[]I Y  I[]I N  I[]I U  Hx of Psychiatric " "Hospitalization:   I[]I Y  I[]I N  I[]I U  Hx of Outpatient Psychiatric Treatment (psychiatry/psychotherapy):   I[]I Y  I[]I N  I[]I U  Psychotropic Trials: ***  I[]I Y  I[]I N  I[]I U  Prior Suicide Attempts:   I[]I Y  I[]I N  I[]I U  Hx of Suicidal Ideation:   I[]I Y  I[]I N  I[]I U  Hx of Homicidal Ideation:   I[]I Y  I[]I N  I[]I U  Hx of Self-Injurious Behavior (Non-Suicidal):   I[]I Y  I[]I N  I[]I U  Hx of Violence:   I[]I Y  I[]I N  I[]I U  Documented Hx of Malingering:      FAMILY HISTORY:  I[]I Y  I[]I N  I[]I U    ***     I[]I Y  I[]I N  I[]I U  Hx of Trauma/Neglect:   I[]I Y  I[]I N  I[]I U  Hx of Physical Abuse:   I[]I Y  I[]I N  I[]I U  Hx of Sexual Abuse:   I[]I Y  I[]I N  I[]I U  Happy Childhood:   I[]I Y  I[]I N  I[]I U  Significant Developmental Delay/Disability:   I[]I Y  I[]I N  I[]I U  GED/High School Diploma or Beyond:   I[]I Y  I[]I N  I[]I U  Currently Employed:   I[]I Y  I[]I N  I[]I U  On or Applying for Disability:   I[]I Y  I[]I N  I[]I U  Functions Independently:   I[]I Y  I[]I N  I[]I U  Financially Stable:   I[]I Y  I[]I N  I[]I U  Domiciled:   I[]I Y  I[]I N  I[]I U  Lives Alone:   I[]I Y  I[]I N  I[]I U  Intact Support System:   I[]I Y  I[]I N  I[]I U  Heterosexual/Cisgender:   I[]I Y  I[]I N  I[]I U  Currently in a Romantic Relationship:   I[]I Y  I[]I N  I[]I U  Ever :   I[]I Y  I[]I N  I[]I U  Children/Dependents:   I[]I Y  I[]I N  I[]I U  Synagogue/Spiritual:   I[]I Y  I[]I N  I[]I U   History:   I[]I Y  I[]I N  I[]I U  Current Legal Issues:   I[]I Y  I[]I N  I[]I U  Past Charges/Convictions:   I[]I Y  I[]I N  I[]I U  Hx of Incarceration:   I[]I Y  I[]I N  I[]I U  Access to a Gun?: ***  {XX-GunSafety:79671::"NOTE: patient counseled on gun safety, including safe storage.","NOTE: patient counseled on inherent risks associated with gun ownership."}     I[]I Y  I[]I N  I[]I U  Hx of Seizure:   I[]I Y  I[]I N  I[]I U  Hx of Significant Head Injury (e.g., Loss of " "Consciousness, Concussion, Coma):   I[]I Y  I[]I N  I[]I U  Medical History & Diagnoses:           Allergies:  Penicillins        EXAMINATION:      /66   Pulse 106   Temp 98.5 °F (36.9 °C) (Oral)   Resp 18   Ht 6' (1.829 m)   Wt 65.3 kg (143 lb 15.4 oz)   SpO2 97%   BMI 19.52 kg/m²      MENTAL STATUS EXAMINATION:  General Appearance: ** {Free Text:82536::"***"} {Drop Down:38379}  Behavior: ** {Free Text:40347::"***"} {Drop Down:74739}  Involuntary Movements and Motor Activity: ** {Free Text:37207::"***"} {Drop Down:35896}  Gait and Station: ** {Free Text:99963::"***"} {Drop Down:26106}  Speech and Language: ** {Free Text:56872::"***"} {Drop Down:83082}  Mood: "***"  Affect: ** {Free Text:63252::"***"} {Drop Down:03173}  Thought Process and Associations: ** {Free Text:21507::"***"} {Drop Down:74875}  Thought Content and Perceptions: ** {Free Text:59371::"***"} {Drop Down:31711}  Sensorium: ** {Free Text:12005::"***"} {Drop Down:76163}  Recent and Remote Memory: ** {Free Text:36820::"***"} {Drop Down:29827}  Attention and Concentration: ** {Free Text:99440::"***"} {Drop Down:98996}  Fund of Knowledge: ** {Free Text:44108::"***"} {Drop Down:69745}  Insight: ** {Free Text:50140::"***"} {Drop Down:40286}  Judgment: ** {Free Text:06193::"***"} {Drop Down:01501}        RISK MANAGEMENT:      I[]I Y  I[]I N  I[]I U  I[]I A  Suicidal Ideation/Behavior: ** {Specifiers:16698}  I[]I Y  I[]I N  I[]I U  I[]I A  Homicidal Ideation/Behavior: **  I[]I Y  I[]I N  I[]I U  I[]I A  Violence: **  I[]I Y  I[]I N  I[]I U  I[]I A  Self-Injurious Behavior: **     The patient is deemed to be {Reliability:14256}.     I[]I Y  I[]I N  I[]I U  I[]I A  I[]I N/A  Minimization of Risk Parameters Suspected/Evident: **  I[]I Y  I[]I N  I[]I U  I[]I A  I[]I N/A  Exaggeration of Risk Parameters Suspected/Evident: **  ***     [] Y  [] N  Danger to Self:   [] Y  [] N  Danger to Others:   [] Y  [] N  Grave Disability:               "

## 2025-03-25 NOTE — ASSESSMENT & PLAN NOTE
WBC 22, CBC otherwise unremarkable. No other SIRS criteria, no localizing symptoms for infectious source. Repeat CBC ordered.   - mild hypotension to 110/50s, LA ordered.  - f/u UA, CXR.

## 2025-03-25 NOTE — ASSESSMENT & PLAN NOTE
- CIWA monitoring  - Ativan 2mg q4 PRN if 2 criteria are met: Systolic BP>160, Diastolic BP >110 or Pulse >110 or CIWA > 8  - Recent inpatient psych admission for suicide attempt with klonopin, required librium taper.   - librium taper with ativan PRN

## 2025-03-25 NOTE — ASSESSMENT & PLAN NOTE
Na 111 on arrival, worsened to 106 following 500mL NS bolus. Serum chloride undetectable (<70). Suspect SIADH 2/2 restarting lexapro vs beer potomania given minimal solute intake in setting of increasingly frequent alcohol use. Pt denies polydipsia. Doubt hypovolemic hyponatremia given poor response to IVF.  - pending urine studies. If urine Na appropriately low, likely beer potomania.    - urine Na, urine/serum osm, urine Cl   - no seizure, headache, or confusion albeit inebriated. Will defer hypertonic saline pending further workup given risk of overcorrection if beer potomania/primary polydipsia. Will consider if Na continues to downtrend  - holding lexapro, briefly taking in 2021, restarted after recent discharge.   - holding home HCTZ, possibly explains hypochloremia, but has been taking without issue previously.   - 1L fluid restriction  - Na goal 112  - BMP q4h

## 2025-03-26 LAB
ABSOLUTE EOSINOPHIL (OHS): 0.03 K/UL
ABSOLUTE MONOCYTE (OHS): 0.94 K/UL (ref 0.3–1)
ABSOLUTE NEUTROPHIL COUNT (OHS): 8.16 K/UL (ref 1.8–7.7)
ALBUMIN SERPL BCP-MCNC: 3.2 G/DL (ref 3.5–5.2)
ALP SERPL-CCNC: 94 UNIT/L (ref 40–150)
ALT SERPL W/O P-5'-P-CCNC: 10 UNIT/L (ref 10–44)
ANION GAP (OHS): 10 MMOL/L (ref 8–16)
ANION GAP (OHS): 11 MMOL/L (ref 8–16)
ANION GAP (OHS): 12 MMOL/L (ref 8–16)
AST SERPL-CCNC: 25 UNIT/L (ref 11–45)
BASOPHILS # BLD AUTO: 0.01 K/UL
BASOPHILS NFR BLD AUTO: 0.1 %
BILIRUB SERPL-MCNC: 0.4 MG/DL (ref 0.1–1)
BUN SERPL-MCNC: 22 MG/DL (ref 8–23)
BUN SERPL-MCNC: 23 MG/DL (ref 8–23)
BUN SERPL-MCNC: 24 MG/DL (ref 8–23)
CALCIUM SERPL-MCNC: 8.2 MG/DL (ref 8.7–10.5)
CALCIUM SERPL-MCNC: 8.5 MG/DL (ref 8.7–10.5)
CALCIUM SERPL-MCNC: 8.7 MG/DL (ref 8.7–10.5)
CHLORIDE SERPL-SCNC: 89 MMOL/L (ref 95–110)
CHLORIDE SERPL-SCNC: 93 MMOL/L (ref 95–110)
CHLORIDE SERPL-SCNC: 98 MMOL/L (ref 95–110)
CO2 SERPL-SCNC: 20 MMOL/L (ref 23–29)
CO2 SERPL-SCNC: 22 MMOL/L (ref 23–29)
CO2 SERPL-SCNC: 24 MMOL/L (ref 23–29)
CREAT SERPL-MCNC: 0.7 MG/DL (ref 0.5–1.4)
CREAT SERPL-MCNC: 0.8 MG/DL (ref 0.5–1.4)
CREAT SERPL-MCNC: 0.8 MG/DL (ref 0.5–1.4)
ERYTHROCYTE [DISTWIDTH] IN BLOOD BY AUTOMATED COUNT: 15.1 % (ref 11.5–14.5)
GFR SERPLBLD CREATININE-BSD FMLA CKD-EPI: >60 ML/MIN/1.73/M2
GLUCOSE SERPL-MCNC: 107 MG/DL (ref 70–110)
GLUCOSE SERPL-MCNC: 109 MG/DL (ref 70–110)
GLUCOSE SERPL-MCNC: 81 MG/DL (ref 70–110)
HCT VFR BLD AUTO: 37.3 % (ref 37–48.5)
HGB BLD-MCNC: 13.2 GM/DL (ref 12–16)
IMM GRANULOCYTES # BLD AUTO: 0.06 K/UL (ref 0–0.04)
IMM GRANULOCYTES NFR BLD AUTO: 0.5 % (ref 0–0.5)
LYMPHOCYTES # BLD AUTO: 1.98 K/UL (ref 1–4.8)
MAGNESIUM SERPL-MCNC: 3.5 MG/DL (ref 1.6–2.6)
MCH RBC QN AUTO: 32.3 PG (ref 27–50)
MCHC RBC AUTO-ENTMCNC: 35.4 G/DL (ref 32–36)
MCV RBC AUTO: 91 FL (ref 82–98)
NUCLEATED RBC (/100WBC) (OHS): 0 /100 WBC
PHOSPHATE SERPL-MCNC: 2.2 MG/DL (ref 2.7–4.5)
PLATELET # BLD AUTO: 353 K/UL (ref 150–450)
PMV BLD AUTO: 8.9 FL (ref 9.2–12.9)
POTASSIUM SERPL-SCNC: 3.4 MMOL/L (ref 3.5–5.1)
POTASSIUM SERPL-SCNC: 3.5 MMOL/L (ref 3.5–5.1)
POTASSIUM SERPL-SCNC: 3.7 MMOL/L (ref 3.5–5.1)
PROT SERPL-MCNC: 6.3 GM/DL (ref 6–8.4)
RBC # BLD AUTO: 4.09 M/UL (ref 4–5.4)
RELATIVE EOSINOPHIL (OHS): 0.3 %
RELATIVE LYMPHOCYTE (OHS): 17.7 % (ref 18–48)
RELATIVE MONOCYTE (OHS): 8.4 % (ref 4–15)
RELATIVE NEUTROPHIL (OHS): 73 % (ref 38–73)
SODIUM SERPL-SCNC: 124 MMOL/L (ref 136–145)
SODIUM SERPL-SCNC: 127 MMOL/L (ref 136–145)
SODIUM SERPL-SCNC: 128 MMOL/L (ref 136–145)
WBC # BLD AUTO: 11.18 K/UL (ref 3.9–12.7)

## 2025-03-26 PROCEDURE — 25000003 PHARM REV CODE 250

## 2025-03-26 PROCEDURE — 85025 COMPLETE CBC W/AUTO DIFF WBC: CPT

## 2025-03-26 PROCEDURE — 99233 SBSQ HOSP IP/OBS HIGH 50: CPT | Mod: ,,, | Performed by: INTERNAL MEDICINE

## 2025-03-26 PROCEDURE — 63700000 PHARM REV CODE 250 ALT 637 W/O HCPCS

## 2025-03-26 PROCEDURE — 11000001 HC ACUTE MED/SURG PRIVATE ROOM

## 2025-03-26 PROCEDURE — 80048 BASIC METABOLIC PNL TOTAL CA: CPT | Mod: XB | Performed by: INTERNAL MEDICINE

## 2025-03-26 PROCEDURE — 80053 COMPREHEN METABOLIC PANEL: CPT

## 2025-03-26 PROCEDURE — 63600175 PHARM REV CODE 636 W HCPCS

## 2025-03-26 PROCEDURE — 84100 ASSAY OF PHOSPHORUS: CPT

## 2025-03-26 PROCEDURE — 21400001 HC TELEMETRY ROOM

## 2025-03-26 PROCEDURE — 83735 ASSAY OF MAGNESIUM: CPT

## 2025-03-26 PROCEDURE — 80051 ELECTROLYTE PANEL: CPT

## 2025-03-26 PROCEDURE — 51798 US URINE CAPACITY MEASURE: CPT

## 2025-03-26 PROCEDURE — 94761 N-INVAS EAR/PLS OXIMETRY MLT: CPT

## 2025-03-26 RX ORDER — SODIUM,POTASSIUM PHOSPHATES 280-250MG
2 POWDER IN PACKET (EA) ORAL ONCE
Status: COMPLETED | OUTPATIENT
Start: 2025-03-26 | End: 2025-03-26

## 2025-03-26 RX ORDER — POTASSIUM CHLORIDE 7.45 MG/ML
10 INJECTION INTRAVENOUS
Status: COMPLETED | OUTPATIENT
Start: 2025-03-26 | End: 2025-03-26

## 2025-03-26 RX ORDER — LOPERAMIDE HYDROCHLORIDE 2 MG/1
2 CAPSULE ORAL 4 TIMES DAILY PRN
Status: DISCONTINUED | OUTPATIENT
Start: 2025-03-26 | End: 2025-03-30 | Stop reason: HOSPADM

## 2025-03-26 RX ORDER — ACETAMINOPHEN 325 MG/1
650 TABLET ORAL EVERY 6 HOURS PRN
Status: DISCONTINUED | OUTPATIENT
Start: 2025-03-26 | End: 2025-03-30 | Stop reason: HOSPADM

## 2025-03-26 RX ORDER — LORAZEPAM 1 MG/1
1 TABLET ORAL EVERY 4 HOURS PRN
Status: DISCONTINUED | OUTPATIENT
Start: 2025-03-26 | End: 2025-03-30 | Stop reason: HOSPADM

## 2025-03-26 RX ADMIN — CYANOCOBALAMIN TAB 250 MCG 250 MCG: 250 TAB at 08:03

## 2025-03-26 RX ADMIN — POTASSIUM CHLORIDE 10 MEQ: 7.46 INJECTION, SOLUTION INTRAVENOUS at 04:03

## 2025-03-26 RX ADMIN — POTASSIUM CHLORIDE 10 MEQ: 7.46 INJECTION, SOLUTION INTRAVENOUS at 07:03

## 2025-03-26 RX ADMIN — CHLORDIAZEPOXIDE HYDROCHLORIDE 50 MG: 25 CAPSULE ORAL at 12:03

## 2025-03-26 RX ADMIN — FOLIC ACID 1 MG: 1 TABLET ORAL at 08:03

## 2025-03-26 RX ADMIN — CHLORDIAZEPOXIDE HYDROCHLORIDE 50 MG: 25 CAPSULE ORAL at 07:03

## 2025-03-26 RX ADMIN — ACETAMINOPHEN 650 MG: 325 TABLET ORAL at 05:03

## 2025-03-26 RX ADMIN — CHLORDIAZEPOXIDE HYDROCHLORIDE 50 MG: 25 CAPSULE ORAL at 11:03

## 2025-03-26 RX ADMIN — THERA TABS 1 TABLET: TAB at 08:03

## 2025-03-26 RX ADMIN — LOPERAMIDE HYDROCHLORIDE 2 MG: 2 CAPSULE ORAL at 05:03

## 2025-03-26 RX ADMIN — CHLORDIAZEPOXIDE HYDROCHLORIDE 50 MG: 25 CAPSULE ORAL at 05:03

## 2025-03-26 RX ADMIN — LORAZEPAM 1 MG: 1 TABLET ORAL at 10:03

## 2025-03-26 RX ADMIN — LORAZEPAM 1 MG: 1 TABLET ORAL at 06:03

## 2025-03-26 RX ADMIN — ACETAMINOPHEN 650 MG: 325 TABLET ORAL at 08:03

## 2025-03-26 RX ADMIN — AZITHROMYCIN DIHYDRATE 500 MG: 250 TABLET ORAL at 08:03

## 2025-03-26 RX ADMIN — CEFTRIAXONE SODIUM 2 G: 2 INJECTION, POWDER, FOR SOLUTION INTRAMUSCULAR; INTRAVENOUS at 11:03

## 2025-03-26 RX ADMIN — ENOXAPARIN SODIUM 40 MG: 40 INJECTION SUBCUTANEOUS at 04:03

## 2025-03-26 RX ADMIN — LORAZEPAM 2 MG: 2 INJECTION INTRAMUSCULAR; INTRAVENOUS at 06:03

## 2025-03-26 RX ADMIN — POTASSIUM & SODIUM PHOSPHATES POWDER PACK 280-160-250 MG 2 PACKET: 280-160-250 PACK at 08:03

## 2025-03-26 RX ADMIN — Medication 100 MG: at 08:03

## 2025-03-26 RX ADMIN — SODIUM CHLORIDE 500 ML: 9 INJECTION, SOLUTION INTRAVENOUS at 08:03

## 2025-03-26 RX ADMIN — POTASSIUM CHLORIDE 10 MEQ: 7.46 INJECTION, SOLUTION INTRAVENOUS at 08:03

## 2025-03-26 RX ADMIN — POTASSIUM BICARBONATE 40 MEQ: 391 TABLET, EFFERVESCENT ORAL at 04:03

## 2025-03-26 RX ADMIN — LORAZEPAM 2 MG: 2 INJECTION INTRAMUSCULAR; INTRAVENOUS at 02:03

## 2025-03-26 RX ADMIN — POTASSIUM CHLORIDE 10 MEQ: 7.46 INJECTION, SOLUTION INTRAVENOUS at 05:03

## 2025-03-26 RX ADMIN — LOPERAMIDE HYDROCHLORIDE 2 MG: 2 CAPSULE ORAL at 08:03

## 2025-03-26 NOTE — PLAN OF CARE
MICU DAILY GOALS     Family/Goals of care/Code Status   Code Status: Full Code    24H Vital Sign Range  Temp:  [97.7 °F (36.5 °C)-98.7 °F (37.1 °C)]   Pulse:  [59-97]   Resp:  [16-38]   BP: ()/(50-88)   SpO2:  [84 %-100 %]      Shift Events (include procedures and significant events)   Pt remains alert and oriented x4, however is restless/verbalizing feelings of anxiety. Has attempted to crawl out of bed multiple times even after redirection. Pt managed to pull out a PIV, unable to recall why she did it. PRN ativan administered twice this shift d/t indication on MAR. Sodium 124 this AM. Critical potassium of 2.6 now 3.4 this AM, receiving more replacements. Bed alarm on.    AWAKE RASS: Goal -    Actual -      Restraint necessity: Not necessary   BREATHE SBT: NA    Coordinate A & B, analgesics/sedatives Pain: managed   SAT: Not intubated   Delirium CAM-ICU:     Early(intubated/ Progressive (non-intubated) Mobility MOVE Screen (INTUBATED ONLY): Not intubated    Activity: Activity Management: Ankle pumps - L1, Arm raise - L1   Feeding/Nutrition Diet order: Diet/Nutrition Received: regular,     Thrombus DVT prophylaxis: VTE Core Measure: Pharmacological prophylaxis initiated/maintained   HOB Elevation Head of Bed (HOB) Positioning: HOB at 30-45 degrees   Ulcer Prophylaxis GI: yes   Glucose control managed     Skin Skin assessment:     Sacrum intact/not altered? Yes  Heels intact/not altered? Yes  Surgical wound? No    CHECK ONE!   (no altered skin or altered skin) and sub boxes:  [] No Altered Skin Integrity Present    []Prevention Measures Documented    [x] Altered Skin Integrity Present or Discovered   [] LDA present in EPIC, daily doc completed              [] LDA added if not in EPIC (describe wound).                    When describing wound, do not stage, use descriptive words only.    [] Wound Image Taken (required on admit,                   transfer/discharge and every Tuesday)    Wound Care Consulted? No    Bowel Function no issues    Indwelling Catheter Necessity [REMOVED]      Urethral Catheter 03/25/25 2900-Reason for Continuing Urinary Catheterization: Critically ill in ICU and requiring hourly monitoring of intake/output          De-escalation Antibiotics No        VS and assessment per flow sheet, patient progressing towards goals as tolerated, plan of care reviewed with patient, all concerns addressed, will continue to monitor.

## 2025-03-26 NOTE — SUBJECTIVE & OBJECTIVE
Interval History/Significant Events: overnight pt was restless/verbalizing feelings of anxiety. Attempted to crawl out of bed multiple times even after redirection. PRN ativan administered twice. Potassium of 2.6 repleted. Today pt complaining of generalized body aches. Ordered tylenol. Continuing NS boluses for hyponatremia.     Review of Systems   Constitutional:  Negative for diaphoresis and fever.   HENT:  Negative for congestion.    Respiratory:  Negative for shortness of breath.    Cardiovascular:  Negative for chest pain.   Gastrointestinal:  Negative for abdominal distention, constipation, diarrhea, nausea and vomiting.   Genitourinary:  Negative for dysuria.   Musculoskeletal:  Positive for myalgias.   Neurological:  Negative for headaches.   Psychiatric/Behavioral:  Negative for agitation.      Objective:     Vital Signs (Most Recent):  Temp: 97.6 °F (36.4 °C) (03/26/25 0701)  Pulse: 60 (03/26/25 0701)  Resp: (!) 28 (03/26/25 0701)  BP: 136/63 (03/26/25 0701)  SpO2: 100 % (03/26/25 0701) Vital Signs (24h Range):  Temp:  [97.6 °F (36.4 °C)-98.7 °F (37.1 °C)] 97.6 °F (36.4 °C)  Pulse:  [59-97] 60  Resp:  [17-38] 28  SpO2:  [84 %-100 %] 100 %  BP: ()/(50-88) 136/63   Weight: 54 kg (119 lb)  Body mass index is 19.8 kg/m².      Intake/Output Summary (Last 24 hours) at 3/26/2025 0829  Last data filed at 3/26/2025 0701  Gross per 24 hour   Intake 2661.95 ml   Output 2545 ml   Net 116.95 ml          Physical Exam  HENT:      Head: Normocephalic and atraumatic.   Eyes:      Extraocular Movements: Extraocular movements intact.      Conjunctiva/sclera: Conjunctivae normal.      Pupils: Pupils are equal, round, and reactive to light.   Cardiovascular:      Rate and Rhythm: Normal rate and regular rhythm.      Heart sounds: No murmur heard.  Pulmonary:      Effort: Pulmonary effort is normal. No respiratory distress.   Abdominal:      General: Abdomen is flat. Bowel sounds are normal. There is no distension.       Tenderness: There is no abdominal tenderness.   Musculoskeletal:      Right lower leg: No edema.      Left lower leg: No edema.   Skin:     General: Skin is warm.      Capillary Refill: Capillary refill takes less than 2 seconds.   Neurological:      General: No focal deficit present.      Mental Status: She is alert and oriented to person, place, and time.            Vents:     Lines/Drains/Airways       Peripheral Intravenous Line  Duration                  Peripheral IV - Single Lumen 03/25/25 0300 20 G Anterior;Left;Proximal Forearm 1 day         Peripheral IV - Single Lumen 03/26/25 0115 20 G Anterior;Proximal;Right Forearm <1 day                  Significant Labs:    CBC/Anemia Profile:  Recent Labs   Lab 03/25/25  0309 03/25/25  0504 03/25/25  1348 03/26/25  0438   WBC 22.16*  --  14.39* 11.18   HGB 15.2  --  14.4 13.2   HCT 41.5 45.1 39.9 37.3   *  --  354 353   MCV 87  --  90 91   RDW 14.6*  --  14.7* 15.1*        Chemistries:  Recent Labs   Lab 03/25/25  0309 03/25/25  0809 03/25/25  1634 03/25/25  1924 03/26/25  0130   *   < > 123* 123* 124*   K 3.4*   < > 3.0* 2.6* 3.4*   CL <70*   < > 89* 89* 89*   CO2 22*   < > 22* 24 24   BUN 24*   < > 19 21 23   CREATININE 1.1   < > 0.7 0.8 0.8   CALCIUM 9.2   < > 8.3* 8.3* 8.7   ALBUMIN 3.7  --   --   --  3.2*   BILITOT 0.9  --   --   --  0.4   ALKPHOS 109  --   --   --  94   ALT 12  --   --   --  10   AST 36  --   --   --  25   GLUCOSE 100   < > 83 90 81   MG 2.0  --   --   --  3.5*   PHOS  --   --   --   --  2.2*    < > = values in this interval not displayed.       All pertinent labs within the past 24 hours have been reviewed.    Significant Imaging:  I have reviewed all pertinent imaging results/findings within the past 24 hours.

## 2025-03-26 NOTE — ASSESSMENT & PLAN NOTE
H/o benzo use disorder, with suicide attempt via klonopin in Jan 2025 requiring inpatient psych admission. Discharged on lexapro and ambien for MDD and insomnia respectively. Benzos discontinued after inpatient detoxification. Restarted in March by PCP per . Has been adherent with lexapro but not ambien.   - addiction psych consulted, appreciate recs    Continue Librium taper, management as per primary.     - agree with holding Lexapro and Remeron right now given profound hyponatremia.     - Discussed rehab with patient, resources for addiction placed in d/c instructions.     - Spoke with OP PCP about prior hospitalization and current hospitalization for benzo withdrawal    - Reasonable to consider thiamine and folate supplementation d/t malnutrition    - may warrant discussion with outpatient provider when nearing discharge  - urine tox, presumptive benzo and THC  -PETH pending

## 2025-03-26 NOTE — ASSESSMENT & PLAN NOTE
WBC 22, CBC otherwise unremarkable. No other SIRS criteria, no localizing symptoms for infectious source. Mild hypotension to 110/50s. CXR, UA, and LA unremarkable. started ceftriaxone and azithromycin. Due to substance abuse, could have aspirated. Repeat CBC revealing WBC within normal limits.    - f/u blood cultures. NGTD  - continue ceftriaxone and azithromycin, EED 3/27/25

## 2025-03-26 NOTE — ASSESSMENT & PLAN NOTE
WBC 22, CBC otherwise unremarkable. No other SIRS criteria, no localizing symptoms for infectious source. Mild hypotension to 110/50s. CXR, UA, and LA unremarkable. started ceftriaxone and azithromycin. Due to substance abuse, could have aspirated. Repeat CBC revealing WBC within normal limits.    - f/u blood cultures. NGTD  - continue ceftriaxone and azithromycin

## 2025-03-26 NOTE — NURSING
MICU DAILY GOALS     Family/Goals of care/Code Status   Code Status: Full Code    24H Vital Sign Range  Temp:  [97.6 °F (36.4 °C)-97.9 °F (36.6 °C)]   Pulse:  [59-97]   Resp:  [7-39]   BP: ()/(48-63)   SpO2:  [84 %-100 %]      Shift Events (include procedures and significant events)   No acute events throughout shift    AWAKE RASS: Goal -    Actual -      Restraint necessity: Not necessary   BREATHE SBT: Not intubated    Coordinate A & B, analgesics/sedatives Pain: managed   SAT: Not intubated   Delirium CAM-ICU:     Early(intubated/ Progressive (non-intubated) Mobility MOVE Screen (INTUBATED ONLY): Not intubated    Activity: Activity Management: Rolling - L1   Feeding/Nutrition Diet order: Diet/Nutrition Received: regular,     Thrombus DVT prophylaxis: VTE Core Measure: Pharmacological prophylaxis initiated/maintained   HOB Elevation Head of Bed (HOB) Positioning: HOB at 30 degrees   Ulcer Prophylaxis GI: yes   Glucose control managed     Skin Skin assessment:     Sacrum intact/not altered? Yes  Heels intact/not altered? Yes  Surgical wound? No    CHECK ONE!   (no altered skin or altered skin) and sub boxes:  [] No Altered Skin Integrity Present    []Prevention Measures Documented    [] Altered Skin Integrity Present or Discovered   [] LDA present in EPIC, daily doc completed              [] LDA added if not in EPIC (describe wound).                    When describing wound, do not stage, use descriptive words only.    [] Wound Image Taken (required on admit,                   transfer/discharge and every Tuesday)    Wound Care Consulted? No   Bowel Function diarrhea    Indwelling Catheter Necessity [REMOVED]      Urethral Catheter 03/25/25 0647-Reason for Continuing Urinary Catheterization: Critically ill in ICU and requiring hourly monitoring of intake/output          De-escalation Antibiotics Yes        VS and assessment per flow sheet, patient progressing towards goals as tolerated, plan of care reviewed  with [unfilled], all concerns addressed, will continue to monitor.

## 2025-03-26 NOTE — ASSESSMENT & PLAN NOTE
- CIWA monitoring  - PO Ativan 1mg q4 PRN if 2 criteria are met: Systolic BP>160, Diastolic BP >110 or Pulse >110 or CIWA > 8  - Recent inpatient psych admission for suicide attempt with klonopin, required librium taper.   - librium taper with ativan PRN   - tylenol PRN

## 2025-03-26 NOTE — HOSPITAL COURSE
Started on librium taper. Addiction psych provided pt with resources. Urine studies revealing hypovolemic hyponatremia. Treating with NS boluses. Improving. BMP BID. Ordered blood cultures and started ceftriaxone and azithromycin due to elevated white count. Repeat white count WNL. Blood cultures NGTD. Set end date for abx after 3/27 dose. Ready for step down.

## 2025-03-26 NOTE — PLAN OF CARE
Hospital Medicine ICU Acceptance Note    Date of Admit: 3/25/2025  Date of Transfer / Stepdown: 3/26/2025  [place], C/J, L, Onc (IV chemo w/in 1 month), Gyn/Onc, or other special case?: No  ICU team stepping patient down: MICU  ICU team member giving handoff: MICU  Accepting  team: LILIAN    History of Present Illness:     Ms. Rowe is a 74 y.o. F with a PMHx of HLD, HTN, PUD, chronic benzodiazepine use, colectomy, anxiety, depression, prior suicide attempts who presented to the ED for concern for benzodiazepine withdrawal. She is prescribed 1mg Klonopin BID though has been taking it TID as she needs it to help her sleep. She ran out x4 days ago. She reports drinking 2 beers this morning though is not a daily drinker. She is prescribed Ambien to sleep though does not take it and takes the Klonopin instead. She had an admission in January after a suicide attempt. She took 60 tablets of Ativan at that time. She was discharged from the psychiatric facility on a Librium taper. Pt has not had anything to eat in x3 days. She lives alone and has no children. She is currently complaining of chest pain that radiates to her whole body. She has had multiple falls over the past few days.      [facility] consulted for admission for hyponatremia and benzodiazepine withdrawal.     Hospital/ICU Course:       Started on librium taper. Addiction psych provided pt with resources. Urine studies revealing hypovolemic hyponatremia. Treating with NS boluses and BMP q4h. Ordered blood cultures and started ceftriaxone and azithromycin due to elevated white count. Suspect possibility of aspiration due to substance use.     Overnight pt was restless/verbalizing feelings of anxiety. Attempted to crawl out of bed multiple times even after redirection. PRN ativan administered twice. Potassium of 2.6 repleted. Today pt complaining of generalized body aches. Ordered tylenol. Continuing NS boluses for hyponatremia.       Deemed appropriate for transfer  to the floor on 3/26/2025, and was accepted to  team G for further care and management.    Consultants and Procedures:     Consultants:  Consults (From admission, onward)          Status Ordering Provider     Inpatient consult to Psychiatry  Once        Provider:  (Not yet assigned)    Completed RASHAUN DE DIOS     Inpatient consult to Critical Care Medicine  Once        Provider:  (Not yet assigned)    Completed GENANRO HODGSON            Procedures:    As documented    Transfer Information:     Diet:  As ordered    Physical Activity:  As tolerated      Pending plan at time of transfer to the floor:  Continue current plan initiated by ICU, will further monitor and adjust as clinically indicated upon arrival to the floor.    Patient has been accepted by [facility] Medicine Team G, who will assume care of the patient upon arrival to the floor from the ICU. Please contact ICU team with any concerns prior to transfer to the floor.      Meghan Boyd MD  Attending Physician  Department of [facility] Medicine

## 2025-03-26 NOTE — ASSESSMENT & PLAN NOTE
Na 111 on arrival. urine studies revealing hypovolemic hyponatremia. S/P 2.5L NS. Now 124.   - holding lexapro, briefly taking in 2021, restarted after recent discharge.   - holding home HCTZ, possibly explains hypochloremia, but has been taking without issue previously.   - fluid boluses held. Encouraging PO intake.   - BMP BID

## 2025-03-26 NOTE — ASSESSMENT & PLAN NOTE
- CIWA monitoring  - Ativan 2mg q4 PRN if 2 criteria are met: Systolic BP>160, Diastolic BP >110 or Pulse >110 or CIWA > 8  - Recent inpatient psych admission for suicide attempt with klonopin, required librium taper.   - librium taper with ativan PRN   - tylenol PRN

## 2025-03-26 NOTE — RESIDENT HANDOFF
Handoff     Primary Team[place] [facility] [place] [facility] [place] 1 Room Number: 7075/7075 A     Patient Name: Juan Antonio Rowe MRN: [id number]     Date of Birth: [id number] Allergies: Patient has no known allergies.     Age: 74 y.o. Admit Date: 3/25/2025     Sex: female  BMI: Body mass index is 19.8 kg/m².     Code Status: Full Code        Illness Level (current clinical status): Watcher - No    Reason for Admission: Hyponatremia    Brief HPI (pertinent PMH and diagnosis or differential diagnosis):  Ms. Rowe is a 74 y.o. F with a PMHx of HLD, HTN, PUD, chronic benzodiazepine use, colectomy, anxiety, depression, prior suicide attempts who presented to the ED for concern for benzodiazepine withdrawal. She is prescribed 1mg Klonopin BID though has been taking it TID as she needs it to help her sleep. She ran out x4 days ago. She reports drinking 2 beers this morning though is not a daily drinker. She is prescribed Ambien to sleep though does not take it and takes the Klonopin instead. She had an admission in January after a suicide attempt. She took 60 tablets of Ativan at that time. She was discharged from the psychiatric facility on a Librium taper. Pt has not had anything to eat in x3 days. She lives alone and has no children. She is currently complaining of chest pain that radiates to her whole body. She has had multiple falls over the past few days.     ED: Labs revealing hyponatremia (111) and elevated white count (22). Received 5mg and 2.5mg dose of IV diazepam. Received 500cc NS bolus without response.       Hospital Course (updated, brief assessment by system or problem, significant events): [facility] consulted for admission for hyponatremia and benzodiazepine withdrawal. Started on librium taper. Addiction psych provided pt with resources. Urine studies revealing hypovolemic hyponatremia. Treating with NS boluses. Sodium improving. BMP BID. Ordered blood cultures and started ceftriaxone and azithromycin due to elevated white  count. Repeat white count WNL. Set end date for abx after 3/27 dose.     Tasks (specific, using if-then statements): Continue librium taper. Most likely does not need any more fluids for the hyponatremia, just encouraged oral intake. Abx are set to discontinue tomorrow. Low suspicion for infection.       Estimated Discharge Date: 3/31/25    Discharge Disposition: Home or Self Care    Mentored By: Dr. Adame

## 2025-03-26 NOTE — ASSESSMENT & PLAN NOTE
Na 111 on arrival. urine studies revealing hypovolemic hyponatremia. S/P 2L NS. Now 124.   - holding lexapro, briefly taking in 2021, restarted after recent discharge.   - holding home HCTZ, possibly explains hypochloremia, but has been taking without issue previously.   - continue treating with NS.  - BMP q4h

## 2025-03-26 NOTE — PROGRESS NOTES
[facility] - Medical ICU  Critical Care Medicine  Progress Note    Patient Name: Juan Antonio Rowe  MRN: [id number]  Admission Date: 3/25/2025  Hospital Length of Stay: 1 days  Code Status: Full Code  Attending Provider: Zeinab Adame MD  Primary Care Provider: Brendon Hardy MD   Principal Problem: Hyponatremia    Subjective:     HPI:  Ms. Rowe is a 74 y.o. F with a PMHx of HLD, HTN, PUD, chronic benzodiazepine use, colectomy, anxiety, depression, prior suicide attempts who presented to the ED for concern for benzodiazepine withdrawal. She is prescribed 1mg Klonopin BID though has been taking it TID as she needs it to help her sleep. She ran out x4 days ago. She reports drinking 2 beers this morning though is not a daily drinker. She is prescribed Ambien to sleep though does not take it and takes the Klonopin instead. She had an admission in January after a suicide attempt. She took 60 tablets of Ativan at that time. She was discharged from the psychiatric facility on a Librium taper. Pt has not had anything to eat in x3 days. She lives alone and has no children. She is currently complaining of chest pain that radiates to her whole body. She has had multiple falls over the past few days.     [facility] consulted for admission for hyponatremia and benzodiazepine withdrawal.     Hospital/ICU Course:  Started on librium taper. Addiction psych provided pt with resources. Urine studies revealing hypovolemic hyponatremia. Treating with NS boluses and BMP q4h. Ordered blood cultures and started ceftriaxone and azithromycin due to elevated white count. Suspect possibility of aspiration due to substance use.     Interval History/Significant Events: overnight pt was restless/verbalizing feelings of anxiety. Attempted to crawl out of bed multiple times even after redirection. PRN ativan administered twice. Potassium of 2.6 repleted. Today pt complaining of generalized body aches. Ordered tylenol. Continuing NS boluses for hyponatremia.      Review of Systems   Constitutional:  Negative for diaphoresis and fever.   HENT:  Negative for congestion.    Respiratory:  Negative for shortness of breath.    Cardiovascular:  Negative for chest pain.   Gastrointestinal:  Negative for abdominal distention, constipation, diarrhea, nausea and vomiting.   Genitourinary:  Negative for dysuria.   Musculoskeletal:  Positive for myalgias.   Neurological:  Negative for headaches.   Psychiatric/Behavioral:  Negative for agitation.      Objective:     Vital Signs (Most Recent):  Temp: 97.6 °F (36.4 °C) (03/26/25 0701)  Pulse: 60 (03/26/25 0701)  Resp: (!) 28 (03/26/25 0701)  BP: 136/63 (03/26/25 0701)  SpO2: 100 % (03/26/25 0701) Vital Signs (24h Range):  Temp:  [97.6 °F (36.4 °C)-98.7 °F (37.1 °C)] 97.6 °F (36.4 °C)  Pulse:  [59-97] 60  Resp:  [17-38] 28  SpO2:  [84 %-100 %] 100 %  BP: ()/(50-88) 136/63   Weight: 54 kg (119 lb)  Body mass index is 19.8 kg/m².      Intake/Output Summary (Last 24 hours) at 3/26/2025 0829  Last data filed at 3/26/2025 0701  Gross per 24 hour   Intake 2661.95 ml   Output 2545 ml   Net 116.95 ml          Physical Exam  HENT:      Head: Normocephalic and atraumatic.   Eyes:      Extraocular Movements: Extraocular movements intact.      Conjunctiva/sclera: Conjunctivae normal.      Pupils: Pupils are equal, round, and reactive to light.   Cardiovascular:      Rate and Rhythm: Normal rate and regular rhythm.      Heart sounds: No murmur heard.  Pulmonary:      Effort: Pulmonary effort is normal. No respiratory distress.   Abdominal:      General: Abdomen is flat. Bowel sounds are normal. There is no distension.      Tenderness: There is no abdominal tenderness.   Musculoskeletal:      Right lower leg: No edema.      Left lower leg: No edema.   Skin:     General: Skin is warm.      Capillary Refill: Capillary refill takes less than 2 seconds.   Neurological:      General: No focal deficit present.      Mental Status: She is alert and  oriented to person, place, and time.            Vents:     Lines/Drains/Airways       Peripheral Intravenous Line  Duration                  Peripheral IV - Single Lumen 03/25/25 0300 20 G Anterior;Left;Proximal Forearm 1 day         Peripheral IV - Single Lumen 03/26/25 0115 20 G Anterior;Proximal;Right Forearm <1 day                  Significant Labs:    CBC/Anemia Profile:  Recent Labs   Lab 03/25/25  0309 03/25/25  0504 03/25/25  1348 03/26/25  0438   WBC 22.16*  --  14.39* 11.18   HGB 15.2  --  14.4 13.2   HCT 41.5 45.1 39.9 37.3   *  --  354 353   MCV 87  --  90 91   RDW 14.6*  --  14.7* 15.1*        Chemistries:  Recent Labs   Lab 03/25/25  0309 03/25/25  0809 03/25/25  1634 03/25/25  1924 03/26/25  0130   *   < > 123* 123* 124*   K 3.4*   < > 3.0* 2.6* 3.4*   CL <70*   < > 89* 89* 89*   CO2 22*   < > 22* 24 24   BUN 24*   < > 19 21 23   CREATININE 1.1   < > 0.7 0.8 0.8   CALCIUM 9.2   < > 8.3* 8.3* 8.7   ALBUMIN 3.7  --   --   --  3.2*   BILITOT 0.9  --   --   --  0.4   ALKPHOS 109  --   --   --  94   ALT 12  --   --   --  10   AST 36  --   --   --  25   GLUCOSE 100   < > 83 90 81   MG 2.0  --   --   --  3.5*   PHOS  --   --   --   --  2.2*    < > = values in this interval not displayed.       All pertinent labs within the past 24 hours have been reviewed.    Significant Imaging:  I have reviewed all pertinent imaging results/findings within the past 24 hours.    ABG  Recent Labs   Lab 03/25/25  0504   PH 7.515   PO2 37.7   PCO2 38.5   HCO3 30.8     Assessment/Plan:     Psychiatric  Severe episode of recurrent major depressive disorder  MDD. Severe caregiver burden with chronically ill mother, complicated grief with death of spouse. Sister with bipolar. Slightly pressured speech on exam. Complicated by benzo use. See substance use disorder    Benzodiazepine withdrawal  - CIWA monitoring  - Ativan 2mg q4 PRN if 2 criteria are met: Systolic BP>160, Diastolic BP >110 or Pulse >110 or CIWA > 8  -  Recent inpatient psych admission for suicide attempt with klonopin, required librium taper.   - librium taper with ativan PRN   - tylenol PRN     Substance use disorder  H/o benzo use disorder, with suicide attempt via klonopin in Jan 2025 requiring inpatient psych admission. Discharged on lexapro and ambien for MDD and insomnia respectively. Benzos discontinued after inpatient detoxification. Restarted in March by PCP per . Has been adherent with lexapro but not ambien.   - addiction psych consulted, appreciate recs    Continue Librium taper, management as per primary.     - agree with holding Lexapro and Remeron right now given profound hyponatremia.     - Discussed rehab with patient, resources for addiction placed in d/c instructions.     - Spoke with OP PCP about prior hospitalization and current hospitalization for benzo withdrawal    - Reasonable to consider thiamine and folate supplementation d/t malnutrition    - may warrant discussion with outpatient provider when nearing discharge  - urine tox, presumptive benzo and THC  -PETH pending     Cardiac/Vascular  Hypertension  Hold home lisinopril-HCTZ    Oncology  Leukocytosis  WBC 22, CBC otherwise unremarkable. No other SIRS criteria, no localizing symptoms for infectious source. Mild hypotension to 110/50s. CXR, UA, and LA unremarkable. started ceftriaxone and azithromycin. Due to substance abuse, could have aspirated. Repeat CBC revealing WBC within normal limits.    - f/u blood cultures. NGTD  - continue ceftriaxone and azithromycin     Endocrine  * Hyponatremia  Na 111 on arrival. urine studies revealing hypovolemic hyponatremia. S/P 2L NS. Now 124.   - holding lexapro, briefly taking in 2021, restarted after recent discharge.   - holding home HCTZ, possibly explains hypochloremia, but has been taking without issue previously.   - continue treating with NS.  - BMP q4h       Critical Care Daily Checklist:    A: Awake: RASS Goal/Actual Goal:    Actual:      B: Spontaneous Breathing Trial Performed?     C: SAT & SBT Coordinated?  NA                      D: Delirium: CAM-ICU     E: Early Mobility Performed? Yes   F: Feeding Goal:    Status:     Current Diet Order   Procedures    Diet Adult Regular      AS: Analgesia/Sedation Tylenol, librium taper    T: Thromboembolic Prophylaxis enoxaparin   H: HOB > 300 Yes   U: Stress Ulcer Prophylaxis (if needed) NA   G: Glucose Control NA   B: Bowel Function Stool Occurrence: 1   I: Indwelling Catheter (Lines & Pugh) Necessity PIV    D: De-escalation of Antimicrobials/Pharmacotherapies None     Plan for the day/ETD Correct hyponatremia. Librium taper     Code Status:  Family/Goals of Care: Full Code  Yes        Critical secondary to Patient has a condition that poses threat to life and bodily function: hyponatremia       Critical care was time spent personally by me on the following activities: development of treatment plan with patient or surrogate and bedside caregivers, discussions with consultants, evaluation of patient's response to treatment, examination of patient, ordering and performing treatments and interventions, ordering and review of laboratory studies, ordering and review of radiographic studies, pulse oximetry, re-evaluation of patient's condition. This critical care time did not overlap with that of any other provider or involve time for any procedures.     Mishel Saenz MD  Critical Care Medicine  [facility] - Medical ICU

## 2025-03-27 LAB
ABSOLUTE EOSINOPHIL (OHS): 0.15 K/UL
ABSOLUTE MONOCYTE (OHS): 0.98 K/UL (ref 0.3–1)
ABSOLUTE NEUTROPHIL COUNT (OHS): 8.68 K/UL (ref 1.8–7.7)
BASOPHILS # BLD AUTO: 0.06 K/UL
BASOPHILS NFR BLD AUTO: 0.4 %
ERYTHROCYTE [DISTWIDTH] IN BLOOD BY AUTOMATED COUNT: 16.1 % (ref 11.5–14.5)
HCT VFR BLD AUTO: 40.4 % (ref 37–48.5)
HGB BLD-MCNC: 13.6 GM/DL (ref 12–16)
IMM GRANULOCYTES # BLD AUTO: 0.05 K/UL (ref 0–0.04)
IMM GRANULOCYTES NFR BLD AUTO: 0.4 % (ref 0–0.5)
LYMPHOCYTES # BLD AUTO: 4.26 K/UL (ref 1–4.8)
MAGNESIUM SERPL-MCNC: 2.3 MG/DL (ref 1.6–2.6)
MCH RBC QN AUTO: 32.2 PG (ref 27–50)
MCHC RBC AUTO-ENTMCNC: 33.7 G/DL (ref 32–36)
MCV RBC AUTO: 96 FL (ref 82–98)
NUCLEATED RBC (/100WBC) (OHS): 0 /100 WBC
PHOSPHATE SERPL-MCNC: 2 MG/DL (ref 2.7–4.5)
PLATELET # BLD AUTO: 444 K/UL (ref 150–450)
PLPETH BLD-MCNC: 56 NG/ML
PMV BLD AUTO: 8.7 FL (ref 9.2–12.9)
POPETH BLD-MCNC: 71 NG/ML
RBC # BLD AUTO: 4.23 M/UL (ref 4–5.4)
RELATIVE EOSINOPHIL (OHS): 1.1 %
RELATIVE LYMPHOCYTE (OHS): 30 % (ref 18–48)
RELATIVE MONOCYTE (OHS): 6.9 % (ref 4–15)
RELATIVE NEUTROPHIL (OHS): 61.2 % (ref 38–73)
TOXICOLOGIST REVIEW: NORMAL
WBC # BLD AUTO: 14.18 K/UL (ref 3.9–12.7)

## 2025-03-27 PROCEDURE — 63700000 PHARM REV CODE 250 ALT 637 W/O HCPCS

## 2025-03-27 PROCEDURE — 25000003 PHARM REV CODE 250

## 2025-03-27 PROCEDURE — 21400001 HC TELEMETRY ROOM

## 2025-03-27 PROCEDURE — 63600175 PHARM REV CODE 636 W HCPCS: Performed by: FAMILY MEDICINE

## 2025-03-27 PROCEDURE — 63600175 PHARM REV CODE 636 W HCPCS

## 2025-03-27 PROCEDURE — 36415 COLL VENOUS BLD VENIPUNCTURE: CPT

## 2025-03-27 PROCEDURE — 85025 COMPLETE CBC W/AUTO DIFF WBC: CPT

## 2025-03-27 PROCEDURE — 25000003 PHARM REV CODE 250: Performed by: INTERNAL MEDICINE

## 2025-03-27 PROCEDURE — 83735 ASSAY OF MAGNESIUM: CPT

## 2025-03-27 PROCEDURE — 99232 SBSQ HOSP IP/OBS MODERATE 35: CPT | Mod: ,,,

## 2025-03-27 PROCEDURE — 84100 ASSAY OF PHOSPHORUS: CPT

## 2025-03-27 PROCEDURE — 25000003 PHARM REV CODE 250: Performed by: HOSPITALIST

## 2025-03-27 RX ORDER — ONDANSETRON HYDROCHLORIDE 2 MG/ML
4 INJECTION, SOLUTION INTRAVENOUS EVERY 6 HOURS PRN
Status: DISCONTINUED | OUTPATIENT
Start: 2025-03-27 | End: 2025-03-27

## 2025-03-27 RX ORDER — ONDANSETRON 4 MG/1
4 TABLET, ORALLY DISINTEGRATING ORAL EVERY 6 HOURS PRN
Status: DISCONTINUED | OUTPATIENT
Start: 2025-03-27 | End: 2025-03-28

## 2025-03-27 RX ORDER — LISINOPRIL 5 MG/1
5 TABLET ORAL DAILY
Status: DISCONTINUED | OUTPATIENT
Start: 2025-03-27 | End: 2025-03-30 | Stop reason: HOSPADM

## 2025-03-27 RX ORDER — LORAZEPAM 2 MG/ML
1 INJECTION INTRAMUSCULAR ONCE
Status: COMPLETED | OUTPATIENT
Start: 2025-03-27 | End: 2025-03-27

## 2025-03-27 RX ORDER — SODIUM CHLORIDE 9 MG/ML
INJECTION, SOLUTION INTRAVENOUS CONTINUOUS
Status: DISCONTINUED | OUTPATIENT
Start: 2025-03-27 | End: 2025-03-27

## 2025-03-27 RX ORDER — LORAZEPAM 2 MG/ML
3 INJECTION INTRAMUSCULAR
Status: DISCONTINUED | OUTPATIENT
Start: 2025-03-27 | End: 2025-03-30 | Stop reason: HOSPADM

## 2025-03-27 RX ORDER — CHLORDIAZEPOXIDE HYDROCHLORIDE 25 MG/1
50 CAPSULE, GELATIN COATED ORAL ONCE
Status: COMPLETED | OUTPATIENT
Start: 2025-03-27 | End: 2025-03-27

## 2025-03-27 RX ORDER — OLANZAPINE 5 MG/1
10 TABLET, ORALLY DISINTEGRATING ORAL NIGHTLY
Status: DISCONTINUED | OUTPATIENT
Start: 2025-03-27 | End: 2025-03-27

## 2025-03-27 RX ORDER — OLANZAPINE 5 MG/1
5 TABLET, ORALLY DISINTEGRATING ORAL EVERY 6 HOURS PRN
Status: DISCONTINUED | OUTPATIENT
Start: 2025-03-27 | End: 2025-03-30 | Stop reason: HOSPADM

## 2025-03-27 RX ADMIN — LORAZEPAM 1 MG: 2 INJECTION INTRAMUSCULAR; INTRAVENOUS at 12:03

## 2025-03-27 RX ADMIN — CYANOCOBALAMIN TAB 250 MCG 250 MCG: 250 TAB at 07:03

## 2025-03-27 RX ADMIN — OLANZAPINE 5 MG: 5 TABLET, ORALLY DISINTEGRATING ORAL at 07:03

## 2025-03-27 RX ADMIN — CEFTRIAXONE SODIUM 2 G: 2 INJECTION, POWDER, FOR SOLUTION INTRAMUSCULAR; INTRAVENOUS at 12:03

## 2025-03-27 RX ADMIN — CHLORDIAZEPOXIDE HYDROCHLORIDE 50 MG: 25 CAPSULE ORAL at 03:03

## 2025-03-27 RX ADMIN — LISINOPRIL 5 MG: 5 TABLET ORAL at 02:03

## 2025-03-27 RX ADMIN — CHLORDIAZEPOXIDE HYDROCHLORIDE 50 MG: 25 CAPSULE ORAL at 02:03

## 2025-03-27 RX ADMIN — ENOXAPARIN SODIUM 40 MG: 40 INJECTION SUBCUTANEOUS at 04:03

## 2025-03-27 RX ADMIN — CHLORDIAZEPOXIDE HYDROCHLORIDE 25 MG: 25 CAPSULE ORAL at 11:03

## 2025-03-27 RX ADMIN — ONDANSETRON 4 MG: 4 TABLET, ORALLY DISINTEGRATING ORAL at 09:03

## 2025-03-27 RX ADMIN — Medication 100 MG: at 07:03

## 2025-03-27 RX ADMIN — AZITHROMYCIN DIHYDRATE 500 MG: 250 TABLET ORAL at 07:03

## 2025-03-27 RX ADMIN — LORAZEPAM 1 MG: 1 TABLET ORAL at 07:03

## 2025-03-27 RX ADMIN — SODIUM CHLORIDE: 9 INJECTION, SOLUTION INTRAVENOUS at 12:03

## 2025-03-27 RX ADMIN — FOLIC ACID 1 MG: 1 TABLET ORAL at 07:03

## 2025-03-27 RX ADMIN — CHLORDIAZEPOXIDE HYDROCHLORIDE 25 MG: 25 CAPSULE ORAL at 12:03

## 2025-03-27 RX ADMIN — CHLORDIAZEPOXIDE HYDROCHLORIDE 25 MG: 25 CAPSULE ORAL at 06:03

## 2025-03-27 RX ADMIN — THERA TABS 1 TABLET: TAB at 07:03

## 2025-03-27 RX ADMIN — LOPERAMIDE HYDROCHLORIDE 2 MG: 2 CAPSULE ORAL at 01:03

## 2025-03-27 NOTE — ASSESSMENT & PLAN NOTE
Patient's blood pressure range in the last 24 hours was: BP  Min: 102/51  Max: 154/70.The patient's inpatient anti-hypertensive regimen is listed below:  Current Antihypertensives     Lisinoprol 5mg daily  Plan  - BP is controlled, no changes needed to their regimen

## 2025-03-27 NOTE — ASSESSMENT & PLAN NOTE
Hyponatremia is likely due to Dehydration/hypovolemia. The patient's most recent sodium results are listed below.  Recent Labs     03/26/25  0130 03/26/25  0902 03/26/25  1513   * 127* 128*     Plan  - Correct the sodium by 4-6mEq in 24 hours.   - Obtain the following studies: done.  - Will treat the hyponatremia with IVF  - Monitor sodium Daily.   - Patient hyponatremia is improving  - Continue with gentle IVF

## 2025-03-27 NOTE — NURSING
"2nd attempt since start of shift that pt attempted to leave off unit.  Pt was very adamant about leaving "I need to go home". Charge nurse removed PIV by elevator.  Nurse was able to talk pt back to room and stay until speaking with MD. Brother "Thierno" was called per pt's request who also assisted with convincing pt to stay.  Pt refuses EKG and AM labs.  MD made aware.   "

## 2025-03-27 NOTE — ASSESSMENT & PLAN NOTE
- Seen by Addiction Psych  - On Librium taper  - restless and wanting to leave. Required Ativan IVP for agitation  - CIWA monitoring  - Ativan 2mg q4 PRN if 2 criteria are met: Systolic BP>160, Diastolic BP >110 or Pulse >110 or CIWA > 8  - Recent inpatient psych admission for suicide attempt with klonopin, required librium taper.   - librium taper with ativan PRN

## 2025-03-27 NOTE — SUBJECTIVE & OBJECTIVE
"Interval History: called by RN this AM as patient wanting to leave. She keeps trying to get up, had to be guided back to her room by the nurses and security.     Ativan 3mg IVP given for severe agitation.     On my visit, the pt. Is somnolent but awakes easily. She tells me she need to go home and take care of her cats. She can tell me that she is admitted for BZD withdrawal and states she usually manages withdrawal with "other pills" but can not tell me names.     Psych saw, felt she does not capacity - agree.         Review of Systems   Constitutional:  Negative for appetite change and fatigue.   Respiratory:  Negative for apnea and chest tightness.    Cardiovascular:  Negative for chest pain.   Gastrointestinal:  Negative for abdominal distention and abdominal pain.   Genitourinary:  Negative for difficulty urinating and dyspareunia.   Musculoskeletal:  Negative for arthralgias.   Neurological:  Negative for dizziness.     Objective:     Vital Signs (Most Recent):  Temp: 97.3 °F (36.3 °C) (03/27/25 1143)  Pulse: 73 (03/27/25 1143)  Resp: 16 (03/27/25 1143)  BP: 117/72 (03/27/25 1143)  SpO2: 100 % (03/27/25 1143) Vital Signs (24h Range):  Temp:  [97.3 °F (36.3 °C)-98.2 °F (36.8 °C)] 97.3 °F (36.3 °C)  Pulse:  [66-88] 73  Resp:  [7-31] 16  SpO2:  [98 %-100 %] 100 %  BP: (102-154)/(51-77) 117/72     Weight: 54 kg (119 lb 0.8 oz)  Body mass index is 19.81 kg/m².    Intake/Output Summary (Last 24 hours) at 3/27/2025 1308  Last data filed at 3/27/2025 0622  Gross per 24 hour   Intake 960 ml   Output --   Net 960 ml         Physical Exam  Constitutional:       General: She is not in acute distress.  HENT:      Mouth/Throat:      Mouth: Mucous membranes are moist.   Cardiovascular:      Rate and Rhythm: Normal rate and regular rhythm.      Heart sounds: No murmur heard.  Abdominal:      General: Abdomen is flat.   Musculoskeletal:         General: Normal range of motion.   Skin:     General: Skin is warm.   Neurological: "      General: No focal deficit present.      Mental Status: She is alert.               Significant Labs: All pertinent labs within the past 24 hours have been reviewed.  CBC:   Recent Labs   Lab 03/25/25  1348 03/26/25  0438   WBC 14.39* 11.18   HGB 14.4 13.2   HCT 39.9 37.3    353     CMP:   Recent Labs   Lab 03/26/25  0130 03/26/25  0902 03/26/25  1513   * 127* 128*   K 3.4* 3.7 3.5   CL 89* 93* 98   CO2 24 22* 20*   BUN 23 22 24*   CREATININE 0.8 0.7 0.8   CALCIUM 8.7 8.5* 8.2*   ALBUMIN 3.2*  --   --    BILITOT 0.4  --   --    ALKPHOS 94  --   --    AST 25  --   --    ALT 10  --   --    ANIONGAP 11 12 10       Significant Imaging: I have reviewed all pertinent imaging results/findings within the past 24 hours.

## 2025-03-27 NOTE — ASSESSMENT & PLAN NOTE
Patient has persistent depression which is moderate and is currently controlled. Will Continue anti-depressant medications. We will consult psychiatry at this time. Patient does not display psychosis at this time. Continue to monitor closely and adjust plan of care as needed.    Severe caregiver burden with chronically ill mother, complicated grief with death of spouse. Sister with bipolar. Slightly pressured speech on exam. Complicated by benzo use.    Librium taper x 5 days, Lexapro 5 mg PO daily, Remeron 15 mg QHS

## 2025-03-27 NOTE — NURSING
Patient arrived to unit via stretcher alert and oriented no complaints of pain or distress noted at this time.

## 2025-03-27 NOTE — PLAN OF CARE
[facility] - Med Surg  Initial Discharge Assessment       Primary Care Provider: Brendon Hardy MD    Admission Diagnosis: AMS (altered mental status) [R41.82]    Admission Date: 3/25/2025  Expected Discharge Date: 4/1/2025    Transition of Care Barriers: Substance Abuse    Payor: MEDICARE / Plan: MEDICARE PART A & B / Product Type: Government /     Extended Emergency Contact Information  Primary Emergency Contact: Thierno Mckay  Mobile Phone: [phone]  Relation: Brother  Preferred language: English   needed? No    Discharge Plan A: Home, Other (Referral for substance abuse/ mental health counseling)  Discharge Plan B: Home      [facility] [place] - [address]  [address]  [place] [address]  Phone: [phone] Fax: [phone]    [id number] - [place] - [address]  [address]  [place] [address]  Phone: [phone] Fax: [phone]      Initial Assessment (most recent)       Adult Discharge Assessment - 03/27/25 1625          Discharge Assessment    Assessment Type Discharge Planning Assessment     Confirmed/corrected address, phone number and insurance Yes     Confirmed Demographics Correct on Facesheet     Source of Information patient     Communicated CATHY with patient/caregiver Yes     Reason For Admission Hyponatremia     People in Home alone     Do you expect to return to your current living situation? Yes     Do you have help at home or someone to help you manage your care at home? No     Current cognitive status: Alert/Oriented     Walking or Climbing Stairs Difficulty no     Dressing/Bathing Difficulty no     Equipment Currently Used at Home none     Readmission within 30 days? No     Patient currently being followed by outpatient case management? No     Do you currently have service(s) that help you manage your care at home? No     Do you take prescription medications? Yes     Do you have prescription coverage? Yes      Coverage Medicare A & B     Do you have any problems affording any of your prescribed medications? No     Is the patient taking medications as prescribed? yes     Who is going to help you get home at discharge? Pt's brother will provide transportation home.     How do you get to doctors appointments? family or friend will provide     Are you on dialysis? No     Do you take coumadin? No     Discharge Plan A Home;Other   Referral for substance abuse/ mental health counseling    Discharge Plan B Home     DME Needed Upon Discharge  none     Discharge Plan discussed with: Patient     Transition of Care Barriers Substance Abuse        Physical Activity    On average, how many days per week do you engage in moderate to strenuous exercise (like a brisk walk)? 0 days     On average, how many minutes do you engage in exercise at this level? 0 min        Financial Resource Strain    How hard is it for you to pay for the very basics like food, housing, medical care, and heating? Somewhat hard        Housing Stability    In the last 12 months, was there a time when you were not able to pay the mortgage or rent on time? No     At any time in the past 12 months, were you homeless or living in a shelter (including now)? Yes        Transportation Needs    In the past 12 months, has lack of transportation kept you from medical appointments or from getting medications? Yes     In the past 12 months, has lack of transportation kept you from meetings, work, or from getting things needed for daily living? Yes        Food Insecurity    Within the past 12 months, you worried that your food would run out before you got the money to buy more. Sometimes true     Within the past 12 months, the food you bought just didn't last and you didn't have money to get more. Sometimes true        Stress    Do you feel stress - tense, restless, nervous, or anxious, or unable to sleep at night because your mind is troubled all the time - these days?  Rather much        Social Isolation    How often do you feel lonely or isolated from those around you?  Sometimes        Alcohol Use    Q1: How often do you have a drink containing alcohol? 4 or more times a week   Benzo       ProtectWise    In the past 12 months has the electric, gas, oil, or water company threatened to shut off services in your home? No        Health Literacy    How often do you need to have someone help you when you read instructions, pamphlets, or other written material from your doctor or pharmacy? Rarely                   SW completed assessment with pt.  No hospital readmissions within the last 30 days.  Pt reported her brother will provide transportation home.     Pt reported she lives alone in a two story home.  Pt reported she is independent with her ADLs and mobility.  DME:  Pt does not report having any DME at home.  Pt's brother provides support.     Pt does not receive coumadin, dialysis, or HH services.      Disp:  Pt not medically ready for d/c.  Pt considering substance abuse treatment and resources. SW will follow up in the morning.      Discharge Plan A and Plan B have been determined by review of patient's clinical status, future medical and therapeutic needs, and coverage/benefits for post-acute care in coordination with multidisciplinary team members.    Kia Christianson LMSW  Part-Time-  [facility]  Ext. [phone]

## 2025-03-27 NOTE — HOSPITAL COURSE
Ms. Rowe is a 74 y.o. F with a PMHx of HLD, HTN, PUD, chronic benzodiazepine use, colectomy, anxiety, depression, prior suicide attempts who presented to the ED for concern for benzodiazepine withdrawal. She is prescribed 1mg Klonopin BID though has been taking it TID as she needs it to help her sleep. She ran out x4 days ago. She reports drinking 2 beers this morning though is not a daily drinker. She is prescribed Ambien to sleep though does not take it and takes the Klonopin instead. She had an admission in January after a suicide attempt. She took 60 tablets of Ativan at that time. She was discharged from the psychiatric facility on a Librium taper. Pt has not had anything to eat in x3 days. She lives alone and has no children. She is currently complaining of chest pain that radiates to her whole body. She has had multiple falls over the past few days.     Stepped down to medicine from ICU on 3/26/25. Agitated and wanting to leave AMA. Psych re-evaluated and felt that she does not have capacity - will PEC if wanting to leave AMA.   Undergoing Librium taper for BZD withdrawal    More cooperative today. Still wants to leave but ok to stay until the weekend.   Tolerating taper well, agitation improved. Hyponatremia resolved with IVF.     Follow up with Psychiatry and PCP outpatient. Started on Lexapro 10mg daily for anxiety and depression, uptitrate as feasible.

## 2025-03-27 NOTE — PLAN OF CARE
Problem: Adult Inpatient Plan of Care  Goal: Plan of Care Review  Outcome: Progressing  Goal: Patient-Specific Goal (Individualized)  Outcome: Progressing  Goal: Absence of Hospital-Acquired Illness or Injury  Outcome: Progressing  Goal: Optimal Comfort and Wellbeing  Outcome: Progressing  Goal: Readiness for Transition of Care  Outcome: Progressing     Problem: Infection  Goal: Absence of Infection Signs and Symptoms  Outcome: Progressing     Problem: Wound  Goal: Optimal Coping  Outcome: Progressing  Goal: Optimal Functional Ability  Outcome: Progressing  Goal: Absence of Infection Signs and Symptoms  Outcome: Progressing  Goal: Improved Oral Intake  Outcome: Progressing  Goal: Optimal Pain Control and Function  Outcome: Progressing  Goal: Skin Health and Integrity  Outcome: Progressing  Goal: Optimal Wound Healing  Outcome: Progressing     Problem: Skin Injury Risk Increased  Goal: Skin Health and Integrity  Outcome: Progressing     Problem: Delirium  Goal: Optimal Coping  Outcome: Progressing  Goal: Improved Behavioral Control  Outcome: Progressing  Goal: Improved Attention and Thought Clarity  Outcome: Progressing  Goal: Improved Sleep  Outcome: Progressing     Problem: Fall Injury Risk  Goal: Absence of Fall and Fall-Related Injury  Outcome: Progressing      Pt AAO x 4. Patient remains free from falls and injuries. Side rails up x2, bed low and locked, bed alarm activated, call light and personal belongings within reach. VS remain stable and respirations even and unlabored. Hourly rounding to ensure safety and comfort.No grimace, cries or other signs of acute distress. Questions and concerns addressed and answered. Medication compliance. Pt continues to refuse telemetry. HOB and pillows adjusted for comfort. Reviewed importance of safety barriers and calling for assistance prn. Verbalized understanding and states she will call prn for needs. Plan of care discussed.

## 2025-03-27 NOTE — NURSING TRANSFER
Nursing Transfer Note      3/26/2025   20:55 PM    Reason patient is being transferred: stepdown    [facility] via bed    Transfer with cardiac monitoring    Transported by Tim RN, Maggie, HELGA    Order for Tele Monitor? No    Additional Lines: N/A    Medicines sent: N/A     Any special needs or follow-up needed: Pt was requesting apple juice. Bed alarm needed.    Patient belongings transferred with patient: Yes    Chart send with patient: Yes    Notified: Pt stated she was going to call brother.    Upon arrival to floor: patient oriented to room, call bell in reach, and bed in lowest position

## 2025-03-27 NOTE — NURSING
MD was called due to patient trying to leave the facility because she couldn't get Librium earlier then scheduled. Security was also called to get patient back to room until MD gave final order. Order was given to give additional Lorazepam to hold her over until Librium  is due. Lorazepam was given tolerated well now resting in bed.

## 2025-03-27 NOTE — PLAN OF CARE
Problem: Skin Injury Risk Increased  Goal: Skin Health and Integrity  Outcome: Not Progressing     Problem: Delirium  Goal: Optimal Coping  Outcome: Not Progressing  Goal: Improved Behavioral Control  Outcome: Not Progressing  Goal: Improved Attention and Thought Clarity  Outcome: Not Progressing  Goal: Improved Sleep  Outcome: Not Progressing

## 2025-03-27 NOTE — NURSING
"Pt pulled out PIV, states "I need my librium".  Dose given at 1217 but pt requesting another dose. Pt attempted to leave unit again, but nurse was able to talk her back to the room.  MD made aware. MD Quintanilla ok'd for pt to not have PIV.   "

## 2025-03-27 NOTE — NURSING
Received call by unit secretary that pt was attempting to leave unit.  Found pt by elevator with charge nurse. Nurse was successful with talking pt out of leaving and going back to room.  MD made aware.

## 2025-03-27 NOTE — PROGRESS NOTES
"[facility] Medicine  Progress Note    Patient Name: Juan Antonio Rowe  MRN: [id number]  Patient Class: IP- Inpatient   Admission Date: 3/25/2025  Length of Stay: 2 days  Attending Physician: Se Quintanilla MD  Primary Care Provider: Brendon Hardy MD        Subjective     Principal Problem:Hyponatremia        HPI:  No notes on file    Overview/Hospital Course:  Ms. Rowe is a 74 y.o. F with a PMHx of HLD, HTN, PUD, chronic benzodiazepine use, colectomy, anxiety, depression, prior suicide attempts who presented to the ED for concern for benzodiazepine withdrawal. She is prescribed 1mg Klonopin BID though has been taking it TID as she needs it to help her sleep. She ran out x4 days ago. She reports drinking 2 beers this morning though is not a daily drinker. She is prescribed Ambien to sleep though does not take it and takes the Klonopin instead. She had an admission in January after a suicide attempt. She took 60 tablets of Ativan at that time. She was discharged from the psychiatric facility on a Librium taper. Pt has not had anything to eat in x3 days. She lives alone and has no children. She is currently complaining of chest pain that radiates to her whole body. She has had multiple falls over the past few days.     Stepped down to medicine from ICU on 3/26/25. Agitated and wanting to leave AMA. Psych re-evaluated and felt that she does not have capacity - will PEC if wanting to leave AMA.   Undergoing Librium taper for BZD withdrawal    Interval History: called by RN this AM as patient wanting to leave. She keeps trying to get up, had to be guided back to her room by the nurses and security.     Ativan 3mg IVP given for severe agitation.     On my visit, the pt. Is somnolent but awakes easily. She tells me she need to go home and take care of her cats. She can tell me that she is admitted for BZD withdrawal and states she usually manages withdrawal with "other pills" but can not tell me names. "     Psych saw, felt she does not capacity - agree.         Review of Systems   Constitutional:  Negative for appetite change and fatigue.   Respiratory:  Negative for apnea and chest tightness.    Cardiovascular:  Negative for chest pain.   Gastrointestinal:  Negative for abdominal distention and abdominal pain.   Genitourinary:  Negative for difficulty urinating and dyspareunia.   Musculoskeletal:  Negative for arthralgias.   Neurological:  Negative for dizziness.     Objective:     Vital Signs (Most Recent):  Temp: 97.3 °F (36.3 °C) (03/27/25 1143)  Pulse: 73 (03/27/25 1143)  Resp: 16 (03/27/25 1143)  BP: 117/72 (03/27/25 1143)  SpO2: 100 % (03/27/25 1143) Vital Signs (24h Range):  Temp:  [97.3 °F (36.3 °C)-98.2 °F (36.8 °C)] 97.3 °F (36.3 °C)  Pulse:  [66-88] 73  Resp:  [7-31] 16  SpO2:  [98 %-100 %] 100 %  BP: (102-154)/(51-77) 117/72     Weight: 54 kg (119 lb 0.8 oz)  Body mass index is 19.81 kg/m².    Intake/Output Summary (Last 24 hours) at 3/27/2025 1308  Last data filed at 3/27/2025 0622  Gross per 24 hour   Intake 960 ml   Output --   Net 960 ml         Physical Exam  Constitutional:       General: She is not in acute distress.  HENT:      Mouth/Throat:      Mouth: Mucous membranes are moist.   Cardiovascular:      Rate and Rhythm: Normal rate and regular rhythm.      Heart sounds: No murmur heard.  Abdominal:      General: Abdomen is flat.   Musculoskeletal:         General: Normal range of motion.   Skin:     General: Skin is warm.   Neurological:      General: No focal deficit present.      Mental Status: She is alert.               Significant Labs: All pertinent labs within the past 24 hours have been reviewed.  CBC:   Recent Labs   Lab 03/25/25  1348 03/26/25  0438   WBC 14.39* 11.18   HGB 14.4 13.2   HCT 39.9 37.3    353     CMP:   Recent Labs   Lab 03/26/25  0130 03/26/25  0902 03/26/25  1513   * 127* 128*   K 3.4* 3.7 3.5   CL 89* 93* 98   CO2 24 22* 20*   BUN 23 22 24*   CREATININE  0.8 0.7 0.8   CALCIUM 8.7 8.5* 8.2*   ALBUMIN 3.2*  --   --    BILITOT 0.4  --   --    ALKPHOS 94  --   --    AST 25  --   --    ALT 10  --   --    ANIONGAP 11 12 10       Significant Imaging: I have reviewed all pertinent imaging results/findings within the past 24 hours.      Assessment & Plan  Hyponatremia  Hyponatremia is likely due to Dehydration/hypovolemia. The patient's most recent sodium results are listed below.  Recent Labs     03/26/25  0130 03/26/25  0902 03/26/25  1513   * 127* 128*     Plan  - Correct the sodium by 4-6mEq in 24 hours.   - Obtain the following studies: done.  - Will treat the hyponatremia with IVF  - Monitor sodium Daily.   - Patient hyponatremia is improving  - Continue with gentle IVF  Substance use disorder  - chronic BZD abuse  - ran out and caused withdrawal  - on Rx. For anxiety apparently      Benzodiazepine withdrawal  - Seen by Addiction Psych  - On Librium taper  - restless and wanting to leave. Required Ativan IVP for agitation  - CIWA monitoring  - Ativan 2mg q4 PRN if 2 criteria are met: Systolic BP>160, Diastolic BP >110 or Pulse >110 or CIWA > 8  - Recent inpatient psych admission for suicide attempt with klonopin, required librium taper.   - librium taper with ativan PRN     Severe episode of recurrent major depressive disorder  Patient has persistent depression which is moderate and is currently controlled. Will Continue anti-depressant medications. We will consult psychiatry at this time. Patient does not display psychosis at this time. Continue to monitor closely and adjust plan of care as needed.    Severe caregiver burden with chronically ill mother, complicated grief with death of spouse. Sister with bipolar. Slightly pressured speech on exam. Complicated by benzo use.    Librium taper x 5 days, Lexapro 5 mg PO daily, Remeron 15 mg QHS      Hypertension  Patient's blood pressure range in the last 24 hours was: BP  Min: 102/51  Max: 154/70.The patient's  inpatient anti-hypertensive regimen is listed below:  Current Antihypertensives     Lisinoprol 5mg daily  Plan  - BP is controlled, no changes needed to their regimen    Leukocytosis  Likely stress response. No indication for ABX    VTE Risk Mitigation (From admission, onward)           Ordered     enoxaparin injection 40 mg  Daily         03/25/25 0537     IP VTE HIGH RISK PATIENT  Once         03/25/25 0537     Place sequential compression device  Until discontinued         03/25/25 0537                    Discharge Planning   CATHY: 4/1/2025     Code Status: Full Code   Medical Readiness for Discharge Date:                            Se Quintanilla MD  Department of Hospital Medicine   [facility]

## 2025-03-28 LAB
ABSOLUTE EOSINOPHIL (OHS): 0.1 K/UL
ABSOLUTE MONOCYTE (OHS): 1.12 K/UL (ref 0.3–1)
ABSOLUTE NEUTROPHIL COUNT (OHS): 9.04 K/UL (ref 1.8–7.7)
ALBUMIN SERPL BCP-MCNC: 3 G/DL (ref 3.5–5.2)
ANION GAP (OHS): 12 MMOL/L (ref 8–16)
BASOPHILS # BLD AUTO: 0.04 K/UL
BASOPHILS NFR BLD AUTO: 0.3 %
BUN SERPL-MCNC: 16 MG/DL (ref 8–23)
CALCIUM SERPL-MCNC: 8.9 MG/DL (ref 8.7–10.5)
CHLORIDE SERPL-SCNC: 99 MMOL/L (ref 95–110)
CO2 SERPL-SCNC: 22 MMOL/L (ref 23–29)
CREAT SERPL-MCNC: 0.6 MG/DL (ref 0.5–1.4)
ERYTHROCYTE [DISTWIDTH] IN BLOOD BY AUTOMATED COUNT: 16.1 % (ref 11.5–14.5)
GFR SERPLBLD CREATININE-BSD FMLA CKD-EPI: >60 ML/MIN/1.73/M2
GLUCOSE SERPL-MCNC: 106 MG/DL (ref 70–110)
HCT VFR BLD AUTO: 39.2 % (ref 37–48.5)
HGB BLD-MCNC: 13.3 GM/DL (ref 12–16)
IMM GRANULOCYTES # BLD AUTO: 0.07 K/UL (ref 0–0.04)
IMM GRANULOCYTES NFR BLD AUTO: 0.5 % (ref 0–0.5)
LYMPHOCYTES # BLD AUTO: 3.64 K/UL (ref 1–4.8)
MAGNESIUM SERPL-MCNC: 2.1 MG/DL (ref 1.6–2.6)
MCH RBC QN AUTO: 32.4 PG (ref 27–50)
MCHC RBC AUTO-ENTMCNC: 33.9 G/DL (ref 32–36)
MCV RBC AUTO: 96 FL (ref 82–98)
NUCLEATED RBC (/100WBC) (OHS): 0 /100 WBC
PHOSPHATE SERPL-MCNC: 2.7 MG/DL (ref 2.7–4.5)
PLATELET # BLD AUTO: 412 K/UL (ref 150–450)
PMV BLD AUTO: 8.7 FL (ref 9.2–12.9)
POTASSIUM SERPL-SCNC: 3.9 MMOL/L (ref 3.5–5.1)
RBC # BLD AUTO: 4.1 M/UL (ref 4–5.4)
RELATIVE EOSINOPHIL (OHS): 0.7 %
RELATIVE LYMPHOCYTE (OHS): 26 % (ref 18–48)
RELATIVE MONOCYTE (OHS): 8 % (ref 4–15)
RELATIVE NEUTROPHIL (OHS): 64.5 % (ref 38–73)
SODIUM SERPL-SCNC: 133 MMOL/L (ref 136–145)
WBC # BLD AUTO: 14.01 K/UL (ref 3.9–12.7)

## 2025-03-28 PROCEDURE — 25000003 PHARM REV CODE 250: Performed by: INTERNAL MEDICINE

## 2025-03-28 PROCEDURE — 85025 COMPLETE CBC W/AUTO DIFF WBC: CPT

## 2025-03-28 PROCEDURE — 83735 ASSAY OF MAGNESIUM: CPT

## 2025-03-28 PROCEDURE — 25000003 PHARM REV CODE 250

## 2025-03-28 PROCEDURE — 63600175 PHARM REV CODE 636 W HCPCS: Performed by: HOSPITALIST

## 2025-03-28 PROCEDURE — 36415 COLL VENOUS BLD VENIPUNCTURE: CPT

## 2025-03-28 PROCEDURE — 63600175 PHARM REV CODE 636 W HCPCS

## 2025-03-28 PROCEDURE — 21400001 HC TELEMETRY ROOM

## 2025-03-28 PROCEDURE — 80069 RENAL FUNCTION PANEL: CPT | Performed by: INTERNAL MEDICINE

## 2025-03-28 RX ORDER — ONDANSETRON 4 MG/1
4 TABLET, ORALLY DISINTEGRATING ORAL EVERY 6 HOURS PRN
Status: DISCONTINUED | OUTPATIENT
Start: 2025-03-28 | End: 2025-03-30 | Stop reason: HOSPADM

## 2025-03-28 RX ORDER — ONDANSETRON HYDROCHLORIDE 2 MG/ML
4 INJECTION, SOLUTION INTRAVENOUS EVERY 6 HOURS PRN
Status: DISCONTINUED | OUTPATIENT
Start: 2025-03-28 | End: 2025-03-30 | Stop reason: HOSPADM

## 2025-03-28 RX ORDER — SODIUM CHLORIDE 9 MG/ML
INJECTION, SOLUTION INTRAVENOUS CONTINUOUS
Status: ACTIVE | OUTPATIENT
Start: 2025-03-28 | End: 2025-03-28

## 2025-03-28 RX ORDER — LORAZEPAM 2 MG/ML
2 INJECTION INTRAMUSCULAR ONCE
Status: COMPLETED | OUTPATIENT
Start: 2025-03-28 | End: 2025-03-28

## 2025-03-28 RX ORDER — PROCHLORPERAZINE EDISYLATE 5 MG/ML
5 INJECTION INTRAMUSCULAR; INTRAVENOUS EVERY 6 HOURS PRN
Status: DISCONTINUED | OUTPATIENT
Start: 2025-03-28 | End: 2025-03-30 | Stop reason: HOSPADM

## 2025-03-28 RX ADMIN — LISINOPRIL 5 MG: 5 TABLET ORAL at 09:03

## 2025-03-28 RX ADMIN — THERA TABS 1 TABLET: TAB at 09:03

## 2025-03-28 RX ADMIN — SODIUM CHLORIDE: 9 INJECTION, SOLUTION INTRAVENOUS at 12:03

## 2025-03-28 RX ADMIN — FOLIC ACID 1 MG: 1 TABLET ORAL at 09:03

## 2025-03-28 RX ADMIN — LORAZEPAM 1 MG: 1 TABLET ORAL at 04:03

## 2025-03-28 RX ADMIN — Medication 100 MG: at 09:03

## 2025-03-28 RX ADMIN — LORAZEPAM 2 MG: 2 INJECTION INTRAMUSCULAR at 12:03

## 2025-03-28 RX ADMIN — CHLORDIAZEPOXIDE HYDROCHLORIDE 25 MG: 25 CAPSULE ORAL at 07:03

## 2025-03-28 RX ADMIN — CHLORDIAZEPOXIDE HYDROCHLORIDE 25 MG: 25 CAPSULE ORAL at 11:03

## 2025-03-28 RX ADMIN — OLANZAPINE 5 MG: 5 TABLET, ORALLY DISINTEGRATING ORAL at 11:03

## 2025-03-28 RX ADMIN — PROCHLORPERAZINE EDISYLATE 5 MG: 5 INJECTION INTRAMUSCULAR; INTRAVENOUS at 12:03

## 2025-03-28 RX ADMIN — ENOXAPARIN SODIUM 40 MG: 40 INJECTION SUBCUTANEOUS at 04:03

## 2025-03-28 RX ADMIN — LORAZEPAM 1 MG: 1 TABLET ORAL at 09:03

## 2025-03-28 RX ADMIN — CYANOCOBALAMIN TAB 250 MCG 250 MCG: 250 TAB at 09:03

## 2025-03-28 RX ADMIN — OLANZAPINE 5 MG: 5 TABLET, ORALLY DISINTEGRATING ORAL at 05:03

## 2025-03-28 NOTE — PLAN OF CARE
Problem: Wound  Goal: Optimal Coping  Outcome: Not Progressing  Goal: Optimal Functional Ability  Outcome: Not Progressing  Goal: Absence of Infection Signs and Symptoms  Outcome: Not Progressing  Goal: Improved Oral Intake  Outcome: Not Progressing  Goal: Optimal Pain Control and Function  Outcome: Not Progressing  Goal: Skin Health and Integrity  Outcome: Not Progressing  Goal: Optimal Wound Healing  Outcome: Not Progressing     Problem: Skin Injury Risk Increased  Goal: Skin Health and Integrity  Outcome: Not Progressing     Problem: Fall Injury Risk  Goal: Absence of Fall and Fall-Related Injury  Outcome: Not Progressing

## 2025-03-28 NOTE — ASSESSMENT & PLAN NOTE
- Seen by Addiction Psych  - On Librium taper  - restless and wanting to leave. Required Ativan IVP for agitation  - CIWA monitoring  - Ativan 2mg q4 PRN if 2 criteria are met: Systolic BP>160, Diastolic BP >110 or Pulse >110 or CIWA > 8  - Recent inpatient psych admission for suicide attempt with klonopin, required librium taper.   - librium taper with ativan PRN     - Appears improved and less agitated.

## 2025-03-28 NOTE — SUBJECTIVE & OBJECTIVE
Interval History: ambulatory in the room. IV placed over night for nausea. Doing quite well this AM and wanting to leave but agrees to stay.     Agrees to get more IVF      Objective:     Vital Signs (Most Recent):  Temp: 98.8 °F (37.1 °C) (03/28/25 1125)  Pulse: 91 (03/28/25 1125)  Resp: 16 (03/28/25 1125)  BP: (!) 159/82 (03/28/25 1125)  SpO2: (!) 81 % (03/28/25 1125) Vital Signs (24h Range):  Temp:  [97.8 °F (36.6 °C)-99 °F (37.2 °C)] 98.8 °F (37.1 °C)  Pulse:  [76-91] 91  Resp:  [16-18] 16  SpO2:  [81 %-100 %] 81 %  BP: (119-202)/(64-84) 159/82     Weight: 54 kg (119 lb 0.8 oz)  Body mass index is 19.81 kg/m².  No intake or output data in the 24 hours ending 03/28/25 1209      Physical Exam  Vitals and nursing note reviewed.   Constitutional:       General: She is not in acute distress.     Appearance: She is not ill-appearing.   HENT:      Mouth/Throat:      Mouth: Mucous membranes are moist.   Cardiovascular:      Rate and Rhythm: Normal rate and regular rhythm.      Heart sounds: No murmur heard.  Pulmonary:      Effort: Pulmonary effort is normal.   Abdominal:      General: Abdomen is flat.   Musculoskeletal:         General: No swelling. Normal range of motion.   Skin:     General: Skin is warm.   Neurological:      General: No focal deficit present.      Mental Status: She is alert.               Significant Labs: All pertinent labs within the past 24 hours have been reviewed.  CBC:   Recent Labs   Lab 03/27/25  1811 03/28/25  0326   WBC 14.18* 14.01*   HGB 13.6 13.3   HCT 40.4 39.2    412     CMP:   Recent Labs   Lab 03/26/25  1513 03/28/25  0326   * 133*   K 3.5 3.9   CL 98 99   CO2 20* 22*   BUN 24* 16   CREATININE 0.8 0.6   CALCIUM 8.2* 8.9   ALBUMIN  --  3.0*   ANIONGAP 10 12       Significant Imaging: I have reviewed all pertinent imaging results/findings within the past 24 hours.

## 2025-03-28 NOTE — ASSESSMENT & PLAN NOTE
Patient's blood pressure range in the last 24 hours was: BP  Min: 119/68  Max: 202/84.The patient's inpatient anti-hypertensive regimen is listed below:  Current Antihypertensives  lisinopriL tablet 5 mg, Daily, Oral  Lisinoprol 5mg daily  Plan  - BP is controlled, no changes needed to their regimen

## 2025-03-28 NOTE — ASSESSMENT & PLAN NOTE
Hyponatremia is likely due to Dehydration/hypovolemia. The patient's most recent sodium results are listed below.  Recent Labs     03/26/25  0902 03/26/25  1513 03/28/25  0326   * 128* 133*     Plan  - Correct the sodium by 4-6mEq in 24 hours.   - Obtain the following studies: done.  - Will treat the hyponatremia with IVF  - Monitor sodium Daily.   - Patient hyponatremia is improving  - Continue with gentle IVF - agrees to more fluids today.

## 2025-03-28 NOTE — PROGRESS NOTES
[facility] Medicine  Progress Note    Patient Name: Juan Antonio Rowe  MRN: [id number]  Patient Class: IP- Inpatient   Admission Date: 3/25/2025  Length of Stay: 3 days  Attending Physician: Se Quintanilla MD  Primary Care Provider: Brendon Hardy MD        Subjective     Principal Problem:Hyponatremia      HPI:  No notes on file    Overview/Hospital Course:  Ms. Rowe is a 74 y.o. F with a PMHx of HLD, HTN, PUD, chronic benzodiazepine use, colectomy, anxiety, depression, prior suicide attempts who presented to the ED for concern for benzodiazepine withdrawal. She is prescribed 1mg Klonopin BID though has been taking it TID as she needs it to help her sleep. She ran out x4 days ago. She reports drinking 2 beers this morning though is not a daily drinker. She is prescribed Ambien to sleep though does not take it and takes the Klonopin instead. She had an admission in January after a suicide attempt. She took 60 tablets of Ativan at that time. She was discharged from the psychiatric facility on a Librium taper. Pt has not had anything to eat in x3 days. She lives alone and has no children. She is currently complaining of chest pain that radiates to her whole body. She has had multiple falls over the past few days.     Stepped down to medicine from ICU on 3/26/25. Agitated and wanting to leave AMA. Psych re-evaluated and felt that she does not have capacity - will PEC if wanting to leave AMA.   Undergoing Librium taper for BZD withdrawal    More cooperative today. Still wants to leave but ok to stay until the weekend.     Interval History: ambulatory in the room. IV placed over night for nausea. Doing quite well this AM and wanting to leave but agrees to stay.     Agrees to get more IVF      Objective:     Vital Signs (Most Recent):  Temp: 98.8 °F (37.1 °C) (03/28/25 1125)  Pulse: 91 (03/28/25 1125)  Resp: 16 (03/28/25 1125)  BP: (!) 159/82 (03/28/25 1125)  SpO2: (!) 81 % (03/28/25 1125) Vital Signs  (24h Range):  Temp:  [97.8 °F (36.6 °C)-99 °F (37.2 °C)] 98.8 °F (37.1 °C)  Pulse:  [76-91] 91  Resp:  [16-18] 16  SpO2:  [81 %-100 %] 81 %  BP: (119-202)/(64-84) 159/82     Weight: 54 kg (119 lb 0.8 oz)  Body mass index is 19.81 kg/m².  No intake or output data in the 24 hours ending 03/28/25 1209      Physical Exam  Vitals and nursing note reviewed.   Constitutional:       General: She is not in acute distress.     Appearance: She is not ill-appearing.   HENT:      Mouth/Throat:      Mouth: Mucous membranes are moist.   Cardiovascular:      Rate and Rhythm: Normal rate and regular rhythm.      Heart sounds: No murmur heard.  Pulmonary:      Effort: Pulmonary effort is normal.   Abdominal:      General: Abdomen is flat.   Musculoskeletal:         General: No swelling. Normal range of motion.   Skin:     General: Skin is warm.   Neurological:      General: No focal deficit present.      Mental Status: She is alert.               Significant Labs: All pertinent labs within the past 24 hours have been reviewed.  CBC:   Recent Labs   Lab 03/27/25  1811 03/28/25  0326   WBC 14.18* 14.01*   HGB 13.6 13.3   HCT 40.4 39.2    412     CMP:   Recent Labs   Lab 03/26/25  1513 03/28/25  0326   * 133*   K 3.5 3.9   CL 98 99   CO2 20* 22*   BUN 24* 16   CREATININE 0.8 0.6   CALCIUM 8.2* 8.9   ALBUMIN  --  3.0*   ANIONGAP 10 12       Significant Imaging: I have reviewed all pertinent imaging results/findings within the past 24 hours.      Assessment & Plan  Hyponatremia  Hyponatremia is likely due to Dehydration/hypovolemia. The patient's most recent sodium results are listed below.  Recent Labs     03/26/25  0902 03/26/25  1513 03/28/25  0326   * 128* 133*     Plan  - Correct the sodium by 4-6mEq in 24 hours.   - Obtain the following studies: done.  - Will treat the hyponatremia with IVF  - Monitor sodium Daily.   - Patient hyponatremia is improving  - Continue with gentle IVF - agrees to more fluids today.    Substance use disorder  - chronic BZD abuse  - ran out and caused withdrawal  - on Rx. For anxiety apparently      Benzodiazepine withdrawal  - Seen by Addiction Psych  - On Librium taper  - restless and wanting to leave. Required Ativan IVP for agitation  - CIWA monitoring  - Ativan 2mg q4 PRN if 2 criteria are met: Systolic BP>160, Diastolic BP >110 or Pulse >110 or CIWA > 8  - Recent inpatient psych admission for suicide attempt with klonopin, required librium taper.   - librium taper with ativan PRN     - Appears improved and less agitated.     Severe episode of recurrent major depressive disorder  Patient has persistent depression which is moderate and is currently controlled. Will Continue anti-depressant medications. We will consult psychiatry at this time. Patient does not display psychosis at this time. Continue to monitor closely and adjust plan of care as needed.    Severe caregiver burden with chronically ill mother, complicated grief with death of spouse. Sister with bipolar. Slightly pressured speech on exam. Complicated by benzo use.    Librium taper x 5 days, Lexapro 5 mg PO daily, Remeron 15 mg QHS      Hypertension  Patient's blood pressure range in the last 24 hours was: BP  Min: 119/68  Max: 202/84.The patient's inpatient anti-hypertensive regimen is listed below:  Current Antihypertensives  lisinopriL tablet 5 mg, Daily, Oral  Lisinoprol 5mg daily  Plan  - BP is controlled, no changes needed to their regimen    Leukocytosis  Likely stress response. No indication for ABX    VTE Risk Mitigation (From admission, onward)           Ordered     enoxaparin injection 40 mg  Daily         03/25/25 0537     IP VTE HIGH RISK PATIENT  Once         03/25/25 0537     Place sequential compression device  Until discontinued         03/25/25 0537                    Discharge Planning   CATHY: 4/1/2025     Code Status: Full Code   Medical Readiness for Discharge Date:   Discharge Plan A: Home, Other (Referral for  substance abuse/ mental health counseling)                        Se Quintanilla MD  Department of Hospital Medicine   [facility]

## 2025-03-29 LAB
ABSOLUTE EOSINOPHIL (OHS): 0.26 K/UL
ABSOLUTE MONOCYTE (OHS): 0.74 K/UL (ref 0.3–1)
ABSOLUTE NEUTROPHIL COUNT (OHS): 3.89 K/UL (ref 1.8–7.7)
ALBUMIN SERPL BCP-MCNC: 2.8 G/DL (ref 3.5–5.2)
ANION GAP (OHS): 8 MMOL/L (ref 8–16)
BASOPHILS # BLD AUTO: 0.05 K/UL
BASOPHILS NFR BLD AUTO: 0.6 %
BUN SERPL-MCNC: 15 MG/DL (ref 8–23)
CALCIUM SERPL-MCNC: 8.6 MG/DL (ref 8.7–10.5)
CHLORIDE SERPL-SCNC: 102 MMOL/L (ref 95–110)
CO2 SERPL-SCNC: 24 MMOL/L (ref 23–29)
CREAT SERPL-MCNC: 0.7 MG/DL (ref 0.5–1.4)
ERYTHROCYTE [DISTWIDTH] IN BLOOD BY AUTOMATED COUNT: 16 % (ref 11.5–14.5)
GFR SERPLBLD CREATININE-BSD FMLA CKD-EPI: >60 ML/MIN/1.73/M2
GLUCOSE SERPL-MCNC: 80 MG/DL (ref 70–110)
HCT VFR BLD AUTO: 34.8 % (ref 37–48.5)
HGB BLD-MCNC: 11.7 GM/DL (ref 12–16)
IMM GRANULOCYTES # BLD AUTO: 0.03 K/UL (ref 0–0.04)
IMM GRANULOCYTES NFR BLD AUTO: 0.4 % (ref 0–0.5)
LYMPHOCYTES # BLD AUTO: 3.37 K/UL (ref 1–4.8)
MAGNESIUM SERPL-MCNC: 1.9 MG/DL (ref 1.6–2.6)
MCH RBC QN AUTO: 32.5 PG (ref 27–50)
MCHC RBC AUTO-ENTMCNC: 33.6 G/DL (ref 32–36)
MCV RBC AUTO: 97 FL (ref 82–98)
NUCLEATED RBC (/100WBC) (OHS): 0 /100 WBC
PHOSPHATE SERPL-MCNC: 2.7 MG/DL (ref 2.7–4.5)
PLATELET # BLD AUTO: 376 K/UL (ref 150–450)
PMV BLD AUTO: 8.9 FL (ref 9.2–12.9)
POTASSIUM SERPL-SCNC: 4.1 MMOL/L (ref 3.5–5.1)
RBC # BLD AUTO: 3.6 M/UL (ref 4–5.4)
RELATIVE EOSINOPHIL (OHS): 3.1 %
RELATIVE LYMPHOCYTE (OHS): 40.4 % (ref 18–48)
RELATIVE MONOCYTE (OHS): 8.9 % (ref 4–15)
RELATIVE NEUTROPHIL (OHS): 46.6 % (ref 38–73)
SODIUM SERPL-SCNC: 134 MMOL/L (ref 136–145)
WBC # BLD AUTO: 8.34 K/UL (ref 3.9–12.7)

## 2025-03-29 PROCEDURE — 25000003 PHARM REV CODE 250

## 2025-03-29 PROCEDURE — 80069 RENAL FUNCTION PANEL: CPT | Performed by: INTERNAL MEDICINE

## 2025-03-29 PROCEDURE — 85025 COMPLETE CBC W/AUTO DIFF WBC: CPT

## 2025-03-29 PROCEDURE — 63600175 PHARM REV CODE 636 W HCPCS

## 2025-03-29 PROCEDURE — 25000003 PHARM REV CODE 250: Performed by: INTERNAL MEDICINE

## 2025-03-29 PROCEDURE — 83735 ASSAY OF MAGNESIUM: CPT

## 2025-03-29 PROCEDURE — 21400001 HC TELEMETRY ROOM

## 2025-03-29 PROCEDURE — 36415 COLL VENOUS BLD VENIPUNCTURE: CPT

## 2025-03-29 RX ADMIN — LORAZEPAM 1 MG: 1 TABLET ORAL at 10:03

## 2025-03-29 RX ADMIN — CHLORDIAZEPOXIDE HYDROCHLORIDE 25 MG: 25 CAPSULE ORAL at 03:03

## 2025-03-29 RX ADMIN — LORAZEPAM 1 MG: 1 TABLET ORAL at 08:03

## 2025-03-29 RX ADMIN — THERA TABS 1 TABLET: TAB at 10:03

## 2025-03-29 RX ADMIN — Medication 100 MG: at 10:03

## 2025-03-29 RX ADMIN — LOPERAMIDE HYDROCHLORIDE 2 MG: 2 CAPSULE ORAL at 08:03

## 2025-03-29 RX ADMIN — CHLORDIAZEPOXIDE HYDROCHLORIDE 25 MG: 25 CAPSULE ORAL at 12:03

## 2025-03-29 RX ADMIN — OLANZAPINE 5 MG: 5 TABLET, ORALLY DISINTEGRATING ORAL at 06:03

## 2025-03-29 RX ADMIN — LOPERAMIDE HYDROCHLORIDE 2 MG: 2 CAPSULE ORAL at 12:03

## 2025-03-29 RX ADMIN — LISINOPRIL 5 MG: 5 TABLET ORAL at 10:03

## 2025-03-29 RX ADMIN — ENOXAPARIN SODIUM 40 MG: 40 INJECTION SUBCUTANEOUS at 04:03

## 2025-03-29 RX ADMIN — LORAZEPAM 1 MG: 1 TABLET ORAL at 02:03

## 2025-03-29 RX ADMIN — CYANOCOBALAMIN TAB 250 MCG 250 MCG: 250 TAB at 10:03

## 2025-03-29 RX ADMIN — LOPERAMIDE HYDROCHLORIDE 2 MG: 2 CAPSULE ORAL at 03:03

## 2025-03-29 RX ADMIN — FOLIC ACID 1 MG: 1 TABLET ORAL at 10:03

## 2025-03-29 RX ADMIN — LORAZEPAM 1 MG: 1 TABLET ORAL at 04:03

## 2025-03-29 NOTE — SUBJECTIVE & OBJECTIVE
Interval History: no new sx. Agrees to complete her taper in the hospital Hyponatremia improved.   More coherent and less agitated.       Objective:     Vital Signs (Most Recent):  Temp: 98.6 °F (37 °C) (03/29/25 0736)  Pulse: 96 (03/29/25 0736)  Resp: 17 (03/29/25 0736)  BP: 132/72 (03/29/25 0736)  SpO2: 96 % (03/29/25 0736) Vital Signs (24h Range):  Temp:  [97.5 °F (36.4 °C)-98.6 °F (37 °C)] 98.6 °F (37 °C)  Pulse:  [] 96  Resp:  [16-18] 17  SpO2:  [96 %-100 %] 96 %  BP: (128-190)/(59-84) 132/72     Weight: 54 kg (119 lb 0.8 oz)  Body mass index is 19.81 kg/m².    Intake/Output Summary (Last 24 hours) at 3/29/2025 1139  Last data filed at 3/29/2025 0624  Gross per 24 hour   Intake 480 ml   Output --   Net 480 ml         Physical Exam  Constitutional:       General: She is not in acute distress.     Appearance: She is not ill-appearing.   HENT:      Mouth/Throat:      Mouth: Mucous membranes are moist.   Cardiovascular:      Rate and Rhythm: Normal rate and regular rhythm.      Heart sounds: No murmur heard.  Pulmonary:      Effort: Pulmonary effort is normal. No respiratory distress.   Abdominal:      General: Abdomen is flat.   Musculoskeletal:         General: No swelling. Normal range of motion.   Skin:     General: Skin is warm.   Neurological:      General: No focal deficit present.      Mental Status: She is alert.               Significant Labs: All pertinent labs within the past 24 hours have been reviewed.  CBC:   Recent Labs   Lab 03/27/25  1811 03/28/25  0326 03/29/25  0507   WBC 14.18* 14.01* 8.34   HGB 13.6 13.3 11.7*   HCT 40.4 39.2 34.8*    412 376     CMP:   Recent Labs   Lab 03/28/25  0326 03/29/25  0507   * 134*   K 3.9 4.1   CL 99 102   CO2 22* 24   BUN 16 15   CREATININE 0.6 0.7   CALCIUM 8.9 8.6*   ALBUMIN 3.0* 2.8*   ANIONGAP 12 8       Significant Imaging: I have reviewed all pertinent imaging results/findings within the past 24 hours.

## 2025-03-29 NOTE — ASSESSMENT & PLAN NOTE
Hyponatremia is likely due to Dehydration/hypovolemia. The patient's most recent sodium results are listed below.  Recent Labs     03/26/25  1513 03/28/25  0326 03/29/25  0507   * 133* 134*     Plan  - Correct the sodium by 4-6mEq in 24 hours.   - Obtain the following studies: done.  - Will treat the hyponatremia with IVF  - Monitor sodium Daily.   - Patient hyponatremia is improving  - Continue with gentle IVF - agrees to more fluids - improved.

## 2025-03-29 NOTE — PROGRESS NOTES
[facility] Medicine  Progress Note    Patient Name: Juan Antonio Rowe  MRN: [id number]  Patient Class: IP- Inpatient   Admission Date: 3/25/2025  Length of Stay: 4 days  Attending Physician: Se Quintanilla MD  Primary Care Provider: Brendon aHrdy MD        Subjective     Principal Problem:Hyponatremia        HPI:  No notes on file    Overview/Hospital Course:  Ms. Rowe is a 74 y.o. F with a PMHx of HLD, HTN, PUD, chronic benzodiazepine use, colectomy, anxiety, depression, prior suicide attempts who presented to the ED for concern for benzodiazepine withdrawal. She is prescribed 1mg Klonopin BID though has been taking it TID as she needs it to help her sleep. She ran out x4 days ago. She reports drinking 2 beers this morning though is not a daily drinker. She is prescribed Ambien to sleep though does not take it and takes the Klonopin instead. She had an admission in January after a suicide attempt. She took 60 tablets of Ativan at that time. She was discharged from the psychiatric facility on a Librium taper. Pt has not had anything to eat in x3 days. She lives alone and has no children. She is currently complaining of chest pain that radiates to her whole body. She has had multiple falls over the past few days.     Stepped down to medicine from ICU on 3/26/25. Agitated and wanting to leave AMA. Psych re-evaluated and felt that she does not have capacity - will PEC if wanting to leave AMA.   Undergoing Librium taper for BZD withdrawal    More cooperative today. Still wants to leave but ok to stay until the weekend.   Tolerating taper well, agitation improved.     Interval History: no new sx. Agrees to complete her taper in the hospital Hyponatremia improved.   More coherent and less agitated.       Objective:     Vital Signs (Most Recent):  Temp: 98.6 °F (37 °C) (03/29/25 0736)  Pulse: 96 (03/29/25 0736)  Resp: 17 (03/29/25 0736)  BP: 132/72 (03/29/25 0736)  SpO2: 96 % (03/29/25 0736) Vital Signs  (24h Range):  Temp:  [97.5 °F (36.4 °C)-98.6 °F (37 °C)] 98.6 °F (37 °C)  Pulse:  [] 96  Resp:  [16-18] 17  SpO2:  [96 %-100 %] 96 %  BP: (128-190)/(59-84) 132/72     Weight: 54 kg (119 lb 0.8 oz)  Body mass index is 19.81 kg/m².    Intake/Output Summary (Last 24 hours) at 3/29/2025 1139  Last data filed at 3/29/2025 0624  Gross per 24 hour   Intake 480 ml   Output --   Net 480 ml         Physical Exam  Constitutional:       General: She is not in acute distress.     Appearance: She is not ill-appearing.   HENT:      Mouth/Throat:      Mouth: Mucous membranes are moist.   Cardiovascular:      Rate and Rhythm: Normal rate and regular rhythm.      Heart sounds: No murmur heard.  Pulmonary:      Effort: Pulmonary effort is normal. No respiratory distress.   Abdominal:      General: Abdomen is flat.   Musculoskeletal:         General: No swelling. Normal range of motion.   Skin:     General: Skin is warm.   Neurological:      General: No focal deficit present.      Mental Status: She is alert.               Significant Labs: All pertinent labs within the past 24 hours have been reviewed.  CBC:   Recent Labs   Lab 03/27/25  1811 03/28/25  0326 03/29/25  0507   WBC 14.18* 14.01* 8.34   HGB 13.6 13.3 11.7*   HCT 40.4 39.2 34.8*    412 376     CMP:   Recent Labs   Lab 03/28/25  0326 03/29/25  0507   * 134*   K 3.9 4.1   CL 99 102   CO2 22* 24   BUN 16 15   CREATININE 0.6 0.7   CALCIUM 8.9 8.6*   ALBUMIN 3.0* 2.8*   ANIONGAP 12 8       Significant Imaging: I have reviewed all pertinent imaging results/findings within the past 24 hours.      Assessment & Plan  Hyponatremia  Hyponatremia is likely due to Dehydration/hypovolemia. The patient's most recent sodium results are listed below.  Recent Labs     03/26/25  1513 03/28/25  0326 03/29/25  0507   * 133* 134*     Plan  - Correct the sodium by 4-6mEq in 24 hours.   - Obtain the following studies: done.  - Will treat the hyponatremia with IVF  -  Monitor sodium Daily.   - Patient hyponatremia is improving  - Continue with gentle IVF - agrees to more fluids - improved.   Substance use disorder  - chronic BZD abuse  - ran out and caused withdrawal  - on Rx. For anxiety apparently      Benzodiazepine withdrawal  - Seen by Addiction Psych  - On Librium taper  - restless and wanting to leave. Required Ativan IVP for agitation  - CIWA monitoring  - Ativan 2mg q4 PRN if 2 criteria are met: Systolic BP>160, Diastolic BP >110 or Pulse >110 or CIWA > 8  - Recent inpatient psych admission for suicide attempt with klonopin, required librium taper.   - librium taper with ativan PRN     - Appears improved and less agitated.     Severe episode of recurrent major depressive disorder  Patient has persistent depression which is moderate and is currently controlled. Will Continue anti-depressant medications. We will consult psychiatry at this time. Patient does not display psychosis at this time. Continue to monitor closely and adjust plan of care as needed.    Severe caregiver burden with chronically ill mother, complicated grief with death of spouse. Sister with bipolar. Slightly pressured speech on exam. Complicated by benzo use.    Librium taper x 5 days, Lexapro 5 mg PO daily, Remeron 15 mg QHS      Hypertension  Patient's blood pressure range in the last 24 hours was: BP  Min: 128/59  Max: 190/84.The patient's inpatient anti-hypertensive regimen is listed below:  Current Antihypertensives  lisinopriL tablet 5 mg, Daily, Oral  Lisinoprol 5mg daily  Plan  - BP is controlled, no changes needed to their regimen    Leukocytosis  Likely stress response. No indication for ABX    VTE Risk Mitigation (From admission, onward)           Ordered     enoxaparin injection 40 mg  Daily         03/25/25 0537     IP VTE HIGH RISK PATIENT  Once         03/25/25 0537     Place sequential compression device  Until discontinued         03/25/25 0537                    Discharge Planning    CATHY: 4/1/2025     Code Status: Full Code   Medical Readiness for Discharge Date:   Discharge Plan A: Home, Other (Referral for substance abuse/ mental health counseling)                        Se Quintanilla MD  Department of Hospital Medicine   [facility]

## 2025-03-29 NOTE — ASSESSMENT & PLAN NOTE
Patient's blood pressure range in the last 24 hours was: BP  Min: 128/59  Max: 190/84.The patient's inpatient anti-hypertensive regimen is listed below:  Current Antihypertensives  lisinopriL tablet 5 mg, Daily, Oral  Lisinoprol 5mg daily  Plan  - BP is controlled, no changes needed to their regimen

## 2025-03-29 NOTE — PLAN OF CARE
Problem: Delirium  Goal: Optimal Coping  Outcome: Not Progressing  Goal: Improved Behavioral Control  Outcome: Not Progressing  Goal: Improved Attention and Thought Clarity  Outcome: Not Progressing  Goal: Improved Sleep  Outcome: Not Progressing     Problem: Fall Injury Risk  Goal: Absence of Fall and Fall-Related Injury  Outcome: Not Progressing

## 2025-03-30 VITALS
SYSTOLIC BLOOD PRESSURE: 148 MMHG | TEMPERATURE: 98 F | DIASTOLIC BLOOD PRESSURE: 83 MMHG | OXYGEN SATURATION: 100 % | WEIGHT: 119.06 LBS | HEIGHT: 65 IN | RESPIRATION RATE: 18 BRPM | BODY MASS INDEX: 19.83 KG/M2 | HEART RATE: 92 BPM

## 2025-03-30 LAB
ABSOLUTE EOSINOPHIL (OHS): 0.29 K/UL
ABSOLUTE MONOCYTE (OHS): 0.73 K/UL (ref 0.3–1)
ABSOLUTE NEUTROPHIL COUNT (OHS): 3.69 K/UL (ref 1.8–7.7)
ALBUMIN SERPL BCP-MCNC: 2.6 G/DL (ref 3.5–5.2)
ANION GAP (OHS): 7 MMOL/L (ref 8–16)
BACTERIA BLD CULT: NORMAL
BACTERIA BLD CULT: NORMAL
BASOPHILS # BLD AUTO: 0.04 K/UL
BASOPHILS NFR BLD AUTO: 0.5 %
BUN SERPL-MCNC: 15 MG/DL (ref 8–23)
CALCIUM SERPL-MCNC: 8.5 MG/DL (ref 8.7–10.5)
CHLORIDE SERPL-SCNC: 105 MMOL/L (ref 95–110)
CO2 SERPL-SCNC: 25 MMOL/L (ref 23–29)
CREAT SERPL-MCNC: 0.6 MG/DL (ref 0.5–1.4)
ERYTHROCYTE [DISTWIDTH] IN BLOOD BY AUTOMATED COUNT: 16.4 % (ref 11.5–14.5)
GFR SERPLBLD CREATININE-BSD FMLA CKD-EPI: >60 ML/MIN/1.73/M2
GLUCOSE SERPL-MCNC: 93 MG/DL (ref 70–110)
HCT VFR BLD AUTO: 34.3 % (ref 37–48.5)
HGB BLD-MCNC: 11.2 GM/DL (ref 12–16)
IMM GRANULOCYTES # BLD AUTO: 0.04 K/UL (ref 0–0.04)
IMM GRANULOCYTES NFR BLD AUTO: 0.5 % (ref 0–0.5)
LYMPHOCYTES # BLD AUTO: 3.54 K/UL (ref 1–4.8)
MAGNESIUM SERPL-MCNC: 1.8 MG/DL (ref 1.6–2.6)
MCH RBC QN AUTO: 31.8 PG (ref 27–50)
MCHC RBC AUTO-ENTMCNC: 32.7 G/DL (ref 32–36)
MCV RBC AUTO: 97 FL (ref 82–98)
NUCLEATED RBC (/100WBC) (OHS): 0 /100 WBC
PHOSPHATE SERPL-MCNC: 3.2 MG/DL (ref 2.7–4.5)
PLATELET # BLD AUTO: 388 K/UL (ref 150–450)
PMV BLD AUTO: 8.6 FL (ref 9.2–12.9)
POTASSIUM SERPL-SCNC: 4.8 MMOL/L (ref 3.5–5.1)
RBC # BLD AUTO: 3.52 M/UL (ref 4–5.4)
RELATIVE EOSINOPHIL (OHS): 3.5 %
RELATIVE LYMPHOCYTE (OHS): 42.5 % (ref 18–48)
RELATIVE MONOCYTE (OHS): 8.8 % (ref 4–15)
RELATIVE NEUTROPHIL (OHS): 44.2 % (ref 38–73)
SODIUM SERPL-SCNC: 137 MMOL/L (ref 136–145)
WBC # BLD AUTO: 8.33 K/UL (ref 3.9–12.7)

## 2025-03-30 PROCEDURE — 85025 COMPLETE CBC W/AUTO DIFF WBC: CPT

## 2025-03-30 PROCEDURE — 36415 COLL VENOUS BLD VENIPUNCTURE: CPT

## 2025-03-30 PROCEDURE — 25000003 PHARM REV CODE 250

## 2025-03-30 PROCEDURE — 25000003 PHARM REV CODE 250: Performed by: INTERNAL MEDICINE

## 2025-03-30 PROCEDURE — 80069 RENAL FUNCTION PANEL: CPT | Performed by: INTERNAL MEDICINE

## 2025-03-30 PROCEDURE — 83735 ASSAY OF MAGNESIUM: CPT

## 2025-03-30 RX ORDER — ESCITALOPRAM OXALATE 10 MG/1
10 TABLET ORAL DAILY
Qty: 90 TABLET | Refills: 3 | Status: SHIPPED | OUTPATIENT
Start: 2025-03-30 | End: 2026-03-30

## 2025-03-30 RX ADMIN — CHLORDIAZEPOXIDE HYDROCHLORIDE 25 MG: 25 CAPSULE ORAL at 12:03

## 2025-03-30 RX ADMIN — LORAZEPAM 1 MG: 1 TABLET ORAL at 06:03

## 2025-03-30 RX ADMIN — OLANZAPINE 5 MG: 5 TABLET, ORALLY DISINTEGRATING ORAL at 08:03

## 2025-03-30 RX ADMIN — FOLIC ACID 1 MG: 1 TABLET ORAL at 08:03

## 2025-03-30 RX ADMIN — THERA TABS 1 TABLET: TAB at 08:03

## 2025-03-30 RX ADMIN — CYANOCOBALAMIN TAB 250 MCG 250 MCG: 250 TAB at 08:03

## 2025-03-30 RX ADMIN — LOPERAMIDE HYDROCHLORIDE 2 MG: 2 CAPSULE ORAL at 06:03

## 2025-03-30 RX ADMIN — Medication 100 MG: at 08:03

## 2025-03-30 RX ADMIN — LISINOPRIL 5 MG: 5 TABLET ORAL at 08:03

## 2025-03-30 NOTE — NURSING
VN cued into room and permission is given to adjust the camera towards patient.  Patient is ambulating independently in room and in no apparent distress.  AVS reviewed with patient and patient verbalized complete understanding on the discharge instructions.  Patient received medications from Outpatient Pharmacy.  Patient wants to walk to the Exit, Floor Nurse notified.  Voiced no other concerns.

## 2025-03-30 NOTE — ASSESSMENT & PLAN NOTE
Patient has persistent depression which is moderate and is currently controlled. Will Continue anti-depressant medications. We will consult psychiatry at this time. Patient does not display psychosis at this time. Continue to monitor closely and adjust plan of care as needed.    Severe caregiver burden with chronically ill mother, complicated grief with death of spouse. Sister with bipolar. Slightly pressured speech on exam. Complicated by benzo use.    Librium taper x 5 days    Started on Lexapro 10mg daily.

## 2025-03-30 NOTE — ASSESSMENT & PLAN NOTE
Hyponatremia is likely due to Dehydration/hypovolemia. The patient's most recent sodium results are listed below.  Recent Labs     03/28/25  0326 03/29/25  0507 03/30/25  0543   * 134* 137     Plan  - Correct the sodium by 4-6mEq in 24 hours.   - Obtain the following studies: done.  - Will treat the hyponatremia with IVF  - Monitor sodium Daily.   - Patient hyponatremia is improving  - Continue with gentle IVF - agrees to more fluids - improved.

## 2025-03-30 NOTE — PLAN OF CARE
Cm reviewed discharge orders, patient verbalized agreement with POC resources given for outpatient resources. IMM signed, family to assist with transport home.     Ruby Aviles RN  Case Management  [phone]

## 2025-03-30 NOTE — ASSESSMENT & PLAN NOTE
- chronic BZD abuse  - ran out and caused withdrawal  - on Rx. For anxiety apparently  - Lexapro started on discharge  - close follow up with psychiatry for management suggested. Referrals sent.

## 2025-03-30 NOTE — ASSESSMENT & PLAN NOTE
Patient's blood pressure range in the last 24 hours was: BP  Min: 131/56  Max: 152/73.The patient's inpatient anti-hypertensive regimen is listed below:  Current Antihypertensives  lisinopriL tablet 5 mg, Daily, Oral    Plan  - BP is controlled, no changes needed to their regimen  - outpatient follow up for adjustments.

## 2025-03-30 NOTE — DISCHARGE SUMMARY
[facility] Medicine  Discharge Summary      Patient Name: Juan Antonio Rowe  MRN: [id number]  YARITZA: [id number]  Patient Class: IP- Inpatient  Admission Date: 3/25/2025  Hospital Length of Stay: 5 days  Discharge Date and Time:  03/30/2025 8:46 AM  Attending Physician: Se Quintanilla MD   Discharging Provider: Se Quintanilla MD  Primary Care Provider: Brendon Hardy MD  [facility] Medicine Team: [facility] Se Quintanilla MD  Primary Care Team: [facility]    HPI:   No notes on file    * No surgery found *      Hospital Course:   Ms. Rowe is a 74 y.o. F with a PMHx of HLD, HTN, PUD, chronic benzodiazepine use, colectomy, anxiety, depression, prior suicide attempts who presented to the ED for concern for benzodiazepine withdrawal. She is prescribed 1mg Klonopin BID though has been taking it TID as she needs it to help her sleep. She ran out x4 days ago. She reports drinking 2 beers this morning though is not a daily drinker. She is prescribed Ambien to sleep though does not take it and takes the Klonopin instead. She had an admission in January after a suicide attempt. She took 60 tablets of Ativan at that time. She was discharged from the psychiatric facility on a Librium taper. Pt has not had anything to eat in x3 days. She lives alone and has no children. She is currently complaining of chest pain that radiates to her whole body. She has had multiple falls over the past few days.     Stepped down to medicine from ICU on 3/26/25. Agitated and wanting to leave AMA. Psych re-evaluated and felt that she does not have capacity - will PEC if wanting to leave AMA.   Undergoing Librium taper for BZD withdrawal    More cooperative today. Still wants to leave but ok to stay until the weekend.   Tolerating taper well, agitation improved. Hyponatremia resolved with IVF.     Follow up with Psychiatry and PCP outpatient. Started on Lexapro 10mg daily for anxiety and depression, uptitrate as feasible.       Goals of Care Treatment Preferences:  Code Status: Full Code      SDOH Screening:  The patient was screened for food insecurity, housing instability, transportation needs, utility difficulties, and interpersonal safety. The social determinant(s) of health identified as a concern this admission are:      Social Drivers of Health with Concerns     Food Insecurity: Food Insecurity Present (3/27/2025)   Housing Stability: High Risk (3/27/2025)   Transportation Needs: Unmet Transportation Needs (3/27/2025)        Consults:   Consults (From admission, onward)          Status Ordering Provider     Inpatient consult to Psychiatry  Once        Provider:  (Not yet assigned)    Completed MAISHA STEWARD     Inpatient consult to Psychiatry  Once        Provider:  (Not yet assigned)    Completed RASHAUN DE DIOS     Inpatient consult to Critical Care Medicine  Once        Provider:  (Not yet assigned)    Completed GENNARO HODGSON            Assessment & Plan  Hyponatremia  Hyponatremia is likely due to Dehydration/hypovolemia. The patient's most recent sodium results are listed below.  Recent Labs     03/28/25  0326 03/29/25  0507 03/30/25  0543   * 134* 137     Plan  - Correct the sodium by 4-6mEq in 24 hours.   - Obtain the following studies: done.  - Will treat the hyponatremia with IVF  - Monitor sodium Daily.   - Patient hyponatremia is improving  - Continue with gentle IVF - agrees to more fluids - improved.   Substance use disorder  - chronic BZD abuse  - ran out and caused withdrawal  - on Rx. For anxiety apparently  - Lexapro started on discharge  - close follow up with psychiatry for management suggested. Referrals sent.       Benzodiazepine withdrawal  - Seen by Addiction Psych  - On Librium taper  - restless and wanting to leave. Required Ativan IVP for agitation  - CIWA monitoring  - Ativan 2mg q4 PRN if 2 criteria are met: Systolic BP>160, Diastolic BP >110 or Pulse >110 or CIWA > 8  - Recent inpatient  psych admission for suicide attempt with klonopin, required librium taper.   - librium taper with ativan PRN     - improved and stable.     Severe episode of recurrent major depressive disorder  Patient has persistent depression which is moderate and is currently controlled. Will Continue anti-depressant medications. We will consult psychiatry at this time. Patient does not display psychosis at this time. Continue to monitor closely and adjust plan of care as needed.    Severe caregiver burden with chronically ill mother, complicated grief with death of spouse. Sister with bipolar. Slightly pressured speech on exam. Complicated by benzo use.    Librium taper x 5 days    Started on Lexapro 10mg daily.      Hypertension  Patient's blood pressure range in the last 24 hours was: BP  Min: 131/56  Max: 152/73.The patient's inpatient anti-hypertensive regimen is listed below:  Current Antihypertensives  lisinopriL tablet 5 mg, Daily, Oral    Plan  - BP is controlled, no changes needed to their regimen  - outpatient follow up for adjustments.     Leukocytosis  Likely stress response. No indication for ABX    Final Active Diagnoses:    Diagnosis Date Noted POA    PRINCIPAL PROBLEM:  Hyponatremia [E87.1] 03/25/2025 Yes    Substance use disorder [F19.90] 03/25/2025 No    Benzodiazepine withdrawal [F13.939] 03/25/2025 Yes    Severe episode of recurrent major depressive disorder [F33.2] 03/25/2025 Yes    Hypertension [I10]  Yes     Chronic    Leukocytosis [D72.829] 03/25/2025 Yes      Problems Resolved During this Admission:       Discharged Condition: stable    Disposition: Home or Self Care    Follow Up:   Follow-up Information       Brendon Hardy MD Follow up.    Specialty: Internal Medicine  Contact information:  [address]  [place] [address]  [phone]               Raji Patten MD Follow up in 1 week(s).    Specialties: Psychiatry, Addiction Medicine  Contact information:  [address]  [place]  [place] [address]  [phone]                           Patient Instructions:      Ambulatory referral/consult to Psychiatry   Standing Status: Future   Referral Priority: Routine Referral Type: Psychiatric   Referral Reason: Specialty Services Required   Requested Specialty: Psychiatry   Number of Visits Requested: 1     Diet Adult Regular     No dressing needed     Activity as tolerated       Significant Diagnostic Studies: Labs: BMP:   Recent Labs   Lab 03/29/25  0507 03/30/25  0543   * 137   K 4.1 4.8    105   CO2 24 25   BUN 15 15   CREATININE 0.7 0.6   CALCIUM 8.6* 8.5*   MG 1.9 1.8    and CBC   Recent Labs   Lab 03/29/25  0507 03/30/25  0543   WBC 8.34 8.33   HGB 11.7* 11.2*   HCT 34.8* 34.3*    388       Pending Diagnostic Studies:       Procedure Component Value Units Date/Time    EKG 12-lead [[id number]]     Order Status: Sent Lab Status: No result     EKG 12-lead [[id number]]     Order Status: Sent Lab Status: No result            Medications:  Reconciled Home Medications:      Medication List        START taking these medications      EScitalopram oxalate 10 MG tablet  Commonly known as: LEXAPRO  Take 1 tablet (10 mg total) by mouth once daily.            CONTINUE taking these medications      calcium carbonate 600 mg calcium (1,500 mg) Tab  Commonly known as: OS-TORI  Take 600 mg by mouth 2 (two) times daily with meals.     FISH OIL ORAL  Take 1 capsule by mouth once daily.     gabapentin 300 MG capsule  Commonly known as: NEURONTIN  Take 1 capsule (300 mg total) by mouth 3 (three) times daily.     lisinopriL-hydrochlorothiazide 20-12.5 mg per tablet  Commonly known as: PRINZIDE,ZESTORETIC  Take 1 tablet by mouth 2 (two) times a day.     potassium chloride SA 20 MEQ tablet  Commonly known as: K-DUR,KLOR-CON  Take 1 tablet (20 mEq total) by mouth once daily.     zolpidem 5 MG Tab  Commonly known as: AMBIEN  Take 1 tablet (5 mg total) by mouth nightly as needed (Insomnia).            STOP  taking these medications      clonazePAM 1 MG tablet  Commonly known as: KlonoPIN     MAGNESIUM ORAL     multivitamin per tablet  Commonly known as: THERAGRAN     tiZANidine 4 MG tablet  Commonly known as: ZANAFLEX     traZODone 100 MG tablet  Commonly known as: DESYREL              Indwelling Lines/Drains at time of discharge:   Lines/Drains/Airways       None                   Time spent on the discharge of patient: 30 minutes         Se Quintanilla MD  Department of Hospital Medicine  [facility]

## 2025-03-30 NOTE — PLAN OF CARE
[facility] - Med Surg  Discharge Final Note    Primary Care Provider: Brendon Hardy MD    Expected Discharge Date: 3/30/2025    Final Discharge Note (most recent)       Final Note - 03/30/25 1840          Final Note    Assessment Type Final Discharge Note     Anticipated Discharge Disposition Home or Self Care     What phone number can be called within the next 1-3 days to see how you are doing after discharge? [phone]        Post-Acute Status    Post-Acute Authorization Other     Other Status No Post-Acute Service Needs                     Important Message from Medicare  Important Message from Medicare regarding Discharge Appeal Rights: Given to patient/caregiver, Explained to patient/caregiver, Signed/date by patient/caregiver     Date IMM was signed: 03/30/25  Time IMM was signed: 0900    Contact Info       Brendon Hardy MD   Specialty: Internal Medicine   Relationship: PCP - General    [address] [place] [address]   Phone: [phone]       Next Steps: Follow up    Raji Patten MD   Specialty: Psychiatry, Addiction Medicine    [address] [facility] [address]   Phone: [phone]       Next Steps: Follow up in 1 week(s)          Discharge Plan A and Plan B have been determined by review of patient's clinical status, future medical and therapeutic needs, and coverage/benefits for post-acute care in coordination with multidisciplinary team members.     Cm spoke with patient agreeable to discharge, resources for Op rehab given to patient, no additional CM needs at this time. Bedside nurse to reviewed instructions.     Ruby Aviles RN  Case Management  [phone]

## 2025-03-30 NOTE — PLAN OF CARE
Patient discharged home via brother with meds. Discharge instructions given to patient- virtual nurse reviewed with patient- questions and concerns answered.

## 2025-03-30 NOTE — PLAN OF CARE
Problem: Adult Inpatient Plan of Care  Goal: Plan of Care Review  Outcome: Progressing  Goal: Patient-Specific Goal (Individualized)  Outcome: Progressing  Goal: Absence of Hospital-Acquired Illness or Injury  Outcome: Progressing  Goal: Optimal Comfort and Wellbeing  Outcome: Progressing  Goal: Readiness for Transition of Care  Outcome: Progressing     Problem: Infection  Goal: Absence of Infection Signs and Symptoms  Outcome: Progressing     Problem: Wound  Goal: Optimal Coping  Outcome: Progressing  Goal: Optimal Functional Ability  Outcome: Progressing  Goal: Absence of Infection Signs and Symptoms  Outcome: Progressing  Goal: Improved Oral Intake  Outcome: Progressing  Goal: Optimal Pain Control and Function  Outcome: Progressing  Goal: Skin Health and Integrity  Outcome: Progressing  Goal: Optimal Wound Healing  Outcome: Progressing     Problem: Skin Injury Risk Increased  Goal: Skin Health and Integrity  Outcome: Progressing     Problem: Delirium  Goal: Optimal Coping  Outcome: Progressing  Goal: Improved Behavioral Control  Outcome: Progressing  Goal: Improved Attention and Thought Clarity  Outcome: Progressing  Goal: Improved Sleep  Outcome: Progressing     Problem: Fall Injury Risk  Goal: Absence of Fall and Fall-Related Injury  Outcome: Progressing

## 2025-03-30 NOTE — ASSESSMENT & PLAN NOTE
- Seen by Addiction Psych  - On Librium taper  - restless and wanting to leave. Required Ativan IVP for agitation  - CIWA monitoring  - Ativan 2mg q4 PRN if 2 criteria are met: Systolic BP>160, Diastolic BP >110 or Pulse >110 or CIWA > 8  - Recent inpatient psych admission for suicide attempt with klonopin, required librium taper.   - librium taper with ativan PRN     - improved and stable.

## 2025-04-12 ENCOUNTER — HOSPITAL ENCOUNTER (EMERGENCY)
Facility: HOSPITAL | Age: 75
Discharge: HOME | End: 2025-04-12
Attending: STUDENT IN AN ORGANIZED HEALTH CARE EDUCATION/TRAINING PROGRAM
Payer: MEDICARE

## 2025-04-12 VITALS
RESPIRATION RATE: 18 BRPM | HEART RATE: 99 BPM | OXYGEN SATURATION: 99 % | SYSTOLIC BLOOD PRESSURE: 152 MMHG | DIASTOLIC BLOOD PRESSURE: 78 MMHG | TEMPERATURE: 98 F

## 2025-04-12 DIAGNOSIS — F41.9 ANXIETY: ICD-10-CM

## 2025-04-12 DIAGNOSIS — F19.20 SUBSTANCE DEPENDENCE, DAILY USE: Primary | ICD-10-CM

## 2025-04-12 DIAGNOSIS — R06.02 SOB (SHORTNESS OF BREATH): ICD-10-CM

## 2025-04-12 LAB
ABSOLUTE EOSINOPHIL (OHS): 0.12 K/UL
ABSOLUTE MONOCYTE (OHS): 0.46 K/UL (ref 0.3–1)
ABSOLUTE NEUTROPHIL COUNT (OHS): 4.77 K/UL (ref 1.8–7.7)
ALBUMIN SERPL BCP-MCNC: 3.5 G/DL (ref 3.5–5.2)
ALP SERPL-CCNC: 137 UNIT/L (ref 40–150)
ALT SERPL W/O P-5'-P-CCNC: 5 UNIT/L (ref 10–44)
ANION GAP (OHS): 11 MMOL/L (ref 8–16)
AST SERPL-CCNC: 12 UNIT/L (ref 11–45)
BASOPHILS # BLD AUTO: 0.08 K/UL
BASOPHILS NFR BLD AUTO: 1 %
BILIRUB SERPL-MCNC: 0.3 MG/DL (ref 0.1–1)
BNP SERPL-MCNC: 48 PG/ML (ref 0–99)
BUN SERPL-MCNC: 17 MG/DL (ref 8–23)
CALCIUM SERPL-MCNC: 9.4 MG/DL (ref 8.7–10.5)
CHLORIDE SERPL-SCNC: 102 MMOL/L (ref 95–110)
CO2 SERPL-SCNC: 24 MMOL/L (ref 23–29)
CREAT SERPL-MCNC: 1 MG/DL (ref 0.5–1.4)
ERYTHROCYTE [DISTWIDTH] IN BLOOD BY AUTOMATED COUNT: 18.4 % (ref 11.5–14.5)
GFR SERPLBLD CREATININE-BSD FMLA CKD-EPI: 60 ML/MIN/1.73/M2
GLUCOSE SERPL-MCNC: 97 MG/DL (ref 70–110)
HCT VFR BLD AUTO: 45 % (ref 37–48.5)
HCV AB SERPL QL IA: NORMAL
HGB BLD-MCNC: 14.6 GM/DL (ref 12–16)
HIV 1+2 AB+HIV1 P24 AG SERPL QL IA: NORMAL
IMM GRANULOCYTES # BLD AUTO: 0.03 K/UL (ref 0–0.04)
IMM GRANULOCYTES NFR BLD AUTO: 0.4 % (ref 0–0.5)
LYMPHOCYTES # BLD AUTO: 2.17 K/UL (ref 1–4.8)
MCH RBC QN AUTO: 32.7 PG (ref 27–31)
MCHC RBC AUTO-ENTMCNC: 32.4 G/DL (ref 32–36)
MCV RBC AUTO: 101 FL (ref 82–98)
NUCLEATED RBC (/100WBC) (OHS): 0 /100 WBC
OHS QRS DURATION: 82 MS
OHS QTC CALCULATION: 473 MS
PLATELET # BLD AUTO: 481 K/UL (ref 150–450)
PMV BLD AUTO: 8.9 FL (ref 9.2–12.9)
POTASSIUM SERPL-SCNC: 4.2 MMOL/L (ref 3.5–5.1)
PROT SERPL-MCNC: 7 GM/DL (ref 6–8.4)
RBC # BLD AUTO: 4.46 M/UL (ref 4–5.4)
RELATIVE EOSINOPHIL (OHS): 1.6 %
RELATIVE LYMPHOCYTE (OHS): 28.4 % (ref 18–48)
RELATIVE MONOCYTE (OHS): 6 % (ref 4–15)
RELATIVE NEUTROPHIL (OHS): 62.6 % (ref 38–73)
SODIUM SERPL-SCNC: 137 MMOL/L (ref 136–145)
T4 FREE SERPL-MCNC: 0.71 NG/DL (ref 0.71–1.51)
TROPONIN I SERPL HS-MCNC: 5 NG/L
TROPONIN I SERPL HS-MCNC: 5 NG/L
TSH SERPL-ACNC: 0.31 UIU/ML (ref 0.4–4)
WBC # BLD AUTO: 7.63 K/UL (ref 3.9–12.7)

## 2025-04-12 PROCEDURE — 85025 COMPLETE CBC W/AUTO DIFF WBC: CPT | Performed by: STUDENT IN AN ORGANIZED HEALTH CARE EDUCATION/TRAINING PROGRAM

## 2025-04-12 PROCEDURE — 87389 HIV-1 AG W/HIV-1&-2 AB AG IA: CPT | Performed by: PHYSICIAN ASSISTANT

## 2025-04-12 PROCEDURE — 84439 ASSAY OF FREE THYROXINE: CPT | Performed by: STUDENT IN AN ORGANIZED HEALTH CARE EDUCATION/TRAINING PROGRAM

## 2025-04-12 PROCEDURE — 93010 ELECTROCARDIOGRAM REPORT: CPT | Mod: ,,, | Performed by: INTERNAL MEDICINE

## 2025-04-12 PROCEDURE — 83880 ASSAY OF NATRIURETIC PEPTIDE: CPT | Performed by: STUDENT IN AN ORGANIZED HEALTH CARE EDUCATION/TRAINING PROGRAM

## 2025-04-12 PROCEDURE — 99285 EMERGENCY DEPT VISIT HI MDM: CPT | Mod: 25

## 2025-04-12 PROCEDURE — 93005 ELECTROCARDIOGRAM TRACING: CPT

## 2025-04-12 PROCEDURE — 86803 HEPATITIS C AB TEST: CPT | Performed by: PHYSICIAN ASSISTANT

## 2025-04-12 PROCEDURE — 80053 COMPREHEN METABOLIC PANEL: CPT | Performed by: STUDENT IN AN ORGANIZED HEALTH CARE EDUCATION/TRAINING PROGRAM

## 2025-04-12 PROCEDURE — 84484 ASSAY OF TROPONIN QUANT: CPT | Performed by: STUDENT IN AN ORGANIZED HEALTH CARE EDUCATION/TRAINING PROGRAM

## 2025-04-12 PROCEDURE — 94761 N-INVAS EAR/PLS OXIMETRY MLT: CPT

## 2025-04-12 PROCEDURE — 84443 ASSAY THYROID STIM HORMONE: CPT | Performed by: STUDENT IN AN ORGANIZED HEALTH CARE EDUCATION/TRAINING PROGRAM

## 2025-04-12 NOTE — DISCHARGE INSTRUCTIONS
Take the Librium you have prescribed to avoid benzodiazepine withdrawal.   We have given you resources for rehabilitation.

## 2025-04-12 NOTE — ED PROVIDER NOTES
"Encounter Date: 4/12/2025       History     Chief Complaint   Patient presents with    Anxiety     Started a new anxiety medication today and drinks 10 kratom drinks a day typically - had 1 today. Now feels like she is gonna die     HPI  Ms. Rowe is a 74 YOF with PMH HLD, HTN, PUD, and suicide attempt is presenting with anxiety.    Patient reports significant distress today. She reports "addiction" to the drink, Feel Free which contains Kratom. She states that she was drinking upwards to 10 of these beverages a day. She states that they make her feel better. She then reports that she was seen by her PCP where she "lied to him" and was given Librium in exchange for her Klonopin yesterday. She states that she began feeling "bad" so took her Librium 4 times, but only made her feel worse. She reportedly statse "I feel like I'm going to die". She denies any thoughts of self harm, SI or HI. She also endorses significant stressors in her life include family relationships, grief, and general anxiety. She also reports some shortness of breath, but denies any chest pain. She denies any other acute concerns.    Review of patient's allergies indicates:  No Known Allergies  Past Medical History:   Diagnosis Date    Acute gastric ulcer with perforation     Acute kidney failure with tubular necrosis     GABBI (acute kidney injury)     Anxiety     Diarrhea     Encounter for medical screening examination 1/9/2025    HLD (hyperlipidemia)     Hypertension     Nausea & vomiting     Peritonitis     PUD (peptic ulcer disease)     Renal disorder     Urine retention     Vascular disorder of intestine, unspecified      Past Surgical History:   Procedure Laterality Date    APPENDECTOMY      APPLICATION OF WOUND VACUUM-ASSISTED CLOSURE DEVICE  1/21/2021    Procedure: APPLICATION, WOUND VAC;  Surgeon: Abel Francis MD;  Location: [facility] OR [address] [place] [address];  Service: General;;    APPLICATION OF WOUND VACUUM-ASSISTED CLOSURE DEVICE  1/25/2021    " Procedure: APPLICATION, WOUND VAC;  Surgeon: Abel Francis MD;  Location: [facility] OR 2ND FLR;  Service: General;;    CLOSURE OF PERFORATED ULCER OF DUODENUM USING OMENTAL PATCH  1/21/2021    Procedure: CLOSURE, ULCER, PERFORATED, DUODENUM, USING OMENTAL PATCH;  Surgeon: Abel Francis MD;  Location: [facility] OR 2ND FLR;  Service: General;;    COLECTOMY, RIGHT  1/21/2021    Procedure: COLECTOMY, RIGHT;  Surgeon: Abel Francis MD;  Location: [facility] OR 2ND FLR;  Service: General;;    cyst removed from neck      ESOPHAGOGASTRODUODENOSCOPY N/A 3/13/2024    Procedure: EGD (ESOPHAGOGASTRODUODENOSCOPY);  Surgeon: Braxton Contreras MD;  Location: [facility] (2ND FLR);  Service: Endoscopy;  Laterality: N/A;    GASTROSTOMY  1/25/2021    Procedure: GASTROSTOMY;  Surgeon: Abel Francis MD;  [facility];  Service: General;;    HYSTERECTOMY  1986    ILEOSTOMY CLOSURE  10/13/2021    Procedure: CLOSURE, ILEOSTOMY;  Surgeon: Abel Francis MD;  [facility];  Service: General;;    LYSIS OF ADHESIONS  10/13/2021    Procedure: LYSIS, ADHESIONS;  Surgeon: Abel Francis MD;  [address];  Service: General;;    OOPHORECTOMY Bilateral 1989    OPEN REDUCTION AND INTERNAL FIXATION (ORIF) OF LISFRANC INJURY OF FOOT Left 10/11/2019    Procedure: ORIF, LISFRANC INJURY, FOOT;  Surgeon: Ferdinand Stauffer DPM;  Location: [facility] OR 1ST FLR;  Service: Podiatry;  Laterality: Left;    TONSILLECTOMY, ADENOIDECTOMY       Family History   Problem Relation Name Age of Onset    Hypertension Mother       Social History[1]  Review of Systems    Physical Exam     Initial Vitals [04/12/25 0255]   BP Pulse Resp Temp SpO2   (!) 170/100 100 18 98 °F (36.7 °C) 99 %      MAP       --         Physical Exam    Nursing note and vitals reviewed.  Cardiovascular:  Normal rate, regular rhythm, normal heart sounds and intact distal pulses.           No murmur heard.  Pulmonary/Chest: Breath sounds normal. No  respiratory distress. She has no wheezes.   Abdominal: Abdomen is soft. Bowel sounds are normal.     Psychiatric:   Tearful, anxious and agitated         ED Course   Procedures  Labs Reviewed   CBC WITH DIFFERENTIAL - Abnormal       Result Value    WBC 7.63      RBC 4.46      HGB 14.6      HCT 45.0       (*)     MCH 32.7 (*)     MCHC 32.4      RDW 18.4 (*)     Platelet Count 481 (*)     MPV 8.9 (*)     Nucleated RBC 0      Neut % 62.6      Lymph % 28.4      Mono % 6.0      Eos % 1.6      Basophil % 1.0      Imm Grans % 0.4      Neut # 4.77      Lymph # 2.17      Mono # 0.46      Eos # 0.12      Baso # 0.08      Imm Grans # 0.03     COMPREHENSIVE METABOLIC PANEL - Abnormal    Sodium 137      Potassium 4.2      Chloride 102      CO2 24      Glucose 97      BUN 17      Creatinine 1.0      Calcium 9.4      Protein Total 7.0      Albumin 3.5      Bilirubin Total 0.3            AST 12      ALT 5 (*)     Anion Gap 11      eGFR 60 (*)    HEPATITIS C ANTIBODY - Normal    Hep C Ab Interp Non-Reactive     HIV 1 / 2 ANTIBODY - Normal    HIV 1/2 Ag/Ab Non-Reactive     TROPONIN I HIGH SENSITIVITY - Normal    Troponin High Sensitive 5     TROPONIN I HIGH SENSITIVITY - Normal    Troponin High Sensitive 5     B-TYPE NATRIURETIC PEPTIDE - Normal    BNP 48     CBC W/ AUTO DIFFERENTIAL    Narrative:     The following orders were created for panel order CBC auto differential.  Procedure                               Abnormality         Status                     ---------                               -----------         ------                     CBC with Differential[[id number]]       Abnormal            Final result                 Please view results for these tests on the individual orders.   TSH        ECG Results              EKG 12-lead (Final result)        Collection Time Result Time QRS Duration OHS QTC Calculation    04/12/25 03:27:43 04/12/25 08:02:54 82 473                     Final result by Interface, Lab  In [facility] (04/12/25 08:03:09)                   Narrative:    Test Reason : R07.9,    Vent. Rate :  86 BPM     Atrial Rate :  86 BPM     P-R Int : 158 ms          QRS Dur :  82 ms      QT Int : 396 ms       P-R-T Axes :  60  60  75 degrees    QTcB Int : 473 ms    Normal sinus rhythm  Minimal voltage criteria for LVH, may be normal variant  Borderline Abnormal ECG  When compared with ECG of 25-Mar-2025 05:00,  T wave inversion no longer evident in Lateral leads  Confirmed by Rajeev Palencia ([id number]) on 4/12/2025 8:02:50 AM    Referred By: MARCO ANTONIOREFERRAL SELF           Confirmed By: Rajeev Palencia                                  Imaging Results              X-Ray Chest AP Portable (Final result)  Result time 04/12/25 04:21:45      Final result by Evangelina Trujillo MD (04/12/25 04:21:45)                   Impression:      Please see above.      Electronically signed by: Evangelina Trujillo MD  Date:    04/12/2025  Time:    04:21               Narrative:    EXAMINATION:  XR CHEST AP PORTABLE    CLINICAL HISTORY:  Chest Pain;    TECHNIQUE:  Single frontal view of the chest was performed.    COMPARISON:  03/25/2025    FINDINGS:  Cardiac monitoring leads overlie the chest.  The cardiomediastinal silhouette is within normal limits with atherosclerosis of thoracic aorta.  Mediastinal structures are midline.  The lungs are symmetrically expanded with diffuse coarse interstitial attenuation.  Multiple skin folds appear to project over the chest.  Allowing for this, no definite large pneumothorax identified.  No confluent airspace consolidation or significant volume of pleural fluid identified.  Osseous structures intact with degenerative change.                                       Medications - No data to display  Medical Decision Making  Amount and/or Complexity of Data Reviewed  Labs: ordered. Decision-making details documented in ED Course.  Radiology: ordered. Decision-making details documented in ED Course.  ECG/medicine tests:   "Decision-making details documented in ED Course.    This is a 74 YOF presenting with anxiety and feeling like she "is going to die". On arrival patient is hemodynamically stable. On exam she appears anxious. Her eyes are red rimmed. She voices multiple times she feels like she is going to die, but is adamant that she denies SI/HI. Differentials at this time include: anxiety, grief, benzo withdrawal, opioid withdrawal, ACS, hyperthyroidism, pneumonia, pneumothorax - though less likely lung sounds normal, electrolyte abnormality, intoxication, and cardiac arrhythmia. Plan to obtain cbc, cmp, troponin, bnp, tsh, EKG and CXR. EKG shows normal sinus rhythm, no STEMI. CXR without acute cardiopulmonary findings. Lab results overall unremarkable. At this point I believe that the patient's symptoms are likely related to withdrawal from benzodiazepines. Patient was already prescribed Librium taper for symptomatic relief. Attempted to discuss long term plans such as rehab center to assist with her withdrawal symptoms. Patient demanded Ativan and refused to conversate any further until Ativan was given. Attempted to explain rationale and concern for the patient's safety and that further dosing would likely be detrimental to her recovery. Patient responded that she will "stay in this room and suffer until she dies" if she doesn't get Ativan. However, still assures writer that she is not suicidal. Patient then sat up and turned away from writer and refused to speak further.  After writer stepped away patient she reportedly was seen dragging herself across the floor and demanding pain medications and Ativan. With redirection from staff patient was able to ambulate back to her bed. Discussed plan for discharge as patient does not have any medical needs that would warrant admission at this time, but did offer her rehab resources and reminded to take the Librium she was already prescribed for symptom relief. She was encouraged to " follow up with her PCP and given a referral to psychiatry to address her benzo dependence, grief and relationship stressors. Patient was offered social work consult prior to discharge. Return precautions provided.           ED Course as of 04/12/25 0838   Sat Apr 12, 2025   0346 X-Ray Chest AP Portable  And without acute cardiopulmonary finding. [AC]   0346 EKG 12-lead  Normal sinus rhythm.  Heart rate of 86.  Normal intervals.   milliseconds.  No STEMI. [AC]   0347 BP(!): 170/100 [AC]   0347 Pulse: 100 [AC]   0513 BNP: 48 [AC]   0513 Troponin I High Sensitivity: 5 [AC]   0634 Reassessed patient who is sleeping  [AC]   0645 Comprehensive metabolic panel(!)  unremarkable [AC]   0645 CBC auto differential(!)  unremarkable [AC]      ED Course User Index  [AC] Ary Mireles MD                           Clinical Impression:  Final diagnoses:  [F41.9] Anxiety (Primary)  [F13.930] Benzodiazepine withdrawal without complication  [R06.02] SOB (shortness of breath)          ED Disposition Condition    Discharge Stable          ED Prescriptions    None       Follow-up Information       Follow up With Specialties Details Why Contact Info    Brendon Hardy MD Internal Medicine In 2 days  [address] [facility] [id number] [phone]      [facility] - Emergency Dept Emergency Medicine  As needed [address] [facility] [address] [phone]                 [1]   Social History  Tobacco Use    Smoking status: Every Day    Smokeless tobacco: Never   Substance Use Topics    Alcohol use: Not Currently     Comment: socially    Drug use: Yes     Comment: Ary Ruiz MD  Resident  04/12/25 0891

## 2025-04-12 NOTE — ED NOTES
"Pt coming out of room stating "I need help, no one is helping me, I need ativan". MD notified. Pt ambulated back to stretcher.   "

## 2025-04-12 NOTE — ED NOTES
"Assumed care of patient. Pt stating "I'm in withdrawals bad". Pt appears in no distress, respirations even and unlabored. VSS. Call light in reach.  "

## 2025-04-12 NOTE — PROVIDER PROGRESS NOTES - EMERGENCY DEPT.
Encounter Date: 4/12/2025    ED Physician Progress Notes            ED Physician Hand-off Note:    ED Course: I assumed care of patient from off-going ED physician, Dr. Mireles.  Briefly, Patient is a 74F .    At the time of signout plan was pending labs    Disposition:  Discharge    Patient comfortable with current treatment. Patient counseled regarding exam, results, diagnosis, treatment, and plan.    After visit summary printed and given to patient, patient verbalizes understanding of return precautions.  Vital signs stable at time of discharge, questions answered.       Impression:  Stable    Final diagnoses:  [F41.9] Anxiety (Primary)  [F13.930] Benzodiazepine withdrawal without complication  [R06.02] SOB (shortness of breath)

## 2025-04-12 NOTE — PLAN OF CARE
HERI provided FREDY nolasco for pt to d/c home    Howard is arriving at [facility] in 5 min  [phone]  [employer]  [address]      Evelyn Perez CD, MSW, LMSW, RSW   Case Management  [facility]  Email: [email or web address]

## 2025-04-12 NOTE — ED NOTES
Juan Antonio Rowe, a 74 y.o. female presents to the ED w/ complaint of     Triage note:  Chief Complaint   Patient presents with    Anxiety     Started a new anxiety medication today and drinks 10 kratom drinks a day typically - had 1 today. Now feels like she is gonna die     Review of patient's allergies indicates:  No Known Allergies  Past Medical History:   Diagnosis Date    Acute gastric ulcer with perforation     Acute kidney failure with tubular necrosis     GABBI (acute kidney injury)     Anxiety     Diarrhea     Encounter for medical screening examination 1/9/2025    HLD (hyperlipidemia)     Hypertension     Nausea & vomiting     Peritonitis     PUD (peptic ulcer disease)     Renal disorder     Urine retention     Vascular disorder of intestine, unspecified

## (undated) DEVICE — SUT ETHILON 4-0 PS2 18 BLK

## (undated) DEVICE — CUTTER PROXIMATE BLUE 75MM

## (undated) DEVICE — SEE MEDLINE ITEM 156902

## (undated) DEVICE — BOVIE SUCTION

## (undated) DEVICE — SET BASIN 48X48IN 6000ML RING

## (undated) DEVICE — SUT VICRYL PLUS 3-0 SH 18IN

## (undated) DEVICE — DRAPE PLASTIC U 60X72

## (undated) DEVICE — SEE MEDLINE ITEM 146417

## (undated) DEVICE — SUT 1 48IN PDS II VIO MONO

## (undated) DEVICE — STOCKINET 4INX48

## (undated) DEVICE — GAUZE SPONGE 4X4 12PLY

## (undated) DEVICE — SUT MONOCRYL 3-0 PS-2 UND

## (undated) DEVICE — DRESSING XEROFORM 1X8IN

## (undated) DEVICE — Device

## (undated) DEVICE — CANISTER INFOV.A.C WOUND 500ML

## (undated) DEVICE — SEE MEDLINE ITEM 146270

## (undated) DEVICE — SEE MEDLINE ITEM 152529

## (undated) DEVICE — SUT 3/0 18IN COATED VICRYLP

## (undated) DEVICE — DRAPE ABDOMINAL TIBURON 14X11

## (undated) DEVICE — DRESSING SPONGE 8PLY 4X4 STRL

## (undated) DEVICE — SPONGE GAUZE 16PLY 4X4

## (undated) DEVICE — TOWEL OR DISP STRL BLUE 4/PK

## (undated) DEVICE — BLADE 4 INCH EDGE UN-INS

## (undated) DEVICE — VAC WOUND ULTRA THERAPY

## (undated) DEVICE — DRESSING ABTHERA SENSA TRAC

## (undated) DEVICE — PADDING CAST 4IN DELTA ROLL

## (undated) DEVICE — SPONGE DERMACEA 4PLY 4X4IN

## (undated) DEVICE — BLADE SURG #15 CARBON STEEL

## (undated) DEVICE — DRAPE INCISE IOBAN 2 23X17IN

## (undated) DEVICE — TRAY FOLEY 16FR INFECTION CONT

## (undated) DEVICE — TRAY MINOR ORTHO

## (undated) DEVICE — SYR 10CC LUER LOCK

## (undated) DEVICE — PENCIL VALLEYLAB TELSCP SMK

## (undated) DEVICE — SEE MEDLINE ITEM 157117

## (undated) DEVICE — RELOAD PROXIMATE CUT BLUE 75MM

## (undated) DEVICE — NDL HYPO REG 25G X 1 1/2

## (undated) DEVICE — SUT 2-0 12-18IN SILK

## (undated) DEVICE — STAPLER SKIN PROXIMATE WIDE

## (undated) DEVICE — TAPE CASTING 4IN BLUE

## (undated) DEVICE — GLOVE GAMMEX SURG LF PI SZ 7.5

## (undated) DEVICE — SPONGE LAP 18X18 PREWASHED

## (undated) DEVICE — DRAPE HEAD/BAR 40 X 27 W/ADH

## (undated) DEVICE — BANDAGE ESMARK LATEX FREE 4INX

## (undated) DEVICE — SUT PDS II 1 TP-1 VIL

## (undated) DEVICE — DRESSING N ADH OIL EMUL 3X3

## (undated) DEVICE — DRAPE STERI-DRAPE 1000 17X11IN

## (undated) DEVICE — CONTAINER SPECIMEN STRL 4OZ

## (undated) DEVICE — ELECTRODE REM PLYHSV RETURN 9

## (undated) DEVICE — KIT PREVENA PLUS

## (undated) DEVICE — DRESSING ADH ISLAND 3.6 X 14

## (undated) DEVICE — SUT 2/0 30IN SILK BLK BRAI

## (undated) DEVICE — DRAPE MINI C ARM STERILE

## (undated) DEVICE — SUT SILK 3-0 SH 18IN BLACK

## (undated) DEVICE — ADHESIVE DERMABOND ADVANCED

## (undated) DEVICE — SEE MEDLINE ITEM 146298

## (undated) DEVICE — SEE L#120831

## (undated) DEVICE — HEMOSTAT SURGICEL 2X3IN

## (undated) DEVICE — ELECTRODE EXTENDED BLADE

## (undated) DEVICE — SUT 3-0 12-18IN SILK

## (undated) DEVICE — KIT ENDOVIVE SAF PEG PULL 20FR

## (undated) DEVICE — SUT PDS II O CTX MONO 60

## (undated) DEVICE — SEALER LIGASURE MARYLAND 23CM

## (undated) DEVICE — SEE MEDLINE ITEM 157131

## (undated) DEVICE — APPLICATOR CHLORAPREP ORN 26ML

## (undated) DEVICE — SPONGE DERMACEA GAUZE 4X4